# Patient Record
Sex: FEMALE | Race: BLACK OR AFRICAN AMERICAN | NOT HISPANIC OR LATINO | Employment: OTHER | URBAN - METROPOLITAN AREA
[De-identification: names, ages, dates, MRNs, and addresses within clinical notes are randomized per-mention and may not be internally consistent; named-entity substitution may affect disease eponyms.]

---

## 2017-01-27 ENCOUNTER — TELEPHONE (OUTPATIENT)
Dept: INTERNAL MEDICINE | Facility: CLINIC | Age: 67
End: 2017-01-27

## 2017-02-07 ENCOUNTER — OFFICE VISIT (OUTPATIENT)
Dept: INTERNAL MEDICINE | Facility: CLINIC | Age: 67
End: 2017-02-07
Payer: MEDICARE

## 2017-02-07 ENCOUNTER — HOSPITAL ENCOUNTER (OUTPATIENT)
Dept: RADIOLOGY | Facility: HOSPITAL | Age: 67
Discharge: HOME | End: 2017-02-07
Attending: INTERNAL MEDICINE
Payer: MEDICARE

## 2017-02-07 VITALS
TEMPERATURE: 98 F | HEART RATE: 76 BPM | SYSTOLIC BLOOD PRESSURE: 124 MMHG | DIASTOLIC BLOOD PRESSURE: 70 MMHG | BODY MASS INDEX: 31.17 KG/M2 | WEIGHT: 170.44 LBS | RESPIRATION RATE: 15 BRPM

## 2017-02-07 DIAGNOSIS — M25.561 CHRONIC PAIN OF RIGHT KNEE: ICD-10-CM

## 2017-02-07 DIAGNOSIS — G89.29 CHRONIC BILATERAL LOW BACK PAIN WITHOUT SCIATICA: ICD-10-CM

## 2017-02-07 DIAGNOSIS — E04.2 MULTIPLE THYROID NODULES: ICD-10-CM

## 2017-02-07 DIAGNOSIS — I10 ESSENTIAL HYPERTENSION: Primary | Chronic | ICD-10-CM

## 2017-02-07 DIAGNOSIS — M54.50 CHRONIC BILATERAL LOW BACK PAIN WITHOUT SCIATICA: ICD-10-CM

## 2017-02-07 DIAGNOSIS — K21.9 GASTROESOPHAGEAL REFLUX DISEASE, ESOPHAGITIS PRESENCE NOT SPECIFIED: ICD-10-CM

## 2017-02-07 DIAGNOSIS — R06.02 SOB (SHORTNESS OF BREATH) ON EXERTION: ICD-10-CM

## 2017-02-07 DIAGNOSIS — G89.29 CHRONIC PAIN OF RIGHT KNEE: ICD-10-CM

## 2017-02-07 DIAGNOSIS — E55.9 VITAMIN D DEFICIENCY: ICD-10-CM

## 2017-02-07 PROCEDURE — 1160F RVW MEDS BY RX/DR IN RCRD: CPT | Mod: S$GLB,,, | Performed by: INTERNAL MEDICINE

## 2017-02-07 PROCEDURE — 3074F SYST BP LT 130 MM HG: CPT | Mod: S$GLB,,, | Performed by: INTERNAL MEDICINE

## 2017-02-07 PROCEDURE — 73562 X-RAY EXAM OF KNEE 3: CPT | Mod: 26,RT,, | Performed by: RADIOLOGY

## 2017-02-07 PROCEDURE — 99499 UNLISTED E&M SERVICE: CPT | Mod: S$GLB,,, | Performed by: INTERNAL MEDICINE

## 2017-02-07 PROCEDURE — 99214 OFFICE O/P EST MOD 30 MIN: CPT | Mod: S$GLB,,, | Performed by: INTERNAL MEDICINE

## 2017-02-07 PROCEDURE — 73562 X-RAY EXAM OF KNEE 3: CPT | Mod: TC,PO,RT

## 2017-02-07 PROCEDURE — 3078F DIAST BP <80 MM HG: CPT | Mod: S$GLB,,, | Performed by: INTERNAL MEDICINE

## 2017-02-07 PROCEDURE — 1159F MED LIST DOCD IN RCRD: CPT | Mod: S$GLB,,, | Performed by: INTERNAL MEDICINE

## 2017-02-07 PROCEDURE — 99999 PR PBB SHADOW E&M-EST. PATIENT-LVL III: CPT | Mod: PBBFAC,,, | Performed by: INTERNAL MEDICINE

## 2017-02-07 PROCEDURE — 93010 ELECTROCARDIOGRAM REPORT: CPT | Mod: S$GLB,,, | Performed by: INTERNAL MEDICINE

## 2017-02-07 PROCEDURE — 1157F ADVNC CARE PLAN IN RCRD: CPT | Mod: S$GLB,,, | Performed by: INTERNAL MEDICINE

## 2017-02-07 PROCEDURE — 93005 ELECTROCARDIOGRAM TRACING: CPT | Mod: S$GLB,,, | Performed by: INTERNAL MEDICINE

## 2017-02-07 NOTE — PROGRESS NOTES
Subjective:       Patient ID: Jojo Burrows is a 67 y.o. female.    Chief Complaint: Follow-up; Thyroid Problem; and Hypertension    HPI   The patient presents for evaluation of medical conditions.  She has hypertension and is tolerating her blood pressure medication well.  She has multinodular goiter and has been noting throat pain and soreness of the anterior neck at the site of her thyroid.  Symptoms have been present for the past 2 weeks.  She also admits to periods of shortness of breath.  She complains of easy fatigability.  Occasional upper sternal and left sided chest pains are noted at rest or with physical activity.    The patient has chronic joint pains involving the hips and knee joints.  She has been experiencing more symptoms of pain in her right knee.  She states this has limited her ability to exercise more.  There is no history of any recent injury involving the joint.  She has use meloxicam alternating with Tylenol for joint pain management.  Review of Systems   Constitutional: Positive for activity change. Negative for appetite change, fatigue and unexpected weight change.   HENT: Positive for sore throat.    Eyes: Negative for visual disturbance.   Respiratory: Positive for shortness of breath. Negative for cough.    Cardiovascular: Positive for chest pain. Negative for palpitations and leg swelling.   Gastrointestinal: Negative for abdominal pain, blood in stool, constipation and diarrhea.   Genitourinary: Negative for dysuria and hematuria.   Musculoskeletal: Positive for arthralgias, back pain, joint swelling and neck pain. Negative for neck stiffness.   Skin: Negative for rash.   Neurological: Negative for dizziness, syncope and headaches.   Psychiatric/Behavioral: Negative for sleep disturbance.       Objective:      Physical Exam   Constitutional: She is oriented to person, place, and time. She appears well-developed and well-nourished. No distress.   HENT:   Head: Normocephalic and  atraumatic.   Eyes: Conjunctivae and EOM are normal. No scleral icterus.   Neck: Normal range of motion. Neck supple. No JVD present. Thyromegaly present.   Cardiovascular: Normal rate, regular rhythm, normal heart sounds and intact distal pulses.  Exam reveals no gallop and no friction rub.    No murmur heard.  Pulmonary/Chest: Effort normal and breath sounds normal. No respiratory distress. She has no wheezes. She has no rales.   Abdominal: Soft. Bowel sounds are normal. She exhibits no mass. There is no tenderness.   Musculoskeletal:   Mild tenderness is noted with flexion and extension of the right knee; range of motion appears to be intact.  No effusion is appreciated.   Lymphadenopathy:     She has no cervical adenopathy.   Neurological: She is alert and oriented to person, place, and time.   Skin: Skin is warm and dry. No rash noted.   Nursing note and vitals reviewed.    EKG today shows a sinus rhythm with ventricular rate of 60.  Moderate voltage criteria for LVH, may be normal variant.  Nonspecific T-wave abnormality.  Abnormal EKG.  Assessment:       1. Essential hypertension    2. Multiple thyroid nodules    3. SOB (shortness of breath) on exertion    4. Chronic pain of right knee    5. Chronic bilateral low back pain without sciatica    6. Gastroesophageal reflux disease, esophagitis presence not specified    7. Vitamin D deficiency        Plan:       Jojo was seen today for follow-up of multiple medical conditions.  Easy fatigability with recurrent shortness of breath and abnormal EKG warrant further cardiac evaluation.  A pharmacologic nuclear stress test will be obtained to assess for any significant coronary artery disease..  X-rays of the right knee will be obtained along with sports medicine consultation regarding her right knee pain as requested.  A thyroid ultrasound will be obtained for interval assessment of her nodular goiter.  Fasting blood tests and a follow-up visit in 4 months will be  obtained.    Diagnoses and all orders for this visit:    Essential hypertension  -     EKG 12-lead; Future  -     CBC auto differential; Future  -     Comprehensive metabolic panel; Future  -     Lipid panel; Future  -     Urinalysis; Future    Multiple thyroid nodules  -     US Soft Tissue Head Neck Thyroid; Future  -     TSH; Future  -     T4, free; Future    SOB (shortness of breath) on exertion  -     EKG 12-lead; Future  -     Nuclear Stress Test - Pharmacologic - Interpreted by Cardiology ([facility]); Future    Chronic pain of right knee  -     X-Ray Knee 3 View Right; Future  -     Ambulatory consult to Sports Medicine    Chronic bilateral low back pain without sciatica    Gastroesophageal reflux disease, esophagitis presence not specified    Vitamin D deficiency  -     Vitamin D; Future

## 2017-02-09 ENCOUNTER — HOSPITAL ENCOUNTER (OUTPATIENT)
Dept: RADIOLOGY | Facility: HOSPITAL | Age: 67
Discharge: HOME | End: 2017-02-09
Attending: INTERNAL MEDICINE
Payer: MEDICARE

## 2017-02-09 DIAGNOSIS — E04.2 MULTIPLE THYROID NODULES: ICD-10-CM

## 2017-02-09 PROCEDURE — 76536 US EXAM OF HEAD AND NECK: CPT | Mod: TC

## 2017-02-09 PROCEDURE — 76536 US EXAM OF HEAD AND NECK: CPT | Mod: 26,,, | Performed by: RADIOLOGY

## 2017-02-13 ENCOUNTER — OFFICE VISIT (OUTPATIENT)
Dept: SPORTS MEDICINE | Facility: CLINIC | Age: 67
End: 2017-02-13
Payer: MEDICARE

## 2017-02-13 ENCOUNTER — HOSPITAL ENCOUNTER (OUTPATIENT)
Dept: RADIOLOGY | Facility: HOSPITAL | Age: 67
Discharge: HOME | End: 2017-02-13
Attending: FAMILY MEDICINE
Payer: MEDICARE

## 2017-02-13 VITALS — HEIGHT: 62 IN | WEIGHT: 169 LBS | BODY MASS INDEX: 31.1 KG/M2 | TEMPERATURE: 98 F

## 2017-02-13 DIAGNOSIS — M25.561 CHRONIC PAIN OF RIGHT KNEE: ICD-10-CM

## 2017-02-13 DIAGNOSIS — G89.29 CHRONIC PAIN OF RIGHT KNEE: ICD-10-CM

## 2017-02-13 DIAGNOSIS — G89.29 CHRONIC PAIN OF BOTH KNEES: ICD-10-CM

## 2017-02-13 DIAGNOSIS — M25.561 CHRONIC PAIN OF BOTH KNEES: ICD-10-CM

## 2017-02-13 DIAGNOSIS — M25.562 CHRONIC PAIN OF BOTH KNEES: ICD-10-CM

## 2017-02-13 DIAGNOSIS — M17.0 PRIMARY OSTEOARTHRITIS OF BOTH KNEES: Primary | ICD-10-CM

## 2017-02-13 PROCEDURE — 73562 X-RAY EXAM OF KNEE 3: CPT | Mod: 26,50,, | Performed by: RADIOLOGY

## 2017-02-13 PROCEDURE — 1125F AMNT PAIN NOTED PAIN PRSNT: CPT | Mod: S$GLB,,, | Performed by: FAMILY MEDICINE

## 2017-02-13 PROCEDURE — 99999 PR PBB SHADOW E&M-EST. PATIENT-LVL III: CPT | Mod: PBBFAC,,, | Performed by: FAMILY MEDICINE

## 2017-02-13 PROCEDURE — 1159F MED LIST DOCD IN RCRD: CPT | Mod: S$GLB,,, | Performed by: FAMILY MEDICINE

## 2017-02-13 PROCEDURE — 1160F RVW MEDS BY RX/DR IN RCRD: CPT | Mod: S$GLB,,, | Performed by: FAMILY MEDICINE

## 2017-02-13 PROCEDURE — 20611 DRAIN/INJ JOINT/BURSA W/US: CPT | Mod: 50,S$GLB,, | Performed by: FAMILY MEDICINE

## 2017-02-13 PROCEDURE — 1157F ADVNC CARE PLAN IN RCRD: CPT | Mod: S$GLB,,, | Performed by: FAMILY MEDICINE

## 2017-02-13 PROCEDURE — 99203 OFFICE O/P NEW LOW 30 MIN: CPT | Mod: 25,S$GLB,, | Performed by: FAMILY MEDICINE

## 2017-02-13 PROCEDURE — 73562 X-RAY EXAM OF KNEE 3: CPT | Mod: TC,50,PO,RT

## 2017-02-13 RX ORDER — TRIAMCINOLONE ACETONIDE 40 MG/ML
40 INJECTION, SUSPENSION INTRA-ARTICULAR; INTRAMUSCULAR
Status: DISCONTINUED | OUTPATIENT
Start: 2017-02-13 | End: 2017-02-13 | Stop reason: HOSPADM

## 2017-02-13 RX ADMIN — TRIAMCINOLONE ACETONIDE 40 MG: 40 INJECTION, SUSPENSION INTRA-ARTICULAR; INTRAMUSCULAR at 11:02

## 2017-02-13 NOTE — LETTER
February 13, 2017      Joo Munoz MD  [address]  [place] [address]           [facility]  [address] [address]  Phone: [phone]          Patient: Jojo Burrows   MR Number: [id number]   YOB: 1950   Date of Visit: 2/13/2017       Dear Dr. Joo Munoz:    Thank you for referring Jojo Burrows to me for evaluation. Attached you will find relevant portions of my assessment and plan of care.    If you have questions, please do not hesitate to call me. I look forward to following Jojo Burrows along with you.    Sincerely,    Moreno Bergeron MD    Enclosure  CC:  No Recipients    If you would like to receive this communication electronically, please contact [email or web address] or [phone] to request more information on [employer] [facility] access.    For providers and/or their staff who would like to refer a patient to [facility], please contact us through our one-stop-shop provider referral line, [facility], [place], at [phone].    If you feel you have received this communication in error or would no longer like to receive these types of communications, please e-mail [email or web address]

## 2017-02-13 NOTE — MR AVS SNAPSHOT
[facility]  [address]  [facility] [address]  Phone: [phone]                  Jojo Burrows   2017 9:45 AM   Appointment    Description:  Female : 1950   Provider:  Moreno Bergeron MD   Department:  [facility]                To Do List           Future Appointments        Provider Department Dept Phone    2017 7:15 AM NUCLEAR CAMERA, [facility] - Nuclear Camera [phone]    2017 8:00 AM LAB, [place] [facility] - Laboratory [phone]    2017 8:15 AM SPECIMEN, [place] [facility] - Specimen Lab [phone]    2017 1:00 PM Joo Munoz MD [facility] - Internal Medicine [phone]      Goals (5 Years of Data)     None      [facility] On Call     [facility] On Call Nurse [facility] -  Assistance  Registered nurses in the [facility] On Call Center provide clinical advisement, health education, appointment booking, and other advisory services.  Call for this free service at [phone].             Medications           Message regarding Medications     Verify the changes and/or additions to your medication regime listed below are the same as discussed with your clinician today.  If any of these changes or additions are incorrect, please notify your healthcare provider.             Verify that the below list of medications is an accurate representation of the medications you are currently taking.  If none reported, the list may be blank. If incorrect, please contact your healthcare provider. Carry this list with you in case of emergency.           Current Medications     esomeprazole (NEXIUM) 40 MG capsule Take 1 capsule (40 mg total) by mouth before breakfast.    meloxicam (MOBIC) 15 MG tablet Take 1 tablet (15 mg total) by mouth once daily.    metoprolol succinate (TOPROL-XL) 50 MG 24 hr tablet TAKE 1 TABLET BY MOUTH EVERY DAY           Clinical Reference Information           Allergies as of 2017     Iodine     Iodine And Iodide Containing Products    Shellfish Containing Products      Immunizations Administered on Date of Encounter - 2/13/2017     None      Language Assistance Services     ATTENTION: Language assistance services are available, free of charge. Please call [phone].      ATENCIÓN: Si dedrick li, tiene a quezada disposición servicios gratuitos de asistencia lingüística. Llame al [phone].     CHÚ Ý: N?u b?n nói Ti?ng Vi?t, có các d?ch v? h? tr? ngôn ng? mi?n phí dành cho b?n. G?i s? [phone].         [facility] complies with applicable Federal civil rights laws and does not discriminate on the basis of race, color, national origin, age, disability, or sex.

## 2017-02-13 NOTE — PROGRESS NOTES
Jojo Burrows, a 67 y.o. female, presents today for evaluation of her right knee.      This patient visit is a consult from the following provider: Joo Munoz MD  Today's office visit notes will be made available to the consulting/refering provider via the mail, a fax, and/or an in basket message through the electronic medical record    New patient.    HISTORY OF PRESENT ILLNESS   Location: anterior knee, right  Onset: insidious,many years  Palliative:    Relative rest   Oral analgesics (meloxicam,tylenol)  Provocative:    ADLs  Prior:    14.05 - L knee - arthroscopy - PPetrocy   Progression: worsening discomfort  Quality:    Clicking    Swollen    Sharp at times  Radiation: none  Severity: per nursing documentation  Timing: intermittent w/ use  Trauma: none     Review of systems (ROS):  A 10+ review of systems was performed with pertinent positives and negatives noted above in the history of present illness. Other systems were negative unless otherwise specified.    PHYSICAL EXAMINATION  General:  The patient is alert and oriented x 3. Mood is pleasant. Observation of ears, eyes and nose reveal no gross abnormalities. HEENT: NCAT, sclera anicteric.   Lungs: Respirations are equal and unlabored.  Gait is coordinated. Patient can toe walk and heel walk without difficulty.    RIGHT KNEE EXAMINATION    Observation/Inspection  Gait:   Antalgic   Alignment:  Neutral   Scars:   None   Muscle atrophy: Mild  Effusion:  None   Warmth:  None   Discoloration:   none     Tenderness / Crepitus (T / C):         T / C      T / C  Patella   + / -   Lateral joint line   + / -     Peripatellar medial  +  Medial joint line    + / -  Peripatellar lateral +  Medial plica   - / -  Patellar tendon +   Popliteal fossa   + / -  Quad tendon   +   Gastrocnemius   -  Prepatellar Bursa + / -   Quadricep   -  Tibial tubercle  -  Thigh/hamstring  -  Pes anserine/HS -  Fibula    -  ITB   - / -  Tibia     -  Tib/fib joint  - /  -  LCL    -    MFC   - / -   MCL: Proximal  -    LFC   - / -   Distal    -          ROM: (* = pain)  PASSIVE   ACTIVE    Left :   5 / 0 / 145   5 / 0 / 145     Right :    5 / 0 / 145   5 / 0 / 145    Patellofemoral examination:  See above noted areas of tenderness.   Patella position    Subluxation / dislocation: Centered        Sup. / Inf;   Normal   Crepitus (PF):    Absent   Patellar Mobility:       Medial-lateral:   Normal    Superior-inferior:  Normal    Inferior tilt   Normal    Patellar tendon:  Normal   Lateral tilt:    Normal   J-sign:     None   Patellofemoral grind:   PAINFUL     Meniscal Signs:     Pain on terminal extension:  -  Pain on terminal flexion:  -  Rebecas maneuver:  +*  Squat     NT    Ligament Examination:  ACL / Lachman:  WNL  PCL-Post.  drawer: normal 0 to 2mm  MCL- Valgus:  normal 0 to 2mm  LCL- Varus:    normal 0 to 2mm  Pivot shift:  guarding   Dial Test:   difference c/w other side   At 30° flexion: normal (< 5°)    At 90° flexion: normal (< 5°)   Reverse Pivot Shift:   normal (Equal)     Strength: (* = with pain) Painful Side  Quadriceps   4/5  Hamstrin/5    Extremity Neuro-vascular Examination:   Sensation:  Grossly intact to light touch all dermatomal regions.   Motor Function:  Fully intact motor function at hip, knee, foot and ankle    DTRs;  quadriceps and  achilles 2+.  No clonus and downgoing Babinski.    Vascular status:  DP and PT pulses 2+, brisk capillary refill, symmetric.     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, bilat   W/ knee pain, right >> left   S/p knee arthroscopy 14.05 PPatrocey, left      No evidence of neurologic pathology  No evidence of vascular pathology    Imaging studies reviewed:   X-ray knee, bilateral 17.02    Plan:    We discussed the importance of appropriate diet, weight, and regular exercise including quadriceps strengthening     We discussed options including:  #1  watchful waiting  #2 physical therapy aimed at:   Core stability   RoM knee   Strengthening quadriceps   Gait training   #3 injection therapy:   CSI iaknee     Right,     Left,    VSI iaknee    Right,     Left,    Orthobiologics   #4 MRI for further evaluation      The patient chooses #3 csi iaknee bilat    Pain management: handout given  Bracing:   Physical therapy:   Activity (e.g. sports, work) restrictions: as tolerated   school/vocation: active in [employer]    Follow up in 2 w  A/e csi iaknee bilat  Effective-->hgPT, f/u in 12 w  Ineffective-->vsi iaknee bilat  Should symptoms worsen or fail to resolve, consider:  Revisiting the above options

## 2017-02-13 NOTE — PROCEDURES
Large Joint Aspiration/Injection  Date/Time: 2/13/2017 11:15 AM  Performed by: SHANAE SWARTZ  Authorized by: SHANAE SWARTZ     Consent Done?:  Yes (Verbal)  Indications:  Pain  Procedure site marked: Yes    Timeout: Prior to procedure the correct patient, procedure, and site was verified      Location:  Knee  Site:  R knee and L knee  Prep: Patient was prepped and draped in usual sterile fashion    Ultrasonic Guidance for needle placement: Yes  Images are saved and documented.  Needle size:  18 G  Approach:  Lateral  Medications:  40 mg triamcinolone acetonide 40 mg/mL; 40 mg triamcinolone acetonide 40 mg/mL  Patient tolerance:  Patient tolerated the procedure well with no immediate complications    Additional Comments: Description of ultrasound utilization for needle guidance:   Ultrasound guidance used for needle localization.  Images saved and stored for documentation.  The knee joint was visualized.  Dynamic visualization of the 20g x 1.5  needle was continuous throughout the procedure.

## 2017-02-15 ENCOUNTER — TELEPHONE (OUTPATIENT)
Dept: CARDIOLOGY | Facility: CLINIC | Age: 67
End: 2017-02-15

## 2017-02-16 ENCOUNTER — TELEPHONE (OUTPATIENT)
Dept: INTERNAL MEDICINE | Facility: CLINIC | Age: 67
End: 2017-02-16

## 2017-02-16 ENCOUNTER — CLINICAL SUPPORT (OUTPATIENT)
Dept: CARDIOLOGY | Facility: CLINIC | Age: 67
End: 2017-02-16
Payer: MEDICARE

## 2017-02-16 DIAGNOSIS — N63.0 BREAST NODULE: Primary | ICD-10-CM

## 2017-02-16 DIAGNOSIS — R06.02 SOB (SHORTNESS OF BREATH) ON EXERTION: ICD-10-CM

## 2017-02-16 PROCEDURE — 99999 PR PBB SHADOW E&M-EST. PATIENT-LVL I: CPT | Mod: PBBFAC,,,

## 2017-02-16 PROCEDURE — A9502 TC99M TETROFOSMIN: HCPCS | Mod: S$GLB,,, | Performed by: INTERNAL MEDICINE

## 2017-02-16 PROCEDURE — 93015 CV STRESS TEST SUPVJ I&R: CPT | Mod: S$GLB,,, | Performed by: INTERNAL MEDICINE

## 2017-02-16 PROCEDURE — 78452 HT MUSCLE IMAGE SPECT MULT: CPT | Mod: S$GLB,,, | Performed by: INTERNAL MEDICINE

## 2017-02-16 NOTE — TELEPHONE ENCOUNTER
Pharmacologic nuclear stress test results were reviewed with the patient today.  There is no evidence of stress-induced myocardial ischemia.      Incidental note was made of increased uptake in a nodular lesion in the left breast.  These results were also discussed with patient.  Her last mammogram and breast ultrasound were obtained one year ago.  At that time cysts were found in both breasts.  A diagnostic mammogram will be ordered for further evaluation.  The patient is agreeable to proceeding with further evaluation as recommended.

## 2017-02-16 NOTE — TELEPHONE ENCOUNTER
----- Message from Shireen Rascon sent at 2/16/2017  3:11 PM CST -----  Contact: [phone]/  Pt had a nuclear speck done today. Dr want to tell results on pt.

## 2017-02-16 NOTE — TELEPHONE ENCOUNTER
----- Message from Shireen Rascon sent at 2/16/2017  3:11 PM CST -----  Contact: [phone]/  Pt had a nuclear speck done today. Dr want to tell results on pt.

## 2017-02-23 ENCOUNTER — HOSPITAL ENCOUNTER (OUTPATIENT)
Dept: RADIOLOGY | Facility: HOSPITAL | Age: 67
Discharge: HOME | End: 2017-02-23
Attending: INTERNAL MEDICINE
Payer: MEDICARE

## 2017-02-23 DIAGNOSIS — N63.0 BREAST NODULE: ICD-10-CM

## 2017-02-23 PROCEDURE — 77062 BREAST TOMOSYNTHESIS BI: CPT | Mod: TC

## 2017-02-23 PROCEDURE — 76642 ULTRASOUND BREAST LIMITED: CPT | Mod: TC,LT

## 2017-02-23 PROCEDURE — 76642 ULTRASOUND BREAST LIMITED: CPT | Mod: 26,LT,, | Performed by: RADIOLOGY

## 2017-02-23 PROCEDURE — 77062 BREAST TOMOSYNTHESIS BI: CPT | Mod: 26,,, | Performed by: RADIOLOGY

## 2017-02-23 PROCEDURE — 77066 DX MAMMO INCL CAD BI: CPT | Mod: 26,,, | Performed by: RADIOLOGY

## 2017-03-01 ENCOUNTER — HOSPITAL ENCOUNTER (OUTPATIENT)
Dept: RADIOLOGY | Facility: OTHER | Age: 67
Discharge: HOME | End: 2017-03-01
Attending: INTERNAL MEDICINE
Payer: MEDICARE

## 2017-03-01 DIAGNOSIS — Z00.00 GENERAL MEDICAL EXAM: ICD-10-CM

## 2017-03-01 DIAGNOSIS — E03.9 ACQUIRED HYPOTHYROIDISM: Primary | ICD-10-CM

## 2017-03-01 DIAGNOSIS — E83.51 HYPOCALCEMIA: ICD-10-CM

## 2017-03-01 DIAGNOSIS — I10 ESSENTIAL HYPERTENSION: ICD-10-CM

## 2017-03-01 DIAGNOSIS — R92.8 ABNORMAL MAMMOGRAM: ICD-10-CM

## 2017-03-01 PROCEDURE — 77059 MRI BREAST BILATERAL: CPT | Mod: 26,,, | Performed by: RADIOLOGY

## 2017-03-01 PROCEDURE — A9577 INJ MULTIHANCE: HCPCS | Performed by: INTERNAL MEDICINE

## 2017-03-01 PROCEDURE — 0159T MRI BREAST BILATERAL: CPT | Mod: TC

## 2017-03-01 PROCEDURE — 0159T MRI BREAST BILATERAL: CPT | Mod: 26,,, | Performed by: RADIOLOGY

## 2017-03-01 PROCEDURE — 25500020 PHARM REV CODE 255: Performed by: INTERNAL MEDICINE

## 2017-03-01 PROCEDURE — 77059 MRI BREAST BILATERAL: CPT | Mod: TC

## 2017-03-01 RX ADMIN — GADOBENATE DIMEGLUMINE 15 ML: 529 INJECTION, SOLUTION INTRAVENOUS at 03:03

## 2017-03-02 ENCOUNTER — OFFICE VISIT (OUTPATIENT)
Dept: SPORTS MEDICINE | Facility: CLINIC | Age: 67
End: 2017-03-02
Payer: MEDICARE

## 2017-03-02 VITALS — TEMPERATURE: 98 F | HEIGHT: 62 IN | WEIGHT: 169 LBS | BODY MASS INDEX: 31.1 KG/M2

## 2017-03-02 DIAGNOSIS — M17.0 PRIMARY OSTEOARTHRITIS OF BOTH KNEES: Primary | ICD-10-CM

## 2017-03-02 PROCEDURE — 99214 OFFICE O/P EST MOD 30 MIN: CPT | Mod: 25,S$GLB,, | Performed by: FAMILY MEDICINE

## 2017-03-02 PROCEDURE — 1125F AMNT PAIN NOTED PAIN PRSNT: CPT | Mod: S$GLB,,, | Performed by: FAMILY MEDICINE

## 2017-03-02 PROCEDURE — 99999 PR PBB SHADOW E&M-EST. PATIENT-LVL III: CPT | Mod: PBBFAC,,, | Performed by: FAMILY MEDICINE

## 2017-03-02 PROCEDURE — 1157F ADVNC CARE PLAN IN RCRD: CPT | Mod: S$GLB,,, | Performed by: FAMILY MEDICINE

## 2017-03-02 PROCEDURE — 1160F RVW MEDS BY RX/DR IN RCRD: CPT | Mod: S$GLB,,, | Performed by: FAMILY MEDICINE

## 2017-03-02 PROCEDURE — 1159F MED LIST DOCD IN RCRD: CPT | Mod: S$GLB,,, | Performed by: FAMILY MEDICINE

## 2017-03-02 PROCEDURE — 97110 THERAPEUTIC EXERCISES: CPT | Mod: GP,S$GLB,, | Performed by: FAMILY MEDICINE

## 2017-03-02 NOTE — PROGRESS NOTES
Jojo Burrows, a 67 y.o. female, presents today for evaluation of her right knee.      This patient visit is a consult from the following provider: Joo Munoz MD  Today's office visit notes will be made available to the consulting/refering provider via the mail, a fax, and/or an in basket message through the electronic medical record    HISTORY OF PRESENT ILLNESS   Location: anterior knee, right > left   Onset: insidious,many years  Palliative:    Relative rest   Oral analgesics (meloxicam,tylenol)   R/L - CSI, iaKnee, 02/13/17, 85-90% improvement   Provocative:    Decreased pain with    ADLs  Prior:    14.05 - L knee - arthroscopy - PPetrocy   Progression: resolving discomfort  Quality:    Was clicking    Was swollen    Sharp at times  Radiation: none  Severity: per nursing documentation  Timing: intermittent w/ use  Trauma: none     Review of systems (ROS):  A 10+ review of systems was performed with pertinent positives and negatives noted above in the history of present illness. Other systems were negative unless otherwise specified.    PHYSICAL EXAMINATION  General:  The patient is alert and oriented x 3. Mood is pleasant. Observation of ears, eyes and nose reveal no gross abnormalities. HEENT: NCAT, sclera anicteric.   Lungs: Respirations are equal and unlabored.  Gait is coordinated. Patient can toe walk and heel walk without difficulty.    RIGHT KNEE EXAMINATION    Observation/Inspection  Gait:   Antalgic   Alignment:  Neutral   Scars:   None   Muscle atrophy: Mild  Effusion:  None   Warmth:  None   Discoloration:   none     Tenderness / Crepitus (T / C):         T / C      T / C  Patella   + / -   Lateral joint line   + / -     Peripatellar medial  +  Medial joint line    + / -  Peripatellar lateral +  Medial plica   - / -  Patellar tendon +   Popliteal fossa   + / -  Quad tendon   +   Gastrocnemius   -  Prepatellar Bursa + / -   Quadricep   -  Tibial tubercle  -  Thigh/hamstring  -  Pes  anserine/HS -  Fibula    -  ITB   - / -  Tibia     -  Tib/fib joint  - / -  LCL    -    MFC   - / -   MCL: Proximal  -    LFC   - / -   Distal    -          ROM: (* = pain)  PASSIVE   ACTIVE    Left :   5 / 0 / 145   5 / 0 / 145     Right :    5 / 0 / 145   5 / 0 / 145    Patellofemoral examination:  See above noted areas of tenderness.   Patella position    Subluxation / dislocation: Centered        Sup. / Inf;   Normal   Crepitus (PF):    Absent   Patellar Mobility:       Medial-lateral:   Normal    Superior-inferior:  Normal    Inferior tilt   Normal    Patellar tendon:  Normal   Lateral tilt:    Normal   J-sign:     None   Patellofemoral grind:   PAINFUL     Meniscal Signs:     Pain on terminal extension:  -  Pain on terminal flexion:  -  Rebecas maneuver:  +*  Squat     NT    Ligament Examination:  ACL / Lachman:  WNL  PCL-Post.  drawer: normal 0 to 2mm  MCL- Valgus:  normal 0 to 2mm  LCL- Varus:    normal 0 to 2mm  Pivot shift:  guarding   Dial Test:   difference c/w other side   At 30° flexion: normal (< 5°)    At 90° flexion: normal (< 5°)   Reverse Pivot Shift:   normal (Equal)     Strength: (* = with pain) Painful Side  Quadriceps   4/5  Hamstrin/5    Extremity Neuro-vascular Examination:   Sensation:  Grossly intact to light touch all dermatomal regions.   Motor Function:  Fully intact motor function at hip, knee, foot and ankle    DTRs;  quadriceps and  achilles 2+.  No clonus and downgoing Babinski.    Vascular status:  DP and PT pulses 2+, brisk capillary refill, symmetric.     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, bilat   W/ knee pain, right >> left   S/p knee arthroscopy 14.05 PPatrocey, left      No evidence of neurologic pathology  No evidence of vascular pathology    Imaging studies reviewed:   X-ray knee, bilateral 17.02    Plan:    We discussed the importance of appropriate diet, weight, and regular exercise  "including quadriceps strengthening     We discussed options including:  #1 watchful waiting  #2 physical therapy aimed at:   Core stability   RoM knee   Strengthening quadriceps   Gait training   #3 injection therapy:   CSI iaknee     Right, effective 85-90%, repeat     Left, effective 85-90%, repeat    VSI iaknee    Right,     Left,    Orthobiologics   #4 MRI for further evaluation      The patient chooses #2    Pain management: handout given  Bracing:   Physical therapy: hgPT, handout given, begin as above  Activity (e.g. sports, work) restrictions: as tolerated   school/vocation: active in [employer]    Follow up in 12 w  A/e hgPT  Should symptoms worsen or fail to resolve, consider:  Revisiting the above options  T-  O-  I-  Ot-  OFC+ 17.03.02) 29988 HOME EXERCISE PROGRAM (HEP):  The patient was taught a homegoing physical therapy regimen as described above and based on the appropriate chapter of "The Sports Medicine Patient Advisor," by Porfirio Bolivar, [id number]. The patient demonstrated understanding of the exercises and proper technique of their execution. This interaction took 15 minutes.       "

## 2017-03-02 NOTE — MR AVS SNAPSHOT
[facility]  [address]  [facility] [address]  Phone: [phone]                  Jojo Burrows   3/2/2017 10:15 AM   Appointment    Description:  Female : 1950   Provider:  Moreno Bergeron MD   Department:  [facility]                To Do List           Future Appointments        Provider Department Dept Phone    2017 8:00 AM LAB, [place] - Laboratory [phone]    2017 8:15 AM SPECIMEN, [place] - Specimen Lab [phone]    2017 1:00 PM Joo Munoz MD [facility] Internal Medicine [phone]      Goals (5 Years of Data)     None      [facility] On Call     [facility] On Call Nurse Care Line -  Assistance  Registered nurses in the [facility] On Call Center provide clinical advisement, health education, appointment booking, and other advisory services.  Call for this free service at [phone].             Medications           Message regarding Medications     Verify the changes and/or additions to your medication regime listed below are the same as discussed with your clinician today.  If any of these changes or additions are incorrect, please notify your healthcare provider.             Verify that the below list of medications is an accurate representation of the medications you are currently taking.  If none reported, the list may be blank. If incorrect, please contact your healthcare provider. Carry this list with you in case of emergency.           Current Medications     esomeprazole (NEXIUM) 40 MG capsule Take 1 capsule (40 mg total) by mouth before breakfast.    meloxicam (MOBIC) 15 MG tablet Take 1 tablet (15 mg total) by mouth once daily.    metoprolol succinate (TOPROL-XL) 50 MG 24 hr tablet TAKE 1 TABLET BY MOUTH EVERY DAY           Clinical Reference Information           Allergies as of 3/2/2017     Iodine    Iodine And Iodide Containing Products    Shellfish Containing Products      Immunizations  Administered on Date of Encounter - 3/2/2017     None      Language Assistance Services     ATTENTION: Language assistance services are available, free of charge. Please call [phone].      ATENCIÓN: Si habharini li, tiene a quezada disposición servicios gratuitos de asistencia lingüística. Llame al [phone].     CHÚ Ý: N?u b?n nói Ti?ng Vi?t, có các d?ch v? h? tr? ngôn ng? mi?n phí dành cho b?n. G?i s? [phone].         [facility] complies with applicable Federal civil rights laws and does not discriminate on the basis of race, color, national origin, age, disability, or sex.

## 2017-03-02 NOTE — LETTER
March 2, 2017      Joo Munoz MD  [address]  [place] [address]           [facility]  [address] [address]  Phone: [phone]          Patient: Jojo Burrows   MR Number: [id number]   YOB: 1950   Date of Visit: 3/2/2017       Dear Dr. Joo Munoz:    Thank you for referring Jojo Burrows to me for evaluation. Attached you will find relevant portions of my assessment and plan of care.    If you have questions, please do not hesitate to call me. I look forward to following Jojo Burrows along with you.    Sincerely,    Moreno Bergeron MD    Enclosure  CC:  No Recipients    If you would like to receive this communication electronically, please contact [email or web address] or [phone] to request more information on [employer] Link access.    For providers and/or their staff who would like to refer a patient to [facility], please contact us through our one-stop-shop provider referral line, [facility], at [phone].    If you feel you have received this communication in error or would no longer like to receive these types of communications, please e-mail [email or web address]

## 2017-03-24 RX ORDER — PROMETHAZINE HYDROCHLORIDE AND DEXTROMETHORPHAN HYDROBROMIDE 6.25; 15 MG/5ML; MG/5ML
5 SYRUP ORAL EVERY 4 HOURS PRN
Qty: 240 ML | Refills: 0 | Status: SHIPPED | OUTPATIENT
Start: 2017-03-24 | End: 2017-03-25

## 2017-03-24 RX ORDER — AZITHROMYCIN 250 MG/1
TABLET, FILM COATED ORAL
Qty: 6 TABLET | Refills: 0 | Status: SHIPPED | OUTPATIENT
Start: 2017-03-24 | End: 2017-03-29

## 2017-03-24 NOTE — TELEPHONE ENCOUNTER
----- Message from SanjanaRubenshabnam Herrera sent at 3/24/2017 10:52 AM CDT -----  Contact: Pt at [phone]  Patient would like to get medical advice.  Symptoms (please be specific):  Cough, sore throat  How long has patient had these symptoms:  About 2 days  Pharmacy name and phone #:  [facility]  [phone] (Phone)  [phone] (Fax)  Any drug allergies:   IodineRash  Iodine And Iodide Containing ProductsItching, Rash  Shellfish Containing ProductsItching, Rash      Comments:

## 2017-03-25 ENCOUNTER — HOSPITAL ENCOUNTER (EMERGENCY)
Facility: HOSPITAL | Age: 67
Discharge: HOME | End: 2017-03-25
Attending: EMERGENCY MEDICINE
Payer: MEDICARE

## 2017-03-25 ENCOUNTER — NURSE TRIAGE (OUTPATIENT)
Dept: ADMINISTRATIVE | Facility: CLINIC | Age: 67
End: 2017-03-25

## 2017-03-25 VITALS
BODY MASS INDEX: 30.73 KG/M2 | HEIGHT: 62 IN | HEART RATE: 90 BPM | SYSTOLIC BLOOD PRESSURE: 141 MMHG | WEIGHT: 167 LBS | DIASTOLIC BLOOD PRESSURE: 68 MMHG | TEMPERATURE: 98 F | RESPIRATION RATE: 20 BRPM | OXYGEN SATURATION: 98 %

## 2017-03-25 DIAGNOSIS — J06.9 UPPER RESPIRATORY TRACT INFECTION, UNSPECIFIED TYPE: Primary | ICD-10-CM

## 2017-03-25 DIAGNOSIS — R05.9 COUGH: ICD-10-CM

## 2017-03-25 LAB
FLUAV AG SPEC QL IA: NEGATIVE
FLUBV AG SPEC QL IA: NEGATIVE
SPECIMEN SOURCE: NORMAL

## 2017-03-25 PROCEDURE — 99284 EMERGENCY DEPT VISIT MOD MDM: CPT

## 2017-03-25 PROCEDURE — 87400 INFLUENZA A/B EACH AG IA: CPT

## 2017-03-25 PROCEDURE — 93005 ELECTROCARDIOGRAM TRACING: CPT

## 2017-03-25 PROCEDURE — 93010 ELECTROCARDIOGRAM REPORT: CPT | Mod: ,,, | Performed by: INTERNAL MEDICINE

## 2017-03-25 RX ORDER — PROMETHAZINE HYDROCHLORIDE AND CODEINE PHOSPHATE 6.25; 1 MG/5ML; MG/5ML
5 SOLUTION ORAL EVERY 4 HOURS PRN
Qty: 120 ML | Refills: 0 | Status: SHIPPED | OUTPATIENT
Start: 2017-03-25 | End: 2017-04-04

## 2017-03-25 NOTE — ED AVS SNAPSHOT
[facility] [address]  [place] [address]               Jojo Burrows   3/25/2017  4:07 PM   ED    Description:  Female : 1950   Department:  [facility]           Your Care was Coordinated By:     Provider Role From To    Alvin Cannon MD Attending Provider 17 0319 --      Reason for Visit     Cough     Pleurisy           Diagnoses this Visit        Comments    Upper respiratory tract infection, unspecified type    -  Primary     Cough           ED Disposition     None           To Do List           Follow-up Information     Follow up with Joo Munoz MD.    Specialty:  Internal Medicine    Why:  As needed    Contact information:     [facility] [address] [place] [place] [address]  [phone]         These Medications        Disp Refills Start End    promethazine-codeine 6.25-10 mg/5 ml (PHENERGAN WITH CODEINE) 6.25-10 mg/5 mL syrup 120 mL 0 3/25/2017 2017    Take 5 mLs by mouth every 4 (four) hours as needed for Cough. - Oral    Pharmacy: [facility]/pharmacy #[id number] - [place] - [address] Ph #: [phone]         [facility]     [facility] Care [facility] -  Assistance  Registered nurses in the [facility] provide clinical advisement, health education, appointment booking, and other advisory services.  Call for this free service at [phone].             Medications           Message regarding Medications     Verify the changes and/or additions to your medication regime listed below are the same as discussed with your clinician today.  If any of these changes or additions are incorrect, please notify your healthcare provider.        START taking these NEW medications        Refills    promethazine-codeine 6.25-10 mg/5 ml (PHENERGAN WITH CODEINE) 6.25-10 mg/5 mL syrup 0    Sig: Take 5 mLs by mouth every 4 (four) hours as needed for Cough.    Class: Print    Route: Oral      STOP taking  "these medications     promethazine-dextromethorphan (PROMETHAZINE-DM) 6.25-15 mg/5 mL Syrp Take 5 mLs by mouth every 4 (four) hours as needed.           Verify that the below list of medications is an accurate representation of the medications you are currently taking.  If none reported, the list may be blank. If incorrect, please contact your healthcare provider. Carry this list with you in case of emergency.           Current Medications     azithromycin (Z-CARRIE) 250 MG tablet Take 2 tablets by mouth on day 1; Take 1 tablet by mouth on days 2-5    esomeprazole (NEXIUM) 40 MG capsule Take 1 capsule (40 mg total) by mouth before breakfast.    meloxicam (MOBIC) 15 MG tablet Take 1 tablet (15 mg total) by mouth once daily.    metoprolol succinate (TOPROL-XL) 50 MG 24 hr tablet TAKE 1 TABLET BY MOUTH EVERY DAY    promethazine-codeine 6.25-10 mg/5 ml (PHENERGAN WITH CODEINE) 6.25-10 mg/5 mL syrup Take 5 mLs by mouth every 4 (four) hours as needed for Cough.           Clinical Reference Information           Your Vitals Were     BP Pulse Temp Resp Height Weight    141/68 (BP Location: Right arm, Patient Position: Lying) 84 98.4 °F (36.9 °C) (Oral) 20 5' 2" (1.575 m) 75.8 kg (167 lb)    SpO2 BMI             96% 30.54 kg/m2         Allergies as of 3/25/2017        Reactions    Iodine Rash    Iodine And Iodide Containing Products Itching, Rash    Shellfish Containing Products Itching, Rash      Immunizations Administered on Date of Encounter - 3/25/2017     None      ED Micro, Lab, POCT     Start Ordered       Status Ordering Provider    03/25/17 1620 03/25/17 1620  Influenza antigen Nasal Swab  STAT      Final result       ED Imaging Orders     Start Ordered       Status Ordering Provider    03/25/17 1620 03/25/17 1620  X-Ray Chest PA And Lateral  1 time imaging      Final result       Your Scheduled Appointments     Jun 02, 2017  8:00 AM CDT   Fasting Lab with LAB, [facility]   [facility]    [address] [facility]  [place] [address]   [phone]            Jun 02, 2017  8:15 AM CDT   Urine with SPECIMEN, [place] - Specimen Lab ([place])    [address] [address]   [phone]            Jun 06, 2017  1:00 PM CDT   Physical with MD [place] [facility] - Internal Medicine ([place])    [address] [facility] [address]   [phone]               [facility] complies with applicable Federal civil rights laws and does not discriminate on the basis of race, color, national origin, age, disability, or sex.        Language Assistance Services     ATTENTION: Language assistance services are available, free of charge. Please call [phone].      ATENCIÓN: Si habla español, tiene a quezada disposición servicios gratuitos de asistencia lingüística. Llame al [phone].     CHÚ Ý: N?u b?n nói Ti?ng Vi?t, có các d?ch v? h? tr? ngôn ng? mi?n phí dành cho b?n. G?i s? [phone].

## 2017-03-25 NOTE — TELEPHONE ENCOUNTER
Reason for Disposition   Wheezing is present    Protocols used: ST COUGH - ACUTE [id number]    Patient called her md and got zpak and cough syrup from her pcp office yesterday. Today patient complains of a cough and wheezing and sore throat. Patient given the information for [facility] clinic nearest her home and she was admonished to have someone to drive her to urgent care. Also, advised patient to go to the ED if wheezing becomes severe. Patient verbalized understanding.

## 2017-03-25 NOTE — ED PROVIDER NOTES
Encounter Date: 3/25/2017       History     Chief Complaint   Patient presents with    Cough     pt presents to ed wtih c/o cough and nasal congestion since yesterday. reports cough is non-productive. pt c/o tightness to lower chest. reports tightness is worse with cough and movement. reports use of mucinex & theraflu at home without relief. called PCP yesterday and began taking z-pack for symtpoms yesterday    Pleurisy     Review of patient's allergies indicates:   Allergen Reactions    Iodine Rash    Iodine and iodide containing products Itching and Rash    Shellfish containing products Itching and Rash     Patient is a 67 y.o. female presenting with the following complaint: cough. The history is provided by the patient.   Cough   This is a new problem. The current episode started two days ago. The problem has been unchanged. The cough is non-productive. There has been no fever. Associated symptoms include sore throat. Pertinent negatives include no chest pain, no chills and no shortness of breath. She is not a smoker.   She called her primary doctor and he phoned in a Z-pack and cough syrup. She is taking the Z-pack but says the pharmacist told her the syrup is no longer made. The cough is keeping her awake at night.  Past Medical History:   Diagnosis Date    ALLERGIC RHINITIS     GERD (gastroesophageal reflux disease)     Hyperlipidemia     Hypertension     Migraine headache     Nuclear sclerosis 4/30/2014    Sleep disorder     Thyroid disease     nodular goiter    TIA (transient ischemic attack)      Past Surgical History:   Procedure Laterality Date    COLONOSCOPY N/A 4/29/2016    Procedure: COLONOSCOPY;  Surgeon: Sergio Vickers MD;  Location: [address]);  Service: Endoscopy;  Laterality: N/A;    HYSTERECTOMY       Family History   Problem Relation Age of Onset    Diabetes Mother     Hypertension Mother     Asthma Father     Glaucoma Father     Heart disease Maternal  Grandmother     Amblyopia Neg Hx     Blindness Neg Hx     Cancer Neg Hx     Cataracts Neg Hx     Macular degeneration Neg Hx     Retinal detachment Neg Hx     Strabismus Neg Hx     Stroke Neg Hx     Thyroid disease Neg Hx      Social History   Substance Use Topics    Smoking status: Never Smoker    Smokeless tobacco: Never Used    Alcohol use No     Review of Systems   Constitutional: Negative for chills.   HENT: Positive for sore throat.    Respiratory: Positive for cough. Negative for shortness of breath.    Cardiovascular: Negative for chest pain.   All other systems reviewed and are negative.      Physical Exam   Initial Vitals   BP Pulse Resp Temp SpO2   03/25/17 1602 03/25/17 1602 03/25/17 1602 03/25/17 1602 03/25/17 1602   141/68 84 20 98.4 °F (36.9 °C) 96 %     Physical Exam    Nursing note and vitals reviewed.  Constitutional: She appears well-developed and well-nourished. No distress.   HENT:   Head: Normocephalic and atraumatic.   Eyes: Conjunctivae and EOM are normal. Pupils are equal, round, and reactive to light.   Neck: Normal range of motion. Neck supple.   Cardiovascular: Normal rate, regular rhythm and normal heart sounds.   Pulmonary/Chest: Breath sounds normal.   Abdominal: Soft. There is no tenderness. There is no rebound and no guarding.   Musculoskeletal: Normal range of motion.   Neurological: She is alert and oriented to person, place, and time. She has normal strength.   Skin: Skin is warm and dry.   Psychiatric: She has a normal mood and affect.         ED Course   Procedures  Labs Reviewed   INFLUENZA A AND B ANTIGEN     Imaging Results         X-Ray Chest PA And Lateral (Final result) Result time:  03/25/17 16:48:02    Final result by Luis Fernando Cai MD (03/25/17 16:48:02)    Impression:     No acute abnormality.      Electronically signed by: LUIS FERNANDO CAI MD  Date:     03/25/17  Time:    16:48     Narrative:    Chest PA and lateral    Comparison: None    Findings: Lungs  are clear. No effusion or pneumothorax. Unremarkable cardiomediastinal silhouette. No displaced fracture.                   X-Rays:   Independently Interpreted Readings:   Chest X-Ray: No infiltrates.     Medical Decision Making:   Clinical Tests:   Radiological Study: Ordered and Reviewed  ED Management:  67-year-old female with an upper respiratory infection.  Informed swabs negative.  Chest x-ray shows no infiltrates.  Patient was recently started an antibiotic which I have suggested she continue.  I have also written her a  prescription for Phenergan with codeine.  She will follow up with her physician if not improved in one week.                   ED Course     Clinical Impression:   The primary encounter diagnosis was Upper respiratory tract infection, unspecified type. A diagnosis of Cough was also pertinent to this visit.          Alvin Cannon MD  03/25/17 2275

## 2017-03-25 NOTE — ED NOTES
Pt co cough for the past 3 weeks increased over the past 2 days, pt reports nasal congestion also, pt denies chest pain or sob, pt awake alert in no acute distress, family in room with pt, breath sounds all fields posterior diminished clear

## 2017-03-29 DIAGNOSIS — J06.9 ACUTE UPPER RESPIRATORY INFECTIONS OF UNSPECIFIED SITE: Primary | ICD-10-CM

## 2017-04-29 DIAGNOSIS — G89.29 CHRONIC RIGHT-SIDED LOW BACK PAIN WITHOUT SCIATICA: ICD-10-CM

## 2017-04-29 DIAGNOSIS — M54.50 CHRONIC RIGHT-SIDED LOW BACK PAIN WITHOUT SCIATICA: ICD-10-CM

## 2017-04-29 DIAGNOSIS — M25.561 ACUTE PAIN OF RIGHT KNEE: ICD-10-CM

## 2017-04-29 RX ORDER — MELOXICAM 15 MG/1
TABLET ORAL
Qty: 30 TABLET | Refills: 2 | OUTPATIENT
Start: 2017-04-29

## 2017-04-30 DIAGNOSIS — M54.50 CHRONIC RIGHT-SIDED LOW BACK PAIN WITHOUT SCIATICA: ICD-10-CM

## 2017-04-30 DIAGNOSIS — G89.29 CHRONIC RIGHT-SIDED LOW BACK PAIN WITHOUT SCIATICA: ICD-10-CM

## 2017-04-30 DIAGNOSIS — M25.561 ACUTE PAIN OF RIGHT KNEE: ICD-10-CM

## 2017-04-30 RX ORDER — MELOXICAM 15 MG/1
TABLET ORAL
Qty: 30 TABLET | Refills: 2 | Status: SHIPPED | OUTPATIENT
Start: 2017-04-30 | End: 2017-06-22 | Stop reason: SDUPTHER

## 2017-06-22 DIAGNOSIS — M54.50 CHRONIC RIGHT-SIDED LOW BACK PAIN WITHOUT SCIATICA: ICD-10-CM

## 2017-06-22 DIAGNOSIS — G89.29 CHRONIC RIGHT-SIDED LOW BACK PAIN WITHOUT SCIATICA: ICD-10-CM

## 2017-06-22 DIAGNOSIS — M25.561 ACUTE PAIN OF RIGHT KNEE: ICD-10-CM

## 2017-06-22 NOTE — TELEPHONE ENCOUNTER
----- Message from Melony Lul sent at 6/22/2017 10:26 AM CDT -----  Contact: [facility] [phone]  RX request - refill or new RX.  Is this a refill or new RX:  New Rx  RX name and strength: metoprolol succinate (TOPROL-XL) 50 MG 24 hr tablet  Directions:   Is this a 30 day or 90 day RX:  90 day  Pharmacy name and phone #: [facility] [phone] (Phone)  [phone] (Fax)  Comments:        RX request - refill or new RX.  Is this a refill or new RX:  New Rx  RX name and strength: meloxicam (MOBIC) 15 MG tablet   Directions:   Is this a 30 day or 90 day RX:  90 day

## 2017-06-26 RX ORDER — MELOXICAM 15 MG/1
TABLET ORAL
Qty: 30 TABLET | Refills: 2 | Status: SHIPPED | OUTPATIENT
Start: 2017-06-26 | End: 2018-02-15 | Stop reason: SDUPTHER

## 2017-06-26 RX ORDER — METOPROLOL SUCCINATE 50 MG/1
50 TABLET, EXTENDED RELEASE ORAL DAILY
Qty: 30 TABLET | Refills: 10 | Status: SHIPPED | OUTPATIENT
Start: 2017-06-26 | End: 2018-10-05 | Stop reason: SDUPTHER

## 2017-07-19 ENCOUNTER — OFFICE VISIT (OUTPATIENT)
Dept: INTERNAL MEDICINE | Facility: CLINIC | Age: 67
End: 2017-07-19
Payer: MEDICARE

## 2017-07-19 VITALS
DIASTOLIC BLOOD PRESSURE: 80 MMHG | WEIGHT: 163 LBS | HEIGHT: 61 IN | RESPIRATION RATE: 15 BRPM | HEART RATE: 75 BPM | SYSTOLIC BLOOD PRESSURE: 148 MMHG | BODY MASS INDEX: 30.78 KG/M2 | TEMPERATURE: 99 F

## 2017-07-19 DIAGNOSIS — E55.9 VITAMIN D DEFICIENCY: ICD-10-CM

## 2017-07-19 DIAGNOSIS — M47.816 LUMBAR FACET ARTHROPATHY: ICD-10-CM

## 2017-07-19 DIAGNOSIS — N60.02 BREAST CYST, LEFT: ICD-10-CM

## 2017-07-19 DIAGNOSIS — M54.40 CHRONIC LOW BACK PAIN WITH SCIATICA, SCIATICA LATERALITY UNSPECIFIED, UNSPECIFIED BACK PAIN LATERALITY: ICD-10-CM

## 2017-07-19 DIAGNOSIS — I10 ESSENTIAL HYPERTENSION: Chronic | ICD-10-CM

## 2017-07-19 DIAGNOSIS — H04.129 DRY EYE: ICD-10-CM

## 2017-07-19 DIAGNOSIS — H25.10 NUCLEAR SCLEROSIS, UNSPECIFIED LATERALITY: ICD-10-CM

## 2017-07-19 DIAGNOSIS — I77.1 TORTUOUS AORTA: ICD-10-CM

## 2017-07-19 DIAGNOSIS — K21.9 GASTROESOPHAGEAL REFLUX DISEASE, ESOPHAGITIS PRESENCE NOT SPECIFIED: ICD-10-CM

## 2017-07-19 DIAGNOSIS — G89.29 CHRONIC LOW BACK PAIN WITH SCIATICA, SCIATICA LATERALITY UNSPECIFIED, UNSPECIFIED BACK PAIN LATERALITY: ICD-10-CM

## 2017-07-19 DIAGNOSIS — Z13.5 SCREENING FOR EYE CONDITION: ICD-10-CM

## 2017-07-19 DIAGNOSIS — Z00.00 ENCOUNTER FOR PREVENTIVE HEALTH EXAMINATION: ICD-10-CM

## 2017-07-19 DIAGNOSIS — E66.9 OBESITY (BMI 30.0-34.9): ICD-10-CM

## 2017-07-19 DIAGNOSIS — E04.2 MULTIPLE THYROID NODULES: Primary | ICD-10-CM

## 2017-07-19 PROBLEM — N60.09 BREAST CYST: Status: ACTIVE | Noted: 2017-07-19

## 2017-07-19 PROCEDURE — 99499 UNLISTED E&M SERVICE: CPT | Mod: S$GLB,,, | Performed by: NURSE PRACTITIONER

## 2017-07-19 PROCEDURE — G0439 PPPS, SUBSEQ VISIT: HCPCS | Mod: S$GLB,,, | Performed by: NURSE PRACTITIONER

## 2017-07-19 PROCEDURE — 99999 PR PBB SHADOW E&M-EST. PATIENT-LVL IV: CPT | Mod: PBBFAC,,, | Performed by: NURSE PRACTITIONER

## 2017-07-19 NOTE — PATIENT INSTRUCTIONS
Counseling and Referral of Other Preventative  (Italic type indicates deductible and co-insurance are waived)    Patient Name: Jojo Burrows  Today's Date: 7/19/2017      SERVICE LIMITATIONS RECOMMENDATION    Vaccines    · Pneumococcal (once after 65)    · Influenza (annually)    · Hepatitis B (if medium/high risk)    · Prevnar 13      Hepatitis B medium/high risk factors:       - End-stage renal disease       - Hemophiliacs who received Factor VII or         IX concentrates       - Clients of institutions for the mentally             retarded       - Persons who live in the same house as          a HepB carrier       - Homosexual men       - Illicit injectable drug abusers     Pneumococcal: due-[facility] handout given     Influenza: current     Hepatitis B:      Prevnar 13: current    Mammogram (biennial age 50-74)  Annually (age 40 or over)  3/1/17    Pap (up to age 70 and after 70 if unknown history or abnormal study last 10 years)         N/A    Colorectal cancer screening (to age 75)    · Fecal occult blood test (annual)  · Flexible sigmoidoscopy (5y)  · Screening colonoscopy (10y)  · Barium enema   4/29/16- due in 2021    Diabetes self-management training (no USPSTF recommendations)  Requires referral by treating physician for patient with diabetes or renal disease. 10 hours of initial DSMT sessions of no less than 30 minutes each in a continuous 12-month period. 2 hours of follow-up DSMT in subsequent years.  N/A    Bone mass measurements (age 65 & older, biennial)  Requires diagnosis related to osteoporosis or estrogen deficiency. Biennial benefit unless patient has history of long-term glucocorticoid  4/10/15- due in 2019    Glaucoma screening (no USPSTF recommendation)  Diabetes mellitus, family history   , age 50 or over    American, age 65 or over  due    Medical nutrition therapy for diabetes or renal disease (no recommended schedule)  Requires referral by treating physician for  patient with diabetes or renal disease or kidney transplant within the past 3 years.  Can be provided in same year as diabetes self-management training (DSMT), and CMS recommends medical nutrition therapy take place after DSMT. Up to 3 hours for initial year and 2 hours in subsequent years.  N/A    Cardiovascular screening blood tests (every 5 years)  · Fasting lipid panel  Order as a panel if possible  current    Diabetes screening tests (at least every 3 years, Medicare covers annually or at 6-month intervals for prediabetic patients)  · Fasting blood sugar (FBS) or glucose tolerance test (GTT)  Patient must be diagnosed with one of the following:       - Hypertension       - Dyslipidemia       - Obesity (BMI 30kg/m2)       - Previous elevated impaired FBS or GTT       ... or any two of the following:       - Overweight (BMI 25 but <30)       - Family history of diabetes       - Age 65 or older       - History of gestational diabetes or birth of baby weighing more than 9 pounds  current    Abdominal aortic aneurysm screening (once)  · Sonogram   Limited to patients who meet one of the following criteria:       - Men who are 65-75 years old and have smoked more than 100 cigarette in their lifetime       - Anyone with a family history of abdominal aortic aneurysm       - Anyone recommended for screening by the USPSTF  N/A    HIV screening (annually for increased risk patients)  · HIV-1 and HIV-2 by EIA, or KEYONNA, rapid antibody test or oral mucosa transudate  Patients must be at increased risk for HIV infection per USPSTF guidelines or pregnant. Tests covered annually for patient at increased risk or as requested by the patient. Pregnant patients may receive up to 3 tests during pregnancy.  Risks discussed, screening is declined    Smoking cessation counseling (up to 8 sessions per year)  Patients must be asymptomatic of tobacco-related conditions to receive as a preventative service.  N/a    Subsequent annual  wellness visit  At least 12 months since last AWV  Return in one year     The following information is provided to all patients.  This information is to help you find resources for any of the problems found today that may be affecting your health:                Living healthy guide: www.[facility].[place].[facility]      Understanding Diabetes: www.diabetes.org      Eating healthy: www.cdc.gov/healthyweight      CDC home safety checklist: www.cdc.gov/steadi/patient.html      Agency on Aging: www.goea.[place].[facility]      Alcoholics anonymous (AA): www.aa.org      Physical Activity: www.soraya.nih.gov/aw4bqta      Tobacco use: www.quitwithusla.org

## 2017-08-04 ENCOUNTER — LAB VISIT (OUTPATIENT)
Dept: LAB | Facility: HOSPITAL | Age: 67
End: 2017-08-04
Attending: INTERNAL MEDICINE
Payer: MEDICARE

## 2017-08-04 DIAGNOSIS — I10 ESSENTIAL HYPERTENSION: Chronic | ICD-10-CM

## 2017-08-04 DIAGNOSIS — E04.2 MULTIPLE THYROID NODULES: ICD-10-CM

## 2017-08-04 DIAGNOSIS — E55.9 VITAMIN D DEFICIENCY: ICD-10-CM

## 2017-08-04 LAB
25(OH)D3+25(OH)D2 SERPL-MCNC: 21 NG/ML
ALBUMIN SERPL BCP-MCNC: 3.9 G/DL
ALP SERPL-CCNC: 100 U/L
ALT SERPL W/O P-5'-P-CCNC: 14 U/L
ANION GAP SERPL CALC-SCNC: 9 MMOL/L
AST SERPL-CCNC: 20 U/L
BASOPHILS # BLD AUTO: 0.02 K/UL
BASOPHILS NFR BLD: 0.3 %
BILIRUB SERPL-MCNC: 0.5 MG/DL
BUN SERPL-MCNC: 12 MG/DL
CALCIUM SERPL-MCNC: 9.5 MG/DL
CHLORIDE SERPL-SCNC: 106 MMOL/L
CHOLEST/HDLC SERPL: 2.9 {RATIO}
CO2 SERPL-SCNC: 26 MMOL/L
CREAT SERPL-MCNC: 0.8 MG/DL
DIFFERENTIAL METHOD: NORMAL
EOSINOPHIL # BLD AUTO: 0.1 K/UL
EOSINOPHIL NFR BLD: 0.7 %
ERYTHROCYTE [DISTWIDTH] IN BLOOD BY AUTOMATED COUNT: 13.9 %
EST. GFR  (AFRICAN AMERICAN): >60 ML/MIN/1.73 M^2
EST. GFR  (NON AFRICAN AMERICAN): >60 ML/MIN/1.73 M^2
GLUCOSE SERPL-MCNC: 88 MG/DL
HCT VFR BLD AUTO: 39.5 %
HDL/CHOLESTEROL RATIO: 34.9 %
HDLC SERPL-MCNC: 212 MG/DL
HDLC SERPL-MCNC: 74 MG/DL
HGB BLD-MCNC: 13.5 G/DL
LDLC SERPL CALC-MCNC: 127 MG/DL
LYMPHOCYTES # BLD AUTO: 3.1 K/UL
LYMPHOCYTES NFR BLD: 46.4 %
MCH RBC QN AUTO: 29.5 PG
MCHC RBC AUTO-ENTMCNC: 34.2 G/DL
MCV RBC AUTO: 86 FL
MONOCYTES # BLD AUTO: 0.7 K/UL
MONOCYTES NFR BLD: 10 %
NEUTROPHILS # BLD AUTO: 2.9 K/UL
NEUTROPHILS NFR BLD: 42.5 %
NONHDLC SERPL-MCNC: 138 MG/DL
PLATELET # BLD AUTO: 197 K/UL
PMV BLD AUTO: 11.6 FL
POTASSIUM SERPL-SCNC: 3.9 MMOL/L
PROT SERPL-MCNC: 7.6 G/DL
RBC # BLD AUTO: 4.58 M/UL
SODIUM SERPL-SCNC: 141 MMOL/L
T4 FREE SERPL-MCNC: 1 NG/DL
TRIGL SERPL-MCNC: 55 MG/DL
TSH SERPL DL<=0.005 MIU/L-ACNC: 2.22 UIU/ML
WBC # BLD AUTO: 6.77 K/UL

## 2017-08-04 PROCEDURE — 84439 ASSAY OF FREE THYROXINE: CPT

## 2017-08-04 PROCEDURE — 85025 COMPLETE CBC W/AUTO DIFF WBC: CPT

## 2017-08-04 PROCEDURE — 80061 LIPID PANEL: CPT

## 2017-08-04 PROCEDURE — 80053 COMPREHEN METABOLIC PANEL: CPT

## 2017-08-04 PROCEDURE — 84443 ASSAY THYROID STIM HORMONE: CPT

## 2017-08-04 PROCEDURE — 82306 VITAMIN D 25 HYDROXY: CPT

## 2017-08-04 PROCEDURE — 36415 COLL VENOUS BLD VENIPUNCTURE: CPT | Mod: PO

## 2017-08-11 ENCOUNTER — OFFICE VISIT (OUTPATIENT)
Dept: INTERNAL MEDICINE | Facility: CLINIC | Age: 67
End: 2017-08-11
Payer: MEDICARE

## 2017-08-11 VITALS
SYSTOLIC BLOOD PRESSURE: 124 MMHG | HEIGHT: 61 IN | WEIGHT: 167.31 LBS | RESPIRATION RATE: 16 BRPM | DIASTOLIC BLOOD PRESSURE: 63 MMHG | TEMPERATURE: 98 F | HEART RATE: 68 BPM | BODY MASS INDEX: 31.59 KG/M2

## 2017-08-11 DIAGNOSIS — G89.29 CHRONIC LOW BACK PAIN WITH SCIATICA, SCIATICA LATERALITY UNSPECIFIED, UNSPECIFIED BACK PAIN LATERALITY: ICD-10-CM

## 2017-08-11 DIAGNOSIS — E66.9 OBESITY (BMI 30.0-34.9): ICD-10-CM

## 2017-08-11 DIAGNOSIS — E04.2 MULTIPLE THYROID NODULES: ICD-10-CM

## 2017-08-11 DIAGNOSIS — M54.40 CHRONIC LOW BACK PAIN WITH SCIATICA, SCIATICA LATERALITY UNSPECIFIED, UNSPECIFIED BACK PAIN LATERALITY: ICD-10-CM

## 2017-08-11 DIAGNOSIS — I77.1 TORTUOUS AORTA: ICD-10-CM

## 2017-08-11 DIAGNOSIS — I10 ESSENTIAL HYPERTENSION: Primary | Chronic | ICD-10-CM

## 2017-08-11 DIAGNOSIS — E55.9 VITAMIN D DEFICIENCY: ICD-10-CM

## 2017-08-11 DIAGNOSIS — M19.90 ARTHRITIS: ICD-10-CM

## 2017-08-11 PROCEDURE — 99999 PR PBB SHADOW E&M-EST. PATIENT-LVL III: CPT | Mod: PBBFAC,,, | Performed by: INTERNAL MEDICINE

## 2017-08-11 PROCEDURE — 99397 PER PM REEVAL EST PAT 65+ YR: CPT | Mod: 25,S$GLB,, | Performed by: INTERNAL MEDICINE

## 2017-08-11 PROCEDURE — 90732 PPSV23 VACC 2 YRS+ SUBQ/IM: CPT | Mod: S$GLB,,, | Performed by: INTERNAL MEDICINE

## 2017-08-11 PROCEDURE — 99499 UNLISTED E&M SERVICE: CPT | Mod: S$GLB,,, | Performed by: INTERNAL MEDICINE

## 2017-08-11 PROCEDURE — G0009 ADMIN PNEUMOCOCCAL VACCINE: HCPCS | Mod: S$GLB,,, | Performed by: INTERNAL MEDICINE

## 2017-08-23 NOTE — PROGRESS NOTES
Subjective:       Patient ID: Jojo Burrows is a 67 y.o. female.    Chief Complaint: Annual Exam    HPI   The patient presents for annual physical examination follow-up.  She has been trying to adjust her diet by reducing her calorie intake and carbohydrate intake.  She also has reduced her intake of fried foods.  She has hypertension and is tolerating her blood pressure medication well.  She has GERD but has been using OTC Nexium as needed.  She does not use a PPI on a daily basis.  She remains physically active but does not have a scheduled exercise regimen.  There is no decrease in exercise tolerance.  Other medical conditions include multinodular thyroid, tortuous aorta, and vitamin D deficiency.  Review of Systems   Constitutional: Negative for fatigue, fever and unexpected weight change.   HENT: Negative for congestion, postnasal drip, rhinorrhea and sore throat.    Eyes: Negative for visual disturbance.   Respiratory: Negative for cough, chest tightness, shortness of breath and wheezing.    Cardiovascular: Negative for chest pain, palpitations and leg swelling.   Gastrointestinal: Negative for abdominal pain and blood in stool.   Genitourinary: Negative for dysuria, frequency and hematuria.   Musculoskeletal: Positive for arthralgias. Negative for back pain, joint swelling and myalgias.   Skin: Negative for rash.   Neurological: Negative for dizziness, syncope, weakness, numbness and headaches.   Psychiatric/Behavioral: Negative for sleep disturbance. The patient is not nervous/anxious.        Objective:      Physical Exam   Constitutional: She is oriented to person, place, and time. Vital signs are normal. She appears well-developed and well-nourished. No distress.   HENT:   Head: Normocephalic and atraumatic.   Right Ear: External ear normal.   Left Ear: External ear normal.   Nose: Nose normal.   Mouth/Throat: Oropharynx is clear and moist. No oropharyngeal exudate.   Eyes: Conjunctivae and EOM are normal.  Pupils are equal, round, and reactive to light. No scleral icterus.   Neck: Normal range of motion. Neck supple. No JVD present. Carotid bruit is not present. No thyromegaly present.   Cardiovascular: Normal rate, regular rhythm, normal heart sounds, intact distal pulses and normal pulses.  Exam reveals no gallop and no friction rub.    No murmur heard.  Pulmonary/Chest: Effort normal and breath sounds normal. No respiratory distress. She has no wheezes. She has no rales.   Abdominal: Soft. Bowel sounds are normal. She exhibits no abdominal bruit and no mass. There is no splenomegaly or hepatomegaly. There is no tenderness. No hernia.   Musculoskeletal: She exhibits no edema.        Right shoulder: She exhibits no effusion and no deformity.   Hypertrophic changes are noted of the right knee.  Tenderness on flexion and extension is present.   Lymphadenopathy:     She has no cervical adenopathy.     She has no axillary adenopathy.        Right: No supraclavicular adenopathy present.        Left: No supraclavicular adenopathy present.   Neurological: She is alert and oriented to person, place, and time. She has normal strength. No cranial nerve deficit.   Skin: Skin is warm and dry. No rash noted.   Psychiatric: She has a normal mood and affect. Her speech is normal and behavior is normal.   Nursing note and vitals reviewed.      Results for orders placed or performed in visit on 08/04/17   Urinalysis   Result Value Ref Range    Specimen UA Urine, Clean Catch     Color, UA Straw Yellow, Straw, Bindu    Appearance, UA Clear Clear    pH, UA 7.0 5.0 - 8.0    Specific Gravity, UA 1.010 1.005 - 1.030    Protein, UA Negative Negative    Glucose, UA Negative Negative    Ketones, UA Negative Negative    Bilirubin (UA) Negative Negative    Occult Blood UA Negative Negative    Nitrite, UA Negative Negative    Urobilinogen, UA Negative <2.0 EU/dL    Leukocytes, UA Negative Negative     Other labs were reviewed with the  patient.  Assessment:       1. Essential hypertension    2. Chronic low back pain with sciatica, sciatica laterality unspecified, unspecified back pain laterality    3. Vitamin D deficiency    4. Multiple thyroid nodules    5. Tortuous aorta    6. Obesity (BMI 30.0-34.9)    7. Arthritis        Plan:       Jojo was seen today for annual exam.  Current therapy will be continued.  The patient has been encouraged to lose weight and increase her exercise level.  Pneumovax will be administered today.  The patient is to return to clinic in 6 months.    Diagnoses and all orders for this visit:    Essential hypertension    Chronic low back pain with sciatica, sciatica laterality unspecified, unspecified back pain laterality    Vitamin D deficiency    Multiple thyroid nodules    Tortuous aorta    Obesity (BMI 30.0-34.9)    Arthritis    Other orders  -     Pneumococcal Polysaccharide Vaccine (23 Valent) (SQ/IM)

## 2017-10-12 ENCOUNTER — OFFICE VISIT (OUTPATIENT)
Dept: OPTOMETRY | Facility: CLINIC | Age: 67
End: 2017-10-12
Payer: MEDICARE

## 2017-10-12 DIAGNOSIS — H25.13 NUCLEAR SCLEROSIS OF BOTH EYES: ICD-10-CM

## 2017-10-12 DIAGNOSIS — H43.811 POSTERIOR VITREOUS DETACHMENT OF RIGHT EYE: Primary | ICD-10-CM

## 2017-10-12 DIAGNOSIS — H52.7 REFRACTIVE ERROR: ICD-10-CM

## 2017-10-12 PROCEDURE — 92015 DETERMINE REFRACTIVE STATE: CPT | Mod: S$GLB,,, | Performed by: OPTOMETRIST

## 2017-10-12 PROCEDURE — 92004 COMPRE OPH EXAM NEW PT 1/>: CPT | Mod: S$GLB,,, | Performed by: OPTOMETRIST

## 2017-10-12 PROCEDURE — 99999 PR PBB SHADOW E&M-EST. PATIENT-LVL II: CPT | Mod: PBBFAC,,, | Performed by: OPTOMETRIST

## 2017-10-12 NOTE — PROGRESS NOTES
HPI     OSIEL 04/2014. Black spots OD past 3 weeks, no flashes. Vision seems worse   distance and near, gradual change.  Wears OTC+1.50 and +1.75 readers.  No eye pain  No drops used  Wants bifocal rx    Last edited by Robert Billy, OD on 10/12/2017  3:51 PM. (History)              Assessment /Plan     For exam results, see Encounter Report.    Posterior vitreous detachment of right eye    Nuclear sclerosis of both eyes    Refractive error      1. No evidence of holes, tears or detachment of retina. Negative Shafers sign in vitreous. 90 diopter lens exam negative in all quadrants. Patient educated to signs and symptoms of retinal detachment and return to clinic immediately if signs or symptoms arise. RTC for dfe if symptoms do not resolve in 1 mos.   2. Educated pt on presence of cataracts and effects on vision. No surgery at this time. Recheck in one year.  3. Spec Rx given. Different lens options discussed with patient. RTC 1 year full exam.

## 2017-10-24 ENCOUNTER — OFFICE VISIT (OUTPATIENT)
Dept: OTOLARYNGOLOGY | Facility: CLINIC | Age: 67
End: 2017-10-24
Payer: MEDICARE

## 2017-10-24 VITALS — SYSTOLIC BLOOD PRESSURE: 147 MMHG | DIASTOLIC BLOOD PRESSURE: 88 MMHG

## 2017-10-24 DIAGNOSIS — H61.22 IMPACTED CERUMEN OF LEFT EAR: ICD-10-CM

## 2017-10-24 DIAGNOSIS — H93.8X2 SENSATION OF FULLNESS IN LEFT EAR: Primary | ICD-10-CM

## 2017-10-24 PROCEDURE — 99203 OFFICE O/P NEW LOW 30 MIN: CPT | Mod: 25,S$GLB,, | Performed by: NURSE PRACTITIONER

## 2017-10-24 PROCEDURE — 69210 REMOVE IMPACTED EAR WAX UNI: CPT | Mod: S$GLB,,, | Performed by: NURSE PRACTITIONER

## 2017-10-24 PROCEDURE — 99999 PR PBB SHADOW E&M-EST. PATIENT-LVL III: CPT | Mod: PBBFAC,,, | Performed by: NURSE PRACTITIONER

## 2017-10-24 NOTE — PROGRESS NOTES
Subjective:       Patient ID: Jojo Burrows is a 67 y.o. female.    Chief Complaint: Cerumen Impaction and Ear Fullness    Ear Fullness    There is pain in the left ear. This is a recurrent problem. The current episode started 1 to 4 weeks ago. The problem occurs constantly. The problem has been unchanged. There has been no fever. The patient is experiencing no pain. Associated symptoms include hearing loss. Pertinent negatives include no abdominal pain, coughing, diarrhea, ear discharge, headaches, neck pain, rash, rhinorrhea, sore throat or vomiting. She has tried nothing for the symptoms. There is no history of a chronic ear infection, hearing loss or a tympanostomy tube.        Past Medical History: Patient has a past medical history of ALLERGIC RHINITIS; Cataract; GERD (gastroesophageal reflux disease); Hyperlipidemia; Hypertension; Migraine headache; Nuclear sclerosis (4/30/2014); Sleep disorder; Thyroid disease; and TIA (transient ischemic attack).    Past Surgical History: Patient has a past surgical history that includes Hysterectomy and Colonoscopy (N/A, 4/29/2016).    Social History: Patient reports that she has never smoked. She has never used smokeless tobacco. She reports that she does not drink alcohol or use drugs.    Family History: family history includes Asthma in her father; Diabetes in her mother; Glaucoma in her father; Heart disease in her maternal grandmother; Hypertension in her mother; No Known Problems in her daughter, daughter, and son; Thalassemia in her daughter.    Medications:   Current Outpatient Prescriptions   Medication Sig    esomeprazole (NEXIUM) 40 MG capsule Take 1 capsule (40 mg total) by mouth before breakfast.    meloxicam (MOBIC) 15 MG tablet TAKE 1 TABLET (15 MG TOTAL) BY MOUTH ONCE DAILY.    metoprolol succinate (TOPROL-XL) 50 MG 24 hr tablet Take 1 tablet (50 mg total) by mouth once daily.     No current facility-administered medications for this visit.         Allergies: Patient is allergic to iodine; iodine and iodide containing products; and shellfish containing products.    Review of Systems   Constitutional: Negative for activity change, appetite change, chills, diaphoresis, fatigue, fever and unexpected weight change.   HENT: Positive for hearing loss. Negative for congestion, dental problem, drooling, ear discharge, ear pain, facial swelling, mouth sores, nosebleeds, postnasal drip, rhinorrhea, sinus pain, sinus pressure, sneezing, sore throat, tinnitus, trouble swallowing and voice change.         L ear fullness   Eyes: Negative for pain and visual disturbance.   Respiratory: Negative for cough, chest tightness, shortness of breath, wheezing and stridor.    Cardiovascular: Negative for chest pain.   Gastrointestinal: Negative for abdominal pain, diarrhea and vomiting.   Musculoskeletal: Negative for gait problem and neck pain.   Skin: Negative for color change and rash.   Allergic/Immunologic: Negative for environmental allergies.   Neurological: Negative for dizziness, seizures, syncope, facial asymmetry, speech difficulty, weakness, light-headedness, numbness and headaches.   Psychiatric/Behavioral: Negative for agitation and confusion. The patient is not nervous/anxious.        Objective:       BP (!) 147/88 (BP Location: Right arm, Patient Position: Sitting)     Physical Exam   Constitutional: She is oriented to person, place, and time. She appears well-developed and well-nourished.   HENT:   Head: Normocephalic and atraumatic. Not macrocephalic and not microcephalic. Head is without raccoon's eyes, without Silveira's sign, without abrasion, without contusion, without laceration, without right periorbital erythema and without left periorbital erythema. Hair is normal.   Right Ear: Tympanic membrane, external ear and ear canal normal. No lacerations. No drainage, swelling or tenderness. No foreign bodies. No mastoid tenderness. Tympanic membrane is not  injected, not scarred, not perforated, not erythematous, not retracted and not bulging. Tympanic membrane mobility is normal. No middle ear effusion. No hemotympanum. No decreased hearing is noted.   Left Ear: Tympanic membrane, external ear and ear canal normal. No lacerations. No drainage, swelling or tenderness. No foreign bodies. No mastoid tenderness. Tympanic membrane is not injected, not scarred, not perforated, not erythematous, not retracted and not bulging. Tympanic membrane mobility is normal.  No middle ear effusion. No hemotympanum. No decreased hearing is noted.   Nose: No mucosal edema, rhinorrhea, nose lacerations, sinus tenderness, nasal deformity or nasal septal hematoma. No epistaxis.  No foreign bodies. Right sinus exhibits no maxillary sinus tenderness and no frontal sinus tenderness. Left sinus exhibits no maxillary sinus tenderness and no frontal sinus tenderness.   Mouth/Throat: Uvula is midline, oropharynx is clear and moist and mucous membranes are normal.   Procedure Note:    Patient was brought to the minor procedure room and using the operating microscope the left ear canal  was cleaned of ceruminous debris. There was a significant cerumen impaction.  The forceps and suction were both used to perform this. Tympanic membrane intact. Pt tolerated well. There were no complications.    No AOM or OE.  No mastoid, tenderness, swelling, or redness.  Facial nerve intact.     Eyes: Conjunctivae, EOM and lids are normal. Pupils are equal, round, and reactive to light.   Neck: Trachea normal and normal range of motion. Neck supple. No spinous process tenderness and no muscular tenderness present. No neck rigidity. No edema, no erythema and normal range of motion present. No thyroid mass and no thyromegaly present.   Pulmonary/Chest: Effort normal.   Abdominal: Soft.   Musculoskeletal: Normal range of motion.   Lymphadenopathy:        Head (right side): No submental, no submandibular, no tonsillar,  no preauricular and no posterior auricular adenopathy present.        Head (left side): No submental, no submandibular, no tonsillar, no preauricular, no posterior auricular and no occipital adenopathy present.     She has no cervical adenopathy.   Neurological: She is alert and oriented to person, place, and time. No cranial nerve deficit or sensory deficit.   Skin: Skin is warm and dry.   Psychiatric: She has a normal mood and affect. Her behavior is normal. Judgment and thought content normal.   Nursing note and vitals reviewed.      Assessment:       1. Sensation of fullness in left ear    2. Impacted cerumen of left ear        Plan:       Advised to not put anything smaller that your elbow in your ear canal.  Encouraged yearly ear cleanings.   RTC prn.

## 2017-10-24 NOTE — PATIENT INSTRUCTIONS
Advised to not put anything smaller that your elbow in your ear canal.  Encouraged yearly ear cleanings.   RTC prn.

## 2017-11-20 ENCOUNTER — TELEPHONE (OUTPATIENT)
Dept: OPTOMETRY | Facility: CLINIC | Age: 67
End: 2017-11-20

## 2018-01-15 ENCOUNTER — PES CALL (OUTPATIENT)
Dept: ADMINISTRATIVE | Facility: CLINIC | Age: 68
End: 2018-01-15

## 2018-02-11 ENCOUNTER — OFFICE VISIT (OUTPATIENT)
Dept: URGENT CARE | Facility: CLINIC | Age: 68
End: 2018-02-11
Payer: MEDICARE

## 2018-02-11 VITALS
HEIGHT: 61 IN | HEART RATE: 72 BPM | SYSTOLIC BLOOD PRESSURE: 122 MMHG | BODY MASS INDEX: 31.53 KG/M2 | RESPIRATION RATE: 18 BRPM | DIASTOLIC BLOOD PRESSURE: 70 MMHG | OXYGEN SATURATION: 97 % | WEIGHT: 167 LBS | TEMPERATURE: 98 F

## 2018-02-11 DIAGNOSIS — J01.90 ACUTE SINUSITIS, RECURRENCE NOT SPECIFIED, UNSPECIFIED LOCATION: Primary | ICD-10-CM

## 2018-02-11 DIAGNOSIS — R50.9 FEVER, UNSPECIFIED FEVER CAUSE: ICD-10-CM

## 2018-02-11 LAB
CTP QC/QA: YES
FLUAV AG NPH QL: NEGATIVE
FLUBV AG NPH QL: NEGATIVE

## 2018-02-11 PROCEDURE — 99213 OFFICE O/P EST LOW 20 MIN: CPT | Mod: 25,S$GLB,, | Performed by: NURSE PRACTITIONER

## 2018-02-11 PROCEDURE — 3008F BODY MASS INDEX DOCD: CPT | Mod: S$GLB,,, | Performed by: NURSE PRACTITIONER

## 2018-02-11 PROCEDURE — 1159F MED LIST DOCD IN RCRD: CPT | Mod: S$GLB,,, | Performed by: NURSE PRACTITIONER

## 2018-02-11 PROCEDURE — 87804 INFLUENZA ASSAY W/OPTIC: CPT | Mod: 59,QW,S$GLB, | Performed by: NURSE PRACTITIONER

## 2018-02-11 PROCEDURE — 96372 THER/PROPH/DIAG INJ SC/IM: CPT | Mod: S$GLB,,, | Performed by: EMERGENCY MEDICINE

## 2018-02-11 RX ORDER — BETAMETHASONE SODIUM PHOSPHATE AND BETAMETHASONE ACETATE 3; 3 MG/ML; MG/ML
9 INJECTION, SUSPENSION INTRA-ARTICULAR; INTRALESIONAL; INTRAMUSCULAR; SOFT TISSUE
Status: COMPLETED | OUTPATIENT
Start: 2018-02-11 | End: 2018-02-11

## 2018-02-11 RX ORDER — AMOXICILLIN AND CLAVULANATE POTASSIUM 875; 125 MG/1; MG/1
1 TABLET, FILM COATED ORAL 2 TIMES DAILY
Qty: 14 TABLET | Refills: 0 | Status: SHIPPED | OUTPATIENT
Start: 2018-02-11 | End: 2018-02-18

## 2018-02-11 RX ORDER — PROMETHAZINE HYDROCHLORIDE AND DEXTROMETHORPHAN HYDROBROMIDE 6.25; 15 MG/5ML; MG/5ML
5 SYRUP ORAL 4 TIMES DAILY PRN
Qty: 100 ML | Refills: 0 | Status: SHIPPED | OUTPATIENT
Start: 2018-02-11 | End: 2018-02-16

## 2018-02-11 RX ADMIN — BETAMETHASONE SODIUM PHOSPHATE AND BETAMETHASONE ACETATE 9 MG: 3; 3 INJECTION, SUSPENSION INTRA-ARTICULAR; INTRALESIONAL; INTRAMUSCULAR; SOFT TISSUE at 05:02

## 2018-02-11 NOTE — PROGRESS NOTES
"Subjective:       Patient ID: Jojo Burrows is a 68 y.o. female.    Vitals:  height is 5' 1" (1.549 m) and weight is 75.8 kg (167 lb). Her oral temperature is 97.5 °F (36.4 °C). Her blood pressure is 122/70 and her pulse is 72. Her respiration is 18 and oxygen saturation is 97%.     Chief Complaint: URI    URI    This is a new problem. The current episode started in the past 7 days. The problem has been gradually worsening. The maximum temperature recorded prior to her arrival was 100.4 - 100.9 F. Associated symptoms include congestion, coughing, a plugged ear sensation and sinus pain. Pertinent negatives include no abdominal pain, chest pain, ear pain, headaches, nausea, sore throat or wheezing. She has tried decongestant for the symptoms. The treatment provided no relief.     Review of Systems   Constitution: Positive for chills, fever and malaise/fatigue.   HENT: Positive for congestion and sinus pain. Negative for ear pain, hoarse voice and sore throat.    Eyes: Negative for discharge and redness.   Cardiovascular: Negative for chest pain, dyspnea on exertion and leg swelling.   Respiratory: Positive for cough. Negative for shortness of breath, sputum production and wheezing.    Musculoskeletal: Positive for myalgias.   Gastrointestinal: Negative for abdominal pain and nausea.   Neurological: Negative for headaches.   All other systems reviewed and are negative.      Objective:      Physical Exam   Constitutional: She is oriented to person, place, and time. She appears well-developed and well-nourished.   HENT:   Head: Normocephalic and atraumatic.   Right Ear: Hearing, tympanic membrane, external ear and ear canal normal. Tympanic membrane is not erythematous. No decreased hearing is noted.   Left Ear: Hearing, tympanic membrane, external ear and ear canal normal. Tympanic membrane is not erythematous. No decreased hearing is noted.   Nose: Mucosal edema and rhinorrhea present. Right sinus exhibits frontal " sinus tenderness. Right sinus exhibits no maxillary sinus tenderness. Left sinus exhibits frontal sinus tenderness. Left sinus exhibits no maxillary sinus tenderness.   Mouth/Throat: Uvula is midline and mucous membranes are normal. No oropharyngeal exudate or posterior oropharyngeal erythema.   Eyes: Conjunctivae, EOM and lids are normal.   Neck: Normal range of motion. Neck supple.   Cardiovascular: Normal rate, regular rhythm, S1 normal, S2 normal and normal heart sounds.    Pulmonary/Chest: Effort normal and breath sounds normal. No respiratory distress.   Neurological: She is alert and oriented to person, place, and time.   Skin: Skin is warm and dry.   Nursing note and vitals reviewed.      Assessment:       1. Acute sinusitis, recurrence not specified, unspecified location    2. Fever, unspecified fever cause        Plan:         Acute sinusitis, recurrence not specified, unspecified location    Fever, unspecified fever cause  -     POCT Influenza A/B    Other orders  -     promethazine-dextromethorphan (PROMETHAZINE-DM) 6.25-15 mg/5 mL Syrp; Take 5 mLs by mouth 4 (four) times daily as needed (cough).  Dispense: 100 mL; Refill: 0  -     amoxicillin-clavulanate 875-125mg (AUGMENTIN) 875-125 mg per tablet; Take 1 tablet by mouth 2 (two) times daily.  Dispense: 14 tablet; Refill: 0  -     betamethasone acetate-betamethasone sodium phosphate injection 9 mg; Inject 1.5 mLs (9 mg total) into the muscle one time.

## 2018-02-11 NOTE — PATIENT INSTRUCTIONS
Please arrange follow up with your primary medical clinic as soon as possible. You must understand that you've received an Urgent Care treatment only and that you may be released before all of your medical problems are known or treated. You, the patient, will arrange for follow up as instructed. If your symptoms worsen or fail to improve you should go to the Emergency Room.    Take over the counter Claritin, Allegra, or Zyrtec for relief of symptoms.   Take over the counter Flonase for relief of symptoms.  These medications can be purchased at any local drug store or pharmacy.      Sinusitis (Antibiotic Treatment)    The sinuses are air-filled spaces within the bones of the face. They connect to the inside of the nose. Sinusitis is an inflammation of the tissue lining the sinus cavity. Sinus inflammation can occur during a cold. It can also be due to allergies to pollens and other particles in the air. Sinusitis can cause symptoms of sinus congestion and fullness. A sinus infection causes fever, headache and facial pain. There is often green or yellow drainage from the nose or into the back of the throat (post-nasal drip). You have been given antibiotics to treat this condition.  Home care:  · Take the full course of antibiotics as instructed. Do not stop taking them, even if you feel better.  · Drink plenty of water, hot tea, and other liquids. This may help thin mucus. It also may promote sinus drainage.  · Heat may help soothe painful areas of the face. Use a towel soaked in hot water. Or,  the shower and direct the hot spray onto your face. Using a vaporizer along with a menthol rub at night may also help.   · An expectorant containing guaifenesin may help thin the mucus and promote drainage from the sinuses.  · Over-the-counter decongestants may be used unless a similar medicine was prescribed. Nasal sprays work the fastest. Use one that contains phenylephrine or oxymetazoline. First blow the nose  gently. Then use the spray. Do not use these medicines more often than directed on the label or symptoms may get worse. You may also use tablets containing pseudoephedrine. Avoid products that combine ingredients, because side effects may be increased. Read labels. You can also ask the pharmacist for help. (NOTE: Persons with high blood pressure should not use decongestants. They can raise blood pressure.)  · Over-the-counter antihistamines may help if allergies contributed to your sinusitis.    · Do not use nasal rinses or irrigation during an acute sinus infection, unless told to by your health care provider. Rinsing may spread the infection to other sinuses.  · Use acetaminophen or ibuprofen to control pain, unless another pain medicine was prescribed. (If you have chronic liver or kidney disease or ever had a stomach ulcer, talk with your doctor before using these medicines. Aspirin should never be used in anyone under 18 years of age who is ill with a fever. It may cause severe liver damage.)  · Don't smoke. This can worsen symptoms.  Follow-up care  Follow up with your healthcare provider or our staff if you are not improving within the next week.  When to seek medical advice  Call your healthcare provider if any of these occur:  · Facial pain or headache becoming more severe  · Stiff neck  · Unusual drowsiness or confusion  · Swelling of the forehead or eyelids  · Vision problems, including blurred or double vision  · Fever of 100.4ºF (38ºC) or higher, or as directed by your healthcare provider  · Seizure  · Breathing problems  · Symptoms not resolving within 10 days  Date Last Reviewed: 4/13/2015  © 3368-6037 [employer]. [address] [place] [address], [place] [address]. All rights reserved. This information is not intended as a substitute for professional medical care. Always follow your healthcare professional's instructions.

## 2018-02-15 DIAGNOSIS — M54.50 CHRONIC RIGHT-SIDED LOW BACK PAIN WITHOUT SCIATICA: ICD-10-CM

## 2018-02-15 DIAGNOSIS — M25.561 ACUTE PAIN OF RIGHT KNEE: ICD-10-CM

## 2018-02-15 DIAGNOSIS — G89.29 CHRONIC RIGHT-SIDED LOW BACK PAIN WITHOUT SCIATICA: ICD-10-CM

## 2018-02-15 RX ORDER — MELOXICAM 15 MG/1
TABLET ORAL
Qty: 30 TABLET | Refills: 3 | Status: SHIPPED | OUTPATIENT
Start: 2018-02-15 | End: 2018-07-25

## 2018-03-20 ENCOUNTER — TELEPHONE (OUTPATIENT)
Dept: INTERNAL MEDICINE | Facility: CLINIC | Age: 68
End: 2018-03-20

## 2018-03-23 ENCOUNTER — LAB VISIT (OUTPATIENT)
Dept: LAB | Facility: HOSPITAL | Age: 68
End: 2018-03-23
Attending: INTERNAL MEDICINE
Payer: MEDICARE

## 2018-03-23 ENCOUNTER — OFFICE VISIT (OUTPATIENT)
Dept: INTERNAL MEDICINE | Facility: CLINIC | Age: 68
End: 2018-03-23
Payer: MEDICARE

## 2018-03-23 VITALS
BODY MASS INDEX: 31.8 KG/M2 | OXYGEN SATURATION: 98 % | HEIGHT: 61 IN | DIASTOLIC BLOOD PRESSURE: 82 MMHG | HEART RATE: 60 BPM | SYSTOLIC BLOOD PRESSURE: 113 MMHG | TEMPERATURE: 98 F | WEIGHT: 168.44 LBS

## 2018-03-23 DIAGNOSIS — Z12.31 ENCOUNTER FOR SCREENING MAMMOGRAM FOR BREAST CANCER: ICD-10-CM

## 2018-03-23 DIAGNOSIS — R10.13 EPIGASTRIC PAIN: Primary | ICD-10-CM

## 2018-03-23 DIAGNOSIS — I10 ESSENTIAL HYPERTENSION: Chronic | ICD-10-CM

## 2018-03-23 DIAGNOSIS — R10.13 EPIGASTRIC PAIN: ICD-10-CM

## 2018-03-23 LAB
ALBUMIN SERPL BCP-MCNC: 3.9 G/DL
ALP SERPL-CCNC: 87 U/L
ALT SERPL W/O P-5'-P-CCNC: 15 U/L
ANION GAP SERPL CALC-SCNC: 10 MMOL/L
AST SERPL-CCNC: 17 U/L
BASOPHILS # BLD AUTO: 0.02 K/UL
BASOPHILS NFR BLD: 0.3 %
BILIRUB SERPL-MCNC: 0.6 MG/DL
BUN SERPL-MCNC: 13 MG/DL
CALCIUM SERPL-MCNC: 9.8 MG/DL
CHLORIDE SERPL-SCNC: 103 MMOL/L
CO2 SERPL-SCNC: 26 MMOL/L
CREAT SERPL-MCNC: 0.8 MG/DL
DIFFERENTIAL METHOD: ABNORMAL
EOSINOPHIL # BLD AUTO: 0.1 K/UL
EOSINOPHIL NFR BLD: 1.1 %
ERYTHROCYTE [DISTWIDTH] IN BLOOD BY AUTOMATED COUNT: 14.3 %
EST. GFR  (AFRICAN AMERICAN): >60 ML/MIN/1.73 M^2
EST. GFR  (NON AFRICAN AMERICAN): >60 ML/MIN/1.73 M^2
GLUCOSE SERPL-MCNC: 81 MG/DL
HCT VFR BLD AUTO: 38.6 %
HGB BLD-MCNC: 12.2 G/DL
IMM GRANULOCYTES # BLD AUTO: 0.02 K/UL
IMM GRANULOCYTES NFR BLD AUTO: 0.3 %
LIPASE SERPL-CCNC: 15 U/L
LYMPHOCYTES # BLD AUTO: 2.3 K/UL
LYMPHOCYTES NFR BLD: 34.1 %
MCH RBC QN AUTO: 28.6 PG
MCHC RBC AUTO-ENTMCNC: 31.6 G/DL
MCV RBC AUTO: 90 FL
MONOCYTES # BLD AUTO: 0.7 K/UL
MONOCYTES NFR BLD: 10.4 %
NEUTROPHILS # BLD AUTO: 3.6 K/UL
NEUTROPHILS NFR BLD: 53.8 %
NRBC BLD-RTO: 0 /100 WBC
PLATELET # BLD AUTO: 201 K/UL
PMV BLD AUTO: 11.8 FL
POTASSIUM SERPL-SCNC: 4 MMOL/L
PROT SERPL-MCNC: 7.3 G/DL
RBC # BLD AUTO: 4.27 M/UL
SODIUM SERPL-SCNC: 139 MMOL/L
WBC # BLD AUTO: 6.65 K/UL

## 2018-03-23 PROCEDURE — 3079F DIAST BP 80-89 MM HG: CPT | Mod: CPTII,S$GLB,, | Performed by: INTERNAL MEDICINE

## 2018-03-23 PROCEDURE — 36415 COLL VENOUS BLD VENIPUNCTURE: CPT | Mod: PO

## 2018-03-23 PROCEDURE — 3074F SYST BP LT 130 MM HG: CPT | Mod: CPTII,S$GLB,, | Performed by: INTERNAL MEDICINE

## 2018-03-23 PROCEDURE — 99214 OFFICE O/P EST MOD 30 MIN: CPT | Mod: S$GLB,,, | Performed by: INTERNAL MEDICINE

## 2018-03-23 PROCEDURE — 85025 COMPLETE CBC W/AUTO DIFF WBC: CPT

## 2018-03-23 PROCEDURE — 80053 COMPREHEN METABOLIC PANEL: CPT

## 2018-03-23 PROCEDURE — 99999 PR PBB SHADOW E&M-EST. PATIENT-LVL III: CPT | Mod: PBBFAC,,, | Performed by: INTERNAL MEDICINE

## 2018-03-23 PROCEDURE — 83690 ASSAY OF LIPASE: CPT

## 2018-03-23 PROCEDURE — 99499 UNLISTED E&M SERVICE: CPT | Mod: S$GLB,,, | Performed by: INTERNAL MEDICINE

## 2018-03-28 ENCOUNTER — HOSPITAL ENCOUNTER (OUTPATIENT)
Dept: RADIOLOGY | Facility: HOSPITAL | Age: 68
Discharge: HOME | End: 2018-03-28
Attending: INTERNAL MEDICINE
Payer: MEDICARE

## 2018-03-28 ENCOUNTER — HOSPITAL ENCOUNTER (OUTPATIENT)
Dept: RADIOLOGY | Facility: OTHER | Age: 68
Discharge: HOME | End: 2018-03-28
Attending: INTERNAL MEDICINE
Payer: MEDICARE

## 2018-03-28 DIAGNOSIS — Z12.31 ENCOUNTER FOR SCREENING MAMMOGRAM FOR BREAST CANCER: ICD-10-CM

## 2018-03-28 DIAGNOSIS — R10.13 EPIGASTRIC PAIN: ICD-10-CM

## 2018-03-28 PROCEDURE — 77067 SCR MAMMO BI INCL CAD: CPT | Mod: TC

## 2018-03-28 PROCEDURE — 77063 BREAST TOMOSYNTHESIS BI: CPT | Mod: 26,,, | Performed by: RADIOLOGY

## 2018-03-28 PROCEDURE — 76700 US EXAM ABDOM COMPLETE: CPT | Mod: TC

## 2018-03-28 PROCEDURE — 76700 US EXAM ABDOM COMPLETE: CPT | Mod: 26,,, | Performed by: RADIOLOGY

## 2018-03-28 PROCEDURE — 77067 SCR MAMMO BI INCL CAD: CPT | Mod: 26,,, | Performed by: RADIOLOGY

## 2018-04-03 ENCOUNTER — HOSPITAL ENCOUNTER (OUTPATIENT)
Dept: RADIOLOGY | Facility: HOSPITAL | Age: 68
Discharge: HOME | End: 2018-04-03
Attending: INTERNAL MEDICINE
Payer: MEDICARE

## 2018-04-03 DIAGNOSIS — R92.8 ABNORMAL MAMMOGRAM: ICD-10-CM

## 2018-04-03 PROCEDURE — 76642 ULTRASOUND BREAST LIMITED: CPT | Mod: 26,LT,, | Performed by: RADIOLOGY

## 2018-04-03 PROCEDURE — 76642 ULTRASOUND BREAST LIMITED: CPT | Mod: TC,PO,LT

## 2018-04-03 PROCEDURE — 77061 BREAST TOMOSYNTHESIS UNI: CPT | Mod: 26,LT,, | Performed by: RADIOLOGY

## 2018-04-03 PROCEDURE — 77061 BREAST TOMOSYNTHESIS UNI: CPT | Mod: TC,PO,LT

## 2018-04-03 PROCEDURE — 77065 DX MAMMO INCL CAD UNI: CPT | Mod: TC,PO,LT

## 2018-04-03 PROCEDURE — 77065 DX MAMMO INCL CAD UNI: CPT | Mod: 26,LT,, | Performed by: RADIOLOGY

## 2018-04-03 NOTE — PROGRESS NOTES
Subjective:       Patient ID: Jojo Burrows is a 68 y.o. female.    Chief Complaint: Follow-up    HPI   The patient presents for follow-up of medical conditions.  She has hypertension and has been tolerating her blood pressure medication well.  Her main complaint today is stomach discomfort for the past 2 months.  She has been noting cramping mid abdominal pain.  Transient diarrhea has also been noted.  She has been using Nexium intermittently.  She also has been using meloxicam on a regular basis for joint pain.  She has been noting bloating.  She denies having any gross rectal bleeding or black-colored stools.    The patient's last colonoscopy was on 4/29/16.  A follow-up exam in 5 years was recommended.    Review of Systems   Constitutional: Negative for activity change, appetite change, fatigue and unexpected weight change.   Eyes: Negative for visual disturbance.   Respiratory: Negative for cough and shortness of breath.    Cardiovascular: Negative for chest pain, palpitations and leg swelling.   Gastrointestinal: Positive for abdominal distention and abdominal pain. Negative for blood in stool, constipation and diarrhea.   Genitourinary: Negative for dysuria and hematuria.   Musculoskeletal: Positive for arthralgias. Negative for neck pain and neck stiffness.   Skin: Negative for rash.   Neurological: Negative for dizziness, syncope and headaches.   Psychiatric/Behavioral: Negative for sleep disturbance.       Objective:      Physical Exam   Constitutional: She is oriented to person, place, and time. She appears well-developed and well-nourished. No distress.   The patient's weight has remained stable since 8/11/17.   HENT:   Head: Normocephalic and atraumatic.   Eyes: Conjunctivae and EOM are normal. No scleral icterus.   Neck: Normal range of motion. Neck supple. No JVD present. No thyromegaly present.   Cardiovascular: Normal rate, regular rhythm, normal heart sounds and intact distal pulses.  Exam reveals  no gallop and no friction rub.    No murmur heard.  Pulmonary/Chest: Effort normal and breath sounds normal. No respiratory distress. She has no wheezes. She has no rales.   Abdominal: Soft. Bowel sounds are normal. She exhibits no mass. There is no tenderness.   Musculoskeletal: Normal range of motion. She exhibits no tenderness.   Lymphadenopathy:     She has no cervical adenopathy.   Neurological: She is alert and oriented to person, place, and time.   Skin: Skin is warm and dry. No rash noted.   Nursing note and vitals reviewed.      Assessment:       1. Epigastric pain    2. Essential hypertension    3. Encounter for screening mammogram for breast cancer        Plan:       Jojo was seen today for follow-up.  Meloxicam will be discontinued for now.  The patient should use Nexium 40 mg daily for the next 4 weeks.  An abdominal ultrasound and EGD will be obtained if the ultrasound is negative.  The patient should use arthritis strength Tylenol for joint pain.  Blood tests will be obtained today.  Follow-up visit in one month as recommended.    Diagnoses and all orders for this visit:    Epigastric pain  -     US Abdomen Complete; Future  -     CBC auto differential; Future  -     Comprehensive metabolic panel; Future  -     Lipase; Future    Essential hypertension  -     CBC auto differential; Future  -     Comprehensive metabolic panel; Future  -     Lipase; Future    Encounter for screening mammogram for breast cancer  -     Cancel: Mammo Digital Screening Bilateral With CAD; Future

## 2018-05-21 ENCOUNTER — OFFICE VISIT (OUTPATIENT)
Dept: INTERNAL MEDICINE | Facility: CLINIC | Age: 68
End: 2018-05-21
Payer: MEDICARE

## 2018-05-21 VITALS
DIASTOLIC BLOOD PRESSURE: 75 MMHG | HEART RATE: 64 BPM | RESPIRATION RATE: 18 BRPM | BODY MASS INDEX: 30.95 KG/M2 | TEMPERATURE: 99 F | HEIGHT: 62 IN | SYSTOLIC BLOOD PRESSURE: 123 MMHG | WEIGHT: 168.19 LBS

## 2018-05-21 DIAGNOSIS — J32.9 VIRAL SINUSITIS: Primary | ICD-10-CM

## 2018-05-21 DIAGNOSIS — J20.9 ACUTE BRONCHITIS, UNSPECIFIED ORGANISM: ICD-10-CM

## 2018-05-21 DIAGNOSIS — M47.816 LUMBAR FACET ARTHROPATHY: ICD-10-CM

## 2018-05-21 DIAGNOSIS — B97.89 VIRAL SINUSITIS: Primary | ICD-10-CM

## 2018-05-21 DIAGNOSIS — I77.1 TORTUOUS AORTA: ICD-10-CM

## 2018-05-21 DIAGNOSIS — I10 HTN, GOAL BELOW 130/80: ICD-10-CM

## 2018-05-21 PROCEDURE — 99999 PR PBB SHADOW E&M-EST. PATIENT-LVL III: CPT | Mod: PBBFAC,,, | Performed by: INTERNAL MEDICINE

## 2018-05-21 PROCEDURE — 96372 THER/PROPH/DIAG INJ SC/IM: CPT | Mod: S$GLB,,, | Performed by: INTERNAL MEDICINE

## 2018-05-21 PROCEDURE — 99214 OFFICE O/P EST MOD 30 MIN: CPT | Mod: 25,S$GLB,, | Performed by: INTERNAL MEDICINE

## 2018-05-21 PROCEDURE — 3074F SYST BP LT 130 MM HG: CPT | Mod: CPTII,S$GLB,, | Performed by: INTERNAL MEDICINE

## 2018-05-21 PROCEDURE — 99499 UNLISTED E&M SERVICE: CPT | Mod: S$GLB,,, | Performed by: INTERNAL MEDICINE

## 2018-05-21 PROCEDURE — 3078F DIAST BP <80 MM HG: CPT | Mod: CPTII,S$GLB,, | Performed by: INTERNAL MEDICINE

## 2018-05-21 RX ORDER — FLUTICASONE PROPIONATE 50 MCG
2 SPRAY, SUSPENSION (ML) NASAL DAILY
Qty: 16 G | Refills: 2 | Status: SHIPPED | OUTPATIENT
Start: 2018-05-21 | End: 2019-04-09 | Stop reason: SDUPTHER

## 2018-05-21 RX ORDER — IBUPROFEN 200 MG
200 TABLET ORAL EVERY 6 HOURS PRN
Status: ON HOLD | COMMUNITY
End: 2020-04-17 | Stop reason: HOSPADM

## 2018-05-21 RX ORDER — TRIAMCINOLONE ACETONIDE 40 MG/ML
40 INJECTION, SUSPENSION INTRA-ARTICULAR; INTRAMUSCULAR
Status: COMPLETED | OUTPATIENT
Start: 2018-05-21 | End: 2018-05-21

## 2018-05-21 RX ORDER — CODEINE PHOSPHATE AND GUAIFENESIN 10; 100 MG/5ML; MG/5ML
5 SOLUTION ORAL 3 TIMES DAILY PRN
Qty: 236 ML | Refills: 0 | Status: SHIPPED | OUTPATIENT
Start: 2018-05-21 | End: 2018-05-31

## 2018-05-21 RX ADMIN — TRIAMCINOLONE ACETONIDE 40 MG: 40 INJECTION, SUSPENSION INTRA-ARTICULAR; INTRAMUSCULAR at 09:05

## 2018-05-21 NOTE — PROGRESS NOTES
Subjective:       Patient ID: Jojo Burrows is a 68 y.o. female.    Chief Complaint: Cough (light yellow mucus); Nasal Congestion; Sore Throat; and Laryngitis    HPI   Pt with HTN, Lumbar arthropathy, tortuous aorta is here c/o 2 weeks of slowly worsening sinus/chest congestion, mild productive cough, post nasal drip, sore throat, subjective fevers with chills, fatigue, wheezing/SOB. Minimal relief with Chloraseptic spray and antihistamines.   Review of Systems   Constitutional: Positive for fever. Negative for activity change, appetite change, chills, diaphoresis, fatigue and unexpected weight change.   HENT: Positive for congestion, postnasal drip, rhinorrhea, sinus pain, sinus pressure and sore throat. Negative for sneezing, trouble swallowing and voice change.    Respiratory: Positive for cough. Negative for shortness of breath and wheezing.    Cardiovascular: Negative for chest pain, palpitations and leg swelling.   Gastrointestinal: Negative for abdominal pain, blood in stool, constipation, diarrhea, nausea and vomiting.   Genitourinary: Negative for dysuria.   Musculoskeletal: Negative for arthralgias and myalgias.   Skin: Negative for rash and wound.   Allergic/Immunologic: Negative for environmental allergies and food allergies.   Hematological: Negative for adenopathy. Does not bruise/bleed easily.       Objective:      Physical Exam   Constitutional: She is oriented to person, place, and time. She appears well-developed and well-nourished. No distress.   HENT:   Head: Normocephalic and atraumatic.   Right Ear: External ear normal.   Left Ear: External ear normal.   Nose: Mucosal edema and rhinorrhea present.   Mouth/Throat: Oropharynx is clear and moist. No oropharyngeal exudate.   Eyes: Conjunctivae and EOM are normal. Pupils are equal, round, and reactive to light. Right eye exhibits no discharge. Left eye exhibits no discharge. No scleral icterus.   Neck: Neck supple. No JVD present.   Cardiovascular:  Normal rate, regular rhythm, normal heart sounds and intact distal pulses.    Pulmonary/Chest: Effort normal and breath sounds normal. No respiratory distress. She has no wheezes. She has no rales.   Abdominal: Soft. Bowel sounds are normal. There is no tenderness.   Musculoskeletal: She exhibits no edema.   Lymphadenopathy:     She has no cervical adenopathy.   Neurological: She is alert and oriented to person, place, and time.   Skin: Skin is warm and dry. No rash noted. She is not diaphoretic. No pallor.       Assessment:       1. Viral sinusitis    2. Acute bronchitis, unspecified organism    3. HTN, goal below 130/80    4. Tortuous aorta    5. Lumbar facet arthropathy        Plan:    1. Rx Flonase, kenalog 40 mg IM and continue daily antihistamine       No decongestants 2/2 HTN   2. Rx Cheratussin AC TID PRN   3. Stable, CPT    4/5. Stable, continue to monitor

## 2018-05-25 ENCOUNTER — TELEPHONE (OUTPATIENT)
Dept: INTERNAL MEDICINE | Facility: CLINIC | Age: 68
End: 2018-05-25

## 2018-05-25 RX ORDER — AZITHROMYCIN 250 MG/1
TABLET, FILM COATED ORAL
Qty: 6 TABLET | Refills: 0 | Status: SHIPPED | OUTPATIENT
Start: 2018-05-25 | End: 2018-05-30

## 2018-05-25 NOTE — TELEPHONE ENCOUNTER
"----- Message from Sachi Bauer sent at 5/25/2018 12:33 PM CDT -----  Contact: self/[phone]  Patient would like to get medical advice.    Symptoms (please be specific):  Hoarse,coughing, chest congestion    How long has patient had these symptoms:  4 days    Pharmacy name and phone # (DON'T enter "on file" or "in chart"):  [facility] [phone] (Phone) [phone] (Fax  Any drug allergies:      Would you prefer a response via [employer]?:      Comments:  Pt stated the doctor would call in antibiotics if her symptoms did not improve.  "

## 2018-07-21 ENCOUNTER — HOSPITAL ENCOUNTER (EMERGENCY)
Facility: HOSPITAL | Age: 68
Discharge: HOME | End: 2018-07-21
Attending: EMERGENCY MEDICINE
Payer: MEDICARE

## 2018-07-21 VITALS
DIASTOLIC BLOOD PRESSURE: 68 MMHG | HEART RATE: 78 BPM | RESPIRATION RATE: 20 BRPM | SYSTOLIC BLOOD PRESSURE: 134 MMHG | HEIGHT: 62 IN | OXYGEN SATURATION: 96 % | WEIGHT: 167 LBS | BODY MASS INDEX: 30.73 KG/M2 | TEMPERATURE: 98 F

## 2018-07-21 DIAGNOSIS — N61.0 CELLULITIS OF LEFT BREAST: Primary | ICD-10-CM

## 2018-07-21 PROCEDURE — 99283 EMERGENCY DEPT VISIT LOW MDM: CPT | Mod: ,,, | Performed by: EMERGENCY MEDICINE

## 2018-07-21 PROCEDURE — 99283 EMERGENCY DEPT VISIT LOW MDM: CPT

## 2018-07-21 RX ORDER — MUPIROCIN 20 MG/G
OINTMENT TOPICAL 3 TIMES DAILY
Qty: 15 G | Refills: 0 | Status: SHIPPED | OUTPATIENT
Start: 2018-07-21 | End: 2018-07-28

## 2018-07-21 RX ORDER — CLINDAMYCIN HYDROCHLORIDE 150 MG/1
300 CAPSULE ORAL EVERY 8 HOURS
Qty: 42 CAPSULE | Refills: 0 | Status: SHIPPED | OUTPATIENT
Start: 2018-07-21 | End: 2018-07-28

## 2018-07-22 NOTE — ED NOTES
Discharge home with family, states understanding to follow up as directed. Ambulates out of ED without difficulty. RX given, All questions answered.

## 2018-07-22 NOTE — ED PROVIDER NOTES
Encounter Date: 7/21/2018    SCRIBE #1 NOTE: I, Santino Richardson, am scribing for, and in the presence of,  Dr. Zhong. I have scribed the following portions of the note - Other sections scribed: HPI, ROS, PE.       History     Chief Complaint   Patient presents with    Insect Bite     Pt presents to ED c/o unknown insect bite to L breast on Thursday. Denies itching.      Time seen by provider: 7:32 PM    This is a 68 y.o. female with history of hypertension, hyperlipidemia who presents to the ED with complaint of superficial pain to her left breast for 2 days, associated with a localized area of redness.  She was cleaning out a box of papers when debris got onto her clothes and chest. She did not feel anything bite her at that time but noticed a small area of redness to the left side of the breast approximately one day later. She thinks it is an insect bite. She reports gradual increase in size of this area of redness and is now having mild pain that is worse with palpation. She denies nipple pain and discharge. She has been applying neosporin ointment to the area without improvement. Patient denies any fever, chills, nausea, vomiting.       The history is provided by the patient.     Review of patient's allergies indicates:   Allergen Reactions    Iodine Rash    Iodine and iodide containing products Itching and Rash    Shellfish containing products Itching and Rash     Past Medical History:   Diagnosis Date    ALLERGIC RHINITIS     Cataract     GERD (gastroesophageal reflux disease)     Hyperlipidemia     Hypertension     Migraine headache     Nuclear sclerosis 4/30/2014    Sleep disorder     Thyroid disease     nodular goiter    TIA (transient ischemic attack)      Past Surgical History:   Procedure Laterality Date    COLONOSCOPY N/A 4/29/2016    Procedure: COLONOSCOPY;  Surgeon: Sergio Vickers MD;  Location: [address]);  Service: Endoscopy;  Laterality: N/A;    HYSTERECTOMY       Family  History   Problem Relation Age of Onset    Diabetes Mother     Hypertension Mother     Asthma Father     Glaucoma Father     Heart disease Maternal Grandmother     Thalassemia Daughter     No Known Problems Son     No Known Problems Daughter     No Known Problems Daughter     Amblyopia Neg Hx     Blindness Neg Hx     Cancer Neg Hx     Cataracts Neg Hx     Macular degeneration Neg Hx     Retinal detachment Neg Hx     Strabismus Neg Hx     Stroke Neg Hx     Thyroid disease Neg Hx      Social History   Substance Use Topics    Smoking status: Never Smoker    Smokeless tobacco: Never Used    Alcohol use No     Review of Systems   Constitutional: Negative for chills, fatigue and fever.   Gastrointestinal: Negative for nausea and vomiting.   Musculoskeletal: Negative for arthralgias, back pain and myalgias.   Skin: Positive for color change and rash (left breast).   Neurological: Negative for dizziness, light-headedness and headaches.   Hematological: Does not bruise/bleed easily.       Physical Exam     Initial Vitals [07/21/18 1918]   BP Pulse Resp Temp SpO2   134/68 78 20 98.3 °F (36.8 °C) 96 %      MAP       --         Physical Exam    Constitutional: She appears well-developed and well-nourished. No distress.   Pulmonary/Chest: Left breast exhibits skin change and tenderness. Left breast exhibits no inverted nipple, no mass and no nipple discharge. Breasts are symmetrical. There is no breast swelling.       Small papule to the left breast superiorly and laterally to the nipple, approximately 1 cm in diameter, mildly tender and without fluctuance and with small amount of nonpapular erythema surrounding this lesion. No drainage with palpation. No overlying puncture.   Genitourinary: No breast bleeding.   Neurological: She is alert and oriented to person, place, and time. GCS eye subscore is 4. GCS verbal subscore is 5. GCS motor subscore is 6.         ED Course   Procedures  Labs Reviewed - No data  to display       Imaging Results    None          Medical Decision Making:   History:   Old Medical Records: I decided to obtain old medical records.            Scribe Attestation:   Scribe #1: I performed the above scribed service and the documentation accurately describes the services I performed. I attest to the accuracy of the note.               Clinical Impression:   The encounter diagnosis was Cellulitis of left breast.      Disposition:   Disposition: Discharged  Condition: Stable                        Sal Zhong III, MD  07/21/18 1957

## 2018-07-22 NOTE — ED TRIAGE NOTES
68 year old female pt presents to the ed with complaints of an insect bite to her left breast x 2-3 days ago. Pt states she was working outside and something bit her . Pt denies fever or night sweats.pt denies any diarrhea. Pt is aox 3.

## 2018-07-22 NOTE — ED NOTES
Pain: Denies at present.    Psychosocial: Patient is calm and cooperative. Patients insight and judgement are appropriate to situation. Appears clean, well maintained, with clothing appropriate to environment. No evidence of delusions, hallucinations, or psychosis.    Neuro: Eyes open spontaneously. Awake, alert, oriented x 4. Speech clear and appropriate. Tolerating saliva secretions well. Able to follow commands, demonstrating ability to actively and appropriately communicate within context of current conversation. Symmetrical facial muscles. Moving all extremities well with no noted weakness. Adequate muscle tone present. Movement is purposeful. No evidence of impaired sensation. Responds to external stimuli with appropriate reflexes.     Airway: Bilateral chest rise and fall. RR regular and non-labored. Air entry patent and clear x 5 lobes of the lungs. No crepitus or subcutaneous emphysema noted on palpation.     Circulatory: Skin warm, dry, and pink. Apical and radial pulses strong and regular. Capillary refill/skin blanching less than 3 seconds to distal of 4 extremities. Placed on CM in NSR without ectopy.    Abdomen: Abdomen obese, soft and non-distended. Positive normo-active bowel sounds x 4 quadrants.     Urinary: Patient reports routine urination without pain, frequency, or urgency. Voids independently. Reports urine appears emelina/yellow in color.    Extremities: No redness, heat, swelling, deformity, or pain.     Skin: 3 cm red indication noted to the left breast x 3 days red and hard

## 2018-07-23 ENCOUNTER — TELEPHONE (OUTPATIENT)
Dept: SURGERY | Facility: CLINIC | Age: 68
End: 2018-07-23

## 2018-07-23 ENCOUNTER — OFFICE VISIT (OUTPATIENT)
Dept: INTERNAL MEDICINE | Facility: CLINIC | Age: 68
End: 2018-07-23
Payer: MEDICARE

## 2018-07-23 VITALS
BODY MASS INDEX: 30.76 KG/M2 | HEART RATE: 79 BPM | SYSTOLIC BLOOD PRESSURE: 123 MMHG | TEMPERATURE: 98 F | DIASTOLIC BLOOD PRESSURE: 63 MMHG | OXYGEN SATURATION: 97 % | WEIGHT: 167.13 LBS | HEIGHT: 62 IN

## 2018-07-23 DIAGNOSIS — N61.1 BREAST ABSCESS OF FEMALE: Primary | ICD-10-CM

## 2018-07-23 DIAGNOSIS — N61.0 CELLULITIS OF LEFT BREAST: Primary | ICD-10-CM

## 2018-07-23 DIAGNOSIS — N61.0 CELLULITIS OF BREAST: ICD-10-CM

## 2018-07-23 PROCEDURE — 3078F DIAST BP <80 MM HG: CPT | Mod: CPTII,S$GLB,, | Performed by: INTERNAL MEDICINE

## 2018-07-23 PROCEDURE — 99999 PR PBB SHADOW E&M-EST. PATIENT-LVL III: CPT | Mod: PBBFAC,,, | Performed by: INTERNAL MEDICINE

## 2018-07-23 PROCEDURE — 3074F SYST BP LT 130 MM HG: CPT | Mod: CPTII,S$GLB,, | Performed by: INTERNAL MEDICINE

## 2018-07-23 PROCEDURE — 99213 OFFICE O/P EST LOW 20 MIN: CPT | Mod: S$GLB,,, | Performed by: INTERNAL MEDICINE

## 2018-07-23 NOTE — PROGRESS NOTES
Subjective:       Patient ID: Jojo Burrows is a 68 y.o. female.    Chief Complaint: Follow-up and breast cellulitis    HPI   The patient presents for follow-up after being evaluated in the emergency room on 07/21/2018 for redness and swelling of the left breast.  The patient was diagnosed to have a localized cellulitis of the left breast.  She was placed on clindamycin and topical Bactroban ointment.  She presents today for follow-up stating that she has noted worsening swelling and soreness at the site of the cellulitis.   The skin has remained dry with no drainage present.  She does have a history of multiple cysts in the left breast with noted on previous breast ultrasound examinations.  Her last mammogram and left breast ultrasound were performed in April 2018. She has not experienced any nipple discharge.     Medical history is significant for hypertension, lumbar facet arthropathy, tortuous aorta.  The patient does not have diabetes mellitus.     Review of Systems   Constitutional: Negative for chills, diaphoresis and fever.   Respiratory: Negative for cough and shortness of breath.    Cardiovascular: Negative for chest pain.   Skin: Positive for color change. Negative for rash and wound.        Increased redness is noted involving the skin of the left breast.       Objective:      Physical Exam   Constitutional: She is oriented to person, place, and time. She appears well-developed and well-nourished. No distress.   HENT:   Head: Normocephalic and atraumatic.   Neck: Normal range of motion. Neck supple.   Lymphadenopathy:     She has no cervical adenopathy.   Neurological: She is alert and oriented to person, place, and time. Coordination normal.   Gait is normal.   No focal extremity weakness is noted.   Skin:   The left breast shows erythema and induration involving  the skin superolateral to the areola.  The center of the inflamed soft tissue is raised and exquisitely tender.  No other breast abnormality  is noted.   There is no tenderness or mass palpable on examination of the left axilla.   Psychiatric: She has a normal mood and affect. Her behavior is normal.   Nursing note and vitals reviewed.      Assessment:       1. Breast abscess of female    2. Cellulitis of breast        Plan:     Jojo was seen today for follow-up and breast cellulitis. Breast Surgery consultation will be obtained for further evaluation for possible abscess involving the left breast.  Current antibiotic therapy will be continued.      Diagnoses and all orders for this visit:    Breast abscess of female  -     Ambulatory Referral to Breast Surgery    Cellulitis of breast  -     Ambulatory Referral to Breast Surgery

## 2018-07-23 NOTE — TELEPHONE ENCOUNTER
----- Message from Alejandra Ledezma sent at 7/23/2018  3:14 PM CDT -----  Dr. Munoz has a pt with Cellulitis of the breast and soonest we could get her in was 8/6/2018.  Can Dr. Yanes or one of the other providers see her sooner.  The pt can be reached @ [phone]

## 2018-07-23 NOTE — TELEPHONE ENCOUNTER
Spoke with pt. Appt for left breast US schedule 7/25/18 at 11:00 am and pt to see Dr Yanes at 12:00 pm. Appt accepted by pt.

## 2018-07-25 ENCOUNTER — OFFICE VISIT (OUTPATIENT)
Dept: SURGERY | Facility: CLINIC | Age: 68
End: 2018-07-25
Payer: MEDICARE

## 2018-07-25 ENCOUNTER — HOSPITAL ENCOUNTER (OUTPATIENT)
Dept: RADIOLOGY | Facility: HOSPITAL | Age: 68
Discharge: HOME | End: 2018-07-25
Attending: SURGERY
Payer: MEDICARE

## 2018-07-25 VITALS
WEIGHT: 167 LBS | HEIGHT: 62 IN | HEART RATE: 65 BPM | BODY MASS INDEX: 30.73 KG/M2 | SYSTOLIC BLOOD PRESSURE: 146 MMHG | DIASTOLIC BLOOD PRESSURE: 80 MMHG | TEMPERATURE: 98 F

## 2018-07-25 DIAGNOSIS — N61.0 CELLULITIS OF LEFT BREAST: ICD-10-CM

## 2018-07-25 DIAGNOSIS — N61.1 BREAST ABSCESS: Primary | ICD-10-CM

## 2018-07-25 PROCEDURE — 99999 PR PBB SHADOW E&M-EST. PATIENT-LVL III: CPT | Mod: PBBFAC,,, | Performed by: SURGERY

## 2018-07-25 PROCEDURE — 76642 ULTRASOUND BREAST LIMITED: CPT | Mod: TC,PO,LT

## 2018-07-25 PROCEDURE — 3077F SYST BP >= 140 MM HG: CPT | Mod: CPTII,S$GLB,, | Performed by: SURGERY

## 2018-07-25 PROCEDURE — 76642 ULTRASOUND BREAST LIMITED: CPT | Mod: 26,LT,, | Performed by: RADIOLOGY

## 2018-07-25 PROCEDURE — 10060 I&D ABSCESS SIMPLE/SINGLE: CPT | Mod: S$GLB,,, | Performed by: SURGERY

## 2018-07-25 PROCEDURE — 99202 OFFICE O/P NEW SF 15 MIN: CPT | Mod: 25,S$GLB,, | Performed by: SURGERY

## 2018-07-25 PROCEDURE — 3079F DIAST BP 80-89 MM HG: CPT | Mod: CPTII,S$GLB,, | Performed by: SURGERY

## 2018-07-25 NOTE — PROGRESS NOTES
History and Physical  [facility]  Department of Surgery    CHIEF COMPLAINT: Left Breast pain/drainage      Subjective:      Jojo Burrows is a 68 y.o. postmenopausal female referred for evaluation of a breast abscess. Change was noted 8 days ago. Patient denies fever.  Patient reports skin changes. Patient initially noticed change in breast exam on Tuesday (), thought that she had a small insect bite, but does not recall any inciting event.  Went to urgent care on Saturday () because she was concerned the redness and pain would not go away with neosporin at home.  The urgent care was closed, so she went to ED.  The ED gave her clindamycin which she has taken since that day.  Patient reports drainage.      Patient does routinely do self breast exams.  Patient has noted a change on breast exam (as of last Tuesday).  Patient denies nipple discharge. Patient reports to previous breast biopsy (says she has had it many times and pathology has been benign cysts). Patient denies a personal history of breast cancer.    GYN History:  Age of menarche was 11. Age of menopause was 49.  Patient endorses hormonal therapy for over five years after her hysterectomy. Patient is . Age of first live birth was 23. Patient did breast feed.  Hysterectomy at age 49.    Breast cancer risk factors include family hx on mother's side.     PMH- HTN, arthritis      Family History:  Mother- breast cancer (diagnosed at age 80, alive at age 91)  Sister- Breast cancer (diagnosed at age 60, alive at 68), also ovarian cancer (diagnosed 2 years ago)  Great aunt (mother's side)- had breast cancer at unknown age)  Cousin (mothers side)- breast cancer diagnosed around age 50    TC score- 12.5 %, calculated in office    Past Medical History:   Diagnosis Date    ALLERGIC RHINITIS     Cataract     GERD (gastroesophageal reflux disease)     Hyperlipidemia     Hypertension     Migraine headache     Nuclear sclerosis 2014     Sleep disorder     Thyroid disease     nodular goiter    TIA (transient ischemic attack)      Past Surgical History:   Procedure Laterality Date    COLONOSCOPY N/A 4/29/2016    Procedure: COLONOSCOPY;  Surgeon: Sergio Vickers MD;  Location: [address]);  Service: Endoscopy;  Laterality: N/A;    HYSTERECTOMY       Current Outpatient Prescriptions on File Prior to Visit   Medication Sig Dispense Refill    acetaminophen (TYLENOL ARTHRITIS ORAL) Take 1 tablet by mouth.      clindamycin (CLEOCIN) 150 MG capsule Take 2 capsules (300 mg total) by mouth every 8 (eight) hours. for 7 days 42 capsule 0    esomeprazole (NEXIUM) 40 MG capsule Take 1 capsule (40 mg total) by mouth before breakfast. 90 capsule 3    ibuprofen (ADVIL,MOTRIN) 200 MG tablet Take 200 mg by mouth every 6 (six) hours as needed for Pain.      metoprolol succinate (TOPROL-XL) 50 MG 24 hr tablet Take 1 tablet (50 mg total) by mouth once daily. 30 tablet 10    mupirocin (BACTROBAN) 2 % ointment Apply topically 3 (three) times daily. for 7 days 15 g 0    [DISCONTINUED] meloxicam (MOBIC) 15 MG tablet TAKE 1 TABLET (15 MG TOTAL) BY MOUTH ONCE DAILY. 30 tablet 3     No current facility-administered medications on file prior to visit.      Social History     Social History    Marital status:      Spouse name: N/A    Number of children: N/A    Years of education: N/A     Occupational History    Not on file.     Social History Main Topics    Smoking status: Never Smoker    Smokeless tobacco: Never Used    Alcohol use No    Drug use: No    Sexual activity: Not on file     Other Topics Concern    Not on file     Social History Narrative    No narrative on file     Family History   Problem Relation Age of Onset    Diabetes Mother     Hypertension Mother     Asthma Father     Glaucoma Father     Heart disease Maternal Grandmother     Thalassemia Daughter     No Known Problems Son     No Known Problems Daughter     No  Known Problems Daughter     Amblyopia Neg Hx     Blindness Neg Hx     Cancer Neg Hx     Cataracts Neg Hx     Macular degeneration Neg Hx     Retinal detachment Neg Hx     Strabismus Neg Hx     Stroke Neg Hx     Thyroid disease Neg Hx        Review of Systems  Constitutional: negative  Eyes: negative  Ears, nose, mouth, throat, and face: negative  Respiratory: negative  Cardiovascular: negative  Gastrointestinal: negative  Integument/breast: negative  Hematologic/lymphatic: negative  Musculoskeletal:negative  Neurological: negative  Behavioral/Psych: negative       Objective:        Physical Exam   Constitutional: She appears well-developed and well-nourished.   HENT:   Head: Normocephalic.   Eyes: No scleral icterus.   Neck: Neck supple. No tracheal deviation present.   Cardiovascular: Normal rate and regular rhythm.    Pulmonary/Chest: Breath sounds normal. No respiratory distress. Right breast exhibits no inverted nipple, no mass, no nipple discharge and no skin change. Left breast exhibits no inverted nipple, no mass, no nipple discharge and no skin change.       Abdominal: Soft. She exhibits no mass. There is no tenderness.   Musculoskeletal: She exhibits no edema.   Lymphadenopathy:     She has no cervical adenopathy.   Neurological: She is alert.   Skin: No rash noted. No erythema.     Psychiatric: She has a normal mood and affect.       Radiology review: Images personally reviewed by me in the clinic.  Mammogram: In March (could not pull up on computer in clinic)  Ultrasound:       In the left breast 2 o'clock axis 4 cm from the nipple, there is an irregular heterogeneous fluid collection measuring 2.4 x 1.1 x 2.0 cm. Associated posterior acoustic enhancement and marked hypervascularity. This fluid collection is confined within the   skin layer. Associated surrounding edema and phlegmonous tissue, also confined in the skin.   This finding corresponds to the area of concern, including left breast  erythema and ulcerated tissue draining suppurative fluid  Impression:  Left breast 2 o'clock axis abscess and phlegmonous tissue confined within the skin. This corresponds to the area of concern. BI-RADS 2: Benign. (7/25)            Assessment:      Jojo Burrows is a 68 y.o. postmenopausal female with abscess of left breast     Plan:   We discussed the options for management of a breast abscess. These include antibiotics and drainage.  Drainage options include aspiration vs. incision and drainage.     We will proceed with incision and drainage.  Risks and benefits explained.  All questions were answered.     Patient will return to clinic in 1 week for wound check.  Instructions for wound care given to the patient.    PROCEDURE:  Betadine prep to skin  Ultrasound exam of abscess performed.  Lidocaine without epi injected .  Aspiration attempted but unsuccessful.  Incision then created with 11 blade and minimal drainage was released from the wound.    Irrigation performed.  Packing placed.  Tolerated well.

## 2018-07-25 NOTE — LETTER
July 29, 2018      Joo Munoz MD  [address]  [place] [address]           [facility]  [address]  [facility] [address]  Phone: [phone]  Fax: [phone]          Patient: Jojo Burrows   MR Number: [id number]   YOB: 1950   Date of Visit: 7/25/2018       Dear Dr. Joo Munoz:    Thank you for referring Jojo Burrows to me for evaluation. Attached you will find relevant portions of my assessment and plan of care.    If you have questions, please do not hesitate to call me. I look forward to following Jojo Burrows along with you.    Sincerely,    Pilar Yanes MD    Enclosure  CC:  No Recipients    If you would like to receive this communication electronically, please contact [email or web address] or [phone] to request more information on [employer] Link access.    For providers and/or their staff who would like to refer a patient to [facility], please contact us through our one-stop-shop provider referral line, [facility], at [phone].    If you feel you have received this communication in error or would no longer like to receive these types of communications, please e-mail [email or web address]

## 2018-08-01 ENCOUNTER — OFFICE VISIT (OUTPATIENT)
Dept: SURGERY | Facility: CLINIC | Age: 68
End: 2018-08-01
Payer: MEDICARE

## 2018-08-01 VITALS
HEART RATE: 84 BPM | DIASTOLIC BLOOD PRESSURE: 83 MMHG | TEMPERATURE: 98 F | HEIGHT: 62 IN | WEIGHT: 168.75 LBS | BODY MASS INDEX: 31.05 KG/M2 | SYSTOLIC BLOOD PRESSURE: 138 MMHG

## 2018-08-01 DIAGNOSIS — N61.1 BREAST ABSCESS: Primary | ICD-10-CM

## 2018-08-01 PROCEDURE — 3075F SYST BP GE 130 - 139MM HG: CPT | Mod: CPTII,S$GLB,, | Performed by: SURGERY

## 2018-08-01 PROCEDURE — 3079F DIAST BP 80-89 MM HG: CPT | Mod: CPTII,S$GLB,, | Performed by: SURGERY

## 2018-08-01 PROCEDURE — 99024 POSTOP FOLLOW-UP VISIT: CPT | Mod: S$GLB,,, | Performed by: SURGERY

## 2018-08-01 PROCEDURE — 99999 PR PBB SHADOW E&M-EST. PATIENT-LVL III: CPT | Mod: PBBFAC,,, | Performed by: SURGERY

## 2018-08-01 NOTE — PROGRESS NOTES
[facility]  Department of Surgery    CHIEF COMPLAINT: Left Breast pain/drainage      Subjective:      Jojo Burrows is a 68 y.o. postmenopausal female referred for evaluation of a breast abscess. Change was noted 8 days ago. Patient denies fever.  Patient reports skin changes. Patient initially noticed change in breast exam on Tuesday (), thought that she had a small insect bite, but does not recall any inciting event.  Went to urgent care on Saturday () because she was concerned the redness and pain would not go away with neosporin at home.  The urgent care was closed, so she went to ED.  The ED gave her clindamycin which she has taken since that day.  Patient reports drainage.      Patient does routinely do self breast exams.  Patient has noted a change on breast exam (as of last Tuesday).  Patient denies nipple discharge. Patient reports to previous breast biopsy (says she has had it many times and pathology has been benign cysts). Patient denies a personal history of breast cancer.    GYN History:  Age of menarche was 11. Age of menopause was 49.  Patient endorses hormonal therapy for over five years after her hysterectomy. Patient is . Age of first live birth was 23. Patient did breast feed.  Hysterectomy at age 49.    Breast cancer risk factors include family hx on mother's side.     PMH- HTN, arthritis      Family History:  Mother- breast cancer (diagnosed at age 80, alive at age 91)  Sister- Breast cancer (diagnosed at age 60, alive at 68), also ovarian cancer (diagnosed 2 years ago)  Great aunt (mother's side)- had breast cancer at unknown age)  Cousin (mothers side)- breast cancer diagnosed around age 50    TC score- 12.5 %, calculated in office    Interval history:  Ms. Burrows returns to clinic today for follow up after I&D of left breast abscess. Area healing well, erythema is decreasing around opening. She still has an open wound over the I&D site. Tenderness has dramatically  improved per the patient. She denies fevers, chills or warmth.     Past Medical History:   Diagnosis Date    ALLERGIC RHINITIS     Cataract     GERD (gastroesophageal reflux disease)     Hyperlipidemia     Hypertension     Migraine headache     Nuclear sclerosis 4/30/2014    Sleep disorder     Thyroid disease     nodular goiter    TIA (transient ischemic attack)      Past Surgical History:   Procedure Laterality Date    COLONOSCOPY N/A 4/29/2016    Procedure: COLONOSCOPY;  Surgeon: Sergio Vickers MD;  Location: [address]);  Service: Endoscopy;  Laterality: N/A;    HYSTERECTOMY       Current Outpatient Prescriptions on File Prior to Visit   Medication Sig Dispense Refill    acetaminophen (TYLENOL ARTHRITIS ORAL) Take 1 tablet by mouth.      esomeprazole (NEXIUM) 40 MG capsule Take 1 capsule (40 mg total) by mouth before breakfast. 90 capsule 3    ibuprofen (ADVIL,MOTRIN) 200 MG tablet Take 200 mg by mouth every 6 (six) hours as needed for Pain.      metoprolol succinate (TOPROL-XL) 50 MG 24 hr tablet Take 1 tablet (50 mg total) by mouth once daily. 30 tablet 10     No current facility-administered medications on file prior to visit.      Social History     Social History    Marital status:      Spouse name: N/A    Number of children: N/A    Years of education: N/A     Occupational History    Not on file.     Social History Main Topics    Smoking status: Never Smoker    Smokeless tobacco: Never Used    Alcohol use No    Drug use: No    Sexual activity: Not on file     Other Topics Concern    Not on file     Social History Narrative    No narrative on file     Family History   Problem Relation Age of Onset    Diabetes Mother     Hypertension Mother     Asthma Father     Glaucoma Father     Heart disease Maternal Grandmother     Thalassemia Daughter     No Known Problems Son     No Known Problems Daughter     No Known Problems Daughter     Amblyopia Neg Hx      "Blindness Neg Hx     Cancer Neg Hx     Cataracts Neg Hx     Macular degeneration Neg Hx     Retinal detachment Neg Hx     Strabismus Neg Hx     Stroke Neg Hx     Thyroid disease Neg Hx        Review of Systems  Constitutional: negative  Eyes: negative  Ears, nose, mouth, throat, and face: negative  Respiratory: negative  Cardiovascular: negative  Gastrointestinal: negative  Integument/breast: negative  Hematologic/lymphatic: negative  Musculoskeletal:negative  Neurological: negative  Behavioral/Psych: negative       Objective:   /83 (BP Location: Right arm, Patient Position: Sitting, BP Method: Medium (Automatic))   Pulse 84   Temp 98.3 °F (36.8 °C) (Oral)   Ht 5' 2" (1.575 m)   Wt 76.5 kg (168 lb 12.2 oz)   BMI 30.87 kg/m²        Physical Exam   Constitutional: She appears well-developed and well-nourished.   HENT:   Head: Normocephalic.   Cardiovascular: Normal rate and regular rhythm.    Pulmonary/Chest: Breath sounds normal. No respiratory distress. Right breast exhibits no inverted nipple, no mass, no nipple discharge and no skin change. Left breast exhibits no inverted nipple, no mass, no nipple discharge and no skin change.       Musculoskeletal: She exhibits no edema.   Lymphadenopathy:     She has no cervical adenopathy.   Neurological: She is alert.   Psychiatric: She has a normal mood and affect.       Radiology review: Images personally reviewed by me in the clinic.  Mammogram: In March (could not pull up on computer in clinic)  Ultrasound:       In the left breast 2 o'clock axis 4 cm from the nipple, there is an irregular heterogeneous fluid collection measuring 2.4 x 1.1 x 2.0 cm. Associated posterior acoustic enhancement and marked hypervascularity. This fluid collection is confined within the   skin layer. Associated surrounding edema and phlegmonous tissue, also confined in the skin.   This finding corresponds to the area of concern, including left breast erythema and ulcerated " tissue draining suppurative fluid  Impression:  Left breast 2 o'clock axis abscess and phlegmonous tissue confined within the skin. This corresponds to the area of concern. BI-RADS 2: Benign. (7/25)    Assessment:      Jojo Burrows is a 68 y.o. postmenopausal female with abscess of left breast s/p I&D on 7/25/18     Plan:   - wound healing well but still residual erythema around wound  - left diagnostic mammogram in 6 weeks  - return to clinic in 6 weeks for wound follow up and to follow up mammogram

## 2018-08-20 ENCOUNTER — HOSPITAL ENCOUNTER (EMERGENCY)
Facility: HOSPITAL | Age: 68
Discharge: HOME | End: 2018-08-20
Attending: EMERGENCY MEDICINE
Payer: MEDICARE

## 2018-08-20 VITALS
RESPIRATION RATE: 14 BRPM | TEMPERATURE: 98 F | HEIGHT: 62 IN | WEIGHT: 168 LBS | HEART RATE: 74 BPM | DIASTOLIC BLOOD PRESSURE: 62 MMHG | BODY MASS INDEX: 30.91 KG/M2 | OXYGEN SATURATION: 98 % | SYSTOLIC BLOOD PRESSURE: 147 MMHG

## 2018-08-20 DIAGNOSIS — M54.6 ACUTE LEFT-SIDED THORACIC BACK PAIN: ICD-10-CM

## 2018-08-20 DIAGNOSIS — M54.2 NECK PAIN, ACUTE: Primary | ICD-10-CM

## 2018-08-20 PROCEDURE — 99284 EMERGENCY DEPT VISIT MOD MDM: CPT | Mod: ,,, | Performed by: EMERGENCY MEDICINE

## 2018-08-20 PROCEDURE — 25000003 PHARM REV CODE 250: Performed by: STUDENT IN AN ORGANIZED HEALTH CARE EDUCATION/TRAINING PROGRAM

## 2018-08-20 PROCEDURE — 96372 THER/PROPH/DIAG INJ SC/IM: CPT

## 2018-08-20 PROCEDURE — 99283 EMERGENCY DEPT VISIT LOW MDM: CPT | Mod: 25

## 2018-08-20 PROCEDURE — 63600175 PHARM REV CODE 636 W HCPCS: Performed by: STUDENT IN AN ORGANIZED HEALTH CARE EDUCATION/TRAINING PROGRAM

## 2018-08-20 RX ORDER — KETOROLAC TROMETHAMINE 30 MG/ML
10 INJECTION, SOLUTION INTRAMUSCULAR; INTRAVENOUS
Status: COMPLETED | OUTPATIENT
Start: 2018-08-20 | End: 2018-08-20

## 2018-08-20 RX ORDER — METHOCARBAMOL 500 MG/1
1000 TABLET, FILM COATED ORAL
Status: COMPLETED | OUTPATIENT
Start: 2018-08-20 | End: 2018-08-20

## 2018-08-20 RX ORDER — METHOCARBAMOL 500 MG/1
1000 TABLET, FILM COATED ORAL 3 TIMES DAILY PRN
Qty: 9 TABLET | Refills: 0 | Status: SHIPPED | OUTPATIENT
Start: 2018-08-20 | End: 2018-08-23

## 2018-08-20 RX ADMIN — KETOROLAC TROMETHAMINE 10 MG: 30 INJECTION, SOLUTION INTRAMUSCULAR at 06:08

## 2018-08-20 RX ADMIN — METHOCARBAMOL 1000 MG: 500 TABLET ORAL at 06:08

## 2018-08-20 NOTE — ED TRIAGE NOTES
Jojo Burrows, a 68 y.o. female presents to the ED to intake 2.       Chief Complaint   Patient presents with    Back Pain     Left upper back pain x 2 days. Also reports left arm tingling     Review of patient's allergies indicates:   Allergen Reactions    Iodine Rash    Iodine and iodide containing products Itching and Rash    Shellfish containing products Itching and Rash     Past Medical History:   Diagnosis Date    ALLERGIC RHINITIS     Cataract     GERD (gastroesophageal reflux disease)     Hyperlipidemia     Hypertension     Migraine headache     Nuclear sclerosis 4/30/2014    Sleep disorder     Thyroid disease     nodular goiter    TIA (transient ischemic attack)

## 2018-08-20 NOTE — ED PROVIDER NOTES
Encounter Date: 8/20/2018       History     Chief Complaint   Patient presents with    Back Pain     Left upper back pain x 2 days. Also reports left arm tingling     68yoF w/HTN presenting with 2x days of constant left neck, upper back pain radiating down her left arm associated with some tingling. The tingling improves with moving her arm and her hand but did not fully go away this  Morning.  The patient reports that she recently helped her granddaughter move into her college dorm and the pain started shortly thereafter. Denies CP, SOB, palpitations, nausea, vomiting. The patient came in because she was concerned that her blood pressure read 158/90 at home and wanted to make sure she was not having a heart attack. She has no other medical history other than HTN.          Review of patient's allergies indicates:   Allergen Reactions    Iodine Rash    Iodine and iodide containing products Itching and Rash    Shellfish containing products Itching and Rash     Past Medical History:   Diagnosis Date    ALLERGIC RHINITIS     Cataract     GERD (gastroesophageal reflux disease)     Hyperlipidemia     Hypertension     Migraine headache     Nuclear sclerosis 4/30/2014    Sleep disorder     Thyroid disease     nodular goiter    TIA (transient ischemic attack)      Past Surgical History:   Procedure Laterality Date    HYSTERECTOMY       Family History   Problem Relation Age of Onset    Diabetes Mother     Hypertension Mother     Asthma Father     Glaucoma Father     Heart disease Maternal Grandmother     Thalassemia Daughter     No Known Problems Son     No Known Problems Daughter     No Known Problems Daughter     Amblyopia Neg Hx     Blindness Neg Hx     Cancer Neg Hx     Cataracts Neg Hx     Macular degeneration Neg Hx     Retinal detachment Neg Hx     Strabismus Neg Hx     Stroke Neg Hx     Thyroid disease Neg Hx      Social History     Tobacco Use    Smoking status: Never Smoker     Smokeless tobacco: Never Used   Substance Use Topics    Alcohol use: No    Drug use: No     Review of Systems   Constitutional: Negative for chills and fever.   HENT: Negative for ear pain and sore throat.    Eyes: Negative for pain and visual disturbance.   Respiratory: Negative for cough and shortness of breath.    Cardiovascular: Negative for chest pain and leg swelling.   Gastrointestinal: Negative for abdominal pain and nausea.   Genitourinary: Negative for dysuria and hematuria.   Musculoskeletal: Positive for back pain and neck pain. Negative for arthralgias, joint swelling and neck stiffness.   Skin: Negative for pallor and rash.   Allergic/Immunologic: Negative for environmental allergies and food allergies.   Neurological: Negative for dizziness, tremors, seizures, syncope, facial asymmetry, speech difficulty, weakness, light-headedness and headaches.   Hematological: Does not bruise/bleed easily.   Psychiatric/Behavioral: Negative for agitation and confusion.       Physical Exam     Initial Vitals [08/20/18 0518]   BP Pulse Resp Temp SpO2   (!) 161/72 77 16 98.4 °F (36.9 °C) 96 %      MAP       --         Physical Exam    Nursing note and vitals reviewed.  Constitutional: She appears well-developed and well-nourished.   HENT:   Head: Normocephalic and atraumatic.   Eyes: Conjunctivae and EOM are normal. Pupils are equal, round, and reactive to light.   Neck: Normal range of motion. Neck supple.   Cardiovascular: Normal rate, regular rhythm, normal heart sounds and intact distal pulses.   Pulmonary/Chest: Breath sounds normal. No respiratory distress. She has no wheezes. She has no rhonchi. She has no rales. She exhibits no tenderness.   Abdominal: Soft. Bowel sounds are normal.   Musculoskeletal:   +marked TTP to left trapezius down the left neck to left shoulder as well as to the suprascapular area; no midline c-spine TTP, patient able to range neck fully with some discomfort on the left side    Neurological: She is alert and oriented to person, place, and time. She has normal strength. No cranial nerve deficit or sensory deficit. Coordination and gait normal. GCS eye subscore is 4. GCS verbal subscore is 5. GCS motor subscore is 6.   Skin: Skin is warm and dry.   Psychiatric: She has a normal mood and affect.         ED Course   Procedures  Labs Reviewed - No data to display       Imaging Results    None                APC / Resident Notes:   HO4 MDM:  68yoF w/HTN presenting with 2x days of constant left neck, upper back pain radiating down her left arm associated with some tingling. Given reproducibility of the pain and no other concerning symptoms, very low suspcion for ACS at this time and feel that the pain is more musculoskeletal in nature. Suspect the higher BP is pain related and should resolve once the patient's pain is under control. Gave toradol and robaxin with some improvement in the pain. Will discharge with a few robaxin and instructions to continue warm compresses, massage and NSAIDs. The patient will followup with her PCP in 1-2 weeks if the pain is persisting.   Prema Butler MD  [facility] Medicine PGY-4  08/20/2018 6:34 AM               Attending Note:  Physician Attestation Statement: I have personally seen and examined this patient. As the supervising MD I agree with the above history. As the supervising MD I agree with the above PE. As the supervising MD I agree with the above treatment, course, plan, and disposition.          Clinical Impression:   The primary encounter diagnosis was Neck pain, acute. A diagnosis of Acute left-sided thoracic back pain was also pertinent to this visit.                             Prema Butler MD  Resident  08/20/18 0634       Vitor Wells MD  08/28/18 [id number]

## 2018-08-21 ENCOUNTER — TELEPHONE (OUTPATIENT)
Dept: INTERNAL MEDICINE | Facility: CLINIC | Age: 68
End: 2018-08-21

## 2018-08-21 ENCOUNTER — HOSPITAL ENCOUNTER (EMERGENCY)
Facility: HOSPITAL | Age: 68
Discharge: HOME | End: 2018-08-21
Attending: EMERGENCY MEDICINE
Payer: MEDICARE

## 2018-08-21 VITALS
WEIGHT: 167 LBS | BODY MASS INDEX: 30.73 KG/M2 | OXYGEN SATURATION: 97 % | DIASTOLIC BLOOD PRESSURE: 74 MMHG | TEMPERATURE: 98 F | HEART RATE: 80 BPM | RESPIRATION RATE: 18 BRPM | SYSTOLIC BLOOD PRESSURE: 158 MMHG | HEIGHT: 62 IN

## 2018-08-21 DIAGNOSIS — M54.9 UPPER BACK PAIN ON LEFT SIDE: ICD-10-CM

## 2018-08-21 DIAGNOSIS — R20.2 PARESTHESIA OF LEFT LOWER EXTREMITY: ICD-10-CM

## 2018-08-21 DIAGNOSIS — M54.12 CERVICAL RADICULOPATHY: Primary | ICD-10-CM

## 2018-08-21 DIAGNOSIS — R20.2 PARESTHESIA OF UPPER EXTREMITY: ICD-10-CM

## 2018-08-21 LAB
ALBUMIN SERPL BCP-MCNC: 4.1 G/DL
ALP SERPL-CCNC: 89 U/L
ALT SERPL W/O P-5'-P-CCNC: 30 U/L
ANION GAP SERPL CALC-SCNC: 11 MMOL/L
AST SERPL-CCNC: 32 U/L
BASOPHILS # BLD AUTO: 0.03 K/UL
BASOPHILS NFR BLD: 0.4 %
BILIRUB SERPL-MCNC: 0.7 MG/DL
BUN SERPL-MCNC: 9 MG/DL
CALCIUM SERPL-MCNC: 10 MG/DL
CHLORIDE SERPL-SCNC: 103 MMOL/L
CO2 SERPL-SCNC: 24 MMOL/L
CREAT SERPL-MCNC: 0.8 MG/DL
DIFFERENTIAL METHOD: ABNORMAL
EOSINOPHIL # BLD AUTO: 0 K/UL
EOSINOPHIL NFR BLD: 0.6 %
ERYTHROCYTE [DISTWIDTH] IN BLOOD BY AUTOMATED COUNT: 14.6 %
EST. GFR  (AFRICAN AMERICAN): >60 ML/MIN/1.73 M^2
EST. GFR  (NON AFRICAN AMERICAN): >60 ML/MIN/1.73 M^2
GLUCOSE SERPL-MCNC: 88 MG/DL
HCT VFR BLD AUTO: 40.9 %
HGB BLD-MCNC: 13.8 G/DL
IMM GRANULOCYTES # BLD AUTO: 0.02 K/UL
IMM GRANULOCYTES NFR BLD AUTO: 0.3 %
LYMPHOCYTES # BLD AUTO: 2.2 K/UL
LYMPHOCYTES NFR BLD: 30.2 %
MCH RBC QN AUTO: 29.7 PG
MCHC RBC AUTO-ENTMCNC: 33.7 G/DL
MCV RBC AUTO: 88 FL
MONOCYTES # BLD AUTO: 0.7 K/UL
MONOCYTES NFR BLD: 9.8 %
NEUTROPHILS # BLD AUTO: 4.2 K/UL
NEUTROPHILS NFR BLD: 58.7 %
NRBC BLD-RTO: 0 /100 WBC
PLATELET # BLD AUTO: 171 K/UL
PMV BLD AUTO: 11.2 FL
POTASSIUM SERPL-SCNC: 3.8 MMOL/L
PROT SERPL-MCNC: 8 G/DL
RBC # BLD AUTO: 4.65 M/UL
SODIUM SERPL-SCNC: 138 MMOL/L
TROPONIN I SERPL DL<=0.01 NG/ML-MCNC: 0.01 NG/ML
TROPONIN I SERPL DL<=0.01 NG/ML-MCNC: <0.006 NG/ML
WBC # BLD AUTO: 7.22 K/UL

## 2018-08-21 PROCEDURE — 85025 COMPLETE CBC W/AUTO DIFF WBC: CPT

## 2018-08-21 PROCEDURE — 99284 EMERGENCY DEPT VISIT MOD MDM: CPT | Mod: ,,, | Performed by: PHYSICIAN ASSISTANT

## 2018-08-21 PROCEDURE — 63600175 PHARM REV CODE 636 W HCPCS: Performed by: PHYSICIAN ASSISTANT

## 2018-08-21 PROCEDURE — 93010 ELECTROCARDIOGRAM REPORT: CPT | Mod: ,,, | Performed by: INTERNAL MEDICINE

## 2018-08-21 PROCEDURE — 80053 COMPREHEN METABOLIC PANEL: CPT

## 2018-08-21 PROCEDURE — 25000003 PHARM REV CODE 250: Performed by: PHYSICIAN ASSISTANT

## 2018-08-21 PROCEDURE — 99284 EMERGENCY DEPT VISIT MOD MDM: CPT | Mod: 25

## 2018-08-21 PROCEDURE — 84484 ASSAY OF TROPONIN QUANT: CPT | Mod: 91

## 2018-08-21 PROCEDURE — 96374 THER/PROPH/DIAG INJ IV PUSH: CPT

## 2018-08-21 PROCEDURE — 93005 ELECTROCARDIOGRAM TRACING: CPT

## 2018-08-21 RX ORDER — LIDOCAINE 50 MG/G
1 PATCH TOPICAL ONCE
Status: DISCONTINUED | OUTPATIENT
Start: 2018-08-21 | End: 2018-08-21 | Stop reason: HOSPADM

## 2018-08-21 RX ORDER — TRAMADOL HYDROCHLORIDE 50 MG/1
50 TABLET ORAL EVERY 6 HOURS PRN
Qty: 12 TABLET | Refills: 0 | Status: SHIPPED | OUTPATIENT
Start: 2018-08-21 | End: 2018-08-28

## 2018-08-21 RX ORDER — TRAMADOL HYDROCHLORIDE 50 MG/1
50 TABLET ORAL
Status: COMPLETED | OUTPATIENT
Start: 2018-08-21 | End: 2018-08-21

## 2018-08-21 RX ORDER — ACETAMINOPHEN 500 MG
1000 TABLET ORAL
Status: COMPLETED | OUTPATIENT
Start: 2018-08-21 | End: 2018-08-21

## 2018-08-21 RX ORDER — KETOROLAC TROMETHAMINE 30 MG/ML
10 INJECTION, SOLUTION INTRAMUSCULAR; INTRAVENOUS
Status: COMPLETED | OUTPATIENT
Start: 2018-08-21 | End: 2018-08-21

## 2018-08-21 RX ADMIN — ACETAMINOPHEN 1000 MG: 500 TABLET ORAL at 05:08

## 2018-08-21 RX ADMIN — TRAMADOL HYDROCHLORIDE 50 MG: 50 TABLET, COATED ORAL at 02:08

## 2018-08-21 RX ADMIN — LIDOCAINE 1 PATCH: 50 PATCH TOPICAL at 02:08

## 2018-08-21 RX ADMIN — KETOROLAC TROMETHAMINE 10 MG: 30 INJECTION, SOLUTION INTRAMUSCULAR at 05:08

## 2018-08-21 NOTE — ED NOTES
Appearance:  Pt awake, alert & oriented to person, place & time.  Pt appears uncomfortable.   Skin:  Skin warm, dry & intact.  Mucous membranes moist.  Skin turgor normal.  Respiratory:  Respirations even, non-labored.    Neurologic:  Pt moving all extremities without difficulty.  Sensation intact.   Numbness to left arm & hand.    Peripheral Vascular:  All peripheral pulses present.

## 2018-08-21 NOTE — ED PROVIDER NOTES
Encounter Date: 8/21/2018       History     Chief Complaint   Patient presents with    Neck Pain     seen here yest  neck spasm, numbness to L arm     Patient is a 68-year-old female with a past medical history of HTN, HLD, thyroid disease, TIA who presents the ED with left shoulder/neck/upper back pain. Patient complains of pain for the past 3 days.  She denies any injury, trauma, or heavy lifting.  Patient was seen in the ED yesterday and prescribed Robaxin for muscle spasms, but patient states that she is still in pain despite taking the medication which is why she presented to the ED again today.  She complains of 10/10 pain to her left shoulder/upper back/neck that is worse with movement.  She complains of left upper extremity pain and paresthesias.  She states her entire hand is numb.  She denies any decreased range of motion. She denies any anterior chest pain, cough, shortness of breath, urinary difficulties.          Review of patient's allergies indicates:   Allergen Reactions    Iodine Rash    Iodine and iodide containing products Itching and Rash    Shellfish containing products Itching and Rash     Past Medical History:   Diagnosis Date    ALLERGIC RHINITIS     Cataract     GERD (gastroesophageal reflux disease)     Hyperlipidemia     Hypertension     Migraine headache     Nuclear sclerosis 4/30/2014    Sleep disorder     Thyroid disease     nodular goiter    TIA (transient ischemic attack)      Past Surgical History:   Procedure Laterality Date    HYSTERECTOMY       Family History   Problem Relation Age of Onset    Diabetes Mother     Hypertension Mother     Asthma Father     Glaucoma Father     Heart disease Maternal Grandmother     Thalassemia Daughter     No Known Problems Son     No Known Problems Daughter     No Known Problems Daughter     Amblyopia Neg Hx     Blindness Neg Hx     Cancer Neg Hx     Cataracts Neg Hx     Macular degeneration Neg Hx     Retinal  detachment Neg Hx     Strabismus Neg Hx     Stroke Neg Hx     Thyroid disease Neg Hx      Social History     Tobacco Use    Smoking status: Never Smoker    Smokeless tobacco: Never Used   Substance Use Topics    Alcohol use: No    Drug use: No     Review of Systems   Constitutional: Negative for chills and fever.   HENT: Negative for ear pain and sore throat.    Eyes: Negative for redness.   Respiratory: Negative for cough and shortness of breath.    Cardiovascular: Negative for chest pain.   Gastrointestinal: Negative for nausea.   Endocrine: Negative for polyphagia.   Genitourinary: Negative for dysuria.   Musculoskeletal: Positive for myalgias and neck pain. Negative for back pain.   Skin: Negative for rash.   Neurological: Positive for numbness. Negative for weakness.   Hematological: Does not bruise/bleed easily.       Physical Exam     Initial Vitals [08/21/18 1343]   BP Pulse Resp Temp SpO2   (!) 158/74 80 18 98.1 °F (36.7 °C) 97 %      MAP       --         Physical Exam    Vitals reviewed.  Constitutional: She appears well-developed and well-nourished. She is not diaphoretic. No distress.   HENT:   Head: Normocephalic and atraumatic.   Nose: Nose normal.   Eyes: Conjunctivae and EOM are normal.   Neck: Normal range of motion.   Cardiovascular: Normal rate, regular rhythm and normal heart sounds. Exam reveals no friction rub.    No murmur heard.  Pulmonary/Chest: Breath sounds normal. No respiratory distress. She has no wheezes. She has no rales.   Abdominal: Soft. Bowel sounds are normal. She exhibits no distension. There is no tenderness. There is no rebound.   Musculoskeletal: Normal range of motion.        Cervical back: She exhibits tenderness. She exhibits no bony tenderness, no swelling, no edema and no deformity.        Back:    Full range of motion of upper extremities with 5/5 strength intact.   Neurological: She is alert and oriented to person, place, and time. She has normal strength. No  sensory deficit.   Skin: Skin is warm and dry. No erythema. No pallor.   Psychiatric: She has a normal mood and affect. Her behavior is normal. Judgment and thought content normal.         ED Course   Procedures  Labs Reviewed   CBC W/ AUTO DIFFERENTIAL - Abnormal; Notable for the following components:       Result Value    RDW 14.6 (*)     All other components within normal limits   COMPREHENSIVE METABOLIC PANEL   TROPONIN I   TROPONIN I          Imaging Results          CT Cervical Spine Without Contrast (Final result)  Result time 08/21/18 16:01:01    Final result by Woody Otoole MD (08/21/18 16:01:01)                 Impression:      1. Multilevel degenerative changes throughout the cervical spine as described above, no acute displaced fracture or dislocation.  Please see above.      Electronically signed by: Woody Otoole MD  Date:    08/21/2018  Time:    16:01             Narrative:    EXAMINATION:  CT CERVICAL SPINE WITHOUT CONTRAST    CLINICAL HISTORY:  L upper back and shoulder with LUE paresthesias;    TECHNIQUE:  Low dose axial images, sagittal and coronal reformations were performed though the cervical spine.  Contrast was not administered.    COMPARISON:  None    FINDINGS:  The visualized lung apices are grossly clear.  The thyroid gland and parotid glands are grossly unremarkable.  The remaining salivary glands are grossly unremarkable.  No significant cervical lymphadenopathy.  The paraspinous and hypo pharyngeal soft tissues are grossly unremarkable.  The airway is patent.    C2-C3: There is a mild disc bulge without significant spinal canal stenosis or neuroforaminal narrowing.    C3-C4: No significant spinal canal stenosis or neuroforaminal narrowing.    C4-C5: There is a broad-based disc bulge noting there may be mild spinal canal stenosis.  There is mild to moderate right neuroforaminal narrowing.    C5-C6: There is a broad-based disc bulge, asymmetric to the right.  There is mild spinal  canal stenosis and moderate left and mild right neuroforaminal narrowing.    C6-C7: There is disc osteophyte complex resulting in mild-to-moderate spinal canal stenosis.  There is moderate to severe left and moderate right neuroforaminal narrowing.    C7-T1: No significant spinal canal stenosis.  There is mild to moderate bilateral neuroforaminal narrowing.    There is multilevel facet arthropathy.  There is disc space height loss primarily involving C4-C5 and C6-C7 with marked endplate degenerative changes at C5-C6 and C6-C7.  There is mild height loss involving C6, likely on the basis of degenerative change/Schmorl's node although comparison with previous exams would be helpful.  Spinal alignment is otherwise grossly preserved.                               X-Ray Chest PA And Lateral (Final result)  Result time 08/21/18 15:24:13    Final result by Vitor Patterson Jr., MD (08/21/18 15:24:13)                 Impression:      See above      Electronically signed by: Vitor Patterson MD  Date:    08/21/2018  Time:    15:24             Narrative:    EXAMINATION:  XR CHEST PA AND LATERAL    CLINICAL HISTORY:  Paresthesia of skin    TECHNIQUE:  PA and lateral views of the chest were performed.    COMPARISON:  March 2017.    FINDINGS:  Heart size pulmonary vessels are normal.  The lungs are well aerated and clear.  Mild deviation the trachea to the left slightly increased compared to prior.  This may be related to an enlarged thyroid.  Correlation with clinical findings suggested.  Degenerative change in the spine.                                 Medical Decision Making:   History:   Old Medical Records: I decided to obtain old medical records.  Clinical Tests:   Lab Tests: Ordered and Reviewed  Radiological Study: Reviewed and Ordered  Medical Tests: Reviewed and Ordered       APC / Resident Notes:   Patient is a 68-year-old female that presents the ED with left-sided neck pain/upper back/lower shoulder pain for the past 3  days.  She went to the ED yesterday and was prescribed muscle relaxer which did not improve her pain.  She presented to the ED again due to persistent pain.    On exam, vital signs stable.  NAD and nontoxic appearing.  No cervical spinous process tenderness. She has TTP to her left paraspinous region.  Full range of motion of upper extremities with 5/5 strength intact and symmetric. She endorses numbness to her left 2nd through 4th distal digits.     DDX includes but is not limited to cervical radiculopathy, musculoskeletal pain, muscle strain, neuropathy, ACS.  I have considered but do not suspect CVA.  Will initiate workup, give tramadol and apply lidocaine patch, and continue to monitor.    ECG with sinus ileana at 57 bpm. Nonspecific T wave abn. Right ventricular conduction delay.   CBC with no leukocytosis or anemia.    CMP with no electrolyte abnormalities. Creatinine stable at 0.7.    1st troponin negative.   Chest x-ray with heart size and pulmonary vessels normal.  Lungs are clear.  Degenerative change in the spine.    CT cervical spine shows multilevel degenerative changes throughout the cervical spine without fracture or dislocations.    2nd troponin negative.    Patient updated with results.  She reports improvement in her pain. Her symptoms are most likely due to cervical radiculopathy.  I advised patient to take OTC NSAIDs, acetaminophen, and lidocaine patch.  I prescribed her tramadol for severe pain.  She is to follow up with sports medicine in 5 days if her symptoms do not improved.  Return to ED precautions given.  All questions answered.  Patient is comfortable with plan and stable for discharge. I have reviewed patient's chart and discussed this case with my supervising MD.                 Clinical Impression:   The primary encounter diagnosis was Cervical radiculopathy. Diagnoses of Paresthesia of upper extremity, Paresthesia of left lower extremity, and Upper back pain on left side were also  pertinent to this visit.      Disposition:   Disposition: Discharged  Condition: Stable                        Shanika Fish PA-C  08/21/18 [id number]

## 2018-08-21 NOTE — TELEPHONE ENCOUNTER
----- Message from Stacey Vang sent at 8/21/2018 11:37 AM CDT -----  Contact: Pt [phone]  or Home [phone]  Patient would like a call back in regards to her having to go to the emergency room for upper back pain and and loss of sensation and numbness in her arms hands and fingers. She called today to try to be seen but I told her that she should call 911 if she is having those symptoms.

## 2018-08-21 NOTE — ED TRIAGE NOTES
"Pt c/o neck, upper back pain and left arm pain.  Pain began a few days ago.  Pt was seen in ED yesterday-given meds which helped relieve pain " a little bit".  Pt has been taking meds that were prescribed but states pain continues.    "

## 2018-08-24 ENCOUNTER — TELEPHONE (OUTPATIENT)
Dept: INTERNAL MEDICINE | Facility: CLINIC | Age: 68
End: 2018-08-24

## 2018-08-24 NOTE — TELEPHONE ENCOUNTER
----- Message from Maddy Felder sent at 8/24/2018  2:46 PM CDT -----  Contact: Jojo Burrows [phone]  Jojo would like a call back for guidance as far as what to do next due to her being in the hospital. Jojo states she still not feeling well either.

## 2018-08-24 NOTE — TELEPHONE ENCOUNTER
"Er 8/20 and 8/21 , 8/21 notes say "  Patient updated with results.  She reports improvement in her pain. Her symptoms are most likely due to cervical radiculopathy.    I advised patient to take OTC NSAIDs, acetaminophen, and lidocaine patch.  I prescribed her tramadol for severe pain.    She is to follow up with sports medicine in 5 days if her symptoms do not improved.  Return to ED precautions given.  All questions answered.    Patient is comfortable with plan and stable for discharge. I have reviewed patient's chart and discussed this case with my supervising MD.  "    She is using the lidoderm patch, taking tylenol or motrin ev 6-8 hours  . Using tramadol once that wears off.  They gave her #12 8/21.  Has 4-5 left.    Out of robaxin given 8/20 visit .  Hands and fingers are numb and tingling. Pain in back/shoulder and arms.  She says Arms and hands are weak.    Can't really sleep thru night and wondering how long this is going to last.  She called sports medicine for appt since er suggested. they told her they don't handle the back And referred her to spine dept.    Soonest appt with dr king is 9/5 with spine dr king and she is on waiting list for a cancellation.  She is also seeing you Thursday next week for "hosp fol up" . I reminded her of that appt.      She wants to make sure you think she needs to keep dr king appt and if so will need more tramadol or other meds you suggest.  I explained you are in meeting this afternoon and we would respond as soon as you can, may not be till Monday.    Please advise   Thanks lillian      "

## 2018-08-27 ENCOUNTER — TELEPHONE (OUTPATIENT)
Dept: INTERNAL MEDICINE | Facility: CLINIC | Age: 68
End: 2018-08-27

## 2018-08-27 DIAGNOSIS — M51.36 DDD (DEGENERATIVE DISC DISEASE), LUMBAR: Primary | ICD-10-CM

## 2018-08-27 DIAGNOSIS — M50.30 DDD (DEGENERATIVE DISC DISEASE), CERVICAL: Primary | ICD-10-CM

## 2018-08-27 NOTE — TELEPHONE ENCOUNTER
"----- Message from Sherri Ramirez sent at 8/27/2018 11:41 AM CDT -----  Contact: pt [phone]  RX request - refill or new RX.  Is this a refill or new RX:  Refill   RX name and strength: traMADol (ULTRAM) 50 mg tablet  Directions:   Is this a 30 day or 90 day RX:    Local pharmacy or mail order pharmacy:  Local   Pharmacy name and phone # (DON'T enter "on file" or "in chart"): [facility]/pharmacy #[id number] - [place] - [address] [phone] (Phone)  [phone] (Fax)      Comments:        "

## 2018-08-28 ENCOUNTER — HOSPITAL ENCOUNTER (OUTPATIENT)
Dept: RADIOLOGY | Facility: HOSPITAL | Age: 68
Discharge: HOME | End: 2018-08-28
Attending: PHYSICIAN ASSISTANT
Payer: MEDICARE

## 2018-08-28 ENCOUNTER — OFFICE VISIT (OUTPATIENT)
Dept: ORTHOPEDICS | Facility: CLINIC | Age: 68
End: 2018-08-28
Payer: MEDICARE

## 2018-08-28 VITALS
HEART RATE: 70 BPM | BODY MASS INDEX: 30.76 KG/M2 | WEIGHT: 167.13 LBS | HEIGHT: 62 IN | SYSTOLIC BLOOD PRESSURE: 135 MMHG | DIASTOLIC BLOOD PRESSURE: 79 MMHG

## 2018-08-28 DIAGNOSIS — M54.12 CERVICAL RADICULOPATHY: Primary | ICD-10-CM

## 2018-08-28 DIAGNOSIS — M51.36 DDD (DEGENERATIVE DISC DISEASE), LUMBAR: ICD-10-CM

## 2018-08-28 DIAGNOSIS — M50.30 DDD (DEGENERATIVE DISC DISEASE), CERVICAL: ICD-10-CM

## 2018-08-28 PROCEDURE — 72050 X-RAY EXAM NECK SPINE 4/5VWS: CPT | Mod: 26,,, | Performed by: RADIOLOGY

## 2018-08-28 PROCEDURE — 3078F DIAST BP <80 MM HG: CPT | Mod: CPTII,S$GLB,, | Performed by: PHYSICIAN ASSISTANT

## 2018-08-28 PROCEDURE — 72050 X-RAY EXAM NECK SPINE 4/5VWS: CPT | Mod: TC

## 2018-08-28 PROCEDURE — 99999 PR PBB SHADOW E&M-EST. PATIENT-LVL III: CPT | Mod: PBBFAC,,, | Performed by: PHYSICIAN ASSISTANT

## 2018-08-28 PROCEDURE — 3075F SYST BP GE 130 - 139MM HG: CPT | Mod: CPTII,S$GLB,, | Performed by: PHYSICIAN ASSISTANT

## 2018-08-28 PROCEDURE — 99204 OFFICE O/P NEW MOD 45 MIN: CPT | Mod: S$GLB,,, | Performed by: PHYSICIAN ASSISTANT

## 2018-08-28 RX ORDER — DIAZEPAM 2 MG/1
2 TABLET ORAL
Qty: 2 TABLET | Refills: 0 | Status: SHIPPED | OUTPATIENT
Start: 2018-08-28 | End: 2018-09-12

## 2018-08-28 RX ORDER — METHYLPREDNISOLONE 4 MG/1
TABLET ORAL
Qty: 1 PACKAGE | Refills: 0 | Status: SHIPPED | OUTPATIENT
Start: 2018-08-28 | End: 2018-09-12

## 2018-08-28 RX ORDER — TRAMADOL HYDROCHLORIDE 50 MG/1
50 TABLET ORAL EVERY 6 HOURS PRN
Qty: 42 TABLET | Refills: 0 | Status: SHIPPED | OUTPATIENT
Start: 2018-08-28 | End: 2018-09-07

## 2018-08-28 NOTE — PROGRESS NOTES
DATE: 8/28/2018  PATIENT: Jojo Burrows    Supervising Physician: Fer Camarillo M.D.    CHIEF COMPLAINT: neck and left arm pain    HISTORY:  Jojo Burrows is a 68 y.o. female here for initial evaluation of neck and left arm pain (Neck - 10, Arm - 10). The pain has been present for about 2 weeks. The patient describes the pain as aching. The pain is worse with activity and at night and improved by heat. There is associated numbness and tingling. There is subjective weakness.  She reports dropping things and difficulty with fine motor skills with the left hand.  Prior treatments have included tylenol, tramadol, robaxin, and heat, but no ESIs or surgery.     The patient denies myelopathic symptoms such as handwriting changes or difficulty with buttons/coins/keys. Denies perineal paresthesias, bowel/bladder dysfunction.    PAST MEDICAL/SURGICAL HISTORY:  Past Medical History:   Diagnosis Date    ALLERGIC RHINITIS     Cataract     GERD (gastroesophageal reflux disease)     Hyperlipidemia     Hypertension     Migraine headache     Nuclear sclerosis 4/30/2014    Sleep disorder     Thyroid disease     nodular goiter    TIA (transient ischemic attack)      Past Surgical History:   Procedure Laterality Date    HYSTERECTOMY         Medications:  Current Outpatient Medications on File Prior to Visit   Medication Sig Dispense Refill    acetaminophen (TYLENOL ARTHRITIS ORAL) Take 1 tablet by mouth.      esomeprazole (NEXIUM) 40 MG capsule Take 1 capsule (40 mg total) by mouth before breakfast. 90 capsule 3    ibuprofen (ADVIL,MOTRIN) 200 MG tablet Take 200 mg by mouth every 6 (six) hours as needed for Pain.      metoprolol succinate (TOPROL-XL) 50 MG 24 hr tablet Take 1 tablet (50 mg total) by mouth once daily. 30 tablet 10    [DISCONTINUED] traMADol (ULTRAM) 50 mg tablet Take 1 tablet (50 mg total) by mouth every 6 (six) hours as needed for Pain. 12 tablet 0     No current facility-administered medications on  "file prior to visit.        Social History:   Social History     Socioeconomic History    Marital status:      Spouse name: Not on file    Number of children: Not on file    Years of education: Not on file    Highest education level: Not on file   Social Needs    Financial resource strain: Not on file    Food insecurity - worry: Not on file    Food insecurity - inability: Not on file    Transportation needs - medical: Not on file    Transportation needs - non-medical: Not on file   Occupational History    Not on file   Tobacco Use    Smoking status: Never Smoker    Smokeless tobacco: Never Used   Substance and Sexual Activity    Alcohol use: No    Drug use: No    Sexual activity: Not on file   Other Topics Concern    Not on file   Social History Narrative    Not on file       REVIEW OF SYSTEMS:  Constitution: Negative. Negative for chills, fever and night sweats.   Cardiovascular: Negative for chest pain and syncope.   Respiratory: Negative for cough and shortness of breath.   Gastrointestinal: See HPI. Negative for nausea/vomiting. Negative for abdominal pain.  Genitourinary: See HPI. Negative for discoloration or dysuria.  Skin: Negative for dry skin, itching and rash.   Hematologic/Lymphatic: Negative for bleeding problem. Does not bruise/bleed easily.   Musculoskeletal: Negative for falls and muscle weakness.   Neurological: See HPI. No seizures.   Endocrine: Negative for polydipsia, polyphagia and polyuria.   Allergic/Immunologic: Negative for hives and persistent infections.  Psychiatric/Behavioral: Negative for depression and insomnia.         EXAM:  /79 (BP Location: Right arm, Patient Position: Sitting, BP Method: Large (Automatic))   Pulse 70   Ht 5' 2" (1.575 m)   Wt 75.8 kg (167 lb 1.7 oz)   BMI 30.56 kg/m²     General: The patient is a very pleasant 68 y.o. female in no apparent distress, the patient is oriented to person, place and time.  Psych: Normal mood and " affect  HEENT: Vision grossly intact, hearing intact to the spoken word.  Lungs: Respirations unlabored.  Gait: Normal station and gait, no difficulty with toe or heel walk.   Skin: Cervical skin negative for rashes, lesions, hairy patches and surgical scars.  Range of motion: Cervical range of motion is acceptable. There is moderate trapezial tenderness to palpation.  Spinal Balance: Global saggital and coronal spinal balance acceptable, no significant for scoliosis and kyphosis.  Musculoskeletal: No pain with the range of motion of the bilateral shoulders and elbows. Normal bulk and contour of the bilateral hands.  Vascular: Bilateral hands warm and well perfused, radial pulses 2+ bilaterally.  Neurological: Normal strength and tone in all major motor groups in the bilateral upper and lower extremities with the exception of decreased strength with wrist extension on the left. Normal sensation to light touch in the C5-T1 and L2-S1 dermatomes bilaterally with the exception of decreased sensation in the C7 and C8 dermatomes on the left.  Deep tendon reflexes symmetric 2++ in the bilateral upper and lower extremities.  Positive Inverted Radial Reflex and negative Bourne's bilaterally. Negative Babinski bilaterally.     IMAGING:   Today I personally reviewed AP, Lat and Flex/Ex  upright C-spine films that demonstrate moderate disc space narrowing between C4 and C7.      CT cervical spine shows multilevel spondylosis.    Body mass index is 30.56 kg/m².    Hemoglobin A1C   Date Value Ref Range Status   06/14/2005 5.6 4.5 - 6.2 % Final           ASSESSMENT/PLAN:    Diagnoses and all orders for this visit:    Cervical radiculopathy  -     MRI Cervical Spine Without Contrast; Future    Other orders  -     methylPREDNISolone (MEDROL DOSEPACK) 4 mg tablet; use as directed  -     traMADol (ULTRAM) 50 mg tablet; Take 1 tablet (50 mg total) by mouth every 6 (six) hours as needed for Pain.  -     diazePAM (VALIUM) 2 MG tablet;  Take 1 tablet (2 mg total) by mouth On call Procedure for Anxiety (take 1 tab 30 min prior to procedure.  may take 2nd if needed).    MRI cervical spine.  Follow up after the MRI to discuss results and further treatment.       Follow-up if symptoms worsen or fail to improve.

## 2018-08-31 ENCOUNTER — HOSPITAL ENCOUNTER (OUTPATIENT)
Dept: RADIOLOGY | Facility: HOSPITAL | Age: 68
Discharge: HOME | End: 2018-08-31
Attending: PHYSICIAN ASSISTANT
Payer: MEDICARE

## 2018-08-31 DIAGNOSIS — M54.12 CERVICAL RADICULOPATHY: ICD-10-CM

## 2018-08-31 PROCEDURE — 72141 MRI NECK SPINE W/O DYE: CPT | Mod: TC

## 2018-08-31 PROCEDURE — 72141 MRI NECK SPINE W/O DYE: CPT | Mod: 26,,, | Performed by: RADIOLOGY

## 2018-09-05 ENCOUNTER — OFFICE VISIT (OUTPATIENT)
Dept: ORTHOPEDICS | Facility: CLINIC | Age: 68
End: 2018-09-05
Payer: MEDICARE

## 2018-09-05 VITALS — HEIGHT: 62 IN | WEIGHT: 167.13 LBS | BODY MASS INDEX: 30.76 KG/M2

## 2018-09-05 DIAGNOSIS — M54.12 CERVICAL RADICULOPATHY: Primary | ICD-10-CM

## 2018-09-05 DIAGNOSIS — M50.30 DDD (DEGENERATIVE DISC DISEASE), CERVICAL: ICD-10-CM

## 2018-09-05 PROCEDURE — 1101F PT FALLS ASSESS-DOCD LE1/YR: CPT | Mod: CPTII,,, | Performed by: PHYSICIAN ASSISTANT

## 2018-09-05 PROCEDURE — 99999 PR PBB SHADOW E&M-EST. PATIENT-LVL III: CPT | Mod: PBBFAC,,, | Performed by: PHYSICIAN ASSISTANT

## 2018-09-05 PROCEDURE — 99213 OFFICE O/P EST LOW 20 MIN: CPT | Mod: S$PBB,,, | Performed by: PHYSICIAN ASSISTANT

## 2018-09-05 PROCEDURE — 99213 OFFICE O/P EST LOW 20 MIN: CPT | Mod: PBBFAC | Performed by: PHYSICIAN ASSISTANT

## 2018-09-05 NOTE — PROGRESS NOTES
"DATE: 9/5/2018  PATIENT: Jojo Burrows    Attending Physician: Fer Camarillo M.D.    HISTORY:  Jojo Burrows is a 68 y.o. female who returns to me today for MRI results.  She was last seen by me 8/28/2018.  Today she is doing well but notes her pain is a little better today.  She rates her pain 2/10 today.  She completed the steroid pack.     The Patient denies myelopathic symptoms such as handwriting changes or difficulty with buttons/coins/keys. Denies perineal paresthesias, bowel/bladder dysfunction.    PMH/PSH/FamHx/SocHx:  Unchanged from prior visit    ROS:  REVIEW OF SYSTEMS:  Constitution: Negative. Negative for chills, fever and night sweats.   HENT: Negative for congestion and headaches.    Eyes: Negative for blurred vision, left vision loss and right vision loss.   Cardiovascular: Negative for chest pain and syncope.   Respiratory: Negative for cough and shortness of breath.    Endocrine: Negative for polydipsia, polyphagia and polyuria.   Hematologic/Lymphatic: Negative for bleeding problem. Does not bruise/bleed easily.   Skin: Negative for dry skin, itching and rash.   Musculoskeletal: Negative for falls and muscle weakness.   Gastrointestinal: Negative for abdominal pain and bowel incontinence.   Allergic/Immunologic: Negative for hives and persistent infections.  Genitourinary: Negative for urinary retention/incontinence and nocturia.   Neurological: Negative for disturbances in coordination, no myelopathic symptoms such as handwriting changes or difficulty with buttons, coins, keys or small objects. No loss of balance and seizures.   Psychiatric/Behavioral: Negative for depression. The patient does not have insomnia.   Denies myelopathic symptoms, perineal paresthesias, bowel or bladder incontinence    EXAM:  Ht 5' 2" (1.575 m)   Wt 75.8 kg (167 lb 1.7 oz)   BMI 30.56 kg/m²     Physical exam stable.  Neuro exam stable.    IMAGING:  No new imaging today.    Today I personally re-reviewed AP, Lat " and Flex/Ex  upright C-spine films that demonstrate moderate C4-C7 disc space narrowing.    MRI cervical spine shows multilevel spondylosis without significant spinal canal stenosis.     Body mass index is 30.56 kg/m².    Hemoglobin A1C   Date Value Ref Range Status   06/14/2005 5.6 4.5 - 6.2 % Final         ASSESSMENT/PLAN:    Diagnoses and all orders for this visit:    Cervical radiculopathy  -     Ambulatory Referral to Physical/Occupational Therapy    DDD (degenerative disc disease), cervical  -     Ambulatory Referral to Physical/Occupational Therapy        There is no surgical intervention indicated at this time.  Referral for PT placed today at [facility].  Follow up after therapy if symptoms persist.       Follow-up if symptoms worsen or fail to improve.

## 2018-09-10 ENCOUNTER — CLINICAL SUPPORT (OUTPATIENT)
Dept: REHABILITATION | Facility: HOSPITAL | Age: 68
End: 2018-09-10
Payer: MEDICARE

## 2018-09-10 DIAGNOSIS — M89.8X1 PAIN OF LEFT SCAPULA: ICD-10-CM

## 2018-09-10 DIAGNOSIS — R29.898 LEFT ARM WEAKNESS: ICD-10-CM

## 2018-09-10 DIAGNOSIS — M53.82 DECREASED ROM OF INTERVERTEBRAL DISCS OF CERVICAL SPINE: ICD-10-CM

## 2018-09-10 DIAGNOSIS — M79.602 LEFT ARM PAIN: ICD-10-CM

## 2018-09-10 PROCEDURE — 97110 THERAPEUTIC EXERCISES: CPT | Mod: PO

## 2018-09-10 PROCEDURE — G8978 MOBILITY CURRENT STATUS: HCPCS | Mod: CK,PO

## 2018-09-10 PROCEDURE — 97161 PT EVAL LOW COMPLEX 20 MIN: CPT | Mod: PO

## 2018-09-10 PROCEDURE — G8979 MOBILITY GOAL STATUS: HCPCS | Mod: CJ,PO

## 2018-09-10 NOTE — PROGRESS NOTES
PHYSICAL THERAPY INITIAL EVALUATION    Name: Jojo Burrows  Clinic Number: [id number]    Medical Diagnosis:   M54.12 (ICD-10-CM) - Cervical radiculopathy   M50.30 (ICD-10-CM) - DDD (degenerative disc disease), cervical       PT Diagnosis:   Encounter Diagnoses   Name Primary?    Pain of left scapula     Left arm pain     Left arm weakness     Decreased ROM of intervertebral discs of cervical spine      Physician: Prema Salazar PA-C  Treatment Orders: PT Eval and Treat    History     Past Medical History:   Diagnosis Date    ALLERGIC RHINITIS     Cataract     GERD (gastroesophageal reflux disease)     Hyperlipidemia     Hypertension     Migraine headache     Nuclear sclerosis 4/30/2014    Sleep disorder     Thyroid disease     nodular goiter    TIA (transient ischemic attack)      Current Outpatient Medications   Medication Sig    acetaminophen (TYLENOL ARTHRITIS ORAL) Take 1 tablet by mouth.    diazePAM (VALIUM) 2 MG tablet Take 1 tablet (2 mg total) by mouth On call Procedure for Anxiety (take 1 tab 30 min prior to procedure.  may take 2nd if needed).    esomeprazole (NEXIUM) 40 MG capsule Take 1 capsule (40 mg total) by mouth before breakfast.    ibuprofen (ADVIL,MOTRIN) 200 MG tablet Take 200 mg by mouth every 6 (six) hours as needed for Pain.    methylPREDNISolone (MEDROL DOSEPACK) 4 mg tablet use as directed    metoprolol succinate (TOPROL-XL) 50 MG 24 hr tablet Take 1 tablet (50 mg total) by mouth once daily.     No current facility-administered medications for this visit.      Review of patient's allergies indicates:   Allergen Reactions    Iodine Rash    Iodine and iodide containing products Itching and Rash    Shellfish containing products Itching and Rash       Evaluation Date: 9/10/2018  Visit # authorized: 1   Authorization period: 9/5/2019  Plan of care Expiration: 11/5/2018  Evaluation Time In/Time Out: 1:00/1:30    Subjective     History of present condition:  Patient  reports insiduous onset of of pain in L UT and scapular pain that would refer into her L UE.  Pain began approx 3 week ago. Went to ER and rec'd injection which she states wore off by the next day.  R hand dominant.  C/o pain, described as achiness in L UT/scapular region with pain referring into her L UE. Reports parasthesias throughout her entire L hand but states symptoms are worse in 4th, 5th digits     Precautions: standard  Previous treatment : steroid pack (finished 8 days ago) with good reilef  Current Functional Limitations:     Manipulating buttons and zippers using L hand: moderate limtation due to weakness of LUE  Reaching with L UE: increased pain in L UT/scapula region  Sleep: moderate limitation, difficult to find comfortable position for L UE  Driving: using R UE primarily  Sitting: increased pain with long duration  Walking: increased pain with long duration  Carrying purse/bag on L UE: moderate limitation  Lifting: moderate limitation using L UE    Prior Functional Status: able to sit at craft table for several hours without increased pain    Pain Scale:  Current: 5/10    Worst: 8/10    Best: 4/10    Diagnostic tests: MRI shows Multilevel degenerative change of the cervical spine resulting in varying degrees of spinal canal and neural foraminal narrowing    Occupation: retired    Patient Goals: wants to be able to get back to doing crafts, sitting for several hours at once without pain    Objective     Observation/Posture:: Mild FHP, increased tone B UT, pt demo mild gaurding posture of L UE throughout examination    Cervical ROM: (measured in percentage)    Percent   Flexion 25   Extension 50   Right SB 25   Left SB 25   Right rotation 50   Left rotation 50   **increased lower csp and L UT pain reported all planes, worse with flexion and L SB      Shoulder Active ROM: (measured in degrees)   Shoulder Right UE Left UE   Flexion 170 135   Abduction 180 140   ER 90 (at 90 deg abd) 90(at 90 deg abd)  "  Extension 50 50   IR Thumb to T9 Thumb to T9     Sensation: Dermatomes: decreased to light touch throughout L UE, states L UE feels "hard, cold, and lifeless"    Upper Extremity Strength: (grading 1-5 out of 5)      Right UE Left UE   Shoulder Flexion: 5/5 4+/5   Shoulder Abduction: 5/5 4+/5   Shoulder ER: 5/5 4+/5   Shoulder IR: 5/5 5/5        Elbow Flexion: 5/5 4+/5   Elbow Extension: 4+/5 4+/5        Wrist flexion: 5/5 4/5   Wrist extension: 5/5 4/5   Finger intrinsics 5/5 4/5    #1 40 20    #2 35 20    #3 34 18   Lower Trap: 4/5 4/5   Middle Trap: 4/5 4/5     Cervical Spine Special Tests: ((+): positive; (-): negative)   Compression +   Distraction -     Did not assess thoracic segmental mobility due to tenderness reported with light palpation and pt reporting several times that she is afraid of the severe pain returning since she's just now feeling some relief    Palpation:   Pt demo muscle guarding with all palpation, reporting concerns over severe pain returning.  Increased tone noted L UT, tsp paraspinals, and lower csp paraspinals    Functional Limitations Reports - G Codes  Category: Mobility    Tool: FOTO Lumbar Survey   Modifier  Impairment Limitation Restriction    CH  0 % impaired, limited or restricted    CI  @ least 1% but less than 20% impaired, limited or restricted    CJ  @ least 20%<40% impaired, limited or restricted    CK  @ least 40%<60% impaired, limited or restricted    CL  @ least 60% <80% impaired, limited or restricted    CM  @ least 80%<100% impaired limited or restricted    CN  100% impaired, limited or restricted     Current/: CK = 47% Limitation   Goal/ : CJ = 20% Limitation      History  Co-morbidities and personal factors that may impact the plan of care Co-morbidities:   advanced age and difficulty sleeping    Personal Factors:   no deficits     low   Examination  Body Structures and Functions, activity limitations and participation restrictions that may " impact the plan of care Body Regions:   neck  upper extremities  trunk    Body Systems:    ROM, strength, posture    Participation Restrictions:   none    Activity limitations:   Learning and applying knowledge  no deficits    General Tasks and Commands  no deficits    Communication  no deficits    Mobility  lifting and carrying objects  fine hand use (grasping/picking up)  walking    Self care  dressing     Domestic Life  no deficits    Interactions/Relationships  no deficits    Life Areas  no deficits    Community and Social Life  no deficits         low   Clinical Presentation stable and uncomplicated low   Decision Making/ Complexity Score: low           TREATMENT     Time In: 130  Time Out: 200    PT Evaluation Completed? Yes    Jojo received 15 minutes of therapeutic exercise & instruction including:    Supine cervical rotation x 10 B  Supine chin tuck 20 x 3 sec hold  Serratus punches 3 x 10  Seated scap squeeze 30 x 3 sec hold      Written Home Exercises Provided: Yes  Jojo demo good understanding of the education provided. Patient demo good return demo of skill of exercises.      Assessment     Patient presents with moderate functional limitations with sustained sitting, fine motor tasks, and lifting/reaching activity secondary to L cervical/thoracic pain, L  UE weakness, and impaired sensation throughout L UE and  weakness. Patient will benefit from treatment initially focused on increasing cervical/thoracic and L UE mobility with slow progression of postural stretching and strengthening as tolerated along with manual tx to decrease pain and parasthesias.  Patient cari initial tx session well and demo good understanding of written HEP.  Did not perform manual tx this date due to pt exhibiting fear avoidance behaviors and increased pain reported with light touch along with L medial scapular border.      Pt prognosis is Good.  Pt will benefit from skilled outpatient physical therapy to address the above  stated deficits, provide pt/family education and to maximize pt's level of independence.     Medical necessity is demonstrated by the following IMPAIRMENTS/PROBLEM LIST:   1) Pain limiting function   2) Postural dysfunction   3) Longus colli/suboccipital tightness   4) Upper trapezius/levator scapula tightness   5) Longus colli/scapula stabilizer weakness    6) Decreased cervical/shoulder ROM    7) Decreased cervical and thoracic joint mobility   8) Decreased UT/levator scapula/scalenes soft tissue extensibility L   9) Lack of HEP   10) UE paresthesia L    GOALS:     Short Term Goals:   1. Pt. to report decreased csp/tsp pain  </=  6/10 at worst to increase tolerance for upright unsupported sitting  2. Pt. to demonstrate proper cervical and scapula retraction requiring min. to no verbal cues from PT   3. Increase B cervical rotation AROM to 90%, pain-free, to promote greater ease with driving within 3 weeks  4. Pt to tolerate HEP to improve ROM and independence with ADLs  5. Pt. to report a decrease in frequency of L UE paresthesia by 25% within 3 weeks    Long Term Goals:  1. Pt. to report decreased csp/tsp pain </ =  3/10 at worst to increase tolerance for upright unsupported sitting posture within 8 weeks  2. Pt. to demonstrate proper cervical and scapula retraction requiring no verbal cues from PT  3. Pt. to demonstrate increased MMT for bilateral UE abduction to 5/5 to improve tolerance for ADLs within 8 weeks  4. Pt. to demonstrate increased MMT for mid-trap/lower trap strength to 4+/5 to improve posture stability during OH reaching/lifting tasks.  5. Pt to be independent with HEP to improve ROM and independence with ADL's  6 Pt. to report a decrease in UE paresthesia by at least 90% within 8 weeks  7. Patient able to tolerate at least 90  Minutes sustained sitting without increased csp/tsp pain within 8 weeks in order to return patient to PLOF  8. Patient able to carry purse on L shoulder without increased  pain within 6 weeks        Plan     Pt will be treated 2 times a week for 8 weeks for pt. education, HEP, therapeutic exercises, neuromuscular re-education, joint/soft tissue mobilizations, and modalities, including but not exclusive to dry needling, prn to achieve established goals. Jojo may at times be seen by a PTA as part of the Rehab Team.       Laura Gonsalez, PT

## 2018-09-12 ENCOUNTER — OFFICE VISIT (OUTPATIENT)
Dept: SURGERY | Facility: CLINIC | Age: 68
End: 2018-09-12
Payer: MEDICARE

## 2018-09-12 ENCOUNTER — HOSPITAL ENCOUNTER (OUTPATIENT)
Dept: RADIOLOGY | Facility: HOSPITAL | Age: 68
Discharge: HOME | End: 2018-09-12
Attending: SURGERY
Payer: MEDICARE

## 2018-09-12 ENCOUNTER — CLINICAL SUPPORT (OUTPATIENT)
Dept: REHABILITATION | Facility: HOSPITAL | Age: 68
End: 2018-09-12
Payer: MEDICARE

## 2018-09-12 VITALS
HEIGHT: 62 IN | TEMPERATURE: 98 F | DIASTOLIC BLOOD PRESSURE: 59 MMHG | BODY MASS INDEX: 30.56 KG/M2 | HEART RATE: 70 BPM | SYSTOLIC BLOOD PRESSURE: 126 MMHG

## 2018-09-12 DIAGNOSIS — M89.8X1 PAIN OF LEFT SCAPULA: ICD-10-CM

## 2018-09-12 DIAGNOSIS — N61.1 BREAST ABSCESS: Primary | ICD-10-CM

## 2018-09-12 DIAGNOSIS — M53.82 DECREASED ROM OF INTERVERTEBRAL DISCS OF CERVICAL SPINE: ICD-10-CM

## 2018-09-12 DIAGNOSIS — R29.898 LEFT ARM WEAKNESS: ICD-10-CM

## 2018-09-12 DIAGNOSIS — M79.602 LEFT ARM PAIN: ICD-10-CM

## 2018-09-12 DIAGNOSIS — N61.1 BREAST ABSCESS: ICD-10-CM

## 2018-09-12 PROCEDURE — 76642 ULTRASOUND BREAST LIMITED: CPT | Mod: 26,LT,, | Performed by: RADIOLOGY

## 2018-09-12 PROCEDURE — 97140 MANUAL THERAPY 1/> REGIONS: CPT | Mod: PO

## 2018-09-12 PROCEDURE — 99999 PR PBB SHADOW E&M-EST. PATIENT-LVL III: CPT | Mod: PBBFAC,,, | Performed by: SURGERY

## 2018-09-12 PROCEDURE — 77065 DX MAMMO INCL CAD UNI: CPT | Mod: TC,PO,LT

## 2018-09-12 PROCEDURE — 99212 OFFICE O/P EST SF 10 MIN: CPT | Mod: S$PBB,,, | Performed by: SURGERY

## 2018-09-12 PROCEDURE — 77061 BREAST TOMOSYNTHESIS UNI: CPT | Mod: TC,PO,LT

## 2018-09-12 PROCEDURE — 3074F SYST BP LT 130 MM HG: CPT | Mod: CPTII,,, | Performed by: SURGERY

## 2018-09-12 PROCEDURE — 77065 DX MAMMO INCL CAD UNI: CPT | Mod: 26,LT,, | Performed by: RADIOLOGY

## 2018-09-12 PROCEDURE — 99213 OFFICE O/P EST LOW 20 MIN: CPT | Mod: PBBFAC,25,PO | Performed by: SURGERY

## 2018-09-12 PROCEDURE — 1101F PT FALLS ASSESS-DOCD LE1/YR: CPT | Mod: CPTII,,, | Performed by: SURGERY

## 2018-09-12 PROCEDURE — 3078F DIAST BP <80 MM HG: CPT | Mod: CPTII,,, | Performed by: SURGERY

## 2018-09-12 PROCEDURE — 77061 BREAST TOMOSYNTHESIS UNI: CPT | Mod: 26,LT,, | Performed by: RADIOLOGY

## 2018-09-12 PROCEDURE — 76642 ULTRASOUND BREAST LIMITED: CPT | Mod: TC,PO,LT

## 2018-09-12 PROCEDURE — 97110 THERAPEUTIC EXERCISES: CPT | Mod: PO

## 2018-09-12 NOTE — PROGRESS NOTES
Physical Therapy Daily Treatment Note     Name: Jojo Burrows  Clinic Number: [id number]    Therapy Diagnosis:   Encounter Diagnoses   Name Primary?    Pain of left scapula     Left arm pain     Left arm weakness     Decreased ROM of intervertebral discs of cervical spine      Physician: Prema Salazar PA-C    Visit Date: 9/12/2018  Visit #/Visits authorized: 2   Visit # authorized: 1    Authorization period: 9/5/2019  Plan of care Expiration: 11/5/2018  Evaluation Time In/Time Out: 1:00/1:30  Time In: 11:15  Time Out: 12:15  Total Billable Time: 40 minutes    Precautions: Standard    Subjective     Pt reports: she's been doing her ex's at home without c/o increased pain. No significant changes in symptoms since initial evaluation.   She was compliant with home exercise program.    Objective     Began with MH x 10 minutes to L UT/periscap region. Pt then performed therex, finished with manual tx.    Jojo received therapeutic exercises to develop strength, ROM, flexibility and posture for 25 minutes including:    Supine cervical rotation x 15 B  Supine chin tuck 30 x 3 sec hold  Serratus punches 2 x 10 2#  scap retraction OTB 2 x 10  Upper back stretch at pole 2 x 20 sec  Posterior shoulder rolls 2 x 10  Supine cane flexion 2 x 10    Jojo received the following manual therapy techniques: Joint mobilizations, Manual traction and Soft tissue Mobilization were applied to the: csp/tsp  for 15 minutes, including:    STM to L UT/levator scapula  L scapula release performed in R SL  Manual csp traction grade II/III  Passive csp rotation B with manual upglides of lower csp B    Reviewed current HEP, no changes made    Assessment     Patient with decreased tenderness to light palpation throughout L persicapular mm's and able to tolerate gentle manual tx this date with r/o decreased pain at end of session.  Will benefit from continued tx to improve postural mobility and strength along with manual tx to decrease pain and  restore segmental mobility.capri Uribe is progressing well towards her goals.   Pt prognosis is Good.     Pt will continue to benefit from skilled outpatient physical therapy to address the deficits listed in the problem list box on initial evaluation, provide pt/family education and to maximize pt's level of independence in the home and community environment.     Pt's spiritual, cultural and educational needs considered and pt agreeable to plan of care and goals.    Anticipated barriers to physical therapy: none    Goals:   Short Term Goals:   1. Pt. to report decreased csp/tsp pain  </=  6/10 at worst to increase tolerance for upright unsupported sitting  2. Pt. to demonstrate proper cervical and scapula retraction requiring min. to no verbal cues from PT   3. Increase B cervical rotation AROM to 90%, pain-free, to promote greater ease with driving within 3 weeks  4. Pt to tolerate HEP to improve ROM and independence with ADLs  5. Pt. to report a decrease in frequency of L UE paresthesia by 25% within 3 weeks     Long Term Goals:  1. Pt. to report decreased csp/tsp pain </ =  3/10 at worst to increase tolerance for upright unsupported sitting posture within 8 weeks  2. Pt. to demonstrate proper cervical and scapula retraction requiring no verbal cues from PT  3. Pt. to demonstrate increased MMT for bilateral UE abduction to 5/5 to improve tolerance for ADLs within 8 weeks  4. Pt. to demonstrate increased MMT for mid-trap/lower trap strength to 4+/5 to improve posture stability during OH reaching/lifting tasks.  5. Pt to be independent with HEP to improve ROM and independence with ADL's  6 Pt. to report a decrease in UE paresthesia by at least 90% within 8 weeks  7. Patient able to tolerate at least 90  Minutes sustained sitting without increased csp/tsp pain within 8 weeks in order to return patient to PLOF  8. Patient able to carry purse on L shoulder without increased pain within 6 weeks    Plan     Pt will be  treated 2 times a week for 8 weeks for pt. education, HEP, therapeutic exercises, neuromuscular re-education, joint/soft tissue mobilizations, and modalities, including but not exclusive to dry needling, prn to achieve established goals. Jojo may at times be seen by a PTA as part of the Rehab Team.     Laura Gonsalez, PT

## 2018-09-13 ENCOUNTER — OFFICE VISIT (OUTPATIENT)
Dept: INTERNAL MEDICINE | Facility: CLINIC | Age: 68
End: 2018-09-13
Payer: MEDICARE

## 2018-09-13 VITALS
TEMPERATURE: 98 F | HEIGHT: 62 IN | BODY MASS INDEX: 30.26 KG/M2 | SYSTOLIC BLOOD PRESSURE: 119 MMHG | WEIGHT: 164.44 LBS | RESPIRATION RATE: 18 BRPM | DIASTOLIC BLOOD PRESSURE: 69 MMHG | HEART RATE: 70 BPM

## 2018-09-13 DIAGNOSIS — M54.12 CERVICAL RADICULOPATHY: Primary | Chronic | ICD-10-CM

## 2018-09-13 DIAGNOSIS — M79.602 LEFT ARM PAIN: ICD-10-CM

## 2018-09-13 DIAGNOSIS — M50.30 DEGENERATIVE DISC DISEASE, CERVICAL: Chronic | ICD-10-CM

## 2018-09-13 PROCEDURE — 3078F DIAST BP <80 MM HG: CPT | Mod: CPTII,,, | Performed by: INTERNAL MEDICINE

## 2018-09-13 PROCEDURE — 3074F SYST BP LT 130 MM HG: CPT | Mod: CPTII,,, | Performed by: INTERNAL MEDICINE

## 2018-09-13 PROCEDURE — 1101F PT FALLS ASSESS-DOCD LE1/YR: CPT | Mod: CPTII,,, | Performed by: INTERNAL MEDICINE

## 2018-09-13 PROCEDURE — 99999 PR PBB SHADOW E&M-EST. PATIENT-LVL III: CPT | Mod: PBBFAC,,, | Performed by: INTERNAL MEDICINE

## 2018-09-13 PROCEDURE — 99213 OFFICE O/P EST LOW 20 MIN: CPT | Mod: PBBFAC,PO | Performed by: INTERNAL MEDICINE

## 2018-09-13 PROCEDURE — 99213 OFFICE O/P EST LOW 20 MIN: CPT | Mod: S$PBB,,, | Performed by: INTERNAL MEDICINE

## 2018-09-13 NOTE — PROGRESS NOTES
Subjective:       Patient ID: Jojo Burrows is a 68 y.o. female.    Chief Complaint: Follow-up (hospital )    HPI     The patient presents for follow-up post ER visits of 820/18 in 08/21/2018 for back, shoulder, and left upper arm pain.  The patient also has been experiencing numbness in the left hand.  Symptoms have been present now for 3 weeks.  An MRI of the cervical spine showed moderate C4-C7 disc space narrowing.  Degenerative disc disease of the cervical spine was present.  She was evaluated by the Spine specialist and was referred for outpatient PT/OT.  She just completed her 2nd treatment.  Slight improvement in her left upper extremity pain is noted.  She is still noted numbness in the 4th and 5th fingers of the left hand.  She is currently using a arthritis strength Tylenol for pain.  She has used NSAIDs before but has experienced GI upset with medications such as meloxicam.  She does complain of fatigue.  Her sleep has been disrupted by the left upper extremity pain.    The patient denies any exertional chest pain or shortness of breath.  No PND or orthopnea as described.  She does complain of fatigue.      Review of Systems   Constitutional: Positive for fatigue. Negative for activity change, appetite change, chills, fever and unexpected weight change.   Eyes: Negative for visual disturbance.   Respiratory: Negative for cough and shortness of breath.    Cardiovascular: Positive for chest pain. Negative for palpitations and leg swelling.   Gastrointestinal: Negative for abdominal pain, blood in stool, constipation and diarrhea.   Genitourinary: Negative for dysuria and hematuria.   Musculoskeletal: Positive for arthralgias, back pain, myalgias and neck pain. Negative for joint swelling and neck stiffness.   Skin: Negative for rash.   Neurological: Positive for weakness and numbness. Negative for dizziness, syncope and headaches.   Psychiatric/Behavioral: Positive for sleep disturbance. Negative for  confusion and dysphoric mood.       Objective:      Physical Exam   Constitutional: She is oriented to person, place, and time. She appears well-developed and well-nourished. No distress.   HENT:   Head: Normocephalic and atraumatic.   Eyes: Conjunctivae and EOM are normal. No scleral icterus.   Neck: No JVD present. No thyromegaly present.   Neck muscle tenderness is present on left lateral rotation, flexion, and extension.   Cardiovascular: Normal rate, regular rhythm, normal heart sounds and intact distal pulses. Exam reveals no gallop and no friction rub.   No murmur heard.  Pulmonary/Chest: Effort normal and breath sounds normal. No respiratory distress. She has no wheezes. She has no rales.   Abdominal: Soft. Bowel sounds are normal. She exhibits no mass. There is no tenderness.   Musculoskeletal: Normal range of motion. She exhibits tenderness.   Left shoulder range of motion is intact.  Marked tenderness is noted overlying the left trapezius, left scapula, and upper anterior chest wall .No skin rash is present.    Lymphadenopathy:     She has no cervical adenopathy.   Neurological: She is alert and oriented to person, place, and time.   Skin: Skin is warm and dry. No rash noted.   Psychiatric: She has a normal mood and affect. Her behavior is normal. Thought content normal.   Nursing note and vitals reviewed.      Assessment:       1. Cervical radiculopathy    2. Degenerative disc disease, cervical    3. Left arm pain        Plan:       Jojo was seen today for follow-up. We discussed obtaining an EMG of the left upper extremity; the patient wishes to defer the study.  The patient should continue PT/OT as prescribed.    Diagnoses and all orders for this visit:    Cervical radiculopathy    Degenerative disc disease, cervical    Left arm pain

## 2018-09-19 ENCOUNTER — CLINICAL SUPPORT (OUTPATIENT)
Dept: REHABILITATION | Facility: HOSPITAL | Age: 68
End: 2018-09-19
Payer: MEDICARE

## 2018-09-19 DIAGNOSIS — R29.898 LEFT ARM WEAKNESS: ICD-10-CM

## 2018-09-19 DIAGNOSIS — M53.82 DECREASED ROM OF INTERVERTEBRAL DISCS OF CERVICAL SPINE: ICD-10-CM

## 2018-09-19 DIAGNOSIS — M79.602 LEFT ARM PAIN: ICD-10-CM

## 2018-09-19 DIAGNOSIS — M89.8X1 PAIN OF LEFT SCAPULA: ICD-10-CM

## 2018-09-19 PROCEDURE — 97110 THERAPEUTIC EXERCISES: CPT | Mod: PO

## 2018-09-19 PROCEDURE — 97140 MANUAL THERAPY 1/> REGIONS: CPT | Mod: PO

## 2018-09-19 NOTE — PROGRESS NOTES
Physical Therapy Daily Treatment Note     Name: Jojo Burrows  Clinic Number: [id number]    Therapy Diagnosis:   Encounter Diagnoses   Name Primary?    Pain of left scapula     Left arm pain     Left arm weakness     Decreased ROM of intervertebral discs of cervical spine      Physician: Prema Salazar PA-C    Visit Date: 9/19/2018  Visit #/Visits authorized: 3   Visit # authorized: 1    Authorization period: 9/5/2019  Plan of care Expiration: 11/5/2018  Evaluation Time In/Time Out: 1:00/1:30  Time In: 8:30  Time Out: 9:00  Total Billable Time: 30 minutes  Patient arrives 30 minutes late to appt     Precautions: Standard    Subjective     Pt reports: improvement in the last week, the parasthesias in  L UE have been less and primarily localized now in her pinky finger. Also states the pain in her neck and L scapula region is improved, but can still reach moderate levels (up to 6/10) with sustained sitting.  Is also still careful with all lifting, pushing/pulling activity with L UE because it will increase her pain.  She was compliant with home exercise program.    Objective     Pt performed therex, finished with manual tx.    Jojo received therapeutic exercises to develop strength, ROM, flexibility and posture for 25 minutes including:    Supine cervical rotation x 15 B-NP  Supine chin tuck 30 x 3 sec hold-HEP  Serratus punches 2 x 10 2#-NP  scap retraction GTB 3 x 10  Upper back stretch at pole 2 x 20 sec-NP  Posterior shoulder rolls 2 x 10  Supine cane flexion 2 x 10-HEP  Pulleys flex/scap 1 min each  Bent over row 2# 2 x 10  Doorway stretch 2 x 20 sec  pec minor stretch on door 2 x 20 sec      Jojo received the following manual therapy techniques: Joint mobilizations, Manual traction and Soft tissue Mobilization were applied to the: csp/tsp  for 15 minutes, including:    STM to L UT/levator scapula  L scapula release performed in R SL  Manual csp traction grade II/III  Passive csp rotation B with manual  upglides of lower csp B  Grade II/III upglides of lower csp B at neutral and end range passive rotation    Issued new HEP for doorway stretch and pec minor stretch    Assessment     Patient progressing well towards LTGs with decreased intensity of parasthesias L UE and decreased pain levels in L scapula region. She continues with  Moderate tightness and tenderness along medial scapular border and L UT/levator scapula.  Moderate restrictions in middle to lower cervical spine and pt will benefit from continued manual tx.     Jojo is progressing well towards her goals.   Pt prognosis is Good.     Pt will continue to benefit from skilled outpatient physical therapy to address the deficits listed in the problem list box on initial evaluation, provide pt/family education and to maximize pt's level of independence in the home and community environment.     Pt's spiritual, cultural and educational needs considered and pt agreeable to plan of care and goals.    Anticipated barriers to physical therapy: none    Goals:   Short Term Goals:   1. Pt. to report decreased csp/tsp pain  </=  6/10 at worst to increase tolerance for upright unsupported sitting  2. Pt. to demonstrate proper cervical and scapula retraction requiring min. to no verbal cues from PT   3. Increase B cervical rotation AROM to 90%, pain-free, to promote greater ease with driving within 3 weeks  4. Pt to tolerate HEP to improve ROM and independence with ADLs  5. Pt. to report a decrease in frequency of L UE paresthesia by 25% within 3 weeks     Long Term Goals:  1. Pt. to report decreased csp/tsp pain </ =  3/10 at worst to increase tolerance for upright unsupported sitting posture within 8 weeks  2. Pt. to demonstrate proper cervical and scapula retraction requiring no verbal cues from PT  3. Pt. to demonstrate increased MMT for bilateral UE abduction to 5/5 to improve tolerance for ADLs within 8 weeks  4. Pt. to demonstrate increased MMT for mid-trap/lower  trap strength to 4+/5 to improve posture stability during OH reaching/lifting tasks.  5. Pt to be independent with HEP to improve ROM and independence with ADL's  6 Pt. to report a decrease in UE paresthesia by at least 90% within 8 weeks  7. Patient able to tolerate at least 90  Minutes sustained sitting without increased csp/tsp pain within 8 weeks in order to return patient to PLOF  8. Patient able to carry purse on L shoulder without increased pain within 6 weeks    Plan     Pt will be treated 2 times a week for 8 weeks for pt. education, HEP, therapeutic exercises, neuromuscular re-education, joint/soft tissue mobilizations, and modalities, including but not exclusive to dry needling, prn to achieve established goals. Jojo may at times be seen by a PTA as part of the Rehab Team.     Laura Gonsalez, PT

## 2018-09-22 NOTE — PROGRESS NOTES
[facility]  Department of Surgery    CHIEF COMPLAINT: Left Breast pain/drainage      Subjective:      Jojo Burrows is a 68 y.o. postmenopausal female returns for 6 week eval of healed breast abscess with imaging.  SHe is doing well with no complaints.      She was initially referred for evaluation of a breast abscess. Change was noted 8 days ago. Patient denies fever.  Patient reports skin changes. Patient initially noticed change in breast exam on Tuesday (), thought that she had a small insect bite, but does not recall any inciting event.  Went to urgent care on Saturday () because she was concerned the redness and pain would not go away with neosporin at home.  The urgent care was closed, so she went to ED.  The ED gave her clindamycin which she has taken since that day.  Patient reports drainage.      Patient does routinely do self breast exams.  Patient has noted a change on breast exam (as of last Tuesday).  Patient denies nipple discharge. Patient reports to previous breast biopsy (says she has had it many times and pathology has been benign cysts). Patient denies a personal history of breast cancer.    GYN History:  Age of menarche was 11. Age of menopause was 49.  Patient endorses hormonal therapy for over five years after her hysterectomy. Patient is . Age of first live birth was 23. Patient did breast feed.  Hysterectomy at age 49.    Breast cancer risk factors include family hx on mother's side.     PMH- HTN, arthritis      Family History:  Mother- breast cancer (diagnosed at age 80, alive at age 91)  Sister- Breast cancer (diagnosed at age 60, alive at 68), also ovarian cancer (diagnosed 2 years ago)  Great aunt (mother's side)- had breast cancer at unknown age)  Cousin (mothers side)- breast cancer diagnosed around age 50    TC score- 12.5 %, calculated in office    Interval history:  Ms. Burrows returns to clinic today for follow up after I&D of left breast abscess. Area  healing well, erythema is decreasing around opening. She still has an open wound over the I&D site. Tenderness has dramatically improved per the patient. She denies fevers, chills or warmth.     Past Medical History:   Diagnosis Date    ALLERGIC RHINITIS     Cataract     Degenerative disc disease, cervical 9/13/2018    GERD (gastroesophageal reflux disease)     Hyperlipidemia     Hypertension     Migraine headache     Nuclear sclerosis 4/30/2014    Sleep disorder     Thyroid disease     nodular goiter    TIA (transient ischemic attack)      Past Surgical History:   Procedure Laterality Date    ARTHROSCOPY-KNEE DEBRIDEMENT Left 5/13/2014    [facility] Del[facility] Hassan [facility]    COLONOSCOPY N/A 4/29/2016    Procedure: COLONOSCOPY;  Surgeon: Sergio Vickers MD;  Location: [facility] (4TH FLR);  Service: Endoscopy;  Laterality: N/A;    COLONOSCOPY N/A 4/29/2016    [facility]    HYSTERECTOMY       Current Outpatient Medications on File Prior to Visit   Medication Sig Dispense Refill    esomeprazole (NEXIUM) 40 MG capsule Take 1 capsule (40 mg total) by mouth before breakfast. 90 capsule 3    ibuprofen (ADVIL,MOTRIN) 200 MG tablet Take 200 mg by mouth every 6 (six) hours as needed for Pain.      metoprolol succinate (TOPROL-XL) 50 MG 24 hr tablet Take 1 tablet (50 mg total) by mouth once daily. 30 tablet 10    acetaminophen (TYLENOL ARTHRITIS ORAL) Take 1 tablet by mouth.       No current facility-administered medications on file prior to visit.      Social History     Socioeconomic History    Marital status:      Spouse name: Not on file    Number of children: Not on file    Years of education: Not on file    Highest education level: Not on file   Social Needs    Financial resource strain: Not on file    Food insecurity - worry: Not on file    Food insecurity - inability: Not on file    Transportation needs - medical: Not on file     "Transportation needs - non-medical: Not on file   Occupational History    Not on file   Tobacco Use    Smoking status: Never Smoker    Smokeless tobacco: Never Used   Substance and Sexual Activity    Alcohol use: No    Drug use: No    Sexual activity: Not on file   Other Topics Concern    Not on file   Social History Narrative    Not on file     Family History   Problem Relation Age of Onset    Diabetes Mother     Hypertension Mother     Asthma Father     Glaucoma Father     Heart disease Maternal Grandmother     Thalassemia Daughter     No Known Problems Son     No Known Problems Daughter     No Known Problems Daughter     Amblyopia Neg Hx     Blindness Neg Hx     Cancer Neg Hx     Cataracts Neg Hx     Macular degeneration Neg Hx     Retinal detachment Neg Hx     Strabismus Neg Hx     Stroke Neg Hx     Thyroid disease Neg Hx        Review of Systems  Constitutional: negative  Eyes: negative  Ears, nose, mouth, throat, and face: negative  Respiratory: negative  Cardiovascular: negative  Gastrointestinal: negative  Integument/breast: negative  Hematologic/lymphatic: negative  Musculoskeletal:negative  Neurological: negative  Behavioral/Psych: negative       Objective:   BP (!) 126/59 (BP Location: Right arm, Patient Position: Sitting, BP Method: Medium (Automatic))   Pulse 70   Temp 97.8 °F (36.6 °C) (Oral)   Ht 5' 2" (1.575 m)   BMI 30.56 kg/m²        Physical Exam   Constitutional: She appears well-developed and well-nourished.   HENT:   Head: Normocephalic.   Cardiovascular: Normal rate and regular rhythm.    Pulmonary/Chest: Breath sounds normal. No respiratory distress. Right breast exhibits no inverted nipple, no mass, no nipple discharge and no skin change. Left breast exhibits no inverted nipple, no mass, no nipple discharge and no skin change.       Musculoskeletal: She exhibits no edema.   Lymphadenopathy:     She has no cervical adenopathy.   Neurological: She is alert. "   Psychiatric: She has a normal mood and affect.       Radiology review: Images personally reviewed by me in the clinic.  Mammogram: In March (could not pull up on computer in clinic)  Ultrasound:       In the left breast 2 o'clock axis 4 cm from the nipple, there is an irregular heterogeneous fluid collection measuring 2.4 x 1.1 x 2.0 cm. Associated posterior acoustic enhancement and marked hypervascularity. This fluid collection is confined within the   skin layer. Associated surrounding edema and phlegmonous tissue, also confined in the skin.   This finding corresponds to the area of concern, including left breast erythema and ulcerated tissue draining suppurative fluid  Impression:  Left breast 2 o'clock axis abscess and phlegmonous tissue confined within the skin. This corresponds to the area of concern. BI-RADS 2: Benign. (7/25)    MMG today:  Mammo Digital Diagnostic Left with Tomosynthesis_CAD  Left  Skin Lesion: There is a skin lesion seen in the left breast at 2 o'clock, 1 cm from the nipple. Compared to the previous study, the skin lesion decreased in size. This small hypoechoic focus within the skin now may reflect scar or small residual sebaceous cyst but has dramatically improved from prior exam.      Impression:  There is no mammographic or sonographic evidence of malignancy.     BI-RADS Category:   Left: 2 - Benign  Overall: 2 - Benign     Recommendation:  Return to annual screening mammogram schedule is recommended     The patient's estimated lifetime risk of breast cancer (to age 85) based on Tyrer-Cuzick - 7 risk assessment model is: Tyrer-Cuzick: 5.12 %. According to the [employer] Society,  patients with a lifetime breast cancer risk of 20% or higher might benefit from supplemental screening tests.    Assessment:      Jojo Burrows is a 68 y.o. postmenopausal female with abscess of left breast s/p I&D on 7/25/18     Plan:   - wound well healed  - bilateral mammogram due 3/2018  - return to  clinic prn  -She does not want to have this area excised at this time.

## 2018-09-24 ENCOUNTER — CLINICAL SUPPORT (OUTPATIENT)
Dept: REHABILITATION | Facility: HOSPITAL | Age: 68
End: 2018-09-24
Payer: MEDICARE

## 2018-09-24 DIAGNOSIS — M89.8X1 PAIN OF LEFT SCAPULA: ICD-10-CM

## 2018-09-24 DIAGNOSIS — M79.602 LEFT ARM PAIN: ICD-10-CM

## 2018-09-24 DIAGNOSIS — M53.82 DECREASED ROM OF INTERVERTEBRAL DISCS OF CERVICAL SPINE: ICD-10-CM

## 2018-09-24 DIAGNOSIS — R29.898 LEFT ARM WEAKNESS: ICD-10-CM

## 2018-09-24 PROCEDURE — 97110 THERAPEUTIC EXERCISES: CPT | Mod: PO

## 2018-09-24 PROCEDURE — 97140 MANUAL THERAPY 1/> REGIONS: CPT | Mod: PO

## 2018-09-24 NOTE — PROGRESS NOTES
Physical Therapy Daily Treatment Note     Name: Jojo Burrows  Clinic Number: [id number]    Therapy Diagnosis:   Encounter Diagnoses   Name Primary?    Pain of left scapula     Left arm pain     Left arm weakness     Decreased ROM of intervertebral discs of cervical spine      Physician: Prema Salazar PA-C    Visit Date: 9/24/2018  Visit #/Visits authorized: 4   Visit # authorized: 1    Authorization period: 9/5/2019  Plan of care Expiration: 11/5/2018  Evaluation Time In/Time Out: 1:00/1:30  Time In: 11:05  Time Out: 12:00  Total Billable Time: 30 minutes    Precautions: Standard    Subjective     Pt reports: continued improvement, the N&T in her L UE is slowly improving. Not having as much pain in L shoulder blade region.  She was compliant with home exercise program.    Objective     Began with MH f/b manual tx and therex,    Jojo received therapeutic exercises to develop strength, ROM, flexibility and posture for 25 minutes including:    Supine cervical rotation x 15 B-NP  Supine chin tuck 30 x 3 sec hold-HEP  Serratus punches 2 x 10 2#-NP  scap retraction GTB 3 x 15  Upper back stretch at pole 2 x 20 sec-NP  Supine cane flexion x 10  Bent over row 2# 2 x 10-NP  Wall angels with 1/2 roll on wall 2 x 10    Progression next session: UBE bkwd, sup hor abd    Jojo received the following manual therapy techniques: Joint mobilizations, Manual traction and Soft tissue Mobilization were applied to the: csp/tsp  for 15 minutes, including:    STM to L UT/levator scapula  L scapula release performed in R SL  Manual csp traction grade II/III  Passive csp rotation B with manual upglides of lower csp B  Grade II/III upglides of lower csp B at neutral and end range passive rotation    Assessment     Much improved tolerance to manual tx to csp/tsp, including passive csp rotation and scapular release. Decreased tightness/tenderness L UT and scalenes and cervical passive rotation WFL B. Will benefit from progression of  postural strengthening to improve cari to sustained sitting.    Jojo is progressing well towards her goals.   Pt prognosis is Good.     Pt will continue to benefit from skilled outpatient physical therapy to address the deficits listed in the problem list box on initial evaluation, provide pt/family education and to maximize pt's level of independence in the home and community environment.     Pt's spiritual, cultural and educational needs considered and pt agreeable to plan of care and goals.    Anticipated barriers to physical therapy: none    Goals:   Short Term Goals:   1. Pt. to report decreased csp/tsp pain  </=  6/10 at worst to increase tolerance for upright unsupported sitting  2. Pt. to demonstrate proper cervical and scapula retraction requiring min. to no verbal cues from PT   3. Increase B cervical rotation AROM to 90%, pain-free, to promote greater ease with driving within 3 weeks  4. Pt to tolerate HEP to improve ROM and independence with ADLs  5. Pt. to report a decrease in frequency of L UE paresthesia by 25% within 3 weeks     Long Term Goals:  1. Pt. to report decreased csp/tsp pain </ =  3/10 at worst to increase tolerance for upright unsupported sitting posture within 8 weeks  2. Pt. to demonstrate proper cervical and scapula retraction requiring no verbal cues from PT  3. Pt. to demonstrate increased MMT for bilateral UE abduction to 5/5 to improve tolerance for ADLs within 8 weeks  4. Pt. to demonstrate increased MMT for mid-trap/lower trap strength to 4+/5 to improve posture stability during OH reaching/lifting tasks.  5. Pt to be independent with HEP to improve ROM and independence with ADL's  6 Pt. to report a decrease in UE paresthesia by at least 90% within 8 weeks  7. Patient able to tolerate at least 90  Minutes sustained sitting without increased csp/tsp pain within 8 weeks in order to return patient to [facility]  8. Patient able to carry purse on L shoulder without increased pain within 6  weeks    Plan     Pt will be treated 2 times a week for 8 weeks for pt. education, HEP, therapeutic exercises, neuromuscular re-education, joint/soft tissue mobilizations, and modalities, including but not exclusive to dry needling, prn to achieve established goals. Jojo may at times be seen by a PTA as part of the Rehab Team.     Laura Gonsalez, PT

## 2018-09-26 ENCOUNTER — CLINICAL SUPPORT (OUTPATIENT)
Dept: REHABILITATION | Facility: HOSPITAL | Age: 68
End: 2018-09-26
Payer: MEDICARE

## 2018-09-26 DIAGNOSIS — M79.602 LEFT ARM PAIN: ICD-10-CM

## 2018-09-26 DIAGNOSIS — M53.82 DECREASED ROM OF INTERVERTEBRAL DISCS OF CERVICAL SPINE: ICD-10-CM

## 2018-09-26 DIAGNOSIS — M89.8X1 PAIN OF LEFT SCAPULA: ICD-10-CM

## 2018-09-26 DIAGNOSIS — R29.898 LEFT ARM WEAKNESS: ICD-10-CM

## 2018-09-26 PROCEDURE — 97140 MANUAL THERAPY 1/> REGIONS: CPT | Mod: PO

## 2018-09-26 PROCEDURE — 97110 THERAPEUTIC EXERCISES: CPT | Mod: PO

## 2018-09-26 NOTE — PROGRESS NOTES
Physical Therapy Daily Treatment Note     Name: Jojo Burrows  Clinic Number: [id number]    Therapy Diagnosis:   Encounter Diagnoses   Name Primary?    Pain of left scapula     Left arm pain     Left arm weakness     Decreased ROM of intervertebral discs of cervical spine      Physician: Prema Salazar PA-C    Visit Date: 9/26/2018  Visit #/Visits authorized: 4   Visit # authorized: 1    Authorization period: 9/5/2019  Plan of care Expiration: 11/5/2018  Evaluation Time In/Time Out: 1:00/1:30  Time In: 11:05  Time Out: 12:00  Total Billable Time: 30 minutes    Precautions: Standard    Subjective     Pt reports: minimal L UT pain upon arrival, N&T isolated to L 4th/5th digits  She was compliant with home exercise program.    Objective     Began with  f/b manual tx and therex,    Jojo received therapeutic exercises to develop strength, ROM, flexibility and posture for 25 minutes including:    Supine cervical rotation x 15 B-  Supine chin tuck 30 x 3 sec hold-  Serratus punches 2 x 10 2#-  scap retraction GTB 3 x 15  Upper back stretch at pole 2 x 20 sec-  Supine cane flexion x 10  Bent over row 2# 2 x 10-  Wall angels with 1/2 roll on wall 2 x 10    Progression next session: UBE bkwd, sup hor abd    Jojo received the following manual therapy techniques: Joint mobilizations, Manual traction and Soft tissue Mobilization were applied to the: csp/tsp  for 15 minutes, including:    STM to L UT/levator scapula  L scapula release performed in R SL  Manual csp traction grade II/III  Passive csp rotation B with manual upglides of lower csp B  Grade II/III upglides of lower csp B at neutral and end range passive rotation    Assessment     Much improved tolerance to postural stretngthening this date, denies increase pain, mm fatigue only.    Jojo is progressing well towards her goals.   Pt prognosis is Good.     Pt will continue to benefit from skilled outpatient physical therapy to address the deficits listed in the  problem list box on initial evaluation, provide pt/family education and to maximize pt's level of independence in the home and community environment.     Pt's spiritual, cultural and educational needs considered and pt agreeable to plan of care and goals.    Anticipated barriers to physical therapy: none    Goals:   Short Term Goals:   1. Pt. to report decreased csp/tsp pain  </=  6/10 at worst to increase tolerance for upright unsupported sitting  2. Pt. to demonstrate proper cervical and scapula retraction requiring min. to no verbal cues from PT   3. Increase B cervical rotation AROM to 90%, pain-free, to promote greater ease with driving within 3 weeks  4. Pt to tolerate HEP to improve ROM and independence with ADLs  5. Pt. to report a decrease in frequency of L UE paresthesia by 25% within 3 weeks     Long Term Goals:  1. Pt. to report decreased csp/tsp pain </ =  3/10 at worst to increase tolerance for upright unsupported sitting posture within 8 weeks  2. Pt. to demonstrate proper cervical and scapula retraction requiring no verbal cues from PT  3. Pt. to demonstrate increased MMT for bilateral UE abduction to 5/5 to improve tolerance for ADLs within 8 weeks  4. Pt. to demonstrate increased MMT for mid-trap/lower trap strength to 4+/5 to improve posture stability during OH reaching/lifting tasks.  5. Pt to be independent with HEP to improve ROM and independence with ADL's  6 Pt. to report a decrease in UE paresthesia by at least 90% within 8 weeks  7. Patient able to tolerate at least 90  Minutes sustained sitting without increased csp/tsp pain within 8 weeks in order to return patient to PLOF  8. Patient able to carry purse on L shoulder without increased pain within 6 weeks    Plan     Pt will be treated 2 times a week for 8 weeks for pt. education, HEP, therapeutic exercises, neuromuscular re-education, joint/soft tissue mobilizations, and modalities, including but not exclusive to dry needling, prn to  achieve established goals. Jojo may at times be seen by a PTA as part of the Rehab Team.     Laura Gonsalez, PT

## 2018-10-01 ENCOUNTER — CLINICAL SUPPORT (OUTPATIENT)
Dept: REHABILITATION | Facility: HOSPITAL | Age: 68
End: 2018-10-01
Payer: MEDICARE

## 2018-10-01 DIAGNOSIS — R29.898 LEFT ARM WEAKNESS: ICD-10-CM

## 2018-10-01 DIAGNOSIS — M53.82 DECREASED ROM OF INTERVERTEBRAL DISCS OF CERVICAL SPINE: ICD-10-CM

## 2018-10-01 DIAGNOSIS — M79.602 LEFT ARM PAIN: ICD-10-CM

## 2018-10-01 DIAGNOSIS — M89.8X1 PAIN OF LEFT SCAPULA: ICD-10-CM

## 2018-10-01 PROCEDURE — 97110 THERAPEUTIC EXERCISES: CPT | Mod: PO

## 2018-10-01 PROCEDURE — 97140 MANUAL THERAPY 1/> REGIONS: CPT | Mod: PO

## 2018-10-01 NOTE — PROGRESS NOTES
Physical Therapy Daily Treatment Note     Name: Jojo Burrows  Clinic Number: [id number]    Therapy Diagnosis:   Encounter Diagnoses   Name Primary?    Pain of left scapula     Left arm pain     Left arm weakness     Decreased ROM of intervertebral discs of cervical spine      Physician: Prema Salazar PA-C    Visit Date: 10/1/2018  Visit #/Visits authorized: 6  Visit # authorized: 20    Authorization period: 9/5/2019  Plan of care Expiration: 11/5/2018  Time In: 11:15  Time Out: 12:00  Total Billable Time: 30 minutes    Precautions: Standard    Subjective     Pt reports: improvement in L hand pain/parasthesias but still having pain in L scapula region, 5/10 upon arrival.  She was compliant with home exercise program.    Objective       Jojo received therapeutic exercises to develop strength, ROM, flexibility and posture for 25 minutes including:  UBE 3/3  Serratus punches 3 x 10 3#  Upper back stretch at pole 2 x 20 sec-  Bent over row 3# 3 x 10  Wall angels with 1/2 roll on wall 3 x 10  Supine pec stretch 1 minute  Supine hor abd OTB 3 x 10    Jojo received the following manual therapy techniques: Joint mobilizations, Manual traction and Soft tissue Mobilization were applied to the: csp/tsp  for 15 minutes, including:    STM to L UT/levator scapula  L scapula release performed in R SL  Manual csp traction grade II/III  Passive csp rotation B with manual upglides of lower csp B  Grade II/III upglides of lower csp B at neutral and end range passive rotation    Assessment     Improved L scapular mobility yet pt continues with moderate segmental restriction L C-T junction that can reproduce L UE symptoms during joint mobilizations.  Will benefit from continued manual tx and postural stretching and strengthening in order to decrease pain and improve cari to sustained sitting, reaching, lifting.    Jojo is progressing well towards her goals.   Pt prognosis is Good.     Pt will continue to benefit from skilled  outpatient physical therapy to address the deficits listed in the problem list box on initial evaluation, provide pt/family education and to maximize pt's level of independence in the home and community environment.     Pt's spiritual, cultural and educational needs considered and pt agreeable to plan of care and goals.    Anticipated barriers to physical therapy: none    Goals:   Short Term Goals:   1. Pt. to report decreased csp/tsp pain  </=  6/10 at worst to increase tolerance for upright unsupported sitting  2. Pt. to demonstrate proper cervical and scapula retraction requiring min. to no verbal cues from PT   3. Increase B cervical rotation AROM to 90%, pain-free, to promote greater ease with driving within 3 weeks  4. Pt to tolerate HEP to improve ROM and independence with ADLs  5. Pt. to report a decrease in frequency of L UE paresthesia by 25% within 3 weeks     Long Term Goals:  1. Pt. to report decreased csp/tsp pain </ =  3/10 at worst to increase tolerance for upright unsupported sitting posture within 8 weeks  2. Pt. to demonstrate proper cervical and scapula retraction requiring no verbal cues from PT  3. Pt. to demonstrate increased MMT for bilateral UE abduction to 5/5 to improve tolerance for ADLs within 8 weeks  4. Pt. to demonstrate increased MMT for mid-trap/lower trap strength to 4+/5 to improve posture stability during OH reaching/lifting tasks.  5. Pt to be independent with HEP to improve ROM and independence with ADL's  6 Pt. to report a decrease in UE paresthesia by at least 90% within 8 weeks  7. Patient able to tolerate at least 90  Minutes sustained sitting without increased csp/tsp pain within 8 weeks in order to return patient to [facility]  8. Patient able to carry purse on L shoulder without increased pain within 6 weeks    Plan     Pt will be treated 2 times a week for 8 weeks for pt. education, HEP, therapeutic exercises, neuromuscular re-education, joint/soft tissue mobilizations, and  modalities, including but not exclusive to dry needling, prn to achieve established goals. Jojo may at times be seen by a PTA as part of the Rehab Team.     Laura Gonsalez, PT

## 2018-10-03 ENCOUNTER — CLINICAL SUPPORT (OUTPATIENT)
Dept: REHABILITATION | Facility: HOSPITAL | Age: 68
End: 2018-10-03
Payer: MEDICARE

## 2018-10-03 DIAGNOSIS — M53.82 DECREASED ROM OF INTERVERTEBRAL DISCS OF CERVICAL SPINE: ICD-10-CM

## 2018-10-03 DIAGNOSIS — R29.898 LEFT ARM WEAKNESS: ICD-10-CM

## 2018-10-03 DIAGNOSIS — M89.8X1 PAIN OF LEFT SCAPULA: ICD-10-CM

## 2018-10-03 DIAGNOSIS — M79.602 LEFT ARM PAIN: ICD-10-CM

## 2018-10-03 PROCEDURE — 97110 THERAPEUTIC EXERCISES: CPT | Mod: PO

## 2018-10-03 PROCEDURE — 97140 MANUAL THERAPY 1/> REGIONS: CPT | Mod: PO

## 2018-10-03 NOTE — PROGRESS NOTES
Physical Therapy Daily Treatment Note     Name: Jojo Burrows  Clinic Number: [id number]    Therapy Diagnosis:   Encounter Diagnoses   Name Primary?    Pain of left scapula     Left arm pain     Left arm weakness     Decreased ROM of intervertebral discs of cervical spine      Physician: Prema Salazar PA-C    Visit Date: 10/3/2018  Visit #::7  Visit # authorized: 20    Authorization period: 9/5/2019  Plan of care Expiration: 11/5/2018  Time In: 11:15  Time Out: 12:00  Total Billable Time: 30 minutes    Precautions: Standard    Subjective     Pt reports: 4/10 L scapula pain upon arrival. States she still feels constant mild pain throughout entire L arm but worse in 4th/5th digits.  She was compliant with home exercise program.    Objective       Jojo received therapeutic exercises to develop strength, ROM, flexibility and posture for 25 minutes including:  UBE 3/3  Serratus punches 3 x 10 3# on 1/2 roll  Upper back stretch at pole 2 x 20 sec-  Bent over row 3# 3 x 10  Wall angels with 1/2 roll on wall 3 x 10 1#  Supine pec stretch 1 minute with 1/2 roll  Standing hor abd OTB 3 x 10    Jojo received the following manual therapy techniques: Joint mobilizations, Manual traction and Soft tissue Mobilization were applied to the: csp/tsp  for 15 minutes, including:    STM to L UT/levator scapula  L scapula release performed in R SL  Manual csp traction grade II/III  Passive csp rotation B with manual upglides of lower csp B  Grade II/III upglides of lower csp B at neutral and end range passive rotation    Assessment     Patient demo improved active cervical rotation and decreased r/o pain throughout L UE since starting treatment. She continues with restrictions at L lower csp and C-T junction with both segmental restrictions along with soft tissue tightness and will benefit from continued skilled PT.  Good return demonstration of HEP this date.    Jojo is progressing well towards her goals.   Pt prognosis is Good.      Pt will continue to benefit from skilled outpatient physical therapy to address the deficits listed in the problem list box on initial evaluation, provide pt/family education and to maximize pt's level of independence in the home and community environment.     Pt's spiritual, cultural and educational needs considered and pt agreeable to plan of care and goals.    Anticipated barriers to physical therapy: none    Goals:   Short Term Goals:   1. Pt. to report decreased csp/tsp pain  </=  6/10 at worst to increase tolerance for upright unsupported sitting  2. Pt. to demonstrate proper cervical and scapula retraction requiring min. to no verbal cues from PT   3. Increase B cervical rotation AROM to 90%, pain-free, to promote greater ease with driving within 3 weeks  4. Pt to tolerate HEP to improve ROM and independence with ADLs  5. Pt. to report a decrease in frequency of L UE paresthesia by 25% within 3 weeks     Long Term Goals:  1. Pt. to report decreased csp/tsp pain </ =  3/10 at worst to increase tolerance for upright unsupported sitting posture within 8 weeks  2. Pt. to demonstrate proper cervical and scapula retraction requiring no verbal cues from PT  3. Pt. to demonstrate increased MMT for bilateral UE abduction to 5/5 to improve tolerance for ADLs within 8 weeks  4. Pt. to demonstrate increased MMT for mid-trap/lower trap strength to 4+/5 to improve posture stability during OH reaching/lifting tasks.  5. Pt to be independent with HEP to improve ROM and independence with ADL's  6 Pt. to report a decrease in UE paresthesia by at least 90% within 8 weeks  7. Patient able to tolerate at least 90  Minutes sustained sitting without increased csp/tsp pain within 8 weeks in order to return patient to PLOF  8. Patient able to carry purse on L shoulder without increased pain within 6 weeks    Plan     Pt will be treated 2 times a week for 8 weeks for pt. education, HEP, therapeutic exercises, neuromuscular  re-education, joint/soft tissue mobilizations, and modalities, including but not exclusive to dry needling, prn to achieve established goals. Jojo may at times be seen by a PTA as part of the Rehab Team.     Laura Gonsalez, PT

## 2018-10-05 RX ORDER — METOPROLOL SUCCINATE 50 MG/1
50 TABLET, EXTENDED RELEASE ORAL DAILY
Qty: 30 TABLET | Refills: 10 | Status: SHIPPED | OUTPATIENT
Start: 2018-10-05 | End: 2019-11-02 | Stop reason: SDUPTHER

## 2018-10-08 ENCOUNTER — CLINICAL SUPPORT (OUTPATIENT)
Dept: REHABILITATION | Facility: HOSPITAL | Age: 68
End: 2018-10-08
Payer: MEDICARE

## 2018-10-08 DIAGNOSIS — M89.8X1 PAIN OF LEFT SCAPULA: ICD-10-CM

## 2018-10-08 DIAGNOSIS — R29.898 LEFT ARM WEAKNESS: ICD-10-CM

## 2018-10-08 DIAGNOSIS — M53.82 DECREASED ROM OF INTERVERTEBRAL DISCS OF CERVICAL SPINE: ICD-10-CM

## 2018-10-08 DIAGNOSIS — M79.602 LEFT ARM PAIN: ICD-10-CM

## 2018-10-08 PROCEDURE — 97140 MANUAL THERAPY 1/> REGIONS: CPT | Mod: PO

## 2018-10-08 PROCEDURE — 97110 THERAPEUTIC EXERCISES: CPT | Mod: PO

## 2018-10-08 NOTE — PROGRESS NOTES
Physical Therapy Daily Treatment Note     Name: Jojo Burrows  Clinic Number: [id number]    Therapy Diagnosis:   Encounter Diagnoses   Name Primary?    Pain of left scapula     Left arm pain     Left arm weakness     Decreased ROM of intervertebral discs of cervical spine      Physician: Prema Salazar PA-C    Visit Date: 10/8/2018  Visit #::8  Visit # authorized: 20    Authorization period: 9/5/2019  Plan of care Expiration: 11/5/2018  Time In: 10:10  Time Out: 11:00  Total Billable Time: 30 minutes    Precautions: Standard    Subjective     Pt reports: she had more pain in R UT region this weekend, mopped her kitchen on Saturday and also sat for 1 hour, both of which increased her pain.  She was compliant with home exercise program.    Objective       Jojo received therapeutic exercises to develop strength, ROM, flexibility and posture for 25 minutes including:  UBE 3/3  Serratus punches 3 x 10 3# on 1/2 roll  Upper back stretch at pole 2 x 20 sec-HEP  Bent over row 3# 3 x 10  Wall angels with 1/2 roll on wall 3 x 10 1#  Supine pec stretch 1 minute with 1/2 roll  Standing hor abd OTB 3 x 10  Levator scap stretch 2 x 30 seconds  Wall push up (+) 3 x10    Jojo received the following manual therapy techniques: Joint mobilizations, Manual traction and Soft tissue Mobilization were applied to the: csp/tsp  for 15 minutes, including:    STM to L UT/levator scapula  L scapula release performed in R SL  Manual csp traction grade II/III  Passive csp rotation B with manual upglides of lower csp B  Grade II/III upglides of lower csp B at neutral and end range passive rotation    Assessment     Due to continued pain and tightness throughout L csp/tsp, levator scapula ,and medial scapular border that is causing functional limitations with sustained and repetitive activity, she will benefit from continued skilled PT.  Jojo is progressing well towards her goals.   Pt prognosis is Good.     Pt will continue to benefit from  skilled outpatient physical therapy to address the deficits listed in the problem list box on initial evaluation, provide pt/family education and to maximize pt's level of independence in the home and community environment.     Pt's spiritual, cultural and educational needs considered and pt agreeable to plan of care and goals.    Anticipated barriers to physical therapy: none    Goals:   Short Term Goals:   1. Pt. to report decreased csp/tsp pain  </=  6/10 at worst to increase tolerance for upright unsupported sitting  2. Pt. to demonstrate proper cervical and scapula retraction requiring min. to no verbal cues from PT   3. Increase B cervical rotation AROM to 90%, pain-free, to promote greater ease with driving within 3 weeks  4. Pt to tolerate HEP to improve ROM and independence with ADLs  5. Pt. to report a decrease in frequency of L UE paresthesia by 25% within 3 weeks     Long Term Goals:  1. Pt. to report decreased csp/tsp pain </ =  3/10 at worst to increase tolerance for upright unsupported sitting posture within 8 weeks  2. Pt. to demonstrate proper cervical and scapula retraction requiring no verbal cues from PT  3. Pt. to demonstrate increased MMT for bilateral UE abduction to 5/5 to improve tolerance for ADLs within 8 weeks  4. Pt. to demonstrate increased MMT for mid-trap/lower trap strength to 4+/5 to improve posture stability during OH reaching/lifting tasks.  5. Pt to be independent with HEP to improve ROM and independence with ADL's  6 Pt. to report a decrease in UE paresthesia by at least 90% within 8 weeks  7. Patient able to tolerate at least 90  Minutes sustained sitting without increased csp/tsp pain within 8 weeks in order to return patient to [facility]  8. Patient able to carry purse on L shoulder without increased pain within 6 weeks    Plan     Pt will be treated 2 times a week for 8 weeks for pt. education, HEP, therapeutic exercises, neuromuscular re-education, joint/soft tissue  mobilizations, and modalities, including but not exclusive to dry needling, prn to achieve established goals. Jojo may at times be seen by a PTA as part of the Rehab Team.     Laura Gonsalez, PT

## 2018-10-17 ENCOUNTER — CLINICAL SUPPORT (OUTPATIENT)
Dept: REHABILITATION | Facility: HOSPITAL | Age: 68
End: 2018-10-17
Payer: MEDICARE

## 2018-10-17 DIAGNOSIS — M53.82 DECREASED ROM OF INTERVERTEBRAL DISCS OF CERVICAL SPINE: ICD-10-CM

## 2018-10-17 DIAGNOSIS — R29.898 LEFT ARM WEAKNESS: ICD-10-CM

## 2018-10-17 DIAGNOSIS — M89.8X1 PAIN OF LEFT SCAPULA: ICD-10-CM

## 2018-10-17 DIAGNOSIS — M79.602 LEFT ARM PAIN: ICD-10-CM

## 2018-10-17 PROCEDURE — G8979 MOBILITY GOAL STATUS: HCPCS | Mod: CJ,PO

## 2018-10-17 PROCEDURE — 97110 THERAPEUTIC EXERCISES: CPT | Mod: PO

## 2018-10-17 PROCEDURE — 97140 MANUAL THERAPY 1/> REGIONS: CPT | Mod: PO

## 2018-10-17 PROCEDURE — G8978 MOBILITY CURRENT STATUS: HCPCS | Mod: CK,PO

## 2018-10-17 NOTE — PROGRESS NOTES
Physical Therapy Daily Treatment Note     Name: Jojo Burrows  Clinic Number: [id number]    Therapy Diagnosis:   Encounter Diagnoses   Name Primary?    Pain of left scapula     Left arm pain     Left arm weakness     Decreased ROM of intervertebral discs of cervical spine      Physician: Prema Salazar PA-C    Visit Date: 10/17/2018  Visit #::9  Visit # authorized: 20    Authorization period: 9/5/2019  Plan of care Expiration: 11/5/2018  Time In: 0920  Time Out: 1000  Total Billable Time: 30 minutes    Precautions: Standard    Subjective     Pt reports: she's been working on some craft projects, sat for an hour and had to intermittently stretch her upper back.  Has been working on her posture with sitting.  Pain/parasthesias in L UE slowly improving.  She was compliant with home exercise program.    Objective     Cervical ROM: (measured in percentage)     Percent   Flexion 100   Extension 50   Right SB 50   Left SB 50   Right rotation 90   Left rotation 60   **pain at end range L rotation     Jojo received therapeutic exercises to develop strength, ROM, flexibility and posture for 13 minutes including:  Bolded ex's performed this date    UBE 3/3  Serratus punches 3 x 10 3# on 1/2 roll  Upper back stretch at pole 2 x 20 sec-HEP  Bent over row 3# 3 x 10  Wall angels with 1/2 roll on wall 3 x 10   Supine pec stretch 1 minute with 1/2 roll  Standing hor abd OTB 3 x 10  Levator scap stretch 2 x 30 seconds  Wall push up (+) 3 x12  --held several ex's due to pt arriving 20 minutes late to appt    Jojo received the following manual therapy techniques: Joint mobilizations, Manual traction and Soft tissue Mobilization were applied to the: csp/tsp  for 15 minutes, including:    STM to L anterior scalenes  MET for elevated 1st rib in supine  Passive scalene stretch  L scapula release performed in R SL  Manual csp traction grade II/III  Passive csp rotation B with manual upglides of lower csp B  Grade II/III upglides of  lower csp B at neutral and end range passive rotation    Following manual tx, csp L rotation 90%, pain L    FOTO score impairment: 46%   Reviewed HEP, emphasized doorway stretch  Assessment     Moderate tightness and tenderness L scalenes, elevated 1st rib L. Patient demo significant improvement in L csp rotation after manual tx.  Demo improved postural awareness throughout session.  Jojo is progressing well towards her goals.   Pt prognosis is Good.     Pt will continue to benefit from skilled outpatient physical therapy to address the deficits listed in the problem list box on initial evaluation, provide pt/family education and to maximize pt's level of independence in the home and community environment.     Pt's spiritual, cultural and educational needs considered and pt agreeable to plan of care and goals.    Anticipated barriers to physical therapy: none    Goals:   Short Term Goals:   1. Pt. to report decreased csp/tsp pain  </=  6/10 at worst to increase tolerance for upright unsupported sitting MET  2. Pt. to demonstrate proper cervical and scapula retraction requiring min. to no verbal cues from PT  MET  3. Increase B cervical rotation AROM to 90%, pain-free, to promote greater ease with driving within 3 weeks IN PROGRESS  4. Pt to tolerate HEP to improve ROM and independence with ADLs MET  5. Pt. to report a decrease in frequency of L UE paresthesia by 25% within 3 weeks MET     Long Term Goals:  1. Pt. to report decreased csp/tsp pain </ =  3/10 at worst to increase tolerance for upright unsupported sitting posture within 8 weeks  IN PROGRESS  2. Pt. to demonstrate proper cervical and scapula retraction requiring no verbal cues from PT  3. Pt. to demonstrate increased MMT for bilateral UE abduction to 5/5 to improve tolerance for ADLs within 8 weeks IN PROGRESS  4. Pt. to demonstrate increased MMT for mid-trap/lower trap strength to 4+/5 to improve posture stability during OH reaching/lifting tasks.  IIN  PROGRES  5. Pt to be independent with HEP to improve ROM and independence with ADL's  6 Pt. to report a decrease in UE paresthesia by at least 90% within 8 weeks  IN PROGRESS  7. Patient able to tolerate at least 90  Minutes sustained sitting without increased csp/tsp pain within 8 weeks in order to return patient to PLOF IN PROGRESS  8. Patient able to carry purse on L shoulder without increased pain within 6 weeks    Plan     Pt will be treated 2 times a week for 8 weeks for pt. education, HEP, therapeutic exercises, neuromuscular re-education, joint/soft tissue mobilizations, and modalities, including but not exclusive to dry needling, prn to achieve established goals. Jojo may at times be seen by a PTA as part of the Rehab Team.     Laura Gonsalez, PT

## 2018-10-23 ENCOUNTER — OFFICE VISIT (OUTPATIENT)
Dept: FAMILY MEDICINE | Facility: CLINIC | Age: 68
End: 2018-10-23
Payer: MEDICARE

## 2018-10-23 VITALS
HEIGHT: 62 IN | SYSTOLIC BLOOD PRESSURE: 117 MMHG | BODY MASS INDEX: 30.07 KG/M2 | WEIGHT: 163.38 LBS | HEART RATE: 87 BPM | DIASTOLIC BLOOD PRESSURE: 74 MMHG

## 2018-10-23 DIAGNOSIS — H61.20 CERUMINOSIS, UNSPECIFIED LATERALITY: ICD-10-CM

## 2018-10-23 DIAGNOSIS — H60.502 ACUTE OTITIS EXTERNA OF LEFT EAR, UNSPECIFIED TYPE: Primary | ICD-10-CM

## 2018-10-23 DIAGNOSIS — J30.2 SEASONAL ALLERGIC RHINITIS, UNSPECIFIED TRIGGER: ICD-10-CM

## 2018-10-23 DIAGNOSIS — L29.9 PRURITUS: ICD-10-CM

## 2018-10-23 DIAGNOSIS — R21 RASH: ICD-10-CM

## 2018-10-23 DIAGNOSIS — L85.3 DRY SKIN: ICD-10-CM

## 2018-10-23 PROCEDURE — 3078F DIAST BP <80 MM HG: CPT | Mod: CPTII,,, | Performed by: NURSE PRACTITIONER

## 2018-10-23 PROCEDURE — 1101F PT FALLS ASSESS-DOCD LE1/YR: CPT | Mod: CPTII,,, | Performed by: NURSE PRACTITIONER

## 2018-10-23 PROCEDURE — 99999 PR PBB SHADOW E&M-EST. PATIENT-LVL III: CPT | Mod: PBBFAC,,, | Performed by: NURSE PRACTITIONER

## 2018-10-23 PROCEDURE — 3074F SYST BP LT 130 MM HG: CPT | Mod: CPTII,,, | Performed by: NURSE PRACTITIONER

## 2018-10-23 PROCEDURE — 99214 OFFICE O/P EST MOD 30 MIN: CPT | Mod: S$PBB,,, | Performed by: NURSE PRACTITIONER

## 2018-10-23 PROCEDURE — 99213 OFFICE O/P EST LOW 20 MIN: CPT | Mod: PBBFAC,PO | Performed by: NURSE PRACTITIONER

## 2018-10-23 RX ORDER — HYDROXYZINE HYDROCHLORIDE 25 MG/1
25 TABLET, FILM COATED ORAL DAILY PRN
Qty: 30 TABLET | Refills: 0 | Status: SHIPPED | OUTPATIENT
Start: 2018-10-23 | End: 2018-11-22

## 2018-10-23 RX ORDER — CLOTRIMAZOLE AND BETAMETHASONE DIPROPIONATE 10; .64 MG/G; MG/G
CREAM TOPICAL 2 TIMES DAILY
Qty: 45 G | Refills: 0 | Status: SHIPPED | OUTPATIENT
Start: 2018-10-23 | End: 2024-02-10

## 2018-10-23 RX ORDER — AMMONIUM LACTATE 12 G/100G
LOTION TOPICAL 2 TIMES DAILY PRN
Qty: 1 BOTTLE | Refills: 1 | Status: ON HOLD | OUTPATIENT
Start: 2018-10-23 | End: 2020-04-17 | Stop reason: HOSPADM

## 2018-10-23 NOTE — PROGRESS NOTES
Subjective:       Patient ID: Jojo Burrows is a 68 y.o. female.    Chief Complaint: Otalgia (left ear) and Rash    Otalgia    There is pain in the left ear. This is a new problem. The current episode started in the past 7 days. The problem occurs constantly. The problem has been gradually worsening. There has been no fever. The pain is at a severity of 5/10. The pain is moderate. Associated symptoms include coughing and a rash. She has tried nothing for the symptoms.   Rash   This is a chronic problem. The current episode started more than 1 year ago. The problem has been gradually worsening since onset. The affected locations include the lips. The rash is characterized by itchiness (tingling). She was exposed to nothing. Associated symptoms include coughing. Past treatments include nothing.     Review of Systems   HENT: Positive for ear pain (left) and postnasal drip.    Respiratory: Positive for cough.    Skin: Positive for color change (around lips) and rash.        Dry skin; itching   All other systems reviewed and are negative.      Objective:      Physical Exam   Constitutional: She is oriented to person, place, and time. She appears well-developed and well-nourished. No distress.   HENT:   Head: Normocephalic and atraumatic.   Right Ear: Hearing and external ear normal.   Left Ear: Hearing, tympanic membrane and external ear normal. There is swelling and tenderness.   Nose: Mucosal edema and rhinorrhea present. Right sinus exhibits no maxillary sinus tenderness and no frontal sinus tenderness. Left sinus exhibits no maxillary sinus tenderness and no frontal sinus tenderness.   Left ear canal pale and swollen with tenderness near the TMJ; ceruminosis to the right ear    Eyes: EOM are normal. Pupils are equal, round, and reactive to light.   Neck: Neck supple. No JVD present. No tracheal deviation present.   Cardiovascular: Normal rate, regular rhythm, normal heart sounds and intact distal pulses.   No murmur  heard.  Pulmonary/Chest: Effort normal and breath sounds normal. No respiratory distress. She has no wheezes. She has no rales.   Abdominal: Soft. Bowel sounds are normal. She exhibits no distension and no mass. There is no tenderness.   Musculoskeletal: Normal range of motion. She exhibits no edema or tenderness.   Neurological: She is alert and oriented to person, place, and time. Coordination normal.   Skin: Skin is warm and dry. Rash noted. Rash is macular (hypopigmented; around lips). No erythema. No pallor.   Psychiatric: She has a normal mood and affect. Her behavior is normal. Judgment and thought content normal. Cognition and memory are normal. She expresses no homicidal and no suicidal ideation.   Nursing note and vitals reviewed.      Assessment:       1. Acute otitis externa of left ear, unspecified type    2. Ceruminosis, unspecified laterality    3. Seasonal allergic rhinitis, unspecified trigger    4. Rash    5. Pruritus    6. Dry skin        Plan:       Jojo was seen today for otalgia.    Diagnoses and all orders for this visit:    Acute otitis externa of left ear, unspecified type  -     ciprofloxacin-hydrocortisone 0.2-1% (CIPRO HC OTIC) otic suspension; Place 3 drops into the left ear 2 (two) times daily.    Ceruminosis, unspecified laterality  -     carbamide peroxide (DEBROX) 6.5 % otic solution; Place 5 drops into both ears as needed.    Seasonal allergic rhinitis, unspecified trigger  Restart zyrtec and flonase as previously prescribed.    Rash  Appt made with Dermatology for hypopigmented area located around lips.  -     clotrimazole-betamethasone 1-0.05% (LOTRISONE) cream; Apply topically 2 (two) times daily.    Pruritus  -     hydrOXYzine HCl (ATARAX) 25 MG tablet; Take 1 tablet (25 mg total) by mouth daily as needed for Itching.  -     ammonium lactate (LAC-HYDRIN) 12 % lotion; Apply topically 2 (two) times daily as needed for Dry Skin.    Dry skin  -     ammonium lactate (LAC-HYDRIN) 12 %  lotion; Apply topically 2 (two) times daily as needed for Dry Skin.    Follow-up with PCP if symptoms worsen or fail to improve.

## 2018-10-25 ENCOUNTER — TELEPHONE (OUTPATIENT)
Dept: INTERNAL MEDICINE | Facility: CLINIC | Age: 68
End: 2018-10-25

## 2018-10-25 NOTE — TELEPHONE ENCOUNTER
----- Message from Cleopatra Mccormick sent at 10/25/2018 11:10 AM CDT -----  Contact: [phone]/self  Patient requesting to speak with you concerning her prescription not being covered.  (name of prescription unknown)  Please call and advise

## 2018-10-25 NOTE — TELEPHONE ENCOUNTER
----- Message from Emilie Villalobos sent at 10/25/2018  2:13 PM CDT -----  Contact: [facility] - [phone]  [facility] - [phone]      ciprofloxacin-hydrocortisone 0.2-1% (CIPRO HC OTIC) otic suspension ( to expensive )       Change to the NEOMYCIN POLYMYXIN Hydrocortisone  ( change to )

## 2018-10-31 ENCOUNTER — CLINICAL SUPPORT (OUTPATIENT)
Dept: REHABILITATION | Facility: HOSPITAL | Age: 68
End: 2018-10-31
Payer: MEDICARE

## 2018-10-31 DIAGNOSIS — R29.898 LEFT ARM WEAKNESS: ICD-10-CM

## 2018-10-31 DIAGNOSIS — M79.602 LEFT ARM PAIN: ICD-10-CM

## 2018-10-31 DIAGNOSIS — M53.82 DECREASED ROM OF INTERVERTEBRAL DISCS OF CERVICAL SPINE: ICD-10-CM

## 2018-10-31 DIAGNOSIS — M89.8X1 PAIN OF LEFT SCAPULA: ICD-10-CM

## 2018-10-31 PROCEDURE — 97140 MANUAL THERAPY 1/> REGIONS: CPT | Mod: PO

## 2018-10-31 NOTE — PROGRESS NOTES
Physical Therapy Daily Treatment Note     Name: Jojo Burrows  Clinic Number: [id number]    Therapy Diagnosis:   Encounter Diagnoses   Name Primary?    Pain of left scapula     Left arm pain     Left arm weakness     Decreased ROM of intervertebral discs of cervical spine      Physician: Prema Salazar PA-C    Visit Date: 10/31/2018  Visit #::10  Visit # authorized: 20    Authorization period: 9/5/2019  Plan of care Expiration: 11/5/2018  Time In: 1115  Time Out: 1200  Total Billable Time: 30 minutes    Precautions: Standard    Subjective     Pt reports: she missed PT last week due to being sick.  States she's been having more pain L UT, has been doing her stretches at home.  She was compliant with home exercise program.    Objective     Jojo received therapeutic exercises to develop strength, ROM, flexibility and posture for 10 minutes including:  Bolded ex's performed this date    UBE 3/3  Serratus punches 3 x 10 3# on 1/2 roll-NP  Upper back stretch at pole 2 x 20 sec  Bent over row 3# 3 x 10  Wall angels with 1/2 roll on wall 3 x 10   Supine pec stretch 1 minute with 1/2 roll-NP  Standing hor abd OTB 3 x 10  Levator scap stretch 2 x 30 seconds  Wall push up (+) 3 x12  --held several ex's due to pt arriving 15 minutes late to appt    Jojo received the following manual therapy techniques: Joint mobilizations, Manual traction and Soft tissue Mobilization were applied to the: csp/tsp  for 24 minutes, including:    STM to L anterior scalenes  MET for elevated 1st rib in supine  Passive scalene stretch  L scapula release performed in R SL  Manual csp traction grade II/III  Passive csp rotation B with manual upglides of lower csp B  Grade II/III upglides of lower csp B at neutral and end range passive rotation      Assessment     Patient responds well to manual tx this date with decreased pain at rest and improved L cervical rotation.  Continues with moderate restrictions throughout tsp/csp, worse on L, along  with scalenes on L.  L UE steadily decreasing in intensity per patient report.      Jojo is progressing well towards her goals.   Pt prognosis is Good.     Pt will continue to benefit from skilled outpatient physical therapy to address the deficits listed in the problem list box on initial evaluation, provide pt/family education and to maximize pt's level of independence in the home and community environment.     Pt's spiritual, cultural and educational needs considered and pt agreeable to plan of care and goals.    Anticipated barriers to physical therapy: none    Goals:   Short Term Goals:   1. Pt. to report decreased csp/tsp pain  </=  6/10 at worst to increase tolerance for upright unsupported sitting MET  2. Pt. to demonstrate proper cervical and scapula retraction requiring min. to no verbal cues from PT  MET  3. Increase B cervical rotation AROM to 90%, pain-free, to promote greater ease with driving within 3 weeks IN PROGRESS  4. Pt to tolerate HEP to improve ROM and independence with ADLs MET  5. Pt. to report a decrease in frequency of L UE paresthesia by 25% within 3 weeks MET     Long Term Goals:  1. Pt. to report decreased csp/tsp pain </ =  3/10 at worst to increase tolerance for upright unsupported sitting posture within 8 weeks  IN PROGRESS  2. Pt. to demonstrate proper cervical and scapula retraction requiring no verbal cues from PT  3. Pt. to demonstrate increased MMT for bilateral UE abduction to 5/5 to improve tolerance for ADLs within 8 weeks IN PROGRESS  4. Pt. to demonstrate increased MMT for mid-trap/lower trap strength to 4+/5 to improve posture stability during OH reaching/lifting tasks.  IIGRETCHEN REARDON  5. Pt to be independent with HEP to improve ROM and independence with ADL's  6 Pt. to report a decrease in UE paresthesia by at least 90% within 8 weeks  IN PROGRESS  7. Patient able to tolerate at least 90  Minutes sustained sitting without increased csp/tsp pain within 8 weeks in order to  return patient to PLOF IN PROGRESS  8. Patient able to carry purse on L shoulder without increased pain within 6 weeks    Plan     Pt will be treated 2 times a week for 8 weeks for pt. education, HEP, therapeutic exercises, neuromuscular re-education, joint/soft tissue mobilizations, and modalities, including but not exclusive to dry needling, prn to achieve established goals. Jojo may at times be seen by a PTA as part of the Rehab Team.     Laura Gonsalez, PT

## 2018-12-05 ENCOUNTER — CLINICAL SUPPORT (OUTPATIENT)
Dept: REHABILITATION | Facility: HOSPITAL | Age: 68
End: 2018-12-05
Payer: MEDICARE

## 2018-12-05 DIAGNOSIS — R29.898 LEFT ARM WEAKNESS: ICD-10-CM

## 2018-12-05 DIAGNOSIS — M89.8X1 PAIN OF LEFT SCAPULA: ICD-10-CM

## 2018-12-05 DIAGNOSIS — M79.602 LEFT ARM PAIN: ICD-10-CM

## 2018-12-05 DIAGNOSIS — M53.82 DECREASED ROM OF INTERVERTEBRAL DISCS OF CERVICAL SPINE: ICD-10-CM

## 2018-12-05 PROCEDURE — G8978 MOBILITY CURRENT STATUS: HCPCS | Mod: CK,PO

## 2018-12-05 PROCEDURE — G8979 MOBILITY GOAL STATUS: HCPCS | Mod: CJ,PO

## 2018-12-05 PROCEDURE — 97140 MANUAL THERAPY 1/> REGIONS: CPT | Mod: PO

## 2018-12-05 PROCEDURE — 97110 THERAPEUTIC EXERCISES: CPT | Mod: PO

## 2018-12-05 NOTE — PROGRESS NOTES
"  Physical Therapy Daily Treatment Note     Name: Jojo Burrows  Clinic Number: [id number]    Therapy Diagnosis:   Encounter Diagnoses   Name Primary?    Pain of left scapula     Left arm pain     Left arm weakness     Decreased ROM of intervertebral discs of cervical spine      Physician: Prema Salazra PA-C    Visit Date: 12/5/2018  Visit #::10  Visit # authorized: 20    Authorization period: 9/5/2019  Plan of care Expiration: 11/5/2018  Time In: 1115  Time Out: 1200  Total Billable Time: 30 minutes    Precautions: Standard    Subjective     Pt reports: she missed PT last week due to being sick.  States she's been having more pain L UT, has been doing her stretches at home.  She was compliant with home exercise program.    Objective      Observation/Posture:: Mild FHP, increased tone B UT, pt demo mild gaurding posture of L UE throughout examination     Cervical ROM: (measured in percentage)     Percent   Flexion 50   Extension 60   Right SB 35   Left SB 35   Right rotation 75   Left rotation 75   **increased lower csp and L UT pain reported all planes, worse with flexion and L SB        Shoulder Active ROM: (measured in degrees)   Shoulder Right UE Left UE   Flexion 170 150   Abduction 180 10   ER 90 (at 90 deg abd) 90(at 90 deg abd)   Extension 50 50   IR Thumb to T9 Thumb to T9      Sensation: Dermatomes: decreased to light touch throughout L UE, states L UE feels "hard, cold, and lifeless"     Upper Extremity Strength: (grading 1-5 out of 5)        Right UE Left UE   Shoulder Flexion: 5/5 4+/5   Shoulder Abduction: 5/5 4+/5   Shoulder ER: 5/5 4+/5   Shoulder IR: 5/5 5/5           Elbow Flexion: 5/5 4+/5   Elbow Extension: 4+/5 4+/5           Wrist flexion: 5/5 4/5   Wrist extension: 5/5 4/5   Finger intrinsics 5/5 4/5    #1 40 20    #2 35 20    #3 34 18   Lower Trap: 4/5 4/5   Middle Trap: 4/5 4/5      Cervical Spine Special Tests: ((+): positive; (-): negative)   Compression +   Distraction - "      Did not assess thoracic segmental mobility due to tenderness reported with light palpation and pt reporting several times that she is afraid of the severe pain returning since she's just now feeling some relief     Palpation:   Pt demo muscle guarding with all palpation, reporting concerns over severe pain returning.  Increased tone noted L UT, tsp paraspinals, and lower csp paraspinals     Functional Limitations Reports - G Codes  Category: Mobility     Tool: FOTO Lumbar Survey   Modifier  Impairment Limitation Restriction    CH  0 % impaired, limited or restricted    CI  @ least 1% but less than 20% impaired, limited or restricted    CJ  @ least 20%<40% impaired, limited or restricted    CK  @ least 40%<60% impaired, limited or restricted    CL  @ least 60% <80% impaired, limited or restricted    CM  @ least 80%<100% impaired limited or restricted    CN  100% impaired, limited or restricted      Current/: CK = 47% Limitation   Goal/ : CJ = 20% Limitation    Jojo received therapeutic exercises to develop strength, ROM, flexibility and posture for 15 minutes including:  Bolded ex's performed this date    UBE 3/3  Serratus punches 3 x 10 3# on 1/2 roll   Upper back stretch at pole 2 x 20 sec - np  Bent over row 3# 3 x 10 - np  Wall angels with 1/2 roll on wall 3 x 10 - np  Supine pec stretch 1 minute with 1/2 roll   Standing hor abd OTB 3 x 10 - np  Levator scap stretch 2 x 30 seconds   Mat push up (+) 3 x12  breuggers with yellow band - 2x10  Rows with TB - 2x10   Extension with TB - 2x10  Supine overhead flexion  On 1/2 foam roller - x30  --held several ex's due to pt arriving 15 minutes late to appt    Jojo received the following manual therapy techniques: Joint mobilizations, Manual traction and Soft tissue Mobilization were applied to the: csp/tsp  for 15 minutes, including:    STM to L anterior scalenes  1st rib depression and posterior glides - grades 1-4  Passive scalene stretch  L scapula  release performed in R SL - np  Manual csp traction grade II/III   Passive csp rotation B with manual upglides of lower csp B - np  Grade II/III upglides of lower csp B at neutral and end range passive rotation - np      Assessment     Patient responds well to manual tx this date with decreased pain. She continues to improve with shoulder mobility and thoraci mobilty. Her periscapular muscle strength is improving.  Continues with moderate restrictions throughout tsp/csp, worse on L, along with scalenes on L.  Still presents with radicular symtoms in arm, but symptoms have improved since last visit.      Jojo is progressing well towards her goals.   Pt prognosis is Good.     Pt will continue to benefit from skilled outpatient physical therapy to address the deficits listed in the problem list box on initial evaluation, provide pt/family education and to maximize pt's level of independence in the home and community environment.     Pt's spiritual, cultural and educational needs considered and pt agreeable to plan of care and goals.    Anticipated barriers to physical therapy: none    Goals:   Short Term Goals:   1. Pt. to report decreased csp/tsp pain  </=  6/10 at worst to increase tolerance for upright unsupported sitting MET  2. Pt. to demonstrate proper cervical and scapula retraction requiring min. to no verbal cues from PT  MET  3. Increase B cervical rotation AROM to 90%, pain-free, to promote greater ease with driving within 3 weeks IN PROGRESS  4. Pt to tolerate HEP to improve ROM and independence with ADLs MET   5. Pt. to report a decrease in frequency of L UE paresthesia by 25% within 3 weeks MET      Long Term Goals:  1. Pt. to report decreased csp/tsp pain </ =  3/10 at worst to increase tolerance for upright unsupported sitting posture within 8 weeks  IN PROGRESS  2. Pt. to demonstrate proper cervical and scapula retraction requiring no verbal cues from PT  3. Pt. to demonstrate increased MMT for  bilateral UE abduction to 5/5 to improve tolerance for ADLs within 8 weeks IN PROGRESS  4. Pt. to demonstrate increased MMT for mid-trap/lower trap strength to 4+/5 to improve posture stability during OH reaching/lifting tasks.  IIN PROGRES  5. Pt to be independent with HEP to improve ROM and independence with ADL's  6 Pt. to report a decrease in UE paresthesia by at least 90% within 8 weeks  IN PROGRESS  7. Patient able to tolerate at least 90  Minutes sustained sitting without increased csp/tsp pain within 8 weeks in order to return patient to OF IN PROGRESS  8. Patient able to carry purse on L shoulder without increased pain within 6 weeks - met     Plan     Pt will be treated 2 times a week for 8 weeks for pt. education, HEP, therapeutic exercises, neuromuscular re-education, joint/soft tissue mobilizations, and modalities, including but not exclusive to dry needling, prn to achieve established goals. Jojo may at times be seen by a PTA as part of the Rehab Team.     Oswaldo Peralta, PT

## 2018-12-13 ENCOUNTER — CLINICAL SUPPORT (OUTPATIENT)
Dept: REHABILITATION | Facility: HOSPITAL | Age: 68
End: 2018-12-13
Payer: MEDICARE

## 2018-12-13 DIAGNOSIS — M79.602 LEFT ARM PAIN: ICD-10-CM

## 2018-12-13 DIAGNOSIS — M89.8X1 PAIN OF LEFT SCAPULA: ICD-10-CM

## 2018-12-13 DIAGNOSIS — M53.82 DECREASED ROM OF INTERVERTEBRAL DISCS OF CERVICAL SPINE: ICD-10-CM

## 2018-12-13 DIAGNOSIS — R29.898 LEFT ARM WEAKNESS: ICD-10-CM

## 2018-12-13 PROCEDURE — 97140 MANUAL THERAPY 1/> REGIONS: CPT | Mod: PO

## 2018-12-13 PROCEDURE — 97110 THERAPEUTIC EXERCISES: CPT | Mod: PO

## 2018-12-13 NOTE — PROGRESS NOTES
"  Physical Therapy Daily Treatment Note     Name: Jojo Burrows  Clinic Number: [id number]    Therapy Diagnosis:   Encounter Diagnoses   Name Primary?    Pain of left scapula     Left arm pain     Left arm weakness     Decreased ROM of intervertebral discs of cervical spine      Physician: Prema Salazar PA-C    Visit Date: 12/13/2018  Visit #::12  Visit # authorized: 20    Authorization period: 9/5/2019  Plan of care Expiration: 11/5/2018  Time In: 910  Time Out: 1000  Total Billable Time: 30 minutes    Precautions: Standard    Subjective     Pt reports: she missed PT last week due to being sick.  States she's been having more pain L UT, has been doing her stretches at home.  She was compliant with home exercise program.    Objective      Observation/Posture:: Mild FHP, increased tone B UT, pt demo mild gaurding posture of L UE throughout examination     Cervical ROM: (measured in percentage)     Percent   Flexion 50   Extension 60   Right SB 35   Left SB 35   Right rotation 75   Left rotation 75   **increased lower csp and L UT pain reported all planes, worse with flexion and L SB        Shoulder Active ROM: (measured in degrees)   Shoulder Right UE Left UE   Flexion 170 150   Abduction 180 10   ER 90 (at 90 deg abd) 90(at 90 deg abd)   Extension 50 50   IR Thumb to T9 Thumb to T9      Sensation: Dermatomes: decreased to light touch throughout L UE, states L UE feels "hard, cold, and lifeless"     Upper Extremity Strength: (grading 1-5 out of 5)        Right UE Left UE   Shoulder Flexion: 5/5 4+/5   Shoulder Abduction: 5/5 4+/5   Shoulder ER: 5/5 4+/5   Shoulder IR: 5/5 5/5           Elbow Flexion: 5/5 4+/5   Elbow Extension: 4+/5 4+/5           Wrist flexion: 5/5 4/5   Wrist extension: 5/5 4/5   Finger intrinsics 5/5 4/5    #1 40 20    #2 35 20    #3 34 18   Lower Trap: 4/5 4/5   Middle Trap: 4/5 4/5      Cervical Spine Special Tests: ((+): positive; (-): negative)   Compression +   Distraction - "      Did not assess thoracic segmental mobility due to tenderness reported with light palpation and pt reporting several times that she is afraid of the severe pain returning since she's just now feeling some relief     Palpation:   Pt demo muscle guarding with all palpation, reporting concerns over severe pain returning.  Increased tone noted L UT, tsp paraspinals, and lower csp paraspinals     Functional Limitations Reports - G Codes  Category: Mobility     Tool: FOTO Lumbar Survey   Modifier  Impairment Limitation Restriction    CH  0 % impaired, limited or restricted    CI  @ least 1% but less than 20% impaired, limited or restricted    CJ  @ least 20%<40% impaired, limited or restricted    CK  @ least 40%<60% impaired, limited or restricted    CL  @ least 60% <80% impaired, limited or restricted    CM  @ least 80%<100% impaired limited or restricted    CN  100% impaired, limited or restricted      Current/: CK = 47% Limitation   Goal/ : CJ = 20% Limitation    Jojo received therapeutic exercises to develop strength, ROM, flexibility and posture for 15 minutes including:  Bolded ex's performed this date    UBE 3/3  Serratus punches 3 x 10 3# on 1/2 roll   Upper back stretch at pole 2 x 20 sec - np  Bent over row 3# 3 x 10 - np  Wall angels with 1/2 roll on wall 3 x 10 - np  Supine pec stretch 1 minute with 1/2 roll   Standing hor abd OTB 3 x 10 - np  Levator scap stretch 2 x 30 seconds   Mat push up (+) 3 x12  breuggers with yellow band - 2x10  Rows with TB - 2x10   Extension with TB - 2x10  Supine overhead flexion  On 1/2 foam roller - x30  Shoulder Ws at wall - 2x10  pec stretch in corner - 2x30 seconds  scaption raises - 2x10    Jojo received the following manual therapy techniques: Joint mobilizations, Manual traction and Soft tissue Mobilization were applied to the: csp/tsp  for 15 minutes, including:    STM to L anterior scalenes  1st rib depression and posterior glides - grades 1-4  Passive  scalene stretch  L scapula release performed in R SL - np  Manual csp traction grade II/III   Passive csp rotation B with manual upglides of lower csp B - np  Grade II/III upglides of lower csp B at neutral and end range passive rotation - np      Assessment     Patient responds well to manual tx. No adverse symtoms today and she had relief form postural exercises that were added.       Jojo is progressing well towards her goals.   Pt prognosis is Good.     Pt will continue to benefit from skilled outpatient physical therapy to address the deficits listed in the problem list box on initial evaluation, provide pt/family education and to maximize pt's level of independence in the home and community environment.     Pt's spiritual, cultural and educational needs considered and pt agreeable to plan of care and goals.    Anticipated barriers to physical therapy: none    Goals:   Short Term Goals:   1. Pt. to report decreased csp/tsp pain  </=  6/10 at worst to increase tolerance for upright unsupported sitting MET  2. Pt. to demonstrate proper cervical and scapula retraction requiring min. to no verbal cues from PT  MET  3. Increase B cervical rotation AROM to 90%, pain-free, to promote greater ease with driving within 3 weeks IN PROGRESS  4. Pt to tolerate HEP to improve ROM and independence with ADLs MET   5. Pt. to report a decrease in frequency of L UE paresthesia by 25% within 3 weeks MET      Long Term Goals:  1. Pt. to report decreased csp/tsp pain </ =  3/10 at worst to increase tolerance for upright unsupported sitting posture within 8 weeks  IN PROGRESS  2. Pt. to demonstrate proper cervical and scapula retraction requiring no verbal cues from PT  3. Pt. to demonstrate increased MMT for bilateral UE abduction to 5/5 to improve tolerance for ADLs within 8 weeks IN PROGRESS  4. Pt. to demonstrate increased MMT for mid-trap/lower trap strength to 4+/5 to improve posture stability during OH reaching/lifting tasks.   IIN PROGRES  5. Pt to be independent with HEP to improve ROM and independence with ADL's  6 Pt. to report a decrease in UE paresthesia by at least 90% within 8 weeks  IN PROGRESS  7. Patient able to tolerate at least 90  Minutes sustained sitting without increased csp/tsp pain within 8 weeks in order to return patient to PLOF IN PROGRESS  8. Patient able to carry purse on L shoulder without increased pain within 6 weeks - met     Plan     Pt will be treated 2 times a week for 8 weeks for pt. education, HEP, therapeutic exercises, neuromuscular re-education, joint/soft tissue mobilizations, and modalities, including but not exclusive to dry needling, prn to achieve established goals. Jojo may at times be seen by a PTA as part of the Rehab Team.     Oswaldo Peralta, PT

## 2018-12-19 ENCOUNTER — CLINICAL SUPPORT (OUTPATIENT)
Dept: REHABILITATION | Facility: HOSPITAL | Age: 68
End: 2018-12-19
Payer: MEDICARE

## 2018-12-19 DIAGNOSIS — M53.82 DECREASED ROM OF INTERVERTEBRAL DISCS OF CERVICAL SPINE: ICD-10-CM

## 2018-12-19 DIAGNOSIS — R29.898 LEFT ARM WEAKNESS: ICD-10-CM

## 2018-12-19 DIAGNOSIS — M89.8X1 PAIN OF LEFT SCAPULA: ICD-10-CM

## 2018-12-19 DIAGNOSIS — M79.602 LEFT ARM PAIN: ICD-10-CM

## 2018-12-19 PROCEDURE — 97110 THERAPEUTIC EXERCISES: CPT | Mod: PO

## 2018-12-19 NOTE — PROGRESS NOTES
"  Physical Therapy Daily Treatment Note     Name: oJjo Burrows  Clinic Number: [id number]    Therapy Diagnosis:   Encounter Diagnoses   Name Primary?    Pain of left scapula     Left arm pain     Left arm weakness     Decreased ROM of intervertebral discs of cervical spine      Physician: Prema Salazar PA-C    Visit Date: 12/19/2018  Visit #::13  Visit # authorized: 20    Authorization period: 9/5/2019  Plan of care Expiration: 11/5/2018- Referred to PT for reassessment/Updated POC.  Time In: 1115  Time Out: 1215  Total Billable Time: 30 minutes    Precautions: Standard    Subjective     Pt reports: " I have some pain in my left shoulder today and my neck."    Objective      Jojo received therapeutic exercises to develop strength, ROM, flexibility and posture for 30  minutes including:      UBE 3/3 Level 1.5  Upper trap stretch 2 x 30 sec   Levator scap stretch 2 x 30 seconds   breuggers with green band - 2x10  Rows with GTB - 2x10   Extension with GTB - 2x10  Supine overhead flexion  On 1/2 foam roller - x20  pec stretch in corner - 2x30 seconds  scaption raises - 2x10  Chin tucks in sitting x 10 reps with 5 sec hold    Jojo received the following manual therapy techniques x 25 mins: Joint mobilizations, Manual traction and Soft tissue Mobilization were applied to the: csp/tsp  for  minutes, including:  Performed by Oswaldo Peralta PT  See attached noted.      Assessment   Pt tolerated tx session well despite some pain and was seen by both the PT and the PTA.  PT available on site to perform reassessment/updated POC while the PTA went over HEP.  Pt was able to demonstrate understanding of HEP.  After seen by the PT, they both agreed that a D/C would be appropriate at this time with a good HEP to follow.        Jojo is progressing well towards her goals.   Pt prognosis is Good.     Pt will continue to benefit from skilled outpatient physical therapy to address the deficits listed in the problem list box on " initial evaluation, provide pt/family education and to maximize pt's level of independence in the home and community environment.     Pt's spiritual, cultural and educational needs considered and pt agreeable to plan of care and goals.    Anticipated barriers to physical therapy: none    Goals:   Short Term Goals:   1. Pt. to report decreased csp/tsp pain  </=  6/10 at worst to increase tolerance for upright unsupported sitting MET  2. Pt. to demonstrate proper cervical and scapula retraction requiring min. to no verbal cues from PT  MET  3. Increase B cervical rotation AROM to 90%, pain-free, to promote greater ease with driving within 3 weeks IN PROGRESS  4. Pt to tolerate HEP to improve ROM and independence with ADLs MET   5. Pt. to report a decrease in frequency of L UE paresthesia by 25% within 3 weeks MET      Long Term Goals:  1. Pt. to report decreased csp/tsp pain </ =  3/10 at worst to increase tolerance for upright unsupported sitting posture within 8 weeks  IN PROGRESS  2. Pt. to demonstrate proper cervical and scapula retraction requiring no verbal cues from PT  3. Pt. to demonstrate increased MMT for bilateral UE abduction to 5/5 to improve tolerance for ADLs within 8 weeks IN PROGRESS  4. Pt. to demonstrate increased MMT for mid-trap/lower trap strength to 4+/5 to improve posture stability during OH reaching/lifting tasks.  IIN PROGRES  5. Pt to be independent with HEP to improve ROM and independence with ADL's  6 Pt. to report a decrease in UE paresthesia by at least 90% within 8 weeks  IN PROGRESS  7. Patient able to tolerate at least 90  Minutes sustained sitting without increased csp/tsp pain within 8 weeks in order to return patient to OF IN PROGRESS  8. Patient able to carry purse on L shoulder without increased pain within 6 weeks - met     Plan     Pt will be treated 2 times a week for 8 weeks for pt. education, HEP, therapeutic exercises, neuromuscular re-education, joint/soft tissue  mobilizations, and modalities, including but not exclusive to dry needling, prn to achieve established goals. Jojo may at times be seen by a PTA as part of the Rehab Team.     Melony Norman, PTA

## 2019-01-07 DIAGNOSIS — I10 HYPERTENSION, ESSENTIAL: Primary | ICD-10-CM

## 2019-02-28 ENCOUNTER — PES CALL (OUTPATIENT)
Dept: ADMINISTRATIVE | Facility: CLINIC | Age: 69
End: 2019-02-28

## 2019-03-26 ENCOUNTER — PATIENT OUTREACH (OUTPATIENT)
Dept: ADMINISTRATIVE | Facility: HOSPITAL | Age: 69
End: 2019-03-26

## 2019-03-26 DIAGNOSIS — M94.9 DISORDER OF BONE AND ARTICULAR CARTILAGE: Primary | ICD-10-CM

## 2019-03-26 DIAGNOSIS — M89.9 DISORDER OF BONE AND ARTICULAR CARTILAGE: Primary | ICD-10-CM

## 2019-04-09 ENCOUNTER — LAB VISIT (OUTPATIENT)
Dept: LAB | Facility: HOSPITAL | Age: 69
End: 2019-04-09
Attending: INTERNAL MEDICINE
Payer: MEDICARE

## 2019-04-09 ENCOUNTER — OFFICE VISIT (OUTPATIENT)
Dept: INTERNAL MEDICINE | Facility: CLINIC | Age: 69
End: 2019-04-09
Payer: MEDICARE

## 2019-04-09 VITALS
HEART RATE: 62 BPM | DIASTOLIC BLOOD PRESSURE: 72 MMHG | TEMPERATURE: 98 F | OXYGEN SATURATION: 97 % | HEIGHT: 62 IN | WEIGHT: 168.63 LBS | SYSTOLIC BLOOD PRESSURE: 118 MMHG | BODY MASS INDEX: 31.03 KG/M2

## 2019-04-09 DIAGNOSIS — K82.4 GALLBLADDER POLYP: ICD-10-CM

## 2019-04-09 DIAGNOSIS — G89.29 CHRONIC PAIN OF RIGHT KNEE: ICD-10-CM

## 2019-04-09 DIAGNOSIS — I10 HYPERTENSION, ESSENTIAL: ICD-10-CM

## 2019-04-09 DIAGNOSIS — E55.9 VITAMIN D DEFICIENCY: ICD-10-CM

## 2019-04-09 DIAGNOSIS — J32.9 VIRAL SINUSITIS: ICD-10-CM

## 2019-04-09 DIAGNOSIS — M25.561 CHRONIC PAIN OF RIGHT KNEE: ICD-10-CM

## 2019-04-09 DIAGNOSIS — K21.9 GASTROESOPHAGEAL REFLUX DISEASE, ESOPHAGITIS PRESENCE NOT SPECIFIED: ICD-10-CM

## 2019-04-09 DIAGNOSIS — B97.89 VIRAL SINUSITIS: ICD-10-CM

## 2019-04-09 DIAGNOSIS — Z12.31 BREAST CANCER SCREENING BY MAMMOGRAM: ICD-10-CM

## 2019-04-09 DIAGNOSIS — Z00.00 WELL ADULT EXAM: ICD-10-CM

## 2019-04-09 DIAGNOSIS — M54.12 CERVICAL RADICULOPATHY: ICD-10-CM

## 2019-04-09 DIAGNOSIS — E04.2 MULTIPLE THYROID NODULES: ICD-10-CM

## 2019-04-09 DIAGNOSIS — I77.1 TORTUOUS AORTA: ICD-10-CM

## 2019-04-09 DIAGNOSIS — Z00.00 WELL ADULT EXAM: Primary | ICD-10-CM

## 2019-04-09 LAB
25(OH)D3+25(OH)D2 SERPL-MCNC: 20 NG/ML (ref 30–96)
ALBUMIN SERPL BCP-MCNC: 3.8 G/DL (ref 3.5–5.2)
ALP SERPL-CCNC: 88 U/L (ref 55–135)
ALT SERPL W/O P-5'-P-CCNC: 19 U/L (ref 10–44)
ANION GAP SERPL CALC-SCNC: 9 MMOL/L (ref 8–16)
AST SERPL-CCNC: 19 U/L (ref 10–40)
BASOPHILS # BLD AUTO: 0.02 K/UL (ref 0–0.2)
BASOPHILS NFR BLD: 0.3 % (ref 0–1.9)
BILIRUB SERPL-MCNC: 0.7 MG/DL (ref 0.1–1)
BUN SERPL-MCNC: 11 MG/DL (ref 8–23)
CALCIUM SERPL-MCNC: 9.8 MG/DL (ref 8.7–10.5)
CHLORIDE SERPL-SCNC: 104 MMOL/L (ref 95–110)
CHOLEST SERPL-MCNC: 196 MG/DL (ref 120–199)
CHOLEST/HDLC SERPL: 2.7 {RATIO} (ref 2–5)
CO2 SERPL-SCNC: 27 MMOL/L (ref 23–29)
CREAT SERPL-MCNC: 0.7 MG/DL (ref 0.5–1.4)
DIFFERENTIAL METHOD: ABNORMAL
EOSINOPHIL # BLD AUTO: 0.1 K/UL (ref 0–0.5)
EOSINOPHIL NFR BLD: 1 % (ref 0–8)
ERYTHROCYTE [DISTWIDTH] IN BLOOD BY AUTOMATED COUNT: 14.1 % (ref 11.5–14.5)
EST. GFR  (AFRICAN AMERICAN): >60 ML/MIN/1.73 M^2
EST. GFR  (NON AFRICAN AMERICAN): >60 ML/MIN/1.73 M^2
GLUCOSE SERPL-MCNC: 84 MG/DL (ref 70–110)
HCT VFR BLD AUTO: 39.7 % (ref 37–48.5)
HDLC SERPL-MCNC: 72 MG/DL (ref 40–75)
HDLC SERPL: 36.7 % (ref 20–50)
HGB BLD-MCNC: 12.6 G/DL (ref 12–16)
IMM GRANULOCYTES # BLD AUTO: 0.01 K/UL (ref 0–0.04)
IMM GRANULOCYTES NFR BLD AUTO: 0.1 % (ref 0–0.5)
LDLC SERPL CALC-MCNC: 113.8 MG/DL (ref 63–159)
LYMPHOCYTES # BLD AUTO: 2.3 K/UL (ref 1–4.8)
LYMPHOCYTES NFR BLD: 33.3 % (ref 18–48)
MCH RBC QN AUTO: 28.8 PG (ref 27–31)
MCHC RBC AUTO-ENTMCNC: 31.7 G/DL (ref 32–36)
MCV RBC AUTO: 91 FL (ref 82–98)
MONOCYTES # BLD AUTO: 0.8 K/UL (ref 0.3–1)
MONOCYTES NFR BLD: 12 % (ref 4–15)
NEUTROPHILS # BLD AUTO: 3.7 K/UL (ref 1.8–7.7)
NEUTROPHILS NFR BLD: 53.3 % (ref 38–73)
NONHDLC SERPL-MCNC: 124 MG/DL
NRBC BLD-RTO: 0 /100 WBC
PLATELET # BLD AUTO: 185 K/UL (ref 150–350)
PMV BLD AUTO: 12.1 FL (ref 9.2–12.9)
POTASSIUM SERPL-SCNC: 3.9 MMOL/L (ref 3.5–5.1)
PROT SERPL-MCNC: 7.4 G/DL (ref 6–8.4)
RBC # BLD AUTO: 4.38 M/UL (ref 4–5.4)
SODIUM SERPL-SCNC: 140 MMOL/L (ref 136–145)
TRIGL SERPL-MCNC: 51 MG/DL (ref 30–150)
TSH SERPL DL<=0.005 MIU/L-ACNC: 0.93 UIU/ML (ref 0.4–4)
WBC # BLD AUTO: 6.99 K/UL (ref 3.9–12.7)

## 2019-04-09 PROCEDURE — 99397 PR PREVENTIVE VISIT,EST,65 & OVER: ICD-10-PCS | Mod: S$GLB,,, | Performed by: INTERNAL MEDICINE

## 2019-04-09 PROCEDURE — 85025 COMPLETE CBC W/AUTO DIFF WBC: CPT

## 2019-04-09 PROCEDURE — 99397 PER PM REEVAL EST PAT 65+ YR: CPT | Mod: S$GLB,,, | Performed by: INTERNAL MEDICINE

## 2019-04-09 PROCEDURE — 3074F PR MOST RECENT SYSTOLIC BLOOD PRESSURE < 130 MM HG: ICD-10-PCS | Mod: CPTII,S$GLB,, | Performed by: INTERNAL MEDICINE

## 2019-04-09 PROCEDURE — 36415 COLL VENOUS BLD VENIPUNCTURE: CPT | Mod: PO

## 2019-04-09 PROCEDURE — 3078F DIAST BP <80 MM HG: CPT | Mod: CPTII,S$GLB,, | Performed by: INTERNAL MEDICINE

## 2019-04-09 PROCEDURE — 99999 PR PBB SHADOW E&M-EST. PATIENT-LVL IV: CPT | Mod: PBBFAC,,, | Performed by: INTERNAL MEDICINE

## 2019-04-09 PROCEDURE — 3078F PR MOST RECENT DIASTOLIC BLOOD PRESSURE < 80 MM HG: ICD-10-PCS | Mod: CPTII,S$GLB,, | Performed by: INTERNAL MEDICINE

## 2019-04-09 PROCEDURE — 99499 UNLISTED E&M SERVICE: CPT | Mod: HCNC,S$GLB,, | Performed by: INTERNAL MEDICINE

## 2019-04-09 PROCEDURE — 99999 PR PBB SHADOW E&M-EST. PATIENT-LVL IV: ICD-10-PCS | Mod: PBBFAC,,, | Performed by: INTERNAL MEDICINE

## 2019-04-09 PROCEDURE — 80053 COMPREHEN METABOLIC PANEL: CPT

## 2019-04-09 PROCEDURE — 99499 RISK ADDL DX/OHS AUDIT: ICD-10-PCS | Mod: HCNC,S$GLB,, | Performed by: INTERNAL MEDICINE

## 2019-04-09 PROCEDURE — 80061 LIPID PANEL: CPT

## 2019-04-09 PROCEDURE — 82306 VITAMIN D 25 HYDROXY: CPT

## 2019-04-09 PROCEDURE — 84443 ASSAY THYROID STIM HORMONE: CPT

## 2019-04-09 PROCEDURE — 3074F SYST BP LT 130 MM HG: CPT | Mod: CPTII,S$GLB,, | Performed by: INTERNAL MEDICINE

## 2019-04-09 RX ORDER — CETIRIZINE HYDROCHLORIDE 10 MG/1
10 TABLET ORAL DAILY
Status: ON HOLD | COMMUNITY
End: 2020-04-17 | Stop reason: HOSPADM

## 2019-04-09 RX ORDER — FLUTICASONE PROPIONATE 50 MCG
2 SPRAY, SUSPENSION (ML) NASAL DAILY
Qty: 16 G | Refills: 5 | Status: SHIPPED | OUTPATIENT
Start: 2019-04-09 | End: 2019-05-09

## 2019-04-09 NOTE — PROGRESS NOTES
Subjective:       Patient ID: Jojo Burrows is a 69 y.o. female.    Chief Complaint: Annual Exam    HPI   The patient presents for annual physical examination.  She remains physically active.  She has been traveling with her daughter to [place], [place] for care at [facility]; the daughter has non-Hodgkin's lymphoma.  The patient states she is overdue for her screening mammogram.  She has been experiencing more right knee pain. She has been seen by Sports Medicine in the past for bilateral knee problems. She is tolerating metoprolol well for treatment of her hypertension.  She would like a follow-up thyroid ultrasound to follow-up her nodular thyroid.  The patient also has been diagnosed to gallstones and gallbladder polyps.  Her last ultrasound was 1 year ago.    Active medical conditions include essential hypertension, cervical radiculopathy due to degenerative disc disease of the cervical spine, GERD, tortuous aorta, nodular thyroid, and vitamin-D deficiency.  She does have lumbar facet arthropathy but has not been experiencing any recent symptoms of lower back pain.    No interval change in past medical history, family history, or social history since prior evaluations.  The only exception is her daughter's recently diagnosed non-Hodgkin's lymphoma.    Review of Systems   Constitutional: Negative for activity change, appetite change, fatigue and unexpected weight change.   HENT: Positive for congestion and postnasal drip.    Eyes: Negative for visual disturbance.   Respiratory: Negative for cough and shortness of breath.    Cardiovascular: Negative for chest pain, palpitations and leg swelling.   Gastrointestinal: Negative for abdominal pain, blood in stool, constipation and diarrhea.   Genitourinary: Negative for dysuria and hematuria.   Musculoskeletal: Positive for arthralgias and joint swelling. Negative for neck pain and neck stiffness.   Skin: Negative for rash.   Neurological: Negative for dizziness,  syncope and headaches.   Psychiatric/Behavioral: Positive for sleep disturbance.       Objective:      Physical Exam   Constitutional: She is oriented to person, place, and time. Vital signs are normal. She appears well-developed and well-nourished. No distress.   HENT:   Head: Normocephalic and atraumatic.   Right Ear: External ear normal.   Left Ear: External ear normal.   Nose: Nose normal.   Mouth/Throat: Oropharynx is clear and moist. No oropharyngeal exudate.   Eyes: Pupils are equal, round, and reactive to light. Conjunctivae and EOM are normal. No scleral icterus.   Neck: Normal range of motion. Neck supple. No JVD present. Carotid bruit is not present.   Cardiovascular: Normal rate, regular rhythm, normal heart sounds, intact distal pulses and normal pulses. Exam reveals no gallop and no friction rub.   No murmur heard.  Pulmonary/Chest: Effort normal and breath sounds normal. No respiratory distress. She has no wheezes. She has no rales.   Abdominal: Soft. Bowel sounds are normal. She exhibits no abdominal bruit and no mass. There is no splenomegaly or hepatomegaly. There is no tenderness. No hernia.   Musculoskeletal: She exhibits tenderness. She exhibits no edema.        Right shoulder: She exhibits no effusion and no deformity.   Right knee is tender on ROM testing.  Left knee range of motion is intact with no localized tenderness present.   Lymphadenopathy:     She has no cervical adenopathy.     She has no axillary adenopathy.        Right: No supraclavicular adenopathy present.        Left: No supraclavicular adenopathy present.   Neurological: She is alert and oriented to person, place, and time. She has normal strength. No cranial nerve deficit.   Skin: Skin is warm and dry. No rash noted.   Psychiatric: She has a normal mood and affect. Her speech is normal and behavior is normal.   Nursing note and vitals reviewed.      Assessment:       1. Well adult exam    2. Hypertension, essential    3.  Tortuous aorta    4. Vitamin D deficiency    5. Cervical radiculopathy    6. Gastroesophageal reflux disease, esophagitis presence not specified    7. Chronic pain of right knee    8. Multiple thyroid nodules    9. Breast cancer screening by mammogram    10. Gallbladder polyp        Plan:     Jojo was seen today for annual exam.  Laboratory studies will be obtained today.  Screening mammogram will be ordered.  A thyroid ultrasound will be obtained for follow-up of nodular thyroid.  A gallbladder ultrasound will be ordered for follow-up of gallbladder polyps.  Sports Nedicine consultation will be obtained regarding right knee pain. The patient will use melatonin for insomnia.  She will use Flonase for treatment of rhinitis symptoms.  A routine follow-up visit in 6 months is recommended.    Diagnoses and all orders for this visit:    Well adult exam  -     Comprehensive metabolic panel; Future  -     CBC auto differential; Future  -     Lipid panel; Future  -     TSH; Future  -     Vitamin D; Future    Hypertension, essential  -     Comprehensive metabolic panel; Future  -     CBC auto differential; Future  -     Lipid panel; Future  -     TSH; Future  -     Vitamin D; Future    Tortuous aorta    Vitamin D deficiency  -     Comprehensive metabolic panel; Future  -     CBC auto differential; Future  -     Lipid panel; Future  -     TSH; Future  -     Vitamin D; Future    Cervical radiculopathy    Gastroesophageal reflux disease, esophagitis presence not specified    Chronic pain of right knee  -     Ambulatory consult to Sports Medicine    Multiple thyroid nodules  -     US Soft Tissue Head Neck Thyroid; Future  -     Comprehensive metabolic panel; Future  -     CBC auto differential; Future  -     Lipid panel; Future  -     TSH; Future  -     Vitamin D; Future    Breast cancer screening by mammogram  -     Mammo Digital Screening Bilateral With CAD; Future    Gallbladder polyp  -     US Abdomen Complete;  Future    Viral sinusitis  -     fluticasone (FLONASE) 50 mcg/actuation nasal spray; 2 sprays (100 mcg total) by Each Nare route once daily.

## 2019-04-11 NOTE — TELEPHONE ENCOUNTER
----- Message from Hayden Herrera sent at 1/27/2017 12:26 PM CST -----  Contact: Pt at [phone]  Pt requesting a call back regarding an appointment to be made for her to see Dr. Munoz sooner that available.   
Skin normal color for race, warm, dry and intact. No evidence of rash.

## 2019-04-18 ENCOUNTER — HOSPITAL ENCOUNTER (OUTPATIENT)
Dept: RADIOLOGY | Facility: HOSPITAL | Age: 69
Discharge: HOME | End: 2019-04-18
Attending: INTERNAL MEDICINE
Payer: MEDICARE

## 2019-04-18 DIAGNOSIS — E04.2 MULTIPLE THYROID NODULES: ICD-10-CM

## 2019-04-18 DIAGNOSIS — K82.4 GALLBLADDER POLYP: ICD-10-CM

## 2019-04-18 PROCEDURE — 76700 US ABDOMEN COMPLETE: ICD-10-PCS | Mod: 26,,, | Performed by: RADIOLOGY

## 2019-04-18 PROCEDURE — 76536 US EXAM OF HEAD AND NECK: CPT | Mod: TC

## 2019-04-18 PROCEDURE — 76700 US EXAM ABDOM COMPLETE: CPT | Mod: TC

## 2019-04-18 PROCEDURE — 76536 US EXAM OF HEAD AND NECK: CPT | Mod: 26,,, | Performed by: RADIOLOGY

## 2019-04-18 PROCEDURE — 76700 US EXAM ABDOM COMPLETE: CPT | Mod: 26,,, | Performed by: RADIOLOGY

## 2019-04-18 PROCEDURE — 76536 US SOFT TISSUE HEAD NECK THYROID: ICD-10-PCS | Mod: 26,,, | Performed by: RADIOLOGY

## 2019-04-30 ENCOUNTER — HOSPITAL ENCOUNTER (OUTPATIENT)
Dept: RADIOLOGY | Facility: HOSPITAL | Age: 69
Discharge: HOME | End: 2019-04-30
Attending: INTERNAL MEDICINE
Payer: MEDICARE

## 2019-04-30 DIAGNOSIS — Z12.31 BREAST CANCER SCREENING BY MAMMOGRAM: ICD-10-CM

## 2019-04-30 PROCEDURE — 77063 MAMMO DIGITAL SCREENING BILAT WITH TOMOSYNTHESIS_CAD: ICD-10-PCS | Mod: 26,,, | Performed by: RADIOLOGY

## 2019-04-30 PROCEDURE — 77067 SCR MAMMO BI INCL CAD: CPT | Mod: TC,PO

## 2019-04-30 PROCEDURE — 77067 SCR MAMMO BI INCL CAD: CPT | Mod: 26,,, | Performed by: RADIOLOGY

## 2019-04-30 PROCEDURE — 77063 BREAST TOMOSYNTHESIS BI: CPT | Mod: 26,,, | Performed by: RADIOLOGY

## 2019-04-30 PROCEDURE — 77067 MAMMO DIGITAL SCREENING BILAT WITH TOMOSYNTHESIS_CAD: ICD-10-PCS | Mod: 26,,, | Performed by: RADIOLOGY

## 2019-05-08 ENCOUNTER — HOSPITAL ENCOUNTER (OUTPATIENT)
Dept: RADIOLOGY | Facility: HOSPITAL | Age: 69
Discharge: HOME | End: 2019-05-08
Attending: FAMILY MEDICINE
Payer: MEDICARE

## 2019-05-08 ENCOUNTER — OFFICE VISIT (OUTPATIENT)
Dept: SPORTS MEDICINE | Facility: CLINIC | Age: 69
End: 2019-05-08
Payer: MEDICARE

## 2019-05-08 VITALS — BODY MASS INDEX: 30.91 KG/M2 | WEIGHT: 168 LBS | TEMPERATURE: 98 F | HEIGHT: 62 IN

## 2019-05-08 DIAGNOSIS — M17.11 PRIMARY OSTEOARTHRITIS OF RIGHT KNEE: Primary | ICD-10-CM

## 2019-05-08 DIAGNOSIS — M25.561 RIGHT KNEE PAIN, UNSPECIFIED CHRONICITY: ICD-10-CM

## 2019-05-08 PROCEDURE — 99999 PR PBB SHADOW E&M-EST. PATIENT-LVL III: ICD-10-PCS | Mod: PBBFAC,,, | Performed by: FAMILY MEDICINE

## 2019-05-08 PROCEDURE — 99999 PR PBB SHADOW E&M-EST. PATIENT-LVL III: CPT | Mod: PBBFAC,,, | Performed by: FAMILY MEDICINE

## 2019-05-08 PROCEDURE — 3288F PR FALLS RISK ASSESSMENT DOCUMENTED: ICD-10-PCS | Mod: CPTII,S$GLB,, | Performed by: FAMILY MEDICINE

## 2019-05-08 PROCEDURE — 1100F PTFALLS ASSESS-DOCD GE2>/YR: CPT | Mod: CPTII,S$GLB,, | Performed by: FAMILY MEDICINE

## 2019-05-08 PROCEDURE — 3288F FALL RISK ASSESSMENT DOCD: CPT | Mod: CPTII,S$GLB,, | Performed by: FAMILY MEDICINE

## 2019-05-08 PROCEDURE — 73564 X-RAY EXAM KNEE 4 OR MORE: CPT | Mod: 26,50,, | Performed by: RADIOLOGY

## 2019-05-08 PROCEDURE — 1100F PR PT FALLS ASSESS DOC 2+ FALLS/FALL W/INJURY/YR: ICD-10-PCS | Mod: CPTII,S$GLB,, | Performed by: FAMILY MEDICINE

## 2019-05-08 PROCEDURE — 73564 XR KNEE ORTHO BILAT WITH FLEXION: ICD-10-PCS | Mod: 26,50,, | Performed by: RADIOLOGY

## 2019-05-08 PROCEDURE — 73564 X-RAY EXAM KNEE 4 OR MORE: CPT | Mod: TC,50,FY,PO

## 2019-05-08 PROCEDURE — 99214 PR OFFICE/OUTPT VISIT, EST, LEVL IV, 30-39 MIN: ICD-10-PCS | Mod: S$GLB,,, | Performed by: FAMILY MEDICINE

## 2019-05-08 PROCEDURE — 99214 OFFICE O/P EST MOD 30 MIN: CPT | Mod: S$GLB,,, | Performed by: FAMILY MEDICINE

## 2019-05-08 NOTE — LETTER
May 9, 2019      Joo Munoz MD  [address]  [place] [address]           [facility]  [address] [address]  Phone: [phone]          Patient: Jojo Burrows   MR Number: [id number]   YOB: 1950   Date of Visit: 5/8/2019       Dear Dr. Joo Munoz:    Thank you for referring Jojo Burrows to me for evaluation. Attached you will find relevant portions of my assessment and plan of care.    If you have questions, please do not hesitate to call me. I look forward to following Jojo Burrows along with you.    Sincerely,    Moreno Bergeron MD    Enclosure  CC:  No Recipients    If you would like to receive this communication electronically, please contact [email or web address] or [phone] to request more information on [employer] access.    For providers and/or their staff who would like to refer a patient to [facility], please contact us through our one-stop-shop provider referral line, [facility] [place], at [phone].    If you feel you have received this communication in error or would no longer like to receive these types of communications, please e-mail [email or web address]

## 2019-05-08 NOTE — PROGRESS NOTES
Jojo Burrows, a 69 y.o. female, presents today for evaluation of her right knee.      HISTORY OF PRESENT ILLNESS   Location: anterior knee, right > left   Onset: insidious,many years  Palliative:    Relative rest   Oral analgesics (meloxicam,tylenol)   R/L - CSIZeinab, 02/13/17, 85-90% improvement   Provocative:    ADLs   prolonged walking  Prior:    14.05 - L knee - arthroscopy - PPetrocy   Progression: resolving discomfort  Quality:    Was clicking    Was swollen    Sharp at times  Radiation: none  Severity: per nursing documentation  Timing: intermittent w/ use  Trauma: none     Review of systems (ROS):  A 10+ review of systems was performed with pertinent positives and negatives noted above in the history of present illness. Other systems were negative unless otherwise specified.    PHYSICAL EXAMINATION  General:  The patient is alert and oriented x 3. Mood is pleasant. Observation of ears, eyes and nose reveal no gross abnormalities. HEENT: NCAT, sclera anicteric.   Lungs: Respirations are equal and unlabored.  Gait is coordinated. Patient can toe walk and heel walk without difficulty.    RIGHT KNEE EXAMINATION    Observation/Inspection  Gait:   Antalgic   Alignment:  Neutral   Scars:   None   Muscle atrophy: Mild  Effusion:  None   Warmth:  None   Discoloration:   none     Tenderness / Crepitus (T / C):         T / C      T / C  Patella   + / -   Lateral joint line   + / -     Peripatellar medial  +  Medial joint line    + / -  Peripatellar lateral +  Medial plica   - / -  Patellar tendon +   Popliteal fossa   + / -  Quad tendon   +   Gastrocnemius   -  Prepatellar Bursa + / -   Quadricep   -  Tibial tubercle  -  Thigh/hamstring  -  Pes anserine/HS -  Fibula    -  ITB   - / -  Tibia     -  Tib/fib joint  - / -  LCL    -    MFC   - / -   MCL: Proximal  -    LFC   - / -   Distal    -          ROM: (* = pain)  PASSIVE   ACTIVE    Left :   5 / 0 / 145   5 / 0 / 145     Right :    5 / 0 / 145   5 / 0 /  145    Patellofemoral examination:  See above noted areas of tenderness.   Patella position    Subluxation / dislocation: Centered        Sup. / Inf;   Normal   Crepitus (PF):    Absent   Patellar Mobility:       Medial-lateral:   Normal    Superior-inferior:  Normal    Inferior tilt   Normal    Patellar tendon:  Normal   Lateral tilt:    Normal   J-sign:     None   Patellofemoral grind:   POS     Meniscal Signs:     Pain on terminal extension:  -  Pain on terminal flexion:  -  Rebecas maneuver:  +*  Squat     NT    Ligament Examination:  ACL / Lachman:  WNL  PCL-Post.  drawer: normal 0 to 2mm  MCL- Valgus:  normal 0 to 2mm  LCL- Varus:    normal 0 to 2mm  Pivot shift:  guarding   Dial Test:   difference c/w other side   At 30° flexion: normal (< 5°)    At 90° flexion: normal (< 5°)   Reverse Pivot Shift:   normal (Equal)     Strength: (* = with pain) Painful Side  Quadriceps   4/5  Hamstrin/5    Extremity Neuro-vascular Examination:   Sensation:  Grossly intact to light touch all dermatomal regions.   Motor Function:  Fully intact motor function at hip, knee, foot and ankle    DTRs;  quadriceps and  achilles 2+.  No clonus and downgoing Babinski.    Vascular status:  DP and PT pulses 2+, brisk capillary refill, symmetric.     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, fadia med+ant compartment, bilat   S/p knee arthroscopy 14.05 PPatrocey, left    Knee pain right >> left    No evidence of neurologic pathology  No evidence of vascular pathology    Imaging studies reviewed:   X-ray knee, bilateral 19.05    Plan:    We discussed the importance of appropriate diet, weight, and regular exercise including quadriceps strengthening     We discussed options including:  #1 watchful waiting  #2 physical therapy aimed at:   Core stability   RoM knee   Strengthening quadriceps   Gait training   #3 injection therapy:   CSI iaricky     Right, effective  85-90%, did not last long, repeat     Left, effective 85-90%, did not last long, repeat    VSI iaknee    Right,     Left,    Orthobiologics   #4      The patient chooses #3 vsi iaknee right    Pain management: handout given  Bracing:   Physical therapy:   Activity (e.g. sports, work) restrictions: as tolerated   school/vocation: active in [employer]    Daughter has non logkins lymphoma since 19.01 @[facility] [place]    Follow up for vsi iaknee right  Clarify nfpt  Should symptoms worsen or fail to resolve, consider:  Revisiting the above options

## 2019-05-13 ENCOUNTER — PATIENT MESSAGE (OUTPATIENT)
Dept: INTERNAL MEDICINE | Facility: CLINIC | Age: 69
End: 2019-05-13

## 2019-07-23 ENCOUNTER — PES CALL (OUTPATIENT)
Dept: ADMINISTRATIVE | Facility: CLINIC | Age: 69
End: 2019-07-23

## 2019-08-08 ENCOUNTER — TELEPHONE (OUTPATIENT)
Dept: INTERNAL MEDICINE | Facility: CLINIC | Age: 69
End: 2019-08-08

## 2019-08-09 ENCOUNTER — OFFICE VISIT (OUTPATIENT)
Dept: OPTOMETRY | Facility: CLINIC | Age: 69
End: 2019-08-09
Payer: COMMERCIAL

## 2019-08-09 DIAGNOSIS — H25.13 NUCLEAR SCLEROSIS OF BOTH EYES: ICD-10-CM

## 2019-08-09 DIAGNOSIS — H52.4 MYOPIA WITH ASTIGMATISM AND PRESBYOPIA, BILATERAL: ICD-10-CM

## 2019-08-09 DIAGNOSIS — H43.813 POSTERIOR VITREOUS DETACHMENT OF BOTH EYES: Primary | ICD-10-CM

## 2019-08-09 DIAGNOSIS — H52.13 MYOPIA WITH ASTIGMATISM AND PRESBYOPIA, BILATERAL: ICD-10-CM

## 2019-08-09 DIAGNOSIS — H52.203 MYOPIA WITH ASTIGMATISM AND PRESBYOPIA, BILATERAL: ICD-10-CM

## 2019-08-09 PROCEDURE — 92015 PR REFRACTION: ICD-10-PCS | Mod: S$GLB,,, | Performed by: OPTOMETRIST

## 2019-08-09 PROCEDURE — 99999 PR PBB SHADOW E&M-EST. PATIENT-LVL III: ICD-10-PCS | Mod: PBBFAC,,, | Performed by: OPTOMETRIST

## 2019-08-09 PROCEDURE — 92014 PR EYE EXAM, EST PATIENT,COMPREHESV: ICD-10-PCS | Mod: S$GLB,,, | Performed by: OPTOMETRIST

## 2019-08-09 PROCEDURE — 92014 COMPRE OPH EXAM EST PT 1/>: CPT | Mod: S$GLB,,, | Performed by: OPTOMETRIST

## 2019-08-09 PROCEDURE — 92015 DETERMINE REFRACTIVE STATE: CPT | Mod: S$GLB,,, | Performed by: OPTOMETRIST

## 2019-08-09 PROCEDURE — 99999 PR PBB SHADOW E&M-EST. PATIENT-LVL III: CPT | Mod: PBBFAC,,, | Performed by: OPTOMETRIST

## 2019-08-12 NOTE — PROGRESS NOTES
HPI     OSIEL:10/2017  Glasses? no  Contacts? no  H/o eye surgery, injections or laser: no  H/o eye injury: no  Known eye conditions? Dry OU   Family h/o eye conditions? Cataracts, Glaucoma  OU Mother   Eye gtts?no    (-) Flashes (+) Floaters OU OS only, nothing new, intermittent  (-) Mucous     (+) Tearing OU occasional -allergies  (-) Itching (-) Burning   (-) Headaches (+) Eye Pain/discomfort OU pressure due to sinus- sometimes   it's sharp pain occasionally and other times a pressure feeling from the   back of the eye(-) Irritation   (-) Redness (-) Double vision (+) Blurry vision near va is blurry and   having issues with glare at night     Diabetic? (-)  A1c?  (Hemoglobin A1C       Date                     Value               Ref Range             Status                06/14/2005               5.6                 4.5 - 6.2 %           Final            ----------)        Last edited by Lise Gramajo, OD on 8/9/2019  3:38 PM. (History)        ROS     Negative for: Constitutional, Gastrointestinal, Neurological, Skin,   Genitourinary, Musculoskeletal, HENT, Endocrine, Cardiovascular, Eyes,   Respiratory, Psychiatric, Allergic/Imm, Heme/Lymph    Last edited by Lise Gramajo, OD on 8/9/2019  3:36 PM. (History)        Assessment /Plan     For exam results, see Encounter Report.    Posterior vitreous detachment of both eyes    Nuclear sclerosis of both eyes    Myopia with astigmatism and presbyopia, bilateral      1. No e/o h/b/t 360 degrees OU. Monitor for worsening of symptoms or S/Sx of RD.   2. Nuclear sclerotic cataract - not visually significant. Observe.  3. SRx released to patient. Patient educated on lens options. Normal ocular health. RTC 1 year for routine exam.

## 2019-09-19 PROBLEM — M89.8X1 PAIN OF LEFT SCAPULA: Status: RESOLVED | Noted: 2018-09-10 | Resolved: 2019-09-19

## 2019-09-19 PROBLEM — R29.898 LEFT ARM WEAKNESS: Status: RESOLVED | Noted: 2018-09-10 | Resolved: 2019-09-19

## 2019-09-19 PROBLEM — M79.602 LEFT ARM PAIN: Status: RESOLVED | Noted: 2018-09-10 | Resolved: 2019-09-19

## 2019-09-19 PROBLEM — M53.82 DECREASED ROM OF INTERVERTEBRAL DISCS OF CERVICAL SPINE: Status: RESOLVED | Noted: 2018-09-10 | Resolved: 2019-09-19

## 2019-09-23 ENCOUNTER — OFFICE VISIT (OUTPATIENT)
Dept: INTERNAL MEDICINE | Facility: CLINIC | Age: 69
End: 2019-09-23
Payer: MEDICARE

## 2019-09-23 VITALS
OXYGEN SATURATION: 97 % | WEIGHT: 165.13 LBS | BODY MASS INDEX: 30.39 KG/M2 | HEIGHT: 62 IN | DIASTOLIC BLOOD PRESSURE: 74 MMHG | RESPIRATION RATE: 15 BRPM | SYSTOLIC BLOOD PRESSURE: 119 MMHG | HEART RATE: 68 BPM

## 2019-09-23 DIAGNOSIS — K21.9 GASTROESOPHAGEAL REFLUX DISEASE, ESOPHAGITIS PRESENCE NOT SPECIFIED: ICD-10-CM

## 2019-09-23 DIAGNOSIS — E55.9 VITAMIN D DEFICIENCY: ICD-10-CM

## 2019-09-23 DIAGNOSIS — M47.816 LUMBAR FACET ARTHROPATHY: ICD-10-CM

## 2019-09-23 DIAGNOSIS — I77.1 TORTUOUS AORTA: ICD-10-CM

## 2019-09-23 DIAGNOSIS — M46.96 INFLAMMATORY SPONDYLOPATHY OF LUMBAR REGION: ICD-10-CM

## 2019-09-23 DIAGNOSIS — H61.21 EXCESSIVE EAR WAX, RIGHT: ICD-10-CM

## 2019-09-23 DIAGNOSIS — H91.93 DECREASED HEARING OF BOTH EARS: ICD-10-CM

## 2019-09-23 DIAGNOSIS — N60.09 CYST OF BREAST, UNSPECIFIED LATERALITY: ICD-10-CM

## 2019-09-23 DIAGNOSIS — H25.13 NUCLEAR SCLEROSIS OF BOTH EYES: ICD-10-CM

## 2019-09-23 DIAGNOSIS — E66.9 OBESITY (BMI 30.0-34.9): ICD-10-CM

## 2019-09-23 DIAGNOSIS — M54.12 CERVICAL RADICULOPATHY: Chronic | ICD-10-CM

## 2019-09-23 DIAGNOSIS — I10 ESSENTIAL HYPERTENSION: Chronic | ICD-10-CM

## 2019-09-23 DIAGNOSIS — E04.2 MULTIPLE THYROID NODULES: ICD-10-CM

## 2019-09-23 DIAGNOSIS — H61.22 EXCESSIVE EAR WAX, LEFT: ICD-10-CM

## 2019-09-23 DIAGNOSIS — M50.30 DEGENERATIVE DISC DISEASE, CERVICAL: Chronic | ICD-10-CM

## 2019-09-23 DIAGNOSIS — M17.0 PRIMARY OSTEOARTHRITIS OF BOTH KNEES: ICD-10-CM

## 2019-09-23 DIAGNOSIS — Z00.00 ENCOUNTER FOR PREVENTIVE HEALTH EXAMINATION: Primary | ICD-10-CM

## 2019-09-23 PROCEDURE — 3074F SYST BP LT 130 MM HG: CPT | Mod: HCNC,CPTII,S$GLB, | Performed by: NURSE PRACTITIONER

## 2019-09-23 PROCEDURE — 99499 RISK ADDL DX/OHS AUDIT: ICD-10-PCS | Mod: HCNC,S$GLB,, | Performed by: NURSE PRACTITIONER

## 2019-09-23 PROCEDURE — 3074F PR MOST RECENT SYSTOLIC BLOOD PRESSURE < 130 MM HG: ICD-10-PCS | Mod: HCNC,CPTII,S$GLB, | Performed by: NURSE PRACTITIONER

## 2019-09-23 PROCEDURE — 99999 PR PBB SHADOW E&M-EST. PATIENT-LVL V: ICD-10-PCS | Mod: PBBFAC,HCNC,, | Performed by: NURSE PRACTITIONER

## 2019-09-23 PROCEDURE — 3078F DIAST BP <80 MM HG: CPT | Mod: HCNC,CPTII,S$GLB, | Performed by: NURSE PRACTITIONER

## 2019-09-23 PROCEDURE — G0439 PPPS, SUBSEQ VISIT: HCPCS | Mod: HCNC,S$GLB,, | Performed by: NURSE PRACTITIONER

## 2019-09-23 PROCEDURE — G0439 PR MEDICARE ANNUAL WELLNESS SUBSEQUENT VISIT: ICD-10-PCS | Mod: HCNC,S$GLB,, | Performed by: NURSE PRACTITIONER

## 2019-09-23 PROCEDURE — 99499 UNLISTED E&M SERVICE: CPT | Mod: HCNC,S$GLB,, | Performed by: NURSE PRACTITIONER

## 2019-09-23 PROCEDURE — 99999 PR PBB SHADOW E&M-EST. PATIENT-LVL V: CPT | Mod: PBBFAC,HCNC,, | Performed by: NURSE PRACTITIONER

## 2019-09-23 PROCEDURE — 3078F PR MOST RECENT DIASTOLIC BLOOD PRESSURE < 80 MM HG: ICD-10-PCS | Mod: HCNC,CPTII,S$GLB, | Performed by: NURSE PRACTITIONER

## 2019-09-23 RX ORDER — CYANOCOBALAMIN (VITAMIN B-12) 250 MCG
250 TABLET ORAL DAILY
COMMUNITY

## 2019-09-23 NOTE — Clinical Note
Primary Care Providers:Joo Munoz MD, MD (General)Your patient was seen today for a HRA visit. No Gap(s) in care (HEDIS gaps) have been identified during this visit that require additional testing and possible follow up.Orders Placed This Encounter    Ambulatory referral to ENT        Referral Priority:Routine        Referral Type:Consultation        Referral Reason:Specialty Services Required        Requested Specialty:Otolaryngology        Number of Visits Requested:1These orders were placed using [facility] approved protocol and any results will be forwarded to your office for appropriate follow up. I have included a copy of my visit note; please review the note and feel free to contact me with any questions. Thank you for allowing me to participate in the care of your patients.Zohreh Hoffman NP

## 2019-09-23 NOTE — PATIENT INSTRUCTIONS
Counseling and Referral of Other Preventative  (Italic type indicates deductible and co-insurance are waived)    Patient Name: Jojo Burrows  Today's Date: 9/23/2019    Health Maintenance       Date Due Completion Date    Influenza Vaccine (1) 10/16/2019 (Originally 9/1/2019)   10/26/2015    TETANUS VACCINE In the future for injury   ---    Shingles Vaccine (1 of 2) [facility] handout given   ---    Mammogram 04/30/2020 4/30/2019    DEXA SCAN 04/30/2022 4/30/2019    Lipid Panel 04/09/2020 4/9/2019    Colonoscopy 04/29/2021 4/29/2016    Thyroid ultrasound                                                                              4/18/2019            Orders Placed This Encounter   Procedures    Ambulatory referral to ENT     The following information is provided to all patients.  This information is to help you find resources for any of the problems found today that may be affecting your health:                Living healthy guide: www.[facility].[place].[facility]      Understanding Diabetes: www.diabetes.org      Eating healthy: www.cdc.gov/healthyweight      [facility] home safety checklist: www.cdc.gov/steadi/patient.html      Agency on Aging: www.goea.[place].[facility]      Alcoholics anonymous (AA): www.aa.org      Physical Activity: www.soraya.nih.gov/mc9pyol      Tobacco use: www.quitwithusla.org     Exercises to Prevent Falls  Certain types of exercises may help make you less likely to fall. Try the ones below. Or do other exercises that your health care provider suggests. Depending on your health, you may need to start slowly. Don't let that stop you. Even small amounts of exercise can help you. Be sure to talk to your health care provider before starting any exercise program.       Improve balance  Many types of exercise can help improve balance. John chi and yoga are good examples. Here's another one to try. You can do it anytime and almost anywhere.  · Stand next to a counter or solid support.  · Push yourself up onto your  "tiptoes.  · Hold for 5 seconds. If you start to lose your balance, hold on to the counter.  · Rest and repeat 5 times. Work up to holding for 20 to 30 seconds, if you can. Increase flexibility  Being more flexible makes it easier for you to move around safely. Try exercises like the seated hamstring stretch.  · Sit in a chair and put one foot on a stool.  · Straighten your leg and reach with both hands down either side of your leg. Reach as far down your leg as you can.  · Hold for about 20 seconds.  · Go back to the starting position. Then repeat 5 times. Switch legs. Build strength  "Resistance" exercises help build strength. You can do them without equipment. Or you can use weights, elastic bands, or special machines. One such exercise is called the biceps curl. You can hold a 1-pound weight or even a can of soup. Do this exercise at least 3 times a week. Strive for every day.  · Sit up straight in a chair.  · Keep your elbow close to your body and your wrist straight.  · Bend your arm, moving your hand up to your shoulder. Then slowly lower your arm.  · Repeat 5 times. Switch to the other arm.   Build your staying power  Aerobic exercises make your heart and lungs stronger so you can keep moving longer. Walking and swimming are two of the best types of exercises you can do. Using a stationary bike is great, too. Find an aerobic exercise that you enjoy. Start slowly and build up. Even 5 minutes is helpful. Aim for a goal of 30 minutes, at least 3 times a week. You don't have to do 30 minutes in 1 session. Break it up and walk a little throughout the day.  More helpful tips  · Start easy. Slowly work up to doing more.  · Talk with your health care provider about the best exercises for you.  · Call senior centers or health clubs about exercise programs.  · If needed, have a family member watch you walk every so often to check your stability.  · Exercise with a friend. Choose an activity you both enjoy.  · Consider " adelita chi or yoga to strengthen your balance.  · Try exercises that you can do anytime, anywhere. Here are 2 examples. Have someone with you when you first try these:  ¨ Practice walking by placing 1 foot right in front of the other.  ¨ Stand up and sit down 10 times. Repeat this throughout the day.   Date Last Reviewed: 6/13/2015  © 9865-0192 [employer]. [address] [place] [address], [place] [address]. All rights reserved. This information is not intended as a substitute for professional medical care. Always follow your healthcare professional's instructions.        Preventing Falls: Moving Safely Using a Cane or Walker     When using a cane, keep it away from your feet so you dont trip.     A walking aid, such as a cane or walker, can help you stay more independent and avoid falls. Remember to keep your walking aid within easy reach when you're in a chair or in bed. And learn how to use it safely so you don't injure yourself. Be sure the cane or walker is the correct height. Hang your arm loosely at your side, and measure the distance from your wrist to the floor. The distance should be the same as the height of your cane or walker.  Using a cane  If you have a stronger side, hold the cane on the side of your stronger leg.  1. Get your balance.  2. Move the cane and your weaker leg forward.  3. Support your weight on both the cane and your weaker side.  4. Step with your stronger leg.  5. Start again from step 1.  Using a walker  1. Roll the walker (or lift it, if you're using one without wheels) forward about 12 inches.  2. Step forward with your weaker leg first.  3. Use the walker to help keep your balance.  4. Bring your other foot forward to the center of the walker.  5. Start again from step 1.  Helpful tips  · Check with your health care provider about the right walking aid to use. Ask about a walker with a seat attached.  · Check the tips of your cane or walker to make sure they have nonskid  covers.  · Move slowly from room to room. Don't rush.  · Sit down to get dressed.  · Use a tee pack or backpack to keep your hands free.  · Get help for jobs that mean climbing, even on a stepstool.  · Do not try going up or down stairs using a walker.   Date Last Reviewed: 6/12/2015  © 6125-8465 [employer]. [address] [place] [address], [place] [address]. All rights reserved. This information is not intended as a substitute for professional medical care. Always follow your healthcare professional's instructions.

## 2019-09-23 NOTE — PROGRESS NOTES
"Jojo Burrows presented for a  Medicare AWV and comprehensive Health Risk Assessment today. The following components were reviewed and updated:    · Medical history  · Family History  · Social history  · Allergies and Current Medications  · Health Risk Assessment  · Health Maintenance  · Care Team     ** See Completed Assessments for Annual Wellness Visit within the encounter summary.**       The following assessments were completed:  · Living Situation  · CAGE  · Depression Screening  · Timed Get Up and Go  · Whisper Test  · Cognitive Function Screening  · Nutrition Screening  · ADL Screening  · PAQ Screening    Vitals:    09/23/19 1013   BP: 119/74   BP Location: Right arm   Patient Position: Sitting   BP Method: Large (Manual)   Pulse: 68   Resp: 15   SpO2: 97%   Weight: 74.9 kg (165 lb 2 oz)   Height: 5' 2" (1.575 m)     Body mass index is 30.2 kg/m².  Physical Exam   Constitutional: She is oriented to person, place, and time. She appears well-developed and well-nourished.   HENT:   Head: Normocephalic and atraumatic.   Ear wax L>R   Cardiovascular: Normal rate, regular rhythm and normal heart sounds.   No murmur heard.  Pulmonary/Chest: Effort normal and breath sounds normal. No respiratory distress. She has no wheezes. She has no rales.   Musculoskeletal: She exhibits edema. She exhibits no tenderness or deformity.   Right knee, ambulates w a limp   Neurological: She is alert and oriented to person, place, and time. No cranial nerve deficit.   Skin: Skin is warm and dry.   Psychiatric: She has a normal mood and affect. Her behavior is normal. Judgment and thought content normal.   Nursing note and vitals reviewed.        Diagnoses and health risks identified today and associated recommendations/orders:    1. Gastroesophageal reflux disease, esophagitis presence not specified  Stable- followed by PCP    2. Lumbar facet arthropathy  Stable- followed by PCP    3. Tortuous aorta  Stable- followed by PCP    4. " Multiple thyroid nodules  Stable- followed by PCP-thyroid neck u/s 4/18/2019    5. Vitamin D deficiency  Stable- followed by PCP    6. Essential hypertension  Stable- followed by PCP    7. Degenerative disc disease, cervical  Stable- followed by PCP    8. Cervical radiculopathy  Stable- followed by PCP    9. Nuclear sclerosis of both eyes  Stable- followed by opth    10. Obesity (BMI 30.0-34.9)  Chronic . Followed by PCP.   Centers for Disease Control and Prevention (CDC)  weight recommendations for current BMI & ideal BMI range discussed with patient.  Recommended  Low fat diet, start consistent regular exercise & aquatic exercise.     11. Cyst of breast, unspecified laterality  Stable- followed by PCP    12. Decreased hearing of both ears  Stable- followed by PCP  - Ambulatory referral to ENT    13. Excessive ear wax, left  Stable- followed by PCP  - Ambulatory referral to ENT    14. Excessive ear wax, right  Stable- followed by PCP  - Ambulatory referral to ENT    15. Inflammatory spondylopathy of lumbar region  Stable- followed by PCP    16. Encounter for preventive health examination  Assessments completed. Preventative health recommendations reviewed.         Provided Jojo with a 5-10 year written screening schedule and personal prevention plan. Recommendations were developed using the USPSTF age appropriate recommendations. Education, counseling, and referrals were provided as needed. After Visit Summary printed and given to patient which includes a list of additional screenings\tests needed. Fall prevention -suggested using cane for fall risk modification due to knee OA. She will consider F/U in orthopedics-  interested in knee gel injection. States previous 2 steroid injectiosn were painful & did not last. Caregiver oversight to parents, , daughter in remission. She will restart vit d 4000iu OTC a day x 3 mos- get level checked again in 3mos .  Weight bearing exercise .She also takes centrum silver 1  a day- check vit D amount.     Follow up in about 1 year (around 9/23/2020) for HRA.    AYAZ Ramirez offered to discuss end of life issues, including information on how to make advance directives that the patient could use to name someone who would make medical decisions on their behalf if they became too ill to make themselves.    ___Patient declined  _X_Patient is interested, I provided paper work and offered to discuss.

## 2019-10-16 ENCOUNTER — HOSPITAL ENCOUNTER (EMERGENCY)
Facility: HOSPITAL | Age: 69
Discharge: HOME | End: 2019-10-16
Attending: EMERGENCY MEDICINE
Payer: MEDICARE

## 2019-10-16 VITALS
OXYGEN SATURATION: 97 % | RESPIRATION RATE: 20 BRPM | HEIGHT: 62 IN | BODY MASS INDEX: 30.36 KG/M2 | DIASTOLIC BLOOD PRESSURE: 80 MMHG | WEIGHT: 165 LBS | HEART RATE: 52 BPM | TEMPERATURE: 97 F | SYSTOLIC BLOOD PRESSURE: 140 MMHG

## 2019-10-16 DIAGNOSIS — L02.31 ABSCESS OF BUTTOCK, RIGHT: Primary | ICD-10-CM

## 2019-10-16 PROCEDURE — 87070 CULTURE OTHR SPECIMN AEROBIC: CPT | Mod: HCNC

## 2019-10-16 PROCEDURE — 99283 EMERGENCY DEPT VISIT LOW MDM: CPT | Mod: 25,HCNC

## 2019-10-16 PROCEDURE — 90471 IMMUNIZATION ADMIN: CPT | Mod: HCNC | Performed by: PHYSICIAN ASSISTANT

## 2019-10-16 PROCEDURE — 90715 TDAP VACCINE 7 YRS/> IM: CPT | Mod: HCNC | Performed by: PHYSICIAN ASSISTANT

## 2019-10-16 PROCEDURE — 10060 I&D ABSCESS SIMPLE/SINGLE: CPT | Mod: HCNC

## 2019-10-16 PROCEDURE — 25000003 PHARM REV CODE 250: Mod: HCNC | Performed by: PHYSICIAN ASSISTANT

## 2019-10-16 PROCEDURE — 63600175 PHARM REV CODE 636 W HCPCS: Mod: HCNC | Performed by: PHYSICIAN ASSISTANT

## 2019-10-16 PROCEDURE — 87077 CULTURE AEROBIC IDENTIFY: CPT | Mod: HCNC

## 2019-10-16 PROCEDURE — 87186 SC STD MICRODIL/AGAR DIL: CPT | Mod: HCNC

## 2019-10-16 RX ORDER — CLINDAMYCIN HYDROCHLORIDE 300 MG/1
300 CAPSULE ORAL 3 TIMES DAILY
Qty: 21 CAPSULE | Refills: 0 | Status: SHIPPED | OUTPATIENT
Start: 2019-10-16 | End: 2019-10-23

## 2019-10-16 RX ORDER — MUPIROCIN 20 MG/G
1 OINTMENT TOPICAL
Status: COMPLETED | OUTPATIENT
Start: 2019-10-16 | End: 2019-10-16

## 2019-10-16 RX ORDER — NAPROXEN 375 MG/1
375 TABLET ORAL 2 TIMES DAILY WITH MEALS
Qty: 30 TABLET | Refills: 0 | Status: ON HOLD | OUTPATIENT
Start: 2019-10-16 | End: 2020-04-17 | Stop reason: HOSPADM

## 2019-10-16 RX ORDER — LIDOCAINE HYDROCHLORIDE 10 MG/ML
10 INJECTION INFILTRATION; PERINEURAL
Status: COMPLETED | OUTPATIENT
Start: 2019-10-16 | End: 2019-10-16

## 2019-10-16 RX ORDER — KETOROLAC TROMETHAMINE 10 MG/1
10 TABLET, FILM COATED ORAL
Status: COMPLETED | OUTPATIENT
Start: 2019-10-16 | End: 2019-10-16

## 2019-10-16 RX ADMIN — KETOROLAC TROMETHAMINE 10 MG: 10 TABLET, FILM COATED ORAL at 02:10

## 2019-10-16 RX ADMIN — MUPIROCIN 22 G: 20 OINTMENT TOPICAL at 03:10

## 2019-10-16 RX ADMIN — LIDOCAINE HYDROCHLORIDE 10 ML: 10 INJECTION, SOLUTION INFILTRATION; PERINEURAL at 02:10

## 2019-10-16 RX ADMIN — CLOSTRIDIUM TETANI TOXOID ANTIGEN (FORMALDEHYDE INACTIVATED), CORYNEBACTERIUM DIPHTHERIAE TOXOID ANTIGEN (FORMALDEHYDE INACTIVATED), BORDETELLA PERTUSSIS TOXOID ANTIGEN (GLUTARALDEHYDE INACTIVATED), BORDETELLA PERTUSSIS FILAMENTOUS HEMAGGLUTININ ANTIGEN (FORMALDEHYDE INACTIVATED), BORDETELLA PERTUSSIS PERTACTIN ANTIGEN, AND BORDETELLA PERTUSSIS FIMBRIAE 2/3 ANTIGEN 0.5 ML: 5; 2; 2.5; 5; 3; 5 INJECTION, SUSPENSION INTRAMUSCULAR at 03:10

## 2019-10-16 NOTE — ED NOTES
APPEARANCE: Alert, oriented and in no acute distress.  RESPIRATORY:Normal rate and effort, breath sounds clear bilaterally throughout chest. Respirations are equal and unlabored no obvious signs of distress.  SKIN: Skin is warm and dry, normal skin turgor, mucous membranes moist. 2cm abscess to right medial buttock x several weeks. Surrounding induration, and warm to touch.  Pt reports white in color drainage today.   NEURO: 5/5 strength major flexors/extensors bilaterally. Sensory intact to light touch bilaterally. Heather coma scale: eyes open spontaneously-4, oriented & converses-5, obeys commands-6. No neurological abnormalities.   MENTAL STATUS: awake, alert and aware of environment.  EYE: PERRL, both eyes: pupils brisk and reactive to light. Normal size.  ENT: EARS: no obvious drainage. NOSE: no active bleeding.   BREAST: symmetrical. No masses. No tenderness.  GENITALIA: Normal external genitalia.

## 2019-10-16 NOTE — ED NOTES
Mupirocin ointment applied to area and covered with telfa adhesive dressing. Pt tolerated well. Pt educated on home wound care. Pt verbalizes understanding.

## 2019-10-16 NOTE — ED PROVIDER NOTES
Encounter Date: 10/16/2019       History     Chief Complaint   Patient presents with    Abscess     Reports abscess to R buttocks x2 weeks, worse over the past couple days.      Jojo Burrows 69 y.o. female with PMH of HTN, GERD, migraine headaches, HLD, thyroid disease, sleep disorder, TIA, nuclear sclerosis, cataract and degenerative disc disease presented to the ED with c/o abscess to the right buttock.  She states that she began with small papular lesion approximately 2 weeks ago.  She states that she was doing warm Epson soaks and that the area came to head.  She states that she did have some purulent bloody drainage to the site and thought the airway is improving.  She states over the past few days the redness area has gradually increased in size and pain. She is trying yes pore with no significant improvement symptoms. She denies any fever, chills, nausea, vomiting, diarrhea, difficulty with bowel movements or other complaints at this time.      The history is provided by the patient.     Review of patient's allergies indicates:   Allergen Reactions    Iodine Rash    Iodine and iodide containing products Itching and Rash    Shellfish containing products Itching and Rash     Past Medical History:   Diagnosis Date    ALLERGIC RHINITIS     Cataract     Degenerative disc disease, cervical 9/13/2018    GERD (gastroesophageal reflux disease)     Hyperlipidemia     Hypertension     Migraine headache     Nuclear sclerosis 4/30/2014    Sleep disorder     Thyroid disease     nodular goiter    TIA (transient ischemic attack)      Past Surgical History:   Procedure Laterality Date    BREAST BIOPSY      BREAST CYST ASPIRATION      BREAST CYST EXCISION      COLONOSCOPY N/A 4/29/2016    Procedure: COLONOSCOPY;  Surgeon: Sergio Vickers MD;  Location: [address];  Service: Endoscopy;  Laterality: N/A;    HYSTERECTOMY       Family History   Problem Relation Age of Onset    Diabetes Mother      Hypertension Mother     Breast cancer Mother     Asthma Father     Glaucoma Father     Heart disease Maternal Grandmother     No Known Problems Son     No Known Problems Daughter     Breast cancer Sister     Ovarian cancer Sister     Amblyopia Neg Hx     Blindness Neg Hx     Cancer Neg Hx     Cataracts Neg Hx     Macular degeneration Neg Hx     Retinal detachment Neg Hx     Strabismus Neg Hx     Stroke Neg Hx     Thyroid disease Neg Hx      Social History     Tobacco Use    Smoking status: Never Smoker    Smokeless tobacco: Never Used   Substance Use Topics    Alcohol use: No    Drug use: No     Review of Systems   Constitutional: Negative for activity change, appetite change, chills and fever.   Gastrointestinal: Negative for constipation, diarrhea, nausea and vomiting.   Musculoskeletal: Negative for arthralgias, back pain, gait problem and myalgias.   Skin: Positive for color change and wound. Negative for rash.   Allergic/Immunologic: Negative for immunocompromised state.   Neurological: Negative for weakness.   Hematological: Does not bruise/bleed easily.       Physical Exam     Initial Vitals [10/16/19 1344]   BP Pulse Resp Temp SpO2   124/60 62 17 98 °F (36.7 °C) 97 %      MAP       --         Physical Exam    Nursing note and vitals reviewed.  Constitutional: Vital signs are normal. She appears well-developed and well-nourished. She is cooperative.  Non-toxic appearance. She does not appear ill. No distress.   HENT:   Head: Normocephalic and atraumatic.   Eyes: Conjunctivae and lids are normal.   Neck: Neck supple. No neck rigidity.   Cardiovascular: Normal rate.   Pulmonary/Chest: No respiratory distress.   Abdominal: Soft. Normal appearance. There is no tenderness. There is no rigidity.   Musculoskeletal: Normal range of motion.        Legs:  2 cm area of erythema to the right inferior buttock with central punctate center and scant fluctuance. No extending erythema of induration. FROM  of all joints on affected extremities, sensation and capillary refill intact.    Neurological: She is alert and oriented to person, place, and time. She has normal strength. No sensory deficit. GCS eye subscore is 4. GCS verbal subscore is 5. GCS motor subscore is 6.   Skin: Skin is warm and dry. Abscess noted. No rash noted.   Psychiatric: She has a normal mood and affect. Her speech is normal and behavior is normal. Thought content normal.         ED Course   I & D - Incision and Drainage  Date/Time: 10/16/2019 7:47 PM  Location procedure was performed: [facility] EMERGENCY DEPARTMENT  Performed by: ENRIQUE Bhandari  Authorized by: Trever Romero MD   Assisting provider: Trever Romero MD  Pre-operative diagnosis: Abscess  Post-operative diagnosis: Abscess  Consent Done: Yes  Consent: Verbal consent obtained.  Risks and benefits: risks, benefits and alternatives were discussed  Consent given by: patient  Patient understanding: patient states understanding of the procedure being performed  Type: abscess  Body area: lower extremity  Location details: right buttock    Anesthesia:  Local Anesthetic: lidocaine 1% without epinephrine  Anesthetic total: 2 mL  Patient sedated: no  Scalpel size: 11  Incision type: single straight  Complexity: complex  Drainage: purulent and  bloody  Drainage amount: scant  Wound treatment: incision,  wound left open,  deloculation,  expression of material and  wound packed  Packing material: 1/4 in gauze  Specimens: Yes  Patient tolerance: Patient tolerated the procedure well with no immediate complications        Labs Reviewed   CULTURE, AEROBIC  (SPECIFY SOURCE)          Imaging Results    None         Jojo Burrows 69 y.o. female with PMH of HTN, GERD, migraine headaches, HLD, thyroid disease, sleep disorder, TIA, nuclear sclerosis, cataract and degenerative disc disease presented to the ED with c/o abscess to the right buttock.  She states that she began with small papular  lesion approximately 2 weeks ago.  She states that she was doing warm Epson soaks and that the area came to head.  She states that she did have some purulent bloody drainage to the site and thought the airway is improving.  She states over the past few days the redness area has gradually increased in size and pain. She is trying yes pore with no significant improvement symptoms. She denies any fever, chills, nausea, vomiting, diarrhea, difficulty with bowel movements or other complaints at this time.  ROS positive for abscess.  Physical exam reveals patient in no obvious distress with 2 cm area of erythema to the right inferior buttock with central punctate center and scant fluctuance. No extending erythema of induration. FROM of all joints on affected extremities, sensation and capillary refill intact.     DDX: abscess, cellulitis, folliculitis    ED management: wound cleaned I&D; see procedure note.  No immediate complications at this time.  No further labs or workup warranted at this time is patient is well-appearing with no reported history of immunocompromise state We will send patient home with ABX and NSAID'S with instructions on use.        Impression/Plan: The encounter diagnosis was Abscess of buttock, right.  Discharged with Naprosyn and clindamycin  Patient will follow up with Primary for wound check in 2-3 days.  Patient cautioned on when to return to ED.  Pt. Understands and agrees with current treatment plan                           Clinical Impression:       ICD-10-CM ICD-9-CM   1. Abscess of buttock, right L02.31 682.5                                ENRIQUE Bhandari  10/16/19 1948

## 2019-10-18 LAB — BACTERIA SPEC AEROBE CULT: ABNORMAL

## 2019-10-20 ENCOUNTER — PATIENT OUTREACH (OUTPATIENT)
Dept: ADMINISTRATIVE | Facility: OTHER | Age: 69
End: 2019-10-20

## 2019-10-23 ENCOUNTER — OFFICE VISIT (OUTPATIENT)
Dept: OTOLARYNGOLOGY | Facility: CLINIC | Age: 69
End: 2019-10-23
Payer: MEDICARE

## 2019-10-23 ENCOUNTER — CLINICAL SUPPORT (OUTPATIENT)
Dept: AUDIOLOGY | Facility: CLINIC | Age: 69
End: 2019-10-23
Payer: MEDICARE

## 2019-10-23 DIAGNOSIS — H91.90 PERCEIVED HEARING LOSS: Primary | ICD-10-CM

## 2019-10-23 DIAGNOSIS — R42 VERTIGO: Primary | ICD-10-CM

## 2019-10-23 DIAGNOSIS — J31.0 CHRONIC RHINITIS: ICD-10-CM

## 2019-10-23 PROCEDURE — 92552 PURE TONE AUDIOMETRY AIR: CPT | Mod: HCNC,S$GLB,, | Performed by: AUDIOLOGIST

## 2019-10-23 PROCEDURE — 92567 TYMPANOMETRY: CPT | Mod: HCNC,S$GLB,, | Performed by: AUDIOLOGIST

## 2019-10-23 PROCEDURE — 92552 PR PURE TONE AUDIOMETRY, AIR: ICD-10-PCS | Mod: HCNC,S$GLB,, | Performed by: AUDIOLOGIST

## 2019-10-23 PROCEDURE — 92567 PR TYMPA2METRY: ICD-10-PCS | Mod: HCNC,S$GLB,, | Performed by: AUDIOLOGIST

## 2019-10-23 PROCEDURE — 1101F PT FALLS ASSESS-DOCD LE1/YR: CPT | Mod: HCNC,CPTII,S$GLB, | Performed by: NURSE PRACTITIONER

## 2019-10-23 PROCEDURE — 99213 PR OFFICE/OUTPT VISIT, EST, LEVL III, 20-29 MIN: ICD-10-PCS | Mod: HCNC,S$GLB,, | Performed by: NURSE PRACTITIONER

## 2019-10-23 PROCEDURE — 99999 PR PBB SHADOW E&M-EST. PATIENT-LVL II: CPT | Mod: PBBFAC,HCNC,, | Performed by: NURSE PRACTITIONER

## 2019-10-23 PROCEDURE — 1101F PR PT FALLS ASSESS DOC 0-1 FALLS W/OUT INJ PAST YR: ICD-10-PCS | Mod: HCNC,CPTII,S$GLB, | Performed by: NURSE PRACTITIONER

## 2019-10-23 PROCEDURE — 92556 SPEECH AUDIOMETRY COMPLETE: CPT | Mod: HCNC,S$GLB,, | Performed by: AUDIOLOGIST

## 2019-10-23 PROCEDURE — 99213 OFFICE O/P EST LOW 20 MIN: CPT | Mod: HCNC,S$GLB,, | Performed by: NURSE PRACTITIONER

## 2019-10-23 PROCEDURE — 92556 PR SPEECH AUDIOMETRY, COMPLETE: ICD-10-PCS | Mod: HCNC,S$GLB,, | Performed by: AUDIOLOGIST

## 2019-10-23 PROCEDURE — 99999 PR PBB SHADOW E&M-EST. PATIENT-LVL II: ICD-10-PCS | Mod: PBBFAC,HCNC,, | Performed by: NURSE PRACTITIONER

## 2019-10-23 NOTE — PROGRESS NOTES
Audiologic Evaluation    Jojo Burrows was seen on the above date for a hearing evaluation. Jojo Burrows reports periodic episodes of dizziness. Jojo Burrows denies decreased hearing sensitivity, tinnitus and aural fullness.    Tympanometry indicated normal middle ear pressure, tympanic membrane compliance and ear canal volume (type A tympanogram) in each ear.     Audiometric testing via insert ear phones indicated normal hearing sensitivity for 250-8000 Hz in each ear. Pure tone average and speech recognition threshold were in good agreement. Excellent speech discrimination ability was demonstrated in quiet when novel words were presented at a conversational level in each ear.      Recommendations:  1) Otologic consultation.  2) Annual audiometric testing to monitor hearing sensitivity.

## 2019-10-23 NOTE — PROGRESS NOTES
Subjective:      Jojo Burrows is a 69 y.o. female who was referred to me by Zohreh Hoffman in consultation for hearing loss.    Ms. Burrows presents today with decreased hearing in both ears for the past several years. She denies ear pain or drainage, She denies tinnitus. She reports associated nasal congestion, post-nasal drip and itchy nose and eyes. There is not a family history of hearing loss at a young age.  There is not a prior history of ear surgery.  There is not a prior history of ear infections .  She denies a history of significant noise exposure.  She does not wear hearing aids currently.  She has not had a hearing test recently.      Past Medical History  She has a past medical history of ALLERGIC RHINITIS, Cataract, Degenerative disc disease, cervical, GERD (gastroesophageal reflux disease), Hyperlipidemia, Hypertension, Migraine headache, Nuclear sclerosis, Sleep disorder, Thyroid disease, and TIA (transient ischemic attack).    Past Surgical History  She has a past surgical history that includes Hysterectomy; Colonoscopy (N/A, 4/29/2016); Breast biopsy; Breast cyst excision; and Breast cyst aspiration.    Family History  Her family history includes Asthma in her father; Breast cancer in her mother and sister; Diabetes in her mother; Glaucoma in her father; Heart disease in her maternal grandmother; Hypertension in her mother; No Known Problems in her daughter and son; Ovarian cancer in her sister.    Social History  She reports that she has never smoked. She has never used smokeless tobacco. She reports that she does not drink alcohol or use drugs.    Allergies  She is allergic to iodine; iodine and iodide containing products; and shellfish containing products.    Medications  She has a current medication list which includes the following prescription(s): acetaminophen, ammonium lactate, carbamide peroxide, cetirizine, clindamycin, clotrimazole-betamethasone 1-0.05%, cyanocobalamin,  ergocalciferol (vitamin d2), esomeprazole, folic acid/multivit-min/lutein, ibuprofen, metoprolol succinate, and naproxen.    Review of Systems   Constitutional: Negative for chills, fever and unexpected weight change.   HENT: Positive for congestion, hearing loss, postnasal drip and sinus pressure. Negative for ear discharge, ear pain, facial swelling, sore throat, tinnitus and trouble swallowing.    Eyes: Positive for itching. Negative for pain and visual disturbance.   Respiratory: Negative for apnea and shortness of breath.    Cardiovascular: Negative for chest pain and palpitations.   Gastrointestinal: Negative for abdominal pain and nausea.   Endocrine: Negative for cold intolerance and heat intolerance.   Musculoskeletal: Negative for joint swelling and neck stiffness.   Skin: Negative for color change and rash.   Allergic/Immunologic: Positive for environmental allergies.   Neurological: Negative for dizziness, facial asymmetry and headaches.   Hematological: Negative for adenopathy. Does not bruise/bleed easily.   Psychiatric/Behavioral: Negative for agitation. The patient is not nervous/anxious.           Objective:     There were no vitals taken for this visit.     Constitutional:   She is oriented to person, place, and time. Vital signs are normal. She appears well-developed and well-nourished. She appears alert. Normal speech.      Head:  Normocephalic and atraumatic.     Ears:    Right Ear: No lacerations. No drainage, swelling or tenderness. No foreign bodies. No mastoid tenderness. Tympanic membrane is not injected, not scarred, not perforated, not erythematous, not retracted and not bulging. Tympanic membrane mobility is normal. No middle ear effusion. No hemotympanum. No decreased hearing is noted.   Left Ear: No lacerations. No drainage, swelling or tenderness. No foreign bodies. No mastoid tenderness. Tympanic membrane is not injected, not scarred, not perforated, not erythematous, not retracted  and not bulging. Tympanic membrane mobility is normal.  No middle ear effusion. No hemotympanum. No decreased hearing is noted.     Nose:  Mucosal edema and rhinorrhea present. No nose lacerations, sinus tenderness, septal deviation, nasal septal hematoma or polyps. No epistaxis.  No foreign bodies. No turbinate hypertrophy.  Right sinus exhibits no maxillary sinus tenderness and no frontal sinus tenderness. Left sinus exhibits no maxillary sinus tenderness and no frontal sinus tenderness.     Mouth/Throat  Oropharynx clear and moist without lesions or asymmetry, normal uvula midline and lips, teeth, and gums normal. No uvula swelling, oral lesions, trismus, mucous membrane lesions or xerostomia. No oropharyngeal exudate, posterior oropharyngeal edema or posterior oropharyngeal erythema.   Tonsils 1+ bilaterally      Neck:  Neck normal without thyromegaly masses, asymmetry, normal tracheal structure, crepitus, and tenderness and no adenopathy.     Psychiatric:   She has a normal mood and affect. Her speech is normal and behavior is normal.     Neurological:   She is alert and oriented to person, place, and time. No cranial nerve deficit.     Skin:   No abrasions, lacerations, lesions, or rashes.       Procedure    None      Data Reviewed    WBC (K/uL)   Date Value   04/09/2019 6.99     Platelets (K/uL)   Date Value   04/09/2019 185      Creatinine (mg/dL)   Date Value   04/09/2019 0.7     TSH (uIU/mL)   Date Value   04/09/2019 0.926     Glucose (mg/dL)   Date Value   04/09/2019 84     Hemoglobin A1C (%)   Date Value   06/14/2005 5.6       I independently reviewed the tracings of the complete audiometric evaluation performed today.  I reviewed the audiogram with the patient as well.  Pertinent findings include a normal study.         Assessment:     1. Perceived hearing loss    2. Chronic rhinitis         Plan:     I had a long discussion with the patient regarding her condition and the further workup and management  options.    Audiogram normal  I recommend fluticasone and zyrtec for chronic rhinitis symptoms.  RTC as needed.

## 2019-10-23 NOTE — LETTER
October 23, 2019      Zohreh Hoffman, ANDRE  [address]  [facility] [address]           [facility] - Otorhinolaryngology  [address]  [facility] [address]  Phone: [phone]  Fax: [phone]          Patient: Jojo Burrows   MR Number: [id number]   YOB: 1950   Date of Visit: 10/23/2019       Dear Zohreh Hoffman:    Thank you for referring Jojo Burrows to me for evaluation. Attached you will find relevant portions of my assessment and plan of care.    If you have questions, please do not hesitate to call me. I look forward to following Jojo Burrows along with you.    Sincerely,    Dania Navarro NP    Enclosure  CC:  No Recipients    If you would like to receive this communication electronically, please contact [email or web address] or [phone] to request more information on [employer] Link access.    For providers and/or their staff who would like to refer a patient to [facility], please contact us through our one-stop-shop provider referral line, [facility], at [phone].    If you feel you have received this communication in error or would no longer like to receive these types of communications, please e-mail [email or web address]

## 2019-10-24 ENCOUNTER — OFFICE VISIT (OUTPATIENT)
Dept: INTERNAL MEDICINE | Facility: CLINIC | Age: 69
End: 2019-10-24
Payer: MEDICARE

## 2019-10-24 VITALS
RESPIRATION RATE: 18 BRPM | HEIGHT: 62 IN | SYSTOLIC BLOOD PRESSURE: 124 MMHG | BODY MASS INDEX: 31.72 KG/M2 | HEART RATE: 69 BPM | WEIGHT: 172.38 LBS | TEMPERATURE: 98 F | DIASTOLIC BLOOD PRESSURE: 74 MMHG

## 2019-10-24 DIAGNOSIS — L02.31 ABSCESS OF BUTTOCK: Primary | ICD-10-CM

## 2019-10-24 DIAGNOSIS — I10 ESSENTIAL HYPERTENSION: Chronic | ICD-10-CM

## 2019-10-24 PROCEDURE — 99213 PR OFFICE/OUTPT VISIT, EST, LEVL III, 20-29 MIN: ICD-10-PCS | Mod: HCNC,S$GLB,, | Performed by: HOSPITALIST

## 2019-10-24 PROCEDURE — 3074F SYST BP LT 130 MM HG: CPT | Mod: HCNC,CPTII,S$GLB, | Performed by: HOSPITALIST

## 2019-10-24 PROCEDURE — 1101F PT FALLS ASSESS-DOCD LE1/YR: CPT | Mod: HCNC,CPTII,S$GLB, | Performed by: HOSPITALIST

## 2019-10-24 PROCEDURE — 99999 PR PBB SHADOW E&M-EST. PATIENT-LVL III: ICD-10-PCS | Mod: PBBFAC,HCNC,, | Performed by: HOSPITALIST

## 2019-10-24 PROCEDURE — 3078F PR MOST RECENT DIASTOLIC BLOOD PRESSURE < 80 MM HG: ICD-10-PCS | Mod: HCNC,CPTII,S$GLB, | Performed by: HOSPITALIST

## 2019-10-24 PROCEDURE — 99213 OFFICE O/P EST LOW 20 MIN: CPT | Mod: HCNC,S$GLB,, | Performed by: HOSPITALIST

## 2019-10-24 PROCEDURE — 99999 PR PBB SHADOW E&M-EST. PATIENT-LVL III: CPT | Mod: PBBFAC,HCNC,, | Performed by: HOSPITALIST

## 2019-10-24 PROCEDURE — 3078F DIAST BP <80 MM HG: CPT | Mod: HCNC,CPTII,S$GLB, | Performed by: HOSPITALIST

## 2019-10-24 PROCEDURE — 3074F PR MOST RECENT SYSTOLIC BLOOD PRESSURE < 130 MM HG: ICD-10-PCS | Mod: HCNC,CPTII,S$GLB, | Performed by: HOSPITALIST

## 2019-10-24 PROCEDURE — 1101F PR PT FALLS ASSESS DOC 0-1 FALLS W/OUT INJ PAST YR: ICD-10-PCS | Mod: HCNC,CPTII,S$GLB, | Performed by: HOSPITALIST

## 2019-10-24 NOTE — PROGRESS NOTES
"Subjective:     @Patient ID: Jojo Burrows is a 69 y.o. female.    Chief Complaint: No chief complaint on file.    HPI  70 yo F with pmhx of HTN, arthritis presents for f/u of buttock abscess. Seen in ER on 10/16/19 and underwent I&D with wound packing. Culture grew out +MRSA. Was placed on clindamycin 300 mg tid x 7 days. Pt reports wound is healing. Has 1 more day left of abx.     Review of Systems   Constitutional: Negative for chills and fever.   HENT: Negative for congestion and sore throat.    Eyes: Negative for pain and visual disturbance.   Respiratory: Negative for cough and shortness of breath.    Cardiovascular: Negative for chest pain and leg swelling.   Gastrointestinal: Negative for abdominal pain, nausea and vomiting.   Endocrine: Negative for polydipsia and polyuria.   Genitourinary: Negative for difficulty urinating and dysuria.   Musculoskeletal: Negative for arthralgias and back pain.   Skin: Positive for wound. Negative for rash.   Neurological: Negative for dizziness, weakness and headaches.   Psychiatric/Behavioral: Negative for agitation and confusion.     Past medical history, surgical history, and family medical history reviewed and updated as appropriate.    Medications and allergies reviewed.     Objective:     Vitals:    10/24/19 1440   BP: 124/74   BP Location: Right arm   Patient Position: Sitting   BP Method: Large (Manual)   Pulse: 69   Resp: 18   Temp: 97.9 °F (36.6 °C)   TempSrc: Oral   Weight: 78.2 kg (172 lb 6.4 oz)   Height: 5' 2" (1.575 m)     Body mass index is 31.53 kg/m².  Physical Exam   Constitutional: She is oriented to person, place, and time. She appears well-developed and well-nourished. No distress.   HENT:   Head: Normocephalic and atraumatic.   Eyes: Conjunctivae are normal. Right eye exhibits no discharge. Left eye exhibits no discharge.   Neck: Normal range of motion. Neck supple.   Cardiovascular: Normal rate, regular rhythm and intact distal pulses. Exam reveals " no friction rub.   No murmur heard.  Pulmonary/Chest: Effort normal and breath sounds normal.   Musculoskeletal: Normal range of motion. She exhibits no edema.   Neurological: She is alert and oriented to person, place, and time.   Skin: Skin is warm and dry.   +  R buttock abscess appears mostly healed. No drainage noted currently. No erythema.    Psychiatric: She has a normal mood and affect. Her behavior is normal.   Vitals reviewed.      Lab Results   Component Value Date    WBC 6.99 04/09/2019    HGB 12.6 04/09/2019    HCT 39.7 04/09/2019     04/09/2019    CHOL 196 04/09/2019    TRIG 51 04/09/2019    HDL 72 04/09/2019    ALT 19 04/09/2019    AST 19 04/09/2019     04/09/2019    K 3.9 04/09/2019     04/09/2019    CREATININE 0.7 04/09/2019    BUN 11 04/09/2019    CO2 27 04/09/2019    TSH 0.926 04/09/2019    INR 1.0 07/12/2011    HGBA1C 5.6 06/14/2005       Assessment:     1. Abscess of buttock    2. Essential hypertension      Plan:   Jojo was seen today for follow-up.    Diagnoses and all orders for this visit:    Abscess of buttock  - Healing well. Finish course of clindamycin. Continue to apply bactroban ointment. Keep wound clean. Pt aware    Essential hypertension  - bp controlled. Continue home meds           Kaci Meadows MD  Internal Medicine    10/24/2019

## 2019-11-03 RX ORDER — METOPROLOL SUCCINATE 50 MG/1
50 TABLET, EXTENDED RELEASE ORAL DAILY
Qty: 90 TABLET | Refills: 3 | Status: SHIPPED | OUTPATIENT
Start: 2019-11-03 | End: 2020-10-26

## 2019-12-17 ENCOUNTER — PATIENT OUTREACH (OUTPATIENT)
Dept: ADMINISTRATIVE | Facility: OTHER | Age: 69
End: 2019-12-17

## 2019-12-19 ENCOUNTER — HOSPITAL ENCOUNTER (OUTPATIENT)
Dept: RADIOLOGY | Facility: HOSPITAL | Age: 69
Discharge: HOME | End: 2019-12-19
Attending: ORTHOPAEDIC SURGERY
Payer: MEDICARE

## 2019-12-19 ENCOUNTER — OFFICE VISIT (OUTPATIENT)
Dept: ORTHOPEDICS | Facility: CLINIC | Age: 69
End: 2019-12-19
Payer: MEDICARE

## 2019-12-19 VITALS
SYSTOLIC BLOOD PRESSURE: 135 MMHG | HEIGHT: 62 IN | DIASTOLIC BLOOD PRESSURE: 80 MMHG | HEART RATE: 83 BPM | WEIGHT: 156 LBS | TEMPERATURE: 97 F | RESPIRATION RATE: 18 BRPM | BODY MASS INDEX: 28.71 KG/M2

## 2019-12-19 DIAGNOSIS — M62.9 HAMSTRING TIGHTNESS OF BOTH LOWER EXTREMITIES: ICD-10-CM

## 2019-12-19 DIAGNOSIS — M25.562 PAIN IN BOTH KNEES, UNSPECIFIED CHRONICITY: ICD-10-CM

## 2019-12-19 DIAGNOSIS — M17.0 PRIMARY OSTEOARTHRITIS OF BOTH KNEES: ICD-10-CM

## 2019-12-19 DIAGNOSIS — M25.561 PAIN IN BOTH KNEES, UNSPECIFIED CHRONICITY: ICD-10-CM

## 2019-12-19 DIAGNOSIS — M70.51 PES ANSERINUS BURSITIS OF BOTH KNEES: Primary | ICD-10-CM

## 2019-12-19 DIAGNOSIS — M70.52 PES ANSERINUS BURSITIS OF BOTH KNEES: Primary | ICD-10-CM

## 2019-12-19 PROCEDURE — 73564 XR KNEE ORTHO BILAT WITH FLEXION: ICD-10-PCS | Mod: 26,50,HCNC, | Performed by: RADIOLOGY

## 2019-12-19 PROCEDURE — 99213 OFFICE O/P EST LOW 20 MIN: CPT | Mod: HCNC,S$GLB,, | Performed by: ORTHOPAEDIC SURGERY

## 2019-12-19 PROCEDURE — 73564 X-RAY EXAM KNEE 4 OR MORE: CPT | Mod: TC,50,HCNC

## 2019-12-19 PROCEDURE — 3079F PR MOST RECENT DIASTOLIC BLOOD PRESSURE 80-89 MM HG: ICD-10-PCS | Mod: HCNC,CPTII,S$GLB, | Performed by: ORTHOPAEDIC SURGERY

## 2019-12-19 PROCEDURE — 1159F MED LIST DOCD IN RCRD: CPT | Mod: HCNC,S$GLB,, | Performed by: ORTHOPAEDIC SURGERY

## 2019-12-19 PROCEDURE — 99999 PR PBB SHADOW E&M-EST. PATIENT-LVL III: ICD-10-PCS | Mod: PBBFAC,HCNC,, | Performed by: ORTHOPAEDIC SURGERY

## 2019-12-19 PROCEDURE — 3075F SYST BP GE 130 - 139MM HG: CPT | Mod: HCNC,CPTII,S$GLB, | Performed by: ORTHOPAEDIC SURGERY

## 2019-12-19 PROCEDURE — 73564 X-RAY EXAM KNEE 4 OR MORE: CPT | Mod: 26,50,HCNC, | Performed by: RADIOLOGY

## 2019-12-19 PROCEDURE — 1101F PR PT FALLS ASSESS DOC 0-1 FALLS W/OUT INJ PAST YR: ICD-10-PCS | Mod: HCNC,CPTII,S$GLB, | Performed by: ORTHOPAEDIC SURGERY

## 2019-12-19 PROCEDURE — 99999 PR PBB SHADOW E&M-EST. PATIENT-LVL III: CPT | Mod: PBBFAC,HCNC,, | Performed by: ORTHOPAEDIC SURGERY

## 2019-12-19 PROCEDURE — 1125F AMNT PAIN NOTED PAIN PRSNT: CPT | Mod: HCNC,S$GLB,, | Performed by: ORTHOPAEDIC SURGERY

## 2019-12-19 PROCEDURE — 1125F PR PAIN SEVERITY QUANTIFIED, PAIN PRESENT: ICD-10-PCS | Mod: HCNC,S$GLB,, | Performed by: ORTHOPAEDIC SURGERY

## 2019-12-19 PROCEDURE — 1159F PR MEDICATION LIST DOCUMENTED IN MEDICAL RECORD: ICD-10-PCS | Mod: HCNC,S$GLB,, | Performed by: ORTHOPAEDIC SURGERY

## 2019-12-19 PROCEDURE — 99213 PR OFFICE/OUTPT VISIT, EST, LEVL III, 20-29 MIN: ICD-10-PCS | Mod: HCNC,S$GLB,, | Performed by: ORTHOPAEDIC SURGERY

## 2019-12-19 PROCEDURE — 3075F PR MOST RECENT SYSTOLIC BLOOD PRESS GE 130-139MM HG: ICD-10-PCS | Mod: HCNC,CPTII,S$GLB, | Performed by: ORTHOPAEDIC SURGERY

## 2019-12-19 PROCEDURE — 3079F DIAST BP 80-89 MM HG: CPT | Mod: HCNC,CPTII,S$GLB, | Performed by: ORTHOPAEDIC SURGERY

## 2019-12-19 PROCEDURE — 1101F PT FALLS ASSESS-DOCD LE1/YR: CPT | Mod: HCNC,CPTII,S$GLB, | Performed by: ORTHOPAEDIC SURGERY

## 2019-12-19 RX ORDER — DICLOFENAC SODIUM 10 MG/G
2 GEL TOPICAL 2 TIMES DAILY PRN
Qty: 1 TUBE | Refills: 1 | Status: SHIPPED | OUTPATIENT
Start: 2019-12-19 | End: 2024-02-10

## 2019-12-19 NOTE — PROGRESS NOTES
Subjective:     HPI:  69-year-old female complains of bilateral knee pain.  This been going on for some months.  She has a history prior meniscal surgery on the left knee years ago.  She points to the medial side of her knees and states that hurts more with increased activity.  Somewhat relieved with rest.  She takes Aleve.    Patient Active Problem List    Diagnosis Date Noted    Inflammatory spondylopathy of lumbar region 09/23/2019    Primary osteoarthritis of both knees 09/23/2019    Degenerative disc disease, cervical 09/13/2018    Cervical radiculopathy 09/13/2018    Vitamin D deficiency 07/19/2017    Breast cyst 07/19/2017    Tortuous aorta 12/22/2015    Multiple thyroid nodules 12/22/2015    Lumbar facet arthropathy 04/17/2015    Obesity (BMI 30.0-34.9) 01/27/2015    Nuclear sclerosis 04/30/2014    Essential hypertension     GERD (gastroesophageal reflux disease)      Past Medical History:   Diagnosis Date    ALLERGIC RHINITIS     Cataract     Degenerative disc disease, cervical 9/13/2018    GERD (gastroesophageal reflux disease)     Hyperlipidemia     Hypertension     Migraine headache     Nuclear sclerosis 4/30/2014    Sleep disorder     Thyroid disease     nodular goiter    TIA (transient ischemic attack)       Past Surgical History:   Procedure Laterality Date    BREAST BIOPSY      BREAST CYST ASPIRATION      BREAST CYST EXCISION      COLONOSCOPY N/A 4/29/2016    Procedure: COLONOSCOPY;  Surgeon: Sergio Vickers MD;  Location: [facility] ([address] [place] [address]);  Service: Endoscopy;  Laterality: N/A;    HYSTERECTOMY          (Not in a hospital admission)  Review of patient's allergies indicates:   Allergen Reactions    Iodine Rash    Iodine and iodide containing products Itching and Rash    Shellfish containing products Itching and Rash      Social History     Tobacco Use    Smoking status: Never Smoker    Smokeless tobacco: Never Used   Substance Use Topics    Alcohol use: No       Family History   Problem Relation Age of Onset    Diabetes Mother     Hypertension Mother     Breast cancer Mother     Asthma Father     Glaucoma Father     Heart disease Maternal Grandmother     No Known Problems Son     No Known Problems Daughter     Breast cancer Sister     Ovarian cancer Sister     Amblyopia Neg Hx     Blindness Neg Hx     Cancer Neg Hx     Cataracts Neg Hx     Macular degeneration Neg Hx     Retinal detachment Neg Hx     Strabismus Neg Hx     Stroke Neg Hx     Thyroid disease Neg Hx       ROS:  Patient denies constitutional symptoms, cardiac symptoms, respiratory symptoms, GI symptoms.  The remainder of the musculoskeletal ROS is included in the HPI.      Objective:     PE:    AA&O x 4.  NAD  HEENT:  NCAT, sclera nonicteric  Lungs:  Respirations are equal and unlabored.  CV:  2+ bilateral upper and lower extremity pulses.  Skin:  Intact throughout.    MS:  Bilateral knees with same exact exam safe for the well-healed arthroscopic incisions on the left knee.  Patient has a popliteal angle bilaterally of about 30° with tight hamstrings bow stringing.  Negative Lachman's, pivot shift, posterior drawer.  Negative varus/valgus instability. Normal patellar tracking and tilt.  50% medial and lateral translation.  No pain with grind.  No tenderness to palpation at the patellar tendon. Difficult to discern medial joint line tenderness to palpation given tenderness throughout the distal hamstrings into the pes bursa bilaterally. No palpable defect with Rebeca testing.    Imaging Review  Weight-bearing x-rays of bilateral knees show medial compartment moderate arthritis, varus.    Assessment:     1.  Bilateral knee osteoarthritis.  This is mild to moderate I do not think is the main cause of her symptoms.  2.  Bilateral tight hamstrings and pes anserine bursitis.    Plan:     I had a long discussion with the patient about her issues.  We discussed therapy and stretching.  She would  like to do the therapy at [place].. I explained to her that should she not get sufficiently better early with stretching which she will need to maintain long-term to avoid this recurring, I can do an injection in the pes bursa.  She will call and let me know if she does not get better initially with therapy.

## 2020-01-08 ENCOUNTER — CLINICAL SUPPORT (OUTPATIENT)
Dept: REHABILITATION | Facility: HOSPITAL | Age: 70
End: 2020-01-08
Attending: ORTHOPAEDIC SURGERY
Payer: MEDICARE

## 2020-01-08 DIAGNOSIS — M25.562 CHRONIC PAIN OF BOTH KNEES: ICD-10-CM

## 2020-01-08 DIAGNOSIS — G89.29 CHRONIC PAIN OF BOTH KNEES: ICD-10-CM

## 2020-01-08 DIAGNOSIS — M25.561 CHRONIC PAIN OF BOTH KNEES: ICD-10-CM

## 2020-01-08 DIAGNOSIS — R26.2 DIFFICULTY IN WALKING: ICD-10-CM

## 2020-01-08 PROCEDURE — 97161 PT EVAL LOW COMPLEX 20 MIN: CPT | Mod: HCNC | Performed by: PHYSICAL THERAPIST

## 2020-01-08 PROCEDURE — 97110 THERAPEUTIC EXERCISES: CPT | Mod: HCNC | Performed by: PHYSICAL THERAPIST

## 2020-01-08 NOTE — PLAN OF CARE
[facility]  Physical Therapy Initial Evaluation    Name: Jojo Burrows  Clinic Number: [id number]    Therapy Diagnosis:   Encounter Diagnoses   Name Primary?    Chronic pain of both knees     Difficulty in walking      Physician: Teodoro Smith MD    Physician Orders: PT Eval and Treat   Medical Diagnosis from Referral:   M70.51,M70.52 (ICD-10-CM) - Pes anserinus bursitis of both knees   M17.0 (ICD-10-CM) - Primary osteoarthritis of both knees   M62.9 (ICD-10-CM) - Hamstring tightness of both lower extremities       Evaluation Date: 1/8/2020  Authorization Period Expiration: 12/18/2020  Plan of Care Expiration: 2/8/2020  Visit # / Visits authorized: 1/ 1    Time In: 1010  Time Out: 1100  Total Billable Time: 50 minutes    Precautions: Standard,     Subjective   Date of onset: 2-3 years  History of current condition - Jojo reports: Patient reports she fell on the L knee and tore her mensicus requiring surgery. The increased pressure on the R knee caused it to start bothering her. Her right knee has been swelling and giving her problems. She notes 2-3 years of pain. She notes some pain with stretching her leg straight and difficulty sleeping at night. She uses a body pillow between knees. She lives with her  who assists with ADLs as necessary. Her bedroom is upstairs and she requires UE assistance to ascend and descend. She is currently not able to shop like she used to or walk in the community.       Past Medical History:   Diagnosis Date    ALLERGIC RHINITIS     Cataract     Degenerative disc disease, cervical 9/13/2018    GERD (gastroesophageal reflux disease)     Hyperlipidemia     Hypertension     Migraine headache     Nuclear sclerosis 4/30/2014    Sleep disorder     Thyroid disease     nodular goiter    TIA (transient ischemic attack)      Jojo Burrows  has a past surgical history that includes Hysterectomy; Colonoscopy (N/A, 4/29/2016); Breast biopsy; Breast  cyst excision; and Breast cyst aspiration.    Jojo has a current medication list which includes the following prescription(s): acetaminophen, ammonium lactate, carbamide peroxide, cetirizine, clotrimazole-betamethasone 1-0.05%, cyanocobalamin, diclofenac sodium, ergocalciferol (vitamin d2), esomeprazole, folic acid/multivit-min/lutein, ibuprofen, metoprolol succinate, and naproxen.    Review of patient's allergies indicates:   Allergen Reactions    Iodine Rash    Iodine and iodide containing products Itching and Rash    Shellfish containing products Itching and Rash        Imaging, xray    Prior Therapy: Yes  Social History: She lives with their spouse  Occupation: Retired   Prior Level of Function: Independent   Current Level of Function: Independent with difficulty performing ADLs     Pain:  Current 2/10, worst 7/10, best 0/10   Location: bilateral knee   Description: Aching, Grabbing and Sharp  Aggravating Factors: Standing, Walking and Night Time  Easing Factors: rest and cream for the knee    Pts goals: Return to shopping and wear high heels     Objective     Observation: Patient is a short, overweight female who ambulated into clinic without gait deficit. She struggled with sit to stand from low chair.     Functional tests:   SL squat: NT  SLS EO: fair  SLS EC: fair    Range of Motion (Passive):   Knee Right  Left    Flexion 106 118   Extension -5 -5     Range of Motion (Active):   Knee Right active Left Active   Flexion 114 112   Extension 0 0       Lower Extremity Strength  Right LE  Left LE    Quadriceps: 4+/5 Quadriceps: 4+/5   Hamstrings: 4+/5 Hamstrings: 4+/5   Hip flexion (supine): 4/5 Hip flexion (supine): 4/5   Hip extension:  4-/5 Hip extension: 4-/5   PGM: 3+/5 PGM:  3+/5   Hip ER:  4-/5 Hip ER:  4-/5   Hip IR: 4/5 Hip IR: 4/5     Special Tests:   Right Left   Valgus Stress Test negative negative   Varus Stress test negative negative   Lachman's test negative negative   Posterior Lachman  negative negative   Mirella's Test negative negative   Thessaly's Test negative negative   Patellar Grind Test positive positive     Joint Mobility: Limited with patellar mobility med/lateral in the right knee, better on the L. Pain noted with inferior mobs and crepitus, limiting her knee flexion. Improved with Gr III mobs with knee flexed 25 degrees.      Palpation: Tenderness at medial>lateral joint line bilaterally, R>L. Pes anserine on the R painful     Sensation: Intact    Flexibility:    Ely's test: R = NT ; L = NT    Hamstrings: R = -15 ; L = -15    Justin's test: R = NT ; L = NT   Russell test: R = neg ; L = neg    Girth Measurement Joint line 15 cm below 10 cm above         Left 42.8 cm 52.5 cm 40.2 cm         CMS Impairment/Limitation/Restriction for FOTO Knee Survey    Therapist reviewed FOTO scores for Jojo Burrows on 1/8/2020.   FOTO documents entered into [employer] - see Media section.      Current : CL = least 60% but < 80% impaired, limited or restricted  Goal: CK = at least 40% but < 60% impaired, limited or restricted         TREATMENT   Treatment Time In: 1045  Treatment Time Out: 1100  Total Treatment time separate from Evaluation: 15 minutes    Jojo received therapeutic exercises to develop strength for 15 minutes including:  SLR 15x B  SL hip abduction 15x B     Home Exercises and Patient Education Provided    Education provided re: Importance of quad strengthening, goals for therapy, role of therapy for care, exercises/HEP    Written Home Exercises Provided: .  Exercises were reviewed and Jojo was able to demonstrate them prior to the end of the session.   Pt received a written copy of exercises to perform at home. Jojo demonstrated good  understanding of the education provided.     See EMR under patient instructions for exercises given.   Assessment   Jojo is a 69 y.o. female referred to outpatient Physical Therapy with a medical diagnosis of pen anserine bursitis and bilateral knee pain. Pt  presents with limited knee ROM, strength deficits to the LE, poor exercise tolerance, impaired ability to complete ADLs and difficulty sleeping at night. Patient notes lifestyle changes due to her knee pain keeping her from shopping and unable to wear heels.    Pt prognosis is Good.   Pt will benefit from skilled outpatient Physical Therapy to address the deficits stated above and in the chart below, provide pt/family education, and to maximize pt's level of independence.     Plan of care discussed with patient: Yes  Pt's spiritual, cultural and educational needs considered and patient is agreeable to the plan of care and goals as stated below:     Anticipated Barriers for therapy: chronic condition    Medical Necessity is demonstrated by the following  History  Co-morbidities and personal factors that may impact the plan of care Co-morbidities:   advanced age, difficulty sleeping, high BMI and HTN    Personal Factors:   no deficits     low   Examination  Body Structures and Functions, activity limitations and participation restrictions that may impact the plan of care Body Regions:   lower extremities    Body Systems:    ROM  strength  balance  gait  motor control  edema    Participation Restrictions:   scheduling    Activity limitations:   Learning and applying knowledge  no deficits    General Tasks and Commands  no deficits    Communication  no deficits    Mobility  walking    Self care  no deficits    Domestic Life  no deficits    Interactions/Relationships  no deficits    Life Areas  no deficits    Community and Social Life  no deficits         low   Clinical Presentation stable and uncomplicated low   Decision Making/ Complexity Score: low     GOALS: Short Term Goals:  4 weeks  1.Report decreased knee pain  < / =  2/10  to increase tolerance for shopping  2. Increase knee ROM to 110 deg flexion in order to be able to perform ADLs without difficulty.  3. Increase strength by 1/3 MMT grade in quads  to increase  tolerance for ADL and work activities.  4. Pt to tolerate HEP to improve ROM and independence with ADL's    Long Term Goals: 8 weeks  1.Report decreased knee pain < / = 0/10  to increase tolerance for sleeping and ADLs pain free  2.Patient goal: return to shopping and wearing heels without knee pain  3.Increase strength to >/= 4+/5 in quads  to increase tolerance for ADL and work activities.  4. Pt will report at 51% on FOTO knee to demonstrate increase in LE function with every day tasks.     Plan   Plan of care Certification: 1/8/2020 to 2/8/2020.    Outpatient Physical Therapy 2 times weekly for 10 weeks to include the following interventions: Gait Training, Manual Therapy, Moist Heat/ Ice, Neuromuscular Re-ed, Patient Education, Self Care, Therapeutic Activites and Therapeutic Exercise.     Kasi Lovelace, PT DPT      Teodoro Smith MD  01/09/2020

## 2020-01-16 ENCOUNTER — CLINICAL SUPPORT (OUTPATIENT)
Dept: REHABILITATION | Facility: HOSPITAL | Age: 70
End: 2020-01-16
Attending: ORTHOPAEDIC SURGERY
Payer: MEDICARE

## 2020-01-16 DIAGNOSIS — G89.29 CHRONIC PAIN OF BOTH KNEES: ICD-10-CM

## 2020-01-16 DIAGNOSIS — M25.561 CHRONIC PAIN OF BOTH KNEES: ICD-10-CM

## 2020-01-16 DIAGNOSIS — R26.2 DIFFICULTY IN WALKING: ICD-10-CM

## 2020-01-16 DIAGNOSIS — M25.562 CHRONIC PAIN OF BOTH KNEES: ICD-10-CM

## 2020-01-16 PROCEDURE — 97140 MANUAL THERAPY 1/> REGIONS: CPT | Mod: HCNC | Performed by: PHYSICAL THERAPIST

## 2020-01-16 PROCEDURE — 97110 THERAPEUTIC EXERCISES: CPT | Mod: HCNC | Performed by: PHYSICAL THERAPIST

## 2020-01-16 NOTE — PROGRESS NOTES
Physical Therapy Daily Treatment Note     Name: Jojo Burrows  Clinic Number: [id number]    Therapy Diagnosis:   Encounter Diagnoses   Name Primary?    Chronic pain of both knees     Difficulty in walking      Physician: Teodoro Smith MD    Visit Date: 1/16/2020    Physician Orders: PT Eval and Treat   Medical Diagnosis from Referral:   M70.51,M70.52 (ICD-10-CM) - Pes anserinus bursitis of both knees   M17.0 (ICD-10-CM) - Primary osteoarthritis of both knees   M62.9 (ICD-10-CM) - Hamstring tightness of both lower extremities         Evaluation Date: 1/8/2020  Authorization Period Expiration: 12/18/2020  Plan of Care Expiration: 2/8/2020  Visit # / Visits authorized: 2/ 1      Time In: 1010  Time Out: 1107  Total Billable Time: 30 minutes    Precautions: Standard    Subjective     Pt reports: My  has been under the weather, taking care of him and my brother. I do my exercise in bed at night, good and bad days for me  She was compliant with home exercise program.  Response to previous treatment: good and bad days  Functional change: Improved walking    Pain: 4/10  Location: bilateral knee      Objective     Jojo received therapeutic exercises to develop strength, endurance, ROM and flexibility for 35 minutes including:    Recumbent bike 8' Level 3 for quad strengthening  SAQ 1.5# 3 x 10  Quad sets 3 sec holds 30x B  SLR 0# 2 x 15 B supine  Bridges with GTB above knees 3 x 10  SL clams GTB 2 x 15    oJjo received the following manual therapy techniques: Joint mobilizations and Soft tissue Mobilization were applied to the: B knee for 10 minutes, including:  Patellar mobs B planes   Gr III extension mob  Gr IV flexion with AP to tibia     Jojo received cold pack for 10 minutes to B knees.      Home Exercises Provided and Patient Education Provided     Education provided:   - Importance of quad and glut strength, performing her HEP     Written Home Exercises Provided: Patient instructed to cont prior  HEP.  Exercises were reviewed and Jojo was able to demonstrate them prior to the end of the session.  Jojo demonstrated good  understanding of the education provided.     See EMR under Patient Instructions for exercises provided prior visit.    Assessment     Jojo did very well with first treatment session. Notes fatigue in the gluts with clams and bridges. Tightness in quad sensation after SLR. Limited patellar mobility on the R, improved with inferior mob    Jojo is progressing well towards her goals.   Pt prognosis is Good.     Pt will continue to benefit from skilled outpatient physical therapy to address the deficits listed in the problem list box on initial evaluation, provide pt/family education and to maximize pt's level of independence in the home and community environment.     Pt's spiritual, cultural and educational needs considered and pt agreeable to plan of care and goals.     Anticipated barriers to physical therapy: chronic knee pain    Progressing towards her STG and LTG    GOALS: Short Term Goals:  4 weeks  1.Report decreased knee pain  < / =  2/10  to increase tolerance for shopping  2. Increase knee ROM to 110 deg flexion in order to be able to perform ADLs without difficulty.  3. Increase strength by 1/3 MMT grade in quads  to increase tolerance for ADL and work activities.  4. Pt to tolerate HEP to improve ROM and independence with ADL's     Long Term Goals: 8 weeks  1.Report decreased knee pain < / = 0/10  to increase tolerance for sleeping and ADLs pain free  2.Patient goal: return to shopping and wearing heels without knee pain  3.Increase strength to >/= 4+/5 in quads  to increase tolerance for ADL and work activities.  4. Pt will report at 51% on FOTO knee to demonstrate increase in LE function with every day tasks.     Plan     Plan of care Certification: 1/8/2020 to 2/8/2020.     Outpatient Physical Therapy 2 times weekly for 10 weeks to include the following interventions: Gait  Training, Manual Therapy, Moist Heat/ Ice, Neuromuscular Re-ed, Patient Education, Self Care, Therapeutic Activites and Therapeutic Exercise.     Progress quad and glut strengthening    Kasi Lovelace, PT

## 2020-01-28 ENCOUNTER — CLINICAL SUPPORT (OUTPATIENT)
Dept: REHABILITATION | Facility: HOSPITAL | Age: 70
End: 2020-01-28
Attending: ORTHOPAEDIC SURGERY
Payer: MEDICARE

## 2020-01-28 DIAGNOSIS — M25.562 CHRONIC PAIN OF BOTH KNEES: ICD-10-CM

## 2020-01-28 DIAGNOSIS — R26.2 DIFFICULTY IN WALKING: ICD-10-CM

## 2020-01-28 DIAGNOSIS — M25.561 CHRONIC PAIN OF BOTH KNEES: ICD-10-CM

## 2020-01-28 DIAGNOSIS — G89.29 CHRONIC PAIN OF BOTH KNEES: ICD-10-CM

## 2020-01-28 PROCEDURE — 97530 THERAPEUTIC ACTIVITIES: CPT | Mod: HCNC | Performed by: PHYSICAL THERAPIST

## 2020-01-28 PROCEDURE — 97110 THERAPEUTIC EXERCISES: CPT | Mod: HCNC | Performed by: PHYSICAL THERAPIST

## 2020-01-28 NOTE — PROGRESS NOTES
Physical Therapy Daily Treatment Note     Name: Jojo Burrows  Clinic Number: [id number]    Therapy Diagnosis:   Encounter Diagnoses   Name Primary?    Chronic pain of both knees     Difficulty in walking      Physician: Teodoro Smith MD    Visit Date: 1/28/2020    Physician Orders: PT Eval and Treat   Medical Diagnosis from Referral:   M70.51,M70.52 (ICD-10-CM) - Pes anserinus bursitis of both knees   M17.0 (ICD-10-CM) - Primary osteoarthritis of both knees   M62.9 (ICD-10-CM) - Hamstring tightness of both lower extremities         Evaluation Date: 1/8/2020  Authorization Period Expiration: 12/18/2020  Plan of Care Expiration: 2/8/2020  Visit # / Visits authorized: 3/ 1      Time In: 1010  Time Out: 1110  Total Billable Time: 30 minutes    Precautions: Standard    Subjective     Pt reports: I did a lot this past weekend but it is not hurting as bad. This coming week will be more relaxed and not as bad. Minimal knee pain, just some tightness on top of the knees  She was compliant with home exercise program.  Response to previous treatment: not too much pain, a little sore  Functional change: Busy over the weekend    Pain: 3/10  Location: bilateral knee      Objective     Jojo received therapeutic exercises to develop strength, endurance, ROM and flexibility for 30 minutes including:    Recumbent bike 10' Level 3 for quad strengthening  LAQ 2.5# 2 x 15  Bridges with BTB above knees 3 x 10  SL Clams BTB 2 x 15  TKE green sport cord 3 x 10    NT  SAQ 1.5# 3 x 10  Quad sets 3 sec holds 30x B  SLR 0# 2 x 15 B supine    Jojo received the following manual therapy techniques: Joint mobilizations and Soft tissue Mobilization were applied to the: B knee for 10 minutes, including:  Patellar mobs B planes   Gr III extension mob  Gr IV flexion with AP to tibia   90/90 R Hamstring stretch 20 sec 3x    Jojo received therapeutic exercises for 10 minutes including:  Sit to stands 10# kettle bell 3 x 10 - verbal cues    Jojo  received cold pack for 10 minutes to B knees.      Home Exercises Provided and Patient Education Provided     Education provided:   - Importance of quad and glut strength, performing her HEP     Written Home Exercises Provided: Patient instructed to cont prior HEP.  Exercises were reviewed and Jojo was able to demonstrate them prior to the end of the session.  Jojo demonstrated good  understanding of the education provided.     See EMR under Patient Instructions for exercises provided prior visit.    Assessment     Jojo was limited into knee flexion on the right with medial joint line pain. Patient with limited patellar mobility inferiorly on the R. Notes she is oldest of 10 children and has a lot of responsibility taking care of siblings and her parents. Medial joint line pain with education on strengthening gluts to reduce stress. Required verbal cues for proper squat from, corrected valgus stress at the knee and improved her hip hinge    Jojo is progressing well towards her goals.   Pt prognosis is Good.     Pt will continue to benefit from skilled outpatient physical therapy to address the deficits listed in the problem list box on initial evaluation, provide pt/family education and to maximize pt's level of independence in the home and community environment.     Pt's spiritual, cultural and educational needs considered and pt agreeable to plan of care and goals.     Anticipated barriers to physical therapy: chronic knee pain    Progressing towards her STG and LTG    GOALS: Short Term Goals:  4 weeks  1.Report decreased knee pain  < / =  2/10  to increase tolerance for shopping  2. Increase knee ROM to 110 deg flexion in order to be able to perform ADLs without difficulty.  3. Increase strength by 1/3 MMT grade in quads  to increase tolerance for ADL and work activities.  4. Pt to tolerate HEP to improve ROM and independence with ADL's     Long Term Goals: 8 weeks  1.Report decreased knee pain < / = 0/10  to  increase tolerance for sleeping and ADLs pain free  2.Patient goal: return to shopping and wearing heels without knee pain  3.Increase strength to >/= 4+/5 in quads  to increase tolerance for ADL and work activities.  4. Pt will report at 51% on FOTO knee to demonstrate increase in LE function with every day tasks.     Plan     Plan of care Certification: 1/8/2020 to 2/8/2020.     Outpatient Physical Therapy 2 times weekly for 10 weeks to include the following interventions: Gait Training, Manual Therapy, Moist Heat/ Ice, Neuromuscular Re-ed, Patient Education, Self Care, Therapeutic Activites and Therapeutic Exercise.     Progress quad and glut strengthening, squat form progressions    Kasi Lovelace, PT

## 2020-02-04 ENCOUNTER — CLINICAL SUPPORT (OUTPATIENT)
Dept: REHABILITATION | Facility: HOSPITAL | Age: 70
End: 2020-02-04
Attending: ORTHOPAEDIC SURGERY
Payer: MEDICARE

## 2020-02-04 DIAGNOSIS — M25.562 CHRONIC PAIN OF BOTH KNEES: ICD-10-CM

## 2020-02-04 DIAGNOSIS — M25.561 CHRONIC PAIN OF BOTH KNEES: ICD-10-CM

## 2020-02-04 DIAGNOSIS — G89.29 CHRONIC PAIN OF BOTH KNEES: ICD-10-CM

## 2020-02-04 DIAGNOSIS — R26.2 DIFFICULTY IN WALKING: ICD-10-CM

## 2020-02-04 PROCEDURE — 97140 MANUAL THERAPY 1/> REGIONS: CPT | Mod: HCNC,CQ

## 2020-02-04 PROCEDURE — 97110 THERAPEUTIC EXERCISES: CPT | Mod: HCNC,CQ

## 2020-02-04 NOTE — PROGRESS NOTES
"  Physical Therapy Daily Treatment Note     Name: Jojo Burrows  Clinic Number: [id number]    Therapy Diagnosis:   Encounter Diagnoses   Name Primary?    Chronic pain of both knees     Difficulty in walking      Physician: Teodoro Smith MD    Visit Date: 2/4/2020    Physician Orders: PT Eval and Treat   Medical Diagnosis from Referral:   M70.51,M70.52 (ICD-10-CM) - Pes anserinus bursitis of both knees   M17.0 (ICD-10-CM) - Primary osteoarthritis of both knees   M62.9 (ICD-10-CM) - Hamstring tightness of both lower extremities         Evaluation Date: 1/8/2020  Authorization Period Expiration: 12/18/2020  Plan of Care Expiration: 2/8/2020  Visit # / Visits authorized: 4/ pending       Time In: 1010  Time Out: 1100  Total Billable Time: 30 minutes  Tx time 50'    Precautions: Standard    Subjective     Pt reports: min pain in B knee with > R knee.  She was compliant "with some"  with home exercise program.  Response to previous treatment:  a little sore  Functional change: improved mobility    Pain: 3/10  Location: bilateral knee      Objective     Jojo received therapeutic exercises to develop strength, endurance, ROM and flexibility for 30 minutes including:    Recumbent bike 10' Level 3 for quad strengthening, ROM, mm endurance   Quad sets 3 sec holds 30x B  SLR  2 x 15 B supine  SAQ 2.5# 3x10  LAQ 2.5# 2 x 15  Bridges with BTB above knees 3 x 10  SL Clams BTB 2 x 15  TKE green sport cord 3 x 10    Jojo received the following manual therapy techniques: Joint mobilizations and Soft tissue Mobilization were applied to the: B knee for 10 minutes, including: Patellar mobs B planes, STM, Quad stretch, heel cord stretch     Jojo received therapeutic exercises for 0 minutes including: nt    Jojo received cold pack for 10 minutes to B knees.    Home Exercises Provided and Patient Education Provided     Education provided:   - Importance of quad and glut strength, performing her HEP     Written Home Exercises Provided: " Patient instructed to cont prior HEP.  Exercises were reviewed and Jojo was able to demonstrate them prior to the end of the session.  Jojo demonstrated good  understanding of the education provided.       Assessment     Pt tolerating tx well. Min knee pain > R during tx but noted less than last tx. Required verbal cues for proper form/technique with therex and for quad activation. Min extensor lack B knees. Continue to progress as tolerated.    Jojo is progressing well towards her goals.   Pt prognosis is Good.     Pt will continue to benefit from skilled outpatient physical therapy to address the deficits listed in the problem list box on initial evaluation, provide pt/family education and to maximize pt's level of independence in the home and community environment.     Pt's spiritual, cultural and educational needs considered and pt agreeable to plan of care and goals.     Anticipated barriers to physical therapy: chronic knee pain    Progressing towards her STG and LTG    GOALS: Short Term Goals:  4 weeks  1.Report decreased knee pain  < / =  2/10  to increase tolerance for shopping  2. Increase knee ROM to 110 deg flexion in order to be able to perform ADLs without difficulty.  3. Increase strength by 1/3 MMT grade in quads  to increase tolerance for ADL and work activities.  4. Pt to tolerate HEP to improve ROM and independence with ADL's     Long Term Goals: 8 weeks  1.Report decreased knee pain < / = 0/10  to increase tolerance for sleeping and ADLs pain free  2.Patient goal: return to shopping and wearing heels without knee pain  3.Increase strength to >/= 4+/5 in quads  to increase tolerance for ADL and work activities.  4. Pt will report at 51% on FOTO knee to demonstrate increase in LE function with every day tasks.     Plan     Plan of care Certification: 1/8/2020 to 2/8/2020.     Outpatient Physical Therapy 2 times weekly for 10 weeks to include the following interventions: Gait Training, Manual  Therapy, Moist Heat/ Ice, Neuromuscular Re-ed, Patient Education, Self Care, Therapeutic Activites and Therapeutic Exercise.     Progress quad and glut strengthening, squat form progressions    Luis Alfredo Weller, PTA, STS

## 2020-02-18 ENCOUNTER — TELEPHONE (OUTPATIENT)
Dept: INTERNAL MEDICINE | Facility: CLINIC | Age: 70
End: 2020-02-18

## 2020-02-18 DIAGNOSIS — E66.9 OBESITY (BMI 30.0-34.9): ICD-10-CM

## 2020-02-18 DIAGNOSIS — E55.9 VITAMIN D DEFICIENCY: ICD-10-CM

## 2020-02-18 DIAGNOSIS — E04.2 MULTIPLE THYROID NODULES: ICD-10-CM

## 2020-02-18 DIAGNOSIS — Z00.00 WELL ADULT EXAM: Primary | ICD-10-CM

## 2020-02-18 DIAGNOSIS — I10 ESSENTIAL HYPERTENSION: ICD-10-CM

## 2020-02-18 NOTE — TELEPHONE ENCOUNTER
----- Message from Jarrod Demarco sent at 2/18/2020 10:33 AM CST -----  Doctor appointment and lab have been scheduled.  Please link lab orders to the lab appointment.  Date of doctor appointment:  4/21  Date of lab appointment:  4/14  Physical or EP: Physical  Comments:

## 2020-02-19 ENCOUNTER — CLINICAL SUPPORT (OUTPATIENT)
Dept: REHABILITATION | Facility: HOSPITAL | Age: 70
End: 2020-02-19
Attending: ORTHOPAEDIC SURGERY
Payer: MEDICARE

## 2020-02-19 DIAGNOSIS — M25.562 CHRONIC PAIN OF BOTH KNEES: ICD-10-CM

## 2020-02-19 DIAGNOSIS — R26.2 DIFFICULTY IN WALKING: ICD-10-CM

## 2020-02-19 DIAGNOSIS — G89.29 CHRONIC PAIN OF BOTH KNEES: ICD-10-CM

## 2020-02-19 DIAGNOSIS — M25.561 CHRONIC PAIN OF BOTH KNEES: ICD-10-CM

## 2020-02-19 PROCEDURE — 97110 THERAPEUTIC EXERCISES: CPT | Mod: HCNC | Performed by: PHYSICAL THERAPIST

## 2020-02-19 PROCEDURE — 97140 MANUAL THERAPY 1/> REGIONS: CPT | Mod: HCNC | Performed by: PHYSICAL THERAPIST

## 2020-02-19 NOTE — PLAN OF CARE
"  Physical Therapy Daily Treatment Note     Name: Jojo Burrows  Clinic Number: [id number]    Therapy Diagnosis:   Encounter Diagnoses   Name Primary?    Chronic pain of both knees     Difficulty in walking      Physician: Teodoro Smith MD    Visit Date: 2/19/2020    Physician Orders: PT Eval and Treat   Medical Diagnosis from Referral:   M70.51,M70.52 (ICD-10-CM) - Pes anserinus bursitis of both knees   M17.0 (ICD-10-CM) - Primary osteoarthritis of both knees   M62.9 (ICD-10-CM) - Hamstring tightness of both lower extremities         Evaluation Date: 1/8/2020  Authorization Period Expiration: 7/23/2020  New Plan of Care Expiration: 3/25/2020  Visit # / Visits authorized: 3/12  Total Visits: 5      Time In: 1007  Time Out: 1112  Total Billable Time: 55 minutes  Tx time 65'    Precautions: Standard    Subjective     Pt reports: Pain about 4-5/10 today. I have been doing a decent bit of walking  She was compliant "with some"  with home exercise program.  Response to previous treatment:  a little sore  Functional change: improved mobility    Pain: 4/10  Location: bilateral knee      Objective     2/19: Tender to palpation at medial joint line and tightness to medial hamstring tendon. Poor patellar superior/inferior mobility bilaterally. Stands with knees hyperextended.    Jojo received therapeutic exercises to develop strength, endurance, ROM and flexibility for 50 minutes including:    Recumbent bike 10' Level 3 for quad strengthening, ROM, mm endurance   Hamstring stretch orange strap with ankle everted 30 sec 3x  Quad sets 3 sec holds 30x B  SLR  3 x 8 B supine  Bridges with BTB above knees 3 x 10  Shuttle 1 1/2 band DL 30x     NT  SAQ 2.5# 3x10  LAQ 2.5# 2 x 15  SL Clams BTB 2 x 15  TKE green sport cord 3 x 10    Jojo received the following manual therapy techniques: Joint mobilizations and Soft tissue Mobilization were applied to the: B knee for 10 minutes, including: Patellar mobs B 4 planes, STM, hamstring " stretch, heel cord stretch     Jojo received therapeutic exercises for 0 minutes including: nt    Jojo received cold pack for 10 minutes to B knees.    Home Exercises Provided and Patient Education Provided     Education provided:   - Importance of quad and glut strength, performing her HEP     Written Home Exercises Provided: Patient instructed to cont prior HEP.  Exercises were reviewed and Jojo was able to demonstrate them prior to the end of the session.  Jojo demonstrated good  understanding of the education provided.       Assessment     Patient got light headed after SLR and took a break with water. Improved quad activation at end of treatment. Notes more difficultly on upright bike today, poor cardiovascular endurance. Requires cues to not put her feet together for bridges and shuttle    Jojo is progressing well towards her goals.   Pt prognosis is Good.     Pt will continue to benefit from skilled outpatient physical therapy to address the deficits listed in the problem list box on initial evaluation, provide pt/family education and to maximize pt's level of independence in the home and community environment.     Pt's spiritual, cultural and educational needs considered and pt agreeable to plan of care and goals.     Anticipated barriers to physical therapy: chronic knee pain    Progressing towards her STG and LTG    GOALS: Short Term Goals:  4 weeks  1.Report decreased knee pain  < / =  2/10  to increase tolerance for shopping (progressing, not met)  2. Increase knee ROM to 110 deg flexion in order to be able to perform ADLs without difficulty.  (progressing, not met)  3. Increase strength by 1/3 MMT grade in quads  to increase tolerance for ADL and work activities. (met)  4. Pt to tolerate HEP to improve ROM and independence with ADL's (met)     Long Term Goals: 8 weeks  1.Report decreased knee pain < / = 0/10  to increase tolerance for sleeping and ADLs pain free  (progressing, not met)  2.Patient goal:  return to shopping and wearing heels without knee pain  (progressing, not met)  3.Increase strength to >/= 4+/5 in quads  to increase tolerance for ADL and work activities. (progressing, not met)  4. Pt will report at 51% on FOTO knee to demonstrate increase in LE function with every day tasks.    (progressing, not met)  Plan     Certification Period: 2/19/2020 to 3/25/2020  Recommended Treatment Plan: 2 times per week for 6 weeks: Gait Training, Manual Therapy, Moist Heat/ Ice, Neuromuscular Re-ed, Patient Education, Self Care, Therapeutic Activites and Therapeutic Exercise  Other Recommendations: modalities prn, ASTYM prn    Progress functional exercise and quad activation    Therapist: Kasi Lovelace, PT, DPT    Teodoro Smith MD  02/19/2020

## 2020-02-19 NOTE — PROGRESS NOTES
"  Physical Therapy Daily Treatment Note     Name: Jojo Burrows  Clinic Number: [id number]    Therapy Diagnosis:   Encounter Diagnoses   Name Primary?    Chronic pain of both knees     Difficulty in walking      Physician: Teodoro Smtih MD    Visit Date: 2/19/2020    Physician Orders: PT Eval and Treat   Medical Diagnosis from Referral:   M70.51,M70.52 (ICD-10-CM) - Pes anserinus bursitis of both knees   M17.0 (ICD-10-CM) - Primary osteoarthritis of both knees   M62.9 (ICD-10-CM) - Hamstring tightness of both lower extremities         Evaluation Date: 1/8/2020  Authorization Period Expiration: 7/23/2020  New Plan of Care Expiration: 3/25/2020  Visit # / Visits authorized: 3/12  Total Visits: 5      Time In: 1007  Time Out: 1112  Total Billable Time: 55 minutes  Tx time 65'    Precautions: Standard    Subjective     Pt reports: Pain about 4-5/10 today. I have been doing a decent bit of walking  She was compliant "with some"  with home exercise program.  Response to previous treatment:  a little sore  Functional change: improved mobility    Pain: 4/10  Location: bilateral knee      Objective     2/19: Tender to palpation at medial joint line and tightness to medial hamstring tendon. Poor patellar superior/inferior mobility bilaterally. Stands with knees hyperextended.    Jojo received therapeutic exercises to develop strength, endurance, ROM and flexibility for 50 minutes including:    Recumbent bike 10' Level 3 for quad strengthening, ROM, mm endurance   Hamstring stretch orange strap with ankle everted 30 sec 3x  Quad sets 3 sec holds 30x B  SLR  3 x 8 B supine  Bridges with BTB above knees 3 x 10  Shuttle 1 1/2 band DL 30x     NT  SAQ 2.5# 3x10  LAQ 2.5# 2 x 15  SL Clams BTB 2 x 15  TKE green sport cord 3 x 10    Jojo received the following manual therapy techniques: Joint mobilizations and Soft tissue Mobilization were applied to the: B knee for 10 minutes, including: Patellar mobs B 4 planes, STM, hamstring " stretch, heel cord stretch     Jojo received therapeutic exercises for 0 minutes including: nt    Jojo received cold pack for 10 minutes to B knees.    Home Exercises Provided and Patient Education Provided     Education provided:   - Importance of quad and glut strength, performing her HEP     Written Home Exercises Provided: Patient instructed to cont prior HEP.  Exercises were reviewed and Jojo was able to demonstrate them prior to the end of the session.  Jojo demonstrated good  understanding of the education provided.       Assessment     Patient got light headed after SLR and took a break with water. Improved quad activation at end of treatment. Notes more difficultly on upright bike today, poor cardiovascular endurance. Requires cues to not put her feet together for bridges and shuttle    Jojo is progressing well towards her goals.   Pt prognosis is Good.     Pt will continue to benefit from skilled outpatient physical therapy to address the deficits listed in the problem list box on initial evaluation, provide pt/family education and to maximize pt's level of independence in the home and community environment.     Pt's spiritual, cultural and educational needs considered and pt agreeable to plan of care and goals.     Anticipated barriers to physical therapy: chronic knee pain    Progressing towards her STG and LTG    GOALS: Short Term Goals:  4 weeks  1.Report decreased knee pain  < / =  2/10  to increase tolerance for shopping (progressing, not met)  2. Increase knee ROM to 110 deg flexion in order to be able to perform ADLs without difficulty.  (progressing, not met)  3. Increase strength by 1/3 MMT grade in quads  to increase tolerance for ADL and work activities. (met)  4. Pt to tolerate HEP to improve ROM and independence with ADL's (met)     Long Term Goals: 8 weeks  1.Report decreased knee pain < / = 0/10  to increase tolerance for sleeping and ADLs pain free  (progressing, not met)  2.Patient goal:  return to shopping and wearing heels without knee pain  (progressing, not met)  3.Increase strength to >/= 4+/5 in quads  to increase tolerance for ADL and work activities. (progressing, not met)  4. Pt will report at 51% on FOTO knee to demonstrate increase in LE function with every day tasks.    (progressing, not met)  Plan     Certification Period: 2/19/2020 to 3/25/2020  Recommended Treatment Plan: 2 times per week for 6 weeks: Gait Training, Manual Therapy, Moist Heat/ Ice, Neuromuscular Re-ed, Patient Education, Self Care, Therapeutic Activites and Therapeutic Exercise  Other Recommendations: modalities prn, ASTYM prn    Progress functional exercise and quad activation    Therapist: Kasi Lovelace, PT, DPT

## 2020-02-27 ENCOUNTER — TELEPHONE (OUTPATIENT)
Dept: INTERNAL MEDICINE | Facility: CLINIC | Age: 70
End: 2020-02-27

## 2020-02-27 DIAGNOSIS — I10 ESSENTIAL HYPERTENSION: ICD-10-CM

## 2020-02-27 DIAGNOSIS — E66.9 OBESITY (BMI 30.0-34.9): ICD-10-CM

## 2020-02-27 DIAGNOSIS — K21.9 GASTROESOPHAGEAL REFLUX DISEASE, ESOPHAGITIS PRESENCE NOT SPECIFIED: Primary | ICD-10-CM

## 2020-02-27 DIAGNOSIS — E55.9 VITAMIN D DEFICIENCY: ICD-10-CM

## 2020-02-27 DIAGNOSIS — E04.2 MULTIPLE THYROID NODULES: ICD-10-CM

## 2020-03-18 ENCOUNTER — TELEPHONE (OUTPATIENT)
Dept: REHABILITATION | Facility: HOSPITAL | Age: 70
End: 2020-03-18

## 2020-03-23 ENCOUNTER — TELEPHONE (OUTPATIENT)
Dept: INTERNAL MEDICINE | Facility: CLINIC | Age: 70
End: 2020-03-23

## 2020-03-23 RX ORDER — PROMETHAZINE HYDROCHLORIDE AND CODEINE PHOSPHATE 6.25; 1 MG/5ML; MG/5ML
5 SOLUTION ORAL EVERY 4 HOURS PRN
Qty: 240 ML | Refills: 0 | Status: ON HOLD | OUTPATIENT
Start: 2020-03-23 | End: 2020-04-17 | Stop reason: HOSPADM

## 2020-03-23 RX ORDER — LEVOCETIRIZINE DIHYDROCHLORIDE 5 MG/1
5 TABLET, FILM COATED ORAL DAILY PRN
Qty: 30 TABLET | Refills: 1 | Status: SHIPPED | OUTPATIENT
Start: 2020-03-23 | End: 2020-04-22 | Stop reason: SDUPTHER

## 2020-03-23 NOTE — TELEPHONE ENCOUNTER
----- Message from Lenore Wells sent at 3/23/2020  8:20 AM CDT -----  Contact: self   Would like to get medical advice.  Symptoms (please be specific):  Cough congestion sinus  How long has patient had these symptoms:  2 days  Pharmacy name and phone #:  [id number] - [place] - [address] [phone] (Phone)  [phone] (Fax)  Any drug allergies (copy from chart):      Would the patient rather a call back or a response via [facility]?:  Call back   Comments:

## 2020-03-23 NOTE — TELEPHONE ENCOUNTER
Prescription orders for lev cetirizine and promethazine with codeine have been sent to the patient's pharmacy.

## 2020-03-24 ENCOUNTER — TELEPHONE (OUTPATIENT)
Dept: INTERNAL MEDICINE | Facility: CLINIC | Age: 70
End: 2020-03-24

## 2020-03-24 RX ORDER — METHYLPREDNISOLONE 4 MG/1
TABLET ORAL
Qty: 1 PACKAGE | Refills: 0 | Status: ON HOLD | OUTPATIENT
Start: 2020-03-24 | End: 2020-04-17 | Stop reason: HOSPADM

## 2020-03-24 NOTE — TELEPHONE ENCOUNTER
Cough medication was prescribed last evening along Xyzal.  A Medrol Dosepak will be added for inflammation and chest congestion.  The chest congestion comes from inflammation of the bronchial airway.  The patient may also use Mucinex (OTC) for additional mucus congestion relief.

## 2020-03-27 ENCOUNTER — OFFICE VISIT (OUTPATIENT)
Dept: URGENT CARE | Facility: CLINIC | Age: 70
End: 2020-03-27
Payer: MEDICARE

## 2020-03-27 VITALS
BODY MASS INDEX: 30.73 KG/M2 | SYSTOLIC BLOOD PRESSURE: 125 MMHG | DIASTOLIC BLOOD PRESSURE: 86 MMHG | RESPIRATION RATE: 18 BRPM | TEMPERATURE: 99 F | OXYGEN SATURATION: 95 % | WEIGHT: 167 LBS | HEIGHT: 62 IN | HEART RATE: 77 BPM

## 2020-03-27 DIAGNOSIS — R68.89 FLU-LIKE SYMPTOMS: ICD-10-CM

## 2020-03-27 DIAGNOSIS — I10 ESSENTIAL HYPERTENSION: ICD-10-CM

## 2020-03-27 DIAGNOSIS — Z20.822 SUSPECTED COVID-19 VIRUS INFECTION: Primary | ICD-10-CM

## 2020-03-27 DIAGNOSIS — R06.02 SOB (SHORTNESS OF BREATH): ICD-10-CM

## 2020-03-27 LAB
CTP QC/QA: YES
FLUAV AG NPH QL: NEGATIVE
FLUBV AG NPH QL: NEGATIVE

## 2020-03-27 PROCEDURE — 99214 OFFICE O/P EST MOD 30 MIN: CPT | Mod: 25,S$GLB,, | Performed by: NURSE PRACTITIONER

## 2020-03-27 PROCEDURE — 87804 INFLUENZA ASSAY W/OPTIC: CPT | Mod: QW,S$GLB,, | Performed by: NURSE PRACTITIONER

## 2020-03-27 PROCEDURE — 99214 PR OFFICE/OUTPT VISIT, EST, LEVL IV, 30-39 MIN: ICD-10-PCS | Mod: 25,S$GLB,, | Performed by: NURSE PRACTITIONER

## 2020-03-27 PROCEDURE — 71046 X-RAY EXAM CHEST 2 VIEWS: CPT | Mod: S$GLB,,, | Performed by: RADIOLOGY

## 2020-03-27 PROCEDURE — 71046 XR CHEST PA AND LATERAL: ICD-10-PCS | Mod: S$GLB,,, | Performed by: RADIOLOGY

## 2020-03-27 PROCEDURE — 87804 POCT INFLUENZA A/B: ICD-10-PCS | Mod: 59,QW,S$GLB, | Performed by: NURSE PRACTITIONER

## 2020-03-27 RX ORDER — ONDANSETRON 4 MG/1
4 TABLET, ORALLY DISINTEGRATING ORAL EVERY 6 HOURS PRN
Qty: 12 TABLET | Refills: 0 | Status: SHIPPED | OUTPATIENT
Start: 2020-03-27 | End: 2020-05-01

## 2020-03-27 RX ORDER — ALBUTEROL SULFATE 90 UG/1
2 AEROSOL, METERED RESPIRATORY (INHALATION) EVERY 6 HOURS PRN
Qty: 18 G | Refills: 0 | Status: ON HOLD | OUTPATIENT
Start: 2020-03-27 | End: 2020-04-17 | Stop reason: SDUPTHER

## 2020-03-27 NOTE — PATIENT INSTRUCTIONS
"  At this time is not found that you need to be tested for covid 19.  If you start to develop fever or cough and the next 2-14 days please log on to [facility] for further guidance and reassessment.      Viral Syndrome (Adult)  A viral illness may cause a number of symptoms. The symptoms depend on the part of the body that the virus affects. If it settles in your nose, throat, and lungs, it may cause cough, sore throat, congestion, and sometimes headache. If it settles in your stomach and intestinal tract, it may cause vomiting and diarrhea. Sometimes it causes vague symptoms like "aching all over," feeling tired, loss of appetite, or fever.  A viral illness usually lasts 1 to 2 weeks, but sometimes it lasts longer. In some cases, a more serious infection can look like a viral syndrome in the first few days of the illness. You may need another exam and additional tests to know the difference. Watch for the warning signs listed below.  Home care  Follow these guidelines for taking care of yourself at home:  · If symptoms are severe, rest at home for the first 2 to 3 days.  · Stay away from cigarette smoke - both your smoke and the smoke from others.  · You may use over-the-counter acetaminophen or ibuprofen for fever, muscle aching, and headache, unless another medicine was prescribed for this. If you have chronic liver or kidney disease or ever had a stomach ulcer or GI bleeding, talk with your doctor before using these medicines. No one who is younger than 18 and ill with a fever should take aspirin. It may cause severe disease or death.  · Your appetite may be poor, so a light diet is fine. Avoid dehydration by drinking 8 to 12 8-ounce glasses of fluids each day. This may include water; orange juice; lemonade; apple, grape, and cranberry juice; clear fruit drinks; electrolyte replacement and sports drinks; and decaffeinated teas and coffee. If you have been diagnosed with a kidney disease, ask your " doctor how much and what types of fluids you should drink to prevent dehydration. If you have kidney disease, drinking too much fluid can cause it build up in the your body and be dangerous to your health.  · Over-the-counter remedies won't shorten the length of the illness but may be helpful for cough, sore throat; and nasal and sinus congestion. Don't use decongestants if you have high blood pressure.  Follow-up care  Follow up with your healthcare provider if you do not improve over the next week.  Call 911  Get emergency medical care if any of the following occur:  · Convulsion  · Feeling weak, dizzy, or like you are going to faint  · Chest pain, shortness of breath, wheezing, or difficulty breathing  When to seek medical advice  Call your healthcare provider right away if any of these occur:  · Cough with lots of colored sputum (mucus) or blood in your sputum  · Chest pain, shortness of breath, wheezing, or difficulty breathing  · Severe headache; face, neck, or ear pain  · Severe, constant pain in the lower right side of your belly (abdominal)  · Continued vomiting (cant keep liquids down)  · Frequent diarrhea (more than 5 times a day); blood (red or black color) or mucus in diarrhea  · Feeling weak, dizzy, or like you are going to faint  · Extreme thirst  · Fever of 100.4°F (38°C) or higher, or as directed by your healthcare provider  Date Last Reviewed: 9/25/2015  © 1604-0935 [employer]. [address] [place] [address], [place] [address]. All rights reserved. This information is not intended as a substitute for professional medical care. Always follow your healthcare professional's instructions.

## 2020-03-27 NOTE — PROGRESS NOTES
"Subjective:       Patient ID: Jojo Burrows is a 70 y.o. female.    Vitals:  height is 5' 2" (1.575 m) and weight is 75.8 kg (167 lb). Her temperature is 99 °F (37.2 °C). Her blood pressure is 216/103 (abnormal) and her pulse is 77. Her respiration is 18 and oxygen saturation is 95%.     Chief Complaint: URI ([phone])    3-5days cough,congestion,no taste or smell, weakness, SOB, wheezing, body aches, occasional dizziness. Has been taking cough syrup and steroids which were prescribed by her PCP.  States she took her blood pressure medication when she came into clinic.    URI    This is a new problem. The current episode started in the past 7 days. The problem has been gradually worsening. There has been no fever. Associated symptoms include congestion, coughing, headaches, nausea, sinus pain, a sore throat and wheezing. Pertinent negatives include no ear pain, rash or vomiting. She has tried antihistamine and acetaminophen for the symptoms. The treatment provided mild relief.       Constitution: Positive for chills and fatigue. Negative for sweating and fever.   HENT: Positive for congestion, sinus pain and sore throat. Negative for ear pain, sinus pressure and voice change.    Neck: Negative for painful lymph nodes.   Eyes: Negative for eye redness.   Respiratory: Positive for chest tightness, cough and wheezing. Negative for sputum production, bloody sputum, COPD, shortness of breath, stridor and asthma.    Gastrointestinal: Positive for nausea. Negative for vomiting.   Musculoskeletal: Positive for muscle ache.   Skin: Negative for rash.   Allergic/Immunologic: Negative for seasonal allergies and asthma.   Neurological: Positive for dizziness and headaches.   Hematologic/Lymphatic: Negative for swollen lymph nodes.       Objective:      Physical Exam     due to the state of emergency this visit has been done remotely as per [facility] protocol.   Xr Chest Pa And Lateral    Result Date: 3/27/2020  EXAMINATION: " XR CHEST PA AND LATERAL CLINICAL HISTORY: Other general symptoms and signs TECHNIQUE: PA and lateral views of the chest were performed. COMPARISON: 08/21/2018 FINDINGS: The heart size and pulmonary vessels are normal.  Mediastinal contour shows tortuous aorta.  Trachea is again deviated to the left at the thoracic inlet consistent with thyroid enlargement previously evaluated with ultrasound.  The the lungs are expanded and clear without acute consolidation or pleural fluid.  Skeletal structures show degenerative spine changes.     No acute cardiopulmonary disease. Electronically signed by: Anson Gramajo MD Date:    03/27/2020 Time:    12:17      Assessment:       1. Suspected Covid-19 Virus Infection    2. Flu-like symptoms    3. Essential hypertension    4. SOB (shortness of breath)        Plan:       I had a detailed discussion with the patient on quarantining themselves  Also discussed symptomatic management.  Also discussed calling [facility] for further assistance if they start become worse.  Patient voiced understanding and is in agreement with the plan of care.  Suspected Covid-19 Virus Infection    Flu-like symptoms  -     XR CHEST PA AND LATERAL; Future; Expected date: 03/27/2020  -     POCT Influenza A/B  -     ondansetron (ZOFRAN-ODT) 4 MG TbDL; Take 1 tablet (4 mg total) by mouth every 6 (six) hours as needed.  Dispense: 12 tablet; Refill: 0    Essential hypertension    SOB (shortness of breath)  -     albuterol (PROVENTIL/VENTOLIN HFA) 90 mcg/actuation inhaler; Inhale 2 puffs into the lungs every 6 (six) hours as needed for Wheezing. Rescue  Dispense: 18 g; Refill: 0          Patient Instructions     At this time is not found that you need to be tested for covid 19.  If you start to develop fever or cough and the next 2-14 days please log on to [facility] for further guidance and reassessment.      Viral Syndrome (Adult)  A viral illness may cause a number of symptoms. The symptoms  "depend on the part of the body that the virus affects. If it settles in your nose, throat, and lungs, it may cause cough, sore throat, congestion, and sometimes headache. If it settles in your stomach and intestinal tract, it may cause vomiting and diarrhea. Sometimes it causes vague symptoms like "aching all over," feeling tired, loss of appetite, or fever.  A viral illness usually lasts 1 to 2 weeks, but sometimes it lasts longer. In some cases, a more serious infection can look like a viral syndrome in the first few days of the illness. You may need another exam and additional tests to know the difference. Watch for the warning signs listed below.  Home care  Follow these guidelines for taking care of yourself at home:  · If symptoms are severe, rest at home for the first 2 to 3 days.  · Stay away from cigarette smoke - both your smoke and the smoke from others.  · You may use over-the-counter acetaminophen or ibuprofen for fever, muscle aching, and headache, unless another medicine was prescribed for this. If you have chronic liver or kidney disease or ever had a stomach ulcer or GI bleeding, talk with your doctor before using these medicines. No one who is younger than 18 and ill with a fever should take aspirin. It may cause severe disease or death.  · Your appetite may be poor, so a light diet is fine. Avoid dehydration by drinking 8 to 12 8-ounce glasses of fluids each day. This may include water; orange juice; lemonade; apple, grape, and cranberry juice; clear fruit drinks; electrolyte replacement and sports drinks; and decaffeinated teas and coffee. If you have been diagnosed with a kidney disease, ask your doctor how much and what types of fluids you should drink to prevent dehydration. If you have kidney disease, drinking too much fluid can cause it build up in the your body and be dangerous to your health.  · Over-the-counter remedies won't shorten the length of the illness but may be helpful for cough, " sore throat; and nasal and sinus congestion. Don't use decongestants if you have high blood pressure.  Follow-up care  Follow up with your healthcare provider if you do not improve over the next week.  Call 911  Get emergency medical care if any of the following occur:  · Convulsion  · Feeling weak, dizzy, or like you are going to faint  · Chest pain, shortness of breath, wheezing, or difficulty breathing  When to seek medical advice  Call your healthcare provider right away if any of these occur:  · Cough with lots of colored sputum (mucus) or blood in your sputum  · Chest pain, shortness of breath, wheezing, or difficulty breathing  · Severe headache; face, neck, or ear pain  · Severe, constant pain in the lower right side of your belly (abdominal)  · Continued vomiting (cant keep liquids down)  · Frequent diarrhea (more than 5 times a day); blood (red or black color) or mucus in diarrhea  · Feeling weak, dizzy, or like you are going to faint  · Extreme thirst  · Fever of 100.4°F (38°C) or higher, or as directed by your healthcare provider  Date Last Reviewed: 9/25/2015  © 6177-0167 [employer]. [address] [place] [address], [place] [address]. All rights reserved. This information is not intended as a substitute for professional medical care. Always follow your healthcare professional's instructions.

## 2020-03-30 ENCOUNTER — NURSE TRIAGE (OUTPATIENT)
Dept: ADMINISTRATIVE | Facility: CLINIC | Age: 70
End: 2020-03-30

## 2020-03-30 NOTE — TELEPHONE ENCOUNTER
"Patient reports feeling "slight better" better. Reports temperature of 100.4 this morning. Reports taking tylenol for her temperature. Patient reports cough with mils sob. Reports using inhaler for her sob. Patient denies any chest pains or difficulty breathing. Patient is keeping herself hydrated. Patient educated on s/s that would warrant an ER visit. Patient verbalizes understanding. Patient is in self-quarantine at this time.    Reason for Disposition   [1] Cough occurs AND [2] within 14 days of COVID-19 EXPOSURE    Additional Information   Negative: Severe difficulty breathing (e.g., struggling for each breath, speak in single words, bluish lips)   Negative: Sounds like a life-threatening emergency to the triager   Negative: [1] Difficulty breathing (shortness of breath) occurs AND [2] onset > 14 days after COVID-19 EXPOSURE (Close Contact)   Negative: [1] Dry cough occurs AND [2] onset > 14 days after COVID-19 EXPOSURE   Negative: [1] Wet cough (i.e., white-yellow, yellow, green, or janeen colored sputum) AND [2] onset > 14 days after COVID-19 EXPOSURE   Negative: [1] Common cold symptoms AND [2] onset > 14 days after COVID-19 EXPOSURE   Negative: [1] Difficulty breathing occurs AND [2] within 14 days of COVID-19 EXPOSURE (Close Contact)   Negative: Patient sounds very sick or weak to the triager   Negative: [1] Fever or feeling feverish AND [2] within 14 Days of COVID-19 EXPOSURE (Close Contact)   Negative: [1] Fever (or feeling feverish) OR cough occurs AND [2] travel from or living in high risk area (identified by CDC) AND [3] within last 14 days   Negative: [1] COVID-19 EXPOSURE within last 14 days AND [2] mild body aches, chills, diarrhea, headache, runny nose, or sore throat occur   Negative: [1] COVID-19 EXPOSURE within last 14 days AND [2] NO cough, fever, or breathing difficulty AND [3] exposed person is a healthcare worker who was NOT using all recommended personal protective equipment " (i.e., a respirator-N95 mask, eye protection, gloves, and gown)   Negative: [1] COVID-19 EXPOSURE (Close Contact) within last 14 days AND [2] NO cough, fever, or breathing difficulty   Negative: [1] Travel from or living in high risk area (identified by CDC) AND [2] within last 14 days AND [3] NO cough or fever or breathing difficulty   Negative: [1] COVID-19 EXPOSURE (Close Contact) AND [2] 15 or more days ago AND [3] NO cough or fever or breathing difficulty   Negative: [1] No COVID-19 EXPOSURE BUT [2] living with someone who was exposed and who has no fever or cough   Negative: [1] Caller concerned that exposure to COVID-19 occurred BUT [2] does not meet COVID-19 EXPOSURE criteria from CDC   Negative: COVID-19 testing, questions about who needs it   Negative: [1] No COVID-19 EXPOSURE BUT [2] questions about   Negative: [1] Diagnosed with Coronavirus Disease (COVID-19) AND [2] questions about home isolation    Protocols used: CORONAVIRUS (COVID-19) EXPOSURE-A-

## 2020-03-31 ENCOUNTER — HOSPITAL ENCOUNTER (INPATIENT)
Facility: HOSPITAL | Age: 70
LOS: 18 days | Discharge: NURSING, REHAB OR LONG-TERM CARE | DRG: 177 | End: 2020-04-18
Attending: EMERGENCY MEDICINE | Admitting: EMERGENCY MEDICINE
Payer: MEDICARE

## 2020-03-31 DIAGNOSIS — U07.1 COVID-19 VIRUS INFECTION: Primary | ICD-10-CM

## 2020-03-31 DIAGNOSIS — R09.02 HYPOXIA: ICD-10-CM

## 2020-03-31 DIAGNOSIS — R06.02 SOB (SHORTNESS OF BREATH): ICD-10-CM

## 2020-03-31 PROBLEM — J18.9 MULTIFOCAL PNEUMONIA: Status: ACTIVE | Noted: 2020-03-31

## 2020-03-31 PROBLEM — J96.01 ACUTE HYPOXEMIC RESPIRATORY FAILURE: Status: ACTIVE | Noted: 2020-03-31

## 2020-03-31 LAB
ALBUMIN SERPL BCP-MCNC: 3.5 G/DL (ref 3.5–5.2)
ALP SERPL-CCNC: 67 U/L (ref 55–135)
ALT SERPL W/O P-5'-P-CCNC: 29 U/L (ref 10–44)
ANION GAP SERPL CALC-SCNC: 8 MMOL/L (ref 8–16)
AST SERPL-CCNC: 45 U/L (ref 10–40)
BASOPHILS # BLD AUTO: 0.01 K/UL (ref 0–0.2)
BASOPHILS NFR BLD: 0.1 % (ref 0–1.9)
BILIRUB SERPL-MCNC: 0.6 MG/DL (ref 0.1–1)
BNP SERPL-MCNC: <10 PG/ML (ref 0–99)
BUN SERPL-MCNC: 6 MG/DL (ref 8–23)
CALCIUM SERPL-MCNC: 8.7 MG/DL (ref 8.7–10.5)
CHLORIDE SERPL-SCNC: 97 MMOL/L (ref 95–110)
CK SERPL-CCNC: 275 U/L (ref 20–180)
CO2 SERPL-SCNC: 31 MMOL/L (ref 23–29)
CREAT SERPL-MCNC: 0.9 MG/DL (ref 0.5–1.4)
CRP SERPL-MCNC: 52.4 MG/L (ref 0–8.2)
D DIMER PPP IA.FEU-MCNC: 0.73 MG/L FEU
DIFFERENTIAL METHOD: NORMAL
EOSINOPHIL # BLD AUTO: 0 K/UL (ref 0–0.5)
EOSINOPHIL NFR BLD: 0 % (ref 0–8)
ERYTHROCYTE [DISTWIDTH] IN BLOOD BY AUTOMATED COUNT: 13.6 % (ref 11.5–14.5)
ERYTHROCYTE [SEDIMENTATION RATE] IN BLOOD BY WESTERGREN METHOD: 51 MM/HR (ref 0–36)
EST. GFR  (AFRICAN AMERICAN): >60 ML/MIN/1.73 M^2
EST. GFR  (NON AFRICAN AMERICAN): >60 ML/MIN/1.73 M^2
FERRITIN SERPL-MCNC: 471 NG/ML (ref 20–300)
GLUCOSE SERPL-MCNC: 111 MG/DL (ref 70–110)
HCT VFR BLD AUTO: 42 % (ref 37–48.5)
HGB BLD-MCNC: 13.8 G/DL (ref 12–16)
IMM GRANULOCYTES # BLD AUTO: 0.03 K/UL (ref 0–0.04)
IMM GRANULOCYTES NFR BLD AUTO: 0.4 % (ref 0–0.5)
INFLUENZA A, MOLECULAR: NEGATIVE
INFLUENZA B, MOLECULAR: NEGATIVE
LACTATE SERPL-SCNC: 1.4 MMOL/L (ref 0.5–2.2)
LDH SERPL L TO P-CCNC: 444 U/L (ref 110–260)
LYMPHOCYTES # BLD AUTO: 1.6 K/UL (ref 1–4.8)
LYMPHOCYTES NFR BLD: 21.5 % (ref 18–48)
MAGNESIUM SERPL-MCNC: 2.3 MG/DL (ref 1.6–2.6)
MCH RBC QN AUTO: 29.2 PG (ref 27–31)
MCHC RBC AUTO-ENTMCNC: 32.9 G/DL (ref 32–36)
MCV RBC AUTO: 89 FL (ref 82–98)
MONOCYTES # BLD AUTO: 0.7 K/UL (ref 0.3–1)
MONOCYTES NFR BLD: 9 % (ref 4–15)
NEUTROPHILS # BLD AUTO: 5 K/UL (ref 1.8–7.7)
NEUTROPHILS NFR BLD: 69 % (ref 38–73)
NRBC BLD-RTO: 0 /100 WBC
PHOSPHATE SERPL-MCNC: 2.5 MG/DL (ref 2.7–4.5)
PLATELET # BLD AUTO: 173 K/UL (ref 150–350)
PMV BLD AUTO: 10.9 FL (ref 9.2–12.9)
POTASSIUM SERPL-SCNC: 3.8 MMOL/L (ref 3.5–5.1)
PROCALCITONIN SERPL IA-MCNC: 0.04 NG/ML
PROT SERPL-MCNC: 7.6 G/DL (ref 6–8.4)
RBC # BLD AUTO: 4.73 M/UL (ref 4–5.4)
SODIUM SERPL-SCNC: 136 MMOL/L (ref 136–145)
SPECIMEN SOURCE: NORMAL
TROPONIN I SERPL DL<=0.01 NG/ML-MCNC: 0.01 NG/ML (ref 0–0.03)
WBC # BLD AUTO: 7.22 K/UL (ref 3.9–12.7)

## 2020-03-31 PROCEDURE — 99223 PR INITIAL HOSPITAL CARE,LEVL III: ICD-10-PCS | Mod: HCNC,AI,, | Performed by: HOSPITALIST

## 2020-03-31 PROCEDURE — 94640 AIRWAY INHALATION TREATMENT: CPT | Mod: HCNC

## 2020-03-31 PROCEDURE — 83615 LACTATE (LD) (LDH) ENZYME: CPT | Mod: HCNC

## 2020-03-31 PROCEDURE — 93005 ELECTROCARDIOGRAM TRACING: CPT | Mod: HCNC

## 2020-03-31 PROCEDURE — 84100 ASSAY OF PHOSPHORUS: CPT | Mod: HCNC

## 2020-03-31 PROCEDURE — 27000221 HC OXYGEN, UP TO 24 HOURS: Mod: HCNC

## 2020-03-31 PROCEDURE — 63600175 PHARM REV CODE 636 W HCPCS: Mod: HCNC | Performed by: PHYSICIAN ASSISTANT

## 2020-03-31 PROCEDURE — 87040 BLOOD CULTURE FOR BACTERIA: CPT | Mod: 59,HCNC

## 2020-03-31 PROCEDURE — 99291 CRITICAL CARE FIRST HOUR: CPT | Mod: HCNC,,, | Performed by: PHYSICIAN ASSISTANT

## 2020-03-31 PROCEDURE — 87502 INFLUENZA DNA AMP PROBE: CPT | Mod: HCNC

## 2020-03-31 PROCEDURE — 84145 PROCALCITONIN (PCT): CPT | Mod: HCNC

## 2020-03-31 PROCEDURE — 83605 ASSAY OF LACTIC ACID: CPT | Mod: HCNC

## 2020-03-31 PROCEDURE — 11000001 HC ACUTE MED/SURG PRIVATE ROOM: Mod: HCNC

## 2020-03-31 PROCEDURE — 82728 ASSAY OF FERRITIN: CPT | Mod: HCNC

## 2020-03-31 PROCEDURE — 93010 EKG 12-LEAD: ICD-10-PCS | Mod: HCNC,,, | Performed by: INTERNAL MEDICINE

## 2020-03-31 PROCEDURE — 86140 C-REACTIVE PROTEIN: CPT | Mod: HCNC

## 2020-03-31 PROCEDURE — 85652 RBC SED RATE AUTOMATED: CPT | Mod: HCNC

## 2020-03-31 PROCEDURE — 99291 PR CRITICAL CARE, E/M 30-74 MINUTES: ICD-10-PCS | Mod: HCNC,,, | Performed by: PHYSICIAN ASSISTANT

## 2020-03-31 PROCEDURE — 80053 COMPREHEN METABOLIC PANEL: CPT | Mod: HCNC

## 2020-03-31 PROCEDURE — 82550 ASSAY OF CK (CPK): CPT | Mod: HCNC

## 2020-03-31 PROCEDURE — 27100098 HC SPACER: Mod: HCNC

## 2020-03-31 PROCEDURE — 85379 FIBRIN DEGRADATION QUANT: CPT | Mod: HCNC

## 2020-03-31 PROCEDURE — 63700000 PHARM REV CODE 250 ALT 637 W/O HCPCS: Mod: HCNC | Performed by: PHYSICIAN ASSISTANT

## 2020-03-31 PROCEDURE — 85025 COMPLETE CBC W/AUTO DIFF WBC: CPT | Mod: HCNC

## 2020-03-31 PROCEDURE — 83735 ASSAY OF MAGNESIUM: CPT | Mod: HCNC

## 2020-03-31 PROCEDURE — 84484 ASSAY OF TROPONIN QUANT: CPT | Mod: HCNC

## 2020-03-31 PROCEDURE — 99223 1ST HOSP IP/OBS HIGH 75: CPT | Mod: HCNC,AI,, | Performed by: HOSPITALIST

## 2020-03-31 PROCEDURE — 94761 N-INVAS EAR/PLS OXIMETRY MLT: CPT | Mod: HCNC

## 2020-03-31 PROCEDURE — 93010 ELECTROCARDIOGRAM REPORT: CPT | Mod: HCNC,,, | Performed by: INTERNAL MEDICINE

## 2020-03-31 PROCEDURE — 83880 ASSAY OF NATRIURETIC PEPTIDE: CPT | Mod: HCNC

## 2020-03-31 RX ORDER — GLUCAGON 1 MG
1 KIT INJECTION
Status: DISCONTINUED | OUTPATIENT
Start: 2020-03-31 | End: 2020-04-18 | Stop reason: HOSPADM

## 2020-03-31 RX ORDER — SODIUM CHLORIDE 0.9 % (FLUSH) 0.9 %
10 SYRINGE (ML) INJECTION
Status: DISCONTINUED | OUTPATIENT
Start: 2020-03-31 | End: 2020-04-18 | Stop reason: HOSPADM

## 2020-03-31 RX ORDER — TIOTROPIUM BROMIDE 18 UG/1
1 CAPSULE ORAL; RESPIRATORY (INHALATION) DAILY
Status: DISCONTINUED | OUTPATIENT
Start: 2020-04-01 | End: 2020-04-18 | Stop reason: HOSPADM

## 2020-03-31 RX ORDER — ALBUTEROL SULFATE 90 UG/1
2 AEROSOL, METERED RESPIRATORY (INHALATION) EVERY 6 HOURS
Status: DISCONTINUED | OUTPATIENT
Start: 2020-04-01 | End: 2020-04-01

## 2020-03-31 RX ORDER — AZITHROMYCIN 250 MG/1
500 TABLET, FILM COATED ORAL NIGHTLY
Status: COMPLETED | OUTPATIENT
Start: 2020-03-31 | End: 2020-04-02

## 2020-03-31 RX ORDER — ENOXAPARIN SODIUM 100 MG/ML
40 INJECTION SUBCUTANEOUS NIGHTLY
Status: DISCONTINUED | OUTPATIENT
Start: 2020-03-31 | End: 2020-04-18 | Stop reason: HOSPADM

## 2020-03-31 RX ORDER — POLYETHYLENE GLYCOL 3350 17 G/17G
17 POWDER, FOR SOLUTION ORAL 2 TIMES DAILY PRN
Status: DISCONTINUED | OUTPATIENT
Start: 2020-03-31 | End: 2020-04-18 | Stop reason: HOSPADM

## 2020-03-31 RX ORDER — ALBUTEROL SULFATE 90 UG/1
2 AEROSOL, METERED RESPIRATORY (INHALATION) EVERY 6 HOURS PRN
Status: DISCONTINUED | OUTPATIENT
Start: 2020-03-31 | End: 2020-03-31

## 2020-03-31 RX ORDER — CEFTRIAXONE 1 G/1
1 INJECTION, POWDER, FOR SOLUTION INTRAMUSCULAR; INTRAVENOUS NIGHTLY
Status: COMPLETED | OUTPATIENT
Start: 2020-03-31 | End: 2020-04-05

## 2020-03-31 RX ORDER — LOPERAMIDE HYDROCHLORIDE 2 MG/1
2 CAPSULE ORAL EVERY 6 HOURS PRN
Status: DISCONTINUED | OUTPATIENT
Start: 2020-03-31 | End: 2020-04-18 | Stop reason: HOSPADM

## 2020-03-31 RX ORDER — ONDANSETRON 8 MG/1
8 TABLET, ORALLY DISINTEGRATING ORAL EVERY 8 HOURS PRN
Status: DISCONTINUED | OUTPATIENT
Start: 2020-03-31 | End: 2020-04-18 | Stop reason: HOSPADM

## 2020-03-31 RX ORDER — TALC
6 POWDER (GRAM) TOPICAL NIGHTLY PRN
Status: DISCONTINUED | OUTPATIENT
Start: 2020-03-31 | End: 2020-04-06

## 2020-03-31 RX ORDER — BENZONATATE 100 MG/1
100 CAPSULE ORAL 3 TIMES DAILY PRN
Status: DISCONTINUED | OUTPATIENT
Start: 2020-03-31 | End: 2020-04-18 | Stop reason: HOSPADM

## 2020-03-31 RX ORDER — ACETAMINOPHEN 325 MG/1
650 TABLET ORAL EVERY 8 HOURS PRN
Status: DISCONTINUED | OUTPATIENT
Start: 2020-03-31 | End: 2020-04-18 | Stop reason: HOSPADM

## 2020-03-31 RX ORDER — IBUPROFEN 200 MG
24 TABLET ORAL
Status: DISCONTINUED | OUTPATIENT
Start: 2020-03-31 | End: 2020-04-18 | Stop reason: HOSPADM

## 2020-03-31 RX ORDER — IBUPROFEN 200 MG
16 TABLET ORAL
Status: DISCONTINUED | OUTPATIENT
Start: 2020-03-31 | End: 2020-04-18 | Stop reason: HOSPADM

## 2020-03-31 RX ORDER — ACETAMINOPHEN 325 MG/1
650 TABLET ORAL EVERY 4 HOURS PRN
Status: DISCONTINUED | OUTPATIENT
Start: 2020-03-31 | End: 2020-04-18 | Stop reason: HOSPADM

## 2020-03-31 RX ADMIN — AZITHROMYCIN MONOHYDRATE 500 MG: 250 TABLET ORAL at 10:03

## 2020-03-31 RX ADMIN — ALBUTEROL SULFATE 2 PUFF: 90 AEROSOL, METERED RESPIRATORY (INHALATION) at 11:03

## 2020-03-31 RX ADMIN — ENOXAPARIN SODIUM 40 MG: 100 INJECTION SUBCUTANEOUS at 10:03

## 2020-03-31 RX ADMIN — CEFTRIAXONE 1 G: 1 INJECTION, POWDER, FOR SOLUTION INTRAMUSCULAR; INTRAVENOUS at 10:03

## 2020-04-01 LAB
ALBUMIN SERPL BCP-MCNC: 3.4 G/DL (ref 3.5–5.2)
ALP SERPL-CCNC: 63 U/L (ref 55–135)
ALT SERPL W/O P-5'-P-CCNC: 28 U/L (ref 10–44)
ANION GAP SERPL CALC-SCNC: 12 MMOL/L (ref 8–16)
AST SERPL-CCNC: 40 U/L (ref 10–40)
BASOPHILS # BLD AUTO: 0.04 K/UL (ref 0–0.2)
BASOPHILS NFR BLD: 0.5 % (ref 0–1.9)
BILIRUB SERPL-MCNC: 0.6 MG/DL (ref 0.1–1)
BUN SERPL-MCNC: 8 MG/DL (ref 8–23)
CALCIUM SERPL-MCNC: 8.8 MG/DL (ref 8.7–10.5)
CHLORIDE SERPL-SCNC: 99 MMOL/L (ref 95–110)
CO2 SERPL-SCNC: 28 MMOL/L (ref 23–29)
CREAT SERPL-MCNC: 0.8 MG/DL (ref 0.5–1.4)
DIFFERENTIAL METHOD: ABNORMAL
EOSINOPHIL # BLD AUTO: 0 K/UL (ref 0–0.5)
EOSINOPHIL NFR BLD: 0.1 % (ref 0–8)
ERYTHROCYTE [DISTWIDTH] IN BLOOD BY AUTOMATED COUNT: 13.7 % (ref 11.5–14.5)
EST. GFR  (AFRICAN AMERICAN): >60 ML/MIN/1.73 M^2
EST. GFR  (NON AFRICAN AMERICAN): >60 ML/MIN/1.73 M^2
GLUCOSE SERPL-MCNC: 90 MG/DL (ref 70–110)
HCT VFR BLD AUTO: 42.4 % (ref 37–48.5)
HGB BLD-MCNC: 13.5 G/DL (ref 12–16)
IMM GRANULOCYTES # BLD AUTO: 0.02 K/UL (ref 0–0.04)
IMM GRANULOCYTES NFR BLD AUTO: 0.3 % (ref 0–0.5)
LYMPHOCYTES # BLD AUTO: 2.9 K/UL (ref 1–4.8)
LYMPHOCYTES NFR BLD: 37.8 % (ref 18–48)
MAGNESIUM SERPL-MCNC: 2.4 MG/DL (ref 1.6–2.6)
MCH RBC QN AUTO: 28.6 PG (ref 27–31)
MCHC RBC AUTO-ENTMCNC: 31.8 G/DL (ref 32–36)
MCV RBC AUTO: 90 FL (ref 82–98)
MONOCYTES # BLD AUTO: 0.8 K/UL (ref 0.3–1)
MONOCYTES NFR BLD: 10.9 % (ref 4–15)
NEUTROPHILS # BLD AUTO: 3.8 K/UL (ref 1.8–7.7)
NEUTROPHILS NFR BLD: 50.4 % (ref 38–73)
NRBC BLD-RTO: 0 /100 WBC
PHOSPHATE SERPL-MCNC: 3.1 MG/DL (ref 2.7–4.5)
PLATELET # BLD AUTO: 177 K/UL (ref 150–350)
PMV BLD AUTO: 11.2 FL (ref 9.2–12.9)
POTASSIUM SERPL-SCNC: 3.7 MMOL/L (ref 3.5–5.1)
PROT SERPL-MCNC: 7.3 G/DL (ref 6–8.4)
RBC # BLD AUTO: 4.72 M/UL (ref 4–5.4)
SODIUM SERPL-SCNC: 139 MMOL/L (ref 136–145)
WBC # BLD AUTO: 7.61 K/UL (ref 3.9–12.7)

## 2020-04-01 PROCEDURE — 99900035 HC TECH TIME PER 15 MIN (STAT): Mod: HCNC

## 2020-04-01 PROCEDURE — 36415 COLL VENOUS BLD VENIPUNCTURE: CPT | Mod: HCNC

## 2020-04-01 PROCEDURE — 80053 COMPREHEN METABOLIC PANEL: CPT | Mod: HCNC

## 2020-04-01 PROCEDURE — 63700000 PHARM REV CODE 250 ALT 637 W/O HCPCS: Mod: HCNC | Performed by: PHYSICIAN ASSISTANT

## 2020-04-01 PROCEDURE — 63600175 PHARM REV CODE 636 W HCPCS: Mod: HCNC | Performed by: PHYSICIAN ASSISTANT

## 2020-04-01 PROCEDURE — 99233 PR SUBSEQUENT HOSPITAL CARE,LEVL III: ICD-10-PCS | Mod: HCNC,,, | Performed by: HOSPITALIST

## 2020-04-01 PROCEDURE — 83735 ASSAY OF MAGNESIUM: CPT | Mod: HCNC

## 2020-04-01 PROCEDURE — 11000001 HC ACUTE MED/SURG PRIVATE ROOM: Mod: HCNC

## 2020-04-01 PROCEDURE — 25000242 PHARM REV CODE 250 ALT 637 W/ HCPCS: Mod: HCNC | Performed by: HOSPITALIST

## 2020-04-01 PROCEDURE — 25000242 PHARM REV CODE 250 ALT 637 W/ HCPCS: Mod: HCNC | Performed by: PHYSICIAN ASSISTANT

## 2020-04-01 PROCEDURE — 99233 SBSQ HOSP IP/OBS HIGH 50: CPT | Mod: HCNC,,, | Performed by: HOSPITALIST

## 2020-04-01 PROCEDURE — 94640 AIRWAY INHALATION TREATMENT: CPT | Mod: HCNC

## 2020-04-01 PROCEDURE — 27000221 HC OXYGEN, UP TO 24 HOURS: Mod: HCNC

## 2020-04-01 PROCEDURE — 94761 N-INVAS EAR/PLS OXIMETRY MLT: CPT | Mod: HCNC

## 2020-04-01 PROCEDURE — 85025 COMPLETE CBC W/AUTO DIFF WBC: CPT | Mod: HCNC

## 2020-04-01 PROCEDURE — 25000003 PHARM REV CODE 250: Mod: HCNC | Performed by: PHYSICIAN ASSISTANT

## 2020-04-01 PROCEDURE — 84100 ASSAY OF PHOSPHORUS: CPT | Mod: HCNC

## 2020-04-01 PROCEDURE — 25000003 PHARM REV CODE 250: Mod: HCNC | Performed by: HOSPITALIST

## 2020-04-01 RX ORDER — HYDROXYCHLOROQUINE SULFATE 200 MG/1
400 TABLET, FILM COATED ORAL 2 TIMES DAILY
Status: COMPLETED | OUTPATIENT
Start: 2020-04-01 | End: 2020-04-01

## 2020-04-01 RX ORDER — HYDROXYCHLOROQUINE SULFATE 200 MG/1
400 TABLET, FILM COATED ORAL DAILY
Status: COMPLETED | OUTPATIENT
Start: 2020-04-02 | End: 2020-04-05

## 2020-04-01 RX ORDER — ALBUTEROL SULFATE 2.5 MG/.5ML
0.5 SOLUTION RESPIRATORY (INHALATION) EVERY 6 HOURS
Status: DISCONTINUED | OUTPATIENT
Start: 2020-04-01 | End: 2020-04-02

## 2020-04-01 RX ORDER — METOPROLOL SUCCINATE 50 MG/1
50 TABLET, EXTENDED RELEASE ORAL DAILY
Status: DISCONTINUED | OUTPATIENT
Start: 2020-04-02 | End: 2020-04-10

## 2020-04-01 RX ORDER — ALBUTEROL SULFATE 90 UG/1
2 AEROSOL, METERED RESPIRATORY (INHALATION)
Status: DISCONTINUED | OUTPATIENT
Start: 2020-04-01 | End: 2020-04-01

## 2020-04-01 RX ADMIN — ALBUTEROL SULFATE 2 PUFF: 90 AEROSOL, METERED RESPIRATORY (INHALATION) at 11:04

## 2020-04-01 RX ADMIN — ALBUTEROL SULFATE 2 PUFF: 90 AEROSOL, METERED RESPIRATORY (INHALATION) at 04:04

## 2020-04-01 RX ADMIN — HYDROXYCHLOROQUINE SULFATE 400 MG: 200 TABLET ORAL at 08:04

## 2020-04-01 RX ADMIN — BENZONATATE 100 MG: 100 CAPSULE, LIQUID FILLED ORAL at 11:04

## 2020-04-01 RX ADMIN — TIOTROPIUM BROMIDE 18 MCG: 18 CAPSULE ORAL; RESPIRATORY (INHALATION) at 11:04

## 2020-04-01 RX ADMIN — LOPERAMIDE HYDROCHLORIDE 2 MG: 2 CAPSULE ORAL at 11:04

## 2020-04-01 RX ADMIN — AZITHROMYCIN MONOHYDRATE 500 MG: 250 TABLET ORAL at 08:04

## 2020-04-01 RX ADMIN — ENOXAPARIN SODIUM 40 MG: 100 INJECTION SUBCUTANEOUS at 08:04

## 2020-04-01 RX ADMIN — CEFTRIAXONE 1 G: 1 INJECTION, POWDER, FOR SOLUTION INTRAMUSCULAR; INTRAVENOUS at 08:04

## 2020-04-01 RX ADMIN — ALBUTEROL SULFATE 0.5 MG: 2.5 SOLUTION RESPIRATORY (INHALATION) at 08:04

## 2020-04-01 NOTE — H&P
Hospital Medicine  History and Physical  [facility]      Patient Name: Jojo Burrows  MRN:  [id number]  Hospital Medicine Team: [facility] MED C Mikal Staples PA-C  Date of Admission:  3/31/2020     Length of Stay:  LOS: 0 days     Principal Problem: Covid-19 Virus Infection    Chief complaint    SOB    HPI    Jojo Burrows is a 70F with HTN, obesity who presents for evaluation of SOB. She was seen in the ED on 03/28 with fever, chills, cough. She wasn't hypoxic with ambulation and discharged home. She was tested for COVID 19 at that time and has subsequently ruled in. Her  is admitted for COVID+ complications. She presents today with worsening SOB, cough, pleuritic chest pain, sinus congestions, myalgias. She has been using her albuterol inhaler at home with only mild relief.     Initially SpO2 of 78%, placed on NRB 15L, weaned to 6L NC.      Review of Systems    Constitutional: Positive for fever, chills, fatigue, poor appetite   HENT: Neg for sore throat, negative for trouble swallowing.    Eyes: Negative for photophobia, visual disturbance.   Respiratory: Positive for cough, shortness of breath  Cardiovascular: Positive for chest pain, Negative palpitations, leg swelling.   Gastrointestinal: Neg for diarrhea. Negative for abdominal pain, constipation, nausea, vomiting.   Endocrine: Negative for cold intolerance, heat intolerance.   Genitourinary: Negative for dysuria, frequency.   Musculoskeletal: Positive for arthralgias, myalgias.   Skin: Negative for rash  Neurological: Negative for dizziness, syncope, light-headedness.   Psychiatric/Behavioral: Negative for confusion, hallucinations, anxiety    Past Medical History:   Diagnosis Date    Acute hypoxemic respiratory failure 3/31/2020    ALLERGIC RHINITIS     Cataract     Degenerative disc disease, cervical 9/13/2018    GERD (gastroesophageal reflux disease)     Hyperlipidemia     Hypertension     Migraine headache      Multifocal pneumonia 3/31/2020    Nuclear sclerosis 4/30/2014    Sleep disorder     Thyroid disease     nodular goiter    TIA (transient ischemic attack)      Past Surgical History:   Procedure Laterality Date    BREAST BIOPSY      BREAST CYST ASPIRATION      BREAST CYST EXCISION      COLONOSCOPY N/A 4/29/2016    Procedure: COLONOSCOPY;  Surgeon: Sergio Vickers MD;  Location: [address]);  Service: Endoscopy;  Laterality: N/A;    HYSTERECTOMY       Family History   Problem Relation Age of Onset    Diabetes Mother     Hypertension Mother     Breast cancer Mother     Asthma Father     Glaucoma Father     Heart disease Maternal Grandmother     No Known Problems Son     No Known Problems Daughter     Breast cancer Sister     Ovarian cancer Sister     Amblyopia Neg Hx     Blindness Neg Hx     Cancer Neg Hx     Cataracts Neg Hx     Macular degeneration Neg Hx     Retinal detachment Neg Hx     Strabismus Neg Hx     Stroke Neg Hx     Thyroid disease Neg Hx      Social History     Socioeconomic History    Marital status:      Spouse name: Not on file    Number of children: Not on file    Years of education: Not on file    Highest education level: Not on file   Occupational History    Not on file   Social Needs    Financial resource strain: Not on file    Food insecurity:     Worry: Not on file     Inability: Not on file    Transportation needs:     Medical: Not on file     Non-medical: Not on file   Tobacco Use    Smoking status: Never Smoker    Smokeless tobacco: Never Used   Substance and Sexual Activity    Alcohol use: No    Drug use: No    Sexual activity: Not on file   Lifestyle    Physical activity:     Days per week: Not on file     Minutes per session: Not on file    Stress: Not on file   Relationships    Social connections:     Talks on phone: Not on file     Gets together: Not on file     Attends Jainism service: Not on file     Active member of club  or organization: Not on file     Attends meetings of clubs or organizations: Not on file     Relationship status: Not on file   Other Topics Concern    Not on file   Social History Narrative    Not on file       Medications  No current facility-administered medications on file prior to encounter.      Current Outpatient Medications on File Prior to Encounter   Medication Sig Dispense Refill    acetaminophen (TYLENOL ARTHRITIS ORAL) Take 1 tablet by mouth.      albuterol (PROVENTIL/VENTOLIN HFA) 90 mcg/actuation inhaler Inhale 2 puffs into the lungs every 6 (six) hours as needed for Wheezing. Rescue 18 g 0    ammonium lactate (LAC-HYDRIN) 12 % lotion Apply topically 2 (two) times daily as needed for Dry Skin. (Patient not taking: Reported on 3/27/2020) 1 Bottle 1    benzonatate (TESSALON) 100 MG capsule Take 1 capsule (100 mg total) by mouth 3 (three) times daily as needed for Cough. 20 capsule 0    carbamide peroxide (DEBROX) 6.5 % otic solution Place 5 drops into both ears as needed. (Patient not taking: Reported on 3/27/2020) 14.8 mL 0    cetirizine (ZYRTEC) 10 MG tablet Take 10 mg by mouth once daily.      clotrimazole-betamethasone 1-0.05% (LOTRISONE) cream Apply topically 2 (two) times daily. 45 g 0    cyanocobalamin (VITAMIN B-12) 250 MCG tablet Take 250 mcg by mouth once daily.      diclofenac sodium (VOLTAREN) 1 % Gel Apply 2 g topically 2 (two) times daily as needed. 1 Tube 1    ergocalciferol, vitamin D2, (VITAMIN D ORAL) Take 2,000 Int'l Units by mouth once daily.      esomeprazole (NEXIUM) 40 MG capsule Take 1 capsule (40 mg total) by mouth before breakfast. 90 capsule 3    folic acid/multivit-min/lutein (CENTRUM SILVER ORAL) Take 1 tablet by mouth once daily.      ibuprofen (ADVIL,MOTRIN) 200 MG tablet Take 200 mg by mouth every 6 (six) hours as needed for Pain.      levocetirizine (XYZAL) 5 MG tablet Take 1 tablet (5 mg total) by mouth daily as needed (cold and sinus). 30 tablet 1     methylPREDNISolone (MEDROL DOSEPACK) 4 mg tablet use as directed 1 Package 0    metoprolol succinate (TOPROL-XL) 50 MG 24 hr tablet TAKE 1 TABLET (50 MG TOTAL) BY MOUTH ONCE DAILY. 90 tablet 3    naproxen (NAPROSYN) 375 MG tablet Take 1 tablet (375 mg total) by mouth 2 (two) times daily with meals. 30 tablet 0    ondansetron (ZOFRAN) 4 MG tablet Take 1 tablet (4 mg total) by mouth every 6 (six) hours. 12 tablet 0    ondansetron (ZOFRAN-ODT) 4 MG TbDL Take 1 tablet (4 mg total) by mouth every 6 (six) hours as needed. 12 tablet 0    promethazine-codeine 6.25-10 mg/5 ml (PHENERGAN WITH CODEINE) 6.25-10 mg/5 mL syrup Take 5 mLs by mouth every 4 (four) hours as needed. 240 mL 0       Allergies  Iodine; Iodine and iodide containing products; and Shellfish containing products    Physical Examination  Temp:  [99.9 °F (37.7 °C)]   Pulse:  [71-87]   Resp:  [26-30]   BP: (104-123)/(58-74)   SpO2:  [76 %-99 %]     Gen: NAD, conversant  Head: NC, AT  Eyes: PERRLA, EOMI  Throat: MMM, OP clear  CV: RRR, no M/R/G, no peripheral edema, no JVD  Resp: mild wheeze throughout, no increased work of breathing on 6L NC  GI: Soft, NT, ND, +BS  Ext: MAEW, no c/c/e  Neuro: AAOx3, CN grossly intact, no focal neurologic deficits  Psychiatry: Normal mood, normal affect    Laboratory:  Recent Labs   Lab 03/28/20 1843 03/31/20 2011   WBC 5.79 7.22   LYMPH 22.8  1.3 21.5  1.6   HGB 14.2 13.8   HCT 43.2 42.0   * 173     Recent Labs   Lab 03/28/20 1843 03/31/20 2011    136   K 3.5 3.8    97   CO2 25 31*   BUN 9 6*   CREATININE 0.8 0.9    111*   CALCIUM 8.6* 8.7   MG  --  2.3   PHOS  --  2.5*     Recent Labs   Lab 03/28/20  1843 03/31/20 2011   ALKPHOS 70 67   ALT 30 29   AST 52* 45*   ALBUMIN 3.7 3.5   PROT 7.5 7.6   BILITOT 0.6 0.6      Recent Labs     03/31/20 2011   FERRITIN 471*   CRP 52.4*   *   BNP <10   TROPONINI 0.008   LACTATE 1.4       All labs within the last 24 hours were reviewed.  "    Microbiology:  Lab Results   Component Value Date    [id number] Detected (A) 03/28/2020       Microbiology Results (last 7 days)     Procedure Component Value Units Date/Time    Influenza A & B by Molecular [[id number]] Collected:  03/31/20 2224    Order Status:  Sent Specimen:  Nasopharyngeal Swab Updated:  03/31/20 2225    Culture, Respiratory with Gram Stain [[id number]]     Order Status:  No result Specimen:  Sputum, Expectorated     Blood culture [[id number]] Collected:  03/31/20 2018    Order Status:  Sent Specimen:  Blood from Peripheral, Antecubital, Right Updated:  03/31/20 2043    Blood culture [[id number]] Collected:  03/31/20 2011    Order Status:  Sent Specimen:  Blood from Peripheral, Antecubital, Left Updated:  03/31/20 2042            Imaging  ECG Results          EKG 12-lead (In process)  Result time 03/31/20 20:21:23    In process by Interface, Lab In [facility] (03/31/20 20:21:23)                 Narrative:    Test Reason : R06.02,    Vent. Rate : 079 BPM     Atrial Rate : 079 BPM     P-R Int : 148 ms          QRS Dur : 086 ms      QT Int : 382 ms       P-R-T Axes : 067 -15 014 degrees     QTc Int : 438 ms    Normal sinus rhythm  Minimal voltage criteria for LVH, may be normal variant  Nonspecific T wave abnormality  Abnormal ECG  When compared with ECG of 28-MAR-2020 18:26,  No significant change was found    Referred By: [email or web address]   SELF           Confirmed By:                               No results found for this or any previous visit.    X-Ray Chest AP Portable  Narrative: EXAMINATION:  XR CHEST AP PORTABLE    CLINICAL HISTORY:  Provided history is "  Shortness of breath".    TECHNIQUE:  One view of the chest.    COMPARISON:  03/28/2020, 03/27/2020, and 08/21/2018.    FINDINGS:  Lung volumes are low.  Cardiac wires overlie the chest.  Platelike subsegmental opacities are again present in the lower lung zones, slightly worse when compared with the prior study.  New subtle bibasilar and " peripheral interstitial opacities.  No confluent area of consolidation.  No sizable pleural effusion.  No pneumothorax.  Impression: Worsening bibasilar subsegmental and interstitial opacities, worrisome for worsening infectious or inflammatory process including pneumonia/pneumonitis in this patient with recent diagnosis of COVID-19.    Electronically signed by: Graham Ortiz MD  Date:    03/31/2020  Time:    20:46      All imaging within the last 24 hours was reviewed.       Assessment and Plan:    Active Hospital Problems    Diagnosis  POA    *COVID-19 virus infection [J22, B97.29]  Yes    Multifocal pneumonia [J18.9]  Yes    Acute hypoxemic respiratory failure [J96.01]  Yes    Obesity (BMI 30.0-34.9) [E66.9]  Yes    Essential hypertension [I10]  Yes     Chronic      Resolved Hospital Problems   No resolved problems to display.       Covid-19 Virus Infection  Multifocal PNA  Acute Hypoxemic Respiratory Failure  - COVID-19 testing: Collection Date: 3/28/2020 Collection Time:   6:43 PM  - Infection Control notified    - Isolation:   - Airborne and Droplet Precautions  - Surgical mask on patient   - N95 masks must be fit tested, wear eye protection  - 20 second hand hygiene   - Limit visitors per hospital policy   - Consolidate lab draws, nursing care, and interventions    - Diagnostics: (Lymphopenia, hyponatremia, hyperferritinemia, elevated troponin, elevated d-dimer, age, and comorbidities are significant predictors of poor clinical outcome)   - CBC: Hgb 13, WBC 7.22, ALC 1.6   trend Q48hrs  - CMP: Na 136, SCr 0.9, BUN 6        trend Q48hrs  - Procalcitonin: 0.04  - D-dimer: pending  repeat prior to discharge  - Ferritin: 471  repeat prior to discharge   - CRP: 53        trend Q48hrs  - LDH: 444   repeat prior to discharge  - BNP: <10  - Troponin: 0.008    - ECG: NSR, no ischemic changes   - rapid Flu: pending   - RIP only if BMT/solid transplant:   - Legionella antigen: deferred   - Blood culture x2:  pending   - Sputum culture: pending   - CXR: worsening bilateral PNA   - UA and culture: pending   - CPK: 275    - Management:   Bundle care as able to maintain isolation & minimize in/out of room   - Supplemental O2 to maintain SpO2 92%-96%   if requiring 6L NC or higher, place on nonrebreather and discuss case with MICU   - Telemetry & continuous pulse oximetry    - If wheezing   - albuterol inhaler 2-4 puff Q6hr PRN    - ipratropium daily    - acetaminophen 650mg PO Q6hr PRN fever   - loperamide PRN for viral diarrhea  - Empiric antibiotics per likely source & patient allergies    - CAP: x 5 day course (d/c early if low concern for bacterial co-infection)  Ceftriaxone 1g IV Q24hrs            Azithromycin 500mg IV day #1, then 250mg PO daily x4 days     If azithromycin is not available, start doxycycline                 If MRSA risk factors, add Vancomycin IV (PharmD consult)   - Investigational Treatment Protocol: (if patient meets criteria)   [email or web address]     - statin: atorvastatin 40mg po daily (if CPK WNL)    - start HCQ 400mg PO BID x1 day, then 400mg PO daily x 4 days   (check glucose 6 phosphate dehydrogenase (NOT G6PD Quant), ECG, and start Qshift POCT glucose)    Safety notes:   - Avoid NIPPV (CPAP/BiPAP) to prevent aerosolization, use on a case-by-case basis if in neg pressure room   - Cautious use of NSAIDS for fever per WHO recommendations (3/16/2020)   - No new ACEi/ARB start or discontinuation of chronic med unless hypotensive (Esler et al. Journal of Hypertension 2020, 38:000-000)   - Careful use of steroids in the absence of other indications (shock, ARDS)   - Fluid sparing resuscitation, avoid maintenance fluids     HTN  - continue MTP XL 50 mg daily      VTE High Risk Prophylaxis: enoxaparin 40mg sq QHS @ 2100 (bundled care) if GFR >30    Patient's chronic/stable medical conditions noted in the  assessment above will be managed with the patient's home medications as tolerated.     Salbador Mendoza MD  Hospital Medicine Staff  [phone] pager

## 2020-04-01 NOTE — PLAN OF CARE
CM obtained discharge planning assessment from medical record due to COVID.  Planned discharge is home with family - Plan (A) or home with family with home health - Plan (B).    PCP:  Joo Munoz MD     Payor:  Payor: [employer] MANAGED MEDICARE / Plan: [employer] MEDICARE HMO / Product Type: Capitation /      Pharmacy:    [facility]/pharmacy #[id number] - [place] - [address] [place] [address]  Phone: [phone] Fax: [phone]       04/01/20 1021   Discharge Assessment   Assessment Type Discharge Planning Assessment   Confirmed/corrected address and phone number on facesheet? No   Assessment information obtained from? Medical Record   Communicated expected length of stay with patient/caregiver no   Prior to hospitilization cognitive status: Alert/Oriented   Prior to hospitalization functional status: Independent   Current cognitive status: Alert/Oriented   Current Functional Status: Independent   Lives With spouse   Able to Return to Prior Arrangements other (see comments)  (tbd)   Is patient able to care for self after discharge? Unable to determine at this time (comments)   Who are your caregiver(s) and their phone number(s)? Lucille - daughter [phone]   Patient's perception of discharge disposition home or selfcare   Readmission Within the Last 30 Days no previous admission in last 30 days   Patient currently being followed by outpatient case management? No   Patient currently receives any other outside agency services? No   Equipment Currently Used at Home none   Do you have any problems affording any of your prescribed medications? No   Is the patient taking medications as prescribed? yes   Does the patient have transportation home? Yes   Transportation Anticipated family or friend will provide   Discharge Plan A Home with family   Discharge Plan B Home with family;Home Health   DME Needed Upon Discharge  none   Patient/Family in Agreement with Plan yes

## 2020-04-01 NOTE — NURSING
Patient had period of SOB, labored breathing, and sats started decreasing <88 on 6LNC, respirations were 36/min. placed patient on 10L non-rebreather mask and sats are now remaining greater than 95%. Respirations decreased to 24.

## 2020-04-01 NOTE — ED PROVIDER NOTES
Encounter Date: 3/31/2020       History     Chief Complaint   Patient presents with    Shortness of Breath     Pt c/o SOB, sats 76% RA on arrival. She tested positive for COVID-19 on saturday. Nonrebreather placed. Sat is now 96%     70 year old female with medical history of GERD, HLD, HTN, TIA, Recently +COVID-19 on 3/28/2020 presenting to the ED with the chief complaint of SOB. Patient reports 1 week of fever, SOB, dry cough, pleuritic chest pain, sinus congestion, myalgias. She has been using a MDI for her SOB with mild relief. She had worsening SOB today which prompted her ED visit. She denies history of lung disease or tobacco use. Her  is currently admitted for COVID-19 complications.        Review of patient's allergies indicates:   Allergen Reactions    Iodine Rash    Iodine and iodide containing products Itching and Rash    Shellfish containing products Itching and Rash     Past Medical History:   Diagnosis Date    Acute hypoxemic respiratory failure 3/31/2020    ALLERGIC RHINITIS     Cataract     Degenerative disc disease, cervical 9/13/2018    GERD (gastroesophageal reflux disease)     Hyperlipidemia     Hypertension     Migraine headache     Multifocal pneumonia 3/31/2020    Nuclear sclerosis 4/30/2014    Sleep disorder     Thyroid disease     nodular goiter    TIA (transient ischemic attack)      Past Surgical History:   Procedure Laterality Date    BREAST BIOPSY      BREAST CYST ASPIRATION      BREAST CYST EXCISION      COLONOSCOPY N/A 4/29/2016    Procedure: COLONOSCOPY;  Surgeon: Sergio Vickers MD;  Location: [address]);  Service: Endoscopy;  Laterality: N/A;    HYSTERECTOMY       Family History   Problem Relation Age of Onset    Diabetes Mother     Hypertension Mother     Breast cancer Mother     Asthma Father     Glaucoma Father     Heart disease Maternal Grandmother     No Known Problems Son     No Known Problems Daughter     Breast cancer Sister      Ovarian cancer Sister     Amblyopia Neg Hx     Blindness Neg Hx     Cancer Neg Hx     Cataracts Neg Hx     Macular degeneration Neg Hx     Retinal detachment Neg Hx     Strabismus Neg Hx     Stroke Neg Hx     Thyroid disease Neg Hx      Social History     Tobacco Use    Smoking status: Never Smoker    Smokeless tobacco: Never Used   Substance Use Topics    Alcohol use: No    Drug use: No     Review of Systems   Constitutional: Positive for fever.   HENT: Positive for congestion. Negative for sore throat.    Eyes: Negative for pain.   Respiratory: Positive for cough and shortness of breath.    Cardiovascular: Positive for chest pain (pleuritic).   Gastrointestinal: Negative for abdominal pain, constipation, diarrhea, nausea and vomiting.   Genitourinary: Negative for dysuria.   Musculoskeletal: Negative for back pain, neck pain and neck stiffness.   Skin: Negative for rash.   Neurological: Negative for weakness, light-headedness and headaches.   Hematological: Does not bruise/bleed easily.       Physical Exam     Initial Vitals   BP Pulse Resp Temp SpO2   03/31/20 1951 03/31/20 1951 03/31/20 1951 03/31/20 1951 03/1949   104/66 87 (!) 30 99.9 °F (37.7 °C) (!) 76 %      MAP       --                Physical Exam    Constitutional: She appears well-developed and well-nourished. She is not diaphoretic. No distress.   HENT:   Head: Normocephalic and atraumatic.   Mouth/Throat: Oropharynx is clear and moist. No oropharyngeal exudate.   Eyes: EOM are normal. Pupils are equal, round, and reactive to light.   Neck: Normal range of motion. Neck supple.   Cardiovascular: Normal rate and regular rhythm.   No edema BLE   Pulmonary/Chest: She is in respiratory distress.   On Non-rebreather. Speaking in short sentences. Minimal accessory muscle use   Abdominal: Soft. There is no tenderness.   Musculoskeletal: Normal range of motion.   Neurological: She is alert and oriented to person, place, and time. She has  normal strength. No cranial nerve deficit or sensory deficit.   Skin: Skin is warm and dry.       ED Course   Critical Care  Date/Time: 3/31/2020 10:20 PM  Performed by: Aidan Myrick PA-C  Authorized by: Demond Madrigal MD   Direct patient critical care time: 14 minutes  Additional history critical care time: 7 minutes  Ordering / reviewing critical care time: 7 minutes  Documentation critical care time: 4 minutes  Consulting other physicians critical care time: 3 minutes  Total critical care time (exclusive of procedural time) : 35 minutes  Critical care was necessary to treat or prevent imminent or life-threatening deterioration of the following conditions: respiratory failure.  Critical care was time spent personally by me on the following activities: examination of patient, ordering and performing treatments and interventions, ordering and review of radiographic studies, re-evaluation of patient's condition, review of old charts, ordering and review of laboratory studies, obtaining history from patient or surrogate and evaluation of patient's response to treatment.        Labs Reviewed   COMPREHENSIVE METABOLIC PANEL - Abnormal; Notable for the following components:       Result Value    CO2 31 (*)     Glucose 111 (*)     BUN, Bld 6 (*)     AST 45 (*)     All other components within normal limits   C-REACTIVE PROTEIN - Abnormal; Notable for the following components:    CRP 52.4 (*)     All other components within normal limits   LACTATE DEHYDROGENASE - Abnormal; Notable for the following components:     (*)     All other components within normal limits   PHOSPHORUS - Abnormal; Notable for the following components:    Phosphorus 2.5 (*)     All other components within normal limits   CK - Abnormal; Notable for the following components:     (*)     All other components within normal limits   FERRITIN - Abnormal; Notable for the following components:    Ferritin 471 (*)     All other components  "within normal limits   CULTURE, BLOOD   CULTURE, BLOOD   CULTURE, RESPIRATORY   INFLUENZA A & B BY MOLECULAR   B-TYPE NATRIURETIC PEPTIDE   CBC W/ AUTO DIFFERENTIAL   LACTIC ACID, PLASMA   MAGNESIUM   PROCALCITONIN   TROPONIN I   D DIMER, QUANTITATIVE   SEDIMENTATION RATE   URINALYSIS, REFLEX TO URINE CULTURE   SEDIMENTATION RATE        ECG Results          EKG 12-lead (In process)  Result time 03/31/20 20:21:23    In process by Interface, Lab In [facility] (03/31/20 20:21:23)                 Narrative:    Test Reason : R06.02,    Vent. Rate : 079 BPM     Atrial Rate : 079 BPM     P-R Int : 148 ms          QRS Dur : 086 ms      QT Int : 382 ms       P-R-T Axes : 067 -15 014 degrees     QTc Int : 438 ms    Normal sinus rhythm  Minimal voltage criteria for LVH, may be normal variant  Nonspecific T wave abnormality  Abnormal ECG  When compared with ECG of 28-MAR-2020 18:26,  No significant change was found    Referred By: [email or web address]   SELF           Confirmed By:                             Imaging Results          X-Ray Chest AP Portable (Final result)  Result time 03/31/20 20:46:52    Final result by Graham Ortiz MD (03/31/20 20:46:52)                 Impression:      Worsening bibasilar subsegmental and interstitial opacities, worrisome for worsening infectious or inflammatory process including pneumonia/pneumonitis in this patient with recent diagnosis of COVID-19.      Electronically signed by: Graham Ortiz MD  Date:    03/31/2020  Time:    20:46             Narrative:    EXAMINATION:  XR CHEST AP PORTABLE    CLINICAL HISTORY:  Provided history is "  Shortness of breath".    TECHNIQUE:  One view of the chest.    COMPARISON:  03/28/2020, 03/27/2020, and 08/21/2018.    FINDINGS:  Lung volumes are low.  Cardiac wires overlie the chest.  Platelike subsegmental opacities are again present in the lower lung zones, slightly worse when compared with the prior study.  New subtle bibasilar and peripheral interstitial " opacities.  No confluent area of consolidation.  No sizable pleural effusion.  No pneumothorax.                                 Medical Decision Making:   History:   Old Medical Records: I decided to obtain old medical records.  Old Records Summarized: records from clinic visits.  Clinical Tests:   Lab Tests: Ordered and Reviewed  Radiological Study: Ordered and Reviewed  Medical Tests: Ordered and Reviewed  Other:   I have discussed this case with another health care provider.       <> Summary of the Discussion: Hospital Medicine       APC / Resident Notes:   70 year old female with medical history of GERD, HLD, HTN, TIA, Recently +COVID-19 on 3/28/2020 presenting to the ED c/o 1 week of fever, SOB, dry cough, pleuritic chest pain, sinus congestion, myalgias. Reports worsening SOB today which prompted ED visit. Triage vitals significant for POx 76% on RA which improved to % on 15L NRB. DDx includes but not limited to COVID-19, pneumonia, reactive airway disease, ARDS.     Work-up shows CXR and labs concerning for worsening infection. Patient able to be weaned off of NRB to 6L NC while maintaining POx 98%. Resting more comfortably on reassessment. Will admit to medicine for further management. Patient expresses understanding and agreeable to the plan. I have discussed the care of this patient with my supervising physician.         Attending Attestation:     Physician Attestation Statement for NP/PA:   I reviewed the chart but I did not personally examine the patient. The face to face encounter was performed by the NP/PA.                                Clinical Impression:       ICD-10-CM ICD-9-CM   1. COVID-19 virus infection J22     B97.29    2. SOB (shortness of breath) R06.02 786.05   3. Hypoxia R09.02 799.02         Disposition:   Disposition: Admitted  Condition: Fair     ED Disposition Condition    Admit                           Aidan Myrick PA-C  03/31/20 2221       Demond Madrigal MD  03/31/20  [id number]

## 2020-04-01 NOTE — ED TRIAGE NOTES
Pt presents to room on nonrebreather with 02 sat of 99%. Pt was reported in 70s on room air in lobby. Pt with reported positive COVID and has been feeling SOB and coughing x8 days.

## 2020-04-01 NOTE — ED NOTES
Adult Physical Assessment  LOC: Jojo Burrows, 70 y.o. female verified via two identifiers.  The patient is awake, alert, oriented and speaking appropriately at this time.  APPEARANCE: Patient in respiratory distress on nonrebreather. Patient is clean and well groomed, patient's clothing is properly fastened.  SKIN:The skin is warm and dry, color consistent with ethnicity, patient has normal skin turgor and moist mucus membranes, skin intact, no breakdown or brusing noted.  MUSCULOSKELETAL: Patient moving all extremities well, no obvious swelling or deformities noted.  RESPIRATORY: Pt on nonrebreather using accessory muscles and tachypneic.  CARDIAC: Patient has a normal rate and rhythm, no periphreal edema noted in any extremity, capillary refill < 3 seconds in all extremities  ABDOMEN: Soft and non tender to palpation, no abdominal distention noted. Bowel sounds present in all four quadrants.  NEUROLOGIC: Eyes open spontaneously, behavior appropriate to situation, follows commands, facial expression symmetrical, bilateral hand grasp equal and even, purposeful motor response noted, normal sensation in all extremities when touched with a finger.

## 2020-04-01 NOTE — ED NOTES
Attempted to call report x1. Nurse in pt's room and unable to get report at this time. Will call back shortly.

## 2020-04-01 NOTE — PROGRESS NOTES
Hospital Medicine  Progress Note  [facility]      Patient Name: Jojo Burrows  MRN:  [id number]  Hospital Medicine Team: [facility] MED C Marisol Jones MD  Date of Admission:  3/31/2020     Length of Stay:  LOS: 1 day       Principal Problem:  COVID-19 virus infection      Hospital Course:  Jojo Burrows is a 70F with HTN, obesity who presents for evaluation of SOB. She was seen in the ED on 03/28 with fever, chills, cough. She wasn't hypoxic with ambulation and discharged home. She was tested for COVID 19 at that time and has subsequently ruled in. Her  is admitted for COVID+ complications. She presents today with worsening SOB, cough, pleuritic chest pain, sinus congestions, myalgias. She has been using her albuterol inhaler at home with only mild relief.      Initially SpO2 of 78%, placed on NRB 15L, weaned to 6L NC.    Interval History:     Oxygen requirements increased to 11L; pt feels more comfortable. Now.  Her  is also admitted to hospital with +COVID; attempting to have couple placed in same room.  She reports she does not know how to use inhaler.    Review of Systems:  Respiratory: +SOB, minimal cough  Cardiovascular: no chest pain, palpitations  GI: no N/V/D, abdominal pain    Inpatient Medications:    Current Facility-Administered Medications:     acetaminophen tablet 650 mg, 650 mg, Oral, Q4H PRN, Mikal Staples, PA-C    acetaminophen tablet 650 mg, 650 mg, Oral, Q8H PRN, Mikal Staples, PA-C    albuterol inhaler 2 puff, 2 puff, Inhalation, Q6H, 2 puff at 04/01/20 1119 **AND** MDI Q6H, , , Q6H, Mikal Staples, PA-C    azithromycin tablet 500 mg, 500 mg, Oral, QHS, Mikal Staples, PA-C, 500 mg at 03/31/20 2223    benzonatate capsule 100 mg, 100 mg, Oral, TID PRN, Mikal Staples, PA-C, 100 mg at 04/01/20 1107    cefTRIAXone injection 1 g, 1 g, Intravenous, QHS, Mikal Staples, PA-C, 1 g at 03/31/20 2223    dextrose 10% (D10W) Bolus, 12.5 g, Intravenous, PRN, Mikal  "ENRIQUE Staples-SHERWIN    dextrose 10% (D10W) Bolus, 25 g, Intravenous, PRN, Mikal Staples PA-C    enoxaparin injection 40 mg, 40 mg, Subcutaneous, QHS, Mikal Staples, PA-C, 40 mg at 03/31/20 2223    glucagon (human recombinant) injection 1 mg, 1 mg, Intramuscular, PRN, Mikal Staples, PA-C    glucose chewable tablet 16 g, 16 g, Oral, PRN, Mikal Staples, PA-C    glucose chewable tablet 24 g, 24 g, Oral, PRN, Mikal Staples, PA-C    hydroxychloroquine tablet 400 mg, 400 mg, Oral, BID, 400 mg at 04/01/20 0855 **FOLLOWED BY** [START ON 4/2/2020] hydroxychloroquine tablet 400 mg, 400 mg, Oral, Daily, Marisol Jones MD    loperamide capsule 2 mg, 2 mg, Oral, Q6H PRN, Mikal Staples PA-C, 2 mg at 04/01/20 1107    melatonin tablet 6 mg, 6 mg, Oral, Nightly PRN, Mikal Staples PA-C    ondansetron disintegrating tablet 8 mg, 8 mg, Oral, Q8H PRN, Mikal Staples PA-C    polyethylene glycol packet 17 g, 17 g, Oral, BID PRN, Mikal Staples, PA-C    sodium chloride 0.9% flush 10 mL, 10 mL, Intravenous, PRN, ENRIQUE Dias-SHERWIN    sodium chloride 0.9% flush 10 mL, 10 mL, Intravenous, PRN, Mikal Staples PA-C    tiotropium inhalation capsule 18 mcg, 1 capsule, Inhalation, Daily, Mikal Staples, PA-C, 18 mcg at 04/01/20 1108      Physical Exam:    No intake or output data in the 24 hours ending 04/01/20 1346  Wt Readings from Last 3 Encounters:   03/31/20 75.8 kg (167 lb)   03/28/20 75.8 kg (167 lb)   03/27/20 75.8 kg (167 lb)       /68 (BP Location: Left arm, Patient Position: Lying)   Pulse 86   Temp 96.2 °F (35.7 °C) (Axillary)   Resp (!) 28   Ht 5' 2" (1.575 m)   Wt 75.8 kg (167 lb)   SpO2 96%   Breastfeeding? No   BMI 30.54 kg/m²     GEN: NAD, conversant  Resp: coarse bilateral breath sounds, no wheezes or rales, normal work of breathing   CV: RRR, no m/r/g, no edema  GI: soft, NTND  Skin: no rash    Laboratory:  Lab Results   Component Value Date    [id number] Detected (A) 03/28/2020       Recent Labs   Lab " 03/28/20 1843 03/31/20 2011 04/01/20  0556   WBC 5.79 7.22 7.61   LYMPH 22.8  1.3 21.5  1.6 37.8  2.9   HGB 14.2 13.8 13.5   HCT 43.2 42.0 42.4   * 173 177     Recent Labs   Lab 03/28/20  1843 03/31/20 2011 04/01/20  0556    136 139   K 3.5 3.8 3.7    97 99   CO2 25 31* 28   BUN 9 6* 8   CREATININE 0.8 0.9 0.8    111* 90   CALCIUM 8.6* 8.7 8.8   MG  --  2.3 2.4   PHOS  --  2.5* 3.1     Recent Labs   Lab 03/28/20 1843 03/31/20 2011 04/01/20  0556   ALKPHOS 70 67 63   ALT 30 29 28   AST 52* 45* 40   ALBUMIN 3.7 3.5 3.4*   PROT 7.5 7.6 7.3   BILITOT 0.6 0.6 0.6        Recent Labs     03/31/20 2011   DDIMER 0.73*   FERRITIN 471*   CRP 52.4*   *   BNP <10   TROPONINI 0.008   *       All labs within the last 24 hours were reviewed.     Microbiology:  Microbiology Results (last 7 days)     Procedure Component Value Units Date/Time    Blood culture [[id number]] Collected:  03/31/20 2011    Order Status:  Completed Specimen:  Blood from Peripheral, Antecubital, Left Updated:  04/01/20 0715     Blood Culture, Routine No Growth to date    Blood culture [[id number]] Collected:  03/31/20 2018    Order Status:  Completed Specimen:  Blood from Peripheral, Antecubital, Right Updated:  04/01/20 0515     Blood Culture, Routine No Growth to date    Influenza A & B by Molecular [[id number]] Collected:  03/31/20 2224    Order Status:  Completed Specimen:  Nasopharyngeal Swab Updated:  03/31/20 2258     Influenza A, Molecular Negative     Influenza B, Molecular Negative     Flu A & B Source Nasal swab    Culture, Respiratory with Gram Stain [[id number]]     Order Status:  No result Specimen:  Sputum, Expectorated             Imaging  ECG Results          EKG 12-lead (Final result)  Result time 04/01/20 09:38:21    Final result by Interface, Lab In [facility] (04/01/20 09:38:21)                 Narrative:    Test Reason : R06.02,    Vent. Rate : 079 BPM     Atrial Rate : 079 BPM     P-R Int : 148  "ms          QRS Dur : 086 ms      QT Int : 382 ms       P-R-T Axes : 067 -15 014 degrees     QTc Int : 438 ms    Normal sinus rhythm  Minimal voltage criteria for LVH, may be normal variant  Nonspecific T wave abnormality  Abnormal ECG  When compared with ECG of 28-MAR-2020 18:26,  No significant change was found  Confirmed by BLANCA MARTINEZ MD ([id number]) on 4/1/2020 9:38:08 AM    Referred By: [email or web address]   SELF           Confirmed By:BLANCA MARTINEZ MD                              No results found for this or any previous visit.    X-Ray Chest AP Portable  Narrative: EXAMINATION:  XR CHEST AP PORTABLE    CLINICAL HISTORY:  Provided history is "  Shortness of breath".    TECHNIQUE:  One view of the chest.    COMPARISON:  03/28/2020, 03/27/2020, and 08/21/2018.    FINDINGS:  Lung volumes are low.  Cardiac wires overlie the chest.  Platelike subsegmental opacities are again present in the lower lung zones, slightly worse when compared with the prior study.  New subtle bibasilar and peripheral interstitial opacities.  No confluent area of consolidation.  No sizable pleural effusion.  No pneumothorax.  Impression: Worsening bibasilar subsegmental and interstitial opacities, worrisome for worsening infectious or inflammatory process including pneumonia/pneumonitis in this patient with recent diagnosis of COVID-19.    Electronically signed by: Graham Ortiz MD  Date:    03/31/2020  Time:    20:46      All imaging within the last 24 hours was reviewed.     Assessment and Plan:    Active Hospital Problems    Diagnosis  POA    *COVID-19 virus infection [U07.1]  Yes    Multifocal pneumonia [J18.9]  Yes    Acute hypoxemic respiratory failure [J96.01]  Yes    Obesity (BMI 30.0-34.9) [E66.9]  Yes    Essential hypertension [I10]  Yes     Chronic      Resolved Hospital Problems   No resolved problems to display.       Covid-19 Virus Infection  - COVID-19 testing POSITIVE: Collection Date: 3/28/2020 Collection Time:   6:43 PM  - " Infection Control notified    - Isolation:   - Airborne and Droplet Precautions  - Surgical mask on patient   - N95 masks must be fit tested, wear eye protection  - 20 second hand hygiene   - Limit visitors per hospital policy   - Consolidate lab draws, nursing care, and interventions    - Diagnostics: (Lymphopenia, hyponatremia, hyperferritinemia, elevated troponin, elevated d-dimer, age, and comorbidities are significant predictors of poor clinical outcome)   - CBC:   trend Q48hrs  - Ferritin:  471; repeat prior to discharge   - D-dimer:  0.73; repeat prior to discharge  - CMP:        trend Q48hrs  - CRP:        52.4; trend Q48hrs  - BNP:   <10  - CPK:   275  - LDH:   444; repeat prior to discharge  - Troponin:  0.008  - Procalcitonin: 0.04   - ECG with QTc: NSR with QTc 438   - rapid Flu:  negative   - Legionella antigen:   - Blood culture x2: NGTD   - Sputum culture:          - Management:   Bundle care as able to maintain isolation & minimize in/out of room   - Supplemental O2 to maintain SpO2 92%-96%    - Telemetry & continuous pulse oximetry    - If wheezing   - albuterol inhaler 2-4 puff Q6hr PRN  - respiratory communication for inhaler technique  - ipratropium daily    - acetaminophen 650mg PO Q6hr PRN fever   - loperamide PRN for viral diarrhea  - Empiric antibiotics for CAP- end date: 4/4    - Ceftriaxone 1g IV Q24hrs           - Azithromycin 500mg IV day #1, then 250mg PO daily x4 days   - Investigational Treatment Protocol:     - patient does not meet criteria for statin: atorvastatin 40mg po daily     - patient does meet criteria for HCQ: HCQ 400mg PO BID x1 day, then 400mg PO daily x 4 days   -checking glucose 6 phosphate dehydrogenase, ECG, and start Qshift POCT glucose    Safety notes:   - Avoid NIPPV (CPAP/BiPAP) to prevent aerosolization, use on a case-by-case basis if in neg pressure room   - Cautious use of NSAIDS for fever per WHO recommendations (3/16/2020)   - No new ACEi/ARB start or  discontinuation of chronic med unless hypotensive (Inesler et al. Journal of Hypertension 2020, 38:000-000)   - Careful use of steroids in the absence of other indications (shock, ARDS)   - Fluid sparing resuscitation, avoid maintenance fluids      Advance Care Planning  Goals of care, counseling/discussion   -patient understand severity of disease and potential to need to go to ICU for mechanical ventilation should oxygen status worsen  -confirms FULL code status  HTN  - continue MTP XL 50 mg daily    Patient's chronic/stable medical conditions noted in the problem list above will be managed with the patient's home medications as tolerated.     VTE High Risk Prophylaxis: enoxaparin 40mg sq QHS @ 2100 (bundled care) if GFR >30    Disposition: home pending improvement    Marisol Jones MD  Hospital Medicine Staff     Communicable/Infectious

## 2020-04-01 NOTE — PLAN OF CARE
POC reviewed with patient. Pt verbalized understanding. Questions and concerns addressed. No acute events today. Pt progressing toward goals. Will continue to monitor. See flowsheets for full assessments and VS info.

## 2020-04-02 LAB
CRP SERPL-MCNC: 44.6 MG/L (ref 0–8.2)
GLUCOSE-6-PHOSPHATE DEHYDROGENASE QUAL: NORMAL

## 2020-04-02 PROCEDURE — 27100092 HC HIGH FLOW DELIVERY CANNULA: Mod: HCNC

## 2020-04-02 PROCEDURE — 99233 SBSQ HOSP IP/OBS HIGH 50: CPT | Mod: HCNC,,, | Performed by: HOSPITALIST

## 2020-04-02 PROCEDURE — 87070 CULTURE OTHR SPECIMN AEROBIC: CPT | Mod: HCNC

## 2020-04-02 PROCEDURE — 82960 TEST FOR G6PD ENZYME: CPT | Mod: HCNC

## 2020-04-02 PROCEDURE — 99900035 HC TECH TIME PER 15 MIN (STAT): Mod: HCNC

## 2020-04-02 PROCEDURE — 36415 COLL VENOUS BLD VENIPUNCTURE: CPT | Mod: HCNC

## 2020-04-02 PROCEDURE — 94640 AIRWAY INHALATION TREATMENT: CPT | Mod: HCNC

## 2020-04-02 PROCEDURE — 27100171 HC OXYGEN HIGH FLOW UP TO 24 HOURS: Mod: HCNC

## 2020-04-02 PROCEDURE — 87205 SMEAR GRAM STAIN: CPT | Mod: HCNC

## 2020-04-02 PROCEDURE — 25000242 PHARM REV CODE 250 ALT 637 W/ HCPCS: Mod: HCNC | Performed by: PHYSICIAN ASSISTANT

## 2020-04-02 PROCEDURE — 99233 PR SUBSEQUENT HOSPITAL CARE,LEVL III: ICD-10-PCS | Mod: HCNC,,, | Performed by: HOSPITALIST

## 2020-04-02 PROCEDURE — 25000003 PHARM REV CODE 250: Mod: HCNC | Performed by: PHYSICIAN ASSISTANT

## 2020-04-02 PROCEDURE — 25000003 PHARM REV CODE 250: Mod: HCNC | Performed by: HOSPITALIST

## 2020-04-02 PROCEDURE — 63600175 PHARM REV CODE 636 W HCPCS: Mod: HCNC | Performed by: PHYSICIAN ASSISTANT

## 2020-04-02 PROCEDURE — 94761 N-INVAS EAR/PLS OXIMETRY MLT: CPT | Mod: HCNC

## 2020-04-02 PROCEDURE — 63600175 PHARM REV CODE 636 W HCPCS: Mod: HCNC | Performed by: HOSPITALIST

## 2020-04-02 PROCEDURE — 11000001 HC ACUTE MED/SURG PRIVATE ROOM: Mod: HCNC

## 2020-04-02 PROCEDURE — 86140 C-REACTIVE PROTEIN: CPT | Mod: HCNC

## 2020-04-02 PROCEDURE — 63700000 PHARM REV CODE 250 ALT 637 W/O HCPCS: Mod: HCNC | Performed by: PHYSICIAN ASSISTANT

## 2020-04-02 PROCEDURE — 25000242 PHARM REV CODE 250 ALT 637 W/ HCPCS: Mod: HCNC | Performed by: HOSPITALIST

## 2020-04-02 PROCEDURE — 27000221 HC OXYGEN, UP TO 24 HOURS: Mod: HCNC

## 2020-04-02 RX ORDER — FUROSEMIDE 10 MG/ML
20 INJECTION INTRAMUSCULAR; INTRAVENOUS ONCE
Status: COMPLETED | OUTPATIENT
Start: 2020-04-02 | End: 2020-04-02

## 2020-04-02 RX ORDER — ALBUTEROL SULFATE 2.5 MG/.5ML
2.5 SOLUTION RESPIRATORY (INHALATION) EVERY 4 HOURS
Status: DISPENSED | OUTPATIENT
Start: 2020-04-03 | End: 2020-04-03

## 2020-04-02 RX ORDER — PANTOPRAZOLE SODIUM 40 MG/1
40 TABLET, DELAYED RELEASE ORAL NIGHTLY
Status: DISCONTINUED | OUTPATIENT
Start: 2020-04-02 | End: 2020-04-18 | Stop reason: HOSPADM

## 2020-04-02 RX ORDER — GUAIFENESIN 600 MG/1
600 TABLET, EXTENDED RELEASE ORAL 2 TIMES DAILY
Status: DISCONTINUED | OUTPATIENT
Start: 2020-04-02 | End: 2020-04-12

## 2020-04-02 RX ORDER — CALCIUM CARBONATE 200(500)MG
1000 TABLET,CHEWABLE ORAL 3 TIMES DAILY PRN
Status: DISCONTINUED | OUTPATIENT
Start: 2020-04-02 | End: 2020-04-18 | Stop reason: HOSPADM

## 2020-04-02 RX ADMIN — ALBUTEROL SULFATE 0.5 MG: 2.5 SOLUTION RESPIRATORY (INHALATION) at 02:04

## 2020-04-02 RX ADMIN — ENOXAPARIN SODIUM 40 MG: 100 INJECTION SUBCUTANEOUS at 09:04

## 2020-04-02 RX ADMIN — CALCIUM CARBONATE (ANTACID) CHEW TAB 500 MG 1000 MG: 500 CHEW TAB at 10:04

## 2020-04-02 RX ADMIN — GUAIFENESIN 600 MG: 600 TABLET, EXTENDED RELEASE ORAL at 09:04

## 2020-04-02 RX ADMIN — GUAIFENESIN 600 MG: 600 TABLET, EXTENDED RELEASE ORAL at 11:04

## 2020-04-02 RX ADMIN — CEFTRIAXONE 1 G: 1 INJECTION, POWDER, FOR SOLUTION INTRAMUSCULAR; INTRAVENOUS at 09:04

## 2020-04-02 RX ADMIN — METOPROLOL SUCCINATE 50 MG: 50 TABLET, EXTENDED RELEASE ORAL at 09:04

## 2020-04-02 RX ADMIN — ALBUTEROL SULFATE 0.5 MG: 2.5 SOLUTION RESPIRATORY (INHALATION) at 09:04

## 2020-04-02 RX ADMIN — TIOTROPIUM BROMIDE 18 MCG: 18 CAPSULE ORAL; RESPIRATORY (INHALATION) at 09:04

## 2020-04-02 RX ADMIN — PANTOPRAZOLE SODIUM 40 MG: 40 TABLET, DELAYED RELEASE ORAL at 10:04

## 2020-04-02 RX ADMIN — FUROSEMIDE 20 MG: 10 INJECTION, SOLUTION INTRAVENOUS at 11:04

## 2020-04-02 RX ADMIN — ALBUTEROL SULFATE 0.5 MG: 2.5 SOLUTION RESPIRATORY (INHALATION) at 12:04

## 2020-04-02 RX ADMIN — AZITHROMYCIN MONOHYDRATE 500 MG: 250 TABLET ORAL at 09:04

## 2020-04-02 RX ADMIN — HYDROXYCHLOROQUINE SULFATE 400 MG: 200 TABLET ORAL at 09:04

## 2020-04-02 NOTE — PLAN OF CARE
Problem: Adult Inpatient Plan of Care  Goal: Plan of Care Review  Outcome: Ongoing, Progressing     POC reviewed with patient this shift.  A/O x4.  Respirations unlabored.  Skin w/d.  Pt noted to have some periods of SOB but primarily noted with activity but O2 sats maintained at >92%.  Non-rebreather continues at 15L.  Continent of b/b.  Uses bedpan.  Meds tolerated whole with water without difficulty.  ABX Tx in progress with no adverse effects noted.  VSS.  See flowsheet for full assessment.  No c/o pain or discomfort at this time.  WCTM.

## 2020-04-02 NOTE — PROGRESS NOTES
Hospital Medicine  Progress Note  [facility]      Patient Name: Jojo Burrows  MRN:  [id number]  Hospital Medicine Team: [facility] C Marisol Jones MD  Date of Admission:  3/31/2020     Length of Stay:  LOS: 2 days       Principal Problem:  COVID-19 virus infection      Hospital Course:  Jojo Burrows is a 70F with HTN, obesity who presents for evaluation of SOB. She was seen in the ED on 03/28 with fever, chills, cough. She wasn't hypoxic with ambulation and discharged home. She was tested for COVID 19 at that time and has subsequently ruled in. Her  is admitted for COVID+ complications. She presents today with worsening SOB, cough, pleuritic chest pain, sinus congestions, myalgias. She has been using her albuterol inhaler at home with only mild relief.      Initially SpO2 of 78%, placed on NRB 15L, weaned to 6L NC, but then returned to requiring 15L 4/2.    Interval History:     Pt now at 15L NRB from 11L yesterday.  She denies any acute change in symptoms, but reports she has difficulty coughing, as it causes her to become more short of breath.  She notes mild diarrhea.  Discussed that pt's  is COVID+ and inpatient; asked if she would like to be in a double room with him, she prefers not to as she knows she would be continuously tending to him instead of resting.    Review of Systems:  Respiratory: +SOB, minimal cough  Cardiovascular: no chest pain, palpitations  GI: no N/V/D, abdominal pain    Inpatient Medications:    Current Facility-Administered Medications:     acetaminophen tablet 650 mg, 650 mg, Oral, Q4H PRN, Mikal Staples PA-C    acetaminophen tablet 650 mg, 650 mg, Oral, Q8H PRN, Mikal Staples PA-C    albuterol sulfate nebulizer solution 0.5 mg, 0.5 mg, Nebulization, Q6H, Marisol Jones MD, 0.5 mg at 04/02/20 0935    azithromycin tablet 500 mg, 500 mg, Oral, QHS, Mikal Staples PA-C, 500 mg at 04/01/20 2016    benzonatate capsule 100 mg, 100 mg, Oral, TID  PRN, Mikal Staples PA-C, 100 mg at 04/01/20 1107    cefTRIAXone injection 1 g, 1 g, Intravenous, QHS, Marisol Jones MD, 1 g at 04/01/20 2017    dextrose 10% (D10W) Bolus, 12.5 g, Intravenous, PRN, ENRIQUE Aldana-SHERWIN    dextrose 10% (D10W) Bolus, 25 g, Intravenous, PRN, Mikal Staples PA-C    enoxaparin injection 40 mg, 40 mg, Subcutaneous, QHS, ENRIQUE Aldana-C, 40 mg at 04/01/20 2016    glucagon (human recombinant) injection 1 mg, 1 mg, Intramuscular, PRN, Mikal Staples PA-C    glucose chewable tablet 16 g, 16 g, Oral, PRN, ENRIQUE Aldana-SHERWIN    glucose chewable tablet 24 g, 24 g, Oral, PRN, Mikal Staples PA-C    guaiFENesin 12 hr tablet 600 mg, 600 mg, Oral, BID, Marisol Jones MD, 600 mg at 04/02/20 1118    [COMPLETED] hydroxychloroquine tablet 400 mg, 400 mg, Oral, BID, 400 mg at 04/01/20 2016 **FOLLOWED BY** hydroxychloroquine tablet 400 mg, 400 mg, Oral, Daily, Marisol Jones MD, 400 mg at 04/02/20 0902    loperamide capsule 2 mg, 2 mg, Oral, Q6H PRN, Mikal Staples PA-C, 2 mg at 04/01/20 1107    melatonin tablet 6 mg, 6 mg, Oral, Nightly PRN, Mikal Staples PA-C    metoprolol succinate (TOPROL-XL) 24 hr tablet 50 mg, 50 mg, Oral, Daily, Mikal Staples PA-C, 50 mg at 04/02/20 0902    ondansetron disintegrating tablet 8 mg, 8 mg, Oral, Q8H PRN, Mikal Staples PA-C    polyethylene glycol packet 17 g, 17 g, Oral, BID PRN, Mikal Staples PA-C    sodium chloride 0.9% flush 10 mL, 10 mL, Intravenous, PRN, Aidan Myrick PA-C    sodium chloride 0.9% flush 10 mL, 10 mL, Intravenous, PRN, Mikal Staples PA-C    tiotropium inhalation capsule 18 mcg, 1 capsule, Inhalation, Daily, Mikal Staples PA-C, 18 mcg at 04/02/20 0902      Physical Exam:      Intake/Output Summary (Last 24 hours) at 4/2/2020 1316  Last data filed at 4/1/2020 2013  Gross per 24 hour   Intake 240 ml   Output --   Net 240 ml     Wt Readings from Last 3 Encounters:   04/02/20 77.4 kg (170 lb 11.2 oz)   03/28/20 75.8 kg  "(167 lb)   03/27/20 75.8 kg (167 lb)       /69   Pulse 87   Temp 98.8 °F (37.1 °C)   Resp 18   Ht 5' 2" (1.575 m)   Wt 77.4 kg (170 lb 11.2 oz)   SpO2 (!) 93%   Breastfeeding? No   BMI 31.22 kg/m²     GEN: NAD, conversant  Resp: coarse bilateral breath sounds with poor air movement, no wheezes or rales, normal work of breathing   CV: RRR, no m/r/g, no edema  GI: soft, NTND  Skin: no rash    Laboratory:  Lab Results   Component Value Date    [id number] Detected (A) 03/28/2020       Recent Labs   Lab 03/28/20 1843 03/31/20 2011 04/01/20  0556   WBC 5.79 7.22 7.61   LYMPH 22.8  1.3 21.5  1.6 37.8  2.9   HGB 14.2 13.8 13.5   HCT 43.2 42.0 42.4   * 173 177     Recent Labs   Lab 03/28/20 1843 03/31/20 2011 04/01/20  0556    136 139   K 3.5 3.8 3.7    97 99   CO2 25 31* 28   BUN 9 6* 8   CREATININE 0.8 0.9 0.8    111* 90   CALCIUM 8.6* 8.7 8.8   MG  --  2.3 2.4   PHOS  --  2.5* 3.1     Recent Labs   Lab 03/28/20 1843 03/31/20 2011 04/01/20  0556   ALKPHOS 70 67 63   ALT 30 29 28   AST 52* 45* 40   ALBUMIN 3.7 3.5 3.4*   PROT 7.5 7.6 7.3   BILITOT 0.6 0.6 0.6        Recent Labs     03/31/20 2011 04/02/20  0413   DDIMER 0.73*  --    FERRITIN 471*  --    CRP 52.4* 44.6*   *  --    BNP <10  --    TROPONINI 0.008  --    *  --        All labs within the last 24 hours were reviewed.     Microbiology:  Microbiology Results (last 7 days)     Procedure Component Value Units Date/Time    Culture, Respiratory with Gram Stain [[id number]] Collected:  04/02/20 0514    Order Status:  Completed Specimen:  Sputum, Expectorated Updated:  04/02/20 1002     Gram Stain (Respiratory) >10 epithelial cells per low power field     Gram Stain (Respiratory) Moderate WBC's     Gram Stain (Respiratory) Many Gram positive cocci     Gram Stain (Respiratory) Many Gram negative rods     Gram Stain (Respiratory) Few Gram positive rods     Gram Stain (Respiratory) Rare yeast    Blood culture " "[[id number]] Collected:  03/31/20 2018    Order Status:  Completed Specimen:  Blood from Peripheral, Antecubital, Right Updated:  04/01/20 2213     Blood Culture, Routine No Growth to date      No Growth to date    Blood culture [[id number]] Collected:  03/31/20 2011    Order Status:  Completed Specimen:  Blood from Peripheral, Antecubital, Left Updated:  04/01/20 2213     Blood Culture, Routine No Growth to date      No Growth to date    Influenza A & B by Molecular [[id number]] Collected:  03/31/20 2224    Order Status:  Completed Specimen:  Nasopharyngeal Swab Updated:  03/31/20 2258     Influenza A, Molecular Negative     Influenza B, Molecular Negative     Flu A & B Source Nasal swab            Imaging  ECG Results          EKG 12-lead (Final result)  Result time 04/01/20 09:38:21    Final result by Interface, Lab In [facility] (04/01/20 09:38:21)                 Narrative:    Test Reason : R06.02,    Vent. Rate : 079 BPM     Atrial Rate : 079 BPM     P-R Int : 148 ms          QRS Dur : 086 ms      QT Int : 382 ms       P-R-T Axes : 067 -15 014 degrees     QTc Int : 438 ms    Normal sinus rhythm  Minimal voltage criteria for LVH, may be normal variant  Nonspecific T wave abnormality  Abnormal ECG  When compared with ECG of 28-MAR-2020 18:26,  No significant change was found  Confirmed by BLANCA MARTINEZ MD ([id number]) on 4/1/2020 9:38:08 AM    Referred By: [email or web address]   SELF           Confirmed By:BLANCA MARTINEZ MD                              No results found for this or any previous visit.    X-Ray Chest AP Portable  Narrative: EXAMINATION:  XR CHEST AP PORTABLE    CLINICAL HISTORY:  Provided history is "  Shortness of breath".    TECHNIQUE:  One view of the chest.    COMPARISON:  03/28/2020, 03/27/2020, and 08/21/2018.    FINDINGS:  Lung volumes are low.  Cardiac wires overlie the chest.  Platelike subsegmental opacities are again present in the lower lung zones, slightly worse when compared with the prior study.  New " subtle bibasilar and peripheral interstitial opacities.  No confluent area of consolidation.  No sizable pleural effusion.  No pneumothorax.  Impression: Worsening bibasilar subsegmental and interstitial opacities, worrisome for worsening infectious or inflammatory process including pneumonia/pneumonitis in this patient with recent diagnosis of COVID-19.    Electronically signed by: Graham Ortiz MD  Date:    03/31/2020  Time:    20:46      All imaging within the last 24 hours was reviewed.     Assessment and Plan:    Active Hospital Problems    Diagnosis  POA    *COVID-19 virus infection [U07.1]  Yes    Multifocal pneumonia [J18.9]  Yes    Acute hypoxemic respiratory failure [J96.01]  Yes    Obesity (BMI 30.0-34.9) [E66.9]  Yes    Essential hypertension [I10]  Yes     Chronic      Resolved Hospital Problems   No resolved problems to display.       Covid-19 Virus Infection  - COVID-19 testing POSITIVE: Collection Date: 3/28/2020 Collection Time:   6:43 PM  - Infection Control notified    - Isolation:   - Airborne and Droplet Precautions  - Surgical mask on patient   - N95 masks must be fit tested, wear eye protection  - 20 second hand hygiene   - Limit visitors per hospital policy   - Consolidate lab draws, nursing care, and interventions    - Diagnostics: (Lymphopenia, hyponatremia, hyperferritinemia, elevated troponin, elevated d-dimer, age, and comorbidities are significant predictors of poor clinical outcome)   - CBC:   trend Q48hrs  - Ferritin:  471; repeat prior to discharge   - D-dimer:  0.73; repeat prior to discharge  - CMP:        trend Q48hrs  - CRP:        52.4 to 44.6; trend Q48hrs  - BNP:   <10  - CPK:   275  - LDH:   444; repeat prior to discharge  - Troponin:  0.008  - Procalcitonin: 0.04   - ECG with QTc: NSR with QTc 438   - rapid Flu:  negative   - Legionella antigen:   - Blood culture x2: NGTD   - Sputum culture:          - Management:   Bundle care as able to maintain isolation & minimize  in/out of room   - Supplemental O2 to maintain SpO2 92%-96%    - Telemetry & continuous pulse oximetry    - If wheezing   - albuterol inhaler 2-4 puff Q6hr PRN  - ipratropium daily    - acetaminophen 650mg PO Q6hr PRN fever   - loperamide PRN for viral diarrhea  - Empiric antibiotics for CAP- end date: 4/4    - Ceftriaxone 1g IV Q24hrs           - Azithromycin 500mg IV day #1, then 250mg PO daily x4 days   -add Mucinex, Lasix IV x 1   - Investigational Treatment Protocol:     - patient does not meet criteria for statin: atorvastatin 40mg po daily     - patient does meet criteria for HCQ: HCQ 400mg PO BID x1 day, then 400mg PO daily x 4 days   -checking glucose 6 phosphate dehydrogenase, ECG, and start Qshift POCT glucose    Safety notes:   - Avoid NIPPV (CPAP/BiPAP) to prevent aerosolization, use on a case-by-case basis if in neg pressure room   - Cautious use of NSAIDS for fever per WHO recommendations (3/16/2020)   - No new ACEi/ARB start or discontinuation of chronic med unless hypotensive (Esler et al. Journal of Hypertension 2020, 38:000-000)   - Careful use of steroids in the absence of other indications (shock, ARDS)   - Fluid sparing resuscitation, avoid maintenance fluids      Advance Care Planning  Goals of care, counseling/discussion   -patient understand severity of disease and potential to need to go to ICU for mechanical ventilation should oxygen status worsen  -confirms FULL code status  HTN  - continue MTP XL 50 mg daily    Patient's chronic/stable medical conditions noted in the problem list above will be managed with the patient's home medications as tolerated.     VTE High Risk Prophylaxis: enoxaparin 40mg sq QHS @ 2100 (bundled care) if GFR >30    Disposition: home pending improvement    Marisol Jones MD  Hospital Medicine Staff

## 2020-04-03 LAB — CRP SERPL-MCNC: 130.9 MG/L (ref 0–8.2)

## 2020-04-03 PROCEDURE — 99233 SBSQ HOSP IP/OBS HIGH 50: CPT | Mod: HCNC,,, | Performed by: HOSPITALIST

## 2020-04-03 PROCEDURE — 25000242 PHARM REV CODE 250 ALT 637 W/ HCPCS: Mod: HCNC | Performed by: PHYSICIAN ASSISTANT

## 2020-04-03 PROCEDURE — 63600175 PHARM REV CODE 636 W HCPCS: Mod: HCNC | Performed by: HOSPITALIST

## 2020-04-03 PROCEDURE — 63600175 PHARM REV CODE 636 W HCPCS: Mod: HCNC | Performed by: PHYSICIAN ASSISTANT

## 2020-04-03 PROCEDURE — 11000001 HC ACUTE MED/SURG PRIVATE ROOM: Mod: HCNC

## 2020-04-03 PROCEDURE — 94761 N-INVAS EAR/PLS OXIMETRY MLT: CPT | Mod: HCNC

## 2020-04-03 PROCEDURE — 94664 DEMO&/EVAL PT USE INHALER: CPT | Mod: HCNC

## 2020-04-03 PROCEDURE — 99233 PR SUBSEQUENT HOSPITAL CARE,LEVL III: ICD-10-PCS | Mod: HCNC,,, | Performed by: HOSPITALIST

## 2020-04-03 PROCEDURE — 25000242 PHARM REV CODE 250 ALT 637 W/ HCPCS: Mod: HCNC | Performed by: INTERNAL MEDICINE

## 2020-04-03 PROCEDURE — 27100171 HC OXYGEN HIGH FLOW UP TO 24 HOURS: Mod: HCNC

## 2020-04-03 PROCEDURE — 27100092 HC HIGH FLOW DELIVERY CANNULA: Mod: HCNC

## 2020-04-03 PROCEDURE — 94640 AIRWAY INHALATION TREATMENT: CPT | Mod: HCNC

## 2020-04-03 PROCEDURE — 25000003 PHARM REV CODE 250: Mod: HCNC | Performed by: PHYSICIAN ASSISTANT

## 2020-04-03 PROCEDURE — 99900035 HC TECH TIME PER 15 MIN (STAT): Mod: HCNC

## 2020-04-03 PROCEDURE — 86140 C-REACTIVE PROTEIN: CPT | Mod: HCNC

## 2020-04-03 PROCEDURE — 36415 COLL VENOUS BLD VENIPUNCTURE: CPT | Mod: HCNC

## 2020-04-03 PROCEDURE — 25000003 PHARM REV CODE 250: Mod: HCNC | Performed by: HOSPITALIST

## 2020-04-03 RX ADMIN — METOPROLOL SUCCINATE 50 MG: 50 TABLET, EXTENDED RELEASE ORAL at 08:04

## 2020-04-03 RX ADMIN — ALBUTEROL SULFATE 2.5 MG: 2.5 SOLUTION RESPIRATORY (INHALATION) at 02:04

## 2020-04-03 RX ADMIN — ALBUTEROL SULFATE 2.5 MG: 2.5 SOLUTION RESPIRATORY (INHALATION) at 04:04

## 2020-04-03 RX ADMIN — GUAIFENESIN 600 MG: 600 TABLET, EXTENDED RELEASE ORAL at 10:04

## 2020-04-03 RX ADMIN — PANTOPRAZOLE SODIUM 40 MG: 40 TABLET, DELAYED RELEASE ORAL at 10:04

## 2020-04-03 RX ADMIN — GUAIFENESIN 600 MG: 600 TABLET, EXTENDED RELEASE ORAL at 08:04

## 2020-04-03 RX ADMIN — TIOTROPIUM BROMIDE 18 MCG: 18 CAPSULE ORAL; RESPIRATORY (INHALATION) at 08:04

## 2020-04-03 RX ADMIN — CEFTRIAXONE 1 G: 1 INJECTION, POWDER, FOR SOLUTION INTRAMUSCULAR; INTRAVENOUS at 10:04

## 2020-04-03 RX ADMIN — ALBUTEROL SULFATE 2.5 MG: 2.5 SOLUTION RESPIRATORY (INHALATION) at 08:04

## 2020-04-03 RX ADMIN — CALCIUM CARBONATE (ANTACID) CHEW TAB 500 MG 1000 MG: 500 CHEW TAB at 05:04

## 2020-04-03 RX ADMIN — ALBUTEROL SULFATE 2.5 MG: 2.5 SOLUTION RESPIRATORY (INHALATION) at 12:04

## 2020-04-03 RX ADMIN — HYDROXYCHLOROQUINE SULFATE 400 MG: 200 TABLET ORAL at 08:04

## 2020-04-03 RX ADMIN — ENOXAPARIN SODIUM 40 MG: 100 INJECTION SUBCUTANEOUS at 10:04

## 2020-04-03 NOTE — PHYSICIAN QUERY
PT Name: Jojo Burrows  MR #: [id number]    Physician Query Form - Cause and Effect Relationship Clarification      CDS: Silvia SINGH RN  Contact information: [email or web address]  Phone number: [phone]    This form is a permanent document in the medical record.     Query Date: April 3, 2020    By submitting this query, we are merely seeking further clarification of documentation. Please utilize your independent clinical judgment when addressing the question(s) below.    The Medical record contains the following:  Supporting Clinical Findings   Location in record   Jojo Burrows is a 70F with HTN, obesity who presents for evaluation of SOB. She was seen in the ED on 03/28 with fever, chills, cough. She wasn't hypoxic with ambulation and discharged home. She was tested for COVID 19 at that time and has subsequently ruled in. Her  is admitted for COVID+ complications. She presents today with worsening SOB, cough, pleuritic chest pain, sinus congestions, myalgias. She has been using her albuterol inhaler at home with only mild relief.         Worsening bibasilar subsegmental and interstitial opacities, worrisome for worsening infectious or inflammatory process including pneumonia/pneumonitis in this patient with recent diagnosis of COVID-19.     Covid-19 Virus Infection  Multifocal PNA  Acute Hypoxemic Respiratory Failure                                                                                                                                    SARS-CoV2 (COVID-19) Qualitative PCR   Value: DetectedAbnormal        Respiratory Culture Normal respiratory christopher P  Gram Stain (Respiratory) >10 epithelial cells per low power field   Gram Stain (Respiratory) Moderate WBC's   Gram Stain (Respiratory) Many Gram positive cocci   Gram Stain (Respiratory) Many Gram negative rods   Gram Stain (Respiratory) Few Gram positive rods   Gram Stain (Respiratory) Rare yeast     Interval History:   No acute events  overnight. Pt reports mildly worsened breathing this morning, but weaned down to 10L midday. Coughing exacerbates SOB.                                              H&P Hosp Med 3/31/20              Chest X-Ray 3/31/20      H&P Hosp Med 3/31/20      Labs 3/28/20      Labs Resp Culture 4/2/20              Progress Notes Hosp Med 4/3/20       Provider, please clarify if there is any correlation between ___Covid-19 Virus Infection___ and __Multifocal PNA___.           Are the conditions:    [x  ] Due to or associated with each other   [  ] Unrelated to each other   [  ] Other (Please Specify): _________________________   [  ] Clinically Undetermined

## 2020-04-03 NOTE — PLAN OF CARE
Problem: Adult Inpatient Plan of Care  Goal: Plan of Care Review  Outcome: Ongoing, Progressing       POC reviewed with patient this shift.  Quiet uneventful shift.  Remains A/O x4.  Respirations unlabored but with periods of SOB noted primarily with exertion.  Continues on 15L non-rebreather.  Tolerated meds whole with water without difficulty.  ABX Tx in progress with no s/s of adverse effects.  Remains afebrile.  VSS.  See flowsheet for full assessment.  Able to make needs known.  No c/o pain or discomfort.

## 2020-04-03 NOTE — NURSING
Pt c/o acid reflux.  States she normally takes Nexium.  Call placed to on-call C MD Staples.  States he will input orders shortly.

## 2020-04-03 NOTE — PROGRESS NOTES
Hospital Medicine  Progress Note  [facility]      Patient Name: Jojo Burrows  MRN:  [id number]  Hospital Medicine Team: [facility] C Marisol Jones MD  Date of Admission:  3/31/2020     Length of Stay:  LOS: 3 days       Principal Problem:  COVID-19 virus infection      Hospital Course:  Jojo Burrows is a 70F with HTN, obesity who presents for evaluation of SOB. She was seen in the ED on 03/28 with fever, chills, cough. She wasn't hypoxic with ambulation and discharged home. She was tested for COVID 19 at that time and has subsequently ruled in. Her  is admitted for COVID+ complications. She presents today with worsening SOB, cough, pleuritic chest pain, sinus congestions, myalgias. She has been using her albuterol inhaler at home with only mild relief.      Initially SpO2 of 78%, placed on NRB 15L, weaned to 6L NC, but then returned to requiring 15L 4/2.    Interval History:     No acute events overnight.  Pt reports mildly worsened breathing this morning, but weaned down to 10L midday.  Coughing exacerbates SOB.    Review of Systems:  Respiratory: +SOB, minimal cough  Cardiovascular: no chest pain, palpitations  GI: no N/V/D, abdominal pain    Inpatient Medications:    Current Facility-Administered Medications:     acetaminophen tablet 650 mg, 650 mg, Oral, Q4H PRN, Mikal Staples PA-C    acetaminophen tablet 650 mg, 650 mg, Oral, Q8H PRN, Mikal Staples PA-C    albuterol sulfate nebulizer solution 2.5 mg, 2.5 mg, Nebulization, Q4H, Manuel Vásquez MD, 2.5 mg at 04/03/20 0847    benzonatate capsule 100 mg, 100 mg, Oral, TID PRN, Mikal Staples PA-C, 100 mg at 04/01/20 1107    calcium carbonate 200 mg calcium (500 mg) chewable tablet 1,000 mg, 1,000 mg, Oral, TID PRN, ENRIQUE Aldana-SHERWIN, 1,000 mg at 04/02/20 2233    cefTRIAXone injection 1 g, 1 g, Intravenous, QHS, Marisol Jones MD, 1 g at 04/02/20 2107    dextrose 10% (D10W) Bolus, 12.5 g, Intravenous, PRN, Mikal  BINU Staples    dextrose 10% (D10W) Bolus, 25 g, Intravenous, PRN, ENRIQUE Aldana-C    enoxaparin injection 40 mg, 40 mg, Subcutaneous, QHS, ENRIQUE Aldana-C, 40 mg at 04/02/20 2109    glucagon (human recombinant) injection 1 mg, 1 mg, Intramuscular, PRN, ENRIQUE Aldana-C    glucose chewable tablet 16 g, 16 g, Oral, PRN, ENRIQUE Aldana-C    glucose chewable tablet 24 g, 24 g, Oral, PRN, ENRIQUE Aldana-C    guaiFENesin 12 hr tablet 600 mg, 600 mg, Oral, BID, Marisol Jones MD, 600 mg at 04/03/20 0800    [COMPLETED] hydroxychloroquine tablet 400 mg, 400 mg, Oral, BID, 400 mg at 04/01/20 2016 **FOLLOWED BY** hydroxychloroquine tablet 400 mg, 400 mg, Oral, Daily, Marisol Jones MD, 400 mg at 04/03/20 0800    loperamide capsule 2 mg, 2 mg, Oral, Q6H PRN, ENRIQUE Aldana-C, 2 mg at 04/01/20 1107    melatonin tablet 6 mg, 6 mg, Oral, Nightly PRN, Mikal Staples PA-C    metoprolol succinate (TOPROL-XL) 24 hr tablet 50 mg, 50 mg, Oral, Daily, ENRIQUE Aldana-C, 50 mg at 04/03/20 0800    ondansetron disintegrating tablet 8 mg, 8 mg, Oral, Q8H PRN, ENRIQUE Aldana-C    pantoprazole EC tablet 40 mg, 40 mg, Oral, QHS, Mikal Staples PA-C, 40 mg at 04/02/20 2232    polyethylene glycol packet 17 g, 17 g, Oral, BID PRN, ENRIQUE Aldana-C    sodium chloride 0.9% flush 10 mL, 10 mL, Intravenous, PRN, ENRIQUE Dias-SHERWIN    sodium chloride 0.9% flush 10 mL, 10 mL, Intravenous, PRN, ENRIQUE Aldana-C    tiotropium inhalation capsule 18 mcg, 1 capsule, Inhalation, Daily, Mikal Staples PA-C, 18 mcg at 04/03/20 0801      Physical Exam:      Intake/Output Summary (Last 24 hours) at 4/3/2020 1116  Last data filed at 4/2/2020 2107  Gross per 24 hour   Intake 720 ml   Output --   Net 720 ml     Wt Readings from Last 3 Encounters:   04/02/20 77.4 kg (170 lb 11.2 oz)   03/28/20 75.8 kg (167 lb)   03/27/20 75.8 kg (167 lb)       /74 (Patient Position: Lying)   Pulse 88   Temp 98.7 °F (37.1 °C)  "(Oral)   Resp 18   Ht 5' 2" (1.575 m)   Wt 77.4 kg (170 lb 11.2 oz)   SpO2 (!) 94%   Breastfeeding? No   BMI 31.22 kg/m²     GEN: NAD, conversant  Resp: coarse bilateral breath sounds with poor air movement, no wheezes or rales, mild work of breathing   CV: RRR, no m/r/g, no edema  GI: soft, NTND  Skin: no rash    Laboratory:  Lab Results   Component Value Date    [id number] Detected (A) 03/28/2020       Recent Labs   Lab 03/28/20 1843 03/31/20 2011 04/01/20  0556   WBC 5.79 7.22 7.61   LYMPH 22.8  1.3 21.5  1.6 37.8  2.9   HGB 14.2 13.8 13.5   HCT 43.2 42.0 42.4   * 173 177     Recent Labs   Lab 03/28/20 1843 03/31/20 2011 04/01/20  0556    136 139   K 3.5 3.8 3.7    97 99   CO2 25 31* 28   BUN 9 6* 8   CREATININE 0.8 0.9 0.8    111* 90   CALCIUM 8.6* 8.7 8.8   MG  --  2.3 2.4   PHOS  --  2.5* 3.1     Recent Labs   Lab 03/28/20 1843 03/31/20 2011 04/01/20  0556   ALKPHOS 70 67 63   ALT 30 29 28   AST 52* 45* 40   ALBUMIN 3.7 3.5 3.4*   PROT 7.5 7.6 7.3   BILITOT 0.6 0.6 0.6        Recent Labs     03/31/20 2011 04/02/20  0413 04/03/20  0631   DDIMER 0.73*  --   --    FERRITIN 471*  --   --    CRP 52.4* 44.6* 130.9*   *  --   --    BNP <10  --   --    TROPONINI 0.008  --   --    *  --   --        All labs within the last 24 hours were reviewed.     Microbiology:  Microbiology Results (last 7 days)     Procedure Component Value Units Date/Time    Culture, Respiratory with Gram Stain [[id number]] Collected:  04/02/20 0514    Order Status:  Completed Specimen:  Sputum, Expectorated Updated:  04/03/20 0933     Respiratory Culture Normal respiratory christopher     Gram Stain (Respiratory) >10 epithelial cells per low power field     Gram Stain (Respiratory) Moderate WBC's     Gram Stain (Respiratory) Many Gram positive cocci     Gram Stain (Respiratory) Many Gram negative rods     Gram Stain (Respiratory) Few Gram positive rods     Gram Stain (Respiratory) Rare yeast    " "Blood culture [[id number]] Collected:  03/31/20 2011    Order Status:  Completed Specimen:  Blood from Peripheral, Antecubital, Left Updated:  04/02/20 2213     Blood Culture, Routine No Growth to date      No Growth to date      No Growth to date    Blood culture [[id number]] Collected:  03/31/20 2018    Order Status:  Completed Specimen:  Blood from Peripheral, Antecubital, Right Updated:  04/02/20 2213     Blood Culture, Routine No Growth to date      No Growth to date      No Growth to date    Influenza A & B by Molecular [[id number]] Collected:  03/31/20 2224    Order Status:  Completed Specimen:  Nasopharyngeal Swab Updated:  03/31/20 2258     Influenza A, Molecular Negative     Influenza B, Molecular Negative     Flu A & B Source Nasal swab            Imaging  ECG Results          EKG 12-lead (Final result)  Result time 04/01/20 09:38:21    Final result by Interface, Lab In [facility] (04/01/20 09:38:21)                 Narrative:    Test Reason : R06.02,    Vent. Rate : 079 BPM     Atrial Rate : 079 BPM     P-R Int : 148 ms          QRS Dur : 086 ms      QT Int : 382 ms       P-R-T Axes : 067 -15 014 degrees     QTc Int : 438 ms    Normal sinus rhythm  Minimal voltage criteria for LVH, may be normal variant  Nonspecific T wave abnormality  Abnormal ECG  When compared with ECG of 28-MAR-2020 18:26,  No significant change was found  Confirmed by BLANCA MARTINEZ MD ([id number]) on 4/1/2020 9:38:08 AM    Referred By: [email or web address]   SELF           Confirmed By:BLANCA MARTINEZ MD                              No results found for this or any previous visit.    X-Ray Chest AP Portable  Narrative: EXAMINATION:  XR CHEST AP PORTABLE    CLINICAL HISTORY:  Provided history is "  Shortness of breath".    TECHNIQUE:  One view of the chest.    COMPARISON:  03/28/2020, 03/27/2020, and 08/21/2018.    FINDINGS:  Lung volumes are low.  Cardiac wires overlie the chest.  Platelike subsegmental opacities are again present in the lower lung zones, " slightly worse when compared with the prior study.  New subtle bibasilar and peripheral interstitial opacities.  No confluent area of consolidation.  No sizable pleural effusion.  No pneumothorax.  Impression: Worsening bibasilar subsegmental and interstitial opacities, worrisome for worsening infectious or inflammatory process including pneumonia/pneumonitis in this patient with recent diagnosis of COVID-19.    Electronically signed by: Graham Ortiz MD  Date:    03/31/2020  Time:    20:46      All imaging within the last 24 hours was reviewed.     Assessment and Plan:    Active Hospital Problems    Diagnosis  POA    *COVID-19 virus infection [U07.1]  Yes    Multifocal pneumonia [J18.9]  Yes    Acute hypoxemic respiratory failure [J96.01]  Yes    Obesity (BMI 30.0-34.9) [E66.9]  Yes    Essential hypertension [I10]  Yes     Chronic      Resolved Hospital Problems   No resolved problems to display.       Covid-19 Virus Infection  - COVID-19 testing POSITIVE: Collection Date: 3/28/2020 Collection Time:   6:43 PM  - Infection Control notified    - Isolation:   - Airborne and Droplet Precautions  - Surgical mask on patient   - N95 masks must be fit tested, wear eye protection  - 20 second hand hygiene   - Limit visitors per hospital policy   - Consolidate lab draws, nursing care, and interventions    - Diagnostics: (Lymphopenia, hyponatremia, hyperferritinemia, elevated troponin, elevated d-dimer, age, and comorbidities are significant predictors of poor clinical outcome)   - CBC:   trend Q48hrs  - Ferritin:  471; repeat prior to discharge   - D-dimer:  0.73; repeat prior to discharge  - CMP:        trend Q48hrs  - CRP:        52.4 to 44.6; trend Q48hrs  - BNP:   <10  - CPK:   275  - LDH:   444; repeat prior to discharge  - Troponin:  0.008  - Procalcitonin: 0.04   - ECG with QTc: NSR with QTc 438   - rapid Flu:  negative   - Legionella antigen:   - Blood culture x2: NGTD   - Sputum culture:          -  Management:   Bundle care as able to maintain isolation & minimize in/out of room   - Supplemental O2 to maintain SpO2 92%-96%    - Telemetry & continuous pulse oximetry    - If wheezing   - albuterol inhaler 2-4 puff Q6hr PRN  - ipratropium daily    - acetaminophen 650mg PO Q6hr PRN fever   - loperamide PRN for viral diarrhea  - Empiric antibiotics for CAP- end date: 4/4    - Ceftriaxone 1g IV Q24hrs           - Azithromycin 500mg IV day #1, then 250mg PO daily x4 days   -add Mucinex, Lasix IV x 1 again today   - Investigational Treatment Protocol:     - patient does not meet criteria for statin: atorvastatin 40mg po daily     - patient does meet criteria for HCQ: HCQ 400mg PO BID x1 day, then 400mg PO daily x 4 days   -normal glucose 6 phosphate dehydrogenase, ECG, and start Qshift POCT glucose    Safety notes:   - Avoid NIPPV (CPAP/BiPAP) to prevent aerosolization, use on a case-by-case basis if in neg pressure room   - Cautious use of NSAIDS for fever per WHO recommendations (3/16/2020)   - No new ACEi/ARB start or discontinuation of chronic med unless hypotensive (Esler et al. Journal of Hypertension 2020, 38:000-000)   - Careful use of steroids in the absence of other indications (shock, ARDS)   - Fluid sparing resuscitation, avoid maintenance fluids      Advance Care Planning  Goals of care, counseling/discussion   -patient understand severity of disease and potential to need to go to ICU for mechanical ventilation should oxygen status worsen  -confirms FULL code status  HTN  - continue MTP XL 50 mg daily    Patient's chronic/stable medical conditions noted in the problem list above will be managed with the patient's home medications as tolerated.     VTE High Risk Prophylaxis: enoxaparin 40mg sq QHS @ 2100 (bundled care) if GFR >30    Disposition: home pending improvement    Marisol Jones MD  Hospital Medicine Staff

## 2020-04-03 NOTE — PLAN OF CARE
POC reviewed with patient. Pt verbalized understanding. Pt sating 94-95% on 50% ventimask. Pt continues to have SOBOE moving in bed with a productive cough. Will continue to monitor. See flowsheets for full assessments and VS info.

## 2020-04-04 LAB
ALBUMIN SERPL BCP-MCNC: 2.9 G/DL (ref 3.5–5.2)
ALP SERPL-CCNC: 58 U/L (ref 55–135)
ALT SERPL W/O P-5'-P-CCNC: 25 U/L (ref 10–44)
ANION GAP SERPL CALC-SCNC: 12 MMOL/L (ref 8–16)
AST SERPL-CCNC: 33 U/L (ref 10–40)
BACTERIA BLD CULT: NORMAL
BACTERIA BLD CULT: NORMAL
BACTERIA SPEC AEROBE CULT: NORMAL
BACTERIA SPEC AEROBE CULT: NORMAL
BASOPHILS # BLD AUTO: 0.01 K/UL (ref 0–0.2)
BASOPHILS NFR BLD: 0.1 % (ref 0–1.9)
BILIRUB SERPL-MCNC: 0.9 MG/DL (ref 0.1–1)
BUN SERPL-MCNC: 8 MG/DL (ref 8–23)
CALCIUM SERPL-MCNC: 9.5 MG/DL (ref 8.7–10.5)
CHLORIDE SERPL-SCNC: 93 MMOL/L (ref 95–110)
CO2 SERPL-SCNC: 28 MMOL/L (ref 23–29)
CREAT SERPL-MCNC: 0.7 MG/DL (ref 0.5–1.4)
DIFFERENTIAL METHOD: ABNORMAL
EOSINOPHIL # BLD AUTO: 0.1 K/UL (ref 0–0.5)
EOSINOPHIL NFR BLD: 0.8 % (ref 0–8)
ERYTHROCYTE [DISTWIDTH] IN BLOOD BY AUTOMATED COUNT: 13 % (ref 11.5–14.5)
EST. GFR  (AFRICAN AMERICAN): >60 ML/MIN/1.73 M^2
EST. GFR  (NON AFRICAN AMERICAN): >60 ML/MIN/1.73 M^2
GLUCOSE SERPL-MCNC: 77 MG/DL (ref 70–110)
GRAM STN SPEC: NORMAL
HCT VFR BLD AUTO: 38.6 % (ref 37–48.5)
HGB BLD-MCNC: 12.1 G/DL (ref 12–16)
IMM GRANULOCYTES # BLD AUTO: 0.07 K/UL (ref 0–0.04)
IMM GRANULOCYTES NFR BLD AUTO: 0.8 % (ref 0–0.5)
LYMPHOCYTES # BLD AUTO: 1.1 K/UL (ref 1–4.8)
LYMPHOCYTES NFR BLD: 12.4 % (ref 18–48)
MCH RBC QN AUTO: 27.9 PG (ref 27–31)
MCHC RBC AUTO-ENTMCNC: 31.3 G/DL (ref 32–36)
MCV RBC AUTO: 89 FL (ref 82–98)
MONOCYTES # BLD AUTO: 0.9 K/UL (ref 0.3–1)
MONOCYTES NFR BLD: 9.3 % (ref 4–15)
NEUTROPHILS # BLD AUTO: 7 K/UL (ref 1.8–7.7)
NEUTROPHILS NFR BLD: 76.6 % (ref 38–73)
NRBC BLD-RTO: 0 /100 WBC
PLATELET # BLD AUTO: 261 K/UL (ref 150–350)
PMV BLD AUTO: 10.6 FL (ref 9.2–12.9)
POTASSIUM SERPL-SCNC: 3.5 MMOL/L (ref 3.5–5.1)
PROT SERPL-MCNC: 7.4 G/DL (ref 6–8.4)
RBC # BLD AUTO: 4.33 M/UL (ref 4–5.4)
SODIUM SERPL-SCNC: 133 MMOL/L (ref 136–145)
WBC # BLD AUTO: 9.11 K/UL (ref 3.9–12.7)

## 2020-04-04 PROCEDURE — 99900035 HC TECH TIME PER 15 MIN (STAT): Mod: HCNC

## 2020-04-04 PROCEDURE — 11000001 HC ACUTE MED/SURG PRIVATE ROOM: Mod: HCNC

## 2020-04-04 PROCEDURE — 94761 N-INVAS EAR/PLS OXIMETRY MLT: CPT | Mod: HCNC

## 2020-04-04 PROCEDURE — 99233 SBSQ HOSP IP/OBS HIGH 50: CPT | Mod: HCNC,,, | Performed by: HOSPITALIST

## 2020-04-04 PROCEDURE — 25000003 PHARM REV CODE 250: Mod: HCNC | Performed by: HOSPITALIST

## 2020-04-04 PROCEDURE — 94640 AIRWAY INHALATION TREATMENT: CPT | Mod: HCNC

## 2020-04-04 PROCEDURE — 80053 COMPREHEN METABOLIC PANEL: CPT | Mod: HCNC

## 2020-04-04 PROCEDURE — 27100092 HC HIGH FLOW DELIVERY CANNULA: Mod: HCNC

## 2020-04-04 PROCEDURE — 25000003 PHARM REV CODE 250: Mod: HCNC | Performed by: PHYSICIAN ASSISTANT

## 2020-04-04 PROCEDURE — 27100171 HC OXYGEN HIGH FLOW UP TO 24 HOURS: Mod: HCNC

## 2020-04-04 PROCEDURE — 99233 PR SUBSEQUENT HOSPITAL CARE,LEVL III: ICD-10-PCS | Mod: HCNC,,, | Performed by: HOSPITALIST

## 2020-04-04 PROCEDURE — 27000221 HC OXYGEN, UP TO 24 HOURS: Mod: HCNC

## 2020-04-04 PROCEDURE — 85025 COMPLETE CBC W/AUTO DIFF WBC: CPT | Mod: HCNC

## 2020-04-04 PROCEDURE — 94664 DEMO&/EVAL PT USE INHALER: CPT | Mod: HCNC

## 2020-04-04 PROCEDURE — 36415 COLL VENOUS BLD VENIPUNCTURE: CPT | Mod: HCNC

## 2020-04-04 PROCEDURE — 63600175 PHARM REV CODE 636 W HCPCS: Mod: HCNC | Performed by: PHYSICIAN ASSISTANT

## 2020-04-04 PROCEDURE — 25000242 PHARM REV CODE 250 ALT 637 W/ HCPCS: Mod: HCNC | Performed by: PHYSICIAN ASSISTANT

## 2020-04-04 RX ADMIN — ENOXAPARIN SODIUM 40 MG: 100 INJECTION SUBCUTANEOUS at 09:04

## 2020-04-04 RX ADMIN — PANTOPRAZOLE SODIUM 40 MG: 40 TABLET, DELAYED RELEASE ORAL at 09:04

## 2020-04-04 RX ADMIN — GUAIFENESIN 600 MG: 600 TABLET, EXTENDED RELEASE ORAL at 09:04

## 2020-04-04 RX ADMIN — CALCIUM CARBONATE (ANTACID) CHEW TAB 500 MG 1000 MG: 500 CHEW TAB at 09:04

## 2020-04-04 RX ADMIN — HYDROXYCHLOROQUINE SULFATE 400 MG: 200 TABLET ORAL at 09:04

## 2020-04-04 RX ADMIN — TIOTROPIUM BROMIDE 18 MCG: 18 CAPSULE ORAL; RESPIRATORY (INHALATION) at 08:04

## 2020-04-04 RX ADMIN — METOPROLOL SUCCINATE 50 MG: 50 TABLET, EXTENDED RELEASE ORAL at 09:04

## 2020-04-04 NOTE — PLAN OF CARE
Problem: Adult Inpatient Plan of Care  Goal: Plan of Care Review  Outcome: Ongoing, Progressing  Goal: Optimal Comfort and Wellbeing  Outcome: Ongoing, Progressing     Problem: Fall Injury Risk  Goal: Absence of Fall and Fall-Related Injury  Outcome: Ongoing, Progressing     POC reviewed with patient. All questions and concerns reviewed. Fall/ safety precautions implemented & maintained. Bed locked in lowest position, bed alarm activated & audible, & call light in reach. Will continue to monitor.

## 2020-04-04 NOTE — RESPIRATORY THERAPY
Attempted  To  Ween  Pt pt  On  Oxy   Mask  But  desats   Quickly.. Pt  Needs  therapy such as   Incentive spirometry  Or  Something  With  pressure  Such  As  aerobeka

## 2020-04-04 NOTE — PROGRESS NOTES
Hospital Medicine  Progress Note  [facility]      Patient Name: Jojo Burrows  MRN:  [id number]  Hospital Medicine Team: [facility] [place] [facility] Ken Tejada MD  Date of Admission:  3/31/2020     Length of Stay:  LOS: 4 days       Principal Problem:  COVID-19 virus infection      Hospital Course:  Jojo Burrows is a 70F with HTN, obesity who presents for evaluation of SOB. She was seen in the ED on 03/28 with fever, chills, cough. She wasn't hypoxic with ambulation and discharged home. She was tested for COVID 19 at that time and has subsequently ruled in. Her  is admitted for COVID+ complications. She presented with worsening SOB, cough, pleuritic chest pain, sinus congestions, myalgias. She had been using her albuterol inhaler at home with only mild relief.      Initially SpO2 of 78%, placed on NRB 15L, weaned to 6L NC, but then returned to requiring 15L 4/2.  Has been maintained on 15 L via NRB throughout 4/4.    Interval History:     No acute events overnight.  Patient reports that SOB is stable and that coughing continues to exacerbate SOB (as well as hypoxia, objectively).  Provided emotional support for patient who is worried about her overall clinical trajectory.  Discussed code status with patient - wishes to remain full code with escalation to mechanical ventilation if hypoxemia worsens.    Review of Systems:  Respiratory: +SOB, minimal cough  Cardiovascular: no chest pain, palpitations  GI: no N/V/D, abdominal pain    Inpatient Medications:    Current Facility-Administered Medications:     acetaminophen tablet 650 mg, 650 mg, Oral, Q4H PRN, Mikal Staples PA-C    acetaminophen tablet 650 mg, 650 mg, Oral, Q8H PRN, Mikal Staples PA-C    benzonatate capsule 100 mg, 100 mg, Oral, TID PRN, Mikal Staples PA-C, 100 mg at 04/01/20 1107    calcium carbonate 200 mg calcium (500 mg) chewable tablet 1,000 mg, 1,000 mg, Oral, TID PRN, Mikal Staples PA-C, 1,000 mg at 04/03/20 6740     cefTRIAXone injection 1 g, 1 g, Intravenous, QHS, Marisol Jones MD, 1 g at 04/03/20 2210    dextrose 10% (D10W) Bolus, 12.5 g, Intravenous, PRN, ENRIQUE Aldnaa-SHERWIN    dextrose 10% (D10W) Bolus, 25 g, Intravenous, PRN, ENRIQUE Aldana-SHERWIN    enoxaparin injection 40 mg, 40 mg, Subcutaneous, QHS, ENRIQUE Aldana-C, 40 mg at 04/03/20 2210    glucagon (human recombinant) injection 1 mg, 1 mg, Intramuscular, PRN, Mikal Staples PA-C    glucose chewable tablet 16 g, 16 g, Oral, PRN, ENRIQUE Aldana-SHERWIN    glucose chewable tablet 24 g, 24 g, Oral, PRN, ENRIQUE Aldana-SHERWIN    guaiFENesin 12 hr tablet 600 mg, 600 mg, Oral, BID, Marisol Jones MD, 600 mg at 04/03/20 2211    [COMPLETED] hydroxychloroquine tablet 400 mg, 400 mg, Oral, BID, 400 mg at 04/01/20 2016 **FOLLOWED BY** hydroxychloroquine tablet 400 mg, 400 mg, Oral, Daily, Marisol Jones MD, 400 mg at 04/03/20 0800    loperamide capsule 2 mg, 2 mg, Oral, Q6H PRN, Mikal Staples PA-C, 2 mg at 04/01/20 1107    melatonin tablet 6 mg, 6 mg, Oral, Nightly PRN, Mikal Staples PA-C    metoprolol succinate (TOPROL-XL) 24 hr tablet 50 mg, 50 mg, Oral, Daily, ENRIQUE Aldana-C, 50 mg at 04/03/20 0800    ondansetron disintegrating tablet 8 mg, 8 mg, Oral, Q8H PRN, Mikal Staples PA-C    pantoprazole EC tablet 40 mg, 40 mg, Oral, QHS, ENRIQUE Aldana-C, 40 mg at 04/03/20 2210    polyethylene glycol packet 17 g, 17 g, Oral, BID PRN, ENRIQUE Aldana-SHERWIN    sodium chloride 0.9% flush 10 mL, 10 mL, Intravenous, PRN, Aidan Myrick PA-C    sodium chloride 0.9% flush 10 mL, 10 mL, Intravenous, PRN, Mikal Staples PA-C    tiotropium inhalation capsule 18 mcg, 1 capsule, Inhalation, Daily, Mikal Staples PA-C, 18 mcg at 04/03/20 0801      Physical Exam:      Intake/Output Summary (Last 24 hours) at 4/4/2020 0802  Last data filed at 4/4/2020 0700  Gross per 24 hour   Intake 240 ml   Output --   Net 240 ml     Wt Readings from Last 3 Encounters:   04/02/20 77.4  "kg (170 lb 11.2 oz)   03/28/20 75.8 kg (167 lb)   03/27/20 75.8 kg (167 lb)       /60   Pulse 92   Temp 98.8 °F (37.1 °C)   Resp (!) 32   Ht 5' 2" (1.575 m)   Wt 77.4 kg (170 lb 11.2 oz)   SpO2 (!) 93%   Breastfeeding? No   BMI 31.22 kg/m²     GEN: NAD, conversant  Resp: coarse bilateral breath sounds, no wheezes or rales, normal work of breathing   CV: RRR, no m/r/g, no edema  GI: soft, NTND  Skin: no rash    Laboratory:  Lab Results   Component Value Date    [id number] Detected (A) 03/28/2020       Recent Labs   Lab 03/31/20 2011 04/01/20  0556 04/04/20  0452   WBC 7.22 7.61 9.11   LYMPH 21.5  1.6 37.8  2.9 12.4*  1.1   HGB 13.8 13.5 12.1   HCT 42.0 42.4 38.6    177 261     Recent Labs   Lab 03/31/20 2011 04/01/20  0556 04/04/20  0452    139 133*   K 3.8 3.7 3.5   CL 97 99 93*   CO2 31* 28 28   BUN 6* 8 8   CREATININE 0.9 0.8 0.7   * 90 77   CALCIUM 8.7 8.8 9.5   MG 2.3 2.4  --    PHOS 2.5* 3.1  --      Recent Labs   Lab 03/31/20 2011 04/01/20  0556 04/04/20  0452   ALKPHOS 67 63 58   ALT 29 28 25   AST 45* 40 33   ALBUMIN 3.5 3.4* 2.9*   PROT 7.6 7.3 7.4   BILITOT 0.6 0.6 0.9        Recent Labs     04/02/20  0413 04/03/20  0631   CRP 44.6* 130.9*       All labs within the last 24 hours were reviewed.     Microbiology:  Microbiology Results (last 7 days)     Procedure Component Value Units Date/Time    Blood culture [[id number]] Collected:  03/31/20 2011    Order Status:  Completed Specimen:  Blood from Peripheral, Antecubital, Left Updated:  04/03/20 2212     Blood Culture, Routine No Growth to date      No Growth to date      No Growth to date      No Growth to date    Blood culture [[id number]] Collected:  03/31/20 2018    Order Status:  Completed Specimen:  Blood from Peripheral, Antecubital, Right Updated:  04/03/20 2212     Blood Culture, Routine No Growth to date      No Growth to date      No Growth to date      No Growth to date    Culture, Respiratory with Gram " "Stain [[id number]] Collected:  04/02/20 0514    Order Status:  Completed Specimen:  Sputum, Expectorated Updated:  04/03/20 0933     Respiratory Culture Normal respiratory christopher     Gram Stain (Respiratory) >10 epithelial cells per low power field     Gram Stain (Respiratory) Moderate WBC's     Gram Stain (Respiratory) Many Gram positive cocci     Gram Stain (Respiratory) Many Gram negative rods     Gram Stain (Respiratory) Few Gram positive rods     Gram Stain (Respiratory) Rare yeast    Influenza A & B by Molecular [[id number]] Collected:  03/31/20 2224    Order Status:  Completed Specimen:  Nasopharyngeal Swab Updated:  03/31/20 2258     Influenza A, Molecular Negative     Influenza B, Molecular Negative     Flu A & B Source Nasal swab            Imaging  ECG Results          EKG 12-lead (Final result)  Result time 04/01/20 09:38:21    Final result by Interface, Lab In [facility] (04/01/20 09:38:21)                 Narrative:    Test Reason : R06.02,    Vent. Rate : 079 BPM     Atrial Rate : 079 BPM     P-R Int : 148 ms          QRS Dur : 086 ms      QT Int : 382 ms       P-R-T Axes : 067 -15 014 degrees     QTc Int : 438 ms    Normal sinus rhythm  Minimal voltage criteria for LVH, may be normal variant  Nonspecific T wave abnormality  Abnormal ECG  When compared with ECG of 28-MAR-2020 18:26,  No significant change was found  Confirmed by BLANCA MARTINEZ MD ([id number]) on 4/1/2020 9:38:08 AM    Referred By: [email or web address]   SELF           Confirmed By:BLANCA MARTINEZ MD                              No results found for this or any previous visit.    X-Ray Chest AP Portable  Narrative: EXAMINATION:  XR CHEST AP PORTABLE    CLINICAL HISTORY:  Provided history is "  Shortness of breath".    TECHNIQUE:  One view of the chest.    COMPARISON:  03/28/2020, 03/27/2020, and 08/21/2018.    FINDINGS:  Lung volumes are low.  Cardiac wires overlie the chest.  Platelike subsegmental opacities are again present in the lower lung zones, " slightly worse when compared with the prior study.  New subtle bibasilar and peripheral interstitial opacities.  No confluent area of consolidation.  No sizable pleural effusion.  No pneumothorax.  Impression: Worsening bibasilar subsegmental and interstitial opacities, worrisome for worsening infectious or inflammatory process including pneumonia/pneumonitis in this patient with recent diagnosis of COVID-19.    Electronically signed by: Graham Ortiz MD  Date:    03/31/2020  Time:    20:46      All imaging within the last 24 hours was reviewed.     Assessment and Plan:    Active Hospital Problems    Diagnosis  POA    *COVID-19 virus infection [U07.1]  Yes    Multifocal pneumonia [J18.9]  Yes    Acute hypoxemic respiratory failure [J96.01]  Yes    Obesity (BMI 30.0-34.9) [E66.9]  Yes    Essential hypertension [I10]  Yes     Chronic      Resolved Hospital Problems   No resolved problems to display.     Covid-19 Virus Infection  - COVID-19 testing POSITIVE: Collection Date: 3/28/2020 Collection Time:   6:43 PM  - Infection Control notified     - Isolation:   - Airborne and Droplet Precautions  - Surgical mask on patient   - N95 masks must be fit tested, wear eye protection  - 20 second hand hygiene              - Limit visitors per hospital policy              - Consolidate lab draws, nursing care, and interventions     - Diagnostics: (Lymphopenia, hyponatremia, hyperferritinemia, elevated troponin, elevated d-dimer, age, and comorbidities are significant predictors of poor clinical outcome)              - CBC:                         trend Q48hrs  - Ferritin:                      471; repeat prior to discharge   - D-dimer:                    0.73; repeat prior to discharge  - CMP:                         trend Q48hrs  - CRP:                         52.4 to 44.6; trend Q48hrs  - BNP:                          <10  - CPK:                          275  - LDH:                          444; repeat prior to  discharge  - Troponin:                   0.008  - Procalcitonin:            0.04              - ECG with QTc:          NSR with QTc 438              - rapid Flu:                   negative              - Blood culture x2:       NGTD   - Respiratory Culture No S.Aureus or Pseudomonas, normal respiratory fluora     - Management:   Bundle care as able to maintain isolation & minimize in/out of room   - Supplemental O2 to maintain SpO2 92%-96%               - Telemetry & continuous pulse oximetry               - If wheezing   - albuterol inhaler 2-4 puff Q6hr PRN  - ipratropium daily               - acetaminophen 650mg PO Q6hr PRN fever              - loperamide PRN for viral diarrhea  - Empiric antibiotics for CAP- end date: 4/4                          - Ceftriaxone 1g IV Q24hrs                          - Azithromycin 500mg IV day #1, then 250mg PO daily x4 days              -Mucinex, Lasix IV x 1 again today (4/4)              - Investigational Treatment Protocol:                           - patient does not meet criteria for statin: atorvastatin 40mg po daily                           - patient does meet criteria for HCQ: HCQ 400mg PO BID x1 day, then 400mg PO daily x 4 days    -normal glucose 6 phosphate dehydrogenase, ECG, and start Qshift POCT glucose     Safety notes:              - Avoid NIPPV (CPAP/BiPAP) to prevent aerosolization, use on a case-by-case basis if in neg pressure room              - Cautious use of NSAIDS for fever per WHO recommendations (3/16/2020)              - No new ACEi/ARB start or discontinuation of chronic med unless hypotensive (Esler et al. Journal of Hypertension 2020, 38:000-000)              - Careful use of steroids in the absence of other indications (shock, ARDS)              - Fluid sparing resuscitation, avoid maintenance fluids     Advance Care Planning  Goals of care, counseling/discussion   -patient understand severity of disease and potential to need to go to ICU for  mechanical ventilation should oxygen status worsen  -confirms FULL code status    HTN  - continue MTP XL 50 mg daily     Patient's chronic/stable medical conditions noted in the problem list above will be managed with the patient's home medications as tolerated.      VTE High Risk Prophylaxis: enoxaparin 40mg sq QHS @ 2100 (bundled care) if GFR >30     Disposition: home pending improvement    Subsequent Inpatient Hospital Care   Level 3 43049 Total visit time was 35 minutes or greater with greater than 50% of time spent in counseling and coordination of care.       Ken Tejada M.D.  Hospital Medicine Staff  [facility]   Pager: [phone]

## 2020-04-04 NOTE — MEDICAL/APP STUDENT
I called Mrs. Burrows's  (Afshin Burrows) as part of the patient liaison program medical students are volunteering with.     I tried to call Mr. Burrows three times today (2:15pm, 3:40pm, 3:55pm). Twice on his cell phone and once on his home phone. He did not answer any of the calls. I was unable to leave a message because his mobile and home voicemails were full. Will attempt to call back tomorrow.

## 2020-04-04 NOTE — CARE UPDATE
Rapid Response Nurse Chart Check     Chart check completed, abnormal VS noted. Please call [phone] for further concerns or assistance.

## 2020-04-05 LAB — CRP SERPL-MCNC: 186.4 MG/L (ref 0–8.2)

## 2020-04-05 PROCEDURE — 94664 DEMO&/EVAL PT USE INHALER: CPT | Mod: HCNC

## 2020-04-05 PROCEDURE — 99900035 HC TECH TIME PER 15 MIN (STAT): Mod: HCNC

## 2020-04-05 PROCEDURE — 99233 SBSQ HOSP IP/OBS HIGH 50: CPT | Mod: HCNC,,, | Performed by: HOSPITALIST

## 2020-04-05 PROCEDURE — 36415 COLL VENOUS BLD VENIPUNCTURE: CPT | Mod: HCNC

## 2020-04-05 PROCEDURE — 86140 C-REACTIVE PROTEIN: CPT | Mod: HCNC

## 2020-04-05 PROCEDURE — 94761 N-INVAS EAR/PLS OXIMETRY MLT: CPT | Mod: HCNC

## 2020-04-05 PROCEDURE — 99233 PR SUBSEQUENT HOSPITAL CARE,LEVL III: ICD-10-PCS | Mod: HCNC,,, | Performed by: HOSPITALIST

## 2020-04-05 PROCEDURE — 25000003 PHARM REV CODE 250: Mod: HCNC | Performed by: PHYSICIAN ASSISTANT

## 2020-04-05 PROCEDURE — 27000221 HC OXYGEN, UP TO 24 HOURS: Mod: HCNC

## 2020-04-05 PROCEDURE — 25000242 PHARM REV CODE 250 ALT 637 W/ HCPCS: Mod: HCNC | Performed by: PHYSICIAN ASSISTANT

## 2020-04-05 PROCEDURE — 63600175 PHARM REV CODE 636 W HCPCS: Mod: HCNC | Performed by: HOSPITALIST

## 2020-04-05 PROCEDURE — 25000242 PHARM REV CODE 250 ALT 637 W/ HCPCS: Mod: HCNC | Performed by: HOSPITALIST

## 2020-04-05 PROCEDURE — 11000001 HC ACUTE MED/SURG PRIVATE ROOM: Mod: HCNC

## 2020-04-05 PROCEDURE — 63600175 PHARM REV CODE 636 W HCPCS: Mod: HCNC | Performed by: PHYSICIAN ASSISTANT

## 2020-04-05 PROCEDURE — 25000003 PHARM REV CODE 250: Mod: HCNC | Performed by: HOSPITALIST

## 2020-04-05 RX ORDER — ALBUTEROL SULFATE 90 UG/1
2 AEROSOL, METERED RESPIRATORY (INHALATION)
Status: DISCONTINUED | OUTPATIENT
Start: 2020-04-05 | End: 2020-04-07

## 2020-04-05 RX ORDER — ALBUTEROL SULFATE 90 UG/1
2 AEROSOL, METERED RESPIRATORY (INHALATION)
Status: DISCONTINUED | OUTPATIENT
Start: 2020-04-05 | End: 2020-04-05

## 2020-04-05 RX ORDER — FUROSEMIDE 10 MG/ML
20 INJECTION INTRAMUSCULAR; INTRAVENOUS ONCE
Status: COMPLETED | OUTPATIENT
Start: 2020-04-05 | End: 2020-04-05

## 2020-04-05 RX ADMIN — ALBUTEROL SULFATE 2 PUFF: 90 AEROSOL, METERED RESPIRATORY (INHALATION) at 08:04

## 2020-04-05 RX ADMIN — ALBUTEROL SULFATE 2 PUFF: 90 AEROSOL, METERED RESPIRATORY (INHALATION) at 03:04

## 2020-04-05 RX ADMIN — METOPROLOL SUCCINATE 50 MG: 50 TABLET, EXTENDED RELEASE ORAL at 08:04

## 2020-04-05 RX ADMIN — GUAIFENESIN 600 MG: 600 TABLET, EXTENDED RELEASE ORAL at 08:04

## 2020-04-05 RX ADMIN — ALBUTEROL SULFATE 2 PUFF: 90 AEROSOL, METERED RESPIRATORY (INHALATION) at 09:04

## 2020-04-05 RX ADMIN — HYDROXYCHLOROQUINE SULFATE 400 MG: 200 TABLET ORAL at 08:04

## 2020-04-05 RX ADMIN — CALCIUM CARBONATE (ANTACID) CHEW TAB 500 MG 1000 MG: 500 CHEW TAB at 08:04

## 2020-04-05 RX ADMIN — TIOTROPIUM BROMIDE 18 MCG: 18 CAPSULE ORAL; RESPIRATORY (INHALATION) at 09:04

## 2020-04-05 RX ADMIN — CEFTRIAXONE 1 G: 1 INJECTION, POWDER, FOR SOLUTION INTRAMUSCULAR; INTRAVENOUS at 02:04

## 2020-04-05 RX ADMIN — GUAIFENESIN 600 MG: 600 TABLET, EXTENDED RELEASE ORAL at 11:04

## 2020-04-05 RX ADMIN — FUROSEMIDE 20 MG: 10 INJECTION, SOLUTION INTRAMUSCULAR; INTRAVENOUS at 10:04

## 2020-04-05 RX ADMIN — ENOXAPARIN SODIUM 40 MG: 100 INJECTION SUBCUTANEOUS at 11:04

## 2020-04-05 RX ADMIN — PANTOPRAZOLE SODIUM 40 MG: 40 TABLET, DELAYED RELEASE ORAL at 11:04

## 2020-04-05 NOTE — CARE UPDATE
Rapid Response Nurse Chart Check     Chart check completed, abnormal VS noted. bedside RNCorie contacted, the patient is maintaining SpO2 of 92% via oxymask at 15 LPM, instructed to call [phone] for further concerns, change in oxygenation status, or assistance.

## 2020-04-05 NOTE — PLAN OF CARE
04/05/2020  2:34 PM    I have attempted to call Ms. Burrows's daughter, Lucille, throughout the afternoon x7, but I have received a busy signal each time.  Will continue to attempt to reach out to Lucille for update regarding Ms. Burrows's clinical status.  Tentative plan to move her to new [facility] on [address], pending opening.    Ken Tejada M.D.  Hospital Medicine Staff  [facility]   Pager: [phone]

## 2020-04-05 NOTE — PROGRESS NOTES
Hospital Medicine  Progress Note  [facility]      Patient Name: Jojo Burrows  MRN:  [id number]  Hospital Medicine Team: [facility] Ken Tejada MD  Date of Admission:  3/31/2020     Length of Stay:  LOS: 5 days       Principal Problem:  COVID-19 virus infection      Hospital Course:  Jojo Burrows is a 70F with HTN, obesity who presents for evaluation of SOB. She was seen in the ED on 03/28 with fever, chills, cough. She wasn't hypoxic with ambulation and discharged home. She was tested for COVID 19 at that time and has subsequently ruled in. Her  is admitted for COVID+ complications. She presented with worsening SOB, cough, pleuritic chest pain, sinus congestions, myalgias. She had been using her albuterol inhaler at home with only mild relief.      Initially SpO2 of 78%, placed on NRB 15L, weaned to 6L NC, but then returned to requiring 15L via OxyMask through 4/5.  Albuterol nebs scheduled and CPT t.i.d. added to pulmonary toilet regimen.    Interval History:     No acute events overnight.  Patient continues to report that SOB is stable and that coughing exacerbates SOB (as well as hypoxia, objectively). Discussed code status with patient - wishes to remain full code with escalation to mechanical ventilation if hypoxemia worsens.    Review of Systems:  Respiratory: +SOB, minimal cough  Cardiovascular: no chest pain, palpitations  GI: no N/V/D, abdominal pain    Inpatient Medications:    Current Facility-Administered Medications:     acetaminophen tablet 650 mg, 650 mg, Oral, Q4H PRN, Mikal Staples PA-C    acetaminophen tablet 650 mg, 650 mg, Oral, Q8H PRN, Mikal Staples PA-C    albuterol inhaler 2 puff, 2 puff, Inhalation, Q6H WAKE, Ken Tejada MD, 2 puff at 04/05/20 0936    benzonatate capsule 100 mg, 100 mg, Oral, TID PRN, Mikal Staples PA-C, 100 mg at 04/01/20 1107    calcium carbonate 200 mg calcium (500 mg) chewable tablet 1,000 mg, 1,000 mg, Oral, TID PRN,  "Mikal Staples, PA-C, 1,000 mg at 04/05/20 0845    dextrose 10% (D10W) Bolus, 12.5 g, Intravenous, PRN, Mikal Staples, PA-C    dextrose 10% (D10W) Bolus, 25 g, Intravenous, PRN, Mikal Staples, PA-C    enoxaparin injection 40 mg, 40 mg, Subcutaneous, QHS, Mikal Staples, PA-C, 40 mg at 04/04/20 2132    glucagon (human recombinant) injection 1 mg, 1 mg, Intramuscular, PRN, Mikal Staples, PA-C    glucose chewable tablet 16 g, 16 g, Oral, PRN, Mikal Staples, PA-C    glucose chewable tablet 24 g, 24 g, Oral, PRN, Mikal Staples, PA-C    guaiFENesin 12 hr tablet 600 mg, 600 mg, Oral, BID, Marisol Jones MD, 600 mg at 04/05/20 0846    loperamide capsule 2 mg, 2 mg, Oral, Q6H PRN, Mikal Staples, PA-C, 2 mg at 04/01/20 1107    melatonin tablet 6 mg, 6 mg, Oral, Nightly PRN, Mikal Staples, PA-C    metoprolol succinate (TOPROL-XL) 24 hr tablet 50 mg, 50 mg, Oral, Daily, Mikal Staples, PA-C, 50 mg at 04/05/20 0846    ondansetron disintegrating tablet 8 mg, 8 mg, Oral, Q8H PRN, Mikal Staples, PA-C    pantoprazole EC tablet 40 mg, 40 mg, Oral, QHS, Mikal Staples, PA-C, 40 mg at 04/04/20 2133    polyethylene glycol packet 17 g, 17 g, Oral, BID PRN, Mikal Staples PA-C    sodium chloride 0.9% flush 10 mL, 10 mL, Intravenous, PRN, ENRIQUE Dias-SHERWIN    sodium chloride 0.9% flush 10 mL, 10 mL, Intravenous, PRN, Mikal Staples, PA-C    tiotropium inhalation capsule 18 mcg, 1 capsule, Inhalation, Daily, Mikal Staples, PA-C, 18 mcg at 04/05/20 0934      Physical Exam:      Intake/Output Summary (Last 24 hours) at 4/5/2020 1425  Last data filed at 4/5/2020 0300  Gross per 24 hour   Intake 360 ml   Output --   Net 360 ml     Wt Readings from Last 3 Encounters:   04/02/20 77.4 kg (170 lb 11.2 oz)   03/28/20 75.8 kg (167 lb)   03/27/20 75.8 kg (167 lb)       /70 (BP Location: Right arm, Patient Position: Lying)   Pulse (!) 111   Temp 97.8 °F (36.6 °C) (Axillary)   Resp (!) 22   Ht 5' 2" (1.575 m)   Wt 77.4 kg (170 " lb 11.2 oz)   SpO2 96%   Breastfeeding? No   BMI 31.22 kg/m²     GEN: NAD, conversant, on 15 L via OxyMask  Resp: coarse bilateral breath sounds, no wheezes or rales, normal work of breathing   CV: RRR, no m/r/g, no edema  GI: soft, NTND  Skin: no rash    Laboratory:  Lab Results   Component Value Date    [id number] Detected (A) 03/28/2020       Recent Labs   Lab 03/31/20 2011 04/01/20 0556 04/04/20  0452   WBC 7.22 7.61 9.11   LYMPH 21.5  1.6 37.8  2.9 12.4*  1.1   HGB 13.8 13.5 12.1   HCT 42.0 42.4 38.6    177 261     Recent Labs   Lab 03/31/20 2011 04/01/20  0556 04/04/20  0452    139 133*   K 3.8 3.7 3.5   CL 97 99 93*   CO2 31* 28 28   BUN 6* 8 8   CREATININE 0.9 0.8 0.7   * 90 77   CALCIUM 8.7 8.8 9.5   MG 2.3 2.4  --    PHOS 2.5* 3.1  --      Recent Labs   Lab 03/31/20 2011 04/01/20 0556 04/04/20  0452   ALKPHOS 67 63 58   ALT 29 28 25   AST 45* 40 33   ALBUMIN 3.5 3.4* 2.9*   PROT 7.6 7.3 7.4   BILITOT 0.6 0.6 0.9        Recent Labs     04/03/20 0631 04/05/20  0454   .9* 186.4*       All labs within the last 24 hours were reviewed.     Microbiology:  Microbiology Results (last 7 days)     Procedure Component Value Units Date/Time    Blood culture [[id number]] Collected:  03/31/20 2018    Order Status:  Completed Specimen:  Blood from Peripheral, Antecubital, Right Updated:  04/04/20 2212     Blood Culture, Routine No Growth after 4 days.     Blood culture [[id number]] Collected:  03/31/20 2011    Order Status:  Completed Specimen:  Blood from Peripheral, Antecubital, Left Updated:  04/04/20 2212     Blood Culture, Routine No Growth after 4 days.     Culture, Respiratory with Gram Stain [[id number]] Collected:  04/02/20 0514    Order Status:  Completed Specimen:  Sputum, Expectorated Updated:  04/04/20 0917     Respiratory Culture Normal respiratory christopher      No S aureus or Pseudomonas isolated.     Gram Stain (Respiratory) >10 epithelial cells per low power field      "Gram Stain (Respiratory) Moderate WBC's     Gram Stain (Respiratory) Many Gram positive cocci     Gram Stain (Respiratory) Many Gram negative rods     Gram Stain (Respiratory) Few Gram positive rods     Gram Stain (Respiratory) Rare yeast    Influenza A & B by Molecular [[id number]] Collected:  03/31/20 2224    Order Status:  Completed Specimen:  Nasopharyngeal Swab Updated:  03/31/20 2258     Influenza A, Molecular Negative     Influenza B, Molecular Negative     Flu A & B Source Nasal swab            Imaging  ECG Results          EKG 12-lead (Final result)  Result time 04/01/20 09:38:21    Final result by Interface, Lab In [facility] (04/01/20 09:38:21)                 Narrative:    Test Reason : R06.02,    Vent. Rate : 079 BPM     Atrial Rate : 079 BPM     P-R Int : 148 ms          QRS Dur : 086 ms      QT Int : 382 ms       P-R-T Axes : 067 -15 014 degrees     QTc Int : 438 ms    Normal sinus rhythm  Minimal voltage criteria for LVH, may be normal variant  Nonspecific T wave abnormality  Abnormal ECG  When compared with ECG of 28-MAR-2020 18:26,  No significant change was found  Confirmed by BLANCA MARTINEZ MD ([id number]) on 4/1/2020 9:38:08 AM    Referred By: [email or web address]   SELF           Confirmed By:BLANCA MARTINEZ MD                              No results found for this or any previous visit.    X-Ray Chest AP Portable  Narrative: EXAMINATION:  XR CHEST AP PORTABLE    CLINICAL HISTORY:  Provided history is "  Shortness of breath".    TECHNIQUE:  One view of the chest.    COMPARISON:  03/28/2020, 03/27/2020, and 08/21/2018.    FINDINGS:  Lung volumes are low.  Cardiac wires overlie the chest.  Platelike subsegmental opacities are again present in the lower lung zones, slightly worse when compared with the prior study.  New subtle bibasilar and peripheral interstitial opacities.  No confluent area of consolidation.  No sizable pleural effusion.  No pneumothorax.  Impression: Worsening bibasilar subsegmental and " interstitial opacities, worrisome for worsening infectious or inflammatory process including pneumonia/pneumonitis in this patient with recent diagnosis of COVID-19.    Electronically signed by: Graham Ortiz MD  Date:    03/31/2020  Time:    20:46      All imaging within the last 24 hours was reviewed.     Assessment and Plan:    Active Hospital Problems    Diagnosis  POA    *COVID-19 virus infection [U07.1]  Yes    Multifocal pneumonia [J18.9]  Yes    Acute hypoxemic respiratory failure [J96.01]  Yes    Obesity (BMI 30.0-34.9) [E66.9]  Yes    Essential hypertension [I10]  Yes     Chronic      Resolved Hospital Problems   No resolved problems to display.     Covid-19 Virus Infection  - COVID-19 testing POSITIVE: Collection Date: 3/28/2020 Collection Time:   6:43 PM  - Infection Control notified     - Isolation:   - Airborne and Droplet Precautions  - Surgical mask on patient   - N95 masks must be fit tested, wear eye protection  - 20 second hand hygiene              - Limit visitors per hospital policy              - Consolidate lab draws, nursing care, and interventions     - Diagnostics: (Lymphopenia, hyponatremia, hyperferritinemia, elevated troponin, elevated d-dimer, age, and comorbidities are significant predictors of poor clinical outcome)              - CBC:                         trend Q48hrs  - Ferritin:                      471; repeat prior to discharge   - D-dimer:                    0.73; repeat prior to discharge  - CMP:                         trend Q48hrs  - CRP:                         52.4 to 44.6; trend Q48hrs  - BNP:                          <10  - CPK:                          275  - LDH:                          444; repeat prior to discharge  - Troponin:                   0.008  - Procalcitonin:            0.04              - ECG with QTc:          NSR with QTc 438              - rapid Flu:                   negative              - Blood culture x2:       NGTD   - Respiratory  Culture No S.Aureus or Pseudomonas, normal respiratory fluora     - Management:   Bundle care as able to maintain isolation & minimize in/out of room   - Supplemental O2 to maintain SpO2 92%-96%.  Patient remains on 15 L via OxyMask               - Telemetry & continuous pulse oximetry               - If wheezing   - albuterol inhaler 2-4 puff q.6 scheduled, CPT t.i.d.  - Spiriva daily               - acetaminophen 650mg PO Q6hr PRN fever              - loperamide PRN for viral diarrhea  - Empiric antibiotics for CAP- end date: 4/4                          - Ceftriaxone 1g IV Q24hrs                          - Azithromycin 500mg IV day #1, then 250mg PO daily x4 days              -Mucinex, Lasix IV x 1 again today (4/5)              - Investigational Treatment Protocol:                           - patient does not meet criteria for statin: atorvastatin 40mg po daily                           - patient does meet criteria for HCQ: HCQ 400mg PO BID x1 day, then 400mg PO daily x 4 days    -normal glucose 6 phosphate dehydrogenase, ECG, and start Qshift POCT glucose     Safety notes:              - Avoid NIPPV (CPAP/BiPAP) to prevent aerosolization, use on a case-by-case basis if in neg pressure room              - Cautious use of NSAIDS for fever per WHO recommendations (3/16/2020)              - No new ACEi/ARB start or discontinuation of chronic med unless hypotensive (Esler et al. Journal of Hypertension 2020, 38:000-000)              - Careful use of steroids in the absence of other indications (shock, ARDS)              - Fluid sparing resuscitation, avoid maintenance fluids     Advance Care Planning  Goals of care, counseling/discussion  -patient understands severity of disease and potential to need to go to ICU for mechanical ventilation should oxygen status worsen  -confirms FULL code status    HTN  - continue MTP XL 50 mg daily       Patient's chronic/stable medical conditions noted in the problem list above  will be managed with the patient's home medications as tolerated.      VTE High Risk Prophylaxis: enoxaparin 40mg sq QHS @ 2100 (bundled care) if GFR >30     Disposition: home pending improvement      Subsequent Inpatient Hospital Care   Level 3 05872 Total visit time was 35 minutes or greater with greater than 50% of time spent in counseling and coordination of care.       Ken Tejada M.D.  Hospital Medicine Staff  [facility]   Pager: [phone]

## 2020-04-05 NOTE — PLAN OF CARE
POC reviewed with patient. Pt verbalized understanding. Pt requiring non rebreather as sats dropped on oxymask. Will continue to monitor. See flowsheets for full assessments and VS info.

## 2020-04-05 NOTE — PROGRESS NOTES
Brief palliative care note:   I called via telephone and introduced myself to the patient and described the role of the palliative medicine team in helping to support her and her family through not only her hospitalization but also her 's.    I shared with her the conversations we have had with her  as well as her adult children regarding his care plan.  Her  had previously neglected to have conversations about advance care planning the has been very open to the palliative care team this admission.    I shared with her his desire to avoid intubation and prolonged intensive care unit stay given his multiple comorbidities.  I also shared with her his clinical status and are concerned that he will continue to worsen.  She was upset at the news though was thankful to know an honest picture about not only what his wishes are but also what to expect in the future.  She admitted this helps her to have more open and honest conversations with him.    She was thankful to know that her family is being supported through these difficult times.    I am not seeing her to specifically discuss her advance care planning wishes though through documented discussions it sounds as though she has a clear plan about her desired care path.    Russell Adam MD  Palliative Medicine

## 2020-04-06 LAB
ALBUMIN SERPL BCP-MCNC: 2.7 G/DL (ref 3.5–5.2)
ALP SERPL-CCNC: 56 U/L (ref 55–135)
ALT SERPL W/O P-5'-P-CCNC: 26 U/L (ref 10–44)
ANION GAP SERPL CALC-SCNC: 10 MMOL/L (ref 8–16)
AST SERPL-CCNC: 31 U/L (ref 10–40)
BASOPHILS # BLD AUTO: 0.02 K/UL (ref 0–0.2)
BASOPHILS NFR BLD: 0.2 % (ref 0–1.9)
BILIRUB SERPL-MCNC: 0.7 MG/DL (ref 0.1–1)
BUN SERPL-MCNC: 8 MG/DL (ref 8–23)
CALCIUM SERPL-MCNC: 9.5 MG/DL (ref 8.7–10.5)
CHLORIDE SERPL-SCNC: 93 MMOL/L (ref 95–110)
CO2 SERPL-SCNC: 31 MMOL/L (ref 23–29)
CREAT SERPL-MCNC: 0.7 MG/DL (ref 0.5–1.4)
CRP SERPL-MCNC: 158.2 MG/L (ref 0–8.2)
DIFFERENTIAL METHOD: ABNORMAL
EOSINOPHIL # BLD AUTO: 0.1 K/UL (ref 0–0.5)
EOSINOPHIL NFR BLD: 1.1 % (ref 0–8)
ERYTHROCYTE [DISTWIDTH] IN BLOOD BY AUTOMATED COUNT: 12.8 % (ref 11.5–14.5)
EST. GFR  (AFRICAN AMERICAN): >60 ML/MIN/1.73 M^2
EST. GFR  (NON AFRICAN AMERICAN): >60 ML/MIN/1.73 M^2
GLUCOSE SERPL-MCNC: 94 MG/DL (ref 70–110)
HCT VFR BLD AUTO: 37.8 % (ref 37–48.5)
HGB BLD-MCNC: 12.2 G/DL (ref 12–16)
IMM GRANULOCYTES # BLD AUTO: 0.08 K/UL (ref 0–0.04)
IMM GRANULOCYTES NFR BLD AUTO: 0.8 % (ref 0–0.5)
LYMPHOCYTES # BLD AUTO: 1.2 K/UL (ref 1–4.8)
LYMPHOCYTES NFR BLD: 12.5 % (ref 18–48)
MAGNESIUM SERPL-MCNC: 2.2 MG/DL (ref 1.6–2.6)
MCH RBC QN AUTO: 28.8 PG (ref 27–31)
MCHC RBC AUTO-ENTMCNC: 32.3 G/DL (ref 32–36)
MCV RBC AUTO: 89 FL (ref 82–98)
MONOCYTES # BLD AUTO: 1 K/UL (ref 0.3–1)
MONOCYTES NFR BLD: 10.1 % (ref 4–15)
NEUTROPHILS # BLD AUTO: 7.3 K/UL (ref 1.8–7.7)
NEUTROPHILS NFR BLD: 75.3 % (ref 38–73)
NRBC BLD-RTO: 0 /100 WBC
PLATELET # BLD AUTO: 294 K/UL (ref 150–350)
PMV BLD AUTO: 10.7 FL (ref 9.2–12.9)
POTASSIUM SERPL-SCNC: 3.2 MMOL/L (ref 3.5–5.1)
PROT SERPL-MCNC: 7.2 G/DL (ref 6–8.4)
RBC # BLD AUTO: 4.24 M/UL (ref 4–5.4)
SODIUM SERPL-SCNC: 134 MMOL/L (ref 136–145)
WBC # BLD AUTO: 9.73 K/UL (ref 3.9–12.7)

## 2020-04-06 PROCEDURE — 80053 COMPREHEN METABOLIC PANEL: CPT | Mod: HCNC

## 2020-04-06 PROCEDURE — 85025 COMPLETE CBC W/AUTO DIFF WBC: CPT | Mod: HCNC

## 2020-04-06 PROCEDURE — 63600175 PHARM REV CODE 636 W HCPCS: Mod: HCNC | Performed by: PHYSICIAN ASSISTANT

## 2020-04-06 PROCEDURE — 83735 ASSAY OF MAGNESIUM: CPT | Mod: HCNC

## 2020-04-06 PROCEDURE — 25000003 PHARM REV CODE 250: Mod: HCNC | Performed by: HOSPITALIST

## 2020-04-06 PROCEDURE — 99900035 HC TECH TIME PER 15 MIN (STAT): Mod: HCNC

## 2020-04-06 PROCEDURE — 25000242 PHARM REV CODE 250 ALT 637 W/ HCPCS: Mod: HCNC | Performed by: PHYSICIAN ASSISTANT

## 2020-04-06 PROCEDURE — 86140 C-REACTIVE PROTEIN: CPT | Mod: HCNC

## 2020-04-06 PROCEDURE — 25000003 PHARM REV CODE 250: Mod: HCNC | Performed by: PHYSICIAN ASSISTANT

## 2020-04-06 PROCEDURE — 94664 DEMO&/EVAL PT USE INHALER: CPT | Mod: HCNC

## 2020-04-06 PROCEDURE — 94761 N-INVAS EAR/PLS OXIMETRY MLT: CPT | Mod: HCNC

## 2020-04-06 PROCEDURE — 99233 SBSQ HOSP IP/OBS HIGH 50: CPT | Mod: HCNC,,, | Performed by: HOSPITALIST

## 2020-04-06 PROCEDURE — 27100171 HC OXYGEN HIGH FLOW UP TO 24 HOURS: Mod: HCNC

## 2020-04-06 PROCEDURE — 94640 AIRWAY INHALATION TREATMENT: CPT | Mod: HCNC

## 2020-04-06 PROCEDURE — 11000001 HC ACUTE MED/SURG PRIVATE ROOM: Mod: HCNC

## 2020-04-06 PROCEDURE — 63600175 PHARM REV CODE 636 W HCPCS: Mod: HCNC | Performed by: HOSPITALIST

## 2020-04-06 PROCEDURE — 99233 PR SUBSEQUENT HOSPITAL CARE,LEVL III: ICD-10-PCS | Mod: HCNC,,, | Performed by: HOSPITALIST

## 2020-04-06 PROCEDURE — 36415 COLL VENOUS BLD VENIPUNCTURE: CPT | Mod: HCNC

## 2020-04-06 RX ORDER — FUROSEMIDE 10 MG/ML
40 INJECTION INTRAMUSCULAR; INTRAVENOUS ONCE
Status: COMPLETED | OUTPATIENT
Start: 2020-04-06 | End: 2020-04-06

## 2020-04-06 RX ORDER — POTASSIUM CHLORIDE 20 MEQ/1
20 TABLET, EXTENDED RELEASE ORAL ONCE
Status: COMPLETED | OUTPATIENT
Start: 2020-04-06 | End: 2020-04-06

## 2020-04-06 RX ADMIN — ENOXAPARIN SODIUM 40 MG: 100 INJECTION SUBCUTANEOUS at 08:04

## 2020-04-06 RX ADMIN — TIOTROPIUM BROMIDE 18 MCG: 18 CAPSULE ORAL; RESPIRATORY (INHALATION) at 09:04

## 2020-04-06 RX ADMIN — GUAIFENESIN 600 MG: 600 TABLET, EXTENDED RELEASE ORAL at 09:04

## 2020-04-06 RX ADMIN — BENZONATATE 100 MG: 100 CAPSULE, LIQUID FILLED ORAL at 05:04

## 2020-04-06 RX ADMIN — ACETAMINOPHEN 650 MG: 325 TABLET ORAL at 09:04

## 2020-04-06 RX ADMIN — ALBUTEROL SULFATE 2 PUFF: 90 AEROSOL, METERED RESPIRATORY (INHALATION) at 02:04

## 2020-04-06 RX ADMIN — GUAIFENESIN 600 MG: 600 TABLET, EXTENDED RELEASE ORAL at 08:04

## 2020-04-06 RX ADMIN — METOPROLOL SUCCINATE 50 MG: 50 TABLET, EXTENDED RELEASE ORAL at 09:04

## 2020-04-06 RX ADMIN — ALBUTEROL SULFATE 2 PUFF: 90 AEROSOL, METERED RESPIRATORY (INHALATION) at 08:04

## 2020-04-06 RX ADMIN — TRAZODONE HYDROCHLORIDE 25 MG: 50 TABLET ORAL at 08:04

## 2020-04-06 RX ADMIN — POTASSIUM CHLORIDE 20 MEQ: 1500 TABLET, FILM COATED, EXTENDED RELEASE ORAL at 08:04

## 2020-04-06 RX ADMIN — FUROSEMIDE 40 MG: 10 INJECTION, SOLUTION INTRAMUSCULAR; INTRAVENOUS at 04:04

## 2020-04-06 RX ADMIN — PANTOPRAZOLE SODIUM 40 MG: 40 TABLET, DELAYED RELEASE ORAL at 08:04

## 2020-04-06 NOTE — PLAN OF CARE
Patient is on non-rebreather; not medically stable for discharge.        04/06/20 8962   Discharge Reassessment   Assessment Type Discharge Planning Reassessment   Do you have any problems affording any of your prescribed medications? No   Discharge Plan A Home with family   Discharge Plan B Home Health   DME Needed Upon Discharge  other (see comments)  (TBD)   Anticipated Discharge Disposition Home   Can the patient/caregiver answer the patient profile reliably? Yes, cognitively intact       Marlena Berger RN, CM

## 2020-04-06 NOTE — PLAN OF CARE
POC reviewed with patient. Pt verbalized understanding. Questions and concerns addressed. Pt unable to wean off of non rebreather 15L. Pt easily SOBOE. Pt intake is poor and MD made aware. Dietary consult and boost ordered. Will continue to monitor. See flowsheets for full assessments and VS info.

## 2020-04-06 NOTE — PLAN OF CARE
04/06/20 1409   Post-Acute Status   Post-Acute Authorization Other   Other Status See Comments       SW following patient for any needs. Pt COVID + and still with needing high percentage of O2.  Family coming today to notify the patient that her  passed away this morning.  SW in contact with CM and Medical staff. Will continue to follow and offer support as needed.     Hernandez Correa, BRANDON  [facility]   Ext. [phone]

## 2020-04-06 NOTE — PLAN OF CARE
04/06/2020   9:32 AM    Ms. Burrows's  has passed away this AM. Daughters are requesting to inform patient of this news in person which is ok with Dr. Tejada. OK for daughters to visit Ms. Burrows today (4/6/20).    Ken Tejada M.D.  [facility] Medicine Staff  [facility]   Pager: [phone]

## 2020-04-06 NOTE — PROGRESS NOTES
Hospital Medicine  Progress Note  [facility]      Patient Name: Jojo Burrows  MRN:  [id number]  Hospital Medicine Team: [facility] HOSP MED C Ken Tejada MD  Date of Admission:  3/31/2020     Length of Stay:  LOS: 6 days       Principal Problem:  COVID-19 virus infection      Hospital Course:  Jojo Burrows is a 70F with HTN, obesity who presents for evaluation of SOB. She was seen in the ED on 03/28 with fever, chills, cough. She wasn't hypoxic with ambulation and discharged home. She was tested for COVID 19 at that time and has subsequently ruled in. Her  is admitted for COVID+ complications. She presented with worsening SOB, cough, pleuritic chest pain, sinus congestions, myalgias. She had been using her albuterol inhaler at home with only mild relief.      Initially SpO2 of 78%, placed on NRB 15L, weaned to 6L NC, but then returned to requiring 15L via OxyMask through 4/5.  Albuterol nebs scheduled and CPT t.i.d. added to pulmonary toilet regimen.    Interval History:     No acute events overnight.  Patient remained stable on 15 L NRB.  Denies fevers, nausea, vomiting, and diarrhea.  Maintain full code status.  Patient's  passed away earlier this morning from COVID-19.  Patient was visited by her daughters this afternoon informed her of this tragic news and spend time with her.  Will continue to provide emotional support on daily rounds.    Review of Systems:  Respiratory: +SOB, minimal cough  Cardiovascular: no chest pain, palpitations  GI: no N/V/D, abdominal pain    Inpatient Medications:    Current Facility-Administered Medications:     acetaminophen tablet 650 mg, 650 mg, Oral, Q4H PRN, Mikal Staples PA-C, 650 mg at 04/06/20 0928    acetaminophen tablet 650 mg, 650 mg, Oral, Q8H PRN, Mikal Staples PA-C, 650 mg at 04/06/20 0933    albuterol inhaler 2 puff, 2 puff, Inhalation, Q6H WAKE, Ken Tejada MD, 2 puff at 04/06/20 1409    benzonatate capsule 100 mg, 100 mg,  Oral, TID PRN, Mikal Staples, PA-C, 100 mg at 04/06/20 0531    calcium carbonate 200 mg calcium (500 mg) chewable tablet 1,000 mg, 1,000 mg, Oral, TID PRN, Mikal Staples, PA-C, 1,000 mg at 04/05/20 0845    dextrose 10% (D10W) Bolus, 12.5 g, Intravenous, PRN, Mikal Staples, PA-C    dextrose 10% (D10W) Bolus, 25 g, Intravenous, PRN, Mikal Staples, PA-C    enoxaparin injection 40 mg, 40 mg, Subcutaneous, QHS, Mikal Staples, PA-C, 40 mg at 04/05/20 2315    furosemide injection 40 mg, 40 mg, Intravenous, Once, Ken Tejada MD    glucagon (human recombinant) injection 1 mg, 1 mg, Intramuscular, PRN, Mikal Staples, PA-C    glucose chewable tablet 16 g, 16 g, Oral, PRN, Mikal Staples, PA-C    glucose chewable tablet 24 g, 24 g, Oral, PRN, Mikal Staples, PA-C    guaiFENesin 12 hr tablet 600 mg, 600 mg, Oral, BID, Marisol Jones MD, 600 mg at 04/06/20 0918    loperamide capsule 2 mg, 2 mg, Oral, Q6H PRN, Mikal Staples, PA-C, 2 mg at 04/01/20 1107    melatonin tablet 6 mg, 6 mg, Oral, Nightly PRN, Mikal Staples, PA-C    metoprolol succinate (TOPROL-XL) 24 hr tablet 50 mg, 50 mg, Oral, Daily, Mikal Staples, PA-C, 50 mg at 04/06/20 0918    ondansetron disintegrating tablet 8 mg, 8 mg, Oral, Q8H PRN, Mikal Staples, PA-C    pantoprazole EC tablet 40 mg, 40 mg, Oral, QHS, Mikal Staples, PA-C, 40 mg at 04/05/20 2315    polyethylene glycol packet 17 g, 17 g, Oral, BID PRN, Mikal Staples, PA-C    sodium chloride 0.9% flush 10 mL, 10 mL, Intravenous, PRN, Aidan Myrick PA-C    sodium chloride 0.9% flush 10 mL, 10 mL, Intravenous, PRN, Mikal Staples PA-C    tiotropium inhalation capsule 18 mcg, 1 capsule, Inhalation, Daily, Mikal Staples PA-C, 18 mcg at 04/06/20 0919      Physical Exam:    No intake or output data in the 24 hours ending 04/06/20 1535  Wt Readings from Last 3 Encounters:   04/02/20 77.4 kg (170 lb 11.2 oz)   03/28/20 75.8 kg (167 lb)   03/27/20 75.8 kg (167 lb)       /60 (BP Location:  "Right arm, Patient Position: Sitting)   Pulse 97   Temp 98.1 °F (36.7 °C) (Oral)   Resp 16   Ht 5' 2" (1.575 m)   Wt 77.4 kg (170 lb 11.2 oz)   SpO2 (!) 94%   Breastfeeding? No   BMI 31.22 kg/m²     GEN: NAD, conversant, on 15 L via OxyMask  Resp: coarse bilateral breath sounds, no wheezes or rales, normal work of breathing   CV: RRR, no m/r/g, no edema  GI: soft, NTND  Skin: no rash    Laboratory:  Lab Results   Component Value Date    [id number] Detected (A) 03/28/2020       Recent Labs   Lab 04/01/20 0556 04/04/20 0452 04/06/20  0527   WBC 7.61 9.11 9.73   LYMPH 37.8  2.9 12.4*  1.1 12.5*  1.2   HGB 13.5 12.1 12.2   HCT 42.4 38.6 37.8    261 294     Recent Labs   Lab 03/31/20 2011 04/01/20 0556 04/04/20 0452 04/06/20  0527    139 133* 134*   K 3.8 3.7 3.5 3.2*   CL 97 99 93* 93*   CO2 31* 28 28 31*   BUN 6* 8 8 8   CREATININE 0.9 0.8 0.7 0.7   * 90 77 94   CALCIUM 8.7 8.8 9.5 9.5   MG 2.3 2.4  --  2.2   PHOS 2.5* 3.1  --   --      Recent Labs   Lab 04/01/20 0556 04/04/20 0452 04/06/20  0527   ALKPHOS 63 58 56   ALT 28 25 26   AST 40 33 31   ALBUMIN 3.4* 2.9* 2.7*   PROT 7.3 7.4 7.2   BILITOT 0.6 0.9 0.7        Recent Labs     04/05/20 0454 04/06/20  0527   .4* 158.2*       All labs within the last 24 hours were reviewed.     Microbiology:  Microbiology Results (last 7 days)     Procedure Component Value Units Date/Time    Blood culture [[id number]] Collected:  03/31/20 2018    Order Status:  Completed Specimen:  Blood from Peripheral, Antecubital, Right Updated:  04/04/20 2212     Blood Culture, Routine No Growth after 4 days.     Blood culture [[id number]] Collected:  03/31/20 2011    Order Status:  Completed Specimen:  Blood from Peripheral, Antecubital, Left Updated:  04/04/20 2212     Blood Culture, Routine No Growth after 4 days.     Culture, Respiratory with Gram Stain [[id number]] Collected:  04/02/20 0514    Order Status:  Completed Specimen:  Sputum, " "Expectorated Updated:  04/04/20 0917     Respiratory Culture Normal respiratory christopher      No S aureus or Pseudomonas isolated.     Gram Stain (Respiratory) >10 epithelial cells per low power field     Gram Stain (Respiratory) Moderate WBC's     Gram Stain (Respiratory) Many Gram positive cocci     Gram Stain (Respiratory) Many Gram negative rods     Gram Stain (Respiratory) Few Gram positive rods     Gram Stain (Respiratory) Rare yeast    Influenza A & B by Molecular [[id number]] Collected:  03/31/20 2224    Order Status:  Completed Specimen:  Nasopharyngeal Swab Updated:  03/31/20 2258     Influenza A, Molecular Negative     Influenza B, Molecular Negative     Flu A & B Source Nasal swab            Imaging  ECG Results          EKG 12-lead (Final result)  Result time 04/01/20 09:38:21    Final result by Interface, Lab In [facility] (04/01/20 09:38:21)                 Narrative:    Test Reason : R06.02,    Vent. Rate : 079 BPM     Atrial Rate : 079 BPM     P-R Int : 148 ms          QRS Dur : 086 ms      QT Int : 382 ms       P-R-T Axes : 067 -15 014 degrees     QTc Int : 438 ms    Normal sinus rhythm  Minimal voltage criteria for LVH, may be normal variant  Nonspecific T wave abnormality  Abnormal ECG  When compared with ECG of 28-MAR-2020 18:26,  No significant change was found  Confirmed by BLANCA MARTINEZ MD ([id number]) on 4/1/2020 9:38:08 AM    Referred By: JASON   SELF           Confirmed By:BLANCA MARTINEZ MD                              No results found for this or any previous visit.    X-Ray Chest AP Portable  Narrative: EXAMINATION:  XR CHEST AP PORTABLE    CLINICAL HISTORY:  Provided history is "  Shortness of breath".    TECHNIQUE:  One view of the chest.    COMPARISON:  03/28/2020, 03/27/2020, and 08/21/2018.    FINDINGS:  Lung volumes are low.  Cardiac wires overlie the chest.  Platelike subsegmental opacities are again present in the lower lung zones, slightly worse when compared with the prior study.  New " subtle bibasilar and peripheral interstitial opacities.  No confluent area of consolidation.  No sizable pleural effusion.  No pneumothorax.  Impression: Worsening bibasilar subsegmental and interstitial opacities, worrisome for worsening infectious or inflammatory process including pneumonia/pneumonitis in this patient with recent diagnosis of COVID-19.    Electronically signed by: Graham Ortiz MD  Date:    03/31/2020  Time:    20:46      All imaging within the last 24 hours was reviewed.     Assessment and Plan:    Active Hospital Problems    Diagnosis  POA    *COVID-19 virus infection [U07.1]  Yes    Multifocal pneumonia [J18.9]  Yes    Acute hypoxemic respiratory failure [J96.01]  Yes    Obesity (BMI 30.0-34.9) [E66.9]  Yes    Essential hypertension [I10]  Yes     Chronic      Resolved Hospital Problems   No resolved problems to display.     Covid-19 Virus Infection  - COVID-19 testing POSITIVE: Collection Date: 3/28/2020 Collection Time:   6:43 PM  - Infection Control notified     - Isolation:   - Airborne and Droplet Precautions  - Surgical mask on patient   - N95 masks must be fit tested, wear eye protection  - 20 second hand hygiene              - Limit visitors per hospital policy              - Consolidate lab draws, nursing care, and interventions     - Diagnostics: (Lymphopenia, hyponatremia, hyperferritinemia, elevated troponin, elevated d-dimer, age, and comorbidities are significant predictors of poor clinical outcome)              - CBC:                         trend Q48hrs  - Ferritin:                      471; repeat prior to discharge   - D-dimer:                    0.73; repeat prior to discharge  - CMP:                         trend Q48hrs  - CRP:                         52.4 to 44.6; trend Q48hrs  - BNP:                          <10  - CPK:                          275  - LDH:                          444; repeat prior to discharge  - Troponin:                   0.008  - Procalcitonin:             0.04              - ECG with QTc:          NSR with QTc 438              - rapid Flu:                   negative              - Blood culture x2:       NGTD   - Respiratory Culture No S.Aureus or Pseudomonas, normal respiratory fluora     - Management:   Bundle care as able to maintain isolation & minimize in/out of room   - Supplemental O2 to maintain SpO2 92%-96%.  Patient remains on 15 L via OxyMask               - Telemetry & continuous pulse oximetry               - If wheezing   - albuterol inhaler 2-4 puff q.6 scheduled, CPT t.i.d.  - Spiriva daily               - acetaminophen 650mg PO Q6hr PRN fever              - loperamide PRN for viral diarrhea  -  Completed empiric antibiotics for CAP- end date: 4/4                          - Ceftriaxone 1g IV Q24hrs                          - Azithromycin 500mg IV day #1, then 250mg PO daily x4 days              -Mucinex, Lasix 40 IV x 1 again today (4/6)              - Investigational Treatment Protocol:                           - patient does not meet criteria for statin: atorvastatin 40mg po daily                           - patient completed HCQ 400mg PO BID x1 day, then 400mg PO daily x 4 days    -normal glucose 6 phosphate dehydrogenase, ECG, and start Qshift POCT glucose   -request placed for transfer this patient to high oxygen flow unit on [address] pending bed  availability.  Patient is full code and currently on 15 L NRB.     Safety notes:              - Avoid NIPPV (CPAP/BiPAP) to prevent aerosolization, use on a case-by-case basis if in neg pressure room              - Cautious use of NSAIDS for fever per WHO recommendations (3/16/2020)              - No new ACEi/ARB start or discontinuation of chronic med unless hypotensive (Esler et al. Journal of Hypertension 2020, 38:000-000)              - Careful use of steroids in the absence of other indications (shock, ARDS)              - Fluid sparing resuscitation, avoid maintenance fluids     Advance  Care Planning  Goals of care, counseling/discussion  -patient understands severity of disease and potential to need to go to ICU for mechanical ventilation should oxygen status worsen  -patient's  passed away from COVID-19 at [facility] on 04/06.  All members of healthcare team are encouraged provide emotional support to this patient daily  -confirms FULL code status    HTN  - continue MTP XL 50 mg daily       Patient's chronic/stable medical conditions noted in the problem list above will be managed with the patient's home medications as tolerated.      VTE High Risk Prophylaxis: enoxaparin 40mg sq QHS @ 2100 (bundled care) if GFR >30     Disposition: home pending improvement      Subsequent Inpatient Hospital Care   Level 3 79700 Total visit time was 35 minutes or greater with greater than 50% of time spent in counseling and coordination of care.       Ken Tejada M.D.  Hospital Medicine Staff  [facility]   Pager: [phone]

## 2020-04-06 NOTE — CARE UPDATE
Rapid Response Nurse Chart Check     Chart check completed, abnormal VS noted. Please call [phone] for further concerns or assistance.

## 2020-04-06 NOTE — CONSULTS
NIAS unable to see patient due to high consult volume. Re-consult if unable to obtain peripheral access.

## 2020-04-07 LAB
ANION GAP SERPL CALC-SCNC: 12 MMOL/L (ref 8–16)
BUN SERPL-MCNC: 10 MG/DL (ref 8–23)
CALCIUM SERPL-MCNC: 9.6 MG/DL (ref 8.7–10.5)
CHLORIDE SERPL-SCNC: 94 MMOL/L (ref 95–110)
CO2 SERPL-SCNC: 32 MMOL/L (ref 23–29)
CREAT SERPL-MCNC: 0.7 MG/DL (ref 0.5–1.4)
EST. GFR  (AFRICAN AMERICAN): >60 ML/MIN/1.73 M^2
EST. GFR  (NON AFRICAN AMERICAN): >60 ML/MIN/1.73 M^2
GLUCOSE SERPL-MCNC: 93 MG/DL (ref 70–110)
MAGNESIUM SERPL-MCNC: 2.2 MG/DL (ref 1.6–2.6)
POTASSIUM SERPL-SCNC: 3.4 MMOL/L (ref 3.5–5.1)
SODIUM SERPL-SCNC: 138 MMOL/L (ref 136–145)

## 2020-04-07 PROCEDURE — 25000242 PHARM REV CODE 250 ALT 637 W/ HCPCS: Mod: HCNC | Performed by: HOSPITALIST

## 2020-04-07 PROCEDURE — 63600175 PHARM REV CODE 636 W HCPCS: Mod: HCNC | Performed by: PHYSICIAN ASSISTANT

## 2020-04-07 PROCEDURE — 25000003 PHARM REV CODE 250: Mod: HCNC | Performed by: PHYSICIAN ASSISTANT

## 2020-04-07 PROCEDURE — 94761 N-INVAS EAR/PLS OXIMETRY MLT: CPT | Mod: HCNC

## 2020-04-07 PROCEDURE — 25000003 PHARM REV CODE 250: Mod: HCNC | Performed by: HOSPITALIST

## 2020-04-07 PROCEDURE — 94640 AIRWAY INHALATION TREATMENT: CPT | Mod: HCNC

## 2020-04-07 PROCEDURE — 63600175 PHARM REV CODE 636 W HCPCS: Mod: HCNC

## 2020-04-07 PROCEDURE — 36415 COLL VENOUS BLD VENIPUNCTURE: CPT | Mod: HCNC

## 2020-04-07 PROCEDURE — 80048 BASIC METABOLIC PNL TOTAL CA: CPT | Mod: HCNC

## 2020-04-07 PROCEDURE — 94664 DEMO&/EVAL PT USE INHALER: CPT | Mod: HCNC

## 2020-04-07 PROCEDURE — 99233 PR SUBSEQUENT HOSPITAL CARE,LEVL III: ICD-10-PCS | Mod: HCNC,,, | Performed by: HOSPITALIST

## 2020-04-07 PROCEDURE — 27000221 HC OXYGEN, UP TO 24 HOURS: Mod: HCNC

## 2020-04-07 PROCEDURE — 20000000 HC ICU ROOM: Mod: HCNC

## 2020-04-07 PROCEDURE — 83735 ASSAY OF MAGNESIUM: CPT | Mod: HCNC

## 2020-04-07 PROCEDURE — 99900035 HC TECH TIME PER 15 MIN (STAT): Mod: HCNC

## 2020-04-07 PROCEDURE — 99233 SBSQ HOSP IP/OBS HIGH 50: CPT | Mod: HCNC,,, | Performed by: HOSPITALIST

## 2020-04-07 RX ORDER — FUROSEMIDE 10 MG/ML
INJECTION INTRAMUSCULAR; INTRAVENOUS
Status: DISPENSED
Start: 2020-04-07 | End: 2020-04-08

## 2020-04-07 RX ORDER — ASCORBIC ACID 250 MG
250 TABLET ORAL DAILY
Status: DISCONTINUED | OUTPATIENT
Start: 2020-04-07 | End: 2020-04-11

## 2020-04-07 RX ORDER — CHOLECALCIFEROL (VITAMIN D3) 25 MCG
TABLET ORAL
Status: DISPENSED
Start: 2020-04-07 | End: 2020-04-08

## 2020-04-07 RX ORDER — IPRATROPIUM BROMIDE AND ALBUTEROL SULFATE 2.5; .5 MG/3ML; MG/3ML
3 SOLUTION RESPIRATORY (INHALATION)
Status: DISCONTINUED | OUTPATIENT
Start: 2020-04-07 | End: 2020-04-09

## 2020-04-07 RX ORDER — ENOXAPARIN SODIUM 100 MG/ML
INJECTION SUBCUTANEOUS
Status: COMPLETED
Start: 2020-04-07 | End: 2020-04-07

## 2020-04-07 RX ORDER — GUAIFENESIN 600 MG/1
TABLET, EXTENDED RELEASE ORAL
Status: COMPLETED
Start: 2020-04-07 | End: 2020-04-07

## 2020-04-07 RX ORDER — FUROSEMIDE 10 MG/ML
40 INJECTION INTRAMUSCULAR; INTRAVENOUS ONCE
Status: COMPLETED | OUTPATIENT
Start: 2020-04-07 | End: 2020-04-07

## 2020-04-07 RX ORDER — POTASSIUM CHLORIDE 20 MEQ/1
40 TABLET, EXTENDED RELEASE ORAL ONCE
Status: COMPLETED | OUTPATIENT
Start: 2020-04-07 | End: 2020-04-07

## 2020-04-07 RX ORDER — ATORVASTATIN CALCIUM 20 MG/1
TABLET, FILM COATED ORAL
Status: COMPLETED
Start: 2020-04-07 | End: 2020-04-07

## 2020-04-07 RX ORDER — IPRATROPIUM BROMIDE AND ALBUTEROL SULFATE 2.5; .5 MG/3ML; MG/3ML
SOLUTION RESPIRATORY (INHALATION)
Status: DISPENSED
Start: 2020-04-07 | End: 2020-04-08

## 2020-04-07 RX ORDER — VITAMIN A 3000 MCG
10000 CAPSULE ORAL DAILY
Status: DISCONTINUED | OUTPATIENT
Start: 2020-04-07 | End: 2020-04-18 | Stop reason: HOSPADM

## 2020-04-07 RX ORDER — CHOLECALCIFEROL (VITAMIN D3) 25 MCG
1000 TABLET ORAL DAILY
Status: DISCONTINUED | OUTPATIENT
Start: 2020-04-07 | End: 2020-04-18 | Stop reason: HOSPADM

## 2020-04-07 RX ORDER — ASCORBIC ACID 250 MG
TABLET ORAL
Status: COMPLETED
Start: 2020-04-07 | End: 2020-04-07

## 2020-04-07 RX ORDER — POTASSIUM CHLORIDE 20 MEQ/1
TABLET, EXTENDED RELEASE ORAL
Status: COMPLETED
Start: 2020-04-07 | End: 2020-04-07

## 2020-04-07 RX ORDER — ATORVASTATIN CALCIUM 20 MG/1
40 TABLET, FILM COATED ORAL NIGHTLY
Status: DISCONTINUED | OUTPATIENT
Start: 2020-04-07 | End: 2020-04-18 | Stop reason: HOSPADM

## 2020-04-07 RX ORDER — PANTOPRAZOLE SODIUM 40 MG/1
TABLET, DELAYED RELEASE ORAL
Status: COMPLETED
Start: 2020-04-07 | End: 2020-04-07

## 2020-04-07 RX ORDER — FUROSEMIDE 10 MG/ML
INJECTION INTRAMUSCULAR; INTRAVENOUS
Status: COMPLETED
Start: 2020-04-07 | End: 2020-04-07

## 2020-04-07 RX ADMIN — GUAIFENESIN 600 MG: 600 TABLET, EXTENDED RELEASE ORAL at 10:04

## 2020-04-07 RX ADMIN — ENOXAPARIN SODIUM 40 MG: 100 INJECTION SUBCUTANEOUS at 10:04

## 2020-04-07 RX ADMIN — Medication 10000 UNITS: at 10:04

## 2020-04-07 RX ADMIN — Medication 250 MG: at 10:04

## 2020-04-07 RX ADMIN — FUROSEMIDE 40 MG: 10 INJECTION INTRAMUSCULAR; INTRAVENOUS at 03:04

## 2020-04-07 RX ADMIN — MELATONIN 1000 UNITS: at 10:04

## 2020-04-07 RX ADMIN — METOPROLOL SUCCINATE 50 MG: 50 TABLET, EXTENDED RELEASE ORAL at 08:04

## 2020-04-07 RX ADMIN — IPRATROPIUM BROMIDE AND ALBUTEROL SULFATE 3 ML: .5; 3 SOLUTION RESPIRATORY (INHALATION) at 02:04

## 2020-04-07 RX ADMIN — POTASSIUM CHLORIDE 40 MEQ: 1500 TABLET, EXTENDED RELEASE ORAL at 10:04

## 2020-04-07 RX ADMIN — IPRATROPIUM BROMIDE AND ALBUTEROL SULFATE 3 ML: .5; 3 SOLUTION RESPIRATORY (INHALATION) at 10:04

## 2020-04-07 RX ADMIN — ATORVASTATIN CALCIUM 40 MG: 20 TABLET, FILM COATED ORAL at 10:04

## 2020-04-07 RX ADMIN — PANTOPRAZOLE SODIUM 40 MG: 40 TABLET, DELAYED RELEASE ORAL at 10:04

## 2020-04-07 RX ADMIN — FUROSEMIDE 40 MG: 10 INJECTION, SOLUTION INTRAMUSCULAR; INTRAVENOUS at 03:04

## 2020-04-07 RX ADMIN — ALBUTEROL SULFATE 2 PUFF: 90 AEROSOL, METERED RESPIRATORY (INHALATION) at 07:04

## 2020-04-07 RX ADMIN — GUAIFENESIN 600 MG: 600 TABLET, EXTENDED RELEASE ORAL at 08:04

## 2020-04-07 RX ADMIN — TRAZODONE HYDROCHLORIDE 25 MG: 50 TABLET ORAL at 10:04

## 2020-04-07 NOTE — NURSING TRANSFER
Nursing Transfer Note      4/7/2020     Transfer From:  > RCU [id number]    Transfer via bed    Transfer with 15L NRB to O2, cardiac monitoring    Transported by CARLOS Jasso    Medicines sent: Yes    Chart send with patient: Yes    Notified: daughter    Patient reassessed at: 04/04/2020 at 1800    Upon arrival to floor: cardiac monitor applied, patient oriented to room, call bell in reach and bed in lowest position, wheels locked, Montserrat RN at bedside to assume patient

## 2020-04-07 NOTE — PLAN OF CARE
Problem: Oral Intake Inadequate  Goal: Improved Oral Intake  Outcome: Ongoing, Progressing   Recommendations     1. Continue regular diet with Boost Plus TID. 2. Encourage po intake meals.  Goals: 1. Pt's intake meals >50% by RD follow up.  Nutrition Goal Status: new  Communication of RD Recs: (POC)

## 2020-04-07 NOTE — CONSULTS
"  [facility]  Adult Nutrition  Consult Note    SUMMARY     Recommendations    1. Continue regular diet with Boost Plus TID. 2. Encourage po intake meals.  Goals: 1. Pt's intake meals >50% by RD follow up.  Nutrition Goal Status: new  Communication of RD Recs: (POC)    Reason for Assessment    Reason For Assessment: consult  Diagnosis: (COVID-19 infection)  Relevant Medical History: HTN  Interdisciplinary Rounds: did not attend  General Information Comments: Remote assessment completed. Pt with fair intake meals just learned that her  passed away from COVID-19 and has no appetite. Per chart, pt with stable wt x 2 years.Due to recent hospital wide restrictions to limit the transfer of (COVID-19), we are not performing any physical exams at this time. All S/S will be observational; NFPE to be performed at a future date. Unable to reach pt via telephone. Nutrition to send a condolence basket.   Nutrition Discharge Planning: Pt to follow low sodium diet.    Nutrition Risk Screen    Nutrition Risk Screen: no indicators present    Nutrition/Diet History    Spiritual, Cultural Beliefs, Protestant Practices, Values that Affect Care: no    Anthropometrics    Temp: 96.1 °F (35.6 °C)  Height Method: Stated  Height: 5' 2" (157.5 cm)  Height (inches): 62 in  Weight Method: Bed Scale  Weight: 77.4 kg (170 lb 11.2 oz)  Weight (lb): 170.7 lb  Ideal Body Weight (IBW), Female: 110 lb  % Ideal Body Weight, Female (lb): 155.18 %  BMI (Calculated): 31.2       Lab/Procedures/Meds    Pertinent Labs Reviewed: reviewed  Pertinent Labs Comments: CRP 78.5  Pertinent Medications Reviewed: reviewed  Pertinent Medications Comments: pantoprazole, lasix      Estimated/Assessed Needs    Weight Used For Calorie Calculations: 77.4 kg (170 lb 10.2 oz)  Energy Calorie Requirements (kcal): 1554 kcal  Energy Need Method: Ismael-St Griffin(PAL 1.25)  Protein Requirements: 78-85g/day  Weight Used For Protein Calculations: 77.4 kg " (170 lb 10.2 oz)        RDA Method (mL): 1554         Nutrition Prescription Ordered    Current Diet Order: Regular  Oral Nutrition Supplement: Boost Plus TID    Evaluation of Received Nutrient/Fluid Intake    Comments: LBM 4/03  % Intake of Estimated Energy Needs: 25 - 50 %  % Meal Intake: 25 - 50 %    Nutrition Risk    Level of Risk/Frequency of Follow-up: low     Assessment and Plan    Nutrition Problem  Inadequate oral intake    Related to (etiology):   COVID-19 infection, grief    Signs and Symptoms (as evidenced by):   Pt's  just passed, pt with decreased appetite to begin with due to virus.    Interventions(treatment strategy):  Collaboration of care with providers.  Commercial beverage: Boost Plus TID    Nutrition Diagnosis Status:   New      Monitor and Evaluation    Food and Nutrient Intake: energy intake, food and beverage intake  Food and Nutrient Adminstration: diet order  Biochemical Data, Medical Tests and Procedures: electrolyte and renal panel, gastrointestinal profile, glucose/endocrine profile, inflammatory profile, lipid profile  Nutrition-Focused Physical Findings: overall appearance     Malnutrition Assessment      Unable to complete NFPE due to COVID-19 restrictions.    Nutrition Follow-Up    RD Follow-up?: Yes

## 2020-04-07 NOTE — PLAN OF CARE
Spoke to daughter Lucille to update her about her mother's respiratory status. Discussed that the patient will likely be moved to the [address] for closer monitoring which the daughter understands. She would also like to bring food to her mother once the patient is transferred. Advised her to call back again later tonight once the patient is moved so that she can deliver the food to the nurse to give to her mother. No other questions from Lucille.      Mitch Ryan MD (PGY-3)  Internal Medicine

## 2020-04-07 NOTE — PLAN OF CARE
Problem: Adult Inpatient Plan of Care  Goal: Plan of Care Review  Outcome: Ongoing, Not Progressing     POC reviewed with patient this shift.  A/O x4.  Pt still noted to have increased SOB on exertion.  Continues on 15L/non-rebreather.  Skin w/d.  Continent of b/b.  Utilizes bedpan.  Tolerates meds whole with water without difficulty.  VSS.  See flowsheet for full assessment.  No c/o pain or discomfort at this time.  WCTM.

## 2020-04-07 NOTE — PROGRESS NOTES
Hospital Medicine  Progress Note  [facility]      Patient Name: Jojo Burrows  MRN:  [id number]  Hospital Medicine Team: [facility] Ken Tejada MD  Date of Admission:  3/31/2020     Length of Stay:  LOS: 7 days       Principal Problem:  COVID-19 virus infection      Hospital Course:  Jojo Burrows is a 70F with HTN, obesity who presents for evaluation of SOB. She was seen in the ED on 03/28 with fever, chills, cough. She wasn't hypoxic with ambulation and discharged home. She was tested for COVID 19 at that time and has subsequently ruled in. Her  is admitted for COVID+ complications. She presented with worsening SOB, cough, pleuritic chest pain, sinus congestions, myalgias. She had been using her albuterol inhaler at home with only mild relief.      Initially SpO2 of 78%, placed on NRB 15L, weaned to 6L NC, but then returned to requiring 15L via OxyMask through 4/5.  Albuterol nebs scheduled and CPT t.i.d. added to pulmonary toilet regimen.    Interval History:     No acute events overnight.  This morning, patient doing okay all things considered as her  passed away yesterday morning.  She reports persistence of shortness of breath and dry cough but denies fevers.  She denies nausea, vomiting, and diarrhea.  She is requesting vitamin supplementation be added to her medication regimen.    Review of Systems:  Respiratory: +SOB, dry cough  Cardiovascular: no chest pain, palpitations  GI: no N/V/D, abdominal pain    Inpatient Medications:    Current Facility-Administered Medications:     acetaminophen tablet 650 mg, 650 mg, Oral, Q4H PRN, Mikal Staples PA-C, 650 mg at 04/06/20 0928    acetaminophen tablet 650 mg, 650 mg, Oral, Q8H PRN, Mikal Staples PA-C, 650 mg at 04/06/20 0933    albuterol-ipratropium 2.5 mg-0.5 mg/3 mL nebulizer solution 3 mL, 3 mL, Nebulization, Q6H WAKE, Ken Tejada MD, 3 mL at 04/07/20 1424    ascorbic acid (vitamin C) tablet 250 mg, 250 mg,  Oral, Daily, Ken Tejada MD    benzonatate capsule 100 mg, 100 mg, Oral, TID PRN, Mikal Staples PA-C, 100 mg at 04/06/20 0531    calcium carbonate 200 mg calcium (500 mg) chewable tablet 1,000 mg, 1,000 mg, Oral, TID PRN, Mikal Staples, PA-C, 1,000 mg at 04/05/20 0845    dextrose 10% (D10W) Bolus, 12.5 g, Intravenous, PRN, Mikal Staples, PA-C    dextrose 10% (D10W) Bolus, 25 g, Intravenous, PRN, Mikal Staples, PA-C    enoxaparin injection 40 mg, 40 mg, Subcutaneous, QHS, Mikal Staples, PA-C, 40 mg at 04/06/20 2049    furosemide (LASIX) 10 mg/mL injection, , , ,     glucagon (human recombinant) injection 1 mg, 1 mg, Intramuscular, PRN, Mikal Staples PA-C    glucose chewable tablet 16 g, 16 g, Oral, PRN, Mikal Staples, PA-C    glucose chewable tablet 24 g, 24 g, Oral, PRN, Mikal Staples, PA-C    guaiFENesin 12 hr tablet 600 mg, 600 mg, Oral, BID, Marisol Joens MD, 600 mg at 04/07/20 0827    loperamide capsule 2 mg, 2 mg, Oral, Q6H PRN, Mikal Staples PA-C, 2 mg at 04/01/20 1107    metoprolol succinate (TOPROL-XL) 24 hr tablet 50 mg, 50 mg, Oral, Daily, Mikal Staples PA-C, 50 mg at 04/07/20 0827    ondansetron disintegrating tablet 8 mg, 8 mg, Oral, Q8H PRN, Mikal Staples PA-C    pantoprazole EC tablet 40 mg, 40 mg, Oral, QHS, Mikal Staples, PA-C, 40 mg at 04/06/20 2049    polyethylene glycol packet 17 g, 17 g, Oral, BID PRN, Mikal Staples PA-C    sodium chloride 0.9% flush 10 mL, 10 mL, Intravenous, PRN, Aidan Myrick PA-C    sodium chloride 0.9% flush 10 mL, 10 mL, Intravenous, PRN, Mikal Staples PA-C    tiotropium inhalation capsule 18 mcg, 1 capsule, Inhalation, Daily, Mikal Staples PA-C, 18 mcg at 04/06/20 0919    trazodone split tablet 25 mg, 25 mg, Oral, Nightly PRN, Ken Tejada MD, 25 mg at 04/06/20 2049    vitamin A capsule 10,000 Units, 10,000 Units, Oral, Daily, Ken Tejada MD    vitamin D 1000 units tablet 1,000 Units, 1,000 Units, Oral, Daily, Ken Tejada,  "MD      Physical Exam:      Intake/Output Summary (Last 24 hours) at 4/7/2020 1744  Last data filed at 4/6/2020 2056  Gross per 24 hour   Intake 340 ml   Output 300 ml   Net 40 ml     Wt Readings from Last 3 Encounters:   04/02/20 77.4 kg (170 lb 11.2 oz)   03/28/20 75.8 kg (167 lb)   03/27/20 75.8 kg (167 lb)       /62   Pulse 105   Temp 98.3 °F (36.8 °C)   Resp 18   Ht 5' 2" (1.575 m)   Wt 77.4 kg (170 lb 11.2 oz)   SpO2 (!) 92%   Breastfeeding? No   BMI 31.22 kg/m²     GEN: NAD, conversant, on 15 L via NRB  Resp: coarse bilateral breath sounds, no wheezes or rales, normal work of breathing   CV: RRR, no m/r/g, no edema  GI: soft, NTND  Skin: no rash    Laboratory:  Lab Results   Component Value Date    [id number] Detected (A) 03/28/2020       Recent Labs   Lab 04/01/20 0556 04/04/20 0452 04/06/20  0527   WBC 7.61 9.11 9.73   LYMPH 37.8  2.9 12.4*  1.1 12.5*  1.2   HGB 13.5 12.1 12.2   HCT 42.4 38.6 37.8    261 294     Recent Labs   Lab 03/31/20 2011 04/01/20 0556 04/04/20 0452 04/06/20 0527 04/07/20  1540    139 133* 134* 138   K 3.8 3.7 3.5 3.2* 3.4*   CL 97 99 93* 93* 94*   CO2 31* 28 28 31* 32*   BUN 6* 8 8 8 10   CREATININE 0.9 0.8 0.7 0.7 0.7   * 90 77 94 93   CALCIUM 8.7 8.8 9.5 9.5 9.6   MG 2.3 2.4  --  2.2  --    PHOS 2.5* 3.1  --   --   --      Recent Labs   Lab 04/01/20 0556 04/04/20 0452 04/06/20  0527   ALKPHOS 63 58 56   ALT 28 25 26   AST 40 33 31   ALBUMIN 3.4* 2.9* 2.7*   PROT 7.3 7.4 7.2   BILITOT 0.6 0.9 0.7        Recent Labs     04/05/20  0454 04/06/20  0527   .4* 158.2*       All labs within the last 24 hours were reviewed.     Microbiology:  Microbiology Results (last 7 days)     Procedure Component Value Units Date/Time    Blood culture [[id number]] Collected:  03/31/20 2018    Order Status:  Completed Specimen:  Blood from Peripheral, Antecubital, Right Updated:  04/04/20 2212     Blood Culture, Routine No Growth after 4 days.     Blood " culture [[id number]] Collected:  03/31/20 2011    Order Status:  Completed Specimen:  Blood from Peripheral, Antecubital, Left Updated:  04/04/20 2212     Blood Culture, Routine No Growth after 4 days.     Culture, Respiratory with Gram Stain [[id number]] Collected:  04/02/20 0514    Order Status:  Completed Specimen:  Sputum, Expectorated Updated:  04/04/20 0917     Respiratory Culture Normal respiratory christopher      No S aureus or Pseudomonas isolated.     Gram Stain (Respiratory) >10 epithelial cells per low power field     Gram Stain (Respiratory) Moderate WBC's     Gram Stain (Respiratory) Many Gram positive cocci     Gram Stain (Respiratory) Many Gram negative rods     Gram Stain (Respiratory) Few Gram positive rods     Gram Stain (Respiratory) Rare yeast    Influenza A & B by Molecular [[id number]] Collected:  03/31/20 2224    Order Status:  Completed Specimen:  Nasopharyngeal Swab Updated:  03/31/20 2258     Influenza A, Molecular Negative     Influenza B, Molecular Negative     Flu A & B Source Nasal swab            Imaging  ECG Results          EKG 12-lead (Final result)  Result time 04/01/20 09:38:21    Final result by Interface, Lab In [facility] (04/01/20 09:38:21)                 Narrative:    Test Reason : R06.02,    Vent. Rate : 079 BPM     Atrial Rate : 079 BPM     P-R Int : 148 ms          QRS Dur : 086 ms      QT Int : 382 ms       P-R-T Axes : 067 -15 014 degrees     QTc Int : 438 ms    Normal sinus rhythm  Minimal voltage criteria for LVH, may be normal variant  Nonspecific T wave abnormality  Abnormal ECG  When compared with ECG of 28-MAR-2020 18:26,  No significant change was found  Confirmed by BLANCA MARTINEZ MD ([id number]) on 4/1/2020 9:38:08 AM    Referred By: [email or web address]   SELF           Confirmed By:BLANCA MARTINEZ MD                              No results found for this or any previous visit.    X-Ray Chest AP Portable  Narrative: EXAMINATION:  XR CHEST AP PORTABLE    CLINICAL HISTORY:  Provided  "history is "  Shortness of breath".    TECHNIQUE:  One view of the chest.    COMPARISON:  03/28/2020, 03/27/2020, and 08/21/2018.    FINDINGS:  Lung volumes are low.  Cardiac wires overlie the chest.  Platelike subsegmental opacities are again present in the lower lung zones, slightly worse when compared with the prior study.  New subtle bibasilar and peripheral interstitial opacities.  No confluent area of consolidation.  No sizable pleural effusion.  No pneumothorax.  Impression: Worsening bibasilar subsegmental and interstitial opacities, worrisome for worsening infectious or inflammatory process including pneumonia/pneumonitis in this patient with recent diagnosis of COVID-19.    Electronically signed by: Graham Ortiz MD  Date:    03/31/2020  Time:    20:46      All imaging within the last 24 hours was reviewed.     Assessment and Plan:    Active Hospital Problems    Diagnosis  POA    *COVID-19 virus infection [U07.1]  Yes    Multifocal pneumonia [J18.9]  Yes    Acute hypoxemic respiratory failure [J96.01]  Yes    Obesity (BMI 30.0-34.9) [E66.9]  Yes    Essential hypertension [I10]  Yes     Chronic      Resolved Hospital Problems   No resolved problems to display.     Covid-19 Virus Infection  - COVID-19 testing POSITIVE: Collection Date: 3/28/2020 Collection Time:   6:43 PM  - Infection Control notified     - Isolation:   - Airborne and Droplet Precautions  - Surgical mask on patient   - N95 masks must be fit tested, wear eye protection  - 20 second hand hygiene              - Limit visitors per hospital policy              - Consolidate lab draws, nursing care, and interventions     - Diagnostics: (Lymphopenia, hyponatremia, hyperferritinemia, elevated troponin, elevated d-dimer, age, and comorbidities are significant predictors of poor clinical outcome)              - CBC:                         trend Q48hrs  - Ferritin:                      471; repeat prior to discharge   - D-dimer:                 "    0.73; repeat prior to discharge  - CMP:                         trend Q48hrs  - CRP:                         52.4 to 44.6; trend Q48hrs  - BNP:                          <10  - CPK:                          275  - LDH:                          444; repeat prior to discharge  - Troponin:                   0.008  - Procalcitonin:            0.04              - ECG with QTc:          NSR with QTc 438              - rapid Flu:                   negative              - Blood culture x2:       NGTD   - Respiratory Culture No S.Aureus or Pseudomonas, normal respiratory fluora     - Management:   Bundle care as able to maintain isolation & minimize in/out of room   - Supplemental O2 to maintain SpO2 92%-96%.  Patient remains on 15 L via NRB              - Telemetry & continuous pulse oximetry               - If wheezing   - DuoNebs q.6 scheduled, CPT t.i.d.  - Spiriva daily               - acetaminophen 650mg PO Q6hr PRN fever              - loperamide PRN for viral diarrhea  -  Completed empiric antibiotics for CAP- end date: 4/4                          - Ceftriaxone 1g IV Q24hrs                          - Azithromycin 500mg IV day #1, then 250mg PO daily x4 days              -Mucinex 600 b.i.d.    -Lasix 40 IV daily.  Strict intake and output.  Goal net -1 to 2 L daily.              - Investigational Treatment Protocol:                           - statin: atorvastatin 40mg po daily                           - patient completed HCQ 400mg PO BID x1 day, then 400mg PO daily x 4 days    -normal glucose 6 phosphate dehydrogenase, ECG, and start Qshift POCT glucose   -request placed for transfer this patient to high oxygen flow unit on [address] pending bed  availability.  Patient is full code and currently on 15 L NRB.     Safety notes:              - Avoid NIPPV (CPAP/BiPAP) to prevent aerosolization, use on a case-by-case basis if in neg pressure room              - Cautious use of NSAIDS for fever per WHO recommendations  (3/16/2020)              - No new ACEi/ARB start or discontinuation of chronic med unless hypotensive (Esler et al. Journal of Hypertension 2020, 38:000-000)              - Careful use of steroids in the absence of other indications (shock, ARDS)              - Fluid sparing resuscitation, avoid maintenance fluids     Advance Care Planning  Goals of care, counseling/discussion  -patient understands severity of disease and potential to need to go to ICU for mechanical ventilation should oxygen status worsen  -[place] [facility] [place]  All members of healthcare team are encouraged provide emotional support to this patient daily.  -confirms FULL code status    HTN  - continue MTP XL 50 mg daily       Patient's chronic/stable medical conditions noted in the problem list above will be managed with the patient's home medications as tolerated.      VTE High Risk Prophylaxis: enoxaparin 40mg sq QHS @ 2100 (bundled care) if GFR >30     Disposition: home pending improvement      Subsequent Inpatient Hospital Care   Level 3 00086 Total visit time was 35 minutes or greater with greater than 50% of time spent in counseling and coordination of care.       Ken Tejada M.D.  Hospital Medicine Staff  [facility]   Pager: [phone]

## 2020-04-08 LAB
ALBUMIN SERPL BCP-MCNC: 2.7 G/DL (ref 3.5–5.2)
ALLENS TEST: ABNORMAL
ALP SERPL-CCNC: 58 U/L (ref 55–135)
ALT SERPL W/O P-5'-P-CCNC: 21 U/L (ref 10–44)
ANION GAP SERPL CALC-SCNC: 10 MMOL/L (ref 8–16)
AST SERPL-CCNC: 24 U/L (ref 10–40)
BASOPHILS # BLD AUTO: 0.07 K/UL (ref 0–0.2)
BASOPHILS NFR BLD: 0.6 % (ref 0–1.9)
BILIRUB SERPL-MCNC: 0.7 MG/DL (ref 0.1–1)
BUN SERPL-MCNC: 12 MG/DL (ref 8–23)
CALCIUM SERPL-MCNC: 9.3 MG/DL (ref 8.7–10.5)
CHLORIDE SERPL-SCNC: 93 MMOL/L (ref 95–110)
CO2 SERPL-SCNC: 30 MMOL/L (ref 23–29)
CREAT SERPL-MCNC: 0.8 MG/DL (ref 0.5–1.4)
CRP SERPL-MCNC: 136.7 MG/L (ref 0–8.2)
DELSYS: ABNORMAL
DIFFERENTIAL METHOD: ABNORMAL
EOSINOPHIL # BLD AUTO: 0.1 K/UL (ref 0–0.5)
EOSINOPHIL NFR BLD: 1.2 % (ref 0–8)
ERYTHROCYTE [DISTWIDTH] IN BLOOD BY AUTOMATED COUNT: 12.9 % (ref 11.5–14.5)
EST. GFR  (AFRICAN AMERICAN): >60 ML/MIN/1.73 M^2
EST. GFR  (NON AFRICAN AMERICAN): >60 ML/MIN/1.73 M^2
GLUCOSE SERPL-MCNC: 85 MG/DL (ref 70–110)
HCO3 UR-SCNC: 35.3 MMOL/L (ref 24–28)
HCT VFR BLD AUTO: 41.9 % (ref 37–48.5)
HGB BLD-MCNC: 13.2 G/DL (ref 12–16)
IMM GRANULOCYTES # BLD AUTO: 0.13 K/UL (ref 0–0.04)
IMM GRANULOCYTES NFR BLD AUTO: 1.2 % (ref 0–0.5)
LYMPHOCYTES # BLD AUTO: 1.9 K/UL (ref 1–4.8)
LYMPHOCYTES NFR BLD: 17.7 % (ref 18–48)
MAGNESIUM SERPL-MCNC: 2.1 MG/DL (ref 1.6–2.6)
MCH RBC QN AUTO: 28.5 PG (ref 27–31)
MCHC RBC AUTO-ENTMCNC: 31.5 G/DL (ref 32–36)
MCV RBC AUTO: 91 FL (ref 82–98)
MONOCYTES # BLD AUTO: 1.1 K/UL (ref 0.3–1)
MONOCYTES NFR BLD: 9.8 % (ref 4–15)
NEUTROPHILS # BLD AUTO: 7.5 K/UL (ref 1.8–7.7)
NEUTROPHILS NFR BLD: 69.5 % (ref 38–73)
NRBC BLD-RTO: 0 /100 WBC
PCO2 BLDA: 49.3 MMHG (ref 35–45)
PH SMN: 7.46 [PH] (ref 7.35–7.45)
PLATELET # BLD AUTO: 329 K/UL (ref 150–350)
PMV BLD AUTO: 10.1 FL (ref 9.2–12.9)
PO2 BLDA: 85 MMHG (ref 80–100)
POC BE: 11 MMOL/L
POC SATURATED O2: 97 % (ref 95–100)
POC TCO2: 37 MMOL/L (ref 23–27)
POTASSIUM SERPL-SCNC: 4 MMOL/L (ref 3.5–5.1)
PROT SERPL-MCNC: 7.4 G/DL (ref 6–8.4)
RBC # BLD AUTO: 4.63 M/UL (ref 4–5.4)
SAMPLE: ABNORMAL
SITE: ABNORMAL
SODIUM SERPL-SCNC: 133 MMOL/L (ref 136–145)
WBC # BLD AUTO: 10.79 K/UL (ref 3.9–12.7)

## 2020-04-08 PROCEDURE — 63600175 PHARM REV CODE 636 W HCPCS: Mod: HCNC

## 2020-04-08 PROCEDURE — 94761 N-INVAS EAR/PLS OXIMETRY MLT: CPT | Mod: HCNC

## 2020-04-08 PROCEDURE — 25000242 PHARM REV CODE 250 ALT 637 W/ HCPCS: Mod: HCNC

## 2020-04-08 PROCEDURE — 99233 PR SUBSEQUENT HOSPITAL CARE,LEVL III: ICD-10-PCS | Mod: HCNC,,, | Performed by: HOSPITALIST

## 2020-04-08 PROCEDURE — 94640 AIRWAY INHALATION TREATMENT: CPT | Mod: HCNC

## 2020-04-08 PROCEDURE — 20000000 HC ICU ROOM: Mod: HCNC

## 2020-04-08 PROCEDURE — 36415 COLL VENOUS BLD VENIPUNCTURE: CPT | Mod: HCNC

## 2020-04-08 PROCEDURE — 27000221 HC OXYGEN, UP TO 24 HOURS: Mod: HCNC

## 2020-04-08 PROCEDURE — 25000003 PHARM REV CODE 250: Mod: HCNC

## 2020-04-08 PROCEDURE — 25000003 PHARM REV CODE 250: Mod: HCNC | Performed by: HOSPITALIST

## 2020-04-08 PROCEDURE — 86140 C-REACTIVE PROTEIN: CPT | Mod: HCNC

## 2020-04-08 PROCEDURE — 80053 COMPREHEN METABOLIC PANEL: CPT | Mod: HCNC

## 2020-04-08 PROCEDURE — 94664 DEMO&/EVAL PT USE INHALER: CPT | Mod: HCNC

## 2020-04-08 PROCEDURE — 99900035 HC TECH TIME PER 15 MIN (STAT): Mod: HCNC

## 2020-04-08 PROCEDURE — 25000242 PHARM REV CODE 250 ALT 637 W/ HCPCS: Mod: HCNC | Performed by: PHYSICIAN ASSISTANT

## 2020-04-08 PROCEDURE — 83735 ASSAY OF MAGNESIUM: CPT | Mod: HCNC

## 2020-04-08 PROCEDURE — 99233 SBSQ HOSP IP/OBS HIGH 50: CPT | Mod: HCNC,,, | Performed by: HOSPITALIST

## 2020-04-08 PROCEDURE — 85025 COMPLETE CBC W/AUTO DIFF WBC: CPT | Mod: HCNC

## 2020-04-08 RX ORDER — METOPROLOL SUCCINATE 50 MG/1
TABLET, EXTENDED RELEASE ORAL
Status: DISPENSED
Start: 2020-04-08 | End: 2020-04-08

## 2020-04-08 RX ORDER — GUAIFENESIN 600 MG/1
TABLET, EXTENDED RELEASE ORAL
Status: COMPLETED
Start: 2020-04-08 | End: 2020-04-08

## 2020-04-08 RX ORDER — IPRATROPIUM BROMIDE AND ALBUTEROL SULFATE 2.5; .5 MG/3ML; MG/3ML
SOLUTION RESPIRATORY (INHALATION)
Status: COMPLETED
Start: 2020-04-08 | End: 2020-04-08

## 2020-04-08 RX ORDER — ENOXAPARIN SODIUM 100 MG/ML
INJECTION SUBCUTANEOUS
Status: COMPLETED
Start: 2020-04-08 | End: 2020-04-08

## 2020-04-08 RX ORDER — ASCORBIC ACID 250 MG
TABLET ORAL
Status: DISPENSED
Start: 2020-04-08 | End: 2020-04-08

## 2020-04-08 RX ORDER — CHOLECALCIFEROL (VITAMIN D3) 25 MCG
TABLET ORAL
Status: DISPENSED
Start: 2020-04-08 | End: 2020-04-08

## 2020-04-08 RX ORDER — ATORVASTATIN CALCIUM 20 MG/1
TABLET, FILM COATED ORAL
Status: COMPLETED
Start: 2020-04-08 | End: 2020-04-08

## 2020-04-08 RX ORDER — FUROSEMIDE 10 MG/ML
INJECTION INTRAMUSCULAR; INTRAVENOUS
Status: COMPLETED
Start: 2020-04-08 | End: 2020-04-08

## 2020-04-08 RX ORDER — FUROSEMIDE 10 MG/ML
40 INJECTION INTRAMUSCULAR; INTRAVENOUS ONCE
Status: DISCONTINUED | OUTPATIENT
Start: 2020-04-08 | End: 2020-04-09

## 2020-04-08 RX ORDER — PANTOPRAZOLE SODIUM 40 MG/1
TABLET, DELAYED RELEASE ORAL
Status: COMPLETED
Start: 2020-04-08 | End: 2020-04-08

## 2020-04-08 RX ADMIN — TIOTROPIUM BROMIDE 18 MCG: 18 CAPSULE ORAL; RESPIRATORY (INHALATION) at 09:04

## 2020-04-08 RX ADMIN — ENOXAPARIN SODIUM 40 MG: 100 INJECTION SUBCUTANEOUS at 08:04

## 2020-04-08 RX ADMIN — Medication 250 MG: at 08:04

## 2020-04-08 RX ADMIN — IPRATROPIUM BROMIDE AND ALBUTEROL SULFATE 3 ML: .5; 3 SOLUTION RESPIRATORY (INHALATION) at 09:04

## 2020-04-08 RX ADMIN — ATORVASTATIN CALCIUM 40 MG: 20 TABLET, FILM COATED ORAL at 08:04

## 2020-04-08 RX ADMIN — Medication 10000 UNITS: at 08:04

## 2020-04-08 RX ADMIN — IPRATROPIUM BROMIDE AND ALBUTEROL SULFATE 3 ML: .5; 3 SOLUTION RESPIRATORY (INHALATION) at 10:04

## 2020-04-08 RX ADMIN — IPRATROPIUM BROMIDE AND ALBUTEROL SULFATE 3 ML: .5; 3 SOLUTION RESPIRATORY (INHALATION) at 02:04

## 2020-04-08 RX ADMIN — PANTOPRAZOLE SODIUM 40 MG: 40 TABLET, DELAYED RELEASE ORAL at 08:04

## 2020-04-08 RX ADMIN — GUAIFENESIN 600 MG: 600 TABLET, EXTENDED RELEASE ORAL at 08:04

## 2020-04-08 RX ADMIN — FUROSEMIDE 40 MG: 10 INJECTION, SOLUTION INTRAMUSCULAR; INTRAVENOUS at 08:04

## 2020-04-08 RX ADMIN — MELATONIN 1000 UNITS: at 08:04

## 2020-04-08 NOTE — PROGRESS NOTES
Hospital Medicine  Progress Note  [facility]      Patient Name: Jojo Burrows  MRN:  [id number]  Hospital Medicine Team: [facility] C Ken Tejada MD  Date of Admission:  3/31/2020     Length of Stay:  LOS: 8 days       Principal Problem:  COVID-19 virus infection      Hospital Course:  Jojo Burrows is a 70F with HTN, obesity who presents for evaluation of SOB. She was seen in the ED on 03/28 with fever, chills, cough. She wasn't hypoxic with ambulation and discharged home.  She tested positive for COVID-19. Her  is admitted for COVID+ complications. She presented with worsening SOB, cough, pleuritic chest pain, sinus congestions, myalgias. Initially, SpO2 of 78%, placed on NRB 15L, weaned to 6L NC, but then returned to requiring 15L via NRB through 4/8.  Completed 5 day course of ceftriaxone and azithromycin for CAP.  Completed 5 day course of HCQ per COVID-19 protocol.  Has remained on 15 L via NRB for several days and hence was transferred to 11 W high oxygen unit for higher level of care. Current regimen consists of duo nebs q.6 scheduled, Spiriva daily, CPT t.i.d., flutter valve treatments q.6 while awake, Lasix 40 IV daily (monitoring daily UOP), and nightly CPAP.  Blood cultures remain no growth to date and respiratory culture with no significant growth.  CBC and CMP with no significant abnormalities aside from intermittent hypokalemia and hypomagnesemia with diuresis, responsive to repletion.  CRP has down trended from 186 (Peak) to 136.  Of note, patient's  passed away from COVID-19 related complications at [facility] on 4/6; have been providing daily emotional support for patient.  Daughters have been checking on patient each day.  Continuing to monitor closely for improvement; patient full code and open to all respiratory interventions at this time.    Interval History:     No acute events overnight.  Patient transferred to high oxygen unit given that she remains on 15 L  NRB.  Transition to 15 L via HFNC, tolerated well.  Afebrile of the past 24 hr and reports no significant worsening of shortness of breath.  ABG with respiratory alkalosis but no hypercapnia.  Starting CPAP nightly.  Continuing to monitor closely.      Review of Systems:  Respiratory: +SOB, dry cough  Cardiovascular: no chest pain, palpitations  GI: no N/V/D, abdominal pain    Inpatient Medications:    Current Facility-Administered Medications:     acetaminophen tablet 650 mg, 650 mg, Oral, Q4H PRN, Mikal Staples, PA-C, 650 mg at 04/06/20 0928    acetaminophen tablet 650 mg, 650 mg, Oral, Q8H PRN, Mikal Staples, PA-C, 650 mg at 04/06/20 0933    albuterol-ipratropium 2.5 mg-0.5 mg/3 mL nebulizer solution 3 mL, 3 mL, Nebulization, Q6H WAKE, Ken Tejada MD, 3 mL at 04/08/20 1427    ascorbic acid (vitamin C) (VITAMIN C) 250 MG tablet, , , ,     ascorbic acid (vitamin C) tablet 250 mg, 250 mg, Oral, Daily, Ken Tejada MD, 250 mg at 04/08/20 0851    atorvastatin tablet 40 mg, 40 mg, Oral, QHS, Ken Tejada MD, 40 mg at 04/07/20 2228    benzonatate capsule 100 mg, 100 mg, Oral, TID PRN, Mikal Staples, PA-C, 100 mg at 04/06/20 0531    calcium carbonate 200 mg calcium (500 mg) chewable tablet 1,000 mg, 1,000 mg, Oral, TID PRN, Mikal Staples, PA-C, 1,000 mg at 04/05/20 0845    dextrose 10% (D10W) Bolus, 12.5 g, Intravenous, PRN, Mikal Staples, PA-C    dextrose 10% (D10W) Bolus, 25 g, Intravenous, PRN, Mikal Staples, PA-C    enoxaparin injection 40 mg, 40 mg, Subcutaneous, QHS, Mikal Staples, PA-C, 40 mg at 04/07/20 2228    furosemide injection 40 mg, 40 mg, Intravenous, Once, Ken Tejada MD    glucagon (human recombinant) injection 1 mg, 1 mg, Intramuscular, PRN, Mikal Staples PA-C    glucose chewable tablet 16 g, 16 g, Oral, PRN, ENRIQUE Aldana-SHERWIN    glucose chewable tablet 24 g, 24 g, Oral, PRN, Mikal Staples PA-C    guaiFENesin 12 hr tablet 600 mg, 600 mg, Oral, BID, Marisol Jones MD,  "600 mg at 04/07/20 2227    loperamide capsule 2 mg, 2 mg, Oral, Q6H PRN, Mikal Staples PA-C, 2 mg at 04/01/20 1107    metoprolol succinate (TOPROL-XL) 24 hr tablet 50 mg, 50 mg, Oral, Daily, Mikal Staples PA-C, Stopped at 04/08/20 0900    metoprolol succinate (TOPROL-XL) 50 MG 24 hr tablet, , , ,     ondansetron disintegrating tablet 8 mg, 8 mg, Oral, Q8H PRN, Mikal Staples PA-C    pantoprazole EC tablet 40 mg, 40 mg, Oral, QHS, Mikal Staples PA-C, 40 mg at 04/07/20 2228    polyethylene glycol packet 17 g, 17 g, Oral, BID PRN, Mikal Staples PA-C    sodium chloride 0.9% flush 10 mL, 10 mL, Intravenous, PRN, Aidan Myrick PA-C    sodium chloride 0.9% flush 10 mL, 10 mL, Intravenous, PRN, Mikal Staples PA-C    tiotropium inhalation capsule 18 mcg, 1 capsule, Inhalation, Daily, Mikal Staples PA-C, 18 mcg at 04/08/20 0910    trazodone split tablet 25 mg, 25 mg, Oral, Nightly PRN, Ken Tejada MD, 25 mg at 04/07/20 2229    vitamin A capsule 10,000 Units, 10,000 Units, Oral, Daily, Ken Tejada MD, 10,000 Units at 04/08/20 0851    vitamin D (VITAMIN D3) 1000 units tablet, , , ,     vitamin D (VITAMIN D3) 1000 units tablet, , , ,     vitamin D 1000 units tablet 1,000 Units, 1,000 Units, Oral, Daily, Ken Tejada MD, 1,000 Units at 04/08/20 0853      Physical Exam:      Intake/Output Summary (Last 24 hours) at 4/8/2020 1627  Last data filed at 4/8/2020 1223  Gross per 24 hour   Intake 120 ml   Output 650 ml   Net -530 ml     Wt Readings from Last 3 Encounters:   04/02/20 77.4 kg (170 lb 11.2 oz)   03/28/20 75.8 kg (167 lb)   03/27/20 75.8 kg (167 lb)       BP (!) 117/56 (BP Location: Left arm, Patient Position: Lying)   Pulse (!) 116   Temp 97.7 °F (36.5 °C) (Oral)   Resp (!) 29   Ht 5' 2" (1.575 m)   Wt 77.4 kg (170 lb 11.2 oz)   SpO2 95%   Breastfeeding? No   BMI 31.22 kg/m²     GEN: NAD, conversant, on 15 L via HFNC  Resp: coarse bilateral breath sounds, no wheezes or rales, normal " work of breathing   CV: RRR, no m/r/g, no edema  GI: soft, NTND  Skin: no rash    Laboratory:  Lab Results   Component Value Date    [id number] Detected (A) 03/28/2020       Recent Labs   Lab 04/04/20 0452 04/06/20  0527 04/08/20  0513   WBC 9.11 9.73 10.79   LYMPH 12.4*  1.1 12.5*  1.2 17.7*  1.9   HGB 12.1 12.2 13.2   HCT 38.6 37.8 41.9    294 329     Recent Labs   Lab 04/06/20 0527 04/07/20  1540 04/07/20  1633 04/08/20  0512   * 138  --  133*   K 3.2* 3.4*  --  4.0   CL 93* 94*  --  93*   CO2 31* 32*  --  30*   BUN 8 10  --  12   CREATININE 0.7 0.7  --  0.8   GLU 94 93  --  85   CALCIUM 9.5 9.6  --  9.3   MG 2.2  --  2.2 2.1     Recent Labs   Lab 04/04/20 0452 04/06/20  0527 04/08/20  0512   ALKPHOS 58 56 58   ALT 25 26 21   AST 33 31 24   ALBUMIN 2.9* 2.7* 2.7*   PROT 7.4 7.2 7.4   BILITOT 0.9 0.7 0.7        Recent Labs     04/06/20  0527 04/08/20  0512   .2* 136.7*       All labs within the last 24 hours were reviewed.     Microbiology:  Microbiology Results (last 7 days)     Procedure Component Value Units Date/Time    Blood culture [[id number]] Collected:  03/31/20 2018    Order Status:  Completed Specimen:  Blood from Peripheral, Antecubital, Right Updated:  04/04/20 2212     Blood Culture, Routine No Growth after 4 days.     Blood culture [[id number]] Collected:  03/31/20 2011    Order Status:  Completed Specimen:  Blood from Peripheral, Antecubital, Left Updated:  04/04/20 2212     Blood Culture, Routine No Growth after 4 days.     Culture, Respiratory with Gram Stain [[id number]] Collected:  04/02/20 0514    Order Status:  Completed Specimen:  Sputum, Expectorated Updated:  04/04/20 3125     Respiratory Culture Normal respiratory christopher      No S aureus or Pseudomonas isolated.     Gram Stain (Respiratory) >10 epithelial cells per low power field     Gram Stain (Respiratory) Moderate WBC's     Gram Stain (Respiratory) Many Gram positive cocci     Gram Stain (Respiratory) Many Gram  "negative rods     Gram Stain (Respiratory) Few Gram positive rods     Gram Stain (Respiratory) Rare yeast            Imaging  ECG Results          EKG 12-lead (Final result)  Result time 04/01/20 09:38:21    Final result by Interface, Lab In [facility] (04/01/20 09:38:21)                 Narrative:    Test Reason : R06.02,    Vent. Rate : 079 BPM     Atrial Rate : 079 BPM     P-R Int : 148 ms          QRS Dur : 086 ms      QT Int : 382 ms       P-R-T Axes : 067 -15 014 degrees     QTc Int : 438 ms    Normal sinus rhythm  Minimal voltage criteria for LVH, may be normal variant  Nonspecific T wave abnormality  Abnormal ECG  When compared with ECG of 28-MAR-2020 18:26,  No significant change was found  Confirmed by BLANCA MARTINEZ MD ([id number]) on 4/1/2020 9:38:08 AM    Referred By: JASON   SELF           Confirmed By:BLANCA MARTINEZ MD                              No results found for this or any previous visit.    X-Ray Chest AP Portable  Narrative: EXAMINATION:  XR CHEST AP PORTABLE    CLINICAL HISTORY:  Provided history is "  Shortness of breath".    TECHNIQUE:  One view of the chest.    COMPARISON:  03/28/2020, 03/27/2020, and 08/21/2018.    FINDINGS:  Lung volumes are low.  Cardiac wires overlie the chest.  Platelike subsegmental opacities are again present in the lower lung zones, slightly worse when compared with the prior study.  New subtle bibasilar and peripheral interstitial opacities.  No confluent area of consolidation.  No sizable pleural effusion.  No pneumothorax.  Impression: Worsening bibasilar subsegmental and interstitial opacities, worrisome for worsening infectious or inflammatory process including pneumonia/pneumonitis in this patient with recent diagnosis of COVID-19.    Electronically signed by: Graham Ortiz MD  Date:    03/31/2020  Time:    20:46      All imaging within the last 24 hours was reviewed.     Assessment and Plan:    Active Hospital Problems    Diagnosis  POA    *COVID-19 virus " infection [U07.1]  Yes    Multifocal pneumonia [J18.9]  Yes    Acute hypoxemic respiratory failure [J96.01]  Yes    Obesity (BMI 30.0-34.9) [E66.9]  Yes    Essential hypertension [I10]  Yes     Chronic      Resolved Hospital Problems   No resolved problems to display.     Covid-19 Virus Infection  - COVID-19 testing POSITIVE: Collection Date: 3/28/2020 Collection Time:   6:43 PM  - Infection Control notified     - Isolation:   - Airborne and Droplet Precautions  - Surgical mask on patient   - N95 masks must be fit tested, wear eye protection  - 20 second hand hygiene              - Limit visitors per hospital policy              - Consolidate lab draws, nursing care, and interventions     - Diagnostics: (Lymphopenia, hyponatremia, hyperferritinemia, elevated troponin, elevated d-dimer, age, and comorbidities are significant predictors of poor clinical outcome)              - CBC:                         trend Q48hrs  - Ferritin:                      471; repeat prior to discharge   - D-dimer:                    0.73; repeat prior to discharge  - CMP:                         trend Q48hrs  - CRP:                         52.4 to 44.6; trend Q48hrs  - BNP:                          <10  - CPK:                          275  - LDH:                          444; repeat prior to discharge  - Troponin:                   0.008  - Procalcitonin:            0.04              - ECG with QTc:          NSR with QTc 438              - rapid Flu:                   negative              - Blood culture x2:       NGTD   - Respiratory Culture No S.Aureus or Pseudomonas, normal respiratory fluora     - Management:   Bundle care as able to maintain isolation & minimize in/out of room   - Supplemental O2 to maintain SpO2 92%-96%.  Patient remains on 15 L via HFNC              - Telemetry & continuous pulse oximetry               - If wheezing   - DuoNebs q.6 scheduled, CPT t.i.d., flutter valve treatments q.6 while awake  - Spiriva  daily               - acetaminophen 650mg PO Q6hr PRN fever              - loperamide PRN for viral diarrhea  -  Completed empiric antibiotics for CAP- end date: 4/4                          - Ceftriaxone 1g IV Q24hrs                          - Azithromycin 500mg IV day #1, then 250mg PO daily x4 days              -Mucinex 600 b.i.d.    -Lasix 40 IV daily.  Strict intake and output.  Goal net -1 to 2 L daily.              - Investigational Treatment Protocol:                           - statin: atorvastatin 40mg po daily                           - patient completed HCQ 400mg PO BID x1 day, then 400mg PO daily x 4 days    -normal glucose 6 phosphate dehydrogenase, ECG, and start Qshift POCT glucose   -transferred to high oxygen flow unit on [address] for more aggressive respiratory interventions     Advance Care Planning  Goals of care, counseling/discussion  -patient understands severity of disease and potential to need to go to ICU for mechanical ventilation should oxygen status worsen  -patient's  passed away from [place] [facility] [place].  All members of healthcare team are encouraged provide emotional support to this patient daily.  -confirms FULL code status    HTN  - continue MTP XL 50 mg daily       Patient's chronic/stable medical conditions noted in the problem list above will be managed with the patient's home medications as tolerated.      VTE High Risk Prophylaxis: enoxaparin 40mg sq QHS @ 2100 (bundled care) if GFR >30     Disposition: home pending improvement      Subsequent Inpatient Hospital Care   Level 3 57427 Total visit time was 35 minutes or greater with greater than 50% of time spent in counseling and coordination of care.       Ken Tejada M.D.  Hospital Medicine Staff  [facility]   Pager: [phone]

## 2020-04-08 NOTE — PLAN OF CARE
04/08/2020  1:07 PM    I called Ms. Burrows's niece Annmarie, per patient request.  She did not  her phone so I left a voicemail with an update on Ms. Burrows's clinical condition.  Requested that she call back to nursing station if she has any additional questions that I did not answer on my voicemail.    Ken Tejada M.D.  [facility] Medicine Staff  [facility]   Pager: [phone]

## 2020-04-08 NOTE — MEDICAL/APP STUDENT
I called Mrs. Burrows's daughter (Lucille Burrows) as part of the patient liaison program medical students are volunteering with.      I spoke with Lucille Burrows at around 2:15pm. I called to give them an update about their mother's status. Currently she is on 15L of oxygen. We  her to high flow nasal cannula with bubble flow to keep her oxygenated. She's been otherwise stable and receiving the same amount of oxygen.     Mrs. Burrows's daughters were thankful for the update and expressed their understanding. They had a few questions. They asked if their mother's pneumonia has cleared. They also were hoping that someone could check on her mental status given that she is alone in the hospital and grieving her 's death. Additionally, they asked if there is anyway they could communicate with her in the hospital while they are making  arrangements for their father. Finally, they asked for a timeline to discharge (also for  arrangements). I advised them that it was really hard to say when it would be at this point in time. These questions were relayed to Dr. Ryan. They had no additional questions.

## 2020-04-09 LAB
ALBUMIN SERPL BCP-MCNC: 2.7 G/DL (ref 3.5–5.2)
ALP SERPL-CCNC: 62 U/L (ref 55–135)
ALT SERPL W/O P-5'-P-CCNC: 23 U/L (ref 10–44)
ANION GAP SERPL CALC-SCNC: 15 MMOL/L (ref 8–16)
AST SERPL-CCNC: 32 U/L (ref 10–40)
BILIRUB SERPL-MCNC: 0.7 MG/DL (ref 0.1–1)
BUN SERPL-MCNC: 10 MG/DL (ref 8–23)
CALCIUM SERPL-MCNC: 9.3 MG/DL (ref 8.7–10.5)
CHLORIDE SERPL-SCNC: 94 MMOL/L (ref 95–110)
CO2 SERPL-SCNC: 27 MMOL/L (ref 23–29)
CREAT SERPL-MCNC: 0.7 MG/DL (ref 0.5–1.4)
EST. GFR  (AFRICAN AMERICAN): >60 ML/MIN/1.73 M^2
EST. GFR  (NON AFRICAN AMERICAN): >60 ML/MIN/1.73 M^2
GLUCOSE SERPL-MCNC: 90 MG/DL (ref 70–110)
MAGNESIUM SERPL-MCNC: 2.2 MG/DL (ref 1.6–2.6)
POTASSIUM SERPL-SCNC: 4 MMOL/L (ref 3.5–5.1)
PROT SERPL-MCNC: 7.8 G/DL (ref 6–8.4)
SODIUM SERPL-SCNC: 136 MMOL/L (ref 136–145)

## 2020-04-09 PROCEDURE — 27100092 HC HIGH FLOW DELIVERY CANNULA: Mod: HCNC

## 2020-04-09 PROCEDURE — 99233 PR SUBSEQUENT HOSPITAL CARE,LEVL III: ICD-10-PCS | Mod: HCNC,,, | Performed by: HOSPITALIST

## 2020-04-09 PROCEDURE — 83735 ASSAY OF MAGNESIUM: CPT | Mod: HCNC

## 2020-04-09 PROCEDURE — 27000221 HC OXYGEN, UP TO 24 HOURS: Mod: HCNC

## 2020-04-09 PROCEDURE — 25000242 PHARM REV CODE 250 ALT 637 W/ HCPCS: Mod: HCNC | Performed by: HOSPITALIST

## 2020-04-09 PROCEDURE — 20000000 HC ICU ROOM: Mod: HCNC

## 2020-04-09 PROCEDURE — 94761 N-INVAS EAR/PLS OXIMETRY MLT: CPT | Mod: HCNC

## 2020-04-09 PROCEDURE — 80053 COMPREHEN METABOLIC PANEL: CPT | Mod: HCNC

## 2020-04-09 PROCEDURE — 27100171 HC OXYGEN HIGH FLOW UP TO 24 HOURS: Mod: HCNC

## 2020-04-09 PROCEDURE — 25000003 PHARM REV CODE 250: Mod: HCNC

## 2020-04-09 PROCEDURE — 99233 SBSQ HOSP IP/OBS HIGH 50: CPT | Mod: HCNC,,, | Performed by: HOSPITALIST

## 2020-04-09 PROCEDURE — 94640 AIRWAY INHALATION TREATMENT: CPT | Mod: HCNC

## 2020-04-09 PROCEDURE — 25000003 PHARM REV CODE 250: Mod: HCNC | Performed by: PHYSICIAN ASSISTANT

## 2020-04-09 PROCEDURE — 94664 DEMO&/EVAL PT USE INHALER: CPT | Mod: HCNC

## 2020-04-09 PROCEDURE — 25000003 PHARM REV CODE 250: Mod: HCNC | Performed by: HOSPITALIST

## 2020-04-09 PROCEDURE — 63600175 PHARM REV CODE 636 W HCPCS: Mod: HCNC | Performed by: PHYSICIAN ASSISTANT

## 2020-04-09 PROCEDURE — 99900035 HC TECH TIME PER 15 MIN (STAT): Mod: HCNC

## 2020-04-09 PROCEDURE — 25000242 PHARM REV CODE 250 ALT 637 W/ HCPCS: Mod: HCNC | Performed by: PHYSICIAN ASSISTANT

## 2020-04-09 PROCEDURE — 36415 COLL VENOUS BLD VENIPUNCTURE: CPT | Mod: HCNC

## 2020-04-09 RX ORDER — HYDROXYZINE HYDROCHLORIDE 25 MG/1
25 TABLET, FILM COATED ORAL NIGHTLY PRN
Status: DISCONTINUED | OUTPATIENT
Start: 2020-04-09 | End: 2020-04-18 | Stop reason: HOSPADM

## 2020-04-09 RX ORDER — TALC
6 POWDER (GRAM) TOPICAL NIGHTLY PRN
Status: DISCONTINUED | OUTPATIENT
Start: 2020-04-09 | End: 2020-04-18 | Stop reason: HOSPADM

## 2020-04-09 RX ORDER — METOPROLOL SUCCINATE 50 MG/1
TABLET, EXTENDED RELEASE ORAL
Status: COMPLETED
Start: 2020-04-09 | End: 2020-04-09

## 2020-04-09 RX ORDER — PANTOPRAZOLE SODIUM 40 MG/1
TABLET, DELAYED RELEASE ORAL
Status: DISPENSED
Start: 2020-04-09 | End: 2020-04-10

## 2020-04-09 RX ORDER — GUAIFENESIN 600 MG/1
TABLET, EXTENDED RELEASE ORAL
Status: COMPLETED
Start: 2020-04-09 | End: 2020-04-09

## 2020-04-09 RX ORDER — ASCORBIC ACID 250 MG
TABLET ORAL
Status: COMPLETED
Start: 2020-04-09 | End: 2020-04-09

## 2020-04-09 RX ORDER — ATORVASTATIN CALCIUM 20 MG/1
TABLET, FILM COATED ORAL
Status: DISPENSED
Start: 2020-04-09 | End: 2020-04-10

## 2020-04-09 RX ORDER — GUAIFENESIN 600 MG/1
TABLET, EXTENDED RELEASE ORAL
Status: DISPENSED
Start: 2020-04-09 | End: 2020-04-10

## 2020-04-09 RX ORDER — TALC
POWDER (GRAM) TOPICAL
Status: DISPENSED
Start: 2020-04-09 | End: 2020-04-10

## 2020-04-09 RX ORDER — BENZONATATE 100 MG/1
CAPSULE ORAL
Status: DISPENSED
Start: 2020-04-09 | End: 2020-04-10

## 2020-04-09 RX ORDER — IPRATROPIUM BROMIDE AND ALBUTEROL SULFATE 2.5; .5 MG/3ML; MG/3ML
3 SOLUTION RESPIRATORY (INHALATION)
Status: DISCONTINUED | OUTPATIENT
Start: 2020-04-09 | End: 2020-04-12

## 2020-04-09 RX ORDER — ENOXAPARIN SODIUM 100 MG/ML
INJECTION SUBCUTANEOUS
Status: DISPENSED
Start: 2020-04-09 | End: 2020-04-10

## 2020-04-09 RX ORDER — CHOLECALCIFEROL (VITAMIN D3) 25 MCG
TABLET ORAL
Status: COMPLETED
Start: 2020-04-09 | End: 2020-04-09

## 2020-04-09 RX ADMIN — ATORVASTATIN CALCIUM 40 MG: 20 TABLET, FILM COATED ORAL at 08:04

## 2020-04-09 RX ADMIN — METOPROLOL SUCCINATE 50 MG: 50 TABLET, EXTENDED RELEASE ORAL at 08:04

## 2020-04-09 RX ADMIN — TIOTROPIUM BROMIDE 18 MCG: 18 CAPSULE ORAL; RESPIRATORY (INHALATION) at 08:04

## 2020-04-09 RX ADMIN — GUAIFENESIN 600 MG: 600 TABLET, EXTENDED RELEASE ORAL at 08:04

## 2020-04-09 RX ADMIN — HYDROXYZINE HYDROCHLORIDE 25 MG: 25 TABLET, FILM COATED ORAL at 07:04

## 2020-04-09 RX ADMIN — Medication 250 MG: at 08:04

## 2020-04-09 RX ADMIN — Medication 10000 UNITS: at 08:04

## 2020-04-09 RX ADMIN — BENZONATATE 100 MG: 100 CAPSULE, LIQUID FILLED ORAL at 08:04

## 2020-04-09 RX ADMIN — IPRATROPIUM BROMIDE AND ALBUTEROL SULFATE 3 ML: .5; 3 SOLUTION RESPIRATORY (INHALATION) at 09:04

## 2020-04-09 RX ADMIN — IPRATROPIUM BROMIDE AND ALBUTEROL SULFATE 3 ML: .5; 3 SOLUTION RESPIRATORY (INHALATION) at 03:04

## 2020-04-09 RX ADMIN — VITAMIN D 1000 UNITS: 25 TAB ORAL at 08:04

## 2020-04-09 RX ADMIN — ENOXAPARIN SODIUM 40 MG: 100 INJECTION SUBCUTANEOUS at 08:04

## 2020-04-09 RX ADMIN — MELATONIN 1000 UNITS: at 08:04

## 2020-04-09 RX ADMIN — PANTOPRAZOLE SODIUM 40 MG: 40 TABLET, DELAYED RELEASE ORAL at 08:04

## 2020-04-09 RX ADMIN — Medication 6 MG: at 08:04

## 2020-04-09 NOTE — PLAN OF CARE
Pt is AAOx4, 1 person assist-able to reposition self, and VSS-10L high flow NC applied to pt, achieving goal of >90%. Pt denies pain. Airborne precautions maintained. Pure wick changed and in place. Telemetry monitoring in place. Rn rounded on pt throughout shift. Pt's bed in lowest position, nonskid socks on, RN encouraged frequent weight shift, call bell within reach, and RN encouraged pt to call for any assistance needed. RN rounded on pt throughout shift. Will continue to monitor.

## 2020-04-09 NOTE — PROGRESS NOTES
Hospital Medicine  Progress Note  [facility]      Patient Name: Jojo Burrows  MRN:  [id number]  Hospital Medicine Team: [facility]OSP MED C Marisol Jones MD  Date of Admission:  3/31/2020     Length of Stay:  LOS: 9 days       Principal Problem:  COVID-19 virus infection      Hospital Course:  Jojo Burrows is a 70F with HTN, obesity who presents for evaluation of SOB. She was seen in the ED on 03/28 with fever, chills, cough. She wasn't hypoxic with ambulation and discharged home.  She tested positive for COVID-19. Her  is admitted for COVID+ complications. She presented with worsening SOB, cough, pleuritic chest pain, sinus congestions, myalgias. Initially, SpO2 of 78%, placed on NRB 15L, weaned to 6L NC, but then returned to requiring 15L via NRB through 4/8.  Completed 5 day course of ceftriaxone and azithromycin for CAP.  Completed 5 day course of HCQ per COVID-19 protocol.  Has remained on 15 L via NRB for several days and hence was transferred to 11 W high oxygen unit for higher level of care. Current regimen consists of duo nebs q.6 scheduled, Spiriva daily, CPT t.i.d., flutter valve treatments q.6 while awake, Lasix 40 IV daily (monitoring daily UOP), and nightly CPAP.  Blood cultures remain no growth to date and respiratory culture with no significant growth.  CBC and CMP with no significant abnormalities aside from intermittent hypokalemia and hypomagnesemia with diuresis, responsive to repletion.  CRP has down trended from 186 (Peak) to 136.  Of note, patient's  passed away from COVID-19 related complications at [facility] on 4/6; have been providing daily emotional support for patient.  Daughters have been checking on patient each day.  Continuing to monitor closely for improvement; patient full code and open to all respiratory interventions at this time.  Transferred to high oxygen unit on 15L NRB 4/8.     Interval History:     No acute events overnight.  Patient reports  gradually feeling better, and oxygen weaned to 10L HFNC today.  She is eating better but not sleeping well; reports she takes Benadryl for sleep at home.  Expressed condolences for loss of her ; she feels that she still needs to work on her own health before attempting to process his passing.    Review of Systems:  Respiratory: +SOB, dry cough  Cardiovascular: no chest pain, palpitations  GI: no N/V/D, abdominal pain    Inpatient Medications:    Current Facility-Administered Medications:     acetaminophen tablet 650 mg, 650 mg, Oral, Q4H PRN, Mikal Staples PA-C, 650 mg at 04/06/20 0928    acetaminophen tablet 650 mg, 650 mg, Oral, Q8H PRN, Mikal Staples PA-C, 650 mg at 04/06/20 0933    albuterol-ipratropium 2.5 mg-0.5 mg/3 mL nebulizer solution 3 mL, 3 mL, Nebulization, Q6H WAKE, Ken Tejada MD, 3 mL at 04/09/20 0914    ascorbic acid (vitamin C) tablet 250 mg, 250 mg, Oral, Daily, Ken Tejada MD, 250 mg at 04/09/20 0804    atorvastatin tablet 40 mg, 40 mg, Oral, QHS, Ken Tejada MD, 40 mg at 04/08/20 2010    benzonatate capsule 100 mg, 100 mg, Oral, TID PRN, Mikal Staples PA-C, 100 mg at 04/06/20 0531    calcium carbonate 200 mg calcium (500 mg) chewable tablet 1,000 mg, 1,000 mg, Oral, TID PRN, Mikal Staples PA-C, 1,000 mg at 04/05/20 0845    dextrose 10% (D10W) Bolus, 12.5 g, Intravenous, PRN, Mikal Staples PA-C    dextrose 10% (D10W) Bolus, 25 g, Intravenous, PRN, Mikal Staples PA-C    enoxaparin injection 40 mg, 40 mg, Subcutaneous, QHS, Mikal Staples PA-C, 40 mg at 04/08/20 2010    furosemide injection 40 mg, 40 mg, Intravenous, Once, Ken Tejada MD    glucagon (human recombinant) injection 1 mg, 1 mg, Intramuscular, PRN, Mikal Staples PA-C    glucose chewable tablet 16 g, 16 g, Oral, PRN, Mikal Staples PA-C    glucose chewable tablet 24 g, 24 g, Oral, PRN, Mikal Staples PA-C    guaiFENesin 12 hr tablet 600 mg, 600 mg, Oral, BID, Marisol Jones MD, 600 mg at  "04/09/20 0803    hydroxyzine HCL tablet 25 mg, 25 mg, Oral, Nightly PRN, Marisol Jones MD    loperamide capsule 2 mg, 2 mg, Oral, Q6H PRN, Mikal Staples PA-C, 2 mg at 04/01/20 1107    metoprolol succinate (TOPROL-XL) 24 hr tablet 50 mg, 50 mg, Oral, Daily, Mikal Staples PA-C, 50 mg at 04/09/20 0804    ondansetron disintegrating tablet 8 mg, 8 mg, Oral, Q8H PRN, Mikal Staples PA-C    pantoprazole EC tablet 40 mg, 40 mg, Oral, QHS, Mikal Staples PA-C, 40 mg at 04/08/20 2011    polyethylene glycol packet 17 g, 17 g, Oral, BID PRN, Mikal Staples PA-C    sodium chloride 0.9% flush 10 mL, 10 mL, Intravenous, PRN, Aidan Myrick PA-C    sodium chloride 0.9% flush 10 mL, 10 mL, Intravenous, PRN, Mikal Staples PA-C    tiotropium inhalation capsule 18 mcg, 1 capsule, Inhalation, Daily, Mikal Staples PA-C, 18 mcg at 04/09/20 0815    trazodone split tablet 25 mg, 25 mg, Oral, Nightly PRN, Ken Tejada MD, 25 mg at 04/07/20 2229    vitamin A capsule 10,000 Units, 10,000 Units, Oral, Daily, Ken Tejada MD, 10,000 Units at 04/09/20 0802    vitamin D 1000 units tablet 1,000 Units, 1,000 Units, Oral, Daily, Ken Tejada MD, 1,000 Units at 04/09/20 0803      Physical Exam:      Intake/Output Summary (Last 24 hours) at 4/9/2020 1148  Last data filed at 4/9/2020 0400  Gross per 24 hour   Intake 600 ml   Output 1000 ml   Net -400 ml     Wt Readings from Last 3 Encounters:   04/02/20 77.4 kg (170 lb 11.2 oz)   03/28/20 75.8 kg (167 lb)   03/27/20 75.8 kg (167 lb)       BP (!) 104/53   Pulse 88   Temp 97.9 °F (36.6 °C) (Oral)   Resp (!) 21   Ht 5' 2" (1.575 m)   Wt 77.4 kg (170 lb 11.2 oz)   SpO2 97%   Breastfeeding? No   BMI 31.22 kg/m²     GEN: NAD, conversant, on HFNC  Resp: coarse bilateral breath sounds, no wheezes or rales, normal work of breathing   CV: RRR, no m/r/g, no edema  GI: soft, NTND  Skin: no rash    Laboratory:  Lab Results   Component Value Date    [id number] Detected (A) " 03/28/2020       Recent Labs   Lab 04/04/20  0452 04/06/20  0527 04/08/20  0513   WBC 9.11 9.73 10.79   LYMPH 12.4*  1.1 12.5*  1.2 17.7*  1.9   HGB 12.1 12.2 13.2   HCT 38.6 37.8 41.9    294 329     Recent Labs   Lab 04/07/20  1540 04/07/20  1633 04/08/20  0512 04/09/20  0330     --  133* 136   K 3.4*  --  4.0 4.0   CL 94*  --  93* 94*   CO2 32*  --  30* 27   BUN 10  --  12 10   CREATININE 0.7  --  0.8 0.7   GLU 93  --  85 90   CALCIUM 9.6  --  9.3 9.3   MG  --  2.2 2.1 2.2     Recent Labs   Lab 04/06/20  0527 04/08/20  0512 04/09/20  0330   ALKPHOS 56 58 62   ALT 26 21 23   AST 31 24 32   ALBUMIN 2.7* 2.7* 2.7*   PROT 7.2 7.4 7.8   BILITOT 0.7 0.7 0.7        Recent Labs     04/08/20  0512   .7*       All labs within the last 24 hours were reviewed.     Microbiology:  Microbiology Results (last 7 days)     Procedure Component Value Units Date/Time    Blood culture [[id number]] Collected:  03/31/20 2018    Order Status:  Completed Specimen:  Blood from Peripheral, Antecubital, Right Updated:  04/04/20 2212     Blood Culture, Routine No Growth after 4 days.     Blood culture [[id number]] Collected:  03/31/20 2011    Order Status:  Completed Specimen:  Blood from Peripheral, Antecubital, Left Updated:  04/04/20 2212     Blood Culture, Routine No Growth after 4 days.     Culture, Respiratory with Gram Stain [[id number]] Collected:  04/02/20 0514    Order Status:  Completed Specimen:  Sputum, Expectorated Updated:  04/04/20 0917     Respiratory Culture Normal respiratory christopher      No S aureus or Pseudomonas isolated.     Gram Stain (Respiratory) >10 epithelial cells per low power field     Gram Stain (Respiratory) Moderate WBC's     Gram Stain (Respiratory) Many Gram positive cocci     Gram Stain (Respiratory) Many Gram negative rods     Gram Stain (Respiratory) Few Gram positive rods     Gram Stain (Respiratory) Rare yeast            Imaging  ECG Results          EKG 12-lead (Final result)  Result  "time 04/01/20 09:38:21    Final result by Interface, Lab In [facility] (04/01/20 09:38:21)                 Narrative:    Test Reason : R06.02,    Vent. Rate : 079 BPM     Atrial Rate : 079 BPM     P-R Int : 148 ms          QRS Dur : 086 ms      QT Int : 382 ms       P-R-T Axes : 067 -15 014 degrees     QTc Int : 438 ms    Normal sinus rhythm  Minimal voltage criteria for LVH, may be normal variant  Nonspecific T wave abnormality  Abnormal ECG  When compared with ECG of 28-MAR-2020 18:26,  No significant change was found  Confirmed by BLANCA MARTINEZ MD ([id number]) on 4/1/2020 9:38:08 AM    Referred By: [email or web address]   SELF           Confirmed By:BLANCA MARTINEZ MD                              No results found for this or any previous visit.    X-Ray Chest AP Portable  Narrative: EXAMINATION:  XR CHEST AP PORTABLE    CLINICAL HISTORY:  Provided history is "  Shortness of breath".    TECHNIQUE:  One view of the chest.    COMPARISON:  03/28/2020, 03/27/2020, and 08/21/2018.    FINDINGS:  Lung volumes are low.  Cardiac wires overlie the chest.  Platelike subsegmental opacities are again present in the lower lung zones, slightly worse when compared with the prior study.  New subtle bibasilar and peripheral interstitial opacities.  No confluent area of consolidation.  No sizable pleural effusion.  No pneumothorax.  Impression: Worsening bibasilar subsegmental and interstitial opacities, worrisome for worsening infectious or inflammatory process including pneumonia/pneumonitis in this patient with recent diagnosis of COVID-19.    Electronically signed by: Graham Ortiz MD  Date:    03/31/2020  Time:    20:46      All imaging within the last 24 hours was reviewed.     Assessment and Plan:    Active Hospital Problems    Diagnosis  POA    *COVID-19 virus infection [U07.1]  Yes    Multifocal pneumonia [J18.9]  Yes    Acute hypoxemic respiratory failure [J96.01]  Yes    Obesity (BMI 30.0-34.9) [E66.9]  Yes    Essential hypertension " [I10]  Yes     Chronic      Resolved Hospital Problems   No resolved problems to display.     Covid-19 Virus Infection  - COVID-19 testing POSITIVE: Collection Date: 3/28/2020 Collection Time:   6:43 PM  - Infection Control notified     - Isolation:   - Airborne and Droplet Precautions  - Surgical mask on patient   - N95 masks must be fit tested, wear eye protection  - 20 second hand hygiene              - Limit visitors per hospital policy              - Consolidate lab draws, nursing care, and interventions     - Diagnostics: (Lymphopenia, hyponatremia, hyperferritinemia, elevated troponin, elevated d-dimer, age, and comorbidities are significant predictors of poor clinical outcome)              - CBC:                         trend Q48hrs  - Ferritin:                      471; repeat prior to discharge   - D-dimer:                    0.73; repeat prior to discharge  - CMP:                         trend Q48hrs  - CRP:                         134; trend Q48hrs  - BNP:                          <10  - CPK:                          275  - LDH:                          444; repeat prior to discharge  - Troponin:                   0.008  - Procalcitonin:            0.04              - ECG with QTc:          NSR with QTc 438              - rapid Flu:                   negative              - Blood culture x2:       NGTD              - Respiratory Culture   No S.Aureus or Pseudomonas, normal respiratory fluora     - Management:   Bundle care as able to maintain isolation & minimize in/out of room   - Supplemental O2 to maintain SpO2 92%-96%.  Patient weaned to 10L via HFNC              - Telemetry & continuous pulse oximetry               - If wheezing   - DuoNebs q.6 scheduled, CPT t.i.d., flutter valve treatments q.6 while awake  - Spiriva daily               - acetaminophen 650mg PO Q6hr PRN fever              - loperamide PRN for viral diarrhea  -  Completed empiric antibiotics for CAP- end date:  4/4                          - Ceftriaxone 1g IV Q24hrs                          - Azithromycin 500mg IV day #1, then 250mg PO daily x4 days              -Mucinex 600 b.i.d.               -d/c Lasix               - Investigational Treatment Protocol:                           - statin: atorvastatin 40mg po daily                           - patient completed HCQ 400mg PO BID x1 day, then 400mg PO daily x 4 days               -normal glucose 6 phosphate dehydrogenase, ECG, and start Qshift POCT glucose              -transferred to high oxygen flow unit on [address] for more aggressive respiratory interventions     Advance Care Planning  Goals of care, counseling/discussion  -patient understands severity of disease and potential to need to go to ICU for mechanical ventilation should oxygen status worsen  -patient's  passed away from COVID-19 at [facility] on 04/06.  All members of healthcare team are encouraged provide emotional support to this patient daily.  -confirms FULL code status     HTN  - continue MTP XL 50 mg daily     Patient's chronic/stable medical conditions noted in the problem list above will be managed with the patient's home medications as tolerated.      VTE High Risk Prophylaxis: enoxaparin 40mg sq QHS @ 2100 (bundled care) if GFR >30     Disposition: home pending improvement      Marisol Jones MD  Hospital Medicine Staff

## 2020-04-09 NOTE — PLAN OF CARE
AOx4, VSS, now on 12L HFNC. SpO2 WDL this shift. Denies pain. Able to reposition self w/ little assistance. Pt remains free from falls. Bed locked and lowered. Personal items & call light w/in reach. Frequency checks completed for safety. Fall risk reviewed with pt. Pt verbalized understanding. NAD, WCTM.

## 2020-04-10 LAB — CRP SERPL-MCNC: 62.8 MG/L (ref 0–8.2)

## 2020-04-10 PROCEDURE — 99233 SBSQ HOSP IP/OBS HIGH 50: CPT | Mod: HCNC,,, | Performed by: HOSPITALIST

## 2020-04-10 PROCEDURE — 27000190 HC CPAP FULL FACE MASK W/VALVE: Mod: HCNC

## 2020-04-10 PROCEDURE — 94660 CPAP INITIATION&MGMT: CPT | Mod: HCNC

## 2020-04-10 PROCEDURE — 36415 COLL VENOUS BLD VENIPUNCTURE: CPT | Mod: HCNC

## 2020-04-10 PROCEDURE — 94761 N-INVAS EAR/PLS OXIMETRY MLT: CPT | Mod: HCNC

## 2020-04-10 PROCEDURE — 94640 AIRWAY INHALATION TREATMENT: CPT | Mod: HCNC

## 2020-04-10 PROCEDURE — 25000242 PHARM REV CODE 250 ALT 637 W/ HCPCS: Mod: HCNC

## 2020-04-10 PROCEDURE — 86140 C-REACTIVE PROTEIN: CPT | Mod: HCNC

## 2020-04-10 PROCEDURE — 63600175 PHARM REV CODE 636 W HCPCS: Mod: HCNC

## 2020-04-10 PROCEDURE — 25000003 PHARM REV CODE 250: Mod: HCNC | Performed by: PHYSICIAN ASSISTANT

## 2020-04-10 PROCEDURE — 20000000 HC ICU ROOM: Mod: HCNC

## 2020-04-10 PROCEDURE — 63600175 PHARM REV CODE 636 W HCPCS: Mod: HCNC | Performed by: PHYSICIAN ASSISTANT

## 2020-04-10 PROCEDURE — 27000221 HC OXYGEN, UP TO 24 HOURS: Mod: HCNC

## 2020-04-10 PROCEDURE — 25000242 PHARM REV CODE 250 ALT 637 W/ HCPCS: Mod: HCNC | Performed by: PHYSICIAN ASSISTANT

## 2020-04-10 PROCEDURE — 25000003 PHARM REV CODE 250: Mod: HCNC | Performed by: HOSPITALIST

## 2020-04-10 PROCEDURE — 99900035 HC TECH TIME PER 15 MIN (STAT): Mod: HCNC

## 2020-04-10 PROCEDURE — 99233 PR SUBSEQUENT HOSPITAL CARE,LEVL III: ICD-10-PCS | Mod: HCNC,,, | Performed by: HOSPITALIST

## 2020-04-10 PROCEDURE — 94664 DEMO&/EVAL PT USE INHALER: CPT | Mod: HCNC

## 2020-04-10 PROCEDURE — 25000242 PHARM REV CODE 250 ALT 637 W/ HCPCS: Mod: HCNC | Performed by: HOSPITALIST

## 2020-04-10 PROCEDURE — 25000003 PHARM REV CODE 250: Mod: HCNC

## 2020-04-10 RX ORDER — PANTOPRAZOLE SODIUM 40 MG/1
TABLET, DELAYED RELEASE ORAL
Status: DISPENSED
Start: 2020-04-10 | End: 2020-04-11

## 2020-04-10 RX ORDER — GUAIFENESIN 600 MG/1
TABLET, EXTENDED RELEASE ORAL
Status: COMPLETED
Start: 2020-04-10 | End: 2020-04-10

## 2020-04-10 RX ORDER — ENOXAPARIN SODIUM 100 MG/ML
INJECTION SUBCUTANEOUS
Status: COMPLETED
Start: 2020-04-10 | End: 2020-04-10

## 2020-04-10 RX ORDER — ASCORBIC ACID 250 MG
TABLET ORAL
Status: COMPLETED
Start: 2020-04-10 | End: 2020-04-10

## 2020-04-10 RX ORDER — METOPROLOL SUCCINATE 50 MG/1
50 TABLET, EXTENDED RELEASE ORAL DAILY
Status: DISCONTINUED | OUTPATIENT
Start: 2020-04-11 | End: 2020-04-18 | Stop reason: HOSPADM

## 2020-04-10 RX ORDER — GUAIFENESIN 600 MG/1
TABLET, EXTENDED RELEASE ORAL
Status: DISPENSED
Start: 2020-04-10 | End: 2020-04-11

## 2020-04-10 RX ORDER — ATORVASTATIN CALCIUM 20 MG/1
TABLET, FILM COATED ORAL
Status: DISPENSED
Start: 2020-04-10 | End: 2020-04-11

## 2020-04-10 RX ORDER — IPRATROPIUM BROMIDE AND ALBUTEROL SULFATE 2.5; .5 MG/3ML; MG/3ML
SOLUTION RESPIRATORY (INHALATION)
Status: COMPLETED
Start: 2020-04-10 | End: 2020-04-10

## 2020-04-10 RX ORDER — CHOLECALCIFEROL (VITAMIN D3) 25 MCG
TABLET ORAL
Status: COMPLETED
Start: 2020-04-10 | End: 2020-04-10

## 2020-04-10 RX ORDER — METOPROLOL SUCCINATE 50 MG/1
TABLET, EXTENDED RELEASE ORAL
Status: DISPENSED
Start: 2020-04-10 | End: 2020-04-10

## 2020-04-10 RX ADMIN — IPRATROPIUM BROMIDE AND ALBUTEROL SULFATE 3 ML: .5; 3 SOLUTION RESPIRATORY (INHALATION) at 08:04

## 2020-04-10 RX ADMIN — IPRATROPIUM BROMIDE AND ALBUTEROL SULFATE 3 ML: .5; 3 SOLUTION RESPIRATORY (INHALATION) at 03:04

## 2020-04-10 RX ADMIN — Medication 10000 UNITS: at 08:04

## 2020-04-10 RX ADMIN — MELATONIN 1000 UNITS: at 08:04

## 2020-04-10 RX ADMIN — VITAMIN D 1000 UNITS: 25 TAB ORAL at 08:04

## 2020-04-10 RX ADMIN — TIOTROPIUM BROMIDE 18 MCG: 18 CAPSULE ORAL; RESPIRATORY (INHALATION) at 10:04

## 2020-04-10 RX ADMIN — IPRATROPIUM BROMIDE AND ALBUTEROL SULFATE 3 ML: .5; 3 SOLUTION RESPIRATORY (INHALATION) at 10:04

## 2020-04-10 RX ADMIN — ATORVASTATIN CALCIUM 40 MG: 20 TABLET, FILM COATED ORAL at 07:04

## 2020-04-10 RX ADMIN — GUAIFENESIN 600 MG: 600 TABLET, EXTENDED RELEASE ORAL at 07:04

## 2020-04-10 RX ADMIN — GUAIFENESIN 600 MG: 600 TABLET, EXTENDED RELEASE ORAL at 08:04

## 2020-04-10 RX ADMIN — ENOXAPARIN SODIUM 40 MG: 100 INJECTION SUBCUTANEOUS at 07:04

## 2020-04-10 RX ADMIN — PANTOPRAZOLE SODIUM 40 MG: 40 TABLET, DELAYED RELEASE ORAL at 07:04

## 2020-04-10 RX ADMIN — Medication 250 MG: at 07:04

## 2020-04-10 NOTE — PROGRESS NOTES
Hospital Medicine  Progress Note  [facility]      Patient Name: Jojo Burrows  MRN:  [id number]  Hospital Medicine Team: [facility] Marisol Jones MD  Date of Admission:  3/31/2020     Length of Stay:  LOS: 10 days       Principal Problem:  COVID-19 virus infection      Hospital Course:  Jojo Burrows is a 70F with HTN, obesity who presents for evaluation of SOB. She was seen in the ED on 03/28 with fever, chills, cough. She wasn't hypoxic with ambulation and discharged home.  She tested positive for COVID-19. Her  is admitted for COVID+ complications. She presented with worsening SOB, cough, pleuritic chest pain, sinus congestions, myalgias. Initially, SpO2 of 78%, placed on NRB 15L, weaned to 6L NC, but then returned to requiring 15L via NRB through 4/8.  Completed 5 day course of ceftriaxone and azithromycin for CAP.  Completed 5 day course of HCQ per COVID-19 protocol.  Has remained on 15 L via NRB for several days and hence was transferred to 11 W high oxygen unit for higher level of care. Current regimen consists of duo nebs q.6 scheduled, Spiriva daily, CPT t.i.d., flutter valve treatments q.6 while awake, Lasix 40 IV daily (monitoring daily UOP), and nightly CPAP.  Blood cultures remain no growth to date and respiratory culture with no significant growth.  CBC and CMP with no significant abnormalities aside from intermittent hypokalemia and hypomagnesemia with diuresis, responsive to repletion.  CRP has down trended from 186 (Peak) to 136.  Of note, patient's  passed away from COVID-19 related complications at [facility] [place]; have been providing daily emotional support for patient.  Daughters have been checking on patient each day.  Continuing to monitor closely for improvement; patient full code and open to all respiratory interventions at this time.  Transferred to high oxygen unit on 15L NRB 4/8.     Interval History:     No acute events overnight.  Patient reports she  is still continuing to gradually feel better.  Occasionally tachypneic due to sputum production or mobility in bed.  Metoprolol held this morning due to asymptomatic borderline hypotension.      Review of Systems:  Respiratory: +SOB, dry cough  Cardiovascular: no chest pain, palpitations  GI: no N/V/D, abdominal pain    Inpatient Medications:    Current Facility-Administered Medications:     acetaminophen tablet 650 mg, 650 mg, Oral, Q4H PRN, Mikal Staples, PA-C, 650 mg at 04/06/20 0928    acetaminophen tablet 650 mg, 650 mg, Oral, Q8H PRN, Mikal Staples, PA-C, 650 mg at 04/06/20 0933    albuterol-ipratropium 2.5 mg-0.5 mg/3 mL nebulizer solution 3 mL, 3 mL, Nebulization, Q6H WAKE, Ken Tejada MD, 3 mL at 04/10/20 1008    ascorbic acid (vitamin C) tablet 250 mg, 250 mg, Oral, Daily, Ken Tejada MD, 250 mg at 04/10/20 0759    atorvastatin tablet 40 mg, 40 mg, Oral, QHS, Ken Tejada MD, 40 mg at 04/09/20 2000    benzonatate capsule 100 mg, 100 mg, Oral, TID PRN, ENRIQUE Aldana-C, 100 mg at 04/09/20 2000    calcium carbonate 200 mg calcium (500 mg) chewable tablet 1,000 mg, 1,000 mg, Oral, TID PRN, Mikal Staples, PA-C, 1,000 mg at 04/05/20 0845    dextrose 10% (D10W) Bolus, 12.5 g, Intravenous, PRN, Mikal Staples PA-C    dextrose 10% (D10W) Bolus, 25 g, Intravenous, PRN, Mikal Staples PA-C    enoxaparin injection 40 mg, 40 mg, Subcutaneous, QHS, ENRIQUE Aldana-C, 40 mg at 04/09/20 2000    glucagon (human recombinant) injection 1 mg, 1 mg, Intramuscular, PRN, Mikal Staples PA-C    glucose chewable tablet 16 g, 16 g, Oral, PRN, Mikal Staples, PA-C    glucose chewable tablet 24 g, 24 g, Oral, PRN, Mikal Staples PA-C    guaiFENesin 12 hr tablet 600 mg, 600 mg, Oral, BID, Marisol Jones MD, 600 mg at 04/10/20 0801    hydroxyzine HCL tablet 25 mg, 25 mg, Oral, Nightly PRN, Marisol Jones MD, 25 mg at 04/09/20 1959    loperamide capsule 2 mg, 2 mg, Oral, Q6H PRN, Mikal Staples PA-C, 2  "mg at 04/01/20 1107    melatonin tablet 6 mg, 6 mg, Oral, Nightly PRN, Naomi Newell Jr., BINU, 6 mg at 04/09/20 2027    [START ON 4/11/2020] metoprolol succinate (TOPROL-XL) 24 hr tablet 50 mg, 50 mg, Oral, Daily, Marisol Jones MD    ondansetron disintegrating tablet 8 mg, 8 mg, Oral, Q8H PRN, Mikal Staples PA-C    pantoprazole EC tablet 40 mg, 40 mg, Oral, QHS, Mikal Staples PA-C, 40 mg at 04/09/20 2001    polyethylene glycol packet 17 g, 17 g, Oral, BID PRN, Mikal Staples PA-C    sodium chloride 0.9% flush 10 mL, 10 mL, Intravenous, PRN, Aidan Myrick PA-C    sodium chloride 0.9% flush 10 mL, 10 mL, Intravenous, PRN, Mikal Staples PA-C    tiotropium inhalation capsule 18 mcg, 1 capsule, Inhalation, Daily, Mikal Staples PA-C, 18 mcg at 04/10/20 1010    trazodone split tablet 25 mg, 25 mg, Oral, Nightly PRN, Ken Tejada MD, 25 mg at 04/07/20 2229    vitamin A capsule 10,000 Units, 10,000 Units, Oral, Daily, Ken Tejada MD, 10,000 Units at 04/10/20 0801    vitamin D 1000 units tablet 1,000 Units, 1,000 Units, Oral, Daily, Ken Tejada MD, 1,000 Units at 04/10/20 0800      Physical Exam:      Intake/Output Summary (Last 24 hours) at 4/10/2020 1224  Last data filed at 4/10/2020 0600  Gross per 24 hour   Intake 560 ml   Output 1300 ml   Net -740 ml     Wt Readings from Last 3 Encounters:   04/02/20 77.4 kg (170 lb 11.2 oz)   03/28/20 75.8 kg (167 lb)   03/27/20 75.8 kg (167 lb)       BP (!) 101/56   Pulse 107   Temp 98.4 °F (36.9 °C) (Oral)   Resp (!) 25   Ht 5' 2" (1.575 m)   Wt 77.4 kg (170 lb 11.2 oz)   SpO2 96%   Breastfeeding? No   BMI 31.22 kg/m²     GEN: NAD, conversant, on HFNC  Resp: coarse bilateral breath sounds, few crackles, poor air entry, normal work of breathing   CV: RRR, no m/r/g, no edema  GI: soft, NTND  Skin: no rash    Laboratory:  Lab Results   Component Value Date    [id number] Detected (A) 03/28/2020       Recent Labs   Lab 04/04/20  0452 " 04/06/20  0527 04/08/20  0513   WBC 9.11 9.73 10.79   LYMPH 12.4*  1.1 12.5*  1.2 17.7*  1.9   HGB 12.1 12.2 13.2   HCT 38.6 37.8 41.9    294 329     Recent Labs   Lab 04/07/20  1540 04/07/20  1633 04/08/20  0512 04/09/20  0330     --  133* 136   K 3.4*  --  4.0 4.0   CL 94*  --  93* 94*   CO2 32*  --  30* 27   BUN 10  --  12 10   CREATININE 0.7  --  0.8 0.7   GLU 93  --  85 90   CALCIUM 9.6  --  9.3 9.3   MG  --  2.2 2.1 2.2     Recent Labs   Lab 04/06/20  0527 04/08/20  0512 04/09/20  0330   ALKPHOS 56 58 62   ALT 26 21 23   AST 31 24 32   ALBUMIN 2.7* 2.7* 2.7*   PROT 7.2 7.4 7.8   BILITOT 0.7 0.7 0.7        Recent Labs     04/08/20  0512 04/10/20  0744   .7* 62.8*       All labs within the last 24 hours were reviewed.     Microbiology:  Microbiology Results (last 7 days)     Procedure Component Value Units Date/Time    Blood culture [[id number]] Collected:  03/31/20 2018    Order Status:  Completed Specimen:  Blood from Peripheral, Antecubital, Right Updated:  04/04/20 2212     Blood Culture, Routine No Growth after 4 days.     Blood culture [[id number]] Collected:  03/31/20 2011    Order Status:  Completed Specimen:  Blood from Peripheral, Antecubital, Left Updated:  04/04/20 2212     Blood Culture, Routine No Growth after 4 days.     Culture, Respiratory with Gram Stain [[id number]] Collected:  04/02/20 0514    Order Status:  Completed Specimen:  Sputum, Expectorated Updated:  04/04/20 0917     Respiratory Culture Normal respiratory christopher      No S aureus or Pseudomonas isolated.     Gram Stain (Respiratory) >10 epithelial cells per low power field     Gram Stain (Respiratory) Moderate WBC's     Gram Stain (Respiratory) Many Gram positive cocci     Gram Stain (Respiratory) Many Gram negative rods     Gram Stain (Respiratory) Few Gram positive rods     Gram Stain (Respiratory) Rare yeast            Imaging  ECG Results          EKG 12-lead (Final result)  Result time 04/01/20 09:38:21     "Final result by Interface, Lab In [facility] (04/01/20 09:38:21)                 Narrative:    Test Reason : R06.02,    Vent. Rate : 079 BPM     Atrial Rate : 079 BPM     P-R Int : 148 ms          QRS Dur : 086 ms      QT Int : 382 ms       P-R-T Axes : 067 -15 014 degrees     QTc Int : 438 ms    Normal sinus rhythm  Minimal voltage criteria for LVH, may be normal variant  Nonspecific T wave abnormality  Abnormal ECG  When compared with ECG of 28-MAR-2020 18:26,  No significant change was found  Confirmed by BLANCA MARTINEZ MD ([id number]) on 4/1/2020 9:38:08 AM    Referred By: JASON   SELF           Confirmed By:BLANCA MARTINEZ MD                              No results found for this or any previous visit.    X-Ray Chest AP Portable  Narrative: EXAMINATION:  XR CHEST AP PORTABLE    CLINICAL HISTORY:  Provided history is "  Shortness of breath".    TECHNIQUE:  One view of the chest.    COMPARISON:  03/28/2020, 03/27/2020, and 08/21/2018.    FINDINGS:  Lung volumes are low.  Cardiac wires overlie the chest.  Platelike subsegmental opacities are again present in the lower lung zones, slightly worse when compared with the prior study.  New subtle bibasilar and peripheral interstitial opacities.  No confluent area of consolidation.  No sizable pleural effusion.  No pneumothorax.  Impression: Worsening bibasilar subsegmental and interstitial opacities, worrisome for worsening infectious or inflammatory process including pneumonia/pneumonitis in this patient with recent diagnosis of COVID-19.    Electronically signed by: Graham Ortiz MD  Date:    03/31/2020  Time:    20:46      All imaging within the last 24 hours was reviewed.     Assessment and Plan:    Active Hospital Problems    Diagnosis  POA    *COVID-19 virus infection [U07.1]  Yes    Multifocal pneumonia [J18.9]  Yes    Acute hypoxemic respiratory failure [J96.01]  Yes    Obesity (BMI 30.0-34.9) [E66.9]  Yes    Essential hypertension [I10]  Yes     Chronic    "   Resolved Hospital Problems   No resolved problems to display.     Covid-19 Virus Infection  - COVID-19 testing POSITIVE: Collection Date: 3/28/2020 Collection Time:   6:43 PM  - Infection Control notified     - Isolation:   - Airborne and Droplet Precautions  - Surgical mask on patient   - N95 masks must be fit tested, wear eye protection  - 20 second hand hygiene              - Limit visitors per hospital policy              - Consolidate lab draws, nursing care, and interventions     - Diagnostics: (Lymphopenia, hyponatremia, hyperferritinemia, elevated troponin, elevated d-dimer, age, and comorbidities are significant predictors of poor clinical outcome)              - CBC:                         trend Q48hrs  - Ferritin:                      471; repeat prior to discharge   - D-dimer:                    0.73; repeat prior to discharge  - CMP:                         trend Q48hrs  - CRP:                         134 to 63; trend Q48hrs  - BNP:                          <10  - CPK:                          275  - LDH:                          444; repeat prior to discharge  - Troponin:                   0.008  - Procalcitonin:            0.04              - ECG with QTc:          NSR with QTc 438              - rapid Flu:                   negative              - Blood culture x2:       NGTD              - Respiratory Culture   No S.Aureus or Pseudomonas, normal respiratory fluora     - Management:   Bundle care as able to maintain isolation & minimize in/out of room   - Supplemental O2 to maintain SpO2 92%-96%.  Patient weaned to 7L via HFNC              - Telemetry & continuous pulse oximetry               - If wheezing   - DuoNebs q.6 scheduled, CPT t.i.d., flutter valve treatments q.6 while awake  - Spiriva daily               - acetaminophen 650mg PO Q6hr PRN fever              - loperamide PRN for viral diarrhea  -  Completed empiric antibiotics for CAP- end date: 4/4                          - Ceftriaxone  1g IV Q24hrs                          - Azithromycin 500mg IV day #1, then 250mg PO daily x4 days              -Mucinex 600 b.i.d.               -d/c Lasix               - Investigational Treatment Protocol:                           - statin: atorvastatin 40mg po daily                           - patient completed HCQ 400mg PO BID x1 day, then 400mg PO daily x 4 days               -normal glucose 6 phosphate dehydrogenase, ECG, and start Qshift POCT glucose              -transferred to high oxygen flow unit on [address] for more aggressive respiratory interventions     Advance Care Planning  Goals of care, counseling/discussion  -patient understands severity of disease and potential to need to go to ICU for mechanical ventilation should oxygen status worsen  -patient's  [place] [facility]  All members of healthcare team are encouraged provide emotional support to this patient daily.  -confirms FULL code status     HTN  - continue MTP XL 50 mg daily (held today)     Patient's chronic/stable medical conditions noted in the problem list above will be managed with the patient's home medications as tolerated.      VTE High Risk Prophylaxis: enoxaparin 40mg sq QHS @ 2100 (bundled care) if GFR >30     Disposition: home pending improvement; per discussion with daughters yesterday, pt will have ample help at home. If oxygen requirements continue to improve, can likely step down to floor tomorrow.      Marisol Jones MD  Hospital Medicine Staff

## 2020-04-10 NOTE — PLAN OF CARE
Pt is AAOx4, 1 person assist, and VSS-7L HFNC applied to pt. Pt denies pain. 1 BM this shift. Airborne contact maintained. Telemetry monitoring in place. RN encouraged frequent position changes- pt able to reposition self. Pt's bed in lowest position, call bell within reach, nonskid socks on, and RN encouraged pt to call for any assistance needed. RN rounded on pt throughout shift. Will continue to monitor.

## 2020-04-11 LAB
25(OH)D3+25(OH)D2 SERPL-MCNC: 29 NG/ML (ref 30–96)
ANION GAP SERPL CALC-SCNC: 8 MMOL/L (ref 8–16)
BASOPHILS # BLD AUTO: 0.03 K/UL (ref 0–0.2)
BASOPHILS NFR BLD: 0.3 % (ref 0–1.9)
BUN SERPL-MCNC: 10 MG/DL (ref 8–23)
CALCIUM SERPL-MCNC: 9.2 MG/DL (ref 8.7–10.5)
CHLORIDE SERPL-SCNC: 98 MMOL/L (ref 95–110)
CO2 SERPL-SCNC: 31 MMOL/L (ref 23–29)
CREAT SERPL-MCNC: 0.7 MG/DL (ref 0.5–1.4)
D DIMER PPP IA.FEU-MCNC: 2.28 MG/L FEU
DIFFERENTIAL METHOD: ABNORMAL
EOSINOPHIL # BLD AUTO: 0.1 K/UL (ref 0–0.5)
EOSINOPHIL NFR BLD: 1.2 % (ref 0–8)
ERYTHROCYTE [DISTWIDTH] IN BLOOD BY AUTOMATED COUNT: 12.9 % (ref 11.5–14.5)
EST. GFR  (AFRICAN AMERICAN): >60 ML/MIN/1.73 M^2
EST. GFR  (NON AFRICAN AMERICAN): >60 ML/MIN/1.73 M^2
GLUCOSE SERPL-MCNC: 104 MG/DL (ref 70–110)
HCT VFR BLD AUTO: 37 % (ref 37–48.5)
HGB BLD-MCNC: 11.8 G/DL (ref 12–16)
IMM GRANULOCYTES # BLD AUTO: 0.08 K/UL (ref 0–0.04)
IMM GRANULOCYTES NFR BLD AUTO: 0.9 % (ref 0–0.5)
LYMPHOCYTES # BLD AUTO: 1.9 K/UL (ref 1–4.8)
LYMPHOCYTES NFR BLD: 21.2 % (ref 18–48)
MCH RBC QN AUTO: 29 PG (ref 27–31)
MCHC RBC AUTO-ENTMCNC: 31.9 G/DL (ref 32–36)
MCV RBC AUTO: 91 FL (ref 82–98)
MONOCYTES # BLD AUTO: 1.2 K/UL (ref 0.3–1)
MONOCYTES NFR BLD: 12.7 % (ref 4–15)
NEUTROPHILS # BLD AUTO: 5.8 K/UL (ref 1.8–7.7)
NEUTROPHILS NFR BLD: 63.7 % (ref 38–73)
NRBC BLD-RTO: 0 /100 WBC
PLATELET # BLD AUTO: 327 K/UL (ref 150–350)
PMV BLD AUTO: 10.6 FL (ref 9.2–12.9)
POTASSIUM SERPL-SCNC: 3.6 MMOL/L (ref 3.5–5.1)
RBC # BLD AUTO: 4.07 M/UL (ref 4–5.4)
SODIUM SERPL-SCNC: 137 MMOL/L (ref 136–145)
WBC # BLD AUTO: 9.04 K/UL (ref 3.9–12.7)

## 2020-04-11 PROCEDURE — 82306 VITAMIN D 25 HYDROXY: CPT | Mod: HCNC

## 2020-04-11 PROCEDURE — 85379 FIBRIN DEGRADATION QUANT: CPT | Mod: HCNC

## 2020-04-11 PROCEDURE — 94664 DEMO&/EVAL PT USE INHALER: CPT | Mod: HCNC

## 2020-04-11 PROCEDURE — 27000646 HC AEROBIKA DEVICE: Mod: HCNC

## 2020-04-11 PROCEDURE — 36415 COLL VENOUS BLD VENIPUNCTURE: CPT | Mod: HCNC

## 2020-04-11 PROCEDURE — 94640 AIRWAY INHALATION TREATMENT: CPT | Mod: HCNC

## 2020-04-11 PROCEDURE — 25000003 PHARM REV CODE 250: Mod: HCNC | Performed by: PHYSICIAN ASSISTANT

## 2020-04-11 PROCEDURE — 25000242 PHARM REV CODE 250 ALT 637 W/ HCPCS: Mod: HCNC | Performed by: HOSPITALIST

## 2020-04-11 PROCEDURE — 27000221 HC OXYGEN, UP TO 24 HOURS: Mod: HCNC

## 2020-04-11 PROCEDURE — 25000003 PHARM REV CODE 250: Mod: HCNC | Performed by: HOSPITALIST

## 2020-04-11 PROCEDURE — 11000001 HC ACUTE MED/SURG PRIVATE ROOM: Mod: HCNC

## 2020-04-11 PROCEDURE — 27100092 HC HIGH FLOW DELIVERY CANNULA: Mod: HCNC

## 2020-04-11 PROCEDURE — 99233 PR SUBSEQUENT HOSPITAL CARE,LEVL III: ICD-10-PCS | Mod: HCNC,,, | Performed by: HOSPITALIST

## 2020-04-11 PROCEDURE — 99900035 HC TECH TIME PER 15 MIN (STAT): Mod: HCNC

## 2020-04-11 PROCEDURE — 94761 N-INVAS EAR/PLS OXIMETRY MLT: CPT | Mod: HCNC

## 2020-04-11 PROCEDURE — 94660 CPAP INITIATION&MGMT: CPT | Mod: HCNC

## 2020-04-11 PROCEDURE — 25000242 PHARM REV CODE 250 ALT 637 W/ HCPCS: Mod: HCNC | Performed by: PHYSICIAN ASSISTANT

## 2020-04-11 PROCEDURE — 99233 SBSQ HOSP IP/OBS HIGH 50: CPT | Mod: HCNC,,, | Performed by: HOSPITALIST

## 2020-04-11 PROCEDURE — 27100171 HC OXYGEN HIGH FLOW UP TO 24 HOURS: Mod: HCNC

## 2020-04-11 PROCEDURE — 80048 BASIC METABOLIC PNL TOTAL CA: CPT | Mod: HCNC

## 2020-04-11 PROCEDURE — 85025 COMPLETE CBC W/AUTO DIFF WBC: CPT | Mod: HCNC

## 2020-04-11 PROCEDURE — 63600175 PHARM REV CODE 636 W HCPCS: Mod: HCNC | Performed by: PHYSICIAN ASSISTANT

## 2020-04-11 RX ORDER — ASCORBIC ACID 500 MG
500 TABLET ORAL 2 TIMES DAILY
Status: DISCONTINUED | OUTPATIENT
Start: 2020-04-11 | End: 2020-04-18 | Stop reason: HOSPADM

## 2020-04-11 RX ORDER — ASCORBIC ACID 250 MG
TABLET ORAL
Status: DISPENSED
Start: 2020-04-11 | End: 2020-04-11

## 2020-04-11 RX ORDER — GUAIFENESIN 600 MG/1
TABLET, EXTENDED RELEASE ORAL
Status: DISPENSED
Start: 2020-04-11 | End: 2020-04-11

## 2020-04-11 RX ORDER — CHOLECALCIFEROL (VITAMIN D3) 25 MCG
TABLET ORAL
Status: DISPENSED
Start: 2020-04-11 | End: 2020-04-11

## 2020-04-11 RX ADMIN — Medication 10000 UNITS: at 09:04

## 2020-04-11 RX ADMIN — ENOXAPARIN SODIUM 40 MG: 100 INJECTION SUBCUTANEOUS at 08:04

## 2020-04-11 RX ADMIN — TIOTROPIUM BROMIDE 18 MCG: 18 CAPSULE ORAL; RESPIRATORY (INHALATION) at 09:04

## 2020-04-11 RX ADMIN — GUAIFENESIN 600 MG: 600 TABLET, EXTENDED RELEASE ORAL at 09:04

## 2020-04-11 RX ADMIN — PANTOPRAZOLE SODIUM 40 MG: 40 TABLET, DELAYED RELEASE ORAL at 09:04

## 2020-04-11 RX ADMIN — IPRATROPIUM BROMIDE AND ALBUTEROL SULFATE 3 ML: .5; 3 SOLUTION RESPIRATORY (INHALATION) at 09:04

## 2020-04-11 RX ADMIN — GUAIFENESIN 600 MG: 600 TABLET, EXTENDED RELEASE ORAL at 08:04

## 2020-04-11 RX ADMIN — METOPROLOL SUCCINATE 50 MG: 50 TABLET, EXTENDED RELEASE ORAL at 09:04

## 2020-04-11 RX ADMIN — ATORVASTATIN CALCIUM 40 MG: 20 TABLET, FILM COATED ORAL at 08:04

## 2020-04-11 RX ADMIN — IPRATROPIUM BROMIDE AND ALBUTEROL SULFATE 3 ML: .5; 3 SOLUTION RESPIRATORY (INHALATION) at 10:04

## 2020-04-11 RX ADMIN — HYDROXYZINE HYDROCHLORIDE 25 MG: 25 TABLET, FILM COATED ORAL at 10:04

## 2020-04-11 RX ADMIN — Medication 500 MG: at 08:04

## 2020-04-11 RX ADMIN — Medication 500 MG: at 09:04

## 2020-04-11 RX ADMIN — MELATONIN 1000 UNITS: at 09:04

## 2020-04-11 RX ADMIN — IPRATROPIUM BROMIDE AND ALBUTEROL SULFATE 3 ML: .5; 3 SOLUTION RESPIRATORY (INHALATION) at 02:04

## 2020-04-11 NOTE — PLAN OF CARE
Problem: Adult Inpatient Plan of Care  Goal: Plan of Care Review  Outcome: Ongoing, Progressing   POC reviewed with patient. AAOx4. Oxygen titrated down to 6L [place], goal met >92%, tolerated well. Advised to increase physical activity and use of incentive spirometer, pt voiced understanding. Non skid socks remained in place. Call light within reach. Safety maintained. Will continue to monitor.

## 2020-04-11 NOTE — PROGRESS NOTES
Hospital Medicine  Progress Note  [facility]      Patient Name: Jojo Burrows  MRN:  [id number]  Hospital Medicine Team: [facility] MED C Marisol Jones MD  Date of Admission:  3/31/2020     Length of Stay:  LOS: 11 days       Principal Problem:  COVID-19 virus infection      Hospital Course:  Jojo Burrows is a 70F with HTN, obesity who presents for evaluation of SOB. She was seen in the ED on 03/28 with fever, chills, cough. She wasn't hypoxic with ambulation and discharged home.  She tested positive for COVID-19. Her  is admitted for COVID+ complications. She presented with worsening SOB, cough, pleuritic chest pain, sinus congestions, myalgias. Initially, SpO2 of 78%, placed on NRB 15L, weaned to 6L NC, but then returned to requiring 15L via NRB through 4/8.  Completed 5 day course of ceftriaxone and azithromycin for CAP.  Completed 5 day course of HCQ per COVID-19 protocol.  Has remained on 15 L via NRB for several days and hence was transferred to 11 W high oxygen unit for higher level of care. Current regimen consists of duo nebs q.6 scheduled, Spiriva daily, CPT t.i.d., flutter valve treatments q.6 while awake, Lasix 40 IV daily (monitoring daily UOP), and nightly CPAP.  Blood cultures remain no growth to date and respiratory culture with no significant growth.  CBC and CMP with no significant abnormalities aside from intermittent hypokalemia and hypomagnesemia with diuresis, responsive to repletion.  CRP has down trended from 186 (Peak) to 136.  Of note, patient's  passed away from COVID-19 related complications at [facility] on 4/6; have been providing daily emotional support for patient.  Daughters have been checking on patient each day.  Continuing to monitor closely for improvement; patient full code and open to all respiratory interventions at this time.  Transferred to high oxygen unit on 15L NRB 4/8.     Interval History:     No acute events overnight.  Patient reports she  is still continuing to gradually feel better.  HR is elevated with cough or activity.    Review of Systems:  Respiratory: +SOB, dry cough  Cardiovascular: no chest pain, palpitations  GI: no N/V/D, abdominal pain    Inpatient Medications:    Current Facility-Administered Medications:     acetaminophen tablet 650 mg, 650 mg, Oral, Q4H PRN, Mikal Staples, PA-C, 650 mg at 04/06/20 0928    acetaminophen tablet 650 mg, 650 mg, Oral, Q8H PRN, Mikal Staples, PA-C, 650 mg at 04/06/20 0933    albuterol-ipratropium 2.5 mg-0.5 mg/3 mL nebulizer solution 3 mL, 3 mL, Nebulization, Q6H WAKE, Ken Tjeada MD, 3 mL at 04/11/20 1008    ascorbic acid (vitamin C) (VITAMIN C) 250 MG tablet, , , ,     ascorbic acid (vitamin C) tablet 500 mg, 500 mg, Oral, BID, Marisol Jones MD, 500 mg at 04/11/20 0926    atorvastatin tablet 40 mg, 40 mg, Oral, QHS, Ken Tejada MD, Stopped at 04/10/20 2100    benzonatate capsule 100 mg, 100 mg, Oral, TID PRN, ENRIQUE Aldana-C, 100 mg at 04/09/20 2000    calcium carbonate 200 mg calcium (500 mg) chewable tablet 1,000 mg, 1,000 mg, Oral, TID PRN, ENRIQUE Aldana-C, 1,000 mg at 04/05/20 0845    dextrose 10% (D10W) Bolus, 12.5 g, Intravenous, PRN, ENRIQUE Aldana-C    dextrose 10% (D10W) Bolus, 25 g, Intravenous, PRN, ENRIQUE Aldana-C    enoxaparin injection 40 mg, 40 mg, Subcutaneous, QHS, Mikal Staples PA-C, Stopped at 04/10/20 2100    glucagon (human recombinant) injection 1 mg, 1 mg, Intramuscular, PRN, Mikal Staples PA-C    glucose chewable tablet 16 g, 16 g, Oral, PRN, ENRIQUE Aldana-C    glucose chewable tablet 24 g, 24 g, Oral, PRN, Mikal Staples PA-C    guaiFENesin (MUCINEX) 600 mg 12 hr tablet, , , ,     guaiFENesin 12 hr tablet 600 mg, 600 mg, Oral, BID, Marisol Jones MD, 600 mg at 04/11/20 0926    hydroxyzine HCL tablet 25 mg, 25 mg, Oral, Nightly PRN, Marisol Jones MD, 25 mg at 04/09/20 1959    loperamide capsule 2 mg, 2 mg, Oral, Q6H PRN, Mikal  "BINU Staples, 2 mg at 04/01/20 1107    melatonin tablet 6 mg, 6 mg, Oral, Nightly PRN, Naomi Newell Jr., PA-C, 6 mg at 04/09/20 2027    metoprolol succinate (TOPROL-XL) 24 hr tablet 50 mg, 50 mg, Oral, Daily, Marisol Jones MD, 50 mg at 04/11/20 0928    ondansetron disintegrating tablet 8 mg, 8 mg, Oral, Q8H PRN, Mikal Staples PA-C    pantoprazole EC tablet 40 mg, 40 mg, Oral, QHS, Mikal Staples PA-C, Stopped at 04/10/20 2100    polyethylene glycol packet 17 g, 17 g, Oral, BID PRN, Mikal Staples PA-C    sodium chloride 0.9% flush 10 mL, 10 mL, Intravenous, PRN, Aidan Myrick PA-C    sodium chloride 0.9% flush 10 mL, 10 mL, Intravenous, PRN, Mikal Staples PA-C    tiotropium inhalation capsule 18 mcg, 1 capsule, Inhalation, Daily, Mikal Staples PA-C, 18 mcg at 04/11/20 0929    trazodone split tablet 25 mg, 25 mg, Oral, Nightly PRN, Ken Tejada MD, 25 mg at 04/07/20 2229    vitamin A capsule 10,000 Units, 10,000 Units, Oral, Daily, Ken Tejada MD, 10,000 Units at 04/11/20 0926    vitamin D (VITAMIN D3) 1000 units tablet, , , ,     vitamin D 1000 units tablet 1,000 Units, 1,000 Units, Oral, Daily, Ken Tejada MD, 1,000 Units at 04/11/20 0925      Physical Exam:      Intake/Output Summary (Last 24 hours) at 4/11/2020 1234  Last data filed at 4/11/2020 0900  Gross per 24 hour   Intake 240 ml   Output 1576 ml   Net -1336 ml     Wt Readings from Last 3 Encounters:   04/11/20 78 kg (171 lb 15.3 oz)   03/28/20 75.8 kg (167 lb)   03/27/20 75.8 kg (167 lb)       /64 (BP Location: Right arm, Patient Position: Lying)   Pulse 101   Temp 98 °F (36.7 °C) (Axillary)   Resp (!) 25   Ht 5' 2" (1.575 m)   Wt 78 kg (171 lb 15.3 oz)   SpO2 100%   Breastfeeding? No   BMI 31.45 kg/m²     GEN: NAD, conversant, on HFNC  Resp: coarse bilateral breath sounds, few crackles, poor air entry, normal work of breathing   CV: RRR, no m/r/g, no edema  GI: soft, NTND  Skin: no rash    Laboratory:  Lab " Results   Component Value Date    [id number] Detected (A) 03/28/2020       Recent Labs   Lab 04/06/20  0527 04/08/20  0513 04/11/20  0549   WBC 9.73 10.79 9.04   LYMPH 12.5*  1.2 17.7*  1.9 21.2  1.9   HGB 12.2 13.2 11.8*   HCT 37.8 41.9 37.0    329 327     Recent Labs   Lab 04/07/20  1633 04/08/20  0512 04/09/20  0330 04/11/20  0549   NA  --  133* 136 137   K  --  4.0 4.0 3.6   CL  --  93* 94* 98   CO2  --  30* 27 31*   BUN  --  12 10 10   CREATININE  --  0.8 0.7 0.7   GLU  --  85 90 104   CALCIUM  --  9.3 9.3 9.2   MG 2.2 2.1 2.2  --      Recent Labs   Lab 04/06/20 0527 04/08/20  0512 04/09/20  0330   ALKPHOS 56 58 62   ALT 26 21 23   AST 31 24 32   ALBUMIN 2.7* 2.7* 2.7*   PROT 7.2 7.4 7.8   BILITOT 0.7 0.7 0.7        Recent Labs     04/10/20  0744   CRP 62.8*       All labs within the last 24 hours were reviewed.     Microbiology:  Microbiology Results (last 7 days)     Procedure Component Value Units Date/Time    Blood culture [[id number]] Collected:  03/31/20 2018    Order Status:  Completed Specimen:  Blood from Peripheral, Antecubital, Right Updated:  04/04/20 2212     Blood Culture, Routine No Growth after 4 days.     Blood culture [[id number]] Collected:  03/31/20 2011    Order Status:  Completed Specimen:  Blood from Peripheral, Antecubital, Left Updated:  04/04/20 2212     Blood Culture, Routine No Growth after 4 days.             Imaging  ECG Results          EKG 12-lead (Final result)  Result time 04/01/20 09:38:21    Final result by Interface, Lab In [facility] (04/01/20 09:38:21)                 Narrative:    Test Reason : R06.02,    Vent. Rate : 079 BPM     Atrial Rate : 079 BPM     P-R Int : 148 ms          QRS Dur : 086 ms      QT Int : 382 ms       P-R-T Axes : 067 -15 014 degrees     QTc Int : 438 ms    Normal sinus rhythm  Minimal voltage criteria for LVH, may be normal variant  Nonspecific T wave abnormality  Abnormal ECG  When compared with ECG of 28-MAR-2020 18:26,  No significant  "change was found  Confirmed by BLANCA MARTINEZ MD ([id number]) on 4/1/2020 9:38:08 AM    Referred By: [email or web address]   SELF           Confirmed By:BLANCA MARTINEZ MD                              No results found for this or any previous visit.    X-Ray Chest AP Portable  Narrative: EXAMINATION:  XR CHEST AP PORTABLE    CLINICAL HISTORY:  Provided history is "  Shortness of breath".    TECHNIQUE:  One view of the chest.    COMPARISON:  03/28/2020, 03/27/2020, and 08/21/2018.    FINDINGS:  Lung volumes are low.  Cardiac wires overlie the chest.  Platelike subsegmental opacities are again present in the lower lung zones, slightly worse when compared with the prior study.  New subtle bibasilar and peripheral interstitial opacities.  No confluent area of consolidation.  No sizable pleural effusion.  No pneumothorax.  Impression: Worsening bibasilar subsegmental and interstitial opacities, worrisome for worsening infectious or inflammatory process including pneumonia/pneumonitis in this patient with recent diagnosis of COVID-19.    Electronically signed by: Graham Ortiz MD  Date:    03/31/2020  Time:    20:46      All imaging within the last 24 hours was reviewed.     Assessment and Plan:    Active Hospital Problems    Diagnosis  POA    *COVID-19 virus infection [U07.1]  Yes    Multifocal pneumonia [J18.9]  Yes    Acute hypoxemic respiratory failure [J96.01]  Yes    Obesity (BMI 30.0-34.9) [E66.9]  Yes    Essential hypertension [I10]  Yes     Chronic      Resolved Hospital Problems   No resolved problems to display.     Covid-19 Virus Infection  - COVID-19 testing POSITIVE: Collection Date: 3/28/2020 Collection Time:   6:43 PM  - Infection Control notified     - Isolation:   - Airborne and Droplet Precautions  - Surgical mask on patient   - N95 masks must be fit tested, wear eye protection  - 20 second hand hygiene              - Limit visitors per hospital policy              - Consolidate lab draws, nursing care, and " interventions     - Diagnostics: (Lymphopenia, hyponatremia, hyperferritinemia, elevated troponin, elevated d-dimer, age, and comorbidities are significant predictors of poor clinical outcome)              - CBC:                         trend Q48hrs  - Ferritin:                      471; repeat prior to discharge   - D-dimer:                    0.73; repeat prior to discharge  - CMP:                         trend Q48hrs  - CRP:                         134 to 63; trend Q48hrs  - BNP:                          <10  - CPK:                          275  - LDH:                          444; repeat prior to discharge  - Troponin:                   0.008  - Procalcitonin:            0.04              - ECG with QTc:          NSR with QTc 438              - rapid Flu:                   negative              - Blood culture x2:       NGTD              - Respiratory Culture   No S.Aureus or Pseudomonas, normal respiratory fluora     - Management:   Bundle care as able to maintain isolation & minimize in/out of room   - Supplemental O2 to maintain SpO2 92%-96%.  Patient weaned to 6L via HFNC              - Telemetry & continuous pulse oximetry               - If wheezing   - DuoNebs q.6 scheduled, CPT t.i.d., flutter valve treatments q.6 while awake  - Spiriva daily               - acetaminophen 650mg PO Q6hr PRN fever              - loperamide PRN for viral diarrhea  -  Completed empiric antibiotics for CAP- end date: 4/4                          - Ceftriaxone 1g IV Q24hrs                          - Azithromycin 500mg IV day #1, then 250mg PO daily x4 days              -Mucinex 600 b.i.d.               -d/c Lasix               - Investigational Treatment Protocol:                           - statin: atorvastatin 40mg po daily                           - patient completed HCQ 400mg PO BID x1 day, then 400mg PO daily x 4 days               -normal glucose 6 phosphate dehydrogenase, ECG, and start Qshift POCT glucose               -transferred to high oxygen flow unit on [address] for more aggressive respiratory interventions     Advance Care Planning  Goals of care, counseling/discussion  -patient understands severity of disease and potential to need to go to ICU for mechanical ventilation should oxygen status worsen  -patient's  passed away from COVID-19 at [facility] on 04/06.  All members of healthcare team are encouraged provide emotional support to this patient daily.  -confirms FULL code status     HTN  - continue MTP XL 50 mg daily (held today)     Patient's chronic/stable medical conditions noted in the problem list above will be managed with the patient's home medications as tolerated.      VTE High Risk Prophylaxis: enoxaparin 40mg sq QHS @ 2100 (bundled care) if GFR >30     Disposition: home pending improvement.  If oxygen requirements continue to improve, can likely step down to floor later today or tomorrow.      Marisol Jones MD  Hospital Medicine Staff

## 2020-04-12 PROCEDURE — 25000242 PHARM REV CODE 250 ALT 637 W/ HCPCS: Mod: HCNC | Performed by: HOSPITALIST

## 2020-04-12 PROCEDURE — 63600175 PHARM REV CODE 636 W HCPCS: Mod: HCNC | Performed by: PHYSICIAN ASSISTANT

## 2020-04-12 PROCEDURE — 25000003 PHARM REV CODE 250: Mod: HCNC | Performed by: INTERNAL MEDICINE

## 2020-04-12 PROCEDURE — 97165 OT EVAL LOW COMPLEX 30 MIN: CPT | Mod: HCNC

## 2020-04-12 PROCEDURE — 99900035 HC TECH TIME PER 15 MIN (STAT): Mod: HCNC

## 2020-04-12 PROCEDURE — 25000003 PHARM REV CODE 250: Mod: HCNC | Performed by: PHYSICIAN ASSISTANT

## 2020-04-12 PROCEDURE — 94640 AIRWAY INHALATION TREATMENT: CPT | Mod: HCNC

## 2020-04-12 PROCEDURE — 99233 PR SUBSEQUENT HOSPITAL CARE,LEVL III: ICD-10-PCS | Mod: HCNC,,, | Performed by: INTERNAL MEDICINE

## 2020-04-12 PROCEDURE — 27000221 HC OXYGEN, UP TO 24 HOURS: Mod: HCNC

## 2020-04-12 PROCEDURE — 11000001 HC ACUTE MED/SURG PRIVATE ROOM: Mod: HCNC

## 2020-04-12 PROCEDURE — 25000003 PHARM REV CODE 250: Mod: HCNC | Performed by: HOSPITALIST

## 2020-04-12 PROCEDURE — 94761 N-INVAS EAR/PLS OXIMETRY MLT: CPT | Mod: HCNC

## 2020-04-12 PROCEDURE — 25000242 PHARM REV CODE 250 ALT 637 W/ HCPCS: Mod: HCNC | Performed by: PHYSICIAN ASSISTANT

## 2020-04-12 PROCEDURE — 94664 DEMO&/EVAL PT USE INHALER: CPT | Mod: HCNC

## 2020-04-12 PROCEDURE — 94660 CPAP INITIATION&MGMT: CPT | Mod: HCNC

## 2020-04-12 PROCEDURE — 97530 THERAPEUTIC ACTIVITIES: CPT | Mod: HCNC

## 2020-04-12 PROCEDURE — 25000242 PHARM REV CODE 250 ALT 637 W/ HCPCS: Mod: HCNC | Performed by: INTERNAL MEDICINE

## 2020-04-12 PROCEDURE — 99233 SBSQ HOSP IP/OBS HIGH 50: CPT | Mod: HCNC,,, | Performed by: INTERNAL MEDICINE

## 2020-04-12 RX ORDER — ALBUTEROL SULFATE 90 UG/1
4 AEROSOL, METERED RESPIRATORY (INHALATION)
Status: DISCONTINUED | OUTPATIENT
Start: 2020-04-12 | End: 2020-04-18 | Stop reason: HOSPADM

## 2020-04-12 RX ORDER — ALBUTEROL SULFATE 90 UG/1
2 AEROSOL, METERED RESPIRATORY (INHALATION)
Status: DISCONTINUED | OUTPATIENT
Start: 2020-04-12 | End: 2020-04-12

## 2020-04-12 RX ADMIN — GUAIFENESIN 600 MG: 600 TABLET, EXTENDED RELEASE ORAL at 09:04

## 2020-04-12 RX ADMIN — PANTOPRAZOLE SODIUM 40 MG: 40 TABLET, DELAYED RELEASE ORAL at 09:04

## 2020-04-12 RX ADMIN — Medication 500 MG: at 09:04

## 2020-04-12 RX ADMIN — ENOXAPARIN SODIUM 40 MG: 100 INJECTION SUBCUTANEOUS at 09:04

## 2020-04-12 RX ADMIN — TIOTROPIUM BROMIDE 18 MCG: 18 CAPSULE ORAL; RESPIRATORY (INHALATION) at 12:04

## 2020-04-12 RX ADMIN — ATORVASTATIN CALCIUM 40 MG: 20 TABLET, FILM COATED ORAL at 09:04

## 2020-04-12 RX ADMIN — TRAZODONE HYDROCHLORIDE 25 MG: 50 TABLET ORAL at 09:04

## 2020-04-12 RX ADMIN — Medication 6 MG: at 09:04

## 2020-04-12 RX ADMIN — ALBUTEROL SULFATE 4 PUFF: 90 AEROSOL, METERED RESPIRATORY (INHALATION) at 07:04

## 2020-04-12 RX ADMIN — IPRATROPIUM BROMIDE AND ALBUTEROL SULFATE 3 ML: .5; 3 SOLUTION RESPIRATORY (INHALATION) at 06:04

## 2020-04-12 RX ADMIN — MELATONIN 1000 UNITS: at 09:04

## 2020-04-12 RX ADMIN — METOPROLOL SUCCINATE 50 MG: 50 TABLET, EXTENDED RELEASE ORAL at 09:04

## 2020-04-12 RX ADMIN — GUAIFENESIN AND DEXTROMETHORPHAN HYDROBROMIDE 1 TABLET: 600; 30 TABLET, EXTENDED RELEASE ORAL at 09:04

## 2020-04-12 RX ADMIN — Medication 10000 UNITS: at 09:04

## 2020-04-12 RX ADMIN — ALBUTEROL SULFATE 4 PUFF: 90 AEROSOL, METERED RESPIRATORY (INHALATION) at 12:04

## 2020-04-12 NOTE — PROGRESS NOTES
Hospital Medicine  Progress Note  [facility]      Patient Name: Jojo Burrows  MRN:  [id number]  Hospital Medicine Team: [facility] HOSP MED B Karlos Epperson MD  Date of Admission:  3/31/2020     Length of Stay:  LOS: 12 days       Principal Problem:  COVID-19 virus infection      HPI:  Jojo Burrows is a 70F with HTN, obesity who presents for evaluation of SOB. She was seen in the ED on 03/28 with fever, chills, cough. She wasn't hypoxic with ambulation and discharged home.  She tested positive for COVID-19. Her  is admitted for COVID+ complications. She presented with worsening SOB, cough, pleuritic chest pain, sinus congestions, myalgias.     Hospital Course:   Initially, SpO2 of 78%, placed on NRB 15L, weaned to 6L NC, but then returned to requiring 15L via NRB through 4/8.  Completed 5 day course of ceftriaxone and azithromycin for CAP.  Completed 5 day course of HCQ per COVID-19 protocol.  Has remained on 15 L via NRB for several days and hence was transferred to 11 W high oxygen unit for higher level of care. Current regimen consists of duo nebs q.6 scheduled, Spiriva daily, CPT t.i.d., flutter valve treatments q.6 while awake, Lasix 40 IV daily (monitoring daily UOP), and nightly CPAP.  Blood cultures remain no growth to date and respiratory culture with no significant growth.  CBC and CMP with no significant abnormalities aside from intermittent hypokalemia and hypomagnesemia with diuresis, responsive to repletion.  CRP has down trended from 186 (Peak) to 136.  Of note, patient's  passed away from COVID-19 related complications at [facility] on 4/6; have been providing daily emotional support for patient.  Daughters have been checking on patient each day.  Continuing to monitor closely for improvement; patient full code and open to all respiratory interventions at this time.  Transferred to high oxygen unit on 15L NRB 4/8.    Interval History:     Patient lying in bed, no acute  distress. Reports SOB and cough improving. Notes that she is grieving from the loss of her  due to COVID.     Review of Systems:  Respiratory: +SOB, dry cough  Cardiovascular: no chest pain, palpitations  GI: no N/V/D, abdominal pain    Inpatient Medications:    Current Facility-Administered Medications:     acetaminophen tablet 650 mg, 650 mg, Oral, Q4H PRN, Mikal Staples, PA-C, 650 mg at 04/06/20 0928    acetaminophen tablet 650 mg, 650 mg, Oral, Q8H PRN, Mikal Staples, PA-C, 650 mg at 04/06/20 0933    albuterol-ipratropium 2.5 mg-0.5 mg/3 mL nebulizer solution 3 mL, 3 mL, Nebulization, Q6H WAKE, Ken Tejada MD, 3 mL at 04/12/20 0607    ascorbic acid (vitamin C) tablet 500 mg, 500 mg, Oral, BID, Marisol Jones MD, 500 mg at 04/11/20 2050    atorvastatin tablet 40 mg, 40 mg, Oral, QHS, Ken Tejada MD, 40 mg at 04/11/20 2052    benzonatate capsule 100 mg, 100 mg, Oral, TID PRN, ENRIQUE Aldana-C, 100 mg at 04/09/20 2000    calcium carbonate 200 mg calcium (500 mg) chewable tablet 1,000 mg, 1,000 mg, Oral, TID PRN, Mikal Staples PA-C, 1,000 mg at 04/05/20 0845    dextrose 10% (D10W) Bolus, 12.5 g, Intravenous, PRN, ENRIQUE Aldana-C    dextrose 10% (D10W) Bolus, 25 g, Intravenous, PRN, ENRIQUE Aldana-C    enoxaparin injection 40 mg, 40 mg, Subcutaneous, QHS, ENRIQUE Aldana-C, 40 mg at 04/11/20 2050    glucagon (human recombinant) injection 1 mg, 1 mg, Intramuscular, PRN, ENRIQUE Aldana-C    glucose chewable tablet 16 g, 16 g, Oral, PRN, Mikal Staples PA-C    glucose chewable tablet 24 g, 24 g, Oral, PRN, Mikal Staples PA-C    guaiFENesin 12 hr tablet 600 mg, 600 mg, Oral, BID, Marisol Jones MD, 600 mg at 04/11/20 2050    hydroxyzine HCL tablet 25 mg, 25 mg, Oral, Nightly PRN, Marisol Jones MD, 25 mg at 04/11/20 2205    loperamide capsule 2 mg, 2 mg, Oral, Q6H PRN, Mikal Staples PA-C, 2 mg at 04/01/20 1107    melatonin tablet 6 mg, 6 mg, Oral, Nightly PRN, Naomi  "JOSS Newell Jr., BINU, 6 mg at 04/09/20 2027    metoprolol succinate (TOPROL-XL) 24 hr tablet 50 mg, 50 mg, Oral, Daily, Marisol Jones MD, 50 mg at 04/11/20 0928    ondansetron disintegrating tablet 8 mg, 8 mg, Oral, Q8H PRN, Mikal Staples PA-C    pantoprazole EC tablet 40 mg, 40 mg, Oral, QHS, Mikal Staples PA-C, 40 mg at 04/11/20 2100    polyethylene glycol packet 17 g, 17 g, Oral, BID PRN, Mikal Staples PA-C    sodium chloride 0.9% flush 10 mL, 10 mL, Intravenous, PRN, Aidan Myrick PA-C    sodium chloride 0.9% flush 10 mL, 10 mL, Intravenous, PRN, Mikal Staples PA-C    tiotropium inhalation capsule 18 mcg, 1 capsule, Inhalation, Daily, Mikal Staples PA-C, 18 mcg at 04/11/20 0929    trazodone split tablet 25 mg, 25 mg, Oral, Nightly PRN, Ken Tejada MD, 25 mg at 04/07/20 2229    vitamin A capsule 10,000 Units, 10,000 Units, Oral, Daily, Ken Tejada MD, 10,000 Units at 04/11/20 0926    vitamin D 1000 units tablet 1,000 Units, 1,000 Units, Oral, Daily, Ken Tejada MD, 1,000 Units at 04/11/20 0925      Physical Exam:    No intake or output data in the 24 hours ending 04/12/20 0918  Wt Readings from Last 3 Encounters:   04/11/20 78 kg (171 lb 15.3 oz)   03/28/20 75.8 kg (167 lb)   03/27/20 75.8 kg (167 lb)       /68   Pulse 110   Temp 98.1 °F (36.7 °C) (Axillary)   Resp (!) 25   Ht 5' 2" (1.575 m)   Wt 78 kg (171 lb 15.3 oz)   SpO2 97%   Breastfeeding? No   BMI 31.45 kg/m²     GEN: NAD, conversant  Resp: coarse bilateral breath sounds, no wheezes or rales, normal work of breathing   CV: RRR, no m/r/g, no edema  GI: soft, NTND  Skin: no rash  Neuro: AAOx3, CN grossly intact, no focal neurologic deficits    Laboratory:  Lab Results   Component Value Date    [id number] Detected (A) 03/28/2020       Recent Labs   Lab 04/06/20  0527 04/08/20  0513 04/11/20  0549   WBC 9.73 10.79 9.04   LYMPH 12.5*  1.2 17.7*  1.9 21.2  1.9   HGB 12.2 13.2 11.8*   HCT 37.8 41.9 37.0   PLT " "294 329 327     Recent Labs   Lab 04/07/20  1633 04/08/20  0512 04/09/20  0330 04/11/20  0549   NA  --  133* 136 137   K  --  4.0 4.0 3.6   CL  --  93* 94* 98   CO2  --  30* 27 31*   BUN  --  12 10 10   CREATININE  --  0.8 0.7 0.7   GLU  --  85 90 104   CALCIUM  --  9.3 9.3 9.2   MG 2.2 2.1 2.2  --      Recent Labs   Lab 04/06/20  0527 04/08/20  0512 04/09/20  0330   ALKPHOS 56 58 62   ALT 26 21 23   AST 31 24 32   ALBUMIN 2.7* 2.7* 2.7*   PROT 7.2 7.4 7.8   BILITOT 0.7 0.7 0.7        Recent Labs     04/10/20  0744 04/11/20  1153   DDIMER  --  2.28*   CRP 62.8*  --        All labs within the last 24 hours were reviewed.     Microbiology:  Microbiology Results (last 7 days)     ** No results found for the last 168 hours. **            Imaging  ECG Results          EKG 12-lead (Final result)  Result time 04/01/20 09:38:21    Final result by Interface, Lab [facility] (04/01/20 09:38:21)                 Narrative:    Test Reason : R06.02,    Vent. Rate : 079 BPM     Atrial Rate : 079 BPM     P-R Int : 148 ms          QRS Dur : 086 ms      QT Int : 382 ms       P-R-T Axes : 067 -15 014 degrees     QTc Int : 438 ms    Normal sinus rhythm  Minimal voltage criteria for LVH, may be normal variant  Nonspecific T wave abnormality  Abnormal ECG  When compared with ECG of 28-MAR-2020 18:26,  No significant change was found  Confirmed by BLANCA MARTINEZ MD ([id number]) on 4/1/2020 9:38:08 AM    Referred By: [email or web address]   SELF           Confirmed By:BLANCA MARTINEZ MD                              No results found for this or any previous visit.    X-Ray Chest AP Portable  Narrative: EXAMINATION:  XR CHEST AP PORTABLE    CLINICAL HISTORY:  Provided history is "  Shortness of breath".    TECHNIQUE:  One view of the chest.    COMPARISON:  03/28/2020, 03/27/2020, and 08/21/2018.    FINDINGS:  Lung volumes are low.  Cardiac wires overlie the chest.  Platelike subsegmental opacities are again present in the lower lung zones, slightly worse when " compared with the prior study.  New subtle bibasilar and peripheral interstitial opacities.  No confluent area of consolidation.  No sizable pleural effusion.  No pneumothorax.  Impression: Worsening bibasilar subsegmental and interstitial opacities, worrisome for worsening infectious or inflammatory process including pneumonia/pneumonitis in this patient with recent diagnosis of COVID-19.    Electronically signed by: Graham Ortiz MD  Date:    03/31/2020  Time:    20:46      All imaging within the last 24 hours was reviewed.     Assessment and Plan:    Active Hospital Problems    Diagnosis  POA    *COVID-19 virus infection [U07.1]  Yes    Multifocal pneumonia [J18.9]  Yes    Acute hypoxemic respiratory failure [J96.01]  Yes    Obesity (BMI 30.0-34.9) [E66.9]  Yes    Essential hypertension [I10]  Yes     Chronic      Resolved Hospital Problems   No resolved problems to display.     Scheduled Meds:   albuterol-ipratropium  3 mL Nebulization Q6H WAKE    ascorbic acid (vitamin C)  500 mg Oral BID    atorvastatin  40 mg Oral QHS    enoxaparin  40 mg Subcutaneous QHS    guaiFENesin  600 mg Oral BID    metoprolol succinate  50 mg Oral Daily    pantoprazole  40 mg Oral QHS    tiotropium  1 capsule Inhalation Daily    vitamin A  10,000 Units Oral Daily    vitamin D  1,000 Units Oral Daily     Continuous Infusions:  PRN Meds:.acetaminophen, acetaminophen, benzonatate, calcium carbonate, Dextrose 10% Bolus, Dextrose 10% Bolus, glucagon (human recombinant), glucose, glucose, hydroxyzine HCL, loperamide, melatonin, ondansetron, polyethylene glycol, sodium chloride 0.9%, sodium chloride 0.9%, traZODone    PLAN:    Covid-19 Virus Infection  - COVID-19 testing POSITIVE: Collection Date: 3/28/2020 Collection Time:   6:43 PM  - Infection Control notified     - Isolation:   - Airborne and Droplet Precautions  - Surgical mask on patient   - N95 masks must be fit tested, wear eye protection  - 20 second hand  hygiene              - Limit visitors per hospital policy              - Consolidate lab draws, nursing care, and interventions     - Diagnostics: (Lymphopenia, hyponatremia, hyperferritinemia, elevated troponin, elevated d-dimer, age, and comorbidities are significant predictors of poor clinical outcome)              - CBC:      No lymphopenia       trend Q48hrs  - Ferritin:                      471; repeat prior to discharge   - D-dimer:                    0.73; repeat prior to discharge  - CMP:   NA wnl               trend Q48hrs  - CRP:                         134 to 63; trend Q48hrs  - BNP:                          <10  - CPK:                          275  - LDH:                          444; repeat prior to discharge  - Troponin:                   0.008  - Procalcitonin:            0.04              - ECG with QTc:          NSR with QTc 438              - rapid Flu:                   negative              - Blood culture x2:       NGTD              - Respiratory Culture   No S.Aureus or Pseudomonas, normal respiratory fluora     - Management:   Bundle care as able to maintain isolation & minimize in/out of room   - Supplemental O2 to maintain SpO2 92%-96%.  Patient weaned to 5L via HFNC              - Telemetry & continuous pulse oximetry               - If wheezing     - DuoNebs q.6 scheduled, CPT t.i.d., flutter valve treatments q.6 while awake  - Spiriva daily               - acetaminophen 650mg PO Q6hr PRN fever              - loperamide PRN for viral diarrhea  -  Completed empiric antibiotics for CAP- end date: 4/4                          - Ceftriaxone 1g IV Q24hrs                          - Azithromycin 500mg IV day #1, then 250mg PO daily x4 days              -Mucinex 600 b.i.d.               -d/c Lasix               - Investigational Treatment Protocol:                           - statin: atorvastatin 40mg po daily                           - patient completed HCQ 400mg PO BID x1 day, then 400mg PO  "daily x 4 days               -normal glucose 6 phosphate dehydrogenase, ECG, and start Qshift POCT glucose              -transferred to high oxygen flow unit on [address] for more aggressive respiratory interventions       HTN  - continue MTP XL 50 mg daily      Advance Care Planning  Goals of care, counseling/discussion  -patient understands severity of disease and potential to need to go to ICU for mechanical ventilation should oxygen status worsen  -patient's  passed away from COVID-19 at [facility] on 04/06.  All members of healthcare team are encouraged provide emotional support to this patient daily.  -confirms FULL code status    Advance Care Planning       If patient transitions to Comfort-Focused Care, please place "Nurse Communication: End of Life Care, family members allowed to visit, including spouse/partner and adult children [please list names]. Please ask family to visit as a group and leave as a group.       VTE High Risk Prophylaxis:   VTE Risk Mitigation (From admission, onward)         Ordered     enoxaparin (LOVENOX) 40 mg/0.4 mL injection      04/09/20 1932     enoxaparin injection 40 mg  Nightly      03/31/20 2158     IP VTE HIGH RISK PATIENT  Once      03/31/20 2157     Place KELSIE hose  Until discontinued      03/31/20 2150                  Patient's chronic/stable medical conditions noted in the problem list above will be managed with the patient's home medications as tolerated.         Subsequent Inpatient Hospital Care    Level 3 [id number] Total visit time was 35 minutes or greater with greater than 50% of time spent in counseling and coordination of care.           Karlos Epperson MD  Hospital Medicine Staff  Pager: [phone]; Spectra: [phone]          "

## 2020-04-12 NOTE — PLAN OF CARE
Problem: Occupational Therapy Goal  Goal: Occupational Therapy Goal  Description  Goals to be met by: 4/26/2020     Patient will increase functional independence with ADLs by performing:    UE Dressing with [place].  LE Dressing with Stand-by Assistance.  Grooming while standing with Stand-by Assistance.  Toileting from bedside commode with Stand-by Assistance for hygiene and clothing management.   Toilet transfer to bedside commode with Contact Guard Assistance.     Outcome: Ongoing, Progressing    OT evaluation completed and POC established.  Trisha Velez OT  4/12/2020

## 2020-04-12 NOTE — PT/OT/SLP EVAL
"Occupational Therapy   Evaluation    Name: Jojo Burrows  MRN: [id number]  Admitting Diagnosis:  COVID-19 virus infection      Recommendations:     Discharge Recommendations: nursing facility, skilled  Discharge Equipment Recommendations:  (TBD)  Barriers to discharge:  Other (Comment), Inaccessible home environment, Decreased caregiver support(Requires increased skilled assistance)    Assessment:     Jojo Burrows is a 70 y.o. female with a medical diagnosis of COVID-19 virus infection.  She presents with a decline in occupational participation and functional mobility following hospitalization from COVID. Performance deficits affecting function: weakness, impaired endurance, impaired self care skills, impaired functional mobilty, gait instability, impaired balance, impaired cardiopulmonary response to activity.      Rehab Prognosis: Good; patient would benefit from acute skilled OT services to address these deficits and reach maximum level of function.       Plan:     Patient to be seen 4 x/week to address the above listed problems via self-care/home management, therapeutic activities, therapeutic exercises  · Plan of Care Expires: 05/12/20  · Plan of Care Reviewed with: patient    Subjective     Chief Complaint: weakness/fatigue "This is really my first time out of bed."  Patient/Family Comments/goals: To feel better and return home    Occupational Profile:  Living Environment: Patient lives alone ( recently passed away from COVID) in a 2SH with 0 [address]. Her bedroom/bathroom are located on the 2nd floor with ~14 stairs, R HR. Her bathroom has a WIS with a built-in bench.   Previous level of function: Independent PTA  Roles and Routines: Mother. 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 She enjoys crafts, working around her home and in her yard, and she is a caregiver for her parents.  Equipment Used at Home:  bath bench, cane, straight  Assistance upon Discharge: Daughters are available to " assist    Pain/Comfort:  Pain Rating 1: 0/10  Pain Rating Post-Intervention 1: 0/10    Patients cultural, spiritual, Protestant conflicts given the current situation: no    Objective:     Communicated with: RN prior to session.  Patient found HOB elevated with oxygen, telemetry, pulse ox (continuous)(hep lock IV) upon OT entry to room.    General Precautions: Standard, fall, airborne, contact, droplet   Orthopedic Precautions:N/A   Braces: N/A     Occupational Performance:    Bed Mobility:    · Patient completed Scooting anteriorly to EOB for foot placement on floor with stand by assistance  · Patient completed Supine to Sit to L side EOB with minimum assistance for trunk advancement off bed  · Patient completed Sit to Supine with contact guard assistance  · Patient completed rolling to her left for bed pan placement with contact guard assistance    Functional Mobility/Transfers:  · Patient completed Sit <> Stand Transfer with minimum assistance  with  hand-held assist   · Functional Mobility: Patient completed functional mobility ~8' with min assist/HHA and no AD. Patient with increased SOB but no LOB noted.    Activities of Daily Living:  · Grooming: Patient participated in a face wash with a washcloth while sitting EOB with set-up assist.    Cognitive/Visual Perceptual:  Cognitive/Psychosocial Skills:     -       Oriented to: Person, Place, Time and Situation   -       Follows Commands/attention:Follows multistep  commands    Physical Exam:  Upper Extremity Range of Motion:     -       Right Upper Extremity: WFL  -       Left Upper Extremity: WFL  Upper Extremity Strength:    -       Right Upper Extremity: WFL  -       Left Upper Extremity: WFL   Strength:    -       Right Upper Extremity: WFL  -       Left Upper Extremity: WFL  Fine Motor Coordination:    -       Intact  Left hand thumb/finger opposition skills and Right hand thumb/finger opposition skills    AMPA 6 Click ADL:  AMPAC Total Score:  19    Treatment & Education:  Role of OT/evaluation  Call button for assistance  Education:    Patient left HOB elevated on bed pan with all lines intact, call button in reach and RN notified    GOALS:   Multidisciplinary Problems     Occupational Therapy Goals        Problem: Occupational Therapy Goal    Goal Priority Disciplines Outcome Interventions   Occupational Therapy Goal     OT, PT/OT Ongoing, Progressing    Description:  Goals to be met by: 4/26/2020     Patient will increase functional independence with ADLs by performing:    UE Dressing with [place].  LE Dressing with Stand-by Assistance.  Grooming while standing with Stand-by Assistance.  Toileting from bedside commode with Stand-by Assistance for hygiene and clothing management.   Toilet transfer to bedside commode with Contact Guard Assistance.                      History:     Past Medical History:   Diagnosis Date    Acute hypoxemic respiratory failure 3/31/2020    ALLERGIC RHINITIS     Cataract     Degenerative disc disease, cervical 9/13/2018    GERD (gastroesophageal reflux disease)     Hyperlipidemia     Hypertension     Migraine headache     Multifocal pneumonia 3/31/2020    Nuclear sclerosis 4/30/2014    Sleep disorder     Thyroid disease     nodular goiter    TIA (transient ischemic attack)          Past Surgical History:   Procedure Laterality Date    BREAST BIOPSY      BREAST CYST ASPIRATION      BREAST CYST EXCISION      COLONOSCOPY N/A 4/29/2016    Procedure: COLONOSCOPY;  Surgeon: Sergio Vickers MD;  Location: [address]);  Service: Endoscopy;  Laterality: N/A;    HYSTERECTOMY         Time Tracking:     OT Date of Treatment: 04/12/20  OT Start Time: 1110  OT Stop Time: 1141  OT Total Time (min): 31 min    Billable Minutes:Evaluation 15 minutes  Therapeutic Activity 16 minutes    Trisha Velez OT  4/12/2020

## 2020-04-12 NOTE — CARE UPDATE
Rapid Response Nurse Chart Check     Chart check completed, abnormal VS noted. Please call [phone] for further concerns or assistance.

## 2020-04-12 NOTE — PLAN OF CARE
Problem: Adult Inpatient Plan of Care  Goal: Patient-Specific Goal (Individualization)  Outcome: Ongoing, Progressing  Goal: Absence of Hospital-Acquired Illness or Injury  Outcome: Ongoing, Progressing  Goal: Optimal Comfort and Wellbeing  Outcome: Ongoing, Progressing     Problem: Fluid Imbalance (Pneumonia)  Goal: Fluid Balance  Outcome: Ongoing, Progressing     Problem: Infection (Pneumonia)  Goal: Resolution of Infection Signs/Symptoms  Outcome: Ongoing, Progressing     Problem: Respiratory Compromise (Pneumonia)  Goal: Effective Oxygenation and Ventilation  Outcome: Ongoing, Progressing     Problem: Oral Intake Inadequate  Goal: Improved Oral Intake  Outcome: Ongoing, Progressing         Patient is free from falls and injuries. Vital signs stable.  Fluid intake increasing slowly. Encouraged patient to drink more fluids thru out the day.  Patient is sitting up in chair to help with oxygenation. Patient tolerating well.  Will continue  to monitor patient.

## 2020-04-13 PROCEDURE — 25000003 PHARM REV CODE 250: Mod: HCNC | Performed by: HOSPITALIST

## 2020-04-13 PROCEDURE — 27100171 HC OXYGEN HIGH FLOW UP TO 24 HOURS: Mod: HCNC

## 2020-04-13 PROCEDURE — 27000221 HC OXYGEN, UP TO 24 HOURS: Mod: HCNC

## 2020-04-13 PROCEDURE — 11000001 HC ACUTE MED/SURG PRIVATE ROOM: Mod: HCNC

## 2020-04-13 PROCEDURE — 99900035 HC TECH TIME PER 15 MIN (STAT): Mod: HCNC

## 2020-04-13 PROCEDURE — 94761 N-INVAS EAR/PLS OXIMETRY MLT: CPT | Mod: HCNC

## 2020-04-13 PROCEDURE — 97530 THERAPEUTIC ACTIVITIES: CPT | Mod: HCNC

## 2020-04-13 PROCEDURE — 94660 CPAP INITIATION&MGMT: CPT | Mod: HCNC

## 2020-04-13 PROCEDURE — 25000242 PHARM REV CODE 250 ALT 637 W/ HCPCS: Mod: HCNC | Performed by: PHYSICIAN ASSISTANT

## 2020-04-13 PROCEDURE — 63600175 PHARM REV CODE 636 W HCPCS: Mod: HCNC | Performed by: PHYSICIAN ASSISTANT

## 2020-04-13 PROCEDURE — 97162 PT EVAL MOD COMPLEX 30 MIN: CPT | Mod: HCNC

## 2020-04-13 PROCEDURE — 94640 AIRWAY INHALATION TREATMENT: CPT | Mod: HCNC

## 2020-04-13 PROCEDURE — 97116 GAIT TRAINING THERAPY: CPT | Mod: HCNC

## 2020-04-13 PROCEDURE — 97535 SELF CARE MNGMENT TRAINING: CPT | Mod: HCNC

## 2020-04-13 PROCEDURE — 25000003 PHARM REV CODE 250: Mod: HCNC | Performed by: PHYSICIAN ASSISTANT

## 2020-04-13 PROCEDURE — 25000003 PHARM REV CODE 250: Mod: HCNC | Performed by: INTERNAL MEDICINE

## 2020-04-13 PROCEDURE — 99232 PR SUBSEQUENT HOSPITAL CARE,LEVL II: ICD-10-PCS | Mod: HCNC,,, | Performed by: INTERNAL MEDICINE

## 2020-04-13 PROCEDURE — 99232 SBSQ HOSP IP/OBS MODERATE 35: CPT | Mod: HCNC,,, | Performed by: INTERNAL MEDICINE

## 2020-04-13 RX ADMIN — HYDROXYZINE HYDROCHLORIDE 25 MG: 25 TABLET, FILM COATED ORAL at 02:04

## 2020-04-13 RX ADMIN — Medication 500 MG: at 08:04

## 2020-04-13 RX ADMIN — MELATONIN 1000 UNITS: at 08:04

## 2020-04-13 RX ADMIN — GUAIFENESIN AND DEXTROMETHORPHAN HYDROBROMIDE 1 TABLET: 600; 30 TABLET, EXTENDED RELEASE ORAL at 08:04

## 2020-04-13 RX ADMIN — ALBUTEROL SULFATE 4 PUFF: 90 AEROSOL, METERED RESPIRATORY (INHALATION) at 09:04

## 2020-04-13 RX ADMIN — Medication 10000 UNITS: at 08:04

## 2020-04-13 RX ADMIN — Medication 6 MG: at 08:04

## 2020-04-13 RX ADMIN — ATORVASTATIN CALCIUM 40 MG: 20 TABLET, FILM COATED ORAL at 08:04

## 2020-04-13 RX ADMIN — ALBUTEROL SULFATE 4 PUFF: 90 AEROSOL, METERED RESPIRATORY (INHALATION) at 08:04

## 2020-04-13 RX ADMIN — METOPROLOL SUCCINATE 50 MG: 50 TABLET, EXTENDED RELEASE ORAL at 08:04

## 2020-04-13 RX ADMIN — ENOXAPARIN SODIUM 40 MG: 100 INJECTION SUBCUTANEOUS at 08:04

## 2020-04-13 RX ADMIN — PANTOPRAZOLE SODIUM 40 MG: 40 TABLET, DELAYED RELEASE ORAL at 08:04

## 2020-04-13 RX ADMIN — TIOTROPIUM BROMIDE 18 MCG: 18 CAPSULE ORAL; RESPIRATORY (INHALATION) at 09:04

## 2020-04-13 RX ADMIN — ALBUTEROL SULFATE 4 PUFF: 90 AEROSOL, METERED RESPIRATORY (INHALATION) at 02:04

## 2020-04-13 NOTE — PROGRESS NOTES
Hospital Medicine  Progress Note  [facility]      Patient Name: Jojo Burrows  MRN:  [id number]  Hospital Medicine Team: [facility] HOSP MED B Karlos Epperson MD  Date of Admission:  3/31/2020     Length of Stay:  LOS: 13 days       Principal Problem:  COVID-19 virus infection      HPI:  Jojo Burrows is a 70F with HTN, obesity who presents for evaluation of SOB. She was seen in the ED on 03/28 with fever, chills, cough. She wasn't hypoxic with ambulation and discharged home.  She tested positive for COVID-19. Her  is admitted for COVID+ complications. She presented with worsening SOB, cough, pleuritic chest pain, sinus congestions, myalgias.        Hospital Course:   Initially, SpO2 of 78%, placed on NRB 15L, weaned to 6L NC, but then returned to requiring 15L via NRB through 4/8.  Completed 5 day course of ceftriaxone and azithromycin for CAP.  Completed 5 day course of HCQ per COVID-19 protocol.  Has remained on 15 L via NRB for several days and hence was transferred to 11 W high oxygen unit for higher level of care. Current regimen consists of duo nebs q.6 scheduled, Spiriva daily, CPT t.i.d., flutter valve treatments q.6 while awake, Lasix 40 IV daily (monitoring daily UOP), and nightly CPAP.  Blood cultures remain no growth to date and respiratory culture with no significant growth.  CBC and CMP with no significant abnormalities aside from intermittent hypokalemia and hypomagnesemia with diuresis, responsive to repletion.  CRP has down trended from 186 (Peak) to 136.  Of note, patient's  passed away from COVID-19 related complications at [facility] on 4/6; have been providing daily emotional support for patient.  Daughters have been checking on patient each day.  Continuing to monitor closely for improvement; patient full code and open to all respiratory interventions at this time.  Transferred to high oxygen unit on 15L NRB 4/8.    Interval History:     Patient sitting in chair, no  acute distress. Reports slight improvement in SOB and cough. Report lip sore due to CPAP. Denies fever, chills, diarrhea, chest pain or abdominal pain.     Review of Systems:  Respiratory: +SOB, dry cough  Cardiovascular: no chest pain, palpitations  GI: no N/V/D, abdominal pain    Inpatient Medications:    Current Facility-Administered Medications:     acetaminophen tablet 650 mg, 650 mg, Oral, Q4H PRN, Mikal Staples, PA-C, 650 mg at 04/06/20 0928    acetaminophen tablet 650 mg, 650 mg, Oral, Q8H PRN, Mikal Staples, PA-C, 650 mg at 04/06/20 0933    albuterol inhaler 4 puff, 4 puff, Inhalation, Q6H WAKE, Karlos Epperson MD, 4 puff at 04/13/20 0940    ascorbic acid (vitamin C) tablet 500 mg, 500 mg, Oral, BID, Marisol Jones MD, 500 mg at 04/13/20 0823    atorvastatin tablet 40 mg, 40 mg, Oral, QHS, Ken Tejada MD, 40 mg at 04/12/20 2136    benzonatate capsule 100 mg, 100 mg, Oral, TID PRN, ENRIQUE Aldana-C, 100 mg at 04/09/20 2000    calcium carbonate 200 mg calcium (500 mg) chewable tablet 1,000 mg, 1,000 mg, Oral, TID PRN, Mikal Staples PA-C, 1,000 mg at 04/05/20 0845    dextromethorphan-guaifenesin  mg per 12 hr tablet 1 tablet, 1 tablet, Oral, BID, Karlos Epperson MD, 1 tablet at 04/13/20 0823    dextrose 10% (D10W) Bolus, 12.5 g, Intravenous, PRN, ENRIQUE Aldana-C    dextrose 10% (D10W) Bolus, 25 g, Intravenous, PRN, ENRIQUE Aldana-C    enoxaparin injection 40 mg, 40 mg, Subcutaneous, QHS, Mikal Staples PA-C, 40 mg at 04/12/20 2136    glucagon (human recombinant) injection 1 mg, 1 mg, Intramuscular, PRN, ENRIQUE Aldana-SHERWIN    glucose chewable tablet 16 g, 16 g, Oral, PRN, Mikal Staples PA-C    glucose chewable tablet 24 g, 24 g, Oral, PRN, Mikal Staples PA-C    hydroxyzine HCL tablet 25 mg, 25 mg, Oral, Nightly PRN, Marisol Jones MD, 25 mg at 04/13/20 0229    loperamide capsule 2 mg, 2 mg, Oral, Q6H PRN, Mikal Staples PA-C, 2 mg at 04/01/20 1107    melatonin  "tablet 6 mg, 6 mg, Oral, Nightly PRN, Naomi Newell Jr., PA-SHERWIN, 6 mg at 04/12/20 2153    metoprolol succinate (TOPROL-XL) 24 hr tablet 50 mg, 50 mg, Oral, Daily, Marisol Jones MD, 50 mg at 04/13/20 0823    ondansetron disintegrating tablet 8 mg, 8 mg, Oral, Q8H PRN, Mikal Staples PA-C    pantoprazole EC tablet 40 mg, 40 mg, Oral, QHS, Mikal Staples PA-C, 40 mg at 04/12/20 2136    polyethylene glycol packet 17 g, 17 g, Oral, BID PRN, Mikal Staples PA-C    sodium chloride 0.9% flush 10 mL, 10 mL, Intravenous, PRN, Aidan Myrick PA-C    sodium chloride 0.9% flush 10 mL, 10 mL, Intravenous, PRN, Mikal Staples PA-C    tiotropium inhalation capsule 18 mcg, 1 capsule, Inhalation, Daily, Mikal Staples PA-C, 18 mcg at 04/13/20 0945    trazodone split tablet 25 mg, 25 mg, Oral, Nightly PRN, Ken Tejada MD, 25 mg at 04/12/20 2153    vitamin A capsule 10,000 Units, 10,000 Units, Oral, Daily, Ken Tejada MD, 10,000 Units at 04/13/20 0822    vitamin D 1000 units tablet 1,000 Units, 1,000 Units, Oral, Daily, Ken Tejada MD, 1,000 Units at 04/13/20 0823      Physical Exam:      Intake/Output Summary (Last 24 hours) at 4/13/2020 1032  Last data filed at 4/12/2020 1815  Gross per 24 hour   Intake --   Output 600 ml   Net -600 ml     Wt Readings from Last 3 Encounters:   04/11/20 78 kg (171 lb 15.3 oz)   03/28/20 75.8 kg (167 lb)   03/27/20 75.8 kg (167 lb)       /82 (BP Location: Right arm, Patient Position: Lying)   Pulse 78   Temp 97.7 °F (36.5 °C) (Axillary)   Resp 20   Ht 5' 2" (1.575 m)   Wt 78 kg (171 lb 15.3 oz)   SpO2 96%   Breastfeeding? No   BMI 31.45 kg/m²     GEN: NAD, conversant  Resp: coarse bilateral breath sounds, no wheezes or rales, normal work of breathing   CV: RRR, no m/r/g, no edema  GI: soft, NTND  Skin: no rash  Neuro: AAOx3, CN grossly intact, no focal neurologic deficits    Laboratory:  Lab Results   Component Value Date    [id number] Detected (A) " "03/28/2020       Recent Labs   Lab 04/08/20  0513 04/11/20  0549   WBC 10.79 9.04   LYMPH 17.7*  1.9 21.2  1.9   HGB 13.2 11.8*   HCT 41.9 37.0    327     Recent Labs   Lab 04/07/20  1633 04/08/20  0512 04/09/20  0330 04/11/20  0549   NA  --  133* 136 137   K  --  4.0 4.0 3.6   CL  --  93* 94* 98   CO2  --  30* 27 31*   BUN  --  12 10 10   CREATININE  --  0.8 0.7 0.7   GLU  --  85 90 104   CALCIUM  --  9.3 9.3 9.2   MG 2.2 2.1 2.2  --      Recent Labs   Lab 04/08/20  0512 04/09/20  0330   ALKPHOS 58 62   ALT 21 23   AST 24 32   ALBUMIN 2.7* 2.7*   PROT 7.4 7.8   BILITOT 0.7 0.7        Recent Labs     04/11/20  1153   DDIMER 2.28*       All labs within the last 24 hours were reviewed.     Microbiology:  Microbiology Results (last 7 days)     ** No results found for the last 168 hours. **            Imaging  ECG Results          EKG 12-lead (Final result)  Result time 04/01/20 09:38:21    Final result by Interface, Lab In [facility] (04/01/20 09:38:21)                 Narrative:    Test Reason : R06.02,    Vent. Rate : 079 BPM     Atrial Rate : 079 BPM     P-R Int : 148 ms          QRS Dur : 086 ms      QT Int : 382 ms       P-R-T Axes : 067 -15 014 degrees     QTc Int : 438 ms    Normal sinus rhythm  Minimal voltage criteria for LVH, may be normal variant  Nonspecific T wave abnormality  Abnormal ECG  When compared with ECG of 28-MAR-2020 18:26,  No significant change was found  Confirmed by BLANCA MARTINEZ MD ([id number]) on 4/1/2020 9:38:08 AM    Referred By: MAGDIELERR   SELF           Confirmed By:BLANCA MARTINEZ MD                              No results found for this or any previous visit.    X-Ray Chest AP Portable  Narrative: EXAMINATION:  XR CHEST AP PORTABLE    CLINICAL HISTORY:  Provided history is "  Shortness of breath".    TECHNIQUE:  One view of the chest.    COMPARISON:  03/28/2020, 03/27/2020, and 08/21/2018.    FINDINGS:  Lung volumes are low.  Cardiac wires overlie the chest.  Platelike subsegmental " opacities are again present in the lower lung zones, slightly worse when compared with the prior study.  New subtle bibasilar and peripheral interstitial opacities.  No confluent area of consolidation.  No sizable pleural effusion.  No pneumothorax.  Impression: Worsening bibasilar subsegmental and interstitial opacities, worrisome for worsening infectious or inflammatory process including pneumonia/pneumonitis in this patient with recent diagnosis of COVID-19.    Electronically signed by: Graham Ortiz MD  Date:    03/31/2020  Time:    20:46      All imaging within the last 24 hours was reviewed.     Assessment and Plan:    Active Hospital Problems    Diagnosis  POA    *COVID-19 virus infection [U07.1]  Yes    Multifocal pneumonia [J18.9]  Yes    Acute hypoxemic respiratory failure [J96.01]  Yes    Obesity (BMI 30.0-34.9) [E66.9]  Yes    Essential hypertension [I10]  Yes     Chronic      Resolved Hospital Problems   No resolved problems to display.     Scheduled Meds:   albuterol  4 puff Inhalation Q6H WAKE    ascorbic acid (vitamin C)  500 mg Oral BID    atorvastatin  40 mg Oral QHS    dextromethorphan-guaifenesin  mg  1 tablet Oral BID    enoxaparin  40 mg Subcutaneous QHS    metoprolol succinate  50 mg Oral Daily    pantoprazole  40 mg Oral QHS    tiotropium  1 capsule Inhalation Daily    vitamin A  10,000 Units Oral Daily    vitamin D  1,000 Units Oral Daily     Continuous Infusions:  PRN Meds:.acetaminophen, acetaminophen, benzonatate, calcium carbonate, Dextrose 10% Bolus, Dextrose 10% Bolus, glucagon (human recombinant), glucose, glucose, hydroxyzine HCL, loperamide, melatonin, ondansetron, polyethylene glycol, sodium chloride 0.9%, sodium chloride 0.9%, traZODone    PLAN:    Covid-19 Virus Infection  - COVID-19 testing POSITIVE: Collection Date: 3/28/2020 Collection Time:   6:43 PM  - Infection Control notified     - Isolation:   - Airborne and Droplet Precautions  - Surgical mask on  patient   - N95 masks must be fit tested, wear eye protection  - 20 second hand hygiene              - Limit visitors per hospital policy              - Consolidate lab draws, nursing care, and interventions     - Diagnostics: (Lymphopenia, hyponatremia, hyperferritinemia, elevated troponin, elevated d-dimer, age, and comorbidities are significant predictors of poor clinical outcome)              - CBC:      No lymphopenia       trend Q48hrs  - Ferritin:                      471; repeat prior to discharge   - D-dimer:                    0.73; repeat prior to discharge  - CMP:   NA wnl               trend Q48hrs  - CRP:                         134 to 63; trend Q48hrs  - BNP:                          <10  - CPK:                          275  - LDH:                          444; repeat prior to discharge  - Troponin:                   0.008  - Procalcitonin:            0.04              - ECG with QTc:          NSR with QTc 438              - rapid Flu:                   negative              - Blood culture x2:       NGTD              - Respiratory Culture   No S.Aureus or Pseudomonas, normal respiratory fluora     - Management:   Bundle care as able to maintain isolation & minimize in/out of room   - Supplemental O2 to maintain SpO2 92%-96%.  Patient weaned to 5L via HFNC              - Telemetry & continuous pulse oximetry               - If wheezing                           - DuoNebs q.6 scheduled, CPT t.i.d., flutter valve treatments q.6 while awake  - Spiriva daily               - acetaminophen 650mg PO Q6hr PRN fever              - loperamide PRN for viral diarrhea  -  Completed empiric antibiotics for CAP- end date: 4/4                          - Ceftriaxone 1g IV Q24hrs                          - Azithromycin 500mg IV day #1, then 250mg PO daily x4 days              -Mucinex 600 b.i.d.               -d/c Lasix               - Investigational Treatment Protocol:                           - statin: atorvastatin  "40mg po daily                           - patient completed HCQ 400mg PO BID x1 day, then 400mg PO daily x 4 days               -normal glucose 6 phosphate dehydrogenase, ECG, and start Qshift POCT glucose              -transferred to high oxygen flow unit on [address] for more aggressive respiratory interventions        HTN  - continue MTP XL 50 mg daily      Advance Care Planning  Goals of care, counseling/discussion  -patient understands severity of disease and potential to need to go to ICU for mechanical ventilation should oxygen status worsen  -patient's  passed away from COVID-19 at [facility] on 04/06.  All members of healthcare team are encouraged provide emotional support to this patient daily.  -confirms FULL code status  Advance Care Planning       If patient transitions to Comfort-Focused Care, please place "Nurse Communication: End of Life Care, family members allowed to visit, including spouse/partner and adult children [please list names]. Please ask family to visit as a group and leave as a group.       VTE High Risk Prophylaxis:   VTE Risk Mitigation (From admission, onward)         Ordered     enoxaparin (LOVENOX) 40 mg/0.4 mL injection      04/09/20 1932     enoxaparin injection 40 mg  Nightly      03/31/20 2158     IP VTE HIGH RISK PATIENT  Once      03/31/20 2157     Place KELSIE hose  Until discontinued      03/31/20 2150                  Patient's chronic/stable medical conditions noted in the problem list above will be managed with the patient's home medications as tolerated.         Subsequent Inpatient Hospital Care      Level 2 01532 Total visit time was 25 minutes or greater with greater than 50% of time spent in counseling and coordination of care.             Karlos Epperson MD  Hospital Medicine Staff  Pager: [phone]; Spectra: [phone]          "

## 2020-04-13 NOTE — PLAN OF CARE
Problem: Occupational Therapy Goal  Goal: Occupational Therapy Goal  Description  Goals to be met by: 4/26/2020     Patient will increase functional independence with ADLs by performing:    UE Dressing with [place].  LE Dressing with Stand-by Assistance.  Grooming while standing with Stand-by Assistance.  Toileting from bedside commode with Stand-by Assistance for hygiene and clothing management.   Toilet transfer to bedside commode with Contact Guard Assistance.     Outcome: Ongoing, Progressing    Progressing with POC.  Trisha Velze OT  4/13/2020

## 2020-04-13 NOTE — PLAN OF CARE
Problem: Adult Inpatient Plan of Care  Goal: Patient-Specific Goal (Individualization)  Outcome: Ongoing, Progressing     Problem: Adult Inpatient Plan of Care  Goal: Absence of Hospital-Acquired Illness or Injury  Outcome: Ongoing, Progressing     Problem: Skin Injury Risk Increased  Goal: Skin Health and Integrity  Outcome: Ongoing, Progressing       Pt AAPx4, VSS. Weaned from 5L high flow to 4L nasal cannula- sats range between 91-97%. Will keep at 4 for the rest of today and continue to wean in the am. Pt in agreement with Plan of Care, call light in reach, bed at lowest position. Will continue to monitor

## 2020-04-13 NOTE — PT/OT/SLP PROGRESS
Occupational Therapy   Treatment (Co-treat with PT)    Name: Jojo Burrows  MRN: [id number]  Admitting Diagnosis:  COVID-19 virus infection      * No surgery found *      Recommendations:     Discharge Recommendations: nursing facility, skilled  Discharge Equipment Recommendations:  (TBD)  Barriers to discharge:  Inaccessible home environment, Decreased caregiver support(Requires increased skilled assistance)    Assessment:     Jojo Burrows is a 70 y.o. female with a medical diagnosis of COVID-19 virus infection.  She presents with good effort this day and tolerates session well. Patient is motivated to participate with therapy to return to [facility]. Performance deficits affecting function are weakness, impaired endurance, impaired self care skills, impaired functional mobilty, gait instability, impaired balance, impaired cardiopulmonary response to activity.     Rehab Prognosis:  Good; patient would benefit from acute skilled OT services to address these deficits and reach maximum level of function.       Plan:     Patient to be seen 4 x/week to address the above listed problems via self-care/home management, therapeutic activities, therapeutic exercises  · Plan of Care Expires: 05/12/20  · Plan of Care Reviewed with: patient    Subjective     Pain/Comfort:  · Pain Rating 1: 0/10    Objective:     Communicated with: RN prior to session.  Patient found up in chair with oxygen, PureWick, pulse ox (continuous), telemetry upon OT entry to room.    General Precautions: Standard, airborne, contact, droplet, fall   Orthopedic Precautions:N/A   Braces: N/A     Occupational Performance:     Bed Mobility:    · Did not observe    Functional Mobility/Transfers:  · Patient completed Sit <> Stand Transfer x3 trials (1 from bedside chair, 1 from sofa, 1 from EOB) with minimum assistance with hand-held assist   · Functional Mobility: Patient completed functional mobility x3 trials with CGA/HHA and no AD: 1st trial ~5', 2nd trial ~8', 3rd  trial ~12'. Patient took a seated rest break between each.    Activities of Daily Living:  · Grooming: set-up assistance Patient participated in oral hygiene and face wash with a washcloth while sitting on the sofa with set-up assist.     Therapeutic Exercise:    · Patient completed BUE exercises while sitting EOB with patient completing 1 x8 of the following: chest presses, bicep curls, shoulder flex/ext    AMPAC 6 Click ADL: 19    Treatment & Education:  Role of OT/POC  Call button for assistance    Patient left up in chair with all lines intact, call button in reach and RN notifiedEducation:      GOALS:   Multidisciplinary Problems     Occupational Therapy Goals        Problem: Occupational Therapy Goal    Goal Priority Disciplines Outcome Interventions   Occupational Therapy Goal     OT, PT/OT Ongoing, Progressing    Description:  Goals to be met by: 4/26/2020     Patient will increase functional independence with ADLs by performing:    UE Dressing with [place].  LE Dressing with Stand-by Assistance.  Grooming while standing with Stand-by Assistance.  Toileting from bedside commode with Stand-by Assistance for hygiene and clothing management.   Toilet transfer to bedside commode with Contact Guard Assistance.                      Time Tracking:     OT Date of Treatment: 04/13/20  OT Start Time: 1100  OT Stop Time: 1133  OT Total Time (min): 33 min    Billable Minutes:Self Care/Home Management 10 minutes  Therapeutic Activity 15 minutes    Trisha Velez OT  4/13/2020

## 2020-04-13 NOTE — PLAN OF CARE
Problem: Oral Intake Inadequate  Goal: Improved Oral Intake  Outcome: Ongoing, Progressing   Recommendations      1. Continue regular diet. 2. Discontinue Boost Plus TID and add Boost Glucose Control TID instead (per pt's preference).     Goals: 1. Pt's intake meals >50% by RD follow up.  Nutrition Goal Status: goal met  Communication of RD Recs: (POC)

## 2020-04-13 NOTE — PROGRESS NOTES
"[facility]  Adult Nutrition  Progress Note    SUMMARY       Recommendations     1. Continue regular diet. 2. Discontinue Boost Plus TID and add Boost Glucose Control TID instead (per pt's preference).    Goals: 1. Pt's intake meals >50% by RD follow up.  Nutrition Goal Status: goal met  Communication of RD Recs: (POC)    Reason for Assessment    Reason For Assessment: RD follow-up  Diagnosis: (COVID-19 infection)  Relevant Medical History: HTN  Interdisciplinary Rounds: did not attend  General Information Comments: Remote assessment completed. Spoke with pt via telephone: Pt reports improved intake meals (50-75%) and stable wt and good po intake PTA, NFPE not warranted. Pt is also drinking the Boost ONS. Educated pt on low sodium diet. Nutrition Discharge Planning: Pt to follow low sodium diet.    Nutrition Risk Screen    Nutrition Risk Screen: no indicators present    Nutrition/Diet History    Spiritual, Cultural Beliefs, Sabianist Practices, Values that Affect Care: no    Anthropometrics    Temp: 98.6 °F (37 °C)  Height Method: Stated  Height: 5' 2" (157.5 cm)  Height (inches): 62 in  Weight Method: Bed Scale  Weight: 78 kg (171 lb 15.3 oz)  Weight (lb): 171.96 lb  Ideal Body Weight (IBW), Female: 110 lb  % Ideal Body Weight, Female (lb): 156.33 %  BMI (Calculated): 31.4       Lab/Procedures/Meds    Pertinent Labs Reviewed: reviewed  Pertinent Labs Comments: CO2 31  Pertinent Medications Reviewed: reviewed  Pertinent Medications Comments: pantoprazole, vitamin A,D,C; statin      Estimated/Assessed Needs    Weight Used For Calorie Calculations: 77.4 kg (170 lb 10.2 oz)  Energy Calorie Requirements (kcal): 1554 kcal  Energy Need Method: [place]-St Jeor(PAL 1.25)  Protein Requirements: 78-85g/day  Weight Used For Protein Calculations: 77.4 kg (170 lb 10.2 oz)        RDA Method (mL): 1554         Nutrition Prescription Ordered    Current Diet Order: Regular  Oral Nutrition Supplement: Boost Plus " TID    Evaluation of Received Nutrient/Fluid Intake    Comments: LBM 4/12  % Intake of Estimated Energy Needs: 50 - 75 %  % Meal Intake: 75 - 100 %    Nutrition Risk    Level of Risk/Frequency of Follow-up: low     Assessment and Plan    Nutrition Problem  Inadequate oral intake    Related to (etiology):   COVID-19 and grief over  passing    Signs and Symptoms (as evidenced by):   Pt with poor intake previously, now with adequate intake meals.    Interventions(treatment strategy):  Collaboration of care with providers.  Commercial beverage: Boost Plus TID    Nutrition Diagnosis Status:   Improving       Monitor and Evaluation    Food and Nutrient Intake: energy intake, food and beverage intake  Food and Nutrient Adminstration: diet order  Biochemical Data, Medical Tests and Procedures: electrolyte and renal panel, gastrointestinal profile, glucose/endocrine profile, inflammatory profile, lipid profile  Nutrition-Focused Physical Findings: overall appearance     Malnutrition Assessment       Pt does not meet criteria for malnutrition.    Nutrition Follow-Up    RD Follow-up?: Yes

## 2020-04-13 NOTE — PLAN OF CARE
Susannaal completed and POC established     Ken Oleary, PT, DPT  2020      Problem: Physical Therapy Goal  Goal: Physical Therapy Goal  Description  Goals to be met by: 2020    Patient will increase functional independence with mobility by performin. Supine to sit with Stand-by Assistance  2. Sit to supine with Stand-by Assistance  3. Sit to stand transfer with Stand-by Assistance  4. Gait  x 30 feet with Stand-by Assistance using LRAD  5. Perform stairs x1 flight w/R handrail CGA    6. Lower extremity exercise program x15 reps per handout, with independence    Outcome: Ongoing, Progressing

## 2020-04-13 NOTE — PLAN OF CARE
Oxygen saturations 96-97% on 5L HiFlow oxygen.  Discharge plan is for home with home health versus SNF.     04/13/20 1051   Discharge Reassessment   Assessment Type Discharge Planning Reassessment   Discharge Plan A Home Health;Home with family   Discharge Plan B Skilled Nursing Facility   Anticipated Discharge Disposition Home-Health   Post-Acute Status   Post-Acute Authorization Home Health   Home Health Status Awaiting Internal Medical Clearance

## 2020-04-13 NOTE — PT/OT/SLP EVAL
Physical Therapy Evaluation    Patient Name:  Jojo Burrows   MRN:  [id number]    Recommendations:     Discharge Recommendations:  nursing facility, skilled   Discharge Equipment Recommendations: (TBD)   Barriers to discharge: Decreased caregiver support and decreased functional mobility requiring increased assist      Assessment:     Jojo Burrows is a 70 y.o. female admitted with a medical diagnosis of COVID-19 virus infection.  She presents with the following impairments/functional limitations:  weakness, impaired functional mobilty, impaired cardiopulmonary response to activity, impaired coordination, impaired endurance, gait instability, impaired balance, impaired self care skills. Pt presenting on this date with impairments in functional mobility d/t generalized weakness and decreased cardiopulmonary response to activity. Pt currently requires CGA-Min A for all mobility and limited in amb distance d/t poor endurance. Pt's O2 sats remained above 94% throughout session on O2 but d/t decreased caregiver support and 14 steps to enter second floor bedroom pt will require additional therapy prior to discharging home. Pt would benefit from continued skilled acute PT 4x/wk to improve functional mobility.  Recommending pt receive PT services in SNF setting following discharge from hospital once medically cleared.     Rehab Prognosis: Fair; patient would benefit from acute skilled PT services to address these deficits and reach maximum level of function.    Recent Surgery: * No surgery found *      Plan:     During this hospitalization, patient to be seen 4 x/week to address the identified rehab impairments via gait training, therapeutic exercises, therapeutic activities, neuromuscular re-education and progress toward the following goals:    · Plan of Care Expires:  05/13/20    Subjective     Patient/Family Comments/goals: Pt pleasant and willing to participate with therapy on this date.    Pain/Comfort:  · Pain Rating 1:  0/10    Patients cultural, spiritual, L[employer] conflicts given the current situation: no    Living Environment:  Pt lives alone as pt's  recently passed d/t COVID. Pt resides in 2 story home w/0 exterior BR[place]FORD and 14 interior [place] w/R handrail and bedroom located on second floor.   Prior to admission, patients level of function was (I).  Equipment used at home: bath bench, cane, straight.  DME owned (not currently used): none.  Upon discharge, patient will have assistance from daughter.    Objective:     Communicated with NSG prior to session.  Patient found up in chair with oxygen, telemetry, pulse ox (continuous)  upon PT entry to room.    General Precautions: Standard, fall   Orthopedic Precautions:N/A   Braces: N/A     Exams:  · RLE ROM: WFL  · RLE Strength: 4/5  · LLE ROM: WFL  · LLE Strength: 4/5    Functional Mobility:  · Transfers:     · Sit to Stand:  minimum assistance with hand-held assist  · Gait: 5ft, 8ft, and 12ft CGA w/HHA demonstrating decreased ana and short shuffling steps   · Balance: Sitting: SBA     Standing: CGA      Therapeutic Activities and Exercises:   - EOB Sittinmins and 6mins SBA performing ADLs and functional reaching   - Sit-to-Stand Trials x3 all w/Min A and HHA   - Pt educated on:   -PT roles, expectations, and POC    -Safety with mobility   -Benefits of OOB activities to increase strength and functional mobility    -Performing ther ex for increasing LE ROM and strength   -Discharge recommendations     AM-PAC 6 CLICK MOBILITY  Total Score:17     Patient left up in chair with all lines intact and call button in reach.    GOALS:   Multidisciplinary Problems     Physical Therapy Goals        Problem: Physical Therapy Goal    Goal Priority Disciplines Outcome Goal Variances Interventions   Physical Therapy Goal     PT, PT/OT Ongoing, Progressing     Description:  Goals to be met by: 2020    Patient will increase functional independence with mobility by  performin. Supine to sit with Stand-by Assistance  2. Sit to supine with Stand-by Assistance  3. Sit to stand transfer with Stand-by Assistance  4. Gait  x 30 feet with Stand-by Assistance using LRAD  5. Perform stairs x1 flight w/R handrail CGA    6. Lower extremity exercise program x15 reps per handout, with independence                     History:     Past Medical History:   Diagnosis Date    Acute hypoxemic respiratory failure 3/31/2020    ALLERGIC RHINITIS     Cataract     Degenerative disc disease, cervical 2018    GERD (gastroesophageal reflux disease)     Hyperlipidemia     Hypertension     Migraine headache     Multifocal pneumonia 3/31/2020    Nuclear sclerosis 2014    Sleep disorder     Thyroid disease     nodular goiter    TIA (transient ischemic attack)        Past Surgical History:   Procedure Laterality Date    BREAST BIOPSY      BREAST CYST ASPIRATION      BREAST CYST EXCISION      COLONOSCOPY N/A 2016    Procedure: COLONOSCOPY;  Surgeon: Sergio Vickers MD;  Location: [address];  Service: Endoscopy;  Laterality: N/A;    HYSTERECTOMY         Time Tracking:     PT Received On: 20  PT Start Time: 1101     PT Stop Time: 1134  PT Total Time (min): 33 min     Billable Minutes: Evaluation 8, Gait Training 14 and Therapeutic Activity 11      Deonte Oleary, PT  2020

## 2020-04-14 LAB
ALBUMIN SERPL BCP-MCNC: 2.7 G/DL (ref 3.5–5.2)
ALP SERPL-CCNC: 64 U/L (ref 55–135)
ALT SERPL W/O P-5'-P-CCNC: 53 U/L (ref 10–44)
ANION GAP SERPL CALC-SCNC: 5 MMOL/L (ref 8–16)
AST SERPL-CCNC: 39 U/L (ref 10–40)
BASOPHILS # BLD AUTO: 0.04 K/UL (ref 0–0.2)
BASOPHILS NFR BLD: 0.4 % (ref 0–1.9)
BILIRUB SERPL-MCNC: 0.4 MG/DL (ref 0.1–1)
BUN SERPL-MCNC: 10 MG/DL (ref 8–23)
CALCIUM SERPL-MCNC: 9 MG/DL (ref 8.7–10.5)
CHLORIDE SERPL-SCNC: 100 MMOL/L (ref 95–110)
CO2 SERPL-SCNC: 31 MMOL/L (ref 23–29)
CREAT SERPL-MCNC: 0.7 MG/DL (ref 0.5–1.4)
CRP SERPL-MCNC: 28.5 MG/L (ref 0–8.2)
D DIMER PPP IA.FEU-MCNC: 3.22 MG/L FEU
DIFFERENTIAL METHOD: ABNORMAL
EOSINOPHIL # BLD AUTO: 0.3 K/UL (ref 0–0.5)
EOSINOPHIL NFR BLD: 2.7 % (ref 0–8)
ERYTHROCYTE [DISTWIDTH] IN BLOOD BY AUTOMATED COUNT: 13.1 % (ref 11.5–14.5)
EST. GFR  (AFRICAN AMERICAN): >60 ML/MIN/1.73 M^2
EST. GFR  (NON AFRICAN AMERICAN): >60 ML/MIN/1.73 M^2
GLUCOSE SERPL-MCNC: 90 MG/DL (ref 70–110)
HCT VFR BLD AUTO: 37.1 % (ref 37–48.5)
HGB BLD-MCNC: 11.5 G/DL (ref 12–16)
IMM GRANULOCYTES # BLD AUTO: 0.08 K/UL (ref 0–0.04)
IMM GRANULOCYTES NFR BLD AUTO: 0.9 % (ref 0–0.5)
LYMPHOCYTES # BLD AUTO: 2.5 K/UL (ref 1–4.8)
LYMPHOCYTES NFR BLD: 27.2 % (ref 18–48)
MCH RBC QN AUTO: 28.5 PG (ref 27–31)
MCHC RBC AUTO-ENTMCNC: 31 G/DL (ref 32–36)
MCV RBC AUTO: 92 FL (ref 82–98)
MONOCYTES # BLD AUTO: 1.2 K/UL (ref 0.3–1)
MONOCYTES NFR BLD: 12.8 % (ref 4–15)
NEUTROPHILS # BLD AUTO: 5.2 K/UL (ref 1.8–7.7)
NEUTROPHILS NFR BLD: 56 % (ref 38–73)
NRBC BLD-RTO: 0 /100 WBC
PLATELET # BLD AUTO: 284 K/UL (ref 150–350)
PMV BLD AUTO: 10.9 FL (ref 9.2–12.9)
POTASSIUM SERPL-SCNC: 3.8 MMOL/L (ref 3.5–5.1)
PROT SERPL-MCNC: 7.1 G/DL (ref 6–8.4)
RBC # BLD AUTO: 4.04 M/UL (ref 4–5.4)
SODIUM SERPL-SCNC: 136 MMOL/L (ref 136–145)
WBC # BLD AUTO: 9.29 K/UL (ref 3.9–12.7)

## 2020-04-14 PROCEDURE — 97535 SELF CARE MNGMENT TRAINING: CPT | Mod: HCNC

## 2020-04-14 PROCEDURE — 25000003 PHARM REV CODE 250: Mod: HCNC | Performed by: PHYSICIAN ASSISTANT

## 2020-04-14 PROCEDURE — 80053 COMPREHEN METABOLIC PANEL: CPT | Mod: HCNC

## 2020-04-14 PROCEDURE — 86140 C-REACTIVE PROTEIN: CPT | Mod: HCNC

## 2020-04-14 PROCEDURE — 97530 THERAPEUTIC ACTIVITIES: CPT | Mod: HCNC

## 2020-04-14 PROCEDURE — 97116 GAIT TRAINING THERAPY: CPT | Mod: HCNC

## 2020-04-14 PROCEDURE — 85025 COMPLETE CBC W/AUTO DIFF WBC: CPT | Mod: HCNC

## 2020-04-14 PROCEDURE — 99233 SBSQ HOSP IP/OBS HIGH 50: CPT | Mod: HCNC,,, | Performed by: INTERNAL MEDICINE

## 2020-04-14 PROCEDURE — 99900035 HC TECH TIME PER 15 MIN (STAT): Mod: HCNC

## 2020-04-14 PROCEDURE — 25000003 PHARM REV CODE 250: Mod: HCNC | Performed by: HOSPITALIST

## 2020-04-14 PROCEDURE — 94761 N-INVAS EAR/PLS OXIMETRY MLT: CPT | Mod: HCNC

## 2020-04-14 PROCEDURE — 99233 PR SUBSEQUENT HOSPITAL CARE,LEVL III: ICD-10-PCS | Mod: HCNC,,, | Performed by: INTERNAL MEDICINE

## 2020-04-14 PROCEDURE — 25000003 PHARM REV CODE 250: Mod: HCNC | Performed by: INTERNAL MEDICINE

## 2020-04-14 PROCEDURE — 94640 AIRWAY INHALATION TREATMENT: CPT | Mod: HCNC

## 2020-04-14 PROCEDURE — 36415 COLL VENOUS BLD VENIPUNCTURE: CPT | Mod: HCNC

## 2020-04-14 PROCEDURE — 94664 DEMO&/EVAL PT USE INHALER: CPT | Mod: HCNC

## 2020-04-14 PROCEDURE — 94660 CPAP INITIATION&MGMT: CPT | Mod: HCNC

## 2020-04-14 PROCEDURE — 63600175 PHARM REV CODE 636 W HCPCS: Mod: HCNC | Performed by: PHYSICIAN ASSISTANT

## 2020-04-14 PROCEDURE — 11000001 HC ACUTE MED/SURG PRIVATE ROOM: Mod: HCNC

## 2020-04-14 PROCEDURE — 85379 FIBRIN DEGRADATION QUANT: CPT | Mod: HCNC

## 2020-04-14 PROCEDURE — 27000221 HC OXYGEN, UP TO 24 HOURS: Mod: HCNC

## 2020-04-14 RX ADMIN — ENOXAPARIN SODIUM 40 MG: 100 INJECTION SUBCUTANEOUS at 09:04

## 2020-04-14 RX ADMIN — Medication 6 MG: at 09:04

## 2020-04-14 RX ADMIN — ALBUTEROL SULFATE 4 PUFF: 90 AEROSOL, METERED RESPIRATORY (INHALATION) at 08:04

## 2020-04-14 RX ADMIN — HYDROXYZINE HYDROCHLORIDE 25 MG: 25 TABLET, FILM COATED ORAL at 09:04

## 2020-04-14 RX ADMIN — ATORVASTATIN CALCIUM 40 MG: 20 TABLET, FILM COATED ORAL at 09:04

## 2020-04-14 RX ADMIN — PANTOPRAZOLE SODIUM 40 MG: 40 TABLET, DELAYED RELEASE ORAL at 09:04

## 2020-04-14 RX ADMIN — Medication 500 MG: at 08:04

## 2020-04-14 RX ADMIN — MELATONIN 1000 UNITS: at 08:04

## 2020-04-14 RX ADMIN — GUAIFENESIN AND DEXTROMETHORPHAN HYDROBROMIDE 1 TABLET: 600; 30 TABLET, EXTENDED RELEASE ORAL at 09:04

## 2020-04-14 RX ADMIN — ALBUTEROL SULFATE 4 PUFF: 90 AEROSOL, METERED RESPIRATORY (INHALATION) at 07:04

## 2020-04-14 RX ADMIN — GUAIFENESIN AND DEXTROMETHORPHAN HYDROBROMIDE 1 TABLET: 600; 30 TABLET, EXTENDED RELEASE ORAL at 08:04

## 2020-04-14 RX ADMIN — METOPROLOL SUCCINATE 50 MG: 50 TABLET, EXTENDED RELEASE ORAL at 08:04

## 2020-04-14 RX ADMIN — ALBUTEROL SULFATE 4 PUFF: 90 AEROSOL, METERED RESPIRATORY (INHALATION) at 12:04

## 2020-04-14 RX ADMIN — Medication 10000 UNITS: at 08:04

## 2020-04-14 RX ADMIN — Medication 500 MG: at 09:04

## 2020-04-14 NOTE — PT/OT/SLP PROGRESS
Occupational Therapy   Treatment    Name: Jojo Burrows  MRN: [id number]  Admitting Diagnosis:  COVID-19 virus infection       Recommendations:     Discharge Recommendations: nursing facility, skilled  Discharge Equipment Recommendations:  other (see comments)(TBD)  Barriers to discharge:  Inaccessible home environment, Decreased caregiver support    Assessment:     Jojo Burrows is a 70 y.o. female with a medical diagnosis of COVID-19 virus infection.  She presents with impaired ADL and mobility performance deficits. Pt participated in bed mobility, EOB ADLs, sit>stand t/f trials, and mobility to bedside chair. Pt on 3L 02 throughout session with Sp02 92-95%. Pt demo SBA with bed mobility and CGA-min (A) for sit<stand and ambulation. Pt required increased time between tasks 2/2 cardiovascular performance status while using pursed lip breathing technique. Pt very motivated to continue therapy and rapport established during session. Michelle discussed with pt throughout our therapy session as well to meet her spiritual needs.Pt voiced appreciation for spiritual services suggested for pt as she grieves for the recent death of her spouse.  BSC and RW ordered for room. Performance deficits affecting function are weakness, impaired endurance, impaired self care skills, impaired functional mobilty, impaired balance, impaired cardiopulmonary response to activity. Pt would benefit from continued OT skilled services 4x/wk to improve daily living skills to optimize QOL. Pt is recommended to discharge to SNF at this time.      Rehab Prognosis:  Good; patient would benefit from acute skilled OT services to address these deficits and reach maximum level of function.       Plan:     Patient to be seen 4 x/week to address the above listed problems via self-care/home management, therapeutic activities, therapeutic exercises  · Plan of Care Expires: 05/12/20  · Plan of Care Reviewed with: patient    Subjective     Pain/Comfort:  · Pain  Rating 1: 0/10  · Pain Rating Post-Intervention 1: 0/10    Objective:     Communicated with: RN prior to session.  Patient found HOB elevated with oxygen, PureWick, telemetry, pulse ox (continuous) upon OT entry to room.    General Precautions: Standard, airborne, contact, fall, droplet   Orthopedic Precautions:N/A   Braces: N/A     Occupational Performance:     Bed Mobility:    · Patient completed Rolling/Turning to Right with stand by assistance  · Patient completed Scooting/Bridging with stand by assistance  · Patient completed Supine to Sit with stand by assistance     Functional Mobility/Transfers:  · Patient completed Sit <> Stand Transfer with contact guard assistance  with  hand-held assist   · Patient completed Bed <> Chair Transfer using Step Transfer technique with minimum assistance with hand-held assist  · Functional Mobility: Pt completed standing for ADL tasks however required sitting snf through task 2/2 fatigue. Pt then mobilized from bed to bedside chair with CGA-min (A) using HHA and no AD. Pt on 3L 02 throughout session.    Activities of Daily Living:  · Grooming: setup (A) at EOB for pt to wash face and brush teeth. Pt also used Kleenex throughout session for bouts of coughing.      [facility] 6 Click ADL: 19    Treatment & Education:  Pt educated on role of occupational therapy, POC, and safety during ADLs and functional mobility. Pt and OT discussed importance of safe, continued mobility to optimize daily living skills. Pt verbalized understanding.  Pt completed the following during session: bed mobility, EOB ADLs (attempted in standing however requested to finish task in sitting), sit<stand t/f, bedroom mobility to bedside chair. Spiritual needs discussed with this pt and rapport established. Pt explained positive outlook for her current status. White board updated during session. Pt given instruction to call for medical staff/nurse for assistance.       Patient left up in chair with all lines  intact, call button in reach, CARLOS Dyer notified and pt's Zoë placedEducation:      GOALS:   Multidisciplinary Problems     Occupational Therapy Goals        Problem: Occupational Therapy Goal    Goal Priority Disciplines Outcome Interventions   Occupational Therapy Goal     OT, PT/OT Ongoing, Progressing    Description:  Goals to be met by: 4/26/2020     Patient will increase functional independence with ADLs by performing:    UE Dressing with [place].  LE Dressing with Stand-by Assistance.  Grooming while standing with Stand-by Assistance (progressing 4/14).  Toileting from bedside commode with Stand-by Assistance for hygiene and clothing management.   Toilet transfer to bedside commode with Contact Guard Assistance.                       Time Tracking:     OT Date of Treatment: 04/14/20  OT Start Time: 0911  OT Stop Time: 1000  OT Total Time (min): 49 min    Billable Minutes:Self Care/Home Management 30 min  Therapeutic Activity 19 min    Alysha Rodgers OT  4/14/2020

## 2020-04-14 NOTE — PLAN OF CARE
[facility] follow up appointment scheduled for the patient with Dr. Joo Munoz (PCP) on 4/24/2020 at 0900. Will continue to follow.

## 2020-04-14 NOTE — PT/OT/SLP PROGRESS
"Physical Therapy Treatment    Patient Name:  Jojo Burrows   MRN:  [id number]    Recommendations:     Discharge Recommendations:  nursing facility, skilled   Discharge Equipment Recommendations: other (see comments)(TBD)   Barriers to discharge: Inaccessible home and Decreased caregiver support    Assessment:     Jojo Burrows is a 70 y.o. female admitted with a medical diagnosis of COVID-19 virus infection.  She presents with the following impairments/functional limitations:  weakness, impaired functional mobilty, impaired balance, impaired cardiopulmonary response to activity, impaired endurance, gait instability, impaired self care skills. Patient tolerated session well. She was on 3L O2 throughout with sats between 92-95%. She is motivated to improve and return home with family support. Therapeutic listening provided due to patient  passing away a week ago while hospital. Patient very grateful for time spent with her.  Patient would continue to benefit from skilled PT services while in the hospital. At this time, upon d/c she is an excellent candidate for SNF in order to progress towards her PLOF.     Rehab Prognosis: Good; patient would benefit from acute skilled PT services to address these deficits and reach maximum level of function.    Recent Surgery: * No surgery found *      Plan:     During this hospitalization, patient to be seen 4 x/week to address the identified rehab impairments via gait training, therapeutic activities, therapeutic exercises, neuromuscular re-education and progress toward the following goals:    · Plan of Care Expires:  05/13/20    Subjective     Chief Complaint: SOB/cough   Patient/Family Comments/goals: to return home; "I know God is with me."  Pain/Comfort:  · Pain Rating 1: 0/10  · Pain Rating Post-Intervention 1: 0/10      Objective:     Communicated with RN prior to session.  Patient found supine with oxygen, PureWick, telemetry, pulse ox (continuous)(VISI monitor ) upon PT " entry to room.     General Precautions: Standard, airborne, contact, droplet, fall(COVID+)   Orthopedic Precautions:N/A   Braces: N/A     Functional Mobility:  · Bed Mobility:     · Rolling Right: stand by assistance  · Scooting: stand by assistance  · Supine to Sit: stand by assistance  · Transfers:     · Sit to Stand:  contact guard assistance with no AD   · Extended sitting rest break needed following brushing teeth in standing prior to chair t/f   · Bed to Chair: minimum assistance with  hand-held assist  using  Step Transfer  · Gait: ~7ft to bedside chair with Bala and HHA  · mildly unsteady, no LOB; FFP, wide BHARGAVI   · Balance: sitting EOB - Sup; static standing - CGA; dynamic standing - Bala       AM-PAC 6 CLICK MOBILITY  Turning over in bed (including adjusting bedclothes, sheets and blankets)?: 4  Sitting down on and standing up from a chair with arms (e.g., wheelchair, bedside commode, etc.): 3  Moving from lying on back to sitting on the side of the bed?: 4  Moving to and from a bed to a chair (including a wheelchair)?: 3  Need to walk in hospital room?: 3  Climbing 3-5 steps with a railing?: 2  Basic Mobility Total Score: 19       Therapeutic Activities and Exercises:  -Patient educated on the continued role and goal of PT  -Questions and concerns answered within the the PT scope of practice.   -White board updated in patient room to reflect level of assistance needed with nursing.     Patient left up in chair with all lines intact, call button in reach and RN notified..    GOALS:   Multidisciplinary Problems     Physical Therapy Goals        Problem: Physical Therapy Goal    Goal Priority Disciplines Outcome Goal Variances Interventions   Physical Therapy Goal     PT, PT/OT Ongoing, Progressing     Description:  Goals to be met by: 2020    Patient will increase functional independence with mobility by performin. Supine to sit with Stand-by Assistance - MET  2. Sit to supine with Stand-by  Assistance  3. Sit to stand transfer with Stand-by Assistance  4. Gait  x 30 feet with Stand-by Assistance using LRAD  5. Perform stairs x1 flight w/R handrail CGA    6. Lower extremity exercise program x15 reps per handout, with independence                      Time Tracking:     PT Received On: 04/14/20  PT Start Time: 0913     PT Stop Time: 1000  PT Total Time (min): 47 min     Billable Minutes: Gait Training 10 and Therapeutic Activity 37    Treatment Type: Treatment  PT/PTA: PT     PTA Visit Number: 0     Jhoana Herrera, PT  04/14/2020

## 2020-04-14 NOTE — PLAN OF CARE
Problem: Occupational Therapy Goal  Goal: Occupational Therapy Goal  Description  Goals to be met by: 4/26/2020     Patient will increase functional independence with ADLs by performing:    UE Dressing with [place].  LE Dressing with Stand-by Assistance.  Grooming while standing with Stand-by Assistance (progressing 4/14).  Toileting from bedside commode with Stand-by Assistance for hygiene and clothing management.   Toilet transfer to bedside commode with Contact Guard Assistance.    Pt progressing well towards standing ADL tasks this date.  Continue OT as tolerated.  Alysha Rodgers OT  4/14/2020    Outcome: Ongoing, Progressing

## 2020-04-14 NOTE — PROGRESS NOTES
Hospital Medicine  Progress Note  [facility]      Patient Name: Jojo Burrows  MRN:  [id number]  [place]ospital Medicine [place] [facility] [place] [place] Karlos Epperson MD  Date of Admission:  3/31/2020     Length of Stay:  LOS: 14 days       Principal Problem:  COVID-19 virus infection      HPI:  Jojo Burrows is a 70F with HTN, obesity who presents for evaluation of SOB. She was seen in the ED on 03/28 with fever, chills, cough. She wasn't hypoxic with ambulation and discharged home.  She tested positive for COVID-19. Her  is admitted for COVID+ complications. She presented with worsening SOB, cough, pleuritic chest pain, sinus congestions, myalgias.     Hospital Course:   Initially, SpO2 of 78%, placed on NRB 15L, weaned to 6L NC, but then returned to requiring 15L via NRB through 4/8.  Completed 5 day course of ceftriaxone and azithromycin for CAP.  Completed 5 day course of HCQ per COVID-19 protocol.  Has remained on 15 L via NRB for several days and hence was transferred to 11 W high oxygen unit for higher level of care. Current regimen consists of duo nebs q.6 scheduled, Spiriva daily, CPT t.i.d., flutter valve treatments q.6 while awake, Lasix 40 IV daily (monitoring daily UOP), and nightly CPAP.  Blood cultures remain no growth to date and respiratory culture with no significant growth.  CBC and CMP with no significant abnormalities aside from intermittent hypokalemia and hypomagnesemia with diuresis, responsive to repletion.  CRP has down trended from 186 (Peak) to 136.  Of note, patient's  [place] related [place] [facility] [place]; have been providing daily emotional support for patient.  Daughters have been checking on patient each day.  Continuing to monitor closely for improvement; patient full code and open to all respiratory interventions at this time.  Transferred to high oxygen unit on 15L NRB 4/8.    Interval History:     Patient sitting in chair, mild  respiratory distress. Reports SOB and cough is improving. Working well with therapy. No other complaints.     Review of Systems:  Respiratory: +SOB, dry cough  Cardiovascular: no chest pain, palpitations  GI: no N/V/D, abdominal pain    Inpatient Medications:    Current Facility-Administered Medications:     acetaminophen tablet 650 mg, 650 mg, Oral, Q4H PRN, Mikal Staples, PA-C, 650 mg at 04/06/20 0928    acetaminophen tablet 650 mg, 650 mg, Oral, Q8H PRN, Mikal Staples, PA-C, 650 mg at 04/06/20 0933    albuterol inhaler 4 puff, 4 puff, Inhalation, Q6H WAKE, Karlos Epperson MD, 4 puff at 04/14/20 0809    ascorbic acid (vitamin C) tablet 500 mg, 500 mg, Oral, BID, Marisol Jones MD, 500 mg at 04/14/20 0832    atorvastatin tablet 40 mg, 40 mg, Oral, QHS, eKn Tejada MD, 40 mg at 04/13/20 2058    benzonatate capsule 100 mg, 100 mg, Oral, TID PRN, Mikal Staples, PA-C, 100 mg at 04/09/20 2000    calcium carbonate 200 mg calcium (500 mg) chewable tablet 1,000 mg, 1,000 mg, Oral, TID PRN, Mikal Staples, PA-C, 1,000 mg at 04/05/20 0845    dextromethorphan-guaifenesin  mg per 12 hr tablet 1 tablet, 1 tablet, Oral, BID, Karlos Epperson MD, 1 tablet at 04/14/20 0832    dextrose 10% (D10W) Bolus, 12.5 g, Intravenous, PRN, Mikal Staples, PA-C    dextrose 10% (D10W) Bolus, 25 g, Intravenous, PRN, Mikal Staples, PA-C    enoxaparin injection 40 mg, 40 mg, Subcutaneous, QHS, Mikal Staples, PA-C, 40 mg at 04/13/20 2058    glucagon (human recombinant) injection 1 mg, 1 mg, Intramuscular, PRN, Mikal Staples PA-C    glucose chewable tablet 16 g, 16 g, Oral, PRN, Mikal Staples PA-C    glucose chewable tablet 24 g, 24 g, Oral, PRN, Mikal Staples PA-C    hydroxyzine HCL tablet 25 mg, 25 mg, Oral, Nightly PRN, Marisol Jones MD, 25 mg at 04/13/20 0229    loperamide capsule 2 mg, 2 mg, Oral, Q6H PRN, Mikal Staples PA-C, 2 mg at 04/01/20 1107    melatonin tablet 6 mg, 6 mg, Oral, Nightly PRN, Naomi COREAS  "Osiris Olvera, BINU, 6 mg at 04/13/20 2058    metoprolol succinate (TOPROL-XL) 24 hr tablet 50 mg, 50 mg, Oral, Daily, Marisol Jones MD, 50 mg at 04/14/20 0831    ondansetron disintegrating tablet 8 mg, 8 mg, Oral, Q8H PRN, Mikal Staples PA-C    pantoprazole EC tablet 40 mg, 40 mg, Oral, QHS, Mikal Staples PA-C, 40 mg at 04/13/20 2058    polyethylene glycol packet 17 g, 17 g, Oral, BID PRN, Mikal Staples PA-C    sodium chloride 0.9% flush 10 mL, 10 mL, Intravenous, PRN, Aidan Myrick PA-C    sodium chloride 0.9% flush 10 mL, 10 mL, Intravenous, PRN, Mikal Staples PA-C    tiotropium inhalation capsule 18 mcg, 1 capsule, Inhalation, Daily, Mikal Staples PA-C, 18 mcg at 04/13/20 0945    trazodone split tablet 25 mg, 25 mg, Oral, Nightly PRN, Ken Tejada MD, 25 mg at 04/12/20 2153    tuberculin injection 5 Units, 5 Units, Intradermal, Once **AND** [START ON 4/16/2020] POCT TB Skin Test Read, , , Once, Karlos Epperson MD    vitamin A capsule 10,000 Units, 10,000 Units, Oral, Daily, Ken Tejada MD, 10,000 Units at 04/14/20 0831    vitamin D 1000 units tablet 1,000 Units, 1,000 Units, Oral, Daily, Ken Tejada MD, 1,000 Units at 04/14/20 0831    white petrolatum 41 % ointment, , Topical (Top), TID, Karlos Epperson MD      Physical Exam:      Intake/Output Summary (Last 24 hours) at 4/14/2020 1101  Last data filed at 4/14/2020 0500  Gross per 24 hour   Intake 240 ml   Output 400 ml   Net -160 ml     Wt Readings from Last 3 Encounters:   04/11/20 78 kg (171 lb 15.3 oz)   03/28/20 75.8 kg (167 lb)   03/27/20 75.8 kg (167 lb)       BP (!) 140/72   Pulse 105   Temp 97.7 °F (36.5 °C) (Oral)   Resp (!) 35   Ht 5' 2" (1.575 m)   Wt 78 kg (171 lb 15.3 oz)   SpO2 95%   Breastfeeding? No   BMI 31.45 kg/m²     GEN: NAD, conversant  Resp: coarse bilateral breath sounds, no wheezes or rales, normal work of breathing   CV: RRR, no m/r/g, no edema  GI: soft, NTND  Skin: no rash  Neuro: AAOx3, CN " grossly intact, no focal neurologic deficits    Laboratory:  Lab Results   Component Value Date    [id number] Detected (A) 03/28/2020       Recent Labs   Lab 04/08/20  0513 04/11/20  0549 04/14/20  0456   WBC 10.79 9.04 9.29   LYMPH 17.7*  1.9 21.2  1.9 27.2  2.5   HGB 13.2 11.8* 11.5*   HCT 41.9 37.0 37.1    327 284     Recent Labs   Lab 04/07/20  1633 04/08/20  0512 04/09/20  0330 04/11/20  0549 04/14/20  0456   NA  --  133* 136 137 136   K  --  4.0 4.0 3.6 3.8   CL  --  93* 94* 98 100   CO2  --  30* 27 31* 31*   BUN  --  12 10 10 10   CREATININE  --  0.8 0.7 0.7 0.7   GLU  --  85 90 104 90   CALCIUM  --  9.3 9.3 9.2 9.0   MG 2.2 2.1 2.2  --   --      Recent Labs   Lab 04/08/20  0512 04/09/20  0330 04/14/20  0456   ALKPHOS 58 62 64   ALT 21 23 53*   AST 24 32 39   ALBUMIN 2.7* 2.7* 2.7*   PROT 7.4 7.8 7.1   BILITOT 0.7 0.7 0.4        Recent Labs     04/11/20  1153 04/14/20  0456   DDIMER 2.28* 3.22*   CRP  --  28.5*       All labs within the last 24 hours were reviewed.     Microbiology:  Microbiology Results (last 7 days)     ** No results found for the last 168 hours. **            Imaging  ECG Results          EKG 12-lead (Final result)  Result time 04/01/20 09:38:21    Final result by Interface, Lab In [facility] (04/01/20 09:38:21)                 Narrative:    Test Reason : R06.02,    Vent. Rate : 079 BPM     Atrial Rate : 079 BPM     P-R Int : 148 ms          QRS Dur : 086 ms      QT Int : 382 ms       P-R-T Axes : 067 -15 014 degrees     QTc Int : 438 ms    Normal sinus rhythm  Minimal voltage criteria for LVH, may be normal variant  Nonspecific T wave abnormality  Abnormal ECG  When compared with ECG of 28-MAR-2020 18:26,  No significant change was found  Confirmed by BLANCA MARTINEZ MD ([id number]) on 4/1/2020 9:38:08 AM    Referred By: JASON   SELF           Confirmed By:BLANCA MARTINEZ MD                              No results found for this or any previous visit.    X-Ray Chest AP  "Portable  Narrative: EXAMINATION:  XR CHEST AP PORTABLE    CLINICAL HISTORY:  Provided history is "  Shortness of breath".    TECHNIQUE:  One view of the chest.    COMPARISON:  03/28/2020, 03/27/2020, and 08/21/2018.    FINDINGS:  Lung volumes are low.  Cardiac wires overlie the chest.  Platelike subsegmental opacities are again present in the lower lung zones, slightly worse when compared with the prior study.  New subtle bibasilar and peripheral interstitial opacities.  No confluent area of consolidation.  No sizable pleural effusion.  No pneumothorax.  Impression: Worsening bibasilar subsegmental and interstitial opacities, worrisome for worsening infectious or inflammatory process including pneumonia/pneumonitis in this patient with recent diagnosis of COVID-19.    Electronically signed by: Graham Ortiz MD  Date:    03/31/2020  Time:    20:46      All imaging within the last 24 hours was reviewed.     Assessment and Plan:    Active Hospital Problems    Diagnosis  POA    *COVID-19 virus infection [U07.1]  Yes    Multifocal pneumonia [J18.9]  Yes    Acute hypoxemic respiratory failure [J96.01]  Yes    Obesity (BMI 30.0-34.9) [E66.9]  Yes    Essential hypertension [I10]  Yes     Chronic      Resolved Hospital Problems   No resolved problems to display.     Scheduled Meds:   albuterol  4 puff Inhalation Q6H WAKE    ascorbic acid (vitamin C)  500 mg Oral BID    atorvastatin  40 mg Oral QHS    dextromethorphan-guaifenesin  mg  1 tablet Oral BID    enoxaparin  40 mg Subcutaneous QHS    metoprolol succinate  50 mg Oral Daily    pantoprazole  40 mg Oral QHS    tiotropium  1 capsule Inhalation Daily    tuberculin  5 Units Intradermal Once    vitamin A  10,000 Units Oral Daily    vitamin D  1,000 Units Oral Daily    white petrolatum   Topical (Top) TID     Continuous Infusions:  PRN Meds:.acetaminophen, acetaminophen, benzonatate, calcium carbonate, Dextrose 10% Bolus, Dextrose 10% Bolus, glucagon " (human recombinant), glucose, glucose, hydroxyzine HCL, loperamide, melatonin, ondansetron, polyethylene glycol, sodium chloride 0.9%, sodium chloride 0.9%, traZODone    PLAN:    Acute respiratory distress with hypoxia due to Covid-19 Virus Infection  - COVID-19 testing POSITIVE: Collection Date: 3/28/2020 Collection Time:   6:43 PM  - Infection Control notified     - Isolation:   - Airborne and Droplet Precautions  - Surgical mask on patient   - N95 masks must be fit tested, wear eye protection  - 20 second hand hygiene              - Limit visitors per hospital policy              - Consolidate lab draws, nursing care, and interventions     - Diagnostics: (Lymphopenia, hyponatremia, hyperferritinemia, elevated troponin, elevated d-dimer, age, and comorbidities are significant predictors of poor clinical outcome)              - CBC:      No lymphopenia       trend Q48hrs  - Ferritin:                      471; repeat prior to discharge   - D-dimer:                    0.73; repeat prior to discharge  - CMP:   NA wnl               trend Q48hrs  - CRP:                         134 to 63; trend Q48hrs  - BNP:                          <10  - CPK:                          275  - LDH:                          444; repeat prior to discharge  - Troponin:                   0.008  - Procalcitonin:            0.04              - ECG with QTc:          NSR with QTc 438              - rapid Flu:                   negative              - Blood culture x2:       NGTD              - Respiratory Culture   No S.Aureus or Pseudomonas, normal respiratory fluora     - Management:   Bundle care as able to maintain isolation & minimize in/out of room   - Supplemental O2 to maintain SpO2 92%-96%.  Patient weaned to 5L via HFNC              - Telemetry & continuous pulse oximetry               - If wheezing                           - DuoNebs q.6 scheduled, CPT t.i.d., flutter valve treatments q.6 while awake  - Spiriva daily               -  "acetaminophen 650mg PO Q6hr PRN fever              - loperamide PRN for viral diarrhea  -  Completed empiric antibiotics for CAP- end date: 4/4                          - Ceftriaxone 1g IV Q24hrs                          - Azithromycin 500mg IV day #1, then 250mg PO daily x4 days              -Mucinex 600 b.i.d.               -d/c Lasix               - Investigational Treatment Protocol:                           - statin: atorvastatin 40mg po daily                           - patient completed HCQ 400mg PO BID x1 day, then 400mg PO daily x 4 days               -normal glucose 6 phosphate dehydrogenase, ECG, and start Qshift POCT glucose              -transferred to high oxygen flow unit on [address] for more aggressive respiratory interventions        HTN  - continue MTP XL 50 mg daily      Advance Care Planning  Goals of care, counseling/discussion  -patient understands severity of disease and potential to need to go to ICU for mechanical ventilation should oxygen status worsen  -patient's  [place] [facility]  All members of healthcare team are encouraged provide emotional support to this patient daily.  -confirms FULL code status  Advance Care Planning       If patient transitions to Comfort-Focused Care, please place "Nurse Communication: End of Life Care, family members allowed to visit, including spouse/partner and adult children [please list names]. Please ask family to visit as a group and leave as a group.       VTE High Risk Prophylaxis:   VTE Risk Mitigation (From admission, onward)         Ordered     enoxaparin (LOVENOX) 40 mg/0.4 mL injection      04/09/20 1932     enoxaparin injection 40 mg  Nightly      03/31/20 2158     IP VTE HIGH RISK PATIENT  Once      03/31/20 2157     Place KELSIE hose  Until discontinued      03/31/20 2150                  Patient's chronic/stable medical conditions noted in the problem list above will be managed with the patient's home medications as " tolerated.         Subsequent Inpatient Hospital Care    Level 3 11296 Total visit time was 35 minutes or greater with greater than 50% of time spent in counseling and coordination of care.             Karlos Epperson MD  Hospital Medicine Staff  Pager: [phone]; Spectra: [phone]

## 2020-04-14 NOTE — PLAN OF CARE
Problem: Infection (Pneumonia)  Goal: Resolution of Infection Signs/Symptoms  Outcome: Ongoing, Progressing     Problem: Respiratory Compromise (Pneumonia)  Goal: Effective Oxygenation and Ventilation  Outcome: Ongoing, Progressing     Problem: Oral Intake Inadequate  Goal: Improved Oral Intake  Outcome: Ongoing, Progressing

## 2020-04-14 NOTE — MEDICAL/APP STUDENT
Called daughter Lucille Burrows [phone] to update on patient status. Informed her that the patient is gradually improving.    The daughter also was distressed with planning the patient's 's  (passed on ). She does not think they can hold off the  too much longer and were probably going to hold it on Friday. Her daugher is wondering if there is any way that she can be present for the  if it's on Friday. I told her I would talk to the team about that and get back to her tomorrow. Addressed all questions and concerns.

## 2020-04-14 NOTE — PLAN OF CARE
04/14/20 1519   Post-Acute Status   Post-Acute Authorization Placement   Post-Acute Placement Status Referrals Sent       SW sent blast referrals for SNF placement for pt. SW updated CM and ps physician . SW will continue to follow for updates.     Mabel Morales LMSW   Licensed Master Social Worker

## 2020-04-14 NOTE — PLAN OF CARE
Problem: Physical Therapy Goal  Goal: Physical Therapy Goal  Description  Goals to be met by: 2020    Patient will increase functional independence with mobility by performin. Supine to sit with Stand-by Assistance - MET  2. Sit to supine with Stand-by Assistance  3. Sit to stand transfer with Stand-by Assistance  4. Gait  x 30 feet with Stand-by Assistance using LRAD  5. Perform stairs x1 flight w/R handrail CGA    6. Lower extremity exercise program x15 reps per handout, with independence     Outcome: Ongoing, Progressing   Patient tolerated treatment well. Established POC and goals reviewed and remain appropriate. Plan is to continue to improve patient's functional mobility capabilities.      Jhoana Herrera, PT, DPT  2020

## 2020-04-15 PROCEDURE — 97535 SELF CARE MNGMENT TRAINING: CPT | Mod: HCNC

## 2020-04-15 PROCEDURE — 94640 AIRWAY INHALATION TREATMENT: CPT | Mod: HCNC

## 2020-04-15 PROCEDURE — 97110 THERAPEUTIC EXERCISES: CPT | Mod: HCNC

## 2020-04-15 PROCEDURE — 25000003 PHARM REV CODE 250: Mod: HCNC | Performed by: INTERNAL MEDICINE

## 2020-04-15 PROCEDURE — 94761 N-INVAS EAR/PLS OXIMETRY MLT: CPT | Mod: HCNC

## 2020-04-15 PROCEDURE — 63600175 PHARM REV CODE 636 W HCPCS: Mod: HCNC | Performed by: PHYSICIAN ASSISTANT

## 2020-04-15 PROCEDURE — 97530 THERAPEUTIC ACTIVITIES: CPT | Mod: HCNC

## 2020-04-15 PROCEDURE — 99232 PR SUBSEQUENT HOSPITAL CARE,LEVL II: ICD-10-PCS | Mod: HCNC,,, | Performed by: INTERNAL MEDICINE

## 2020-04-15 PROCEDURE — 11000001 HC ACUTE MED/SURG PRIVATE ROOM: Mod: HCNC

## 2020-04-15 PROCEDURE — 25000242 PHARM REV CODE 250 ALT 637 W/ HCPCS: Mod: HCNC | Performed by: PHYSICIAN ASSISTANT

## 2020-04-15 PROCEDURE — 99232 SBSQ HOSP IP/OBS MODERATE 35: CPT | Mod: HCNC,,, | Performed by: INTERNAL MEDICINE

## 2020-04-15 PROCEDURE — 94660 CPAP INITIATION&MGMT: CPT | Mod: HCNC

## 2020-04-15 PROCEDURE — 25000003 PHARM REV CODE 250: Mod: HCNC | Performed by: HOSPITALIST

## 2020-04-15 PROCEDURE — 97112 NEUROMUSCULAR REEDUCATION: CPT | Mod: HCNC

## 2020-04-15 PROCEDURE — 99900035 HC TECH TIME PER 15 MIN (STAT): Mod: HCNC

## 2020-04-15 PROCEDURE — 97116 GAIT TRAINING THERAPY: CPT | Mod: HCNC

## 2020-04-15 PROCEDURE — 25000003 PHARM REV CODE 250: Mod: HCNC | Performed by: PHYSICIAN ASSISTANT

## 2020-04-15 PROCEDURE — 27000221 HC OXYGEN, UP TO 24 HOURS: Mod: HCNC

## 2020-04-15 PROCEDURE — 94664 DEMO&/EVAL PT USE INHALER: CPT | Mod: HCNC

## 2020-04-15 PROCEDURE — 27000646 HC AEROBIKA DEVICE: Mod: HCNC

## 2020-04-15 RX ADMIN — ALBUTEROL SULFATE 4 PUFF: 90 AEROSOL, METERED RESPIRATORY (INHALATION) at 08:04

## 2020-04-15 RX ADMIN — GUAIFENESIN AND DEXTROMETHORPHAN HYDROBROMIDE 1 TABLET: 600; 30 TABLET, EXTENDED RELEASE ORAL at 08:04

## 2020-04-15 RX ADMIN — Medication 6 MG: at 09:04

## 2020-04-15 RX ADMIN — ATORVASTATIN CALCIUM 40 MG: 20 TABLET, FILM COATED ORAL at 09:04

## 2020-04-15 RX ADMIN — Medication 500 MG: at 08:04

## 2020-04-15 RX ADMIN — GUAIFENESIN AND DEXTROMETHORPHAN HYDROBROMIDE 1 TABLET: 600; 30 TABLET, EXTENDED RELEASE ORAL at 09:04

## 2020-04-15 RX ADMIN — ALBUTEROL SULFATE 4 PUFF: 90 AEROSOL, METERED RESPIRATORY (INHALATION) at 02:04

## 2020-04-15 RX ADMIN — ENOXAPARIN SODIUM 40 MG: 100 INJECTION SUBCUTANEOUS at 09:04

## 2020-04-15 RX ADMIN — PANTOPRAZOLE SODIUM 40 MG: 40 TABLET, DELAYED RELEASE ORAL at 09:04

## 2020-04-15 RX ADMIN — Medication 10000 UNITS: at 08:04

## 2020-04-15 RX ADMIN — Medication 500 MG: at 09:04

## 2020-04-15 RX ADMIN — HYDROXYZINE HYDROCHLORIDE 25 MG: 25 TABLET, FILM COATED ORAL at 09:04

## 2020-04-15 RX ADMIN — METOPROLOL SUCCINATE 50 MG: 50 TABLET, EXTENDED RELEASE ORAL at 08:04

## 2020-04-15 RX ADMIN — BENZONATATE 100 MG: 100 CAPSULE, LIQUID FILLED ORAL at 09:04

## 2020-04-15 RX ADMIN — TIOTROPIUM BROMIDE 18 MCG: 18 CAPSULE ORAL; RESPIRATORY (INHALATION) at 02:04

## 2020-04-15 RX ADMIN — MELATONIN 1000 UNITS: at 08:04

## 2020-04-15 NOTE — PT/OT/SLP PROGRESS
Occupational Therapy   Treatment    Name: Jojo Burrows  MRN: [id number]  Admitting Diagnosis:  COVID-19 virus infection       Recommendations:     Discharge Recommendations: nursing facility, skilled  Discharge Equipment Recommendations:  other (see comments)(TBD)  Barriers to discharge:  Decreased caregiver support, Inaccessible home environment    Assessment:     Jojo Burrows is a 70 y.o. female with a medical diagnosis of COVID-19 virus infection.  She presents with impaired ADL and mobility performance deficits. Pt found [facility] and very eager to participate in therapy. Session focused on sit>stand, UE therapeutic exercises, bedroom and bathroom mobility, standing ADL tasks, and safe return to bedside chair. Pt on 2L 02 throughout with sats ~90s throughout mobility. Pt SBA for mobility using RW.  Performance deficits affecting function are weakness, impaired endurance, impaired self care skills, impaired functional mobilty, gait instability, impaired balance, impaired cardiopulmonary response to activity. Pt would benefit from continued OT skilled services 4x/wk to improve daily living skills to optimize QOL. Pt is recommended to discharge to SNF at this time.      Rehab Prognosis:  Good; patient would benefit from acute skilled OT services to address these deficits and reach maximum level of function.       Plan:     Patient to be seen 4 x/week to address the above listed problems via self-care/home management, therapeutic exercises, therapeutic activities  · Plan of Care Expires: 05/12/20  · Plan of Care Reviewed with: patient    Subjective     Pain/Comfort:  · Pain Rating 1: 0/10  · Pain Rating Post-Intervention 1: 0/10  · Pain Rating Post-Intervention 2: 0/10    Objective:     Communicated with: RN prior to session.  Patient found up in chair with oxygen, PureWick, telemetry, pulse ox (continuous) upon OT entry to room.    General Precautions: Standard, airborne, droplet, fall(COVID (+))   Orthopedic  Precautions:N/A   Braces: N/A     Occupational Performance:     Bed Mobility:    · NT as pt found [facility]     Functional Mobility/Transfers:  · Patient completed Sit <> Stand Transfer with stand by assistance  with  rolling walker   · Patient completed Toilet Transfer Step Transfer technique with stand by assistance with  rolling walker  · Functional Mobility: Pt completed bedroom and bathroom ambulation with SBA using RW. Pt able to tolerate standing ~3 min at sink for ADLs.     Activities of Daily Living:  · Grooming: stand by assistance brushing teeth at sink and washing face   · Toileting: stand by assistance using standard commode       [facility] 6 Click ADL: 19    Treatment & Education:  Pt educated on role of occupational therapy, POC, and safety during ADLs and functional mobility. Pt and OT discussed importance of safe, continued mobility to optimize daily living skills. Pt verbalized understanding.   Pt completed the following during session: sit<stand t/f, bedroom mobility and restroom ambulation, toileting, standing ADLs at sink, safe return to bedside chair.  White board updated during session. Pt given instruction to call for medical staff/nurse for assistance.       Patient left up in chair with all lines intact, call button in reach and RN Gomez notifiedEducation:      GOALS:   Multidisciplinary Problems     Occupational Therapy Goals        Problem: Occupational Therapy Goal    Goal Priority Disciplines Outcome Interventions   Occupational Therapy Goal     OT, PT/OT Ongoing, Progressing    Description:  Goals to be met by: 4/26/2020     Patient will increase functional independence with ADLs by performing:    UE Dressing with [place].  LE Dressing with Stand-by Assistance.  Grooming while standing with Stand-by Assistance (progressing 4/14).  Toileting from bedside commode with Stand-by Assistance for hygiene and clothing management.   Toilet transfer to bedside commode with Contact Guard Assistance.                        Time Tracking:     OT Date of Treatment: 04/15/20  OT Start Time: 1048  OT Stop Time: 1105  OT Total Time (min): 17 min    Billable Minutes:Self Care/Home Management 17 min    Alysha Rodgers OT  4/15/2020

## 2020-04-15 NOTE — PLAN OF CARE
Problem: Adult Inpatient Plan of Care  Goal: Optimal Comfort and Wellbeing  Outcome: Ongoing, Progressing     Problem: Infection (Pneumonia)  Goal: Resolution of Infection Signs/Symptoms  Outcome: Ongoing, Progressing     Problem: Respiratory Compromise (Pneumonia)  Goal: Effective Oxygenation and Ventilation  Outcome: Ongoing, Progressing     Problem: Oral Intake Inadequate  Goal: Improved Oral Intake  Outcome: Ongoing, Progressing

## 2020-04-15 NOTE — PLAN OF CARE
Pt would benefit from continued skilled acute PT services to improve functional mobility. POC is to continue towards current goals set to progress towards PLOF.       Problem: Physical Therapy Goal  Goal: Physical Therapy Goal  Description  Goals to be met by: 2020    Patient will increase functional independence with mobility by performin. Supine to sit with Stand-by Assistance - MET  2. Sit to supine with Stand-by Assistance  3. Sit to stand transfer with Stand-by Assistance  4. Gait  x 30 feet with Stand-by Assistance using LRAD  5. Perform stairs x1 flight w/R handrail CGA    6. Lower extremity exercise program x15 reps per handout, with independence     Outcome: Ongoing, Progressing

## 2020-04-15 NOTE — PLAN OF CARE
Problem: Occupational Therapy Goal  Goal: Occupational Therapy Goal  Description  Goals to be met by: 4/26/2020     Patient will increase functional independence with ADLs by performing:    UE Dressing with [place].  LE Dressing with Stand-by Assistance.  Grooming while standing with Supervision.  Toileting from standard  commode with Supervision for hygiene and clothing management.   Toilet transfer to standard  commode with Supervision      Outcome: Ongoing, Progressing

## 2020-04-15 NOTE — PROGRESS NOTES
Hospital Medicine  Progress Note  [facility]      Patient Name: Jojo Burrows  MRN:  [id number]  Hospital Medicine Team: [facility]OS[facility] MED B Karlos Epperson MD  Date of Admission:  3/31/2020     Length of Stay:  LOS: 15 days       Principal Problem:  COVID-19 virus infection      HPI:  Jojo Burrows is a 70F with HTN, obesity who presents for evaluation of SOB. She was seen in the ED on 03/28 with fever, chills, cough. She wasn't hypoxic with ambulation and discharged home.  She tested positive for COVID-19. Her  is admitted for COVID+ complications. She presented with worsening SOB, cough, pleuritic chest pain, sinus congestions, myalgias.     Hospital Course:   Initially, SpO2 of 78%, placed on NRB 15L, weaned to 6L NC, but then returned to requiring 15L via NRB through 4/8.  Completed 5 day course of ceftriaxone and azithromycin for CAP.  Completed 5 day course of HCQ per COVID-19 protocol.  Has remained on 15 L via NRB for several days and hence was transferred to 11 W high oxygen unit for higher level of care. Current regimen consists of duo nebs q.6 scheduled, Spiriva daily, CPT t.i.d., flutter valve treatments q.6 while awake, Lasix 40 IV daily (monitoring daily UOP), and nightly CPAP.  Blood cultures remain no growth to date and respiratory culture with no significant growth.  CBC and CMP with no significant abnormalities aside from intermittent hypokalemia and hypomagnesemia with diuresis, responsive to repletion.  CRP has down trended from 186 (Peak) to 136.  Of note, patient's  passed away from COVID-19 related complications at [facility] on 4/6; have been providing daily emotional support for patient.  Daughters have been checking on patient each day.  Continuing to monitor closely for improvement; patient full code and open to all respiratory interventions at this time.  Transferred to high oxygen unit on 15L NRB 4/8.    Interval History:     Patient sitting in chair, mild  respiratory distress. Reports feeling better each day and SOB is improving. She denies fever, chills, chest pain, abdominal pain or diarrhea. Working well with therapy and tolerating diet. Also reports that lip sores from CPAP mask are healing well with Aquaphor.     Review of Systems:  Respiratory: +SOB, dry cough  Cardiovascular: no chest pain, palpitations  GI: no N/V/D, abdominal pain    Inpatient Medications:    Current Facility-Administered Medications:     acetaminophen tablet 650 mg, 650 mg, Oral, Q4H PRN, ENRIQUE Aldana-C, 650 mg at 04/06/20 0928    acetaminophen tablet 650 mg, 650 mg, Oral, Q8H PRN, ENRIQUE Aldana-C, 650 mg at 04/06/20 0933    albuterol inhaler 4 puff, 4 puff, Inhalation, Q6H WAKE, Karlos Epperson MD, 4 puff at 04/15/20 1424    ascorbic acid (vitamin C) tablet 500 mg, 500 mg, Oral, BID, Marisol Jones MD, 500 mg at 04/15/20 0844    atorvastatin tablet 40 mg, 40 mg, Oral, QHS, Ken Tejada MD, 40 mg at 04/14/20 2125    benzonatate capsule 100 mg, 100 mg, Oral, TID PRN, Mikal Staples PA-C, 100 mg at 04/09/20 2000    calcium carbonate 200 mg calcium (500 mg) chewable tablet 1,000 mg, 1,000 mg, Oral, TID PRN, ENRIQUE Aldana-C, 1,000 mg at 04/05/20 0845    dextromethorphan-guaifenesin  mg per 12 hr tablet 1 tablet, 1 tablet, Oral, BID, Karlos Epperson MD, 1 tablet at 04/15/20 0844    dextrose 10% (D10W) Bolus, 12.5 g, Intravenous, PRN, ENRIQUE Aldana-SHERWIN    dextrose 10% (D10W) Bolus, 25 g, Intravenous, PRN, Mikal Staples PA-C    enoxaparin injection 40 mg, 40 mg, Subcutaneous, QHS, ENRIQUE Aldana-SHERWIN, 40 mg at 04/14/20 2126    glucagon (human recombinant) injection 1 mg, 1 mg, Intramuscular, PRN, Mikal Staples PA-C    glucose chewable tablet 16 g, 16 g, Oral, PRN, Mikal Staples PA-C    glucose chewable tablet 24 g, 24 g, Oral, PRN, Mikal Staples PA-C    hydroxyzine HCL tablet 25 mg, 25 mg, Oral, Nightly PRN, Marisol Jones MD, 25 mg at 04/14/20  "2127    loperamide capsule 2 mg, 2 mg, Oral, Q6H PRN, Mikal Staples PA-C, 2 mg at 04/01/20 1107    melatonin tablet 6 mg, 6 mg, Oral, Nightly PRN, Naomi Newell Jr., PA-C, 6 mg at 04/14/20 2125    metoprolol succinate (TOPROL-XL) 24 hr tablet 50 mg, 50 mg, Oral, Daily, Marisol Jones MD, 50 mg at 04/15/20 0844    ondansetron disintegrating tablet 8 mg, 8 mg, Oral, Q8H PRN, Mikal Staples PA-C    pantoprazole EC tablet 40 mg, 40 mg, Oral, QHS, Mikal Staples PA-C, 40 mg at 04/14/20 2127    polyethylene glycol packet 17 g, 17 g, Oral, BID PRN, Mikal Staples PA-C    sodium chloride 0.9% flush 10 mL, 10 mL, Intravenous, PRN, Aidan Myrick PA-C    sodium chloride 0.9% flush 10 mL, 10 mL, Intravenous, PRN, Mikal Staples PA-C    tiotropium inhalation capsule 18 mcg, 1 capsule, Inhalation, Daily, Mikal Staples PA-C, 18 mcg at 04/15/20 1424    trazodone split tablet 25 mg, 25 mg, Oral, Nightly PRN, Ken Tejada MD, 25 mg at 04/12/20 2153    tuberculin injection 5 Units, 5 Units, Intradermal, Once **AND** [START ON 4/16/2020] POCT TB Skin Test Read, , , Once, Karlos Epperson MD    vitamin A capsule 10,000 Units, 10,000 Units, Oral, Daily, Ken Tejada MD, 10,000 Units at 04/15/20 0852    vitamin D 1000 units tablet 1,000 Units, 1,000 Units, Oral, Daily, Ken Tejada MD, 1,000 Units at 04/15/20 0844    white petrolatum 41 % ointment, , Topical (Top), TID, Karlos Epperson MD      Physical Exam:    No intake or output data in the 24 hours ending 04/15/20 1751  Wt Readings from Last 3 Encounters:   04/11/20 78 kg (171 lb 15.3 oz)   03/28/20 75.8 kg (167 lb)   03/27/20 75.8 kg (167 lb)       /79   Pulse 97   Temp 96.8 °F (36 °C)   Resp 18   Ht 5' 2" (1.575 m)   Wt 78 kg (171 lb 15.3 oz)   SpO2 97%   Breastfeeding? No   BMI 31.45 kg/m²     GEN: NAD, conversant  HEENT: healing lip sores  Resp: coarse bilateral breath sounds, no wheezes or rales, normal work of breathing   CV: " "RRR, no m/r/g, no edema  GI: soft, NTND  Skin: no rash  Neuro: AAOx3, CN grossly intact, no focal neurologic deficits    Laboratory:  Lab Results   Component Value Date    [id number] Detected (A) 03/28/2020       Recent Labs   Lab 04/11/20  0549 04/14/20  0456   WBC 9.04 9.29   LYMPH 21.2  1.9 27.2  2.5   HGB 11.8* 11.5*   HCT 37.0 37.1    284     Recent Labs   Lab 04/09/20  0330 04/11/20  0549 04/14/20  0456    137 136   K 4.0 3.6 3.8   CL 94* 98 100   CO2 27 31* 31*   BUN 10 10 10   CREATININE 0.7 0.7 0.7   GLU 90 104 90   CALCIUM 9.3 9.2 9.0   MG 2.2  --   --      Recent Labs   Lab 04/09/20  0330 04/14/20  0456   ALKPHOS 62 64   ALT 23 53*   AST 32 39   ALBUMIN 2.7* 2.7*   PROT 7.8 7.1   BILITOT 0.7 0.4        Recent Labs     04/14/20  0456   DDIMER 3.22*   CRP 28.5*       All labs within the last 24 hours were reviewed.     Microbiology:  Microbiology Results (last 7 days)     ** No results found for the last 168 hours. **            Imaging  ECG Results          EKG 12-lead (Final result)  Result time 04/01/20 09:38:21    Final result by Interface, Lab In [facility] (04/01/20 09:38:21)                 Narrative:    Test Reason : R06.02,    Vent. Rate : 079 BPM     Atrial Rate : 079 BPM     P-R Int : 148 ms          QRS Dur : 086 ms      QT Int : 382 ms       P-R-T Axes : 067 -15 014 degrees     QTc Int : 438 ms    Normal sinus rhythm  Minimal voltage criteria for LVH, may be normal variant  Nonspecific T wave abnormality  Abnormal ECG  When compared with ECG of 28-MAR-2020 18:26,  No significant change was found  Confirmed by BLACNA MARTINEZ MD ([id number]) on 4/1/2020 9:38:08 AM    Referred By: [email or web address]   SELF           Confirmed By:BLANCA MARTINEZ MD                              No results found for this or any previous visit.    X-Ray Chest AP Portable  Narrative: EXAMINATION:  XR CHEST AP PORTABLE    CLINICAL HISTORY:  Provided history is "  Shortness of breath".    TECHNIQUE:  One view of the " chest.    COMPARISON:  03/28/2020, 03/27/2020, and 08/21/2018.    FINDINGS:  Lung volumes are low.  Cardiac wires overlie the chest.  Platelike subsegmental opacities are again present in the lower lung zones, slightly worse when compared with the prior study.  New subtle bibasilar and peripheral interstitial opacities.  No confluent area of consolidation.  No sizable pleural effusion.  No pneumothorax.  Impression: Worsening bibasilar subsegmental and interstitial opacities, worrisome for worsening infectious or inflammatory process including pneumonia/pneumonitis in this patient with recent diagnosis of COVID-19.    Electronically signed by: Graham Ortiz MD  Date:    03/31/2020  Time:    20:46      All imaging within the last 24 hours was reviewed.     Assessment and Plan:    Active Hospital Problems    Diagnosis  POA    *COVID-19 virus infection [U07.1]  Yes    Multifocal pneumonia [J18.9]  Yes    Acute hypoxemic respiratory failure [J96.01]  Yes    Obesity (BMI 30.0-34.9) [E66.9]  Yes    Essential hypertension [I10]  Yes     Chronic      Resolved Hospital Problems   No resolved problems to display.     Scheduled Meds:   albuterol  4 puff Inhalation Q6H WAKE    ascorbic acid (vitamin C)  500 mg Oral BID    atorvastatin  40 mg Oral QHS    dextromethorphan-guaifenesin  mg  1 tablet Oral BID    enoxaparin  40 mg Subcutaneous QHS    metoprolol succinate  50 mg Oral Daily    pantoprazole  40 mg Oral QHS    tiotropium  1 capsule Inhalation Daily    tuberculin  5 Units Intradermal Once    vitamin A  10,000 Units Oral Daily    vitamin D  1,000 Units Oral Daily    white petrolatum   Topical (Top) TID     Continuous Infusions:  PRN Meds:.acetaminophen, acetaminophen, benzonatate, calcium carbonate, Dextrose 10% Bolus, Dextrose 10% Bolus, glucagon (human recombinant), glucose, glucose, hydroxyzine HCL, loperamide, melatonin, ondansetron, polyethylene glycol, sodium chloride 0.9%, sodium chloride  0.9%, traZODone    PLAN:    Acute respiratory distress with hypoxia due to Covid-19 Virus Infection  - COVID-19 testing POSITIVE: Collection Date: 3/28/2020 Collection Time:   6:43 PM  - Infection Control notified     - Isolation:   - Airborne and Droplet Precautions  - Surgical mask on patient   - N95 masks must be fit tested, wear eye protection  - 20 second hand hygiene              - Limit visitors per hospital policy              - Consolidate lab draws, nursing care, and interventions     - Diagnostics: (Lymphopenia, hyponatremia, hyperferritinemia, elevated troponin, elevated d-dimer, age, and comorbidities are significant predictors of poor clinical outcome)              - CBC:      No lymphopenia       trend Q48hrs  - Ferritin:                      471; repeat prior to discharge   - D-dimer:                    0.73; repeat prior to discharge  - CMP:   NA wnl               trend Q48hrs  - CRP:                         134 to 63; trend Q48hrs  - BNP:                          <10  - CPK:                          275  - LDH:                          444; repeat prior to discharge  - Troponin:                   0.008  - Procalcitonin:            0.04              - ECG with QTc:          NSR with QTc 438              - rapid Flu:                   negative              - Blood culture x2:       NGTD              - Respiratory Culture   No S.Aureus or Pseudomonas, normal respiratory fluora     - Management:   Bundle care as able to maintain isolation & minimize in/out of room   - Supplemental O2 to maintain SpO2 92%-96%.  Patient weaned to 5L via HFNC              - Telemetry & continuous pulse oximetry               - If wheezing                           - DuoNebs q.6 scheduled, CPT t.i.d., flutter valve treatments q.6 while awake  - Spiriva daily               - acetaminophen 650mg PO Q6hr PRN fever              - loperamide PRN for viral diarrhea  -  Completed empiric antibiotics for CAP- end date:  "4/4                          - Ceftriaxone 1g IV Q24hrs                          - Azithromycin 500mg IV day #1, then 250mg PO daily x4 days              -Mucinex 600 b.i.d.               -d/c Lasix               - Investigational Treatment Protocol:                           - statin: atorvastatin 40mg po daily                           - patient completed HCQ 400mg PO BID x1 day, then 400mg PO daily x 4 days               -normal glucose 6 phosphate dehydrogenase, ECG, and start Qshift POCT glucose              -transferred to high oxygen flow unit on [address] for more aggressive respiratory interventions        HTN  - continue MTP XL 50 mg daily      Advance Care Planning  Goals of care, counseling/discussion  -patient understands severity of disease and potential to need to go to ICU for mechanical ventilation should oxygen status worsen  -patient's  [place] [facility] [place]  All members of healthcare team are encouraged provide emotional support to this patient daily.  -confirms FULL code status  Advance Care Planning       If patient transitions to Comfort-Focused Care, please place "Nurse Communication: End of Life Care, family members allowed to visit, including spouse/partner and adult children [please list names]. Please ask family to visit as a group and leave as a group.       VTE High Risk Prophylaxis:   VTE Risk Mitigation (From admission, onward)         Ordered     enoxaparin (LOVENOX) 40 mg/0.4 mL injection      04/09/20 1932     enoxaparin injection 40 mg  Nightly      03/31/20 2158     IP VTE HIGH RISK PATIENT  Once      03/31/20 2157     Place KELSIE hose  Until discontinued      03/31/20 2150                  Patient's chronic/stable medical conditions noted in the problem list above will be managed with the patient's home medications as tolerated.         Subsequent Inpatient Hospital Care    Level 2 [id number] Total visit time was 25 minutes or greater with greater than 50% of " time spent in counseling and coordination of care.               Karlos Epperson MD  Hospital Medicine Staff  Pager: [phone]; Spectra: [phone]

## 2020-04-15 NOTE — PT/OT/SLP PROGRESS
Physical Therapy Treatment    Patient Name:  Jojo Burrows   MRN:  [id number]    Recommendations:     Discharge Recommendations:  nursing facility, skilled   Discharge Equipment Recommendations: walker, rolling   Barriers to discharge:     Assessment:     Jojo Burrows is a 70 y.o. female admitted with a medical diagnosis of COVID-19 virus infection.  She presents with the following impairments/functional limitations:  weakness, impaired functional mobilty, impaired cardiopulmonary response to activity, impaired endurance, gait instability, impaired balance, impaired self care skills. Pt tolerated session well w/focus on exercises, gait training, and transfers. Pt would benefit from continued skilled acute PT 4x/wk to improve functional mobility.  Recommending pt receive PT services in SNF setting but is progressing well and likely can transition to  w/continued improvements.     Rehab Prognosis: Good; patient would benefit from acute skilled PT services to address these deficits and reach maximum level of function.    Recent Surgery: * No surgery found *      Plan:     During this hospitalization, patient to be seen 4 x/week to address the identified rehab impairments via gait training, therapeutic activities, therapeutic exercises, neuromuscular re-education and progress toward the following goals:    · Plan of Care Expires:  05/13/20    Subjective     Chief Complaint: none noted   Pain/Comfort:  Pain Rating 1: 0/10      Objective:     Communicated with NSG prior to session.  Patient found up in chair with oxygen, PureWick, telemetry, pulse ox (continuous) upon PT entry to room.     General Precautions: Standard, airborne, droplet, fall   Orthopedic Precautions:N/A   Braces: N/A     Functional Mobility:  · Transfers:     · Sit to Stand:  contact guard assistance with rolling walker  · Toilet Transfer: contact guard assistance with  rolling walker and grab bars  using  Step Transfer  · Gait: 20ft x2 CGA w/RW on 2L O2  demonstrating decreased ana but good safety awareness   · Balance: Sitting: SBA      Standing: CGA->SBA      AM-PAC 6 CLICK MOBILITY  Turning over in bed (including adjusting bedclothes, sheets and blankets)?: 4  Sitting down on and standing up from a chair with arms (e.g., wheelchair, bedside commode, etc.): 3  Moving from lying on back to sitting on the side of the bed?: 4  Moving to and from a bed to a chair (including a wheelchair)?: 3  Need to walk in hospital room?: 3  Climbing 3-5 steps with a railing?: 3  Basic Mobility Total Score: 20       Therapeutic Activities and Exercises:   - Seated TherEx:    - Ankle Pumps: x30 each    -LAQ: x15 each    -Marches: x15 each   - Static Standinmins performing ADL tasks  - Pt educated on:   -PT roles, expectations, and POC    -Safety with mobility   -Benefits of OOB activities to increase strength and functional mobility    -Performing ther ex for increasing LE ROM and strength   -Discharge recommendations     Patient left up in chair with all lines intact and call button in reach..    GOALS:   Multidisciplinary Problems     Physical Therapy Goals        Problem: Physical Therapy Goal    Goal Priority Disciplines Outcome Goal Variances Interventions   Physical Therapy Goal     PT, PT/OT Ongoing, Progressing     Description:  Goals to be met by: 2020    Patient will increase functional independence with mobility by performin. Supine to sit with Stand-by Assistance - MET  2. Sit to supine with Stand-by Assistance  3. Sit to stand transfer with Stand-by Assistance  4. Gait  x 30 feet with Stand-by Assistance using LRAD  5. Perform stairs x1 flight w/R handrail CGA    6. Lower extremity exercise program x15 reps per handout, with independence                      Time Tracking:     PT Received On: 04/15/20  PT Start Time: 1113     PT Stop Time: 1151  PT Total Time (min): 38 min     Billable Minutes: Gait Training 15, Therapeutic Activity 12 and Therapeutic  Exercise 11    Treatment Type: Treatment  PT/PTA: PT     PTA Visit Number: 0     Deonte Oleary, PT  04/15/2020

## 2020-04-15 NOTE — CONSULTS
Jojo Burrows  has been accepted for transfer to [facility] and will be followed through telemedicine services beginning 04/16/20  at 7am.    Anna Negron

## 2020-04-15 NOTE — PLAN OF CARE
Problem: Occupational Therapy Goal  Goal: Occupational Therapy Goal  Description  Goals to be met by: 4/26/2020     Patient will increase functional independence with ADLs by performing:    UE Dressing with [place].  LE Dressing with Stand-by Assistance.  Grooming while standing with Stand-by Assistance (progressing 4/14).  Toileting from bedside commode with Stand-by Assistance for hygiene and clothing management.   Toilet transfer to bedside commode with Contact Guard Assistance.      Pt making excellent progress in overall therapy goals. Pt met goal related to grooming and toileting this date. POC updated. Continue OT as tolerated.  Alysha Rodgers OT  4/15/2020    4/15/2020 1427 by Alysha Rodgers OT  Outcome: Ongoing, Progressing  4/15/2020 1426 by Alysha Rodgers OT  Outcome: Ongoing, Progressing

## 2020-04-16 LAB
ALBUMIN SERPL BCP-MCNC: 2.8 G/DL (ref 3.5–5.2)
ALP SERPL-CCNC: 66 U/L (ref 55–135)
ALT SERPL W/O P-5'-P-CCNC: 47 U/L (ref 10–44)
ANION GAP SERPL CALC-SCNC: 9 MMOL/L (ref 8–16)
AST SERPL-CCNC: 30 U/L (ref 10–40)
BASOPHILS # BLD AUTO: 0.03 K/UL (ref 0–0.2)
BASOPHILS NFR BLD: 0.3 % (ref 0–1.9)
BILIRUB SERPL-MCNC: 0.4 MG/DL (ref 0.1–1)
BUN SERPL-MCNC: 12 MG/DL (ref 8–23)
CALCIUM SERPL-MCNC: 9.5 MG/DL (ref 8.7–10.5)
CHLORIDE SERPL-SCNC: 101 MMOL/L (ref 95–110)
CO2 SERPL-SCNC: 30 MMOL/L (ref 23–29)
CREAT SERPL-MCNC: 0.8 MG/DL (ref 0.5–1.4)
CRP SERPL-MCNC: 17.1 MG/L (ref 0–8.2)
DIFFERENTIAL METHOD: ABNORMAL
EOSINOPHIL # BLD AUTO: 0.4 K/UL (ref 0–0.5)
EOSINOPHIL NFR BLD: 3.8 % (ref 0–8)
ERYTHROCYTE [DISTWIDTH] IN BLOOD BY AUTOMATED COUNT: 13.2 % (ref 11.5–14.5)
EST. GFR  (AFRICAN AMERICAN): >60 ML/MIN/1.73 M^2
EST. GFR  (NON AFRICAN AMERICAN): >60 ML/MIN/1.73 M^2
FERRITIN SERPL-MCNC: 504 NG/ML (ref 20–300)
GLUCOSE SERPL-MCNC: 94 MG/DL (ref 70–110)
HCT VFR BLD AUTO: 35.9 % (ref 37–48.5)
HGB BLD-MCNC: 10.9 G/DL (ref 12–16)
IMM GRANULOCYTES # BLD AUTO: 0.06 K/UL (ref 0–0.04)
IMM GRANULOCYTES NFR BLD AUTO: 0.6 % (ref 0–0.5)
LYMPHOCYTES # BLD AUTO: 3 K/UL (ref 1–4.8)
LYMPHOCYTES NFR BLD: 31 % (ref 18–48)
MCH RBC QN AUTO: 28.2 PG (ref 27–31)
MCHC RBC AUTO-ENTMCNC: 30.4 G/DL (ref 32–36)
MCV RBC AUTO: 93 FL (ref 82–98)
MONOCYTES # BLD AUTO: 1.2 K/UL (ref 0.3–1)
MONOCYTES NFR BLD: 12.8 % (ref 4–15)
NEUTROPHILS # BLD AUTO: 5 K/UL (ref 1.8–7.7)
NEUTROPHILS NFR BLD: 51.5 % (ref 38–73)
NRBC BLD-RTO: 0 /100 WBC
PLATELET # BLD AUTO: 274 K/UL (ref 150–350)
PMV BLD AUTO: 11.1 FL (ref 9.2–12.9)
POTASSIUM SERPL-SCNC: 4.3 MMOL/L (ref 3.5–5.1)
PROT SERPL-MCNC: 7.1 G/DL (ref 6–8.4)
RBC # BLD AUTO: 3.87 M/UL (ref 4–5.4)
SODIUM SERPL-SCNC: 140 MMOL/L (ref 136–145)
WBC # BLD AUTO: 9.65 K/UL (ref 3.9–12.7)

## 2020-04-16 PROCEDURE — 25000242 PHARM REV CODE 250 ALT 637 W/ HCPCS: Mod: HCNC | Performed by: PHYSICIAN ASSISTANT

## 2020-04-16 PROCEDURE — 25000003 PHARM REV CODE 250: Mod: HCNC | Performed by: INTERNAL MEDICINE

## 2020-04-16 PROCEDURE — 97535 SELF CARE MNGMENT TRAINING: CPT | Mod: HCNC

## 2020-04-16 PROCEDURE — 27000221 HC OXYGEN, UP TO 24 HOURS: Mod: HCNC

## 2020-04-16 PROCEDURE — 97530 THERAPEUTIC ACTIVITIES: CPT | Mod: HCNC

## 2020-04-16 PROCEDURE — 82728 ASSAY OF FERRITIN: CPT | Mod: HCNC

## 2020-04-16 PROCEDURE — 85025 COMPLETE CBC W/AUTO DIFF WBC: CPT | Mod: HCNC

## 2020-04-16 PROCEDURE — 99900035 HC TECH TIME PER 15 MIN (STAT): Mod: HCNC

## 2020-04-16 PROCEDURE — 94761 N-INVAS EAR/PLS OXIMETRY MLT: CPT | Mod: HCNC

## 2020-04-16 PROCEDURE — 11000001 HC ACUTE MED/SURG PRIVATE ROOM: Mod: HCNC

## 2020-04-16 PROCEDURE — 94664 DEMO&/EVAL PT USE INHALER: CPT | Mod: HCNC

## 2020-04-16 PROCEDURE — 25000003 PHARM REV CODE 250: Mod: HCNC | Performed by: PHYSICIAN ASSISTANT

## 2020-04-16 PROCEDURE — 99231 SBSQ HOSP IP/OBS SF/LOW 25: CPT | Mod: GT,HCNC,, | Performed by: INTERNAL MEDICINE

## 2020-04-16 PROCEDURE — 86140 C-REACTIVE PROTEIN: CPT | Mod: HCNC

## 2020-04-16 PROCEDURE — 25000003 PHARM REV CODE 250: Mod: HCNC | Performed by: HOSPITALIST

## 2020-04-16 PROCEDURE — 99231 PR SUBSEQUENT HOSPITAL CARE,LEVL I: ICD-10-PCS | Mod: GT,HCNC,, | Performed by: INTERNAL MEDICINE

## 2020-04-16 PROCEDURE — 94660 CPAP INITIATION&MGMT: CPT | Mod: HCNC

## 2020-04-16 PROCEDURE — 80053 COMPREHEN METABOLIC PANEL: CPT | Mod: HCNC

## 2020-04-16 PROCEDURE — 36415 COLL VENOUS BLD VENIPUNCTURE: CPT | Mod: HCNC

## 2020-04-16 PROCEDURE — 94640 AIRWAY INHALATION TREATMENT: CPT | Mod: HCNC

## 2020-04-16 PROCEDURE — 97116 GAIT TRAINING THERAPY: CPT | Mod: HCNC

## 2020-04-16 PROCEDURE — 63600175 PHARM REV CODE 636 W HCPCS: Mod: HCNC | Performed by: PHYSICIAN ASSISTANT

## 2020-04-16 RX ADMIN — TIOTROPIUM BROMIDE 18 MCG: 18 CAPSULE ORAL; RESPIRATORY (INHALATION) at 08:04

## 2020-04-16 RX ADMIN — HYDROXYZINE HYDROCHLORIDE 25 MG: 25 TABLET, FILM COATED ORAL at 10:04

## 2020-04-16 RX ADMIN — MELATONIN 1000 UNITS: at 09:04

## 2020-04-16 RX ADMIN — GUAIFENESIN AND DEXTROMETHORPHAN HYDROBROMIDE 1 TABLET: 600; 30 TABLET, EXTENDED RELEASE ORAL at 10:04

## 2020-04-16 RX ADMIN — Medication 500 MG: at 09:04

## 2020-04-16 RX ADMIN — ALBUTEROL SULFATE 4 PUFF: 90 AEROSOL, METERED RESPIRATORY (INHALATION) at 01:04

## 2020-04-16 RX ADMIN — METOPROLOL SUCCINATE 50 MG: 50 TABLET, EXTENDED RELEASE ORAL at 09:04

## 2020-04-16 RX ADMIN — GUAIFENESIN AND DEXTROMETHORPHAN HYDROBROMIDE 1 TABLET: 600; 30 TABLET, EXTENDED RELEASE ORAL at 09:04

## 2020-04-16 RX ADMIN — Medication 6 MG: at 10:04

## 2020-04-16 RX ADMIN — ALBUTEROL SULFATE 4 PUFF: 90 AEROSOL, METERED RESPIRATORY (INHALATION) at 08:04

## 2020-04-16 RX ADMIN — BENZONATATE 100 MG: 100 CAPSULE, LIQUID FILLED ORAL at 10:04

## 2020-04-16 RX ADMIN — PANTOPRAZOLE SODIUM 40 MG: 40 TABLET, DELAYED RELEASE ORAL at 10:04

## 2020-04-16 RX ADMIN — BENZONATATE 100 MG: 100 CAPSULE, LIQUID FILLED ORAL at 09:04

## 2020-04-16 RX ADMIN — ATORVASTATIN CALCIUM 40 MG: 20 TABLET, FILM COATED ORAL at 10:04

## 2020-04-16 RX ADMIN — Medication 10000 UNITS: at 09:04

## 2020-04-16 RX ADMIN — ENOXAPARIN SODIUM 40 MG: 100 INJECTION SUBCUTANEOUS at 10:04

## 2020-04-16 RX ADMIN — Medication 500 MG: at 10:04

## 2020-04-16 NOTE — PT/OT/SLP PROGRESS
Physical Therapy Treatment    Patient Name:  Jojo Burrows   MRN:  [id number]    Recommendations:     Discharge Recommendations:  nursing facility, skilled   Discharge Equipment Recommendations: walker, rolling   Barriers to discharge: Inaccessible home and Decreased caregiver support    Assessment:     Jojo Burrows is a 70 y.o. female admitted with a medical diagnosis of COVID-19 virus infection.  She presents with the following impairments/functional limitations:  weakness, impaired functional mobilty, impaired balance, impaired cardiopulmonary response to activity, impaired endurance, impaired self care skills, gait instability. Patient tolerated session well. She is eager and motivated to continue to improve. She was on 2L O2 throughout, and with sats between 91-95%. Extensive therapeutic listening regarding functional progress and plans for d/c recs with potentially progression to HH. Patient very grateful for time spent with her. Patient would continue to benefit from skilled PT services while in the hospital. At this time, upon d/c she is an appropriate candidate for SNF in order to progress towards an improved level of functional mobility independence.     Rehab Prognosis: Good; patient would benefit from acute skilled PT services to address these deficits and reach maximum level of function.    Recent Surgery: * No surgery found *      Plan:     During this hospitalization, patient to be seen 4 x/week to address the identified rehab impairments via gait training, therapeutic activities, therapeutic exercises, neuromuscular re-education and progress toward the following goals:    · Plan of Care Expires:  05/13/20    Subjective     Chief Complaint: HARVEY   Patient/Family Comments/goals: to go home   Pain/Comfort:  · Pain Rating 1: 0/10  · Pain Rating Post-Intervention 1: 0/10      Objective:     Communicated with RN prior to session.  Patient found up in chair with telemetry, pulse ox (continuous), oxygen(VISI  monitor ) upon PT entry to room.     General Precautions: Standard, airborne, contact, droplet, fall(COVID+)   Orthopedic Precautions:N/A   Braces: N/A     Functional Mobility:  · Transfers:     · Sit to Stand:  stand by assistance with rolling walker  · Gait: 40ft with SBA and RW  ·  slow gait speed, cautious with directional changes, narrow BHARGAVI with vc's to imporve step width; FFP   · Balance: good throughout, no LOB       AM-PAC 6 CLICK MOBILITY  Turning over in bed (including adjusting bedclothes, sheets and blankets)?: 4  Sitting down on and standing up from a chair with arms (e.g., wheelchair, bedside commode, etc.): 4  Moving from lying on back to sitting on the side of the bed?: 4  Moving to and from a bed to a chair (including a wheelchair)?: 3  Need to walk in hospital room?: 3  Climbing 3-5 steps with a railing?: 3  Basic Mobility Total Score: 21       Therapeutic Activities and Exercises:  -Patient educated on the continued role and goal of PT  -Questions and concerns answered within the the PT scope of practice.   -White board updated in patient room to reflect level of assistance needed with nursing.     Patient left up in chair with all lines intact, call button in reach and RN notified..    GOALS:   Multidisciplinary Problems     Physical Therapy Goals        Problem: Physical Therapy Goal    Goal Priority Disciplines Outcome Goal Variances Interventions   Physical Therapy Goal     PT, PT/OT Ongoing, Progressing     Description:  Goals to be met by: 2020    Patient will increase functional independence with mobility by performin. Supine to sit with Stand-by Assistance - MET  2. Sit to supine with Stand-by Assistance  3. Sit to stand transfer with Stand-by Assistance - MET         Sit to stand with Mod(I)  4. Gait  x 30 feet with Stand-by Assistance using LRAD        Gait x50ft with SBA using RW   5. Perform stairs x1 flight w/R handrail CGA    6. Lower extremity exercise program x15 reps  per handout, with independence                       Time Tracking:     PT Received On: 04/16/20  PT Start Time: 0926     PT Stop Time: 1013  PT Total Time (min): 47 min     Billable Minutes: Gait Training 15 and Therapeutic Activity 32    Treatment Type: Treatment  PT/PTA: PT     PTA Visit Number: 0     Jhoana Herrera, PT  04/16/2020

## 2020-04-16 NOTE — PLAN OF CARE
Problem: Occupational Therapy Goal  Goal: Occupational Therapy Goal  Description  Goals to be met by: 4/26/2020     Patient will increase functional independence with ADLs by performing:    UE Dressing with [place].  LE Dressing with Stand-by Assistance.  Grooming while standing with Stand-by Assistance (progressing 4/14).  Toileting from bedside commode with Stand-by Assistance for hygiene and clothing management.   Toilet transfer to bedside commode with Contact Guard Assistance.      Outcome: Ongoing, Progressing

## 2020-04-16 NOTE — PLAN OF CARE
Problem: Adult Inpatient Plan of Care  Goal: Patient-Specific Goal (Individualization)  Outcome: Ongoing, Progressing     Problem: Adult Inpatient Plan of Care  Goal: Optimal Comfort and Wellbeing  Outcome: Ongoing, Progressing     Problem: Infection (Pneumonia)  Goal: Resolution of Infection Signs/Symptoms  Outcome: Ongoing, Progressing       AAOx4. Continues on 2l per nc to maintain o2 sats >90%. SOB with activities such as repositioning; rest periods encouraged. Tolerates ambulating with walker for toileting with stand by assistance. Denies discomfort. No signs and symptoms of respiratory distress.

## 2020-04-16 NOTE — PLAN OF CARE
Problem: Physical Therapy Goal  Goal: Physical Therapy Goal  Description  Goals to be met by: 2020    Patient will increase functional independence with mobility by performin. Supine to sit with Stand-by Assistance - MET  2. Sit to supine with Stand-by Assistance  3. Sit to stand transfer with Stand-by Assistance - MET         Sit to stand with Mod(I)  4. Gait  x 30 feet with Stand-by Assistance using LRAD        Gait x50ft with SBA using RW   5. Perform stairs x1 flight w/R handrail CGA    6. Lower extremity exercise program x15 reps per handout, with independence      Outcome: Ongoing, Progressing   Patient tolerated treatment well. Established POC and goals reviewed and remain appropriate. Plan is to continue to improve patient's functional mobility capabilities.      Jhoana Herrera, PT, DPT  2020

## 2020-04-16 NOTE — PROGRESS NOTES
Hospital Medicine / Tele Medicine  Progress Note  [facility]      Patient Name: Jojo Burrows  MRN:  [id number]  Hospital Medicine Team: VIRTUAL HOSPITAL MEDICINE TEAM 8 Endy Ni MD  Date of Admission:  3/31/2020     Length of Stay:  LOS: 16 days     VIRTUAL TELENOTE    Start time: 930 am  Chief complaint: Suspect COVID-19  End time:  945 am  Total time spent with patient: 15mins    The attending portion of this evaluation, treatment, and documentation was performed per Endy Ni MD via audio only.    Principal Problem:  COVID-19 virus infection      HPI:  Jojo Burrows is a 70F with HTN, obesity who presents for evaluation of SOB. She was seen in the ED on 03/28 with fever, chills, cough. She wasn't hypoxic with ambulation and discharged home.  She tested positive for COVID-19. Her  is admitted for COVID+ complications. She presented with worsening SOB, cough, pleuritic chest pain, sinus congestions, myalgias.     Hospital Course:  Initially, SpO2 of 78%, placed on NRB 15L, weaned to 6L NC, but then returned to requiring 15L via NRB through 4/8.  Completed 5 day course of ceftriaxone and azithromycin for CAP.  Completed 5 day course of HCQ per COVID-19 protocol.  Has remained on 15 L via NRB for several days and hence was transferred to 11 W high oxygen unit for higher level of care. Current regimen consists of duo nebs q.6 scheduled, Spiriva daily, CPT t.i.d., flutter valve treatments q.6 while awake, Lasix 40 IV daily (monitoring daily UOP), and nightly CPAP.  Blood cultures remain no growth to date and respiratory culture with no significant growth.  CBC and CMP with no significant abnormalities aside from intermittent hypokalemia and hypomagnesemia with diuresis, responsive to repletion.  CRP has down trended from 186 (Peak) to 136.  Of note, patient's  [place] [facility] [place]; have been providing daily emotional support for  patient.  Daughters have been checking on patient each day.  Continuing to monitor closely for improvement; patient full code and open to all respiratory interventions at this time. Transferred to high oxygen unit on 15L NRB 4/8. Patient improved with supportive care and completed course of abx for CAP. Transferred to telemedicine 4/16. PT OT rec SNF. Currently medically stable for discharge to SNF.       Interval History:     Patient noted to be hemodynamically stable.  No acute events overnight.  Patient continues to complain of some shortness of breath, especially dyspnea on exertion, cough, weakness all are improving. Walking around with a walker.  Eating better. Encourage use of incentive spirometer, albuterol inhaler, p.r.n. antitussives.  Attempt to get out of bed, sit up in chair and walk around the room. Medially stable for SNF at this time, pending placement.     Review of Systems:  ROS (Positive in Bold, otherwise negative)  Constitutional: fever, chills, night sweats, malaise, weakness  CV: chest pain, edema, palpitations  Resp: SOB, cough, sputum production  GI: changes in appetite, NVDC, pain, melena, hematochezia, GERD, hematemesis  : Dysuria, hematuria, urinary urgency, frequency  MSK: arthralgia/myalgia, joint swelling  All other systems were reviewed & are negative.     Inpatient Medications:    Current Facility-Administered Medications:     acetaminophen tablet 650 mg, 650 mg, Oral, Q4H PRN, Mikal Staples PA-C, 650 mg at 04/06/20 0928    acetaminophen tablet 650 mg, 650 mg, Oral, Q8H PRN, Mikal Staples PA-C, 650 mg at 04/06/20 0933    albuterol inhaler 4 puff, 4 puff, Inhalation, Q6H WAKE, Karlos Epperson MD, 4 puff at 04/16/20 0800    ascorbic acid (vitamin C) tablet 500 mg, 500 mg, Oral, BID, Marisol Jones MD, 500 mg at 04/16/20 0900    atorvastatin tablet 40 mg, 40 mg, Oral, QHS, Ken Tejada MD, 40 mg at 04/15/20 2145    benzonatate capsule 100 mg, 100 mg, Oral, TID PRN,  Mikal Staples PA-C, 100 mg at 04/16/20 0900    calcium carbonate 200 mg calcium (500 mg) chewable tablet 1,000 mg, 1,000 mg, Oral, TID PRN, ENRIQUE Aldana-C, 1,000 mg at 04/05/20 0845    dextromethorphan-guaifenesin  mg per 12 hr tablet 1 tablet, 1 tablet, Oral, BID, Karlos Epperson MD, 1 tablet at 04/16/20 0900    dextrose 10% (D10W) Bolus, 12.5 g, Intravenous, PRN, ENRIQUE Aldana-SHERWIN    dextrose 10% (D10W) Bolus, 25 g, Intravenous, PRN, Mikal Staples PA-C    enoxaparin injection 40 mg, 40 mg, Subcutaneous, QHS, ENRIQUE Aldana-C, 40 mg at 04/15/20 2145    glucagon (human recombinant) injection 1 mg, 1 mg, Intramuscular, PRN, Mikal Staples PA-C    glucose chewable tablet 16 g, 16 g, Oral, PRN, Mikal Staples PA-C    glucose chewable tablet 24 g, 24 g, Oral, PRN, Mikal Staples PA-C    hydroxyzine HCL tablet 25 mg, 25 mg, Oral, Nightly PRN, Marisol Jones MD, 25 mg at 04/15/20 2145    loperamide capsule 2 mg, 2 mg, Oral, Q6H PRN, Mikal Staples PA-C, 2 mg at 04/01/20 1107    melatonin tablet 6 mg, 6 mg, Oral, Nightly PRN, Naomi Newell Jr., PA-C, 6 mg at 04/15/20 2145    metoprolol succinate (TOPROL-XL) 24 hr tablet 50 mg, 50 mg, Oral, Daily, Marisol Jones MD, 50 mg at 04/16/20 0900    ondansetron disintegrating tablet 8 mg, 8 mg, Oral, Q8H PRN, Mikal Staples PA-C    pantoprazole EC tablet 40 mg, 40 mg, Oral, QHS, ENRIQUE Aldana-SHERWIN, 40 mg at 04/15/20 2145    polyethylene glycol packet 17 g, 17 g, Oral, BID PRN, Mikal Staples PA-C    sodium chloride 0.9% flush 10 mL, 10 mL, Intravenous, PRN, Aidan Myrick PA-C    sodium chloride 0.9% flush 10 mL, 10 mL, Intravenous, PRN, Mikal Staples PA-C    tiotropium inhalation capsule 18 mcg, 1 capsule, Inhalation, Daily, Mikal Staples PA-C, 18 mcg at 04/16/20 0800    trazodone split tablet 25 mg, 25 mg, Oral, Nightly PRN, Ken Tejada MD, 25 mg at 04/12/20 2153    tuberculin injection 5 Units, 5 Units, Intradermal, Once  "**AND** POCT TB Skin Test Read, , , Once, Karlos Epperson MD    vitamin A capsule 10,000 Units, 10,000 Units, Oral, Daily, Ken Tejada MD, 10,000 Units at 04/16/20 0900    vitamin D 1000 units tablet 1,000 Units, 1,000 Units, Oral, Daily, Ken Tejada MD, 1,000 Units at 04/16/20 0900    white petrolatum 41 % ointment, , Topical (Top), TID, Karlos Epperson MD      Physical Exam:      Intake/Output Summary (Last 24 hours) at 4/16/2020 1133  Last data filed at 4/16/2020 0900  Gross per 24 hour   Intake 240 ml   Output --   Net 240 ml     Wt Readings from Last 3 Encounters:   04/11/20 78 kg (171 lb 15.3 oz)   03/28/20 75.8 kg (167 lb)   03/27/20 75.8 kg (167 lb)       /76 (BP Location: Right arm, Patient Position: Sitting)   Pulse 79   Temp 97.5 °F (36.4 °C) (Axillary)   Resp 20   Ht 5' 2" (1.575 m)   Wt 78 kg (171 lb 15.3 oz)   SpO2 97%   Breastfeeding? No   BMI 31.45 kg/m²     Done by RN:  GEN: NAD  HEENT: EOMI  CV: RRR  Resp: CTAB, diminished BS at bases   GI: soft, NTND  Neuro: alert, oriented  Skin: no rash  MSK: no edema  Psych: normal affect    Laboratory:  Lab Results   Component Value Date    [id number] Detected (A) 03/28/2020       Recent Labs   Lab 04/11/20  0549 04/14/20 0456 04/16/20  0159   WBC 9.04 9.29 9.65   LYMPH 21.2  1.9 27.2  2.5 31.0  3.0   HGB 11.8* 11.5* 10.9*   HCT 37.0 37.1 35.9*    284 274     Recent Labs   Lab 04/11/20  0549 04/14/20 0456 04/16/20  0159    136 140   K 3.6 3.8 4.3   CL 98 100 101   CO2 31* 31* 30*   BUN 10 10 12   CREATININE 0.7 0.7 0.8    90 94   CALCIUM 9.2 9.0 9.5     Recent Labs   Lab 04/14/20  0456 04/16/20  0159   ALKPHOS 64 66   ALT 53* 47*   AST 39 30   ALBUMIN 2.7* 2.8*   PROT 7.1 7.1   BILITOT 0.4 0.4        Recent Labs     04/14/20  0456 04/16/20  0159   DDIMER 3.22*  --    FERRITIN  --  504*   CRP 28.5* 17.1*       All labs within the last 24 hours were reviewed.     Microbiology:  Microbiology Results (last 7 " "days)     ** No results found for the last 168 hours. **            Imaging  ECG Results          EKG 12-lead (Final result)  Result time 04/01/20 09:38:21    Final result by Interface, Lab In [facility] (04/01/20 09:38:21)                 Narrative:    Test Reason : R06.02,    Vent. Rate : 079 BPM     Atrial Rate : 079 BPM     P-R Int : 148 ms          QRS Dur : 086 ms      QT Int : 382 ms       P-R-T Axes : 067 -15 014 degrees     QTc Int : 438 ms    Normal sinus rhythm  Minimal voltage criteria for LVH, may be normal variant  Nonspecific T wave abnormality  Abnormal ECG  When compared with ECG of 28-MAR-2020 18:26,  No significant change was found  Confirmed by BLANCA MARTINEZ MD ([id number]) on 4/1/2020 9:38:08 AM    Referred By: JASON   SELF           Confirmed By:BLANCA MARTINEZ MD                              No results found for this or any previous visit.    X-Ray Chest AP Portable  Narrative: EXAMINATION:  XR CHEST AP PORTABLE    CLINICAL HISTORY:  Provided history is "  Shortness of breath".    TECHNIQUE:  One view of the chest.    COMPARISON:  03/28/2020, 03/27/2020, and 08/21/2018.    FINDINGS:  Lung volumes are low.  Cardiac wires overlie the chest.  Platelike subsegmental opacities are again present in the lower lung zones, slightly worse when compared with the prior study.  New subtle bibasilar and peripheral interstitial opacities.  No confluent area of consolidation.  No sizable pleural effusion.  No pneumothorax.  Impression: Worsening bibasilar subsegmental and interstitial opacities, worrisome for worsening infectious or inflammatory process including pneumonia/pneumonitis in this patient with recent diagnosis of COVID-19.    Electronically signed by: Graham Ortiz MD  Date:    03/31/2020  Time:    20:46      All imaging within the last 24 hours was reviewed.     Assessment and Plan:    Active Hospital Problems    Diagnosis  POA    *COVID-19 virus infection [U07.1]  Yes    Multifocal pneumonia " [J18.9]  Yes    Acute hypoxemic respiratory failure [J96.01]  Yes    Obesity (BMI 30.0-34.9) [E66.9]  Yes    Essential hypertension [I10]  Yes     Chronic      Resolved Hospital Problems   No resolved problems to display.         Acute respiratory distress with hypoxia due to Covid-19 Virus Infection  - COVID-19 testing POSITIVE: Collection Date: 3/28/2020 Collection Time:   6:43 PM  - Infection Control notified     - Isolation:   - Airborne and Droplet Precautions  - Surgical mask on patient   - N95 masks must be fit tested, wear eye protection  - 20 second hand hygiene              - Limit visitors per hospital policy              - Consolidate lab draws, nursing care, and interventions     - Diagnostics: (Lymphopenia, hyponatremia, hyperferritinemia, elevated troponin, elevated d-dimer, age, and comorbidities are significant predictors of poor clinical outcome)              - CBC:      No lymphopenia       trend Q48hrs  - Ferritin:                      471  ; repeat prior to discharge   - D-dimer:                    0.73   ; repeat prior to discharge  - CMP:   NA wnl               trend Q48hrs  - CRP:                         134 > 63 > 17 ; trend Q48hrs  - BNP:                          <10  - CPK:                          275  - LDH:                          444; repeat prior to discharge  - Troponin:                   0.008  - Procalcitonin:            0.04              - ECG with QTc:          NSR with QTc 438              - rapid Flu:                   negative              - Blood culture x2:       NGTD              - Respiratory Culture   No S.Aureus or Pseudomonas, normal respiratory fluora     - Management:   Bundle care as able to maintain isolation & minimize in/out of room   - Supplemental O2 to maintain SpO2 92%-96%.               - Telemetry & continuous pulse oximetry               - If wheezing                           - DuoNebs q.6 scheduled, CPT t.i.d., flutter valve treatments q.6 while  awake  - Spiriva daily               - acetaminophen 650mg PO Q6hr PRN fever              - loperamide PRN for viral diarrhea  -  Completed empiric antibiotics for CAP- end date: 4/4                          - Ceftriaxone 1g IV Q24hrs                          - Azithromycin 500mg IV day #1, then 250mg PO daily x4 days              -Mucinex 600 b.i.d.               -d/c Lasix               - Investigational Treatment Protocol:                           - statin: atorvastatin 40mg po daily                           - patient completed HCQ 400mg PO BID x1 day, then 400mg PO daily x 4 days               -normal glucose 6 phosphate dehydrogenase, ECG, and start Qshift POCT glucose              -transferred to high oxygen flow unit on [address] for more aggressive respiratory interventions        HTN  - continue MTP XL 50 mg daily      Advance Care Planning  Goals of care, counseling/discussion  -patient understands severity of disease and potential to need to go to ICU for mechanical ventilation should oxygen status worsen  -patient's  passed [place] from [place] [facility] [place].  All members of healthcare team are encouraged provide emotional support to this patient daily.  -confirms FULL code status  Advance Care Planning     Dispo - medically stable for SNF at this time, pending placement         Patient's chronic/stable medical conditions noted in the problem list above will be managed with the patient's home medications as tolerated.     VTE High Risk Prophylaxis: enoxaparin 40mg sq QHS @ 2100 (bundled care) if GFR >30    Endy Ni MD  Department of Hospital medicine

## 2020-04-16 NOTE — PT/OT/SLP PROGRESS
Occupational Therapy   Treatment    Name: Jojo Burrows  MRN: [id number]  Admitting Diagnosis:  COVID-19 virus infection       Recommendations:     Discharge Recommendations: nursing facility, skilled  Discharge Equipment Recommendations:  walker, rolling  Barriers to discharge:  Decreased caregiver support, Inaccessible home environment    Assessment:     Jojo Burrows is a 70 y.o. female with a medical diagnosis of COVID-19 virus infection.  She presents with significant deficits in activity tolerance limiting her ability to complete basic self-care tasks without rest breaks. Performance deficits affecting function are weakness, impaired endurance, impaired self care skills, impaired functional mobilty, impaired balance, impaired cardiopulmonary response to activity.     Rehab Prognosis:  Good; patient would benefit from acute skilled OT services to address these deficits and reach maximum level of function.       Plan:     Patient to be seen 4 x/week to address the above listed problems via self-care/home management, therapeutic activities, therapeutic exercises  · Plan of Care Expires: 05/12/20  · Plan of Care Reviewed with: patient    Subjective     Pain/Comfort:  · Pain Rating 1: 0/10  · Pain Rating 2: 0/10    Objective:     Communicated with: nsg prior to session.  Patient found HOB elevated with telemetry, pulse ox (continuous), oxygen(VISI monitor) upon OT entry to room.    General Precautions: Standard, airborne, contact, droplet, fall(Covid+)   Orthopedic Precautions:N/A   Braces: N/A     Occupational Performance:     Functional Mobility/Transfers:  · Pt completed bedroom ambulation. 2 LPM O2 w/ sats 95-97%. See PT note for further details.    Activities of Daily Living:  · Pt initially resistant to grooming activities due to fatigue after getting up to chair and having breathing treatment. However, once she decided to participate, she completed grooming w/ set-up assist only for oral hygiene and washing  face.  · OT established rapport with therapeutic listening and conversation, allowing pt to process her grief and discuss discharge planning options.    [facility] 6 Click ADL: 19    Patient left up in chair with call button in reach and nsg notifiedEducation:      GOALS:   Multidisciplinary Problems     Occupational Therapy Goals        Problem: Occupational Therapy Goal    Goal Priority Disciplines Outcome Interventions   Occupational Therapy Goal     OT, PT/OT Ongoing, Progressing    Description:  Goals to be met by: 4/26/2020  (updated to 5/12/2020)    Patient will increase functional independence with ADLs by performing:    UE Dressing with [place].  LE Dressing with Stand-by Assistance.  Grooming while standing with Stand-by Assistance (progressing 4/14).  Toileting from bedside commode with Stand-by Assistance for hygiene and clothing management.   Toilet transfer to bedside commode with Contact Guard Assistance.                        Time Tracking:     OT Date of Treatment: 04/16/20  OT Start Time: 0926  OT Stop Time: 1013  OT Total Time (min): 47 min    Billable Minutes:Self Care/Home Management 30    Pretty Mckeon OT  4/16/2020

## 2020-04-17 VITALS
BODY MASS INDEX: 31.64 KG/M2 | RESPIRATION RATE: 28 BRPM | HEIGHT: 62 IN | HEART RATE: 90 BPM | WEIGHT: 171.94 LBS | DIASTOLIC BLOOD PRESSURE: 77 MMHG | OXYGEN SATURATION: 99 % | SYSTOLIC BLOOD PRESSURE: 132 MMHG | TEMPERATURE: 98 F

## 2020-04-17 PROCEDURE — 25000242 PHARM REV CODE 250 ALT 637 W/ HCPCS: Mod: HCNC | Performed by: PHYSICIAN ASSISTANT

## 2020-04-17 PROCEDURE — 25000003 PHARM REV CODE 250: Mod: HCNC | Performed by: PHYSICIAN ASSISTANT

## 2020-04-17 PROCEDURE — 97530 THERAPEUTIC ACTIVITIES: CPT | Mod: HCNC

## 2020-04-17 PROCEDURE — 25000003 PHARM REV CODE 250: Mod: HCNC | Performed by: HOSPITALIST

## 2020-04-17 PROCEDURE — 99900035 HC TECH TIME PER 15 MIN (STAT): Mod: HCNC

## 2020-04-17 PROCEDURE — 94660 CPAP INITIATION&MGMT: CPT | Mod: HCNC

## 2020-04-17 PROCEDURE — 94668 MNPJ CHEST WALL SBSQ: CPT | Mod: HCNC

## 2020-04-17 PROCEDURE — 27000221 HC OXYGEN, UP TO 24 HOURS: Mod: HCNC

## 2020-04-17 PROCEDURE — 94761 N-INVAS EAR/PLS OXIMETRY MLT: CPT | Mod: HCNC

## 2020-04-17 PROCEDURE — 99231 PR SUBSEQUENT HOSPITAL CARE,LEVL I: ICD-10-PCS | Mod: GT,HCNC,, | Performed by: INTERNAL MEDICINE

## 2020-04-17 PROCEDURE — 99231 SBSQ HOSP IP/OBS SF/LOW 25: CPT | Mod: GT,HCNC,, | Performed by: INTERNAL MEDICINE

## 2020-04-17 PROCEDURE — 25000003 PHARM REV CODE 250: Mod: HCNC | Performed by: INTERNAL MEDICINE

## 2020-04-17 PROCEDURE — 30200315 PPD INTRADERMAL TEST REV CODE 302: Mod: HCNC | Performed by: INTERNAL MEDICINE

## 2020-04-17 PROCEDURE — 97535 SELF CARE MNGMENT TRAINING: CPT | Mod: HCNC

## 2020-04-17 PROCEDURE — 86580 TB INTRADERMAL TEST: CPT | Mod: HCNC | Performed by: INTERNAL MEDICINE

## 2020-04-17 PROCEDURE — 94664 DEMO&/EVAL PT USE INHALER: CPT | Mod: HCNC

## 2020-04-17 PROCEDURE — 11000001 HC ACUTE MED/SURG PRIVATE ROOM: Mod: HCNC

## 2020-04-17 PROCEDURE — 63600175 PHARM REV CODE 636 W HCPCS: Mod: HCNC | Performed by: PHYSICIAN ASSISTANT

## 2020-04-17 PROCEDURE — 97116 GAIT TRAINING THERAPY: CPT | Mod: HCNC

## 2020-04-17 PROCEDURE — 94640 AIRWAY INHALATION TREATMENT: CPT | Mod: HCNC

## 2020-04-17 RX ORDER — HYDROXYZINE HYDROCHLORIDE 25 MG/1
25 TABLET, FILM COATED ORAL NIGHTLY PRN
Qty: 30 TABLET | Refills: 0 | Status: SHIPPED | OUTPATIENT
Start: 2020-04-17 | End: 2020-04-24 | Stop reason: SDUPTHER

## 2020-04-17 RX ORDER — ASCORBIC ACID 500 MG
500 TABLET ORAL 2 TIMES DAILY
COMMUNITY
Start: 2020-04-17

## 2020-04-17 RX ORDER — ALBUTEROL SULFATE 90 UG/1
2 AEROSOL, METERED RESPIRATORY (INHALATION) EVERY 6 HOURS PRN
Qty: 18 G | Refills: 0 | Status: SHIPPED | OUTPATIENT
Start: 2020-04-17 | End: 2020-04-24 | Stop reason: SDUPTHER

## 2020-04-17 RX ORDER — ACETAMINOPHEN 325 MG/1
650 TABLET ORAL EVERY 4 HOURS PRN
Qty: 30 TABLET | Refills: 3 | Status: SHIPPED | OUTPATIENT
Start: 2020-04-17 | End: 2022-06-27

## 2020-04-17 RX ORDER — TIOTROPIUM BROMIDE 18 UG/1
1 CAPSULE ORAL; RESPIRATORY (INHALATION) DAILY
Qty: 90 CAPSULE | Refills: 3 | Status: SHIPPED | OUTPATIENT
Start: 2020-04-18 | End: 2022-12-27

## 2020-04-17 RX ORDER — BENZONATATE 100 MG/1
100 CAPSULE ORAL 3 TIMES DAILY PRN
Qty: 20 CAPSULE | Refills: 0 | OUTPATIENT
Start: 2020-04-17 | End: 2020-04-27

## 2020-04-17 RX ORDER — CALCIUM CARBONATE 200(500)MG
1000 TABLET,CHEWABLE ORAL 3 TIMES DAILY PRN
COMMUNITY
Start: 2020-04-17 | End: 2024-02-10 | Stop reason: SDUPTHER

## 2020-04-17 RX ORDER — ATORVASTATIN CALCIUM 40 MG/1
40 TABLET, FILM COATED ORAL NIGHTLY
Qty: 90 TABLET | Refills: 3 | Status: SHIPPED | OUTPATIENT
Start: 2020-04-17 | End: 2021-11-30

## 2020-04-17 RX ADMIN — ENOXAPARIN SODIUM 40 MG: 100 INJECTION SUBCUTANEOUS at 08:04

## 2020-04-17 RX ADMIN — TRAZODONE HYDROCHLORIDE 25 MG: 50 TABLET ORAL at 10:04

## 2020-04-17 RX ADMIN — Medication 10000 UNITS: at 08:04

## 2020-04-17 RX ADMIN — TUBERCULIN PURIFIED PROTEIN DERIVATIVE 5 UNITS: 5 INJECTION, SOLUTION INTRADERMAL at 11:04

## 2020-04-17 RX ADMIN — Medication 500 MG: at 08:04

## 2020-04-17 RX ADMIN — ALBUTEROL SULFATE 4 PUFF: 90 AEROSOL, METERED RESPIRATORY (INHALATION) at 08:04

## 2020-04-17 RX ADMIN — MELATONIN 1000 UNITS: at 08:04

## 2020-04-17 RX ADMIN — ALBUTEROL SULFATE 4 PUFF: 90 AEROSOL, METERED RESPIRATORY (INHALATION) at 02:04

## 2020-04-17 RX ADMIN — METOPROLOL SUCCINATE 50 MG: 50 TABLET, EXTENDED RELEASE ORAL at 08:04

## 2020-04-17 RX ADMIN — PANTOPRAZOLE SODIUM 40 MG: 40 TABLET, DELAYED RELEASE ORAL at 08:04

## 2020-04-17 RX ADMIN — GUAIFENESIN AND DEXTROMETHORPHAN HYDROBROMIDE 1 TABLET: 600; 30 TABLET, EXTENDED RELEASE ORAL at 08:04

## 2020-04-17 RX ADMIN — Medication 6 MG: at 10:04

## 2020-04-17 RX ADMIN — ATORVASTATIN CALCIUM 40 MG: 20 TABLET, FILM COATED ORAL at 08:04

## 2020-04-17 RX ADMIN — TIOTROPIUM BROMIDE 18 MCG: 18 CAPSULE ORAL; RESPIRATORY (INHALATION) at 09:04

## 2020-04-17 RX ADMIN — BENZONATATE 100 MG: 100 CAPSULE, LIQUID FILLED ORAL at 03:04

## 2020-04-17 NOTE — PT/OT/SLP PROGRESS
Occupational Therapy   Treatment    Name: Jojo Burrows  MRN: [id number]  Admitting Diagnosis:  COVID-19 virus infection       Recommendations:     Discharge Recommendations: nursing facility, skilled  Discharge Equipment Recommendations:  walker, rolling, grab bar, raised toilet  Barriers to discharge:  Decreased caregiver support, Inaccessible home environment    Assessment:     Jojo Burrows is a 70 y.o. female with a medical diagnosis of COVID-19 virus infection.  She presents with impaired ADL performance and decrease in fx'l mobility ability. Pt found [facility] and very pleasant during therapy co-treatment (PT/OT).  Therapy session consisted of bedroom ambulation and standing ADLs at sink. Pt SBA for ambulation using RW however began having coughing episode lasting ~10 minutes with little resolution initially. Pt on 2L 02 however saturation levels fluctuating ~85-96% Sp02. Pt required extensive time for saturation to return to normal and for her to comfortable with continued mobility in therapy. Pt demonstrated competence in pursed lip breathing technique during this episode returning to WNL of SP02. Pt then safely returned to bedside chair with setup for lunch.    Pt familiar to writing therapy team and rapport strongly established. Pt explained concerns with d/c in relation to placement and overall disposition of returning home. Pt explains that she feels she is getting stronger however still concerned about her impaired endurance and current cardiovascular performance. Writing therapist educated on DME to use for energy conservation and for implementation of using a RW, raised toilet, and GB upon d/c for improvements in safe daily care. Pt voiced appreciation. Pt continues to grieve for her  and reports she wants to make the 'right' decision for herself and for her health. Pt explains she has great family support at home that can care for her (daughters and son) and she can potentially reside on first level of  home if decision is to return home. LEONIDES Jason contacted via secure chat to help this pt make pertinent decisions and receive clarity for SNF availability at d/c given current pandemic.  Performance deficits affecting function are weakness, impaired endurance, impaired self care skills, impaired functional mobilty, gait instability, impaired balance, decreased safety awareness. Pt would benefit from continued OT skilled services 4x/wk to improve daily living skills to optimize QOL. Pt is recommended to discharge to SNF at this time.      Rehab Prognosis:  Good; patient would benefit from acute skilled OT services to address these deficits and reach maximum level of function.       Plan:     Patient to be seen 4 x/week to address the above listed problems via self-care/home management, therapeutic activities, therapeutic exercises  · Plan of Care Expires: 05/12/20  · Plan of Care Reviewed with: patient    Subjective     Pain/Comfort:  · Pain Rating 1: 0/10  · Pain Rating Post-Intervention 1: 0/10  · Pain Rating Post-Intervention 2: 0/10    Objective:     Communicated with: RN prior to session.  Patient found up in chair with telemetry, pulse ox (continuous), oxygen upon OT entry to room.    General Precautions: Standard, airborne, contact, droplet, fall(COVID (+))   Orthopedic Precautions:N/A   Braces: N/A     Occupational Performance:     Bed Mobility:    · NT as pt found [facility]    Functional Mobility/Transfers:  · Patient completed Sit <> Stand Transfer with stand by assistance  with  rolling walker   · Patient completed Bed <> Chair Transfer using Step Transfer technique with stand by assistance with rolling walker  · Patient completed Toilet Transfer Step Transfer technique with stand by assistance with  rolling walker  · Functional Mobility: Pt completed bedroom and bathroom ambulation with SBA using RW. Pt began having severe, un-resolving coughing episode where pt had to sit on BSC to regain control of  cardiovascular status.     Activities of Daily Living:  · Grooming: stand by assistance standing at sink initating task of brushing teeth. Pt then had to   · Upper Body Dressing: stand by assistance donning gown at EOB   · Toileting: stand by assistance using standard commode       [facility] 6 Click ADL: 19    Treatment & Education:  Pt educated on role of occupational therapy, POC, and safety during ADLs and functional mobility. Pt and OT discussed importance of safe, continued mobility to optimize daily living skills. Pt verbalized understanding.  Pt completed the following during session: bedroom and bathroom ambulation, standing grooming tasks at sink, safe return to bedside chair. Pt required ~1 rest break 2/2 coughing episode.  White board updated during session. Pt given instruction to call for medical staff/nurse for assistance.       Patient left up in chair with all lines intact, call button in reach and RN Edda notifiedEducation:      GOALS:   Multidisciplinary Problems     Occupational Therapy Goals        Problem: Occupational Therapy Goal    Goal Priority Disciplines Outcome Interventions   Occupational Therapy Goal     OT, PT/OT Ongoing, Progressing    Description:  Goals to be met by: 4/26/2020  (updated to 5/12/2020)    Patient will increase functional independence with ADLs by performing:    UE Dressing with [place].  LE Dressing with Stand-by Assistance.  Grooming while standing with Supervision  Toileting from commode with Supervision  for hygiene and clothing management.   [facility]  Pt will demo controlled pursed lip breathing technique to normalize cardiovascular fx.                        Time Tracking:     OT Date of Treatment: 04/17/20  OT Start Time: 1126  OT Stop Time: 1213  OT Total Time (min): 47 min    Billable Minutes:Self Care/Home Management 27 min  Therapeutic Activity 20 min    Alysha Rodgers OT  4/17/2020

## 2020-04-17 NOTE — PLAN OF CARE
Addendum: 4-17-20 [id number]    MAYANK spoke with [facility] , they are speaking with pts family regarding financials this afternoon, SW updated CM and pts physician. SW will continue to follow.     Mabel Morales LMSW   Licensed Master Social Worker       MAYANK attempted to contact [facility] on 2 occassions yesterday afternoon and this morning and got no answer and no call back from admissions staff. MAYANK spoke with pt daughter this morning and informed her that there was acceptance from [facility] for her mothers placement. Pts daughter is agreeable. MAYANK attempted to contact admissions at [facility] and was on hold for 10 minutes. SW will attempt again in 15 minutes. SW may need to escalate to leadership if an answer is not attained from admissions by noon. CM updated.     Mabel Morales LMSW   Licensed Master Social Worker

## 2020-04-17 NOTE — PLAN OF CARE
Problem: Adult Inpatient Plan of Care  Goal: Patient-Specific Goal (Individualization)  Outcome: Ongoing, Progressing     Problem: Adult Inpatient Plan of Care  Goal: Optimal Comfort and Wellbeing  Outcome: Ongoing, Progressing     Problem: Adult Inpatient Plan of Care  Goal: Readiness for Transition of Care  Outcome: Ongoing, Progressing     Problem: Infection (Pneumonia)  Goal: Resolution of Infection Signs/Symptoms  Outcome: Ongoing, Progressing       AAOx4. Continues with 1.5l of o2 per nc to maintain o2 > 90%  Episodes of sob.Deep breathing exercises encouraged; Voiced understanding. Awaiting discharge to [facility]. Report provided to receiving nurse Charissa sheikh

## 2020-04-17 NOTE — PROGRESS NOTES
Home Oxygen Evaluation    Date Performed: 2020    1) Patient's Home O2 Sat on room air, while at rest: 88        If O2 sats on room air at rest are 88% or below, patient qualifies. No additional testing needed. Document N/A in steps 2 and 3. If 89% or above, complete steps 2.      2) Patient's O2 Sat on room air while exercisin        If O2 sats on room air while exercising remain 89% or above patient does not qualify, no further testing needed Document N/A in step 3. If O2 sats on room air while exercising are 88% or below, continue to step 3.      3) Patient's O2 Sat while exercising on O2: 94 at 2 LPM         (Must show improvement from #2 for patients to qualify)    If O2 sats improve on oxygen, patient qualifies for portable oxygen. If not, the patient does not qualify.

## 2020-04-17 NOTE — PT/OT/SLP PROGRESS
Physical Therapy Treatment    Patient Name:  Jojo Burrows   MRN:  [id number]    Recommendations:     Discharge Recommendations:  nursing facility, skilled   Discharge Equipment Recommendations: walker, rolling, grab bar, raised toilet   Barriers to discharge: Inaccessible home and Decreased caregiver support    Assessment:     Jojo Burrows is a 70 y.o. female admitted with a medical diagnosis of COVID-19 virus infection.  She presents with the following impairments/functional limitations:  weakness, impaired functional mobilty, impaired cardiopulmonary response to activity, gait instability, impaired endurance, impaired balance, impaired self care skills. Patient continues to tolerate sessions well. She is motivated to participate. Today she was on 2L O2 throughout with sats ranging from 85-96%; during standing ADLs patient hard coughing spell and required prolonged seated rest break. Patient encouraged to perform pursed lip breathing to recover. Discussion at length regarding d/c recs - patients plans for DC and DME at home. Patient would continue to benefit from skilled PT services while in the hospital. At this time, upon d/c she is an appropriate candidate for SNF in order to progress towards her PLOF.     Rehab Prognosis: Good; patient would benefit from acute skilled PT services to address these deficits and reach maximum level of function.    Recent Surgery: * No surgery found *      Plan:     During this hospitalization, patient to be seen 4 x/week to address the identified rehab impairments via therapeutic activities, therapeutic exercises, neuromuscular re-education, gait training and progress toward the following goals:    · Plan of Care Expires:  05/13/20    Subjective     Chief Complaint: HARVEY   Patient/Family Comments/goals: to go home   Pain/Comfort:  · Pain Rating 1: 0/10  · Pain Rating Post-Intervention 1: 0/10      Objective:     Communicated with RN prior to session.  Patient found up in chair with  pulse ox (continuous), telemetry, oxygen(VISI monitor) upon PT entry to room.     General Precautions: Standard, airborne, contact, droplet, fall(COVID+)   Orthopedic Precautions:N/A   Braces: N/A     Functional Mobility:  · Transfers:     · Sit to Stand:  stand by assistance with rolling walker  · Gait: 2x20ft with SBA and RW; FFP, mildly unsteady, steadg gait speed, narrow BHARGAVI   · Balance: static standing - SBA; dynamic standing - SBA    AM-PAC 6 CLICK MOBILITY  Turning over in bed (including adjusting bedclothes, sheets and blankets)?: 4  Sitting down on and standing up from a chair with arms (e.g., wheelchair, bedside commode, etc.): 4  Moving from lying on back to sitting on the side of the bed?: 4  Moving to and from a bed to a chair (including a wheelchair)?: 3  Need to walk in hospital room?: 3  Climbing 3-5 steps with a railing?: 3  Basic Mobility Total Score: 21       Therapeutic Activities and Exercises:  -Patient educated on the continued role and goal of PT  -Questions and concerns answered within the the PT scope of practice.   -White board updated in patient room to reflect level of assistance needed with nursing.     Patient left up in chair with all lines intact, call button in reach and RN notified..    GOALS:   Multidisciplinary Problems     Physical Therapy Goals        Problem: Physical Therapy Goal    Goal Priority Disciplines Outcome Goal Variances Interventions   Physical Therapy Goal     PT, PT/OT Ongoing, Progressing     Description:  Goals to be met by: 2020    Patient will increase functional independence with mobility by performin. Supine to sit with Stand-by Assistance - MET  2. Sit to supine with Stand-by Assistance  3. Sit to stand transfer with Stand-by Assistance - MET         Sit to stand with Mod(I)  4. Gait  x 30 feet with Stand-by Assistance using LRAD        Gait x50ft with SBA using RW   5. Perform stairs x1 flight w/R handrail CGA    6. Lower extremity exercise  program x15 reps per handout, with independence                       Time Tracking:     PT Received On: 04/17/20  PT Start Time: 1126     PT Stop Time: 1213  PT Total Time (min): 47 min     Billable Minutes: Gait Training 20 and Therapeutic Activity 27    Treatment Type: Treatment  PT/PTA: PT     PTA Visit Number: 0     Jhoana Herrera, PT  04/17/2020

## 2020-04-17 NOTE — PLAN OF CARE
Problem: Occupational Therapy Goal  Goal: Occupational Therapy Goal  Description  Goals to be met by: 4/26/2020  (updated to 5/12/2020)    Patient will increase functional independence with ADLs by performing:    UE Dressing with [place].  LE Dressing with Stand-by Assistance.  Grooming while standing with Supervision  Toileting from commode with Supervision  for hygiene and clothing management.   Toilet transfer to commode with Supervision.  Pt will demo controlled pursed lip breathing technique to normalize cardiovascular fx.   Continue OT as tolerated. Pt met grooming and toileting goals with POC updated for this progression.   Alysha Rodgers OT  4/17/2020        Outcome: Ongoing, Progressing

## 2020-04-17 NOTE — PLAN OF CARE
[facility]     Department of Hospital Medicine     [address] [place] [address]     [phone] after hours  [phone] fax       NURSING HOME ORDERS    04/17/2020    Admit to Nursing Home:  Skilled Bed            Diagnoses:  Active Hospital Problems    Diagnosis  POA    *COVID-19 virus infection [U07.1]  Yes    Multifocal pneumonia [J18.9]  Yes    Acute hypoxemic respiratory failure [J96.01]  Yes    Obesity (BMI 30.0-34.9) [E66.9]  Yes    Essential hypertension [I10]  Yes     Chronic      Resolved Hospital Problems   No resolved problems to display.       Patient is homebound due to:  COVID-19 virus infection    Allergies:  Review of patient's allergies indicates:   Allergen Reactions    Iodine Rash    Iodine and iodide containing products Itching and Rash    Shellfish containing products Itching and Rash       Vitals:      Every shift (Skilled Nursing patients)    Diet: Cardiac diet   Supplement:  1 can every three times a day with meals                         Type:  Ensure    Acitivities: As tolerated    LABS:  Per facility protocol    Nursing Precautions:   - Aspiration precautions:             - Total assistance with meals            -  Upright 90 degrees befor during and after meals             -  Suction at bedside          - Fall precautions per nursing home protocol   - Decubitus precautions:        -  for positioning   - Pressure reducing foam mattress   - Turn patient every two hours. Use wedge pillows to anchor patient    CONSULTS:   Physical Therapy to evaluate and treat     Occupational Therapy to evaluate and treat    MISCELLANEOUS CARE:     Routine Skin for Bedridden Patients:  Apply moisture barrier cream to all    skin folds and wet areas in perineal area daily and after baths and                           all bowel movements.         Medications: Discontinue all previous medication orders, if any. See new list below.      Jojo Burrows   Home Medication Instructions SD:[id number]    Printed on:04/17/20 1610   Medication Information                      acetaminophen (TYLENOL) 325 MG tablet  Take 2 tablets (650 mg total) by mouth every 4 (four) hours as needed for pain or fever > 101.             albuterol (PROVENTIL/VENTOLIN HFA) 90 mcg/actuation inhaler  Inhale 2 puffs into the lungs every 6 (six) hours as needed for Wheezing or Shortness of Breath. Rescue             ascorbic acid, vitamin C, (VITAMIN C) 500 MG tablet  Take 1 tablet (500 mg total) by mouth 2 (two) times daily for 10 days             atorvastatin (LIPITOR) 40 MG tablet  Take 1 tablet (40 mg total) by mouth every evening.             benzonatate (TESSALON) 100 MG capsule  Take 1 capsule (100 mg total) by mouth 3 (three) times daily as needed for Cough.             calcium carbonate (TUMS) 200 mg calcium (500 mg) chewable tablet. Take 2 tablets (1,000 mg total) by mouth 3 (three) times daily as needed.             clotrimazole-betamethasone 1-0.05% (LOTRISONE) cream  Apply topically 2 (two) times daily.             cyanocobalamin (VITAMIN B-12) 250 MCG tablet  Take 250 mcg by mouth once daily.             dextromethorphan-guaifenesin  mg (MUCINEX DM)  mg per 12 hr tablet  Take 1 tablet by mouth 2 (two) times daily. for 7 days             diclofenac sodium (VOLTAREN) 1 % Gel  Apply 2 g topically 2 (two) times daily as needed.             ergocalciferol, vitamin D2, (VITAMIN D ORAL)  Take 2,000 Int'l Units by mouth once daily.             esomeprazole (NEXIUM) 40 MG capsule  Take 1 capsule (40 mg total) by mouth before breakfast.             folic acid/multivit-min/lutein (CENTRUM SILVER ORAL)  Take 1 tablet by mouth once daily.             hydroxyzine HCL (ATARAX) 25 MG tablet  Take 1 tablet (25 mg total) by mouth nightly as needed for Anxiety (insomnia or anxiety).             levocetirizine (XYZAL) 5 MG tablet  Take 1 tablet (5 mg total) by mouth daily  as needed (cold and sinus).             metoprolol succinate (TOPROL-XL) 50 MG 24 hr tablet  TAKE 1 TABLET (50 MG TOTAL) BY MOUTH ONCE DAILY.             ondansetron (ZOFRAN-ODT) 4 MG TbDL  Take 1 tablet (4 mg total) by mouth every 6 (six) hours as needed for nausea              tiotropium (SPIRIVA) 18 mcg inhalation capsule  Inhale 1 capsule (18 mcg total) into the lungs once daily. Controller               2 liter continuous oxygen                           _________________________________  Endy Ni MD  04/17/2020

## 2020-04-17 NOTE — PLAN OF CARE
2578   was informed by Dr Ni that the patient is medically stable to discharge today. Patient has been accepted to [facility]. CM questioned nurse Edda ([id number]) regarding PPD results. Edda stated that the PPD was not previously administered. PPD reordered & CM requested that it be placed today prior to discharge.    2495  CM informed the patient's nurse, Edda ([id number]), of order noted for a six minute walk test & requested that results be documented for patient's possible need for home O2 is she discharges home with family. [facility] follow up appointment scheduled for the patient with Dr. Joo Munoz (PCP) on 4/24/2020 at 0900.    Will continue to follow.

## 2020-04-17 NOTE — PROGRESS NOTES
Hospital Medicine / Tele Medicine  Progress Note  [facility]      Patient Name: Jojo Burrows  MRN:  [id number]  Hospital Medicine Team: VIRTUAL HOSPITAL MEDICINE TEAM 8 Endy Ni MD  Date of Admission:  3/31/2020     Length of Stay:  LOS: 17 days     VIRTUAL TELENOTE    Start time: 8:45 am  Chief complaint: Suspect COVID-19  End time:  9:00 am  Total time spent with patient: 15mins    The attending portion of this evaluation, treatment, and documentation was performed per Endy Ni MD via audio only.    Principal Problem:  COVID-19 virus infection      HPI:  Jojo Burrows is a 70F with HTN, obesity who presents for evaluation of SOB. She was seen in the ED on 03/28 with fever, chills, cough. She wasn't hypoxic with ambulation and discharged home.  She tested positive for COVID-19. Her  is admitted for COVID+ complications. She presented with worsening SOB, cough, pleuritic chest pain, sinus congestions, myalgias.     Hospital Course:  Initially, SpO2 of 78%, placed on NRB 15L, weaned to 6L NC, but then returned to requiring 15L via NRB through 4/8.  Completed 5 day course of ceftriaxone and azithromycin for CAP.  Completed 5 day course of HCQ per COVID-19 protocol.  Has remained on 15 L via NRB for several days and hence was transferred to 11 W high oxygen unit for higher level of care. Current regimen consists of duo nebs q.6 scheduled, Spiriva daily, CPT t.i.d., flutter valve treatments q.6 while awake, Lasix 40 IV daily (monitoring daily UOP), and nightly CPAP.  Blood cultures remain no growth to date and respiratory culture with no significant growth.  CBC and CMP with no significant abnormalities aside from intermittent hypokalemia and hypomagnesemia with diuresis, responsive to repletion.  CRP has down trended from 186 (Peak) to 136.  Of note, patient's  passed away from COVID-19 related complications at [facility] on 4/6; have been providing daily emotional support  for patient.  Daughters have been checking on patient each day.  Continuing to monitor closely for improvement; patient full code and open to all respiratory interventions at this time. Transferred to high oxygen unit on 15L NRB 4/8. Patient improved with supportive care and completed course of abx for CAP. Transferred to telemedicine 4/16. PT OT rec SNF. Currently medically stable for discharge to SNF. Pending placement at this time.      Interval History:     Patient noted to be hemodynamically stable.  No acute events overnight. No new complaints other than ongoing weakness. Appetite, dyspnea, cough has improved. Walking around with a walker. Encourage use of incentive spirometer, albuterol inhaler, p.r.n. antitussives.  Attempt to get out of bed, sit up in chair and walk around the room. Medially stable for SNF at this time, pending placement.     Review of Systems:  ROS (Positive in Bold, otherwise negative)  Constitutional: fever, chills, night sweats, malaise, weakness  CV: chest pain, edema, palpitations  Resp: SOB, cough, sputum production  GI: changes in appetite, NVDC, pain, melena, hematochezia, GERD, hematemesis  : Dysuria, hematuria, urinary urgency, frequency  MSK: arthralgia/myalgia, joint swelling  All other systems were reviewed & are negative.     Inpatient Medications:    Current Facility-Administered Medications:     acetaminophen tablet 650 mg, 650 mg, Oral, Q4H PRN, Mikal Staples PA-C, 650 mg at 04/06/20 0928    acetaminophen tablet 650 mg, 650 mg, Oral, Q8H PRN, Mikal Staples PA-C, 650 mg at 04/06/20 0933    albuterol inhaler 4 puff, 4 puff, Inhalation, Q6H WAKE, Karlos Epperson MD, 4 puff at 04/17/20 0813    ascorbic acid (vitamin C) tablet 500 mg, 500 mg, Oral, BID, Marisol Jones MD, 500 mg at 04/17/20 0804    atorvastatin tablet 40 mg, 40 mg, Oral, QHS, Ken Tejada MD, 40 mg at 04/16/20 2213    benzonatate capsule 100 mg, 100 mg, Oral, TID PRN, Mikal Staples PA-C, 100 mg  at 04/16/20 2213    calcium carbonate 200 mg calcium (500 mg) chewable tablet 1,000 mg, 1,000 mg, Oral, TID PRN, ENRIQUE Aldana-C, 1,000 mg at 04/05/20 0845    dextromethorphan-guaifenesin  mg per 12 hr tablet 1 tablet, 1 tablet, Oral, BID, Karlos Epperson MD, 1 tablet at 04/17/20 0804    dextrose 10% (D10W) Bolus, 12.5 g, Intravenous, PRN, ENRIQUE Aldana-C    dextrose 10% (D10W) Bolus, 25 g, Intravenous, PRN, ENRIQUE Aldana-C    enoxaparin injection 40 mg, 40 mg, Subcutaneous, QHS, ENRIQUE Aldana-C, 40 mg at 04/16/20 2213    glucagon (human recombinant) injection 1 mg, 1 mg, Intramuscular, PRN, Mikal Staples PA-C    glucose chewable tablet 16 g, 16 g, Oral, PRN, ENRIQUE Aldana-SHERWIN    glucose chewable tablet 24 g, 24 g, Oral, PRN, Mikal Staples PA-C    hydroxyzine HCL tablet 25 mg, 25 mg, Oral, Nightly PRN, Marisol Jones MD, 25 mg at 04/16/20 2213    loperamide capsule 2 mg, 2 mg, Oral, Q6H PRN, Mikal Staples PA-C, 2 mg at 04/01/20 1107    melatonin tablet 6 mg, 6 mg, Oral, Nightly PRN, Naomi Newell Jr., PA-SHERWIN, 6 mg at 04/16/20 2212    metoprolol succinate (TOPROL-XL) 24 hr tablet 50 mg, 50 mg, Oral, Daily, Marisol Jones MD, 50 mg at 04/17/20 0804    ondansetron disintegrating tablet 8 mg, 8 mg, Oral, Q8H PRN, Mikal Staples PA-C    pantoprazole EC tablet 40 mg, 40 mg, Oral, QHS, ENRIQUE Aldana-C, 40 mg at 04/16/20 2212    polyethylene glycol packet 17 g, 17 g, Oral, BID PRN, Mikal Staples PA-C    sodium chloride 0.9% flush 10 mL, 10 mL, Intravenous, PRN, Aidan Myrick PA-C    sodium chloride 0.9% flush 10 mL, 10 mL, Intravenous, PRN, Mikal Staples PA-C    tiotropium inhalation capsule 18 mcg, 1 capsule, Inhalation, Daily, Mikal Staples PA-C, 18 mcg at 04/16/20 0800    trazodone split tablet 25 mg, 25 mg, Oral, Nightly PRN, Ken Tejada MD, 25 mg at 04/12/20 2153    tuberculin injection 5 Units, 5 Units, Intradermal, Once **AND** [START ON 4/19/2020]  "POCT TB Skin Test Read, , , Once, Endy Ni MD    vitamin A capsule 10,000 Units, 10,000 Units, Oral, Daily, Ken Tejada MD, 10,000 Units at 04/17/20 0804    vitamin D 1000 units tablet 1,000 Units, 1,000 Units, Oral, Daily, Ken Tejada MD, 1,000 Units at 04/17/20 0804    white petrolatum 41 % ointment, , Topical (Top), TID, Karlos Epperson MD      Physical Exam:      Intake/Output Summary (Last 24 hours) at 4/17/2020 0906  Last data filed at 4/17/2020 0600  Gross per 24 hour   Intake 480 ml   Output 1401 ml   Net -921 ml     Wt Readings from Last 3 Encounters:   04/11/20 78 kg (171 lb 15.3 oz)   03/28/20 75.8 kg (167 lb)   03/27/20 75.8 kg (167 lb)       /88 (BP Location: Right arm, Patient Position: Lying)   Pulse (!) 111   Temp 98.2 °F (36.8 °C) (Axillary)   Resp 18   Ht 5' 2" (1.575 m)   Wt 78 kg (171 lb 15.3 oz)   SpO2 96%   Breastfeeding? No   BMI 31.45 kg/m²     Done by RN:  GEN: NAD  HEENT: EOMI  CV: RRR  Resp: CTAB, diminished BS at bases   GI: soft, NTND  Neuro: alert, oriented  Skin: no rash  MSK: no edema  Psych: normal affect    Laboratory:  Lab Results   Component Value Date    [id number] Detected (A) 03/28/2020       Recent Labs   Lab 04/11/20  0549 04/14/20 0456 04/16/20  0159   WBC 9.04 9.29 9.65   LYMPH 21.2  1.9 27.2  2.5 31.0  3.0   HGB 11.8* 11.5* 10.9*   HCT 37.0 37.1 35.9*    284 274     Recent Labs   Lab 04/11/20  0549 04/14/20 0456 04/16/20  0159    136 140   K 3.6 3.8 4.3   CL 98 100 101   CO2 31* 31* 30*   BUN 10 10 12   CREATININE 0.7 0.7 0.8    90 94   CALCIUM 9.2 9.0 9.5     Recent Labs   Lab 04/14/20  0456 04/16/20  0159   ALKPHOS 64 66   ALT 53* 47*   AST 39 30   ALBUMIN 2.7* 2.8*   PROT 7.1 7.1   BILITOT 0.4 0.4        Recent Labs     04/16/20  0159   FERRITIN 504*   CRP 17.1*       All labs within the last 24 hours were reviewed.     Microbiology:  Microbiology Results (last 7 days)     ** No results found for the last 168 " "hours. **            Imaging  ECG Results          EKG 12-lead (Final result)  Result time 04/01/20 09:38:21    Final result by Interface, Lab In [facility] (04/01/20 09:38:21)                 Narrative:    Test Reason : R06.02,    Vent. Rate : 079 BPM     Atrial Rate : 079 BPM     P-R Int : 148 ms          QRS Dur : 086 ms      QT Int : 382 ms       P-R-T Axes : 067 -15 014 degrees     QTc Int : 438 ms    Normal sinus rhythm  Minimal voltage criteria for LVH, may be normal variant  Nonspecific T wave abnormality  Abnormal ECG  When compared with ECG of 28-MAR-2020 18:26,  No significant change was found  Confirmed by BLANCA MARTINEZ MD ([id number]) on 4/1/2020 9:38:08 AM    Referred By: [email or web address]   SELF           Confirmed By:BLANCA MARTINEZ MD                              No results found for this or any previous visit.    X-Ray Chest AP Portable  Narrative: EXAMINATION:  XR CHEST AP PORTABLE    CLINICAL HISTORY:  Provided history is "  Shortness of breath".    TECHNIQUE:  One view of the chest.    COMPARISON:  03/28/2020, 03/27/2020, and 08/21/2018.    FINDINGS:  Lung volumes are low.  Cardiac wires overlie the chest.  Platelike subsegmental opacities are again present in the lower lung zones, slightly worse when compared with the prior study.  New subtle bibasilar and peripheral interstitial opacities.  No confluent area of consolidation.  No sizable pleural effusion.  No pneumothorax.  Impression: Worsening bibasilar subsegmental and interstitial opacities, worrisome for worsening infectious or inflammatory process including pneumonia/pneumonitis in this patient with recent diagnosis of COVID-19.    Electronically signed by: Graham Ortiz MD  Date:    03/31/2020  Time:    20:46      All imaging within the last 24 hours was reviewed.     Assessment and Plan:    Active Hospital Problems    Diagnosis  POA    *COVID-19 virus infection [U07.1]  Yes    Multifocal pneumonia [J18.9]  Yes    Acute hypoxemic respiratory " failure [J96.01]  Yes    Obesity (BMI 30.0-34.9) [E66.9]  Yes    Essential hypertension [I10]  Yes     Chronic      Resolved Hospital Problems   No resolved problems to display.         Acute respiratory distress with hypoxia due to Covid-19 Virus Infection - improved  - COVID-19 testing POSITIVE: Collection Date: 3/28/2020 Collection Time:   6:43 PM  - Infection Control notified  - Improved with supportive care, now on 2L NC  - Medically stable for dc to SNF     - Isolation:   - Airborne and Droplet Precautions  - Surgical mask on patient   - N95 masks must be fit tested, wear eye protection  - 20 second hand hygiene              - Limit visitors per hospital policy              - Consolidate lab draws, nursing care, and interventions     - Diagnostics: (Lymphopenia, hyponatremia, hyperferritinemia, elevated troponin, elevated d-dimer, age, and comorbidities are significant predictors of poor clinical outcome)              - CBC:      No lymphopenia       trend Q48hrs  - Ferritin:                      471  ; repeat prior to discharge   - D-dimer:                    0.73   ; repeat prior to discharge  - CMP:   NA wnl               trend Q48hrs  - CRP:                         134 > 63 > 17 ; trend Q48hrs  - BNP:                          <10  - CPK:                          275  - LDH:                          444; repeat prior to discharge  - Troponin:                   0.008  - Procalcitonin:            0.04              - ECG with QTc:          NSR with QTc 438              - rapid Flu:                   negative              - Blood culture x2:       NGTD              - Respiratory Culture   No S.Aureus or Pseudomonas, normal respiratory fluora     - Management:   Bundle care as able to maintain isolation & minimize in/out of room   - Supplemental O2 to maintain SpO2 92%-96%.               - Telemetry & continuous pulse oximetry               - If wheezing                           - albuterol inhaler q.6  scheduled, CPT t.i.d., flutter valve treatments q.6 while awake               - acetaminophen 650mg PO Q6hr PRN fever              - loperamide PRN for viral diarrhea  -  Completed empiric antibiotics for CAP- end date: 4/4                          - Ceftriaxone 1g IV Q24hrs                          - Azithromycin 500mg IV day #1, then 250mg PO daily x4 days              -Mucinex 600 b.i.d.               -d/c Lasix               - Investigational Treatment Protocol:                           - statin: atorvastatin 40mg po daily                           - patient completed HCQ 400mg PO BID x1 day, then 400mg PO daily x 4 days               -normal glucose 6 phosphate dehydrogenase, ECG, and start Qshift POCT glucose              -transferred to high oxygen flow unit on [address] for more aggressive respiratory interventions        HTN  - continue MTP XL 50 mg daily      Advance Care Planning  Goals of care, counseling/discussion  -patient understands severity of disease and potential to need to go to ICU for mechanical ventilation should oxygen status worsen  -patient's  passed away from COVID-19 at [facility] on 04/06.  All members of healthcare team are encouraged provide emotional support to this patient daily.  -confirms FULL code status  Advance Care Planning     Dispo - medically stable for SNF at this time, pending placement         Patient's chronic/stable medical conditions noted in the problem list above will be managed with the patient's home medications as tolerated.     VTE High Risk Prophylaxis: enoxaparin 40mg sq QHS @ 2100 (bundled care) if GFR >30    Endy Ni MD  Department of Hospital medicine

## 2020-04-17 NOTE — PLAN OF CARE
Received call from , Hiral, at [facility] stating that patient has authorization for admittance today.  Family has signed paperwork and patient will be going to room 410.   Informed patient's nurse that report can be called to Charissa at [phone].   Transportation by ambulance with stretcher requested with [facility] for pickup time at 19:15.

## 2020-04-17 NOTE — NURSING
Patient continues to complain about CPAP machine.  Patient states that she cannot rest with the CPAP machine on and she feels as though it is making her breathing increase and her blood pressure.  I took the CPAP off and put her back on O2 at 2L per NC.  Patient states that she feels better.

## 2020-04-18 PROCEDURE — 99239 PR HOSPITAL DISCHARGE DAY,>30 MIN: ICD-10-PCS | Mod: HCNC,,, | Performed by: INTERNAL MEDICINE

## 2020-04-18 PROCEDURE — 99239 HOSP IP/OBS DSCHRG MGMT >30: CPT | Mod: HCNC,,, | Performed by: INTERNAL MEDICINE

## 2020-04-18 NOTE — DISCHARGE SUMMARY
Discharge Summary  Hospital Medicine       Name: Jojo Burrows  YOB: 1950 (Age: 70 y.o.)  Date of Admission: 3/31/2020  Date of Discharge: 4/18/2020  Attending Provider on Discharge: Endy Ni MD  [facility] Medicine Team: [facility] [place] MEDICINE TEAM 8  Code status: Full Code    Primary Care Provider: Joo Munoz MD    Discharge Diagnosis:  Active Hospital Problems    Diagnosis  POA    *COVID-19 virus infection [U07.1]  Yes    Multifocal pneumonia [J18.9]  Yes    Acute hypoxemic respiratory failure [J96.01]  Yes    Obesity (BMI 30.0-34.9) [E66.9]  Yes    Essential hypertension [I10]  Yes     Chronic      Resolved Hospital Problems   No resolved problems to display.       HPI  Jojo Burrows is a 70F with HTN, obesity who presents for evaluation of SOB. She was seen in the ED on 03/28 with fever, chills, cough. She wasn't hypoxic with ambulation and discharged home.  She tested positive for COVID-19. Her  is admitted for COVID+ complications. She presented with worsening SOB, cough, pleuritic chest pain, sinus congestions, myalgias.        Hospital Course:  Jojo Burrows was admitted to [facility] Medicine for treatment of suspected COVID-19 viral infection and was treated with supportive care following a comprehensive physical, radiographic, and lab evaluation tailored to the current standard of care for COVID19. Please review the admission H&P and the studies listed below for details.    Initially, SpO2 of 78%, placed on NRB 15L, weaned to 6L NC, but then returned to requiring 15L via NRB through 4/8.  Completed 5 day course of ceftriaxone and azithromycin for CAP.  Completed 5 day course of HCQ per COVID-19 protocol.  Has remained on 15 L via NRB for several days and hence was transferred to 11 W high oxygen unit for higher level of care. Current regimen consists of duo nebs q.6 scheduled, Spiriva daily, CPT t.i.d., flutter valve treatments q.6 while awake, Lasix 40 IV daily  (monitoring daily UOP), and nightly CPAP.  Blood cultures remain no growth to date and respiratory culture with no significant growth.  CBC and CMP with no significant abnormalities aside from intermittent hypokalemia and hypomagnesemia with diuresis, responsive to repletion.  CRP has down trended from 186 (Peak) to 136.  Of note, patient's  passed away from COVID-[place] related [place] [facility] [place]; have been providing daily emotional support for patient.  Daughters have been checking on patient each day.  Continuing to monitor closely for improvement; patient full code and open to all respiratory interventions at this time. Transferred to high oxygen unit on 15L NRB 4/8. Patient improved with supportive care and completed course of abx for CAP. Transferred to telemedicine 4/16. PT OT rec SNF. Currently medically stable for discharge to SNF. Pending placement at this time. She was accepted to [facility] SNF 4/17 and discharged on 2L oxygen.     On the day of discharge, isolation precautions were reviewed at length verbally. The patient was also provided with written isolation guidelines modified from the [facility] as well as the CDC, as part of discharge paperwork. An updated phone number was obtained, which will be used to contact the patient when results are available, and positive patients will be enrolled in both the COVID-19 Home Symptom Monitoring program.      Labs:  Recent Labs   Lab 04/14/20 0456 04/16/20 0159   WBC 9.29 9.65   HGB 11.5* 10.9*   HCT 37.1 35.9*    274     Recent Labs   Lab 04/14/20 0456 04/16/20 0159    140   K 3.8 4.3    101   CO2 31* 30*   BUN 10 12   CREATININE 0.7 0.8   GLU 90 94   CALCIUM 9.0 9.5     Recent Labs   Lab 04/14/20 0456 04/16/20 0159   ALKPHOS 64 66   ALT 53* 47*   AST 39 30   ALBUMIN 2.7* 2.8*   PROT 7.1 7.1   BILITOT 0.4 0.4      No results for input(s): POCTGLUCOSE in the last 168 hours.  No results for  input(s): CPK, CPKMB, MB, TROPONINI in the last 72 hours.    ROS (Positive in Bold, otherwise negative)  Constitutional: fever, chills, night sweats  CV: chest pain, edema, palpitations  Resp: SOB, cough, sputum production  GI: changes in appetite, NVDC, pain, melena, hematochezia, GERD, hematemesis  : Dysuria, hematuria, urinary urgency, frequency  MSK: arthralgia/myalgia, joint swelling  Neuro/Psych: anxiety, depression    Discharge Exam: Patient was seen and examined on the date of discharge and determined to be suitable for discharge.    Procedures: CXR    Consultants: HM, nutrition     Discharge Medication List as of 4/18/2020  1:06 AM      START taking these medications    Details   acetaminophen (TYLENOL) 325 MG tablet Take 2 tablets (650 mg total) by mouth every 4 (four) hours as needed., Starting Fri 4/17/2020, Normal      ascorbic acid, vitamin C, (VITAMIN C) 500 MG tablet Take 1 tablet (500 mg total) by mouth 2 (two) times daily., Starting Fri 4/17/2020, OTC      atorvastatin (LIPITOR) 40 MG tablet Take 1 tablet (40 mg total) by mouth every evening., Starting Fri 4/17/2020, Until Sat 4/17/2021, Normal      calcium carbonate (TUMS) 200 mg calcium (500 mg) chewable tablet Take 2 tablets (1,000 mg total) by mouth 3 (three) times daily as needed., Starting Fri 4/17/2020, Until Sat 4/17/2021, OTC      dextromethorphan-guaifenesin  mg (MUCINEX DM)  mg per 12 hr tablet Take 1 tablet by mouth 2 (two) times daily. for 10 days, Starting Fri 4/17/2020, Until Mon 4/27/2020, OTC      hydroxyzine HCL (ATARAX) 25 MG tablet Take 1 tablet (25 mg total) by mouth nightly as needed for Anxiety (insomnia or anixety)., Starting Fri 4/17/2020, Normal      tiotropium (SPIRIVA) 18 mcg inhalation capsule Inhale 1 capsule (18 mcg total) into the lungs once daily. Controller, Starting Sat 4/18/2020, Until Sun 4/18/2021, Normal         CONTINUE these medications which have CHANGED    Details   albuterol  (PROVENTIL/VENTOLIN HFA) 90 mcg/actuation inhaler Inhale 2 puffs into the lungs every 6 (six) hours as needed for Wheezing or Shortness of Breath. Rescue, Starting Fri 4/17/2020, Until Sat 4/17/2021, Normal      benzonatate (TESSALON) 100 MG capsule Take 1 capsule (100 mg total) by mouth 3 (three) times daily as needed for Cough., Starting Fri 4/17/2020, Until Mon 4/27/2020, Print         CONTINUE these medications which have NOT CHANGED    Details   clotrimazole-betamethasone 1-0.05% (LOTRISONE) cream Apply topically 2 (two) times daily., Starting Tue 10/23/2018, Normal      cyanocobalamin (VITAMIN B-12) 250 MCG tablet Take 250 mcg by mouth once daily., Historical Med      diclofenac sodium (VOLTAREN) 1 % Gel Apply 2 g topically 2 (two) times daily as needed., Starting u 12/19/2019, Normal      ergocalciferol, vitamin D2, (VITAMIN D ORAL) Take 2,000 Int'l Units by mouth once daily., Historical Med      esomeprazole (NEXIUM) 40 MG capsule Take 1 capsule (40 mg total) by mouth before breakfast., Starting 4/11/2014, Until Discontinued, Normal      folic acid/multivit-min/lutein (CENTRUM SILVER ORAL) Take 1 tablet by mouth once daily., Historical Med      levocetirizine (XYZAL) 5 MG tablet Take 1 tablet (5 mg total) by mouth daily as needed (cold and sinus)., Starting Mon 3/23/2020, Until Tue 3/23/2021, Normal      metoprolol succinate (TOPROL-XL) 50 MG 24 hr tablet TAKE 1 TABLET (50 MG TOTAL) BY MOUTH ONCE DAILY., Starting Sun 11/3/2019, Normal      ondansetron (ZOFRAN-ODT) 4 MG TbDL Take 1 tablet (4 mg total) by mouth every 6 (six) hours as needed., Starting Fri 3/27/2020, Normal         STOP taking these medications       acetaminophen (TYLENOL ARTHRITIS ORAL) Comments:   Reason for Stopping:         ammonium lactate (LAC-HYDRIN) 12 % lotion Comments:   Reason for Stopping:         carbamide peroxide (DEBROX) 6.5 % otic solution Comments:   Reason for Stopping:         cetirizine (ZYRTEC) 10 MG tablet Comments:    Reason for Stopping:         ibuprofen (ADVIL,MOTRIN) 200 MG tablet Comments:   Reason for Stopping:         methylPREDNISolone (MEDROL DOSEPACK) 4 mg tablet Comments:   Reason for Stopping:         naproxen (NAPROSYN) 375 MG tablet Comments:   Reason for Stopping:         ondansetron (ZOFRAN) 4 MG tablet Comments:   Reason for Stopping:         promethazine-codeine 6.25-10 mg/5 ml (PHENERGAN WITH CODEINE) 6.25-10 mg/5 mL syrup Comments:   Reason for Stopping:               The relevant and important risks, side effects, and benefits of their medications were reviewed with patient during hospitalization and at discharge. The patient was given the opportunity to discuss and ask questions about their medications, including target symptoms, potential risks, side effects and benefits of their medications, as well as their expected prognosis if non-medication treatment options were chosen.  The patient expresses understanding of all these options and information and voluntarily consents to treatment.    Discharge Diet:cardiac diet    Activity: activity as tolerated    Discharge Condition: Stable    Disposition: Skilled Nursing Facility     Time spent  on the discharge of the patient including review of hospital course with the patient. reviewing discharge medications and arranging follow-up care: 39 mins    Discharge examination Patient was seen and examined on the date of discharge and determined to be suitable for discharge.    Discharge plan and follow up:  It is critical that you make your follow-up appointment(s). If you are discharged on the weekend or after business hours, or if we are unable to schedule these appointments for you for any reason, you or a family member need to call during the next business day to schedule your appointment(s).    -Follow up with your PCP, Joo Munoz MD within 1-2 weeks as arranged with  and treatment team prior to discharge  -Follow up with [place] [id number] home monitoring program    -Take all medications as prescribed and listed above.     - Please return to ED or call your physician if you have:         1. Fevers > 101.5 unresponsive to tylenol.       2. Abdominal pain and/or distention       3. Intractable nausea, vomiting or diarrhea       4. Inability to tolerate adequate oral intake of food       5. Neurologic changes, chest pain or shortness of breath     6. Worsening shortness of breath        Future Appointments   Date Time Provider Department Center   4/21/2020  9:00 AM Joo Munoz MD [facility] [place]   4/24/2020  9:00 AM Joo Munoz MD [facility] Ni MD  Hospital Medicine Staff  [phone] pager

## 2020-04-18 NOTE — PLAN OF CARE
04/18/20 0748   Final Note   Assessment Type Final Discharge Note     Patient discharged to [facility] SNF on 4/17/2020. Discharge summary faxed to [employer].

## 2020-04-20 ENCOUNTER — OUTPATIENT CASE MANAGEMENT (OUTPATIENT)
Dept: ADMINISTRATIVE | Facility: OTHER | Age: 70
End: 2020-04-20

## 2020-04-20 NOTE — PT/OT/SLP DISCHARGE
Occupational Therapy Discharge Summary    Jojo Burrows  MRN: [id number]   Principal Problem: COVID-19 virus infection      Patient Discharged from acute Occupational Therapy on 4/17/2020.  Please refer to prior OT note dated 4/17/2020 for functional status.    Assessment:      Goals partially met.    Objective:     GOALS:   Multidisciplinary Problems     Occupational Therapy Goals        Problem: Occupational Therapy Goal    Goal Priority Disciplines Outcome Interventions   Occupational Therapy Goal     OT, PT/OT Ongoing, Progressing    Description:  Goals to be met by: 4/26/2020  (updated to 5/12/2020)    Patient will increase functional independence with ADLs by performing:    UE Dressing with [place].  LE Dressing with Stand-by Assistance.  Grooming while standing with Supervision  Toileting from commode with Supervision  for hygiene and clothing management.   Toilet transfer to commode with Supervision.  Pt will demo controlled pursed lip breathing technique to normalize cardiovascular fx.                        Reasons for Discontinuation of Therapy Services  Transfer to alternate level of care.      Plan:     Patient Discharged to: [facility]    Alysha Rodgers OT  4/20/2020

## 2020-04-22 RX ORDER — LEVOCETIRIZINE DIHYDROCHLORIDE 5 MG/1
5 TABLET, FILM COATED ORAL DAILY PRN
Qty: 30 TABLET | Refills: 3 | Status: SHIPPED | OUTPATIENT
Start: 2020-04-22 | End: 2020-09-16 | Stop reason: SDUPTHER

## 2020-04-22 NOTE — TELEPHONE ENCOUNTER
----- Message from Alysha Reyes sent at 4/22/2020  2:06 PM CDT -----  Contact: lucille (daughter) [phone]  Lucille is requesting a call regarding location pt is currently at. Please advise.

## 2020-04-22 NOTE — TELEPHONE ENCOUNTER
Please advise, pts daughter feels like her mother is being treated unfairly. Residents are wondering in her room, shes been given the the wrong medicine and doesn't feel the nurses are treating her right. Wants mother to come home, wants orders for home health.     Pt is past her 14 days quarantine. She was tested on 03/31/2020

## 2020-04-22 NOTE — TELEPHONE ENCOUNTER
Spoke with Dayne the  at the facility. They need to complete O2 studies before pt can be discharged and he does not have a physician there to sign off on the o2 or HH orders. Pt is addiment about leaving today but if she leaves they cannot give her orders w/o a drs signature. Will call daughter to explain and make her aware

## 2020-04-22 NOTE — TELEPHONE ENCOUNTER
----- Message from Dariana Toledo sent at 4/22/2020  1:30 PM CDT -----  Contact: Daughter Lucille @ [phone]  Good Afternoon  Patient is in a nursing home for rehab but she is not safe and need to be taken out. Daughter would like Home Health    Thank you

## 2020-04-22 NOTE — TELEPHONE ENCOUNTER
Discharge orders should be discussed with the nursing facility doctor who is discharging the patient.  He will have the patient's current oxygen levels and will know if the patient meets requirements for home oxygen.  The oxygen levels must be documented in her medical record on the day of discharge.

## 2020-04-22 NOTE — TELEPHONE ENCOUNTER
----- Message from Graciela Pierre sent at 4/22/2020 11:02 AM CDT -----  Contact: [phone]/Lucille Burrows/ daughter  Patient's daughter is requesting a call from the doctor regarding her mother being at [facility].  Patient's daughter needs to speak with the doctor, she has been the wrong medication, patient's walking in her room.     The daughter and family wants her out of the place.  She would like to discuss home health and the patient's oxygen.    Please advise, thank you.

## 2020-04-22 NOTE — TELEPHONE ENCOUNTER
Pt is on 2% oxygen, the facility is discharging her without the oxygen. Pts daughter is asking for an order for oxygen

## 2020-04-24 ENCOUNTER — TELEPHONE (OUTPATIENT)
Dept: INTERNAL MEDICINE | Facility: CLINIC | Age: 70
End: 2020-04-24

## 2020-04-24 ENCOUNTER — OFFICE VISIT (OUTPATIENT)
Dept: HOME HEALTH SERVICES | Facility: CLINIC | Age: 70
End: 2020-04-24
Payer: MEDICARE

## 2020-04-24 VITALS — HEIGHT: 62 IN | WEIGHT: 171 LBS | BODY MASS INDEX: 31.47 KG/M2

## 2020-04-24 DIAGNOSIS — Z99.81 OXYGEN DEPENDENT: ICD-10-CM

## 2020-04-24 DIAGNOSIS — R06.02 SOB (SHORTNESS OF BREATH): ICD-10-CM

## 2020-04-24 DIAGNOSIS — U07.1 COVID-19 VIRUS INFECTION: Primary | ICD-10-CM

## 2020-04-24 DIAGNOSIS — R06.02 SHORTNESS OF BREATH: ICD-10-CM

## 2020-04-24 DIAGNOSIS — I10 ESSENTIAL HYPERTENSION: Chronic | ICD-10-CM

## 2020-04-24 DIAGNOSIS — R26.2 DIFFICULTY IN WALKING: ICD-10-CM

## 2020-04-24 PROCEDURE — 99441 PR PHYSICIAN TELEPHONE EVALUATION 5-10 MIN: ICD-10-PCS | Mod: 95,,, | Performed by: NURSE PRACTITIONER

## 2020-04-24 PROCEDURE — 99441 PR PHYSICIAN TELEPHONE EVALUATION 5-10 MIN: CPT | Mod: 95,,, | Performed by: NURSE PRACTITIONER

## 2020-04-24 RX ORDER — ALBUTEROL SULFATE 90 UG/1
2 AEROSOL, METERED RESPIRATORY (INHALATION) EVERY 6 HOURS PRN
Qty: 18 G | Refills: 0 | Status: SHIPPED | OUTPATIENT
Start: 2020-04-24 | End: 2021-04-20 | Stop reason: SDUPTHER

## 2020-04-24 RX ORDER — HYDROXYZINE HYDROCHLORIDE 25 MG/1
25 TABLET, FILM COATED ORAL NIGHTLY PRN
Qty: 30 TABLET | Refills: 0 | Status: SHIPPED | OUTPATIENT
Start: 2020-04-24 | End: 2020-05-13

## 2020-04-24 NOTE — TELEPHONE ENCOUNTER
----- Message from Sary Darryn sent at 4/24/2020  1:23 PM CDT -----  Contact: Lucille Burrows (daughter)  [phone]  Patient want to be released from the [facility] but they keep changing her release date due to no one being able to bring out of her Oxygen or a doctor to sign for it. There are incidents that is causing her harm happening there, almost gave her the wrong Rx, spiders crawling on her neck.. Will you order her Oxygen for her today  and she will pick it up? Her mother want to be out of the Nursing home today. Will you order this Oxygen?  The family feels it's urgent.

## 2020-04-24 NOTE — TELEPHONE ENCOUNTER
Marcy toledo, np with home care transition put in orders for ox/ home health and dme eqyipment.  I called nurse at nursing home and advised ox should be delivered by 5.  I called daughter and made her aware that patient can't go home with o2 hook up.    She understood.

## 2020-04-24 NOTE — TELEPHONE ENCOUNTER
"----- Message from Melba Bergeron sent at 4/24/2020  3:02 PM CDT -----  Contact: jaziel " [facility]"  .Type: Patient Call Back    Who called jaziel" [facility]"    What is the request in detail: Pt was released from the hop on today and family is requesting to get orders for pt to go home with oxygen asap. Fax#[phone]    Can the clinic reply by [facility]? Call back     Would the patient rather a call back or a response via [facility]?    Best call back number: [phone]          "

## 2020-04-24 NOTE — PROGRESS NOTES
Established Patient Care - Audio Only Telehealth Visit     The patient location is: Home  The chief complaint leading to consultation is: Transition of Care, COVID19, Shortness of Breath, Oxygen Dependent  Visit type: Virtual visit with audio only (telephone)     The reason for the audio only service rather than synchronous audio and video virtual visit was related to technical difficulties or patient preference/necessity.     Each patient to whom I provide medical services by telemedicine is:  (1) informed of the relationship between the physician and patient and the respective role of any other health care provider with respect to management of the patient; and (2) notified that they may decline to receive medical services by telemedicine and may withdraw from such care at any time. Patient verbally consented to receive this service via voice-only telephone call.    [facility]    Date of Admission: 3/31/2020  Date of Discharge: 4/18/2020  Hospital Course:  Jojo Burrows was admitted to [facility] Medicine for treatment of suspected COVID-19 viral infection and was treated with supportive care following a comprehensive physical, radiographic, and lab evaluation tailored to the current standard of care for COVID19. Please review the admission H&P and the studies listed below for details.     Initially, SpO2 of 78%, placed on NRB 15L, weaned to 6L NC, but then returned to requiring 15L via NRB through 4/8.  Completed 5 day course of ceftriaxone and azithromycin for CAP.  Completed 5 day course of HCQ per COVID-19 protocol.  Has remained on 15 L via NRB for several days and hence was transferred to 11 W high oxygen unit for higher level of care. Current regimen consists of duo nebs q.6 scheduled, Spiriva daily, CPT t.i.d., flutter valve treatments q.6 while awake, Lasix 40 IV daily (monitoring daily UOP), and nightly CPAP.  Blood cultures remain no growth to date and respiratory culture  with no significant growth.  CBC and CMP with no significant abnormalities aside from intermittent hypokalemia and hypomagnesemia with diuresis, responsive to repletion.  CRP has down trended from 186 (Peak) to 136.  Of note, patient's  passed away from COVID-19 related complications at [facility] on ; have been providing daily emotional support for patient.  Daughters have been checking on patient each day.  Continuing to monitor closely for improvement; patient full code and open to all respiratory interventions at this time. Transferred to high oxygen unit on 15L NRB . Patient improved with supportive care and completed course of abx for CAP. Transferred to telemedicine . PT OT rec SNF. Currently medically stable for discharge to SNF. Pending placement at this time. She was accepted to [facility]  and discharged on 2L oxygen. She transitioned from [facility] to home 2020.     On the day of discharge, isolation precautions were reviewed at length verbally. The patient was also provided with written isolation guidelines modified from the [facility] as well as the [facility], as part of discharge paperwork. An updated phone number was obtained, which will be used to contact the patient when results are available, and positive patients will be enrolled in both the COVID-19 Home Symptom Monitoring program.         HPI: Jojo Burrows is a 70F with HTN, obesity who presents for evaluation of SOB. She was seen in the ED on  with fever, chills, cough. She wasn't hypoxic with ambulation and discharged home.  She tested positive for COVID-19. Her  is admitted for COVID+ complications. She presented with worsening SOB, cough, pleuritic chest pain, sinus congestions, myalgias. (see hospital course above). Her   during hospitalization. S/p SNF, she is discharged home with her 3 daughters and her son. [facility] for RN/PT/OT.       Assessment and plan    Covid-19 Virus Infection  Shortness of Breath  Oxygen dependent  - COVID-19 testing POSITIVE: Collection Date: 3/28/2020 Collection Time:   6:43 PM  Airborne and Droplet Precautions  Home oxygen 2L/NC  Albuterol inhaler 2-4 puff Q6hr PRN  shortness of breath and wheezing  Continue Vit C 500mg po BID  Tylenol 650mg every 4 hours as needed for temp/headache  Spiriva 1 capsule daily  Tessalon pearls as needed    Hypertension  Continue metoprolol     Hyperlipidemia  Continue atorvastatin      Anxiety  Insomnia  Hydroxyzine HCL 25mg by mouth nightly as needed for Anxiety or Insomnia       This service was not originating from a related E/M service provided within the previous 7 days nor will  to an E/M service or procedure within the next 24 hours or my soonest available appointment.  Prevailing standard of care was able to be met in this audio-only visit.

## 2020-04-24 NOTE — TELEPHONE ENCOUNTER
I spoke to jaziel at [facility].    Daughter told them we are ordering o2 and she is trying to verify.    Dr mendiola is not ordering  and I told daughter that in message earlier.  Looks like hospitalist ordered o2 and other things and not sure why she got involved in this.  I told jaziel she needs to verify that will be started at nursing home before discharge.  We have no control over this order or when or if it will be delivered.      We don't want patient to have to go to er because doesn't have oxygen..  She has to stay on oxygen.

## 2020-04-24 NOTE — TELEPHONE ENCOUNTER
I explained to daughter we cannot get in middle of this.  Nursing home has to take lead in discharge and handle orders to get her stablized at home till we can see her for fol up.  She has appt next Friday with dr young for hospfol up.    Was covid positive 3/30. Still on o2.   I told her I will let dr mendiola know her concerns.    I told dr mendiola about the call and she had called 4/22 about the same and he agrees with what I told her.

## 2020-04-27 NOTE — PATIENT INSTRUCTIONS
Instructions:  1. Take all medications as prescribed  2. Keep all follow-up appointments  3. Return to the hospital or call your primary care physicians if any worsening symptoms such as fever, chest pain, shortness of breath, return of symptoms, or any other concerns.  4. Home visit 4 weeks

## 2020-04-29 PROCEDURE — G0180 PR HOME HEALTH MD CERTIFICATION: ICD-10-PCS | Mod: ,,, | Performed by: INTERNAL MEDICINE

## 2020-04-29 PROCEDURE — G0180 MD CERTIFICATION HHA PATIENT: HCPCS | Mod: ,,, | Performed by: INTERNAL MEDICINE

## 2020-05-01 ENCOUNTER — OFFICE VISIT (OUTPATIENT)
Dept: INTERNAL MEDICINE | Facility: CLINIC | Age: 70
End: 2020-05-01
Payer: MEDICARE

## 2020-05-01 ENCOUNTER — TELEPHONE (OUTPATIENT)
Dept: INTERNAL MEDICINE | Facility: CLINIC | Age: 70
End: 2020-05-01

## 2020-05-01 VITALS
HEIGHT: 62 IN | TEMPERATURE: 97 F | HEART RATE: 72 BPM | DIASTOLIC BLOOD PRESSURE: 70 MMHG | OXYGEN SATURATION: 100 % | BODY MASS INDEX: 31.28 KG/M2 | SYSTOLIC BLOOD PRESSURE: 130 MMHG | RESPIRATION RATE: 24 BRPM

## 2020-05-01 DIAGNOSIS — F41.9 ANXIETY: ICD-10-CM

## 2020-05-01 DIAGNOSIS — I10 ESSENTIAL HYPERTENSION: Chronic | ICD-10-CM

## 2020-05-01 DIAGNOSIS — G47.00 INSOMNIA, UNSPECIFIED TYPE: ICD-10-CM

## 2020-05-01 DIAGNOSIS — Z09 HOSPITAL DISCHARGE FOLLOW-UP: Primary | ICD-10-CM

## 2020-05-01 DIAGNOSIS — U07.1 COVID-19 VIRUS INFECTION: ICD-10-CM

## 2020-05-01 DIAGNOSIS — J18.9 MULTIFOCAL PNEUMONIA: ICD-10-CM

## 2020-05-01 DIAGNOSIS — Z99.81 OXYGEN DEPENDENT: ICD-10-CM

## 2020-05-01 PROCEDURE — 99999 PR PBB SHADOW E&M-EST. PATIENT-LVL III: CPT | Mod: PBBFAC,HCNC,, | Performed by: HOSPITALIST

## 2020-05-01 PROCEDURE — 99213 PR OFFICE/OUTPT VISIT, EST, LEVL III, 20-29 MIN: ICD-10-PCS | Mod: HCNC,S$GLB,, | Performed by: HOSPITALIST

## 2020-05-01 PROCEDURE — 99213 OFFICE O/P EST LOW 20 MIN: CPT | Mod: HCNC,S$GLB,, | Performed by: HOSPITALIST

## 2020-05-01 PROCEDURE — 99999 PR PBB SHADOW E&M-EST. PATIENT-LVL III: ICD-10-PCS | Mod: PBBFAC,HCNC,, | Performed by: HOSPITALIST

## 2020-05-01 NOTE — TELEPHONE ENCOUNTER
Patient daughter was advised that is ok for her mother to use the office oxygen tan for her office visit.

## 2020-05-01 NOTE — PROGRESS NOTES
Transitional Care Note  Subjective:       Patient ID: Jojo Burrows is a 70 y.o. female.  Chief Complaint: hospital f/u    Family and/or Caretaker present at visit?  Yes.  Diagnostic tests reviewed/disposition: No diagnosic tests pending after this hospitalization.  Disease/illness education: yes  Home health/community services discussion/referrals: Patient has home health established at home  Establishment or re-establishment of referral orders for community resources: No other necessary community resources.   Discussion with other health care providers: No discussion with other health care providers necessary.     @Patient ID: Jojo Burrows is a 70 y.o. female.    Chief Complaint: Hospital Follow Up    HPI  71 yo F with pmhx of HTN, obesity was admitted to [facility] from 3/31-20 for Covid19 and pna. During hospital stay pt required NRB 15L was weaned to supplemental O2. Completed 5 day course of ceftriaxone and azithromycin for CAP.  Completed 5 day course of HCQ per COVID-19 protocol. She was accepted to [facility] SNF  and discharged on 2L oxygen. She reports she has been anxious and not sleeping as much during the night due to that her   from COVID19 when she was in the hospital. Was unable to attend his .  States she is dealing with the grief but does have family support. One of her daughters is currently living with her. She reports still using home oxygen. She reports that her O2 sat is usually 98% at rest.  She reports home health Physical therapy works with her a few times a weak to wean her O2. States it has been going into low 90s after exertion. Reports she does still feel sob after exertion but is getting better        Review of Systems   Constitutional: Negative for chills and fever.   HENT: Positive for rhinorrhea. Negative for congestion and sore throat.    Eyes: Negative for pain and visual disturbance.   Respiratory: Positive for cough. Negative for shortness of breath.   "  Cardiovascular: Negative for chest pain and leg swelling.   Gastrointestinal: Negative for abdominal pain, nausea and vomiting.   Genitourinary: Negative for difficulty urinating and dysuria.   Musculoskeletal: Negative for arthralgias and back pain.   Neurological: Negative for dizziness, weakness and headaches.   Psychiatric/Behavioral: Negative for agitation and confusion.     Past medical history, surgical history, and family medical history reviewed and updated as appropriate.    Medications and allergies reviewed.     Objective:     Vitals:    05/01/20 1548   BP: 130/70   BP Location: Right arm   Patient Position: Sitting   Pulse: 72   Resp: (!) 24   Temp: 97.3 °F (36.3 °C)   SpO2: 100%   Height: 5' 2" (1.575 m)     Body mass index is 31.28 kg/m².  Physical Exam   Constitutional: She is oriented to person, place, and time. She appears well-developed and well-nourished. No distress.   HENT:   Head: Normocephalic and atraumatic.   Mouth/Throat: Oropharynx is clear and moist. No oropharyngeal exudate.   Eyes: Conjunctivae are normal. Right eye exhibits no discharge. Left eye exhibits no discharge.   Neck: Normal range of motion. Neck supple.   Cardiovascular: Normal rate, regular rhythm and intact distal pulses. Exam reveals no friction rub.   No murmur heard.  Pulmonary/Chest: Effort normal and breath sounds normal.   Musculoskeletal: Normal range of motion. She exhibits no edema.   Neurological: She is alert and oriented to person, place, and time.   Skin: Skin is warm and dry.   Psychiatric: She has a normal mood and affect. Her behavior is normal.   Vitals reviewed.      Lab Results   Component Value Date    WBC 9.65 04/16/2020    HGB 10.9 (L) 04/16/2020    HCT 35.9 (L) 04/16/2020     04/16/2020    CHOL 196 04/09/2019    TRIG 51 04/09/2019    HDL 72 04/09/2019    ALT 47 (H) 04/16/2020    AST 30 04/16/2020     04/16/2020    K 4.3 04/16/2020     04/16/2020    CREATININE 0.8 04/16/2020    " BUN 12 04/16/2020    CO2 30 (H) 04/16/2020    TSH 0.926 04/09/2019    INR 1.0 07/12/2011    HGBA1C 5.6 06/14/2005       Assessment:     1. Hospital discharge follow-up    2. COVID-19 virus infection    3. Multifocal pneumonia    4. Oxygen dependent    5. Essential hypertension    6. Anxiety    7. Insomnia, unspecified type      Plan:   Jooj was seen today for hospital follow up.    Diagnoses and all orders for this visit:    Hospital discharge follow-up  COVID-19 virus infection  Multifocal pneumonia  Oxygen dependent  - Pt appears to be slowly improving from recent hospital stay and Covid19 infection. She will try nasal saline spray, mucinex, xyzal and flonase with her allergy symptoms. She appears improved from pna standpoint but is still requiring O2. She is working with home health physical therapy to be weaned off it. Patient and family's questions were answered today.     Essential hypertension  - BP Stable. Continue home meds     Anxiety/Insomnia  - Pt reports hydroxyzine has been helping at nighttime. She also takes melatonin. However counseled ok to take additional 1/2 tablet- 1 tablet of hydroxyzine if still unable to get to sleep at night. Notify PCP if any issues.       No follow-ups on file.    Kaci Meadows MD  Internal Medicine    5/1/2020

## 2020-05-01 NOTE — TELEPHONE ENCOUNTER
----- Message from Alissa Wells sent at 5/1/2020  2:29 PM CDT -----  Contact: Lucille (daughter)   Lucille phone number . This is the number to confirm the patient is able to use the clinic oxygen while at the clinic. Please call and advise.

## 2020-05-11 ENCOUNTER — EXTERNAL HOME HEALTH (OUTPATIENT)
Dept: HOME HEALTH SERVICES | Facility: HOSPITAL | Age: 70
End: 2020-05-11
Payer: MEDICARE

## 2020-05-13 ENCOUNTER — TELEPHONE (OUTPATIENT)
Dept: INTERNAL MEDICINE | Facility: CLINIC | Age: 70
End: 2020-05-13

## 2020-05-13 RX ORDER — ZOLPIDEM TARTRATE 5 MG/1
5 TABLET ORAL NIGHTLY PRN
Qty: 30 TABLET | Refills: 1 | Status: SHIPPED | OUTPATIENT
Start: 2020-05-13 | End: 2020-09-11

## 2020-05-13 NOTE — TELEPHONE ENCOUNTER
----- Message from Griselda Sebastian sent at 5/13/2020 12:38 PM CDT -----  Contact: Gerda/[facility] [phone]  Patient is having trouble sleeping. Rx hydroxyzine HCL (ATARAX) 25 MG tablet not working.    Please call and advise.    Thank You

## 2020-05-13 NOTE — TELEPHONE ENCOUNTER
Spoke to Keyanta with [facility] and she will call patient to give the doctor instructions.

## 2020-05-22 ENCOUNTER — TELEPHONE (OUTPATIENT)
Dept: INTERNAL MEDICINE | Facility: CLINIC | Age: 70
End: 2020-05-22

## 2020-05-22 ENCOUNTER — OFFICE VISIT (OUTPATIENT)
Dept: HOME HEALTH SERVICES | Facility: CLINIC | Age: 70
End: 2020-05-22
Payer: MEDICARE

## 2020-05-22 VITALS — OXYGEN SATURATION: 96 % | BODY MASS INDEX: 31.47 KG/M2 | WEIGHT: 171 LBS | HEIGHT: 62 IN

## 2020-05-22 DIAGNOSIS — R53.1 WEAKNESS GENERALIZED: ICD-10-CM

## 2020-05-22 DIAGNOSIS — J18.9 MULTIFOCAL PNEUMONIA: ICD-10-CM

## 2020-05-22 DIAGNOSIS — U07.1 COVID-19 VIRUS INFECTION: Primary | ICD-10-CM

## 2020-05-22 DIAGNOSIS — Z99.81 OXYGEN DEPENDENT: ICD-10-CM

## 2020-05-22 DIAGNOSIS — R06.02 SHORTNESS OF BREATH: ICD-10-CM

## 2020-05-22 PROCEDURE — 99441 PR PHYSICIAN TELEPHONE EVALUATION 5-10 MIN: ICD-10-PCS | Mod: 95,,, | Performed by: NURSE PRACTITIONER

## 2020-05-22 PROCEDURE — 99441 PR PHYSICIAN TELEPHONE EVALUATION 5-10 MIN: CPT | Mod: 95,,, | Performed by: NURSE PRACTITIONER

## 2020-05-22 NOTE — PROGRESS NOTES
Established Patient Care - Audio Only Telehealth Visit     The patient location is: Home  The chief complaint leading to consultation is: Hospital Follow up, COVID19, Shortness of Breath, Oxygen Dependent  Visit type: Virtual visit with audio only (telephone)     The reason for the audio only service rather than synchronous audio and video virtual visit was related to technical difficulties or patient preference/necessity.     Each patient to whom I provide medical services by telemedicine is:  (1) informed of the relationship between the physician and patient and the respective role of any other health care provider with respect to management of the patient; and (2) notified that they may decline to receive medical services by telemedicine and may withdraw from such care at any time. Patient verbally consented to receive this service via voice-only telephone call.    [facility]    Date of Admission: 3/31/2020  Date of Discharge: 4/18/2020  Hospital Course:  Jojo Burrows was admitted to [facility] for treatment of suspected COVID-19 viral infection and was treated with supportive care following a comprehensive physical, radiographic, and lab evaluation tailored to the current standard of care for COVID19. Please review the admission H&P and the studies listed below for details.     Initially, SpO2 of 78%, placed on NRB 15L, weaned to 6L NC, but then returned to requiring 15L via NRB through 4/8.  Completed 5 day course of ceftriaxone and azithromycin for CAP.  Completed 5 day course of HCQ per COVID-19 protocol.  Has remained on 15 L via NRB for several days and hence was transferred to 11 W high oxygen unit for higher level of care. Current regimen consists of duo nebs q.6 scheduled, Spiriva daily, CPT t.i.d., flutter valve treatments q.6 while awake, Lasix 40 IV daily (monitoring daily UOP), and nightly CPAP.  Blood cultures remain no growth to date and respiratory culture  with no significant growth.  CBC and CMP with no significant abnormalities aside from intermittent hypokalemia and hypomagnesemia with diuresis, responsive to repletion.  CRP has down trended from 186 (Peak) to 136.  Of note, patient's  passed away from COVID-19 related complications at [facility] on ; have been providing daily emotional support for patient.  Daughters have been checking on patient each day.  Continuing to monitor closely for improvement; patient full code and open to all respiratory interventions at this time. Transferred to high oxygen unit on 15L NRB . Patient improved with supportive care and completed course of abx for CAP. Transferred to telemedicine . PT OT rec SNF. Currently medically stable for discharge to SNF. Pending placement at this time. She was accepted to [facility] SNF  and discharged on 2L oxygen. She transitioned from [facility] to home 2020.     On the day of discharge, isolation precautions were reviewed at length verbally. The patient was also provided with written isolation guidelines modified from the [place] Department of Health and [facility] as well as the CDC, as part of discharge paperwork. An updated phone number was obtained, which will be used to contact the patient when results are available, and positive patients will be enrolled in both the COVID-19 Home Symptom Monitoring program.         HPI: Jojo Burrows is a 70F with HTN, obesity who was hospitalized at [facility] from 3/31/2020 to 2020 after presenting the ED on  with fever, chills, cough. She wasn't hypoxic with ambulation and discharged home.  She presented with worsening SOB, cough, pleuritic chest pain, sinus congestions, myalgias. (see hospital course above).She tested positive for COVID-19 on 3/31/2020. Her  of 49 years was admitted for COVID19+ complications. She remained in the hospital and was unable to attend his  services.  S/p SNF, she is discharged home  with her 3 daughters and her son. DME with [facility], [facility] following for RN/PT/OT who continues to follow. PT is currently weaning her O2.      Assessment and plan    Covid-19 Virus Infection  Shortness of Breath  Oxygen dependent  - COVID-19 testing POSITIVE  Airborne and Droplet Precautions  Home oxygen 2L/NC  Albuterol inhaler 2-4 puff Q6hr PRN  shortness of breath and wheezing  Continue Vit C 500mg po BID  Tylenol 650mg every 4 hours as needed for temp/headache  Spiriva 1 capsule daily  Tessalon pearls as needed    Hypertension  Continue metoprolol     Hyperlipidemia  Continue atorvastatin      Anxiety  Insomnia  Continue Ambien as needed for Insomnia       This service was not originating from a related E/M service provided within the previous 7 days nor will  to an E/M service or procedure within the next 24 hours or my soonest available appointment.  Prevailing standard of care was able to be met in this audio-only visit.

## 2020-05-22 NOTE — TELEPHONE ENCOUNTER
----- Message from Yelena Ferrell sent at 5/22/2020  8:34 AM CDT -----  Contact: Sergio with [facility]   [phone]  Sergio states he would like orders to continue HH PT. Sergio also states he would like to start weaning the pt off of the oxygen. Please call and advise.

## 2020-06-02 ENCOUNTER — DOCUMENT SCAN (OUTPATIENT)
Dept: HOME HEALTH SERVICES | Facility: HOSPITAL | Age: 70
End: 2020-06-02
Payer: MEDICARE

## 2020-06-03 ENCOUNTER — TELEPHONE (OUTPATIENT)
Dept: INTERNAL MEDICINE | Facility: CLINIC | Age: 70
End: 2020-06-03

## 2020-06-03 NOTE — TELEPHONE ENCOUNTER
Per sergio at home health.  FYI    She is down to 1 liter of ox and doing well.    Was fine with therapy off oxygen during pt.  Lowest o2 got was 92.  And end of session was 98.    Serigo with home health told her to use intermittently and come off o2 for 1 hour at a time.    Only complains of  Back pain and using tylenol   As need.    Patient is doing well.

## 2020-06-03 NOTE — TELEPHONE ENCOUNTER
----- Message from Jasper Campbell sent at 6/3/2020  2:35 PM CDT -----  Contact: Sergio ROBB/CLARISSE Nurse [phone]  Patient would like to get medical advice.     Comments: Stating patient is doing good using around one liter of oxygen, some low back pain, does take Tylenol Sergio PARKER Nurse [phone]    Please call an advise  Thank you

## 2020-06-08 ENCOUNTER — TELEPHONE (OUTPATIENT)
Dept: INTERNAL MEDICINE | Facility: CLINIC | Age: 70
End: 2020-06-08

## 2020-06-08 DIAGNOSIS — Z99.81 OXYGEN DEPENDENT: ICD-10-CM

## 2020-06-08 DIAGNOSIS — U07.1 COVID-19 VIRUS INFECTION: Primary | ICD-10-CM

## 2020-06-08 NOTE — TELEPHONE ENCOUNTER
----- Message from Griselda Sebastian sent at 6/8/2020  1:44 PM CDT -----  Contact: Patient [phone]  Patient stated that she was supposed o be referred to Pulmonary. Requesting to speakw ith you.    Please call and advise.    Thank You

## 2020-06-10 ENCOUNTER — HOSPITAL ENCOUNTER (OUTPATIENT)
Dept: RADIOLOGY | Facility: HOSPITAL | Age: 70
Discharge: HOME | End: 2020-06-10
Attending: INTERNAL MEDICINE
Payer: MEDICARE

## 2020-06-10 ENCOUNTER — OFFICE VISIT (OUTPATIENT)
Dept: PULMONOLOGY | Facility: CLINIC | Age: 70
End: 2020-06-10
Payer: MEDICARE

## 2020-06-10 ENCOUNTER — HOSPITAL ENCOUNTER (OUTPATIENT)
Dept: PULMONOLOGY | Facility: CLINIC | Age: 70
Discharge: HOME | End: 2020-06-10
Payer: MEDICARE

## 2020-06-10 ENCOUNTER — TELEPHONE (OUTPATIENT)
Dept: PULMONOLOGY | Facility: CLINIC | Age: 70
End: 2020-06-10

## 2020-06-10 VITALS
HEART RATE: 79 BPM | WEIGHT: 163.13 LBS | HEIGHT: 62 IN | OXYGEN SATURATION: 98 % | SYSTOLIC BLOOD PRESSURE: 110 MMHG | DIASTOLIC BLOOD PRESSURE: 79 MMHG | BODY MASS INDEX: 30.02 KG/M2

## 2020-06-10 VITALS — WEIGHT: 163 LBS | HEIGHT: 62 IN | BODY MASS INDEX: 30 KG/M2

## 2020-06-10 DIAGNOSIS — U07.1 COVID-19 VIRUS INFECTION: ICD-10-CM

## 2020-06-10 PROCEDURE — 3078F PR MOST RECENT DIASTOLIC BLOOD PRESSURE < 80 MM HG: ICD-10-PCS | Mod: HCNC,CPTII,S$GLB, | Performed by: INTERNAL MEDICINE

## 2020-06-10 PROCEDURE — 3074F SYST BP LT 130 MM HG: CPT | Mod: HCNC,CPTII,S$GLB, | Performed by: INTERNAL MEDICINE

## 2020-06-10 PROCEDURE — 71046 X-RAY EXAM CHEST 2 VIEWS: CPT | Mod: 26,HCNC,, | Performed by: RADIOLOGY

## 2020-06-10 PROCEDURE — 1101F PR PT FALLS ASSESS DOC 0-1 FALLS W/OUT INJ PAST YR: ICD-10-PCS | Mod: HCNC,CPTII,S$GLB, | Performed by: INTERNAL MEDICINE

## 2020-06-10 PROCEDURE — 99204 OFFICE O/P NEW MOD 45 MIN: CPT | Mod: 25,HCNC,S$GLB, | Performed by: INTERNAL MEDICINE

## 2020-06-10 PROCEDURE — 3078F DIAST BP <80 MM HG: CPT | Mod: HCNC,CPTII,S$GLB, | Performed by: INTERNAL MEDICINE

## 2020-06-10 PROCEDURE — 99999 PR PBB SHADOW E&M-EST. PATIENT-LVL IV: ICD-10-PCS | Mod: PBBFAC,HCNC,, | Performed by: INTERNAL MEDICINE

## 2020-06-10 PROCEDURE — 71046 XR CHEST PA AND LATERAL: ICD-10-PCS | Mod: 26,HCNC,, | Performed by: RADIOLOGY

## 2020-06-10 PROCEDURE — 1101F PT FALLS ASSESS-DOCD LE1/YR: CPT | Mod: HCNC,CPTII,S$GLB, | Performed by: INTERNAL MEDICINE

## 2020-06-10 PROCEDURE — 71046 X-RAY EXAM CHEST 2 VIEWS: CPT | Mod: TC,HCNC,FY

## 2020-06-10 PROCEDURE — 94618 PULMONARY STRESS TESTING: CPT | Mod: HCNC,S$GLB,, | Performed by: INTERNAL MEDICINE

## 2020-06-10 PROCEDURE — 1159F PR MEDICATION LIST DOCUMENTED IN MEDICAL RECORD: ICD-10-PCS | Mod: HCNC,S$GLB,, | Performed by: INTERNAL MEDICINE

## 2020-06-10 PROCEDURE — 1159F MED LIST DOCD IN RCRD: CPT | Mod: HCNC,S$GLB,, | Performed by: INTERNAL MEDICINE

## 2020-06-10 PROCEDURE — 94618 PULMONARY STRESS TESTING: ICD-10-PCS | Mod: HCNC,S$GLB,, | Performed by: INTERNAL MEDICINE

## 2020-06-10 PROCEDURE — 3074F PR MOST RECENT SYSTOLIC BLOOD PRESSURE < 130 MM HG: ICD-10-PCS | Mod: HCNC,CPTII,S$GLB, | Performed by: INTERNAL MEDICINE

## 2020-06-10 PROCEDURE — 99999 PR PBB SHADOW E&M-EST. PATIENT-LVL IV: CPT | Mod: PBBFAC,HCNC,, | Performed by: INTERNAL MEDICINE

## 2020-06-10 PROCEDURE — 99204 PR OFFICE/OUTPT VISIT, NEW, LEVL IV, 45-59 MIN: ICD-10-PCS | Mod: 25,HCNC,S$GLB, | Performed by: INTERNAL MEDICINE

## 2020-06-10 RX ORDER — HYDROXYZINE HYDROCHLORIDE 25 MG/1
TABLET, FILM COATED ORAL
COMMUNITY
Start: 2020-05-13 | End: 2023-09-07

## 2020-06-10 NOTE — TELEPHONE ENCOUNTER
----- Message from Katelin Limon MA sent at 6/10/2020  8:11 AM CDT -----  Contact: PTs daughter (Lucille) RE-PT Jojo Burrows MRN [id number] @# [phone]      ----- Message -----  From: Dana Sosa  Sent: 6/10/2020   8:06 AM CDT  To: Kapil MCARTHUR Staff        They are on their way to appt - had difficulty with the patients oxygen to come on  PTs daughter (Lucille) RE-PT Jojo Burrows MRN [id number] @# [phone]

## 2020-06-10 NOTE — PROCEDURES
Jojo Burrows is a 70 y.o.  female patient, who presents for a 6 minute walk test ordered by MD Kapil.  The diagnosis is Shortness of Breath; COVID pneumonia.  The patient's BMI is 29.8 kg/m2.  Predicted distance (lower limit of normal) is 278.12 meters.      Test Results:    The test was completed with stops.  The patient stopped 1 time for a total of 16 seconds.  The total time walked was 344 seconds.  During walking, the patient reported:  No complaints.  The patient used supplemental oxygen during testing.     06/10/2020---------Distance: 274.32 meters (900 feet)     O2 Sat % Supplemental Oxygen Heart Rate Blood Pressure Teodora Scale   Pre-exercise  (Resting) 100 % 2 L/M 66 bpm 131/62 mmHg 1   During Exercise 98 % 2 L/M 92 bpm 145/65 mmHg 3   Post-exercise  (Recovery) 99 % 2 L/M  68 bpm       Recovery Time: 105 seconds    Performing nurse/tech: ROBIN Mcnally      PREVIOUS STUDY:   The patient has not had a previous study.      CLINICAL INTERPRETATION:  Six minute walk distance is 274.32 meters (900 feet) with moderate dyspnea.  During exercise, there was no significant desaturation while breathing supplemental oxygen.  Blood pressure remained stable and Heart rate increased significantly with walking.  The patient did not report non-pulmonary symptoms during exercise.  Significant exercise impairment is likely due to subjective symptoms.  The patient did complete the study, walking 344 seconds of the 360 second test.  No previous study performed.  Based upon age and body mass index, exercise capacity is less than predicted.

## 2020-06-10 NOTE — TELEPHONE ENCOUNTER
Patient  have arrived to her Pulmonary Appointment with .  Daughter stated she was having trouble with new oxygen tank.   is in the exam room now with  and her daughter Lucille.

## 2020-06-10 NOTE — LETTER
Luly 15, 2020      Joo Munoz MD  [address]  [place] [address]           [facility]  [address]  [place] [address]  Phone: [phone]          Patient: Jojo Burrows   MR Number: [id number]   YOB: 1950   Date of Visit: 6/10/2020       Dear Dr. Joo Munoz:    Thank you for referring Jojo Burrows to me for evaluation. Attached you will find relevant portions of my assessment and plan of care.    If you have questions, please do not hesitate to call me. I look forward to following Jojo Burrows along with you.    Sincerely,    Nathan Dick MD    Enclosure  CC:  No Recipients    If you would like to receive this communication electronically, please contact [email or web address] or [phone] to request more information on [employer] Link access.    For providers and/or their staff who would like to refer a patient to [facility], please contact us through our one-stop-shop provider referral line, [facility], at [phone].    If you feel you have received this communication in error or would no longer like to receive these types of communications, please e-mail [email or web address]

## 2020-06-12 ENCOUNTER — TELEPHONE (OUTPATIENT)
Dept: PULMONOLOGY | Facility: CLINIC | Age: 70
End: 2020-06-12

## 2020-06-12 DIAGNOSIS — J18.9 PNEUMONIA OF BOTH LUNGS DUE TO INFECTIOUS ORGANISM, UNSPECIFIED PART OF LUNG: Primary | ICD-10-CM

## 2020-06-12 NOTE — TELEPHONE ENCOUNTER
----- Message from Katelin Limon MA sent at 6/12/2020  3:10 PM CDT -----  Good After Noon Dr Dick,    I have received your message about scheduling patient to do a 6 minute walk test but I do not see a order in patient chart for me to schedule. Please advise.    Thanks Katelin

## 2020-06-15 NOTE — PROGRESS NOTES
Subjective:       Patient ID: Jojo Burrows is a 70 y.o. female.    Chief Complaint: Pneumonia    HPI   Jojo Burrows 70 y.o. female    has a past medical history of Acute hypoxemic respiratory failure (3/31/2020), ALLERGIC RHINITIS, Cataract, Degenerative disc disease, cervical (2018), GERD (gastroesophageal reflux disease), Hyperlipidemia, Hypertension, Migraine headache, Multifocal pneumonia (3/31/2020), Nuclear sclerosis (2014), Sleep disorder, Thyroid disease, and TIA (transient ischemic attack).    has a past surgical history that includes Hysterectomy; Colonoscopy (N/A, 2016); Breast biopsy; Breast cyst excision; and Breast cyst aspiration.   reports that she has never smoked. She has never used smokeless tobacco. She reports that she does not drink alcohol or use drugs.  Referred by: Dr. Joo Munoz  Who had concerns including Pneumonia.  The patient's last visit with me was on Visit date not found.    Patient admitted to hospital with covid- dc home end of March    in hospital.   DC home with oxygen and has been improving.   Daily getting stronger      No fever chills, ns, wt changes, nausea, vomiting, diarrhea, constipation, chest pain, tightness, pressure. Remainder of review of systems is negative.  Otherwise asymptomatic from pulmonary standpoint.    Review of Systems   All other systems reviewed and are negative.      Objective:      Physical Exam   Constitutional: She is oriented to person, place, and time. She appears well-developed and well-nourished. She appears not cachectic. No distress. She is not obese.   HENT:   Head: Normocephalic.   Nose: Nose normal. No mucosal edema.   Mouth/Throat: Normal dentition. No oropharyngeal exudate.   Neck: Normal range of motion. Neck supple. No tracheal deviation present.   Cardiovascular: Normal rate, regular rhythm, normal heart sounds and intact distal pulses. Exam reveals no gallop and no friction rub.   No murmur  heard.  Pulmonary/Chest: Normal expansion, symmetric chest wall expansion, effort normal and breath sounds normal. No stridor.   Abdominal: Soft. Bowel sounds are normal.   Musculoskeletal: Normal range of motion.         General: No tenderness, deformity or edema.   Lymphadenopathy: No supraclavicular adenopathy is present.     She has no cervical adenopathy.   Neurological: She is alert and oriented to person, place, and time. No cranial nerve deficit. Gait normal.   Skin: Skin is warm and dry. No rash noted. She is not diaphoretic. No cyanosis or erythema. No pallor. Nails show no clubbing.   Psychiatric: She has a normal mood and affect. Her behavior is normal. Judgment and thought content normal.     Personal Diagnostic Review    No flowsheet data found.      Assessment:       1. COVID-19 virus infection        Outpatient Encounter Medications as of 6/10/2020   Medication Sig Dispense Refill    acetaminophen (TYLENOL) 325 MG tablet Take 2 tablets (650 mg total) by mouth every 4 (four) hours as needed. 30 tablet 3    albuterol (PROVENTIL/VENTOLIN HFA) 90 mcg/actuation inhaler Inhale 2 puffs into the lungs every 6 (six) hours as needed for Wheezing or Shortness of Breath. Rescue 18 g 0    ascorbic acid, vitamin C, (VITAMIN C) 500 MG tablet Take 1 tablet (500 mg total) by mouth 2 (two) times daily.      atorvastatin (LIPITOR) 40 MG tablet Take 1 tablet (40 mg total) by mouth every evening. 90 tablet 3    calcium carbonate (TUMS) 200 mg calcium (500 mg) chewable tablet Take 2 tablets (1,000 mg total) by mouth 3 (three) times daily as needed.      clotrimazole-betamethasone 1-0.05% (LOTRISONE) cream Apply topically 2 (two) times daily. 45 g 0    cyanocobalamin (VITAMIN B-12) 250 MCG tablet Take 250 mcg by mouth once daily.      diclofenac sodium (VOLTAREN) 1 % Gel Apply 2 g topically 2 (two) times daily as needed. 1 Tube 1    ergocalciferol, vitamin D2, (VITAMIN D ORAL) Take 2,000 Int'l Units by mouth once  daily.      esomeprazole (NEXIUM) 40 MG capsule Take 1 capsule (40 mg total) by mouth before breakfast. 90 capsule 3    folic acid/multivit-min/lutein (CENTRUM SILVER ORAL) Take 1 tablet by mouth once daily.      hydrOXYzine HCL (ATARAX) 25 MG tablet       levocetirizine (XYZAL) 5 MG tablet Take 1 tablet (5 mg total) by mouth daily as needed (cold and sinus). 30 tablet 3    metoprolol succinate (TOPROL-XL) 50 MG 24 hr tablet TAKE 1 TABLET (50 MG TOTAL) BY MOUTH ONCE DAILY. 90 tablet 3    tiotropium (SPIRIVA) 18 mcg inhalation capsule Inhale 1 capsule (18 mcg total) into the lungs once daily. Controller 90 capsule 3    zolpidem (AMBIEN) 5 MG Tab Take 1 tablet (5 mg total) by mouth nightly as needed (insomnia). 30 tablet 1     No facility-administered encounter medications on file as of 6/10/2020.      Orders Placed This Encounter   Procedures    X-Ray Chest PA And Lateral     Standing Status:   Future     Number of Occurrences:   1     Standing Expiration Date:   6/10/2021     Order Specific Question:   Reason for Exam:     Answer:   shortness of breath    Stress test, pulmonary     Standing Status:   Future     Number of Occurrences:   1     Standing Expiration Date:   6/10/2021     Order Specific Question:   Reason for study     Answer:   Functional status       Plan:               Assessment:  Jojo was seen today for pneumonia.    Diagnoses and all orders for this visit:    COVID-19 virus infection  -     Ambulatory referral/consult to Pulmonology  -     X-Ray Chest PA And Lateral; Future  -     Stress test, pulmonary; Future        Plan:  Problem List Items Addressed This Visit     COVID-19 virus infection    Relevant Orders    X-Ray Chest PA And Lateral (Completed)    Stress test, pulmonary (Completed)          Cxr showed resolution  6mwd done on oxygen- plan to repeat in a few weeks without oxygen and determine further need.       No follow-ups on file.    There are no Patient Instructions on file for  this visit.    Immunization History   Administered Date(s) Administered    Influenza 10/26/2009, 09/28/2010    Influenza - High Dose - PF (65 years and older) 11/21/2014, 10/26/2015    Influenza - Quadrivalent - PF (6 months and older) 10/26/2009, 09/28/2010    PPD Test 04/17/2020    Pneumococcal Conjugate - 13 Valent 06/29/2015    Pneumococcal Polysaccharide - 23 Valent 08/11/2017    Tdap 10/16/2019

## 2020-06-15 NOTE — PROGRESS NOTES
Subjective:       Patient ID: Jojo Burrows is a 70 y.o. female.    Chief Complaint: Pneumonia    HPI Jojo Burrows 70 y.o. female    has a past medical history of Acute hypoxemic respiratory failure (3/31/2020), ALLERGIC RHINITIS, Cataract, Degenerative disc disease, cervical (9/13/2018), GERD (gastroesophageal reflux disease), Hyperlipidemia, Hypertension, Migraine headache, Multifocal pneumonia (3/31/2020), Nuclear sclerosis (4/30/2014), Sleep disorder, Thyroid disease, and TIA (transient ischemic attack).    has a past surgical history that includes Hysterectomy; Colonoscopy (N/A, 4/29/2016); Breast biopsy; Breast cyst excision; and Breast cyst aspiration.   reports that she has never smoked. She has never used smokeless tobacco. She reports that she does not drink alcohol or use drugs.  Referred by: Dr. Joo Munoz  Who had concerns including Pneumonia.  The patient's last visit with me was on Visit date not found.    Patient   Review of Systems    Objective:      Physical Exam  Personal Diagnostic Review  {Pulmonary diagnostics:71348}  No flowsheet data found.      Assessment:       1. COVID-19 virus infection        Outpatient Encounter Medications as of 6/10/2020   Medication Sig Dispense Refill    acetaminophen (TYLENOL) 325 MG tablet Take 2 tablets (650 mg total) by mouth every 4 (four) hours as needed. 30 tablet 3    albuterol (PROVENTIL/VENTOLIN HFA) 90 mcg/actuation inhaler Inhale 2 puffs into the lungs every 6 (six) hours as needed for Wheezing or Shortness of Breath. Rescue 18 g 0    ascorbic acid, vitamin C, (VITAMIN C) 500 MG tablet Take 1 tablet (500 mg total) by mouth 2 (two) times daily.      atorvastatin (LIPITOR) 40 MG tablet Take 1 tablet (40 mg total) by mouth every evening. 90 tablet 3    calcium carbonate (TUMS) 200 mg calcium (500 mg) chewable tablet Take 2 tablets (1,000 mg total) by mouth 3 (three) times daily as needed.      clotrimazole-betamethasone 1-0.05% (LOTRISONE) cream  Apply topically 2 (two) times daily. 45 g 0    cyanocobalamin (VITAMIN B-12) 250 MCG tablet Take 250 mcg by mouth once daily.      diclofenac sodium (VOLTAREN) 1 % Gel Apply 2 g topically 2 (two) times daily as needed. 1 Tube 1    ergocalciferol, vitamin D2, (VITAMIN D ORAL) Take 2,000 Int'l Units by mouth once daily.      esomeprazole (NEXIUM) 40 MG capsule Take 1 capsule (40 mg total) by mouth before breakfast. 90 capsule 3    folic acid/multivit-min/lutein (CENTRUM SILVER ORAL) Take 1 tablet by mouth once daily.      hydrOXYzine HCL (ATARAX) 25 MG tablet       levocetirizine (XYZAL) 5 MG tablet Take 1 tablet (5 mg total) by mouth daily as needed (cold and sinus). 30 tablet 3    metoprolol succinate (TOPROL-XL) 50 MG 24 hr tablet TAKE 1 TABLET (50 MG TOTAL) BY MOUTH ONCE DAILY. 90 tablet 3    tiotropium (SPIRIVA) 18 mcg inhalation capsule Inhale 1 capsule (18 mcg total) into the lungs once daily. Controller 90 capsule 3    zolpidem (AMBIEN) 5 MG Tab Take 1 tablet (5 mg total) by mouth nightly as needed (insomnia). 30 tablet 1     No facility-administered encounter medications on file as of 6/10/2020.      Orders Placed This Encounter   Procedures    X-Ray Chest PA And Lateral     Standing Status:   Future     Number of Occurrences:   1     Standing Expiration Date:   6/10/2021     Order Specific Question:   Reason for Exam:     Answer:   shortness of breath    Stress test, pulmonary     Standing Status:   Future     Number of Occurrences:   1     Standing Expiration Date:   6/10/2021     Order Specific Question:   Reason for study     Answer:   Functional status       Plan:       ***

## 2020-06-17 ENCOUNTER — TELEPHONE (OUTPATIENT)
Dept: INTERNAL MEDICINE | Facility: CLINIC | Age: 70
End: 2020-06-17

## 2020-06-17 NOTE — TELEPHONE ENCOUNTER
----- Message from Emperatriz Limon sent at 6/17/2020 10:16 AM CDT -----  Contact: Nelli/ [facility]/ [phone]  Continuation orders for P.T  on patient . Please call Sergio for additional P.T. @ [phone]

## 2020-06-17 NOTE — TELEPHONE ENCOUNTER
She is doing better but therapist is asking for more sessions to help her with stairs in home.  I gave verbal ok.    Please document  his was ok.

## 2020-06-23 ENCOUNTER — DOCUMENT SCAN (OUTPATIENT)
Dept: HOME HEALTH SERVICES | Facility: HOSPITAL | Age: 70
End: 2020-06-23
Payer: MEDICARE

## 2020-06-26 ENCOUNTER — HOSPITAL ENCOUNTER (OUTPATIENT)
Dept: PULMONOLOGY | Facility: CLINIC | Age: 70
Discharge: HOME | End: 2020-06-26
Payer: MEDICARE

## 2020-06-26 VITALS — BODY MASS INDEX: 30 KG/M2 | HEIGHT: 62 IN | WEIGHT: 163 LBS

## 2020-06-26 DIAGNOSIS — J18.9 PNEUMONIA OF BOTH LUNGS DUE TO INFECTIOUS ORGANISM, UNSPECIFIED PART OF LUNG: ICD-10-CM

## 2020-06-26 PROCEDURE — 94618 PULMONARY STRESS TESTING: CPT | Mod: HCNC,S$GLB,, | Performed by: INTERNAL MEDICINE

## 2020-06-26 PROCEDURE — 94618 PULMONARY STRESS TESTING: ICD-10-PCS | Mod: HCNC,S$GLB,, | Performed by: INTERNAL MEDICINE

## 2020-06-28 PROCEDURE — G0179 MD RECERTIFICATION HHA PT: HCPCS | Mod: ,,, | Performed by: INTERNAL MEDICINE

## 2020-06-28 PROCEDURE — G0179 PR HOME HEALTH MD RECERTIFICATION: ICD-10-PCS | Mod: ,,, | Performed by: INTERNAL MEDICINE

## 2020-06-29 NOTE — PROCEDURES
Jojo Burrows is a 70 y.o.  female patient, who presents for a 6 minute walk test ordered by MD Kapil.  The diagnosis is Shortness of Breath; COVID pneumonia.  The patient's BMI is 29.8 kg/m2.  Predicted distance (lower limit of normal) is 278.12 meters.      Test Results:    The test was completed without stopping.  The total time walked was 360 seconds.  During walking, the patient reported:  No complaints.  The patient used no assistive devices during testing.     06/26/2020---------Distance: 304.8 meters (1000 feet)     O2 Sat % Supplemental Oxygen Heart Rate Blood Pressure Teodora Scale   Pre-exercise  (Resting) 98 % Room Air 73 bpm 143/74 mmHg 0   During Exercise 93 % Room Air 106 bpm 171/89 mmHg 1   Post-exercise  (Recovery) 99 % Room Air  81 bpm 151/73 mmHg      Recovery Time: 130 seconds    Performing nurse/tech: Vianca KELLY      PREVIOUS STUDY:   06/10/2020---------Distance: 274.32 meters (900 feet)       O2 Sat % Supplemental Oxygen Heart Rate Blood Pressure Teodora Scale   Pre-exercise  (Resting) 100 % 2 L/M 66 bpm 131/62 mmHg 1   During Exercise   98 % 2 L/M 92 bpm 145/65 mmHg 3   Post-exercise  (Recovery) 99 % 2 L/M  68 bpm           CLINICAL INTERPRETATION:  Six minute walk distance is 304.8 meters (1000 feet) with very light dyspnea.  During exercise, there was significant desaturation while breathing room air.  Both blood pressure and heart rate increased significantly with walking.  The patient did not report non-pulmonary symptoms during exercise.  Since the previous study in June 2020, exercise capacity may be somewhat improved.  Based upon age and body mass index, exercise capacity is normal.

## 2020-07-06 ENCOUNTER — EXTERNAL HOME HEALTH (OUTPATIENT)
Dept: HOME HEALTH SERVICES | Facility: HOSPITAL | Age: 70
End: 2020-07-06
Payer: MEDICARE

## 2020-07-06 NOTE — PROGRESS NOTES
60 Day Recert Order # [id number]  New Cert Period: 06/28 to 08/26/2020 with [facility] - Dr. Joo Munoz

## 2020-07-15 ENCOUNTER — PES CALL (OUTPATIENT)
Dept: ADMINISTRATIVE | Facility: CLINIC | Age: 70
End: 2020-07-15

## 2020-07-21 ENCOUNTER — OFFICE VISIT (OUTPATIENT)
Dept: INTERNAL MEDICINE | Facility: CLINIC | Age: 70
End: 2020-07-21
Payer: MEDICARE

## 2020-07-21 VITALS
DIASTOLIC BLOOD PRESSURE: 72 MMHG | WEIGHT: 163 LBS | OXYGEN SATURATION: 97 % | TEMPERATURE: 97 F | SYSTOLIC BLOOD PRESSURE: 118 MMHG | HEART RATE: 67 BPM | BODY MASS INDEX: 30 KG/M2 | HEIGHT: 62 IN

## 2020-07-21 DIAGNOSIS — N60.09 CYST OF BREAST, UNSPECIFIED LATERALITY: ICD-10-CM

## 2020-07-21 DIAGNOSIS — K21.9 GASTROESOPHAGEAL REFLUX DISEASE, ESOPHAGITIS PRESENCE NOT SPECIFIED: ICD-10-CM

## 2020-07-21 DIAGNOSIS — G89.29 CHRONIC PAIN OF BOTH KNEES: ICD-10-CM

## 2020-07-21 DIAGNOSIS — M25.561 CHRONIC PAIN OF BOTH KNEES: ICD-10-CM

## 2020-07-21 DIAGNOSIS — M50.30 DEGENERATIVE DISC DISEASE, CERVICAL: Primary | Chronic | ICD-10-CM

## 2020-07-21 DIAGNOSIS — M46.96 INFLAMMATORY SPONDYLOPATHY OF LUMBAR REGION: ICD-10-CM

## 2020-07-21 DIAGNOSIS — E55.9 VITAMIN D DEFICIENCY: ICD-10-CM

## 2020-07-21 DIAGNOSIS — M47.816 LUMBAR FACET ARTHROPATHY: ICD-10-CM

## 2020-07-21 DIAGNOSIS — E66.9 OBESITY (BMI 30.0-34.9): ICD-10-CM

## 2020-07-21 DIAGNOSIS — E04.2 MULTIPLE THYROID NODULES: ICD-10-CM

## 2020-07-21 DIAGNOSIS — Z99.81 DEPENDENCE ON SUPPLEMENTAL OXYGEN: ICD-10-CM

## 2020-07-21 DIAGNOSIS — M54.12 CERVICAL RADICULOPATHY: Chronic | ICD-10-CM

## 2020-07-21 DIAGNOSIS — I77.1 TORTUOUS AORTA: ICD-10-CM

## 2020-07-21 DIAGNOSIS — M25.562 CHRONIC PAIN OF BOTH KNEES: ICD-10-CM

## 2020-07-21 DIAGNOSIS — M17.0 PRIMARY OSTEOARTHRITIS OF BOTH KNEES: ICD-10-CM

## 2020-07-21 DIAGNOSIS — Z00.00 ENCOUNTER FOR PREVENTIVE HEALTH EXAMINATION: ICD-10-CM

## 2020-07-21 DIAGNOSIS — I10 ESSENTIAL HYPERTENSION: Chronic | ICD-10-CM

## 2020-07-21 DIAGNOSIS — H25.13 NUCLEAR SCLEROSIS OF BOTH EYES: ICD-10-CM

## 2020-07-21 DIAGNOSIS — Z13.31 POSITIVE DEPRESSION SCREENING: ICD-10-CM

## 2020-07-21 PROBLEM — J18.9 MULTIFOCAL PNEUMONIA: Status: RESOLVED | Noted: 2020-03-31 | Resolved: 2020-07-21

## 2020-07-21 PROBLEM — R06.02 SHORTNESS OF BREATH: Status: RESOLVED | Noted: 2020-04-24 | Resolved: 2020-07-21

## 2020-07-21 PROBLEM — J96.01 ACUTE HYPOXEMIC RESPIRATORY FAILURE: Status: RESOLVED | Noted: 2020-03-31 | Resolved: 2020-07-21

## 2020-07-21 PROBLEM — U07.1 COVID-19 VIRUS INFECTION: Status: RESOLVED | Noted: 2020-03-31 | Resolved: 2020-07-21

## 2020-07-21 PROBLEM — R26.2 DIFFICULTY IN WALKING: Status: RESOLVED | Noted: 2020-01-08 | Resolved: 2020-07-21

## 2020-07-21 PROCEDURE — 99499 RISK ADDL DX/OHS AUDIT: ICD-10-PCS | Mod: HCNC,S$GLB,, | Performed by: NURSE PRACTITIONER

## 2020-07-21 PROCEDURE — G0439 PR MEDICARE ANNUAL WELLNESS SUBSEQUENT VISIT: ICD-10-PCS | Mod: HCNC,S$GLB,, | Performed by: NURSE PRACTITIONER

## 2020-07-21 PROCEDURE — 99499 UNLISTED E&M SERVICE: CPT | Mod: HCNC,S$GLB,, | Performed by: NURSE PRACTITIONER

## 2020-07-21 PROCEDURE — G0439 PPPS, SUBSEQ VISIT: HCPCS | Mod: HCNC,S$GLB,, | Performed by: NURSE PRACTITIONER

## 2020-07-21 NOTE — PROGRESS NOTES
"I offered to discuss end of life issues, including information on how to make advance directives that the patient could use to name someone who would make medical decisions on their behalf if they became too ill to make themselves.    ___Patient declined  _X_Patient is interested, I provided paper work and offered to discuss.      Jojo Burrows presented for a  Medicare AWV and comprehensive Health Risk Assessment today. The following components were reviewed and updated:    · Medical history  · Family History  · Social history  · Allergies and Current Medications  · Health Risk Assessment  · Health Maintenance  · Care Team     ** See Completed Assessments for Annual Wellness Visit within the encounter summary.**         The following assessments were completed:  · Living Situation  · CAGE  · Depression Screening  · Timed Get Up and Go  · Whisper Test  · Cognitive Function Screening  · Nutrition Screening  · ADL Screening  · PAQ Screening        Vitals:    07/21/20 1601   BP: 118/72   Pulse: 67   Temp: 97.1 °F (36.2 °C)   SpO2: 97%   Weight: 73.9 kg (163 lb)   Height: 5' 2" (1.575 m)     Body mass index is 29.81 kg/m².  Physical Exam  Vitals signs and nursing note reviewed.   Constitutional:       Appearance: Normal appearance.   HENT:      Head: Normocephalic and atraumatic.   Eyes:      Extraocular Movements: Extraocular movements intact.      Conjunctiva/sclera: Conjunctivae normal.      Pupils: Pupils are equal, round, and reactive to light.   Cardiovascular:      Rate and Rhythm: Normal rate and regular rhythm.      Pulses: Normal pulses. No decreased pulses.      Heart sounds: Normal heart sounds, S1 normal and S2 normal.   Pulmonary:      Effort: Pulmonary effort is normal. No accessory muscle usage or respiratory distress.      Breath sounds: Normal breath sounds. No decreased air movement. No decreased breath sounds, wheezing, rhonchi or rales.   Abdominal:      General: Abdomen is flat. Bowel sounds are " normal.      Palpations: Abdomen is soft.   Musculoskeletal:      Right lower leg: No edema.      Left lower leg: No edema.   Lymphadenopathy:      Cervical: No cervical adenopathy.   Skin:     General: Skin is warm and dry.   Neurological:      General: No focal deficit present.      Mental Status: She is alert.      Gait: Gait is intact.   Psychiatric:         Attention and Perception: Attention and perception normal.         Mood and Affect: Mood and affect normal.         Speech: Speech normal.         Behavior: Behavior normal. Behavior is cooperative.         Thought Content: Thought content normal.         Cognition and Memory: Cognition and memory normal.         Judgment: Judgment normal.               Diagnoses and health risks identified today and associated recommendations/orders:    1. Encounter for preventive health examination  - No  current complaints today.    2. Dependence on supplemental oxygen  -  Stable patient reports she has not use oxygen in the last couple of weeks.  Oxygen was initially used immediately post to discharge from hospital after having been diagnosed and treated for COVID-19 virus.  Followed by PCP.      3. Degenerative disc disease, cervical  --  Chronic, last PT/OT in April 2020.  Patient reports utilizing at home exercises given by PT.  Followed by PCP.      4. Cervical radiculopathy  -  Chronic, last PT/OT in April 2020.  Patient reports utilizing at home exercises given by PT.  Followed by PCP.    5. Essential hypertension  -  Chronic, stable with meds, BP normal at today's visit;  patient reports medication compliance, followed by PCP    6. Nuclear sclerosis of both eyes  -  Chronic, no acute complaints with vision, followed by ophthalmology    7. Tortuous aorta  -  Chronic, stable with meds, Denies any acute CP, Palpitations, SOB, HARVEY;  patient reports medication compliance, followed by PCP    8. Cyst of breast, unspecified laterality  -  Chronic, stable, last mammogram  in April of 2019 completed and revealed No mammographic evidence of malignancy;   Next mammogram due April of 2021    9. Obesity (BMI 30.0-34.9)  -  Chronic, discuss risk factors for cardiovascular disease due to current BMI.  Patient aware of healthy lifestyle changes to lower risk of cardiovascular disease;  followed by PCP    10. Multiple thyroid nodules  - Chronic, stable, no meds initiated.  Continual observation followed by Endocrinologist.    11. Vitamin D deficiency  -  Chronic, stable with meds, patient reports medication compliance; her last vitamin-D level from 3 months ago was 29 however vitamin-D level is slightly increased when compared to a year ago;  followed by PCP    12. Gastroesophageal reflux disease, esophagitis presence not specified  -  Chronic, stable with meds, followed by PCP    13. Chronic pain of both knees  - Chronic, stable with physical therapy and meds, followed by both Orthopedics and PCP    14. Primary osteoarthritis of both knees  - Chronic, stable with physical therapy and meds, followed by both Orthopedics and PCP    15. Inflammatory spondylopathy of lumbar region  - Chronic, stable with physical therapy and meds, followed by both Orthopedics and PCP    16. Lumbar facet arthropathy  - Chronic, stable with physical therapy and meds, followed by both Orthopedics and PCP    17. Positive Depression Screening   -  Patient denies any suicidal ideations or harm to others/self; Advised patient to follow up with her PCP to discuss this more.  Will send a note to the PCP.      Provided Jojo with a 5-10 year written screening schedule and personal prevention plan. Recommendations were developed using the USPSTF age appropriate recommendations. Education, counseling, and referrals were provided as needed. After Visit Summary printed and given to patient which includes a list of additional screenings\tests needed.    Follow up in about 1 year (around 7/21/2021) for for your next wellness  exam.    Rivera Porter, NP

## 2020-07-21 NOTE — PATIENT INSTRUCTIONS
Counseling and Referral of Other Preventative  (Italic type indicates deductible and co-insurance are waived)    Patient Name: Jojo Burrows  Today's Date: 7/21/2020    Health Maintenance       Date Due Completion Date    Shingles Vaccine (1 of 2) 09/23/2020 (Originally 2/4/2000) ---    Influenza Vaccine (1) 09/01/2020 10/26/2015    Colorectal Cancer Screening 04/29/2021 4/29/2016    Override on 2/16/2011: Done    Mammogram 04/30/2021 4/30/2019    DEXA SCAN 04/30/2022 4/30/2019    Lipid Panel 04/09/2024 4/9/2019    TETANUS VACCINE 10/16/2029 10/16/2019        No orders of the defined types were placed in this encounter.    The following information is provided to all patients.  This information is to help you find resources for any of the problems found today that may be affecting your health:                Living healthy guide: www.[facility].[place]      Understanding Diabetes: www.diabetes.org      Eating healthy: www.cdc.gov/healthyweight      CDC home safety checklist: www.cdc.gov/steadi/patient.html      Agency on Aging: www.goea.[place].[facility]      Alcoholics anonymous (AA): www.aa.org      Physical Activity: www.soraya.nih.gov/vx1xurv      Tobacco use: www.quitwithusla.org

## 2020-07-21 NOTE — Clinical Note
Nina Munoz,  It was a pleasure taking care of your patient today.  Upon discharge of our visit Mrs. Burrows mention that she would consider speaking with someone regarding the recent death of her  who  from COVID-19 virus.  As you know Ms. Burrows was also hospitalized from COVID-19 virus and discharged.  While she did present with a Positive Depression Screen, she did not report any suicidal ideations or harm to others/self.  I did speak with her about trying to talk to someone regarding her grief.   I instructed her to follow-up with you at your next visit to discuss this more.  She seemed receptive and said she would think about it more but is considering speaking with someone.  Post my visit, I walked her to the  where she tried to make the appointment with you, but the  had left for the day.  If you have any further questions, please feel free to reach out to me.  Rivera Porter, FNP-BC

## 2020-07-23 PROBLEM — Z13.31 POSITIVE DEPRESSION SCREENING: Status: ACTIVE | Noted: 2020-07-23

## 2020-07-23 NOTE — PROGRESS NOTES
"  Jojo Burrows presented for a  Medicare AWV and comprehensive Health Risk Assessment today. The following components were reviewed and updated:    · Medical history  · Family History  · Social history  · Allergies and Current Medications  · Health Risk Assessment  · Health Maintenance  · Care Team     ** See Completed Assessments for Annual Wellness Visit within the encounter summary.**         The following assessments were completed:  · Living Situation  · CAGE  · Depression Screening  · Timed Get Up and Go  · Whisper Test  · Cognitive Function Screening  · Nutrition Screening  · ADL Screening  · PAQ Screening        Vitals:    07/21/20 1601   BP: 118/72   Pulse: 67   Temp: 97.1 °F (36.2 °C)   SpO2: 97%   Weight: 73.9 kg (163 lb)   Height: 5' 2" (1.575 m)     Body mass index is 29.81 kg/m².  Physical Exam          Diagnoses and health risks identified today and associated recommendations/orders:    1. Dependence on supplemental oxygen  ***    2. Encounter for preventive health examination  ***    3. Degenerative disc disease, cervical  ***    4. Cervical radiculopathy  ***    5. Essential hypertension  ***    6. Nuclear sclerosis of both eyes  ***    7. Tortuous aorta  ***    8. Cyst of breast, unspecified laterality  ***    9. Obesity (BMI 30.0-34.9)  ***    10. Multiple thyroid nodules  ***    11. Vitamin D deficiency  ***    12. Gastroesophageal reflux disease, esophagitis presence not specified  ***    13. Chronic pain of both knees  ***    14. Primary osteoarthritis of both knees  ***    15. Inflammatory spondylopathy of lumbar region  ***    16. Lumbar facet arthropathy  ***      Provided Jojo with a 5-10 year written screening schedule and personal prevention plan. Recommendations were developed using the USPSTF age appropriate recommendations. Education, counseling, and referrals were provided as needed. After Visit Summary printed and given to patient which includes a list of additional screenings\tests " needed.    Follow up in about 1 year (around 7/21/2021) for for your next wellness exam.    Rivera Porter NP

## 2020-07-27 ENCOUNTER — TELEPHONE (OUTPATIENT)
Dept: INTERNAL MEDICINE | Facility: CLINIC | Age: 70
End: 2020-07-27

## 2020-07-27 DIAGNOSIS — Z12.31 BREAST CANCER SCREENING BY MAMMOGRAM: Primary | ICD-10-CM

## 2020-07-27 NOTE — TELEPHONE ENCOUNTER
----- Message from Dariana Toledo sent at 7/27/2020 10:22 AM CDT -----  Regarding: Mammo  Contact: Patient @ [phone]  Caller is requesting to schedule their annual screening mammogram appointment. Order is not listed in Epic.  Please enter order and contact patient to schedule.  Where would they like the mammogram performed?:  [facility]  Would the patient rather receive a phone call back or a response via [facility]?:  Call  Additional information:

## 2020-07-27 NOTE — TELEPHONE ENCOUNTER
Tried reaching 3 times.  Left msg to call us back.    I have put in mammo order.  Need to discuss short of breath and weakness symptoms.

## 2020-07-27 NOTE — TELEPHONE ENCOUNTER
----- Message from Dariana Toledo sent at 7/27/2020 10:15 AM CDT -----  Regarding: Short of Breath and Weak  Contact: Patient @ [phone]  Good Morning  Would like to get medical advice.  Symptoms (please be specific):  Short of Breath and Weak  How long has patient had these symptoms:    Pharmacy name and phone #:    Any drug allergies (copy from chart):      Would the patient rather a call back or a response via [facility]?:    Comments:  Patient would like a call from Dr Munoz

## 2020-07-28 NOTE — TELEPHONE ENCOUNTER
She said she is not short of breath but feels fatigue.  Has been off oxygen 2-3 weeks now.     Was doing home pt and that finished last week.   Nurse is coming this week.

## 2020-08-11 ENCOUNTER — HOSPITAL ENCOUNTER (OUTPATIENT)
Dept: RADIOLOGY | Facility: HOSPITAL | Age: 70
Discharge: HOME | End: 2020-08-11
Attending: INTERNAL MEDICINE
Payer: MEDICARE

## 2020-08-11 DIAGNOSIS — Z12.31 BREAST CANCER SCREENING BY MAMMOGRAM: ICD-10-CM

## 2020-08-11 PROCEDURE — 77067 MAMMO DIGITAL SCREENING BILAT WITH TOMOSYNTHESIS_CAD: ICD-10-PCS | Mod: 26,HCNC,, | Performed by: RADIOLOGY

## 2020-08-11 PROCEDURE — 77067 SCR MAMMO BI INCL CAD: CPT | Mod: 26,HCNC,, | Performed by: RADIOLOGY

## 2020-08-11 PROCEDURE — 77063 MAMMO DIGITAL SCREENING BILAT WITH TOMOSYNTHESIS_CAD: ICD-10-PCS | Mod: 26,HCNC,, | Performed by: RADIOLOGY

## 2020-08-11 PROCEDURE — 77067 SCR MAMMO BI INCL CAD: CPT | Mod: TC,HCNC

## 2020-08-11 PROCEDURE — 77063 BREAST TOMOSYNTHESIS BI: CPT | Mod: 26,HCNC,, | Performed by: RADIOLOGY

## 2020-08-14 ENCOUNTER — TELEPHONE (OUTPATIENT)
Dept: RADIOLOGY | Facility: HOSPITAL | Age: 70
End: 2020-08-14

## 2020-08-14 NOTE — TELEPHONE ENCOUNTER
Spoke with patient and explained mammogram findings.Patient expressed understanding of results. Patient scheduled abnormal mammogram follow up appointment at [facility] [place] on 8/19/2020.

## 2020-08-19 ENCOUNTER — HOSPITAL ENCOUNTER (OUTPATIENT)
Dept: RADIOLOGY | Facility: HOSPITAL | Age: 70
Discharge: HOME | End: 2020-08-19
Attending: INTERNAL MEDICINE
Payer: MEDICARE

## 2020-08-19 DIAGNOSIS — R92.8 ABNORMAL MAMMOGRAM: ICD-10-CM

## 2020-08-19 PROCEDURE — 76642 US BREAST LEFT LIMITED: ICD-10-PCS | Mod: 26,HCNC,LT, | Performed by: RADIOLOGY

## 2020-08-19 PROCEDURE — 76642 ULTRASOUND BREAST LIMITED: CPT | Mod: TC,HCNC,LT

## 2020-08-19 PROCEDURE — 76642 ULTRASOUND BREAST LIMITED: CPT | Mod: 26,HCNC,LT, | Performed by: RADIOLOGY

## 2020-09-04 ENCOUNTER — LAB VISIT (OUTPATIENT)
Dept: LAB | Facility: HOSPITAL | Age: 70
End: 2020-09-04
Attending: INTERNAL MEDICINE
Payer: MEDICARE

## 2020-09-04 DIAGNOSIS — Z00.00 WELL ADULT EXAM: ICD-10-CM

## 2020-09-04 DIAGNOSIS — E04.2 MULTIPLE THYROID NODULES: ICD-10-CM

## 2020-09-04 DIAGNOSIS — I10 ESSENTIAL HYPERTENSION: ICD-10-CM

## 2020-09-04 DIAGNOSIS — E66.9 OBESITY (BMI 30.0-34.9): ICD-10-CM

## 2020-09-04 DIAGNOSIS — E55.9 VITAMIN D DEFICIENCY: ICD-10-CM

## 2020-09-04 LAB
BILIRUB UR QL STRIP: NEGATIVE
CLARITY UR REFRACT.AUTO: CLEAR
COLOR UR AUTO: YELLOW
GLUCOSE UR QL STRIP: NEGATIVE
HGB UR QL STRIP: NEGATIVE
KETONES UR QL STRIP: NEGATIVE
LEUKOCYTE ESTERASE UR QL STRIP: NEGATIVE
NITRITE UR QL STRIP: NEGATIVE
PH UR STRIP: 6 [PH] (ref 5–8)
PROT UR QL STRIP: NEGATIVE
SP GR UR STRIP: 1.01 (ref 1–1.03)
URN SPEC COLLECT METH UR: NORMAL

## 2020-09-04 PROCEDURE — 81003 URINALYSIS AUTO W/O SCOPE: CPT | Mod: HCNC

## 2020-09-11 ENCOUNTER — OFFICE VISIT (OUTPATIENT)
Dept: INTERNAL MEDICINE | Facility: CLINIC | Age: 70
End: 2020-09-11
Payer: MEDICARE

## 2020-09-11 VITALS
DIASTOLIC BLOOD PRESSURE: 78 MMHG | SYSTOLIC BLOOD PRESSURE: 128 MMHG | WEIGHT: 167 LBS | RESPIRATION RATE: 16 BRPM | BODY MASS INDEX: 30.73 KG/M2 | HEIGHT: 62 IN | TEMPERATURE: 98 F | HEART RATE: 68 BPM

## 2020-09-11 DIAGNOSIS — R53.83 FATIGUE, UNSPECIFIED TYPE: ICD-10-CM

## 2020-09-11 DIAGNOSIS — K21.9 GASTROESOPHAGEAL REFLUX DISEASE, ESOPHAGITIS PRESENCE NOT SPECIFIED: ICD-10-CM

## 2020-09-11 DIAGNOSIS — G47.00 INSOMNIA, UNSPECIFIED TYPE: ICD-10-CM

## 2020-09-11 DIAGNOSIS — R79.89 LFT ELEVATION: ICD-10-CM

## 2020-09-11 DIAGNOSIS — Z86.16 HISTORY OF 2019 NOVEL CORONAVIRUS DISEASE (COVID-19): Primary | ICD-10-CM

## 2020-09-11 DIAGNOSIS — R53.1 WEAKNESS GENERALIZED: ICD-10-CM

## 2020-09-11 DIAGNOSIS — I10 ESSENTIAL HYPERTENSION: ICD-10-CM

## 2020-09-11 PROCEDURE — 3078F DIAST BP <80 MM HG: CPT | Mod: HCNC,CPTII,S$GLB, | Performed by: INTERNAL MEDICINE

## 2020-09-11 PROCEDURE — 99999 PR PBB SHADOW E&M-EST. PATIENT-LVL IV: CPT | Mod: PBBFAC,HCNC,, | Performed by: INTERNAL MEDICINE

## 2020-09-11 PROCEDURE — 99397 PER PM REEVAL EST PAT 65+ YR: CPT | Mod: HCNC,S$GLB,, | Performed by: INTERNAL MEDICINE

## 2020-09-11 PROCEDURE — 3074F PR MOST RECENT SYSTOLIC BLOOD PRESSURE < 130 MM HG: ICD-10-PCS | Mod: HCNC,CPTII,S$GLB, | Performed by: INTERNAL MEDICINE

## 2020-09-11 PROCEDURE — 99999 PR PBB SHADOW E&M-EST. PATIENT-LVL IV: ICD-10-PCS | Mod: PBBFAC,HCNC,, | Performed by: INTERNAL MEDICINE

## 2020-09-11 PROCEDURE — 99499 RISK ADDL DX/OHS AUDIT: ICD-10-PCS | Mod: HCNC,S$GLB,, | Performed by: INTERNAL MEDICINE

## 2020-09-11 PROCEDURE — 3078F PR MOST RECENT DIASTOLIC BLOOD PRESSURE < 80 MM HG: ICD-10-PCS | Mod: HCNC,CPTII,S$GLB, | Performed by: INTERNAL MEDICINE

## 2020-09-11 PROCEDURE — 99499 UNLISTED E&M SERVICE: CPT | Mod: HCNC,S$GLB,, | Performed by: INTERNAL MEDICINE

## 2020-09-11 PROCEDURE — 3074F SYST BP LT 130 MM HG: CPT | Mod: HCNC,CPTII,S$GLB, | Performed by: INTERNAL MEDICINE

## 2020-09-11 PROCEDURE — 99397 PR PREVENTIVE VISIT,EST,65 & OVER: ICD-10-PCS | Mod: HCNC,S$GLB,, | Performed by: INTERNAL MEDICINE

## 2020-09-11 RX ORDER — TRAZODONE HYDROCHLORIDE 50 MG/1
50 TABLET ORAL NIGHTLY PRN
Qty: 30 TABLET | Refills: 3 | Status: SHIPPED | OUTPATIENT
Start: 2020-09-11 | End: 2020-12-16

## 2020-09-11 NOTE — PROGRESS NOTES
Subjective:       Patient ID: Jojo Burrows is a 70 y.o. female.    Chief Complaint: Annual Exam (see labs.  right knee pain at 4)    HPI   Patient presents for scheduled annual examination today.  This is my 1st visit with the patient since she was admitted to the hospital on 2020 with COVID-19 associated pneumonia.  The patient was ultimately discharged home with oxygen.  She states she no longer has need for the oxygen.  She denies having any shortness of breath with her routine activities.  A chest x-ray obtained at the time of her evaluation by her pulmonary specialist showed resolution of the pneumonia.  The patient denies having any cough for chest pain.  She is afebrile with no complaints of chills or sweats.  She does complain of her stamina being diminished.  It has been gradually improving.  She has difficulty sleeping at night.  Zolpidem was discontinued by the pulmonary specialist so she is using Benadryl at night for sleep.  She is complaining of symptoms of mild depression and anxiety.  She would like to try a prescription medicine for sleep.  She believes she was given trazodone by her daughter and this was helpful.    She has chronic bilateral knee pain the symptoms being worse on the right side.    Reflux symptoms have been stable.  She intermittently uses Nexium.    Active medical conditions also include hypertension, degenerative disc disease of the cervical spine, cervical radiculopathy, tortuous aorta, obesity, multiple thyroid nodules, vitamin-D deficiency, osteoarthritis of both knees, lumbar facet arthropathy.    Immunization record was reviewed.    Screening tests were reviewed.    No interval change in past medical history, family history, or social history since prior evaluations.  The main exception is that her   in the hospital earlier this year due to COVID -19.  She still helps with the management of her elderly parents who are back in their own home.  They both  suffer from dementia.  She also has a brother who also has a mild form of dementia.    Review of Systems   Constitutional: Positive for activity change and fatigue. Negative for fever and unexpected weight change.   HENT: Negative for nasal congestion, postnasal drip, rhinorrhea and sore throat.    Eyes: Negative for visual disturbance.   Respiratory: Negative for cough, chest tightness, shortness of breath and wheezing.    Cardiovascular: Negative for chest pain, palpitations and leg swelling.   Gastrointestinal: Negative for abdominal pain and blood in stool.   Genitourinary: Negative for dysuria, frequency and hematuria.   Musculoskeletal: Positive for arthralgias, back pain and neck pain. Negative for joint swelling and myalgias.   Integumentary:  Negative for rash.   Neurological: Negative for dizziness, syncope, weakness, numbness and headaches.   Psychiatric/Behavioral: Positive for dysphoric mood and sleep disturbance. Negative for confusion and suicidal ideas. The patient is nervous/anxious.          Objective:      Physical Exam  Vitals signs and nursing note reviewed.   Constitutional:       General: She is not in acute distress.     Appearance: She is well-developed.   HENT:      Head: Normocephalic and atraumatic.      Right Ear: External ear normal.      Left Ear: External ear normal.      Nose: Nose normal.      Mouth/Throat:      Pharynx: No oropharyngeal exudate.   Eyes:      General: No scleral icterus.     Conjunctiva/sclera: Conjunctivae normal.      Pupils: Pupils are equal, round, and reactive to light.   Neck:      Musculoskeletal: Normal range of motion and neck supple.      Thyroid: No thyromegaly.      Vascular: No carotid bruit or JVD.   Cardiovascular:      Rate and Rhythm: Normal rate and regular rhythm.      Pulses: Normal pulses.      Heart sounds: Normal heart sounds. No murmur. No friction rub. No gallop.    Pulmonary:      Effort: Pulmonary effort is normal. No respiratory distress.       Breath sounds: Normal breath sounds. No wheezing or rales.   Abdominal:      General: Bowel sounds are normal. There is no abdominal bruit.      Palpations: Abdomen is soft. There is no hepatomegaly, splenomegaly or mass.      Tenderness: There is no abdominal tenderness.      Hernia: No hernia is present.   Musculoskeletal: Normal range of motion.         General: No tenderness.      Right shoulder: She exhibits no effusion and no deformity.      Comments: Knee crepitus is present examination.  Negative straight leg raising test bilaterally.  Hip range of motion is intact.   Lymphadenopathy:      Cervical: No cervical adenopathy.      Upper Body:      Right upper body: No supraclavicular adenopathy.      Left upper body: No supraclavicular adenopathy.   Skin:     General: Skin is warm and dry.      Findings: No rash.   Neurological:      Mental Status: She is alert and oriented to person, place, and time.      Cranial Nerves: No cranial nerve deficit.   Psychiatric:         Mood and Affect: Mood normal.         Speech: Speech normal.         Behavior: Behavior normal.         Thought Content: Thought content normal.      Comments: There is no suicidal homicidal ideation.  No hallucinosis or delusions are present.  Judgment and insight are intact.  She does have sad feelings of course regarding the loss of her .         Results for orders placed or performed in visit on 09/04/20   CBC auto differential   Result Value Ref Range    WBC 6.20 3.90 - 12.70 K/uL    RBC 4.20 4.00 - 5.40 M/uL    Hemoglobin 12.5 12.0 - 16.0 g/dL    Hematocrit 39.7 37.0 - 48.5 %    Mean Corpuscular Volume 95 82 - 98 fL    Mean Corpuscular Hemoglobin 29.8 27.0 - 31.0 pg    Mean Corpuscular Hemoglobin Conc 31.5 (L) 32.0 - 36.0 g/dL    RDW 14.0 11.5 - 14.5 %    Platelets 156 150 - 350 K/uL    MPV 12.3 9.2 - 12.9 fL    Immature Granulocytes 0.2 0.0 - 0.5 %    Gran # (ANC) 2.5 1.8 - 7.7 K/uL    Immature Grans (Abs) 0.01 0.00 - 0.04  K/uL    Lymph # 2.9 1.0 - 4.8 K/uL    Mono # 0.7 0.3 - 1.0 K/uL    Eos # 0.1 0.0 - 0.5 K/uL    Baso # 0.02 0.00 - 0.20 K/uL    nRBC 0 0 /100 WBC    Gran% 40.6 38.0 - 73.0 %    Lymph% 46.1 18.0 - 48.0 %    Mono% 11.8 4.0 - 15.0 %    Eosinophil% 1.0 0.0 - 8.0 %    Basophil% 0.3 0.0 - 1.9 %    Differential Method Automated    Comprehensive metabolic panel   Result Value Ref Range    Sodium 138 136 - 145 mmol/L    Potassium 3.7 3.5 - 5.1 mmol/L    Chloride 104 95 - 110 mmol/L    CO2 26 23 - 29 mmol/L    Glucose 87 70 - 110 mg/dL    BUN, Bld 12 8 - 23 mg/dL    Creatinine 0.8 0.5 - 1.4 mg/dL    Calcium 9.5 8.7 - 10.5 mg/dL    Total Protein 7.2 6.0 - 8.4 g/dL    Albumin 4.0 3.5 - 5.2 g/dL    Total Bilirubin 0.4 0.1 - 1.0 mg/dL    Alkaline Phosphatase 95 55 - 135 U/L    AST 39 10 - 40 U/L    ALT 52 (H) 10 - 44 U/L    Anion Gap 8 8 - 16 mmol/L    eGFR if African American >60.0 >60 mL/min/1.73 m^2    eGFR if non African American >60.0 >60 mL/min/1.73 m^2   Lipid panel   Result Value Ref Range    Cholesterol 166 120 - 199 mg/dL    Triglycerides 39 30 - 150 mg/dL    HDL 71 40 - 75 mg/dL    LDL Cholesterol 87.2 63.0 - 159.0 mg/dL    Hdl/Cholesterol Ratio 42.8 20.0 - 50.0 %    Total Cholesterol/HDL Ratio 2.3 2.0 - 5.0    Non-HDL Cholesterol 95 mg/dL   TSH   Result Value Ref Range    TSH 2.233 0.400 - 4.000 uIU/mL   CBC auto differential   Result Value Ref Range    WBC 6.20 3.90 - 12.70 K/uL    RBC 4.20 4.00 - 5.40 M/uL    Hemoglobin 12.5 12.0 - 16.0 g/dL    Hematocrit 39.7 37.0 - 48.5 %    Mean Corpuscular Volume 95 82 - 98 fL    Mean Corpuscular Hemoglobin 29.8 27.0 - 31.0 pg    Mean Corpuscular Hemoglobin Conc 31.5 (L) 32.0 - 36.0 g/dL    RDW 14.0 11.5 - 14.5 %    Platelets 156 150 - 350 K/uL    MPV 12.3 9.2 - 12.9 fL    Immature Granulocytes 0.2 0.0 - 0.5 %    Gran # (ANC) 2.5 1.8 - 7.7 K/uL    Immature Grans (Abs) 0.01 0.00 - 0.04 K/uL    Lymph # 2.9 1.0 - 4.8 K/uL    Mono # 0.7 0.3 - 1.0 K/uL    Eos # 0.1 0.0 - 0.5 K/uL     Baso # 0.02 0.00 - 0.20 K/uL    nRBC 0 0 /100 WBC    Gran% 40.6 38.0 - 73.0 %    Lymph% 46.1 18.0 - 48.0 %    Mono% 11.8 4.0 - 15.0 %    Eosinophil% 1.0 0.0 - 8.0 %    Basophil% 0.3 0.0 - 1.9 %    Differential Method Automated    Comprehensive metabolic panel   Result Value Ref Range    Sodium 138 136 - 145 mmol/L    Potassium 3.7 3.5 - 5.1 mmol/L    Chloride 104 95 - 110 mmol/L    CO2 26 23 - 29 mmol/L    Glucose 87 70 - 110 mg/dL    BUN, Bld 12 8 - 23 mg/dL    Creatinine 0.8 0.5 - 1.4 mg/dL    Calcium 9.5 8.7 - 10.5 mg/dL    Total Protein 7.2 6.0 - 8.4 g/dL    Albumin 4.0 3.5 - 5.2 g/dL    Total Bilirubin 0.4 0.1 - 1.0 mg/dL    Alkaline Phosphatase 95 55 - 135 U/L    AST 39 10 - 40 U/L    ALT 52 (H) 10 - 44 U/L    Anion Gap 8 8 - 16 mmol/L    eGFR if African American >60.0 >60 mL/min/1.73 m^2    eGFR if non African American >60.0 >60 mL/min/1.73 m^2   Lipid panel   Result Value Ref Range    Cholesterol 166 120 - 199 mg/dL    Triglycerides 39 30 - 150 mg/dL    HDL 71 40 - 75 mg/dL    LDL Cholesterol 87.2 63.0 - 159.0 mg/dL    Hdl/Cholesterol Ratio 42.8 20.0 - 50.0 %    Total Cholesterol/HDL Ratio 2.3 2.0 - 5.0    Non-HDL Cholesterol 95 mg/dL   TSH   Result Value Ref Range    TSH 2.233 0.400 - 4.000 uIU/mL        Assessment:       1. History of 2019 novel coronavirus disease (COVID-19)    2. Essential hypertension    3. Weakness generalized    4. Gastroesophageal reflux disease, esophagitis presence not specified    5. Insomnia, unspecified type    6. LFT elevation    7. Fatigue, unspecified type        Plan:       Jojo was seen today for annual exam.  An abdominal ultrasound will be obtained for further assessment of the abnormal liver enzyme levels.  Trazodone will be ordered for sleep and to help with mild depression.  An echocardiogram will be obtained to assess cardiovascular function in view of symptoms of fatigue.  The patient will be due for next colonoscopy in 2021 (5 year follow-up surveillance of  adenomatous colon polyp ups).    Diagnoses and all orders for this visit:    History of 2019 novel coronavirus disease (COVID-19)    Essential hypertension  -     Echo Color Flow Doppler? Yes; Future    Weakness generalized    Gastroesophageal reflux disease, esophagitis presence not specified    Insomnia, unspecified type    LFT elevation  -     US Abdomen Complete; Future    Fatigue, unspecified type  -     Echo Color Flow Doppler? Yes; Future    Other orders  -     traZODone (DESYREL) 50 MG tablet; Take 1 tablet (50 mg total) by mouth nightly as needed for Insomnia.

## 2020-09-16 ENCOUNTER — TELEPHONE (OUTPATIENT)
Dept: INTERNAL MEDICINE | Facility: CLINIC | Age: 70
End: 2020-09-16

## 2020-09-16 RX ORDER — LEVOCETIRIZINE DIHYDROCHLORIDE 5 MG/1
5 TABLET, FILM COATED ORAL DAILY PRN
Qty: 30 TABLET | Refills: 11 | Status: SHIPPED | OUTPATIENT
Start: 2020-09-16 | End: 2024-02-10 | Stop reason: ALTCHOICE

## 2020-09-16 NOTE — TELEPHONE ENCOUNTER
I didn't reach her to discuss this message.  Did you ask for this?  Do I need to do something else?    Please advise.  Thanks lillian

## 2020-09-16 NOTE — TELEPHONE ENCOUNTER
----- Message from Vicki Brambila sent at 9/16/2020  4:01 PM CDT -----  Contact: Sudeep [phone]--  Needs Advice    Reason for call:--Medical equipment--         Communication Preference:--Jojo--[phone]--    Additional Information:Pt calling to speak with a nurse regarding to  let the doctor know that her on oxygen machine is from [facility].. Please call to advise.

## 2020-09-22 ENCOUNTER — HOSPITAL ENCOUNTER (OUTPATIENT)
Dept: RADIOLOGY | Facility: HOSPITAL | Age: 70
Discharge: HOME | End: 2020-09-22
Attending: INTERNAL MEDICINE
Payer: MEDICARE

## 2020-09-22 DIAGNOSIS — R79.89 LFT ELEVATION: ICD-10-CM

## 2020-09-22 PROCEDURE — 76700 US ABDOMEN COMPLETE: ICD-10-PCS | Mod: 26,HCNC,, | Performed by: RADIOLOGY

## 2020-09-22 PROCEDURE — 76700 US EXAM ABDOM COMPLETE: CPT | Mod: TC,HCNC

## 2020-09-22 PROCEDURE — 76700 US EXAM ABDOM COMPLETE: CPT | Mod: 26,HCNC,, | Performed by: RADIOLOGY

## 2020-09-25 ENCOUNTER — CLINICAL SUPPORT (OUTPATIENT)
Dept: CARDIOLOGY | Facility: CLINIC | Age: 70
End: 2020-09-25
Attending: INTERNAL MEDICINE
Payer: MEDICARE

## 2020-09-25 VITALS — HEART RATE: 60 BPM | BODY MASS INDEX: 30.73 KG/M2 | HEIGHT: 62 IN | WEIGHT: 167 LBS

## 2020-09-25 DIAGNOSIS — R53.83 FATIGUE, UNSPECIFIED TYPE: ICD-10-CM

## 2020-09-25 DIAGNOSIS — I10 ESSENTIAL HYPERTENSION: ICD-10-CM

## 2020-09-25 LAB
ASCENDING AORTA: 2.93 CM
AV INDEX (PROSTH): 0.73
AV MEAN GRADIENT: 3 MMHG
AV PEAK GRADIENT: 6 MMHG
AV VALVE AREA: 2.17 CM2
AV VELOCITY RATIO: 0.69
BSA FOR ECHO PROCEDURE: 1.82 M2
CV ECHO LV RWT: 0.3 CM
DOP CALC AO PEAK VEL: 1.18 M/S
DOP CALC AO VTI: 29.53 CM
DOP CALC LVOT AREA: 3 CM2
DOP CALC LVOT DIAMETER: 1.95 CM
DOP CALC LVOT PEAK VEL: 0.81 M/S
DOP CALC LVOT STROKE VOLUME: 64 CM3
DOP CALCLVOT PEAK VEL VTI: 21.44 CM
E WAVE DECELERATION TIME: 210.14 MSEC
E/A RATIO: 0.67
E/E' RATIO: 8.93 M/S
ECHO LV POSTERIOR WALL: 0.61 CM (ref 0.6–1.1)
FRACTIONAL SHORTENING: 38 % (ref 28–44)
INTERVENTRICULAR SEPTUM: 0.77 CM (ref 0.6–1.1)
IVRT: 97.05 MSEC
LA MAJOR: 4.63 CM
LA MINOR: 4.6 CM
LA WIDTH: 3.76 CM
LEFT ATRIUM SIZE: 3.26 CM
LEFT ATRIUM VOLUME INDEX: 27.2 ML/M2
LEFT ATRIUM VOLUME: 48.08 CM3
LEFT INTERNAL DIMENSION IN SYSTOLE: 2.48 CM (ref 2.1–4)
LEFT VENTRICLE DIASTOLIC VOLUME INDEX: 39.9 ML/M2
LEFT VENTRICLE DIASTOLIC VOLUME: 70.65 ML
LEFT VENTRICLE MASS INDEX: 44 G/M2
LEFT VENTRICLE SYSTOLIC VOLUME INDEX: 12.3 ML/M2
LEFT VENTRICLE SYSTOLIC VOLUME: 21.79 ML
LEFT VENTRICULAR INTERNAL DIMENSION IN DIASTOLE: 4.02 CM (ref 3.5–6)
LEFT VENTRICULAR MASS: 77.56 G
LV LATERAL E/E' RATIO: 7.44 M/S
LV SEPTAL E/E' RATIO: 11.17 M/S
MV PEAK A VEL: 1 M/S
MV PEAK E VEL: 0.67 M/S
MV STENOSIS PRESSURE HALF TIME: 60.94 MS
MV VALVE AREA P 1/2 METHOD: 3.61 CM2
PISA TR MAX VEL: 2.88 M/S
PULM VEIN S/D RATIO: 1.44
PV PEAK D VEL: 0.43 M/S
PV PEAK S VEL: 0.62 M/S
RA MAJOR: 4.14 CM
RA PRESSURE: 3 MMHG
RA WIDTH: 2.74 CM
RIGHT VENTRICULAR END-DIASTOLIC DIMENSION: 2.45 CM
SINUS: 3.02 CM
STJ: 2.72 CM
TDI LATERAL: 0.09 M/S
TDI SEPTAL: 0.06 M/S
TDI: 0.08 M/S
TR MAX PG: 33 MMHG
TRICUSPID ANNULAR PLANE SYSTOLIC EXCURSION: 1.71 CM
TV REST PULMONARY ARTERY PRESSURE: 36 MMHG

## 2020-09-25 PROCEDURE — 93306 TTE W/DOPPLER COMPLETE: CPT | Mod: HCNC,S$GLB,, | Performed by: INTERNAL MEDICINE

## 2020-09-25 PROCEDURE — 99999 PR PBB SHADOW E&M-EST. PATIENT-LVL I: CPT | Mod: PBBFAC,HCNC,,

## 2020-09-25 PROCEDURE — 99999 PR PBB SHADOW E&M-EST. PATIENT-LVL I: ICD-10-PCS | Mod: PBBFAC,HCNC,,

## 2020-09-25 PROCEDURE — 93306 ECHO (CUPID ONLY): ICD-10-PCS | Mod: HCNC,S$GLB,, | Performed by: INTERNAL MEDICINE

## 2020-09-29 ENCOUNTER — PATIENT MESSAGE (OUTPATIENT)
Dept: OTHER | Facility: OTHER | Age: 70
End: 2020-09-29

## 2020-10-07 NOTE — TELEPHONE ENCOUNTER
I gave her pulmonary dept phone number.    They called to make an appt for later this month and she wanted sooner.  I gave her phone number to get appt with them.   Sudden numbness or weakness of the face, arm, or leg, especially on one side of the body. Confusion, trouble speaking or understanding. Trouble seeing in one or both eyes. Trouble walking, dizziness, loss of balance or coordination. Severe headache.

## 2020-10-14 ENCOUNTER — TELEPHONE (OUTPATIENT)
Dept: INTERNAL MEDICINE | Facility: CLINIC | Age: 70
End: 2020-10-14

## 2020-10-14 NOTE — TELEPHONE ENCOUNTER
----- Message from Mary Ellen Tejada sent at 10/14/2020 10:50 AM CDT -----  Regarding: vaccines  Contact: [phone]  Needs Advice    Reason for call:      Mrs. Uribe is requesting a call back to see if the provider would like her to have the flu shot and the pneumonia shot taken this year and if so should she come to the clinic to have it done.     Communication Preference: [phone]    Additional Information:    Please call pt to advise.

## 2020-10-23 ENCOUNTER — TELEPHONE (OUTPATIENT)
Dept: OPTOMETRY | Facility: CLINIC | Age: 70
End: 2020-10-23

## 2020-10-23 NOTE — TELEPHONE ENCOUNTER
Scheduled urgent appt 10/28 @ 140 due to floaters and pain. Pt will kepp 11/20 appt for routine .

## 2020-10-27 ENCOUNTER — PATIENT OUTREACH (OUTPATIENT)
Dept: ADMINISTRATIVE | Facility: OTHER | Age: 70
End: 2020-10-27

## 2020-10-29 ENCOUNTER — TELEPHONE (OUTPATIENT)
Dept: OPTOMETRY | Facility: CLINIC | Age: 70
End: 2020-10-29

## 2020-10-29 NOTE — TELEPHONE ENCOUNTER
Pt denies increase in symptoms . Has been advised if any changes to call back to office. Pt verbalized understanding by saying ok. rscheduled 11/04 @140

## 2020-11-04 ENCOUNTER — OFFICE VISIT (OUTPATIENT)
Dept: OPTOMETRY | Facility: CLINIC | Age: 70
End: 2020-11-04
Payer: MEDICARE

## 2020-11-04 DIAGNOSIS — H04.123 DRY EYE SYNDROME OF BOTH EYES: ICD-10-CM

## 2020-11-04 DIAGNOSIS — H25.13 SENILE NUCLEAR SCLEROSIS, BILATERAL: ICD-10-CM

## 2020-11-04 DIAGNOSIS — H52.203 MYOPIA WITH ASTIGMATISM AND PRESBYOPIA, BILATERAL: ICD-10-CM

## 2020-11-04 DIAGNOSIS — H52.4 MYOPIA WITH ASTIGMATISM AND PRESBYOPIA, BILATERAL: ICD-10-CM

## 2020-11-04 DIAGNOSIS — H52.13 MYOPIA WITH ASTIGMATISM AND PRESBYOPIA, BILATERAL: ICD-10-CM

## 2020-11-04 DIAGNOSIS — H43.393 VISUAL FLOATERS, BILATERAL: Primary | ICD-10-CM

## 2020-11-04 PROCEDURE — 92015 PR REFRACTION: ICD-10-PCS | Mod: HCNC,S$GLB,, | Performed by: OPTOMETRIST

## 2020-11-04 PROCEDURE — 92015 DETERMINE REFRACTIVE STATE: CPT | Mod: HCNC,S$GLB,, | Performed by: OPTOMETRIST

## 2020-11-04 PROCEDURE — 99999 PR PBB SHADOW E&M-EST. PATIENT-LVL III: CPT | Mod: PBBFAC,HCNC,, | Performed by: OPTOMETRIST

## 2020-11-04 PROCEDURE — 92014 PR EYE EXAM, EST PATIENT,COMPREHESV: ICD-10-PCS | Mod: HCNC,S$GLB,, | Performed by: OPTOMETRIST

## 2020-11-04 PROCEDURE — 92014 COMPRE OPH EXAM EST PT 1/>: CPT | Mod: HCNC,S$GLB,, | Performed by: OPTOMETRIST

## 2020-11-04 PROCEDURE — 99999 PR PBB SHADOW E&M-EST. PATIENT-LVL III: ICD-10-PCS | Mod: PBBFAC,HCNC,, | Performed by: OPTOMETRIST

## 2020-11-04 NOTE — PROGRESS NOTES
HPI     Pt is coming in for increase floaters and sharp pain OS   Symptoms have been staying the same but has noticed and overall increase   in floaters in the past month    OSIEL:08/2019  Glasses? no  Contacts? no  H/o eye surgery, injections or laser: no  H/o eye injury: no  Known eye conditions? Cataracts  Family h/o eye conditions? Parents-cataracts , MU-Glaucoma   Eye gtts?no      (-) Flashes (+) Floaters OU OS>OD  (-) Mucous   (-) Tearing (-) Itching (-) Burning   (-) Headaches (+) Eye Pain/discomfort sharp pain intermittent OS  (+)   Irritation foreign body sensation   (-) Redness (-) Double vision (+) Blurry vision harder to focus,   intermittent     Diabetic? (-)  A1c?  (Hemoglobin A1C       Date                     Value               Ref Range             Status                06/14/2005               5.6                 4.5 - 6.2 %           Final            ----------)        Last edited by Jory Lane on 11/4/2020  2:34 PM. (History)            Assessment /Plan     For exam results, see Encounter Report.    Visual floaters, bilateral    Dry eye syndrome of both eyes    Myopia with astigmatism and presbyopia, bilateral    Senile nuclear sclerosis, bilateral      1. No e/o h/b/t 360 degrees OU. Monitor for worsening of symptoms or S/Sx of RD. RTC 1 mo DFE  2. Recommend Systane Ultra or Refresh Optive BID-TID OU to aid with symptoms of dry eyes.  3. SRx released to patient. Patient educated on lens options. Normal ocular health. RTC 1 year for routine exam.   4. Nuclear sclerotic cataract - mildy visually significant. Observe.    Pt lost her  due to COVID in April. They were  for 50 years. Pt's  was  of [facility].

## 2020-12-08 ENCOUNTER — PATIENT OUTREACH (OUTPATIENT)
Dept: ADMINISTRATIVE | Facility: OTHER | Age: 70
End: 2020-12-08

## 2020-12-10 ENCOUNTER — OFFICE VISIT (OUTPATIENT)
Dept: OPTOMETRY | Facility: CLINIC | Age: 70
End: 2020-12-10
Payer: MEDICARE

## 2020-12-10 DIAGNOSIS — H43.813 POSTERIOR VITREOUS DETACHMENT OF BOTH EYES: ICD-10-CM

## 2020-12-10 DIAGNOSIS — H43.393 VISUAL FLOATERS, BILATERAL: ICD-10-CM

## 2020-12-10 DIAGNOSIS — H04.123 DRY EYE SYNDROME OF BOTH EYES: Primary | ICD-10-CM

## 2020-12-10 DIAGNOSIS — H25.13 SENILE NUCLEAR SCLEROSIS, BILATERAL: ICD-10-CM

## 2020-12-10 PROCEDURE — 92014 COMPRE OPH EXAM EST PT 1/>: CPT | Mod: HCNC,S$GLB,, | Performed by: OPTOMETRIST

## 2020-12-10 PROCEDURE — 3288F FALL RISK ASSESSMENT DOCD: CPT | Mod: HCNC,CPTII,S$GLB, | Performed by: OPTOMETRIST

## 2020-12-10 PROCEDURE — 1101F PR PT FALLS ASSESS DOC 0-1 FALLS W/OUT INJ PAST YR: ICD-10-PCS | Mod: HCNC,CPTII,S$GLB, | Performed by: OPTOMETRIST

## 2020-12-10 PROCEDURE — 1126F PR PAIN SEVERITY QUANTIFIED, NO PAIN PRESENT: ICD-10-PCS | Mod: HCNC,S$GLB,, | Performed by: OPTOMETRIST

## 2020-12-10 PROCEDURE — 92014 PR EYE EXAM, EST PATIENT,COMPREHESV: ICD-10-PCS | Mod: HCNC,S$GLB,, | Performed by: OPTOMETRIST

## 2020-12-10 PROCEDURE — 1101F PT FALLS ASSESS-DOCD LE1/YR: CPT | Mod: HCNC,CPTII,S$GLB, | Performed by: OPTOMETRIST

## 2020-12-10 PROCEDURE — 99999 PR PBB SHADOW E&M-EST. PATIENT-LVL III: ICD-10-PCS | Mod: PBBFAC,HCNC,, | Performed by: OPTOMETRIST

## 2020-12-10 PROCEDURE — 3288F PR FALLS RISK ASSESSMENT DOCUMENTED: ICD-10-PCS | Mod: HCNC,CPTII,S$GLB, | Performed by: OPTOMETRIST

## 2020-12-10 PROCEDURE — 99999 PR PBB SHADOW E&M-EST. PATIENT-LVL III: CPT | Mod: PBBFAC,HCNC,, | Performed by: OPTOMETRIST

## 2020-12-10 PROCEDURE — 1126F AMNT PAIN NOTED NONE PRSNT: CPT | Mod: HCNC,S$GLB,, | Performed by: OPTOMETRIST

## 2020-12-10 NOTE — PROGRESS NOTES
HPI     Pt is coming in for f/u floaters       Last edited by Jory Lane on 12/10/2020  3:11 PM. (History)            Assessment /Plan     For exam results, see Encounter Report.    Dry eye syndrome of both eyes    Visual floaters, bilateral    Senile nuclear sclerosis, bilateral    Posterior vitreous detachment of both eyes      1. Recommend Systane Ultra or Refresh Optive BID-TID OU to aid with symptoms of dry eyes.  2, 4. No e/o h/b/t 360 degrees OU. Monitor for worsening of symptoms or S/Sx of RD.   3. Nuclear sclerotic cataract - not visually significant. Observe.    RTC 1 year Routine

## 2020-12-11 ENCOUNTER — PATIENT MESSAGE (OUTPATIENT)
Dept: OTHER | Facility: OTHER | Age: 70
End: 2020-12-11

## 2020-12-15 ENCOUNTER — OFFICE VISIT (OUTPATIENT)
Dept: INTERNAL MEDICINE | Facility: CLINIC | Age: 70
End: 2020-12-15
Payer: MEDICARE

## 2020-12-15 VITALS
RESPIRATION RATE: 16 BRPM | OXYGEN SATURATION: 98 % | HEART RATE: 62 BPM | WEIGHT: 171.75 LBS | HEIGHT: 62 IN | BODY MASS INDEX: 31.6 KG/M2 | DIASTOLIC BLOOD PRESSURE: 72 MMHG | SYSTOLIC BLOOD PRESSURE: 116 MMHG | TEMPERATURE: 98 F

## 2020-12-15 DIAGNOSIS — I10 ESSENTIAL HYPERTENSION: Primary | Chronic | ICD-10-CM

## 2020-12-15 DIAGNOSIS — R82.90 UNSPECIFIED ABNORMAL FINDINGS IN URINE: ICD-10-CM

## 2020-12-15 DIAGNOSIS — I77.1 TORTUOUS AORTA: ICD-10-CM

## 2020-12-15 DIAGNOSIS — N32.81 OAB (OVERACTIVE BLADDER): ICD-10-CM

## 2020-12-15 DIAGNOSIS — K21.9 GASTROESOPHAGEAL REFLUX DISEASE, UNSPECIFIED WHETHER ESOPHAGITIS PRESENT: ICD-10-CM

## 2020-12-15 DIAGNOSIS — M17.0 PRIMARY OSTEOARTHRITIS OF BOTH KNEES: ICD-10-CM

## 2020-12-15 PROCEDURE — 1101F PT FALLS ASSESS-DOCD LE1/YR: CPT | Mod: HCNC,CPTII,S$GLB, | Performed by: INTERNAL MEDICINE

## 2020-12-15 PROCEDURE — 99999 PR PBB SHADOW E&M-EST. PATIENT-LVL IV: CPT | Mod: PBBFAC,HCNC,, | Performed by: INTERNAL MEDICINE

## 2020-12-15 PROCEDURE — 90662 IIV NO PRSV INCREASED AG IM: CPT | Mod: HCNC,S$GLB,, | Performed by: INTERNAL MEDICINE

## 2020-12-15 PROCEDURE — 3008F BODY MASS INDEX DOCD: CPT | Mod: HCNC,CPTII,S$GLB, | Performed by: INTERNAL MEDICINE

## 2020-12-15 PROCEDURE — 1126F PR PAIN SEVERITY QUANTIFIED, NO PAIN PRESENT: ICD-10-PCS | Mod: HCNC,S$GLB,, | Performed by: INTERNAL MEDICINE

## 2020-12-15 PROCEDURE — 99499 UNLISTED E&M SERVICE: CPT | Mod: HCNC,S$GLB,, | Performed by: INTERNAL MEDICINE

## 2020-12-15 PROCEDURE — 99499 RISK ADDL DX/OHS AUDIT: ICD-10-PCS | Mod: HCNC,S$GLB,, | Performed by: INTERNAL MEDICINE

## 2020-12-15 PROCEDURE — G0008 ADMIN INFLUENZA VIRUS VAC: HCPCS | Mod: HCNC,S$GLB,, | Performed by: INTERNAL MEDICINE

## 2020-12-15 PROCEDURE — 3074F PR MOST RECENT SYSTOLIC BLOOD PRESSURE < 130 MM HG: ICD-10-PCS | Mod: HCNC,CPTII,S$GLB, | Performed by: INTERNAL MEDICINE

## 2020-12-15 PROCEDURE — 3078F DIAST BP <80 MM HG: CPT | Mod: HCNC,CPTII,S$GLB, | Performed by: INTERNAL MEDICINE

## 2020-12-15 PROCEDURE — 3288F PR FALLS RISK ASSESSMENT DOCUMENTED: ICD-10-PCS | Mod: HCNC,CPTII,S$GLB, | Performed by: INTERNAL MEDICINE

## 2020-12-15 PROCEDURE — G0008 FLU VACCINE - HIGH DOSE (65+) PRESERVATIVE FREE IM: ICD-10-PCS | Mod: HCNC,S$GLB,, | Performed by: INTERNAL MEDICINE

## 2020-12-15 PROCEDURE — 99999 PR PBB SHADOW E&M-EST. PATIENT-LVL IV: ICD-10-PCS | Mod: PBBFAC,HCNC,, | Performed by: INTERNAL MEDICINE

## 2020-12-15 PROCEDURE — 99214 OFFICE O/P EST MOD 30 MIN: CPT | Mod: 25,HCNC,S$GLB, | Performed by: INTERNAL MEDICINE

## 2020-12-15 PROCEDURE — 3074F SYST BP LT 130 MM HG: CPT | Mod: HCNC,CPTII,S$GLB, | Performed by: INTERNAL MEDICINE

## 2020-12-15 PROCEDURE — 3078F PR MOST RECENT DIASTOLIC BLOOD PRESSURE < 80 MM HG: ICD-10-PCS | Mod: HCNC,CPTII,S$GLB, | Performed by: INTERNAL MEDICINE

## 2020-12-15 PROCEDURE — 3288F FALL RISK ASSESSMENT DOCD: CPT | Mod: HCNC,CPTII,S$GLB, | Performed by: INTERNAL MEDICINE

## 2020-12-15 PROCEDURE — 1126F AMNT PAIN NOTED NONE PRSNT: CPT | Mod: HCNC,S$GLB,, | Performed by: INTERNAL MEDICINE

## 2020-12-15 PROCEDURE — 90662 FLU VACCINE - HIGH DOSE (65+) PRESERVATIVE FREE IM: ICD-10-PCS | Mod: HCNC,S$GLB,, | Performed by: INTERNAL MEDICINE

## 2020-12-15 PROCEDURE — 1159F MED LIST DOCD IN RCRD: CPT | Mod: HCNC,S$GLB,, | Performed by: INTERNAL MEDICINE

## 2020-12-15 PROCEDURE — 3008F PR BODY MASS INDEX (BMI) DOCUMENTED: ICD-10-PCS | Mod: HCNC,CPTII,S$GLB, | Performed by: INTERNAL MEDICINE

## 2020-12-15 PROCEDURE — 99214 PR OFFICE/OUTPT VISIT, EST, LEVL IV, 30-39 MIN: ICD-10-PCS | Mod: 25,HCNC,S$GLB, | Performed by: INTERNAL MEDICINE

## 2020-12-15 PROCEDURE — 1101F PR PT FALLS ASSESS DOC 0-1 FALLS W/OUT INJ PAST YR: ICD-10-PCS | Mod: HCNC,CPTII,S$GLB, | Performed by: INTERNAL MEDICINE

## 2020-12-15 PROCEDURE — 1159F PR MEDICATION LIST DOCUMENTED IN MEDICAL RECORD: ICD-10-PCS | Mod: HCNC,S$GLB,, | Performed by: INTERNAL MEDICINE

## 2020-12-15 RX ORDER — TOLTERODINE 4 MG/1
4 CAPSULE, EXTENDED RELEASE ORAL DAILY
Qty: 30 CAPSULE | Refills: 11 | Status: SHIPPED | OUTPATIENT
Start: 2020-12-15 | End: 2022-12-27

## 2020-12-31 NOTE — PROGRESS NOTES
Subjective:       Patient ID: Jojo Burrows is a 70 y.o. female.    Chief Complaint: Follow-up and Immunizations (Flu shot)    HPI   The patient presents for follow-up of medical conditions which include hypertension, osteoarthritis, and overactive bladder.  The patient also has been experiencing intermittent symptoms of acid reflux.  Nexium has been helpful in alleviating her symptoms.  She has been under increased stress lately.  She is caring for both of her parents at home.  Both parents have varying degrees of dementia.    She has been using albuterol inhaler as needed for chest congestion.  Symptoms have been gradually getting better.  Breathing treatments have also been helpful.  She has had access to the nebulizer bronchodilator treatments since her COVID -19 infection.  She states that exposure to dust appears to exacerbate her chest congestion.  She does not monitor blood pressures.  She is tolerating her medication well without side effects.    Review of Systems   Constitutional: Negative for activity change, appetite change, fatigue and unexpected weight change.   Eyes: Negative for visual disturbance.   Respiratory: Positive for chest tightness. Negative for cough and shortness of breath.    Cardiovascular: Negative for chest pain, palpitations and leg swelling.   Gastrointestinal: Positive for reflux. Negative for abdominal pain, blood in stool, constipation and diarrhea.   Genitourinary: Positive for frequency and urgency. Negative for dysuria, flank pain and hematuria.   Musculoskeletal: Positive for arthralgias. Negative for neck pain and neck stiffness.   Integumentary:  Negative for rash.   Neurological: Negative for dizziness, syncope and headaches.   Psychiatric/Behavioral: Negative for sleep disturbance.         Objective:      Physical Exam  Vitals signs and nursing note reviewed.   Constitutional:       General: She is not in acute distress.     Appearance: She is well-developed.       Comments: The patient has gained 4 lb since 09/11/2020.   HENT:      Head: Normocephalic and atraumatic.   Eyes:      General: No scleral icterus.     Conjunctiva/sclera: Conjunctivae normal.   Neck:      Musculoskeletal: Normal range of motion and neck supple.      Thyroid: No thyromegaly.      Vascular: No JVD.   Cardiovascular:      Rate and Rhythm: Normal rate and regular rhythm.      Heart sounds: Normal heart sounds. No murmur. No friction rub. No gallop.    Pulmonary:      Effort: Pulmonary effort is normal. No respiratory distress.      Breath sounds: Normal breath sounds. No wheezing or rales.   Abdominal:      General: Bowel sounds are normal.      Palpations: Abdomen is soft. There is no mass.      Tenderness: There is no abdominal tenderness.   Musculoskeletal:         General: Tenderness and deformity present.      Right lower leg: No edema.      Left lower leg: No edema.      Comments: Knee joints exhibit hypertrophic changes bilaterally.  Crepitus is present on range-of-motion testing.  No effusion is appreciated.   Lymphadenopathy:      Cervical: No cervical adenopathy.   Skin:     General: Skin is warm and dry.      Findings: No rash.   Neurological:      Mental Status: She is alert and oriented to person, place, and time.             Assessment:       1. Essential hypertension    2. Gastroesophageal reflux disease, unspecified whether esophagitis present    3. OAB (overactive bladder)    4. Unspecified abnormal findings in urine     5. Tortuous aorta    6. Primary osteoarthritis of both knees        Plan:     Jojo was seen today for follow-up and immunizations.  Urine studies will be obtained today.  A trial of Detrol LA will be ordered for treatment of overactive bladder symptoms.      The patient may use Pepcid AC as needed for mild reflux symptoms.  The patient may reserve Nexium for more persistent symptoms.      Influenza vaccine will be administered today.    Diagnoses and all orders for this  visit:    Essential hypertension    Gastroesophageal reflux disease, unspecified whether esophagitis present    OAB (overactive bladder)  -     Urinalysis; Future  -     Urine culture; Future    Unspecified abnormal findings in urine   -     Urine culture; Future    Tortuous aorta    Primary osteoarthritis of both knees    Other orders  -     tolterodine (DETROL LA) 4 MG 24 hr capsule; Take 1 capsule (4 mg total) by mouth once daily. For overactive bladder  -     Influenza - High Dose (65+) (PF) (IM)

## 2021-02-03 ENCOUNTER — IMMUNIZATION (OUTPATIENT)
Dept: PHARMACY | Facility: CLINIC | Age: 71
End: 2021-02-03
Payer: MEDICARE

## 2021-02-03 DIAGNOSIS — Z23 NEED FOR VACCINATION: Primary | ICD-10-CM

## 2021-03-03 ENCOUNTER — IMMUNIZATION (OUTPATIENT)
Dept: PHARMACY | Facility: CLINIC | Age: 71
End: 2021-03-03
Payer: MEDICARE

## 2021-03-03 DIAGNOSIS — Z23 NEED FOR VACCINATION: Primary | ICD-10-CM

## 2021-03-09 ENCOUNTER — TELEPHONE (OUTPATIENT)
Dept: INTERNAL MEDICINE | Facility: CLINIC | Age: 71
End: 2021-03-09

## 2021-03-09 RX ORDER — MECLIZINE HYDROCHLORIDE 25 MG/1
25 TABLET ORAL EVERY 8 HOURS PRN
Qty: 40 TABLET | Refills: 1 | Status: SHIPPED | OUTPATIENT
Start: 2021-03-09 | End: 2023-09-07

## 2021-03-18 ENCOUNTER — TELEPHONE (OUTPATIENT)
Dept: INTERNAL MEDICINE | Facility: CLINIC | Age: 71
End: 2021-03-18

## 2021-03-18 DIAGNOSIS — M62.838 OTHER MUSCLE SPASM: Primary | ICD-10-CM

## 2021-03-19 ENCOUNTER — LAB VISIT (OUTPATIENT)
Dept: LAB | Facility: HOSPITAL | Age: 71
End: 2021-03-19
Attending: INTERNAL MEDICINE
Payer: MEDICARE

## 2021-03-19 DIAGNOSIS — M62.838 OTHER MUSCLE SPASM: ICD-10-CM

## 2021-03-19 PROCEDURE — 36415 COLL VENOUS BLD VENIPUNCTURE: CPT | Mod: PO | Performed by: INTERNAL MEDICINE

## 2021-03-19 PROCEDURE — 80048 BASIC METABOLIC PNL TOTAL CA: CPT | Performed by: INTERNAL MEDICINE

## 2021-03-19 PROCEDURE — 83735 ASSAY OF MAGNESIUM: CPT | Performed by: INTERNAL MEDICINE

## 2021-03-20 LAB
ANION GAP SERPL CALC-SCNC: 9 MMOL/L (ref 8–16)
BUN SERPL-MCNC: 10 MG/DL (ref 8–23)
CALCIUM SERPL-MCNC: 9.2 MG/DL (ref 8.7–10.5)
CHLORIDE SERPL-SCNC: 103 MMOL/L (ref 95–110)
CO2 SERPL-SCNC: 26 MMOL/L (ref 23–29)
CREAT SERPL-MCNC: 0.8 MG/DL (ref 0.5–1.4)
EST. GFR  (AFRICAN AMERICAN): >60 ML/MIN/1.73 M^2
EST. GFR  (NON AFRICAN AMERICAN): >60 ML/MIN/1.73 M^2
GLUCOSE SERPL-MCNC: 90 MG/DL (ref 70–110)
MAGNESIUM SERPL-MCNC: 1.8 MG/DL (ref 1.6–2.6)
POTASSIUM SERPL-SCNC: 4 MMOL/L (ref 3.5–5.1)
SODIUM SERPL-SCNC: 138 MMOL/L (ref 136–145)

## 2021-03-23 ENCOUNTER — TELEPHONE (OUTPATIENT)
Dept: INTERNAL MEDICINE | Facility: CLINIC | Age: 71
End: 2021-03-23

## 2021-04-08 ENCOUNTER — TELEPHONE (OUTPATIENT)
Dept: INTERNAL MEDICINE | Facility: CLINIC | Age: 71
End: 2021-04-08

## 2021-04-20 ENCOUNTER — OFFICE VISIT (OUTPATIENT)
Dept: INTERNAL MEDICINE | Facility: CLINIC | Age: 71
End: 2021-04-20
Payer: MEDICARE

## 2021-04-20 VITALS
DIASTOLIC BLOOD PRESSURE: 70 MMHG | BODY MASS INDEX: 31.36 KG/M2 | OXYGEN SATURATION: 95 % | TEMPERATURE: 97 F | HEART RATE: 73 BPM | HEIGHT: 62 IN | SYSTOLIC BLOOD PRESSURE: 102 MMHG | WEIGHT: 170.44 LBS | RESPIRATION RATE: 16 BRPM

## 2021-04-20 DIAGNOSIS — I10 ESSENTIAL HYPERTENSION: Primary | ICD-10-CM

## 2021-04-20 DIAGNOSIS — Z12.31 ENCOUNTER FOR SCREENING MAMMOGRAM FOR BREAST CANCER: ICD-10-CM

## 2021-04-20 DIAGNOSIS — E55.9 VITAMIN D DEFICIENCY: ICD-10-CM

## 2021-04-20 DIAGNOSIS — K63.5 POLYP OF COLON, UNSPECIFIED PART OF COLON, UNSPECIFIED TYPE: ICD-10-CM

## 2021-04-20 DIAGNOSIS — R22.9 MASS OF SKIN: ICD-10-CM

## 2021-04-20 DIAGNOSIS — M17.0 PRIMARY OSTEOARTHRITIS OF BOTH KNEES: ICD-10-CM

## 2021-04-20 DIAGNOSIS — Z12.11 COLON CANCER SCREENING: ICD-10-CM

## 2021-04-20 DIAGNOSIS — R06.02 SOB (SHORTNESS OF BREATH): ICD-10-CM

## 2021-04-20 DIAGNOSIS — K21.9 GASTROESOPHAGEAL REFLUX DISEASE, UNSPECIFIED WHETHER ESOPHAGITIS PRESENT: ICD-10-CM

## 2021-04-20 PROCEDURE — 3078F DIAST BP <80 MM HG: CPT | Mod: CPTII,S$GLB,, | Performed by: INTERNAL MEDICINE

## 2021-04-20 PROCEDURE — 3008F BODY MASS INDEX DOCD: CPT | Mod: CPTII,S$GLB,, | Performed by: INTERNAL MEDICINE

## 2021-04-20 PROCEDURE — 3008F PR BODY MASS INDEX (BMI) DOCUMENTED: ICD-10-PCS | Mod: CPTII,S$GLB,, | Performed by: INTERNAL MEDICINE

## 2021-04-20 PROCEDURE — 3074F PR MOST RECENT SYSTOLIC BLOOD PRESSURE < 130 MM HG: ICD-10-PCS | Mod: CPTII,S$GLB,, | Performed by: INTERNAL MEDICINE

## 2021-04-20 PROCEDURE — 99214 OFFICE O/P EST MOD 30 MIN: CPT | Mod: S$GLB,,, | Performed by: INTERNAL MEDICINE

## 2021-04-20 PROCEDURE — 1126F AMNT PAIN NOTED NONE PRSNT: CPT | Mod: S$GLB,,, | Performed by: INTERNAL MEDICINE

## 2021-04-20 PROCEDURE — 3288F FALL RISK ASSESSMENT DOCD: CPT | Mod: CPTII,S$GLB,, | Performed by: INTERNAL MEDICINE

## 2021-04-20 PROCEDURE — 3288F PR FALLS RISK ASSESSMENT DOCUMENTED: ICD-10-PCS | Mod: CPTII,S$GLB,, | Performed by: INTERNAL MEDICINE

## 2021-04-20 PROCEDURE — 1101F PT FALLS ASSESS-DOCD LE1/YR: CPT | Mod: CPTII,S$GLB,, | Performed by: INTERNAL MEDICINE

## 2021-04-20 PROCEDURE — 3078F PR MOST RECENT DIASTOLIC BLOOD PRESSURE < 80 MM HG: ICD-10-PCS | Mod: CPTII,S$GLB,, | Performed by: INTERNAL MEDICINE

## 2021-04-20 PROCEDURE — 99999 PR PBB SHADOW E&M-EST. PATIENT-LVL V: CPT | Mod: PBBFAC,,, | Performed by: INTERNAL MEDICINE

## 2021-04-20 PROCEDURE — 1101F PR PT FALLS ASSESS DOC 0-1 FALLS W/OUT INJ PAST YR: ICD-10-PCS | Mod: CPTII,S$GLB,, | Performed by: INTERNAL MEDICINE

## 2021-04-20 PROCEDURE — 3074F SYST BP LT 130 MM HG: CPT | Mod: CPTII,S$GLB,, | Performed by: INTERNAL MEDICINE

## 2021-04-20 PROCEDURE — 1159F PR MEDICATION LIST DOCUMENTED IN MEDICAL RECORD: ICD-10-PCS | Mod: S$GLB,,, | Performed by: INTERNAL MEDICINE

## 2021-04-20 PROCEDURE — 1159F MED LIST DOCD IN RCRD: CPT | Mod: S$GLB,,, | Performed by: INTERNAL MEDICINE

## 2021-04-20 PROCEDURE — 1126F PR PAIN SEVERITY QUANTIFIED, NO PAIN PRESENT: ICD-10-PCS | Mod: S$GLB,,, | Performed by: INTERNAL MEDICINE

## 2021-04-20 PROCEDURE — 99999 PR PBB SHADOW E&M-EST. PATIENT-LVL V: ICD-10-PCS | Mod: PBBFAC,,, | Performed by: INTERNAL MEDICINE

## 2021-04-20 PROCEDURE — 99214 PR OFFICE/OUTPT VISIT, EST, LEVL IV, 30-39 MIN: ICD-10-PCS | Mod: S$GLB,,, | Performed by: INTERNAL MEDICINE

## 2021-04-20 RX ORDER — ALBUTEROL SULFATE 90 UG/1
2 AEROSOL, METERED RESPIRATORY (INHALATION) EVERY 6 HOURS PRN
Qty: 18 G | Refills: 1 | Status: SHIPPED | OUTPATIENT
Start: 2021-04-20 | End: 2022-01-20

## 2021-05-10 ENCOUNTER — PATIENT OUTREACH (OUTPATIENT)
Dept: ADMINISTRATIVE | Facility: OTHER | Age: 71
End: 2021-05-10

## 2021-05-18 ENCOUNTER — OFFICE VISIT (OUTPATIENT)
Dept: SPORTS MEDICINE | Facility: CLINIC | Age: 71
End: 2021-05-18
Payer: MEDICARE

## 2021-05-18 ENCOUNTER — HOSPITAL ENCOUNTER (OUTPATIENT)
Dept: RADIOLOGY | Facility: HOSPITAL | Age: 71
Discharge: HOME | End: 2021-05-18
Attending: ORTHOPAEDIC SURGERY
Payer: MEDICARE

## 2021-05-18 VITALS
BODY MASS INDEX: 31.28 KG/M2 | WEIGHT: 170 LBS | DIASTOLIC BLOOD PRESSURE: 65 MMHG | HEART RATE: 60 BPM | HEIGHT: 62 IN | SYSTOLIC BLOOD PRESSURE: 110 MMHG

## 2021-05-18 DIAGNOSIS — M17.0 PRIMARY OSTEOARTHRITIS OF BOTH KNEES: ICD-10-CM

## 2021-05-18 DIAGNOSIS — Z12.11 SPECIAL SCREENING FOR MALIGNANT NEOPLASM OF COLON: Primary | ICD-10-CM

## 2021-05-18 PROCEDURE — 99999 PR PBB SHADOW E&M-EST. PATIENT-LVL IV: CPT | Mod: PBBFAC,,, | Performed by: ORTHOPAEDIC SURGERY

## 2021-05-18 PROCEDURE — 3008F BODY MASS INDEX DOCD: CPT | Mod: CPTII,S$GLB,, | Performed by: ORTHOPAEDIC SURGERY

## 2021-05-18 PROCEDURE — 99214 PR OFFICE/OUTPT VISIT, EST, LEVL IV, 30-39 MIN: ICD-10-PCS | Mod: S$GLB,,, | Performed by: ORTHOPAEDIC SURGERY

## 2021-05-18 PROCEDURE — 73564 X-RAY EXAM KNEE 4 OR MORE: CPT | Mod: 26,50,, | Performed by: RADIOLOGY

## 2021-05-18 PROCEDURE — 73564 X-RAY EXAM KNEE 4 OR MORE: CPT | Mod: TC,50

## 2021-05-18 PROCEDURE — 1159F PR MEDICATION LIST DOCUMENTED IN MEDICAL RECORD: ICD-10-PCS | Mod: S$GLB,,, | Performed by: ORTHOPAEDIC SURGERY

## 2021-05-18 PROCEDURE — 1159F MED LIST DOCD IN RCRD: CPT | Mod: S$GLB,,, | Performed by: ORTHOPAEDIC SURGERY

## 2021-05-18 PROCEDURE — 1126F AMNT PAIN NOTED NONE PRSNT: CPT | Mod: S$GLB,,, | Performed by: ORTHOPAEDIC SURGERY

## 2021-05-18 PROCEDURE — 99214 OFFICE O/P EST MOD 30 MIN: CPT | Mod: S$GLB,,, | Performed by: ORTHOPAEDIC SURGERY

## 2021-05-18 PROCEDURE — 1126F PR PAIN SEVERITY QUANTIFIED, NO PAIN PRESENT: ICD-10-PCS | Mod: S$GLB,,, | Performed by: ORTHOPAEDIC SURGERY

## 2021-05-18 PROCEDURE — 99999 PR PBB SHADOW E&M-EST. PATIENT-LVL IV: ICD-10-PCS | Mod: PBBFAC,,, | Performed by: ORTHOPAEDIC SURGERY

## 2021-05-18 PROCEDURE — 3008F PR BODY MASS INDEX (BMI) DOCUMENTED: ICD-10-PCS | Mod: CPTII,S$GLB,, | Performed by: ORTHOPAEDIC SURGERY

## 2021-05-18 PROCEDURE — 73564 XR KNEE ORTHO BILAT WITH FLEXION: ICD-10-PCS | Mod: 26,50,, | Performed by: RADIOLOGY

## 2021-05-18 RX ORDER — CELECOXIB 200 MG/1
200 CAPSULE ORAL 2 TIMES DAILY
Qty: 60 CAPSULE | Refills: 2 | Status: SHIPPED | OUTPATIENT
Start: 2021-05-18 | End: 2022-12-27 | Stop reason: SDUPTHER

## 2021-05-19 ENCOUNTER — TELEPHONE (OUTPATIENT)
Dept: ENDOSCOPY | Facility: HOSPITAL | Age: 71
End: 2021-05-19

## 2021-05-19 DIAGNOSIS — Z12.11 SCREENING FOR COLON CANCER: Primary | ICD-10-CM

## 2021-05-19 RX ORDER — SODIUM, POTASSIUM,MAG SULFATES 17.5-3.13G
1 SOLUTION, RECONSTITUTED, ORAL ORAL DAILY
Qty: 1 KIT | Refills: 0 | Status: SHIPPED | OUTPATIENT
Start: 2021-05-19 | End: 2021-05-21

## 2021-05-28 ENCOUNTER — PES CALL (OUTPATIENT)
Dept: ADMINISTRATIVE | Facility: CLINIC | Age: 71
End: 2021-05-28

## 2021-05-28 ENCOUNTER — CLINICAL SUPPORT (OUTPATIENT)
Dept: REHABILITATION | Facility: HOSPITAL | Age: 71
End: 2021-05-28
Payer: MEDICARE

## 2021-05-28 DIAGNOSIS — M17.0 PRIMARY OSTEOARTHRITIS OF BOTH KNEES: ICD-10-CM

## 2021-05-28 PROCEDURE — 97110 THERAPEUTIC EXERCISES: CPT | Performed by: PHYSICAL THERAPIST

## 2021-05-28 PROCEDURE — 97162 PT EVAL MOD COMPLEX 30 MIN: CPT | Performed by: PHYSICAL THERAPIST

## 2021-05-31 ENCOUNTER — CLINICAL SUPPORT (OUTPATIENT)
Dept: REHABILITATION | Facility: HOSPITAL | Age: 71
End: 2021-05-31
Payer: MEDICARE

## 2021-05-31 DIAGNOSIS — M25.561 CHRONIC PAIN OF BOTH KNEES: ICD-10-CM

## 2021-05-31 DIAGNOSIS — G89.29 CHRONIC PAIN OF BOTH KNEES: ICD-10-CM

## 2021-05-31 DIAGNOSIS — M25.562 CHRONIC PAIN OF BOTH KNEES: ICD-10-CM

## 2021-05-31 PROCEDURE — 97113 AQUATIC THERAPY/EXERCISES: CPT | Performed by: PHYSICAL THERAPIST

## 2021-06-03 ENCOUNTER — CLINICAL SUPPORT (OUTPATIENT)
Dept: REHABILITATION | Facility: HOSPITAL | Age: 71
End: 2021-06-03
Payer: MEDICARE

## 2021-06-03 DIAGNOSIS — M25.561 CHRONIC PAIN OF BOTH KNEES: ICD-10-CM

## 2021-06-03 DIAGNOSIS — G89.29 CHRONIC PAIN OF BOTH KNEES: ICD-10-CM

## 2021-06-03 DIAGNOSIS — M25.562 CHRONIC PAIN OF BOTH KNEES: ICD-10-CM

## 2021-06-03 PROCEDURE — 97113 AQUATIC THERAPY/EXERCISES: CPT | Performed by: PHYSICAL THERAPIST

## 2021-06-15 ENCOUNTER — TELEPHONE (OUTPATIENT)
Dept: OTOLARYNGOLOGY | Facility: CLINIC | Age: 71
End: 2021-06-15

## 2021-06-15 ENCOUNTER — OFFICE VISIT (OUTPATIENT)
Dept: DERMATOLOGY | Facility: CLINIC | Age: 71
End: 2021-06-15
Payer: MEDICARE

## 2021-06-15 DIAGNOSIS — D48.9 NEOPLASM OF UNCERTAIN BEHAVIOR: ICD-10-CM

## 2021-06-15 PROCEDURE — 3288F FALL RISK ASSESSMENT DOCD: CPT | Mod: CPTII,S$GLB,, | Performed by: DERMATOLOGY

## 2021-06-15 PROCEDURE — 99202 OFFICE O/P NEW SF 15 MIN: CPT | Mod: S$GLB,,, | Performed by: DERMATOLOGY

## 2021-06-15 PROCEDURE — 99999 PR PBB SHADOW E&M-EST. PATIENT-LVL IV: CPT | Mod: PBBFAC,,, | Performed by: DERMATOLOGY

## 2021-06-15 PROCEDURE — 3288F PR FALLS RISK ASSESSMENT DOCUMENTED: ICD-10-PCS | Mod: CPTII,S$GLB,, | Performed by: DERMATOLOGY

## 2021-06-15 PROCEDURE — 1126F PR PAIN SEVERITY QUANTIFIED, NO PAIN PRESENT: ICD-10-PCS | Mod: S$GLB,,, | Performed by: DERMATOLOGY

## 2021-06-15 PROCEDURE — 1101F PT FALLS ASSESS-DOCD LE1/YR: CPT | Mod: CPTII,S$GLB,, | Performed by: DERMATOLOGY

## 2021-06-15 PROCEDURE — 99999 PR PBB SHADOW E&M-EST. PATIENT-LVL IV: ICD-10-PCS | Mod: PBBFAC,,, | Performed by: DERMATOLOGY

## 2021-06-15 PROCEDURE — 99202 PR OFFICE/OUTPT VISIT, NEW, LEVL II, 15-29 MIN: ICD-10-PCS | Mod: S$GLB,,, | Performed by: DERMATOLOGY

## 2021-06-15 PROCEDURE — 1159F MED LIST DOCD IN RCRD: CPT | Mod: S$GLB,,, | Performed by: DERMATOLOGY

## 2021-06-15 PROCEDURE — 1126F AMNT PAIN NOTED NONE PRSNT: CPT | Mod: S$GLB,,, | Performed by: DERMATOLOGY

## 2021-06-15 PROCEDURE — 1159F PR MEDICATION LIST DOCUMENTED IN MEDICAL RECORD: ICD-10-PCS | Mod: S$GLB,,, | Performed by: DERMATOLOGY

## 2021-06-15 PROCEDURE — 1101F PR PT FALLS ASSESS DOC 0-1 FALLS W/OUT INJ PAST YR: ICD-10-PCS | Mod: CPTII,S$GLB,, | Performed by: DERMATOLOGY

## 2021-06-16 ENCOUNTER — CLINICAL SUPPORT (OUTPATIENT)
Dept: REHABILITATION | Facility: HOSPITAL | Age: 71
End: 2021-06-16
Payer: MEDICARE

## 2021-06-16 DIAGNOSIS — G89.29 CHRONIC PAIN OF BOTH KNEES: ICD-10-CM

## 2021-06-16 DIAGNOSIS — M25.561 CHRONIC PAIN OF BOTH KNEES: ICD-10-CM

## 2021-06-16 DIAGNOSIS — M25.562 CHRONIC PAIN OF BOTH KNEES: ICD-10-CM

## 2021-06-16 PROCEDURE — 97113 AQUATIC THERAPY/EXERCISES: CPT

## 2021-06-18 ENCOUNTER — CLINICAL SUPPORT (OUTPATIENT)
Dept: REHABILITATION | Facility: HOSPITAL | Age: 71
End: 2021-06-18
Payer: MEDICARE

## 2021-06-18 PROCEDURE — 97110 THERAPEUTIC EXERCISES: CPT

## 2021-06-21 ENCOUNTER — PATIENT OUTREACH (OUTPATIENT)
Dept: ADMINISTRATIVE | Facility: OTHER | Age: 71
End: 2021-06-21

## 2021-06-22 ENCOUNTER — OFFICE VISIT (OUTPATIENT)
Dept: OTOLARYNGOLOGY | Facility: CLINIC | Age: 71
End: 2021-06-22
Payer: MEDICARE

## 2021-06-22 VITALS
HEART RATE: 80 BPM | WEIGHT: 170.19 LBS | DIASTOLIC BLOOD PRESSURE: 76 MMHG | BODY MASS INDEX: 31.13 KG/M2 | TEMPERATURE: 98 F | SYSTOLIC BLOOD PRESSURE: 133 MMHG

## 2021-06-22 DIAGNOSIS — R22.9 SUBCUTANEOUS MASS: Primary | ICD-10-CM

## 2021-06-22 PROCEDURE — 3008F PR BODY MASS INDEX (BMI) DOCUMENTED: ICD-10-PCS | Mod: CPTII,S$GLB,, | Performed by: OTOLARYNGOLOGY

## 2021-06-22 PROCEDURE — 1159F PR MEDICATION LIST DOCUMENTED IN MEDICAL RECORD: ICD-10-PCS | Mod: S$GLB,,, | Performed by: OTOLARYNGOLOGY

## 2021-06-22 PROCEDURE — 1159F MED LIST DOCD IN RCRD: CPT | Mod: S$GLB,,, | Performed by: OTOLARYNGOLOGY

## 2021-06-22 PROCEDURE — 3008F BODY MASS INDEX DOCD: CPT | Mod: CPTII,S$GLB,, | Performed by: OTOLARYNGOLOGY

## 2021-06-22 PROCEDURE — 99214 PR OFFICE/OUTPT VISIT, EST, LEVL IV, 30-39 MIN: ICD-10-PCS | Mod: S$GLB,,, | Performed by: OTOLARYNGOLOGY

## 2021-06-22 PROCEDURE — 1126F PR PAIN SEVERITY QUANTIFIED, NO PAIN PRESENT: ICD-10-PCS | Mod: S$GLB,,, | Performed by: OTOLARYNGOLOGY

## 2021-06-22 PROCEDURE — 99214 OFFICE O/P EST MOD 30 MIN: CPT | Mod: S$GLB,,, | Performed by: OTOLARYNGOLOGY

## 2021-06-22 PROCEDURE — 1126F AMNT PAIN NOTED NONE PRSNT: CPT | Mod: S$GLB,,, | Performed by: OTOLARYNGOLOGY

## 2021-06-23 ENCOUNTER — TELEPHONE (OUTPATIENT)
Dept: ENDOSCOPY | Facility: HOSPITAL | Age: 71
End: 2021-06-23

## 2021-06-24 ENCOUNTER — TELEPHONE (OUTPATIENT)
Dept: GASTROENTEROLOGY | Facility: CLINIC | Age: 71
End: 2021-06-24

## 2021-06-28 ENCOUNTER — ANESTHESIA EVENT (OUTPATIENT)
Dept: ENDOSCOPY | Facility: HOSPITAL | Age: 71
End: 2021-06-28
Payer: MEDICARE

## 2021-06-28 ENCOUNTER — HOSPITAL ENCOUNTER (OUTPATIENT)
Facility: HOSPITAL | Age: 71
Discharge: HOME | End: 2021-06-28
Attending: INTERNAL MEDICINE | Admitting: INTERNAL MEDICINE
Payer: MEDICARE

## 2021-06-28 ENCOUNTER — ANESTHESIA (OUTPATIENT)
Dept: ENDOSCOPY | Facility: HOSPITAL | Age: 71
End: 2021-06-28
Payer: MEDICARE

## 2021-06-28 VITALS
SYSTOLIC BLOOD PRESSURE: 123 MMHG | TEMPERATURE: 98 F | WEIGHT: 170 LBS | HEART RATE: 59 BPM | BODY MASS INDEX: 31.28 KG/M2 | DIASTOLIC BLOOD PRESSURE: 62 MMHG | HEIGHT: 62 IN | OXYGEN SATURATION: 100 % | RESPIRATION RATE: 16 BRPM

## 2021-06-28 DIAGNOSIS — D12.6 COLON ADENOMA: ICD-10-CM

## 2021-06-28 PROCEDURE — 37000009 HC ANESTHESIA EA ADD 15 MINS: Performed by: INTERNAL MEDICINE

## 2021-06-28 PROCEDURE — 45385 PR COLONOSCOPY,REMV LESN,SNARE: ICD-10-PCS | Mod: PT,,, | Performed by: INTERNAL MEDICINE

## 2021-06-28 PROCEDURE — 88305 TISSUE EXAM BY PATHOLOGIST: CPT | Performed by: PATHOLOGY

## 2021-06-28 PROCEDURE — 63600175 PHARM REV CODE 636 W HCPCS: Performed by: NURSE ANESTHETIST, CERTIFIED REGISTERED

## 2021-06-28 PROCEDURE — 45385 COLONOSCOPY W/LESION REMOVAL: CPT | Mod: PT,,, | Performed by: INTERNAL MEDICINE

## 2021-06-28 PROCEDURE — 45385 COLONOSCOPY W/LESION REMOVAL: CPT | Performed by: INTERNAL MEDICINE

## 2021-06-28 PROCEDURE — 25000003 PHARM REV CODE 250: Performed by: INTERNAL MEDICINE

## 2021-06-28 PROCEDURE — 27200997: Performed by: INTERNAL MEDICINE

## 2021-06-28 PROCEDURE — 88305 TISSUE EXAM BY PATHOLOGIST: CPT | Mod: 26,,, | Performed by: PATHOLOGY

## 2021-06-28 PROCEDURE — E9220 PRA ENDO ANESTHESIA: ICD-10-PCS | Mod: PT,,, | Performed by: NURSE ANESTHETIST, CERTIFIED REGISTERED

## 2021-06-28 PROCEDURE — 37000008 HC ANESTHESIA 1ST 15 MINUTES: Performed by: INTERNAL MEDICINE

## 2021-06-28 PROCEDURE — 88305 TISSUE EXAM BY PATHOLOGIST: ICD-10-PCS | Mod: 26,,, | Performed by: PATHOLOGY

## 2021-06-28 PROCEDURE — 27201089 HC SNARE, DISP (ANY): Performed by: INTERNAL MEDICINE

## 2021-06-28 PROCEDURE — E9220 PRA ENDO ANESTHESIA: HCPCS | Mod: PT,,, | Performed by: NURSE ANESTHETIST, CERTIFIED REGISTERED

## 2021-06-28 RX ORDER — PROPOFOL 10 MG/ML
VIAL (ML) INTRAVENOUS
Status: DISCONTINUED | OUTPATIENT
Start: 2021-06-28 | End: 2021-06-28

## 2021-06-28 RX ORDER — SODIUM CHLORIDE 9 MG/ML
INJECTION, SOLUTION INTRAVENOUS CONTINUOUS
Status: DISCONTINUED | OUTPATIENT
Start: 2021-06-28 | End: 2021-06-28 | Stop reason: HOSPADM

## 2021-06-28 RX ORDER — LIDOCAINE HCL/PF 100 MG/5ML
SYRINGE (ML) INTRAVENOUS
Status: DISCONTINUED | OUTPATIENT
Start: 2021-06-28 | End: 2021-06-28

## 2021-06-28 RX ORDER — PROPOFOL 10 MG/ML
VIAL (ML) INTRAVENOUS CONTINUOUS PRN
Status: DISCONTINUED | OUTPATIENT
Start: 2021-06-28 | End: 2021-06-28

## 2021-06-28 RX ADMIN — SODIUM CHLORIDE: 0.9 INJECTION, SOLUTION INTRAVENOUS at 10:06

## 2021-06-28 RX ADMIN — Medication 80 MG: at 11:06

## 2021-06-28 RX ADMIN — PROPOFOL 30 MG: 10 INJECTION, EMULSION INTRAVENOUS at 11:06

## 2021-06-28 RX ADMIN — Medication 150 MCG/KG/MIN: at 11:06

## 2021-06-29 ENCOUNTER — OFFICE VISIT (OUTPATIENT)
Dept: SPORTS MEDICINE | Facility: CLINIC | Age: 71
End: 2021-06-29
Payer: MEDICARE

## 2021-06-29 VITALS
DIASTOLIC BLOOD PRESSURE: 72 MMHG | HEIGHT: 62 IN | BODY MASS INDEX: 31.28 KG/M2 | HEART RATE: 90 BPM | WEIGHT: 170 LBS | SYSTOLIC BLOOD PRESSURE: 109 MMHG

## 2021-06-29 DIAGNOSIS — M17.0 PRIMARY OSTEOARTHRITIS OF BOTH KNEES: Primary | ICD-10-CM

## 2021-06-29 PROCEDURE — 3288F FALL RISK ASSESSMENT DOCD: CPT | Mod: CPTII,S$GLB,, | Performed by: ORTHOPAEDIC SURGERY

## 2021-06-29 PROCEDURE — 1101F PR PT FALLS ASSESS DOC 0-1 FALLS W/OUT INJ PAST YR: ICD-10-PCS | Mod: CPTII,S$GLB,, | Performed by: ORTHOPAEDIC SURGERY

## 2021-06-29 PROCEDURE — 3008F PR BODY MASS INDEX (BMI) DOCUMENTED: ICD-10-PCS | Mod: CPTII,S$GLB,, | Performed by: ORTHOPAEDIC SURGERY

## 2021-06-29 PROCEDURE — 1126F AMNT PAIN NOTED NONE PRSNT: CPT | Mod: S$GLB,,, | Performed by: ORTHOPAEDIC SURGERY

## 2021-06-29 PROCEDURE — 99999 PR PBB SHADOW E&M-EST. PATIENT-LVL IV: ICD-10-PCS | Mod: PBBFAC,,, | Performed by: ORTHOPAEDIC SURGERY

## 2021-06-29 PROCEDURE — 1159F MED LIST DOCD IN RCRD: CPT | Mod: S$GLB,,, | Performed by: ORTHOPAEDIC SURGERY

## 2021-06-29 PROCEDURE — 1159F PR MEDICATION LIST DOCUMENTED IN MEDICAL RECORD: ICD-10-PCS | Mod: S$GLB,,, | Performed by: ORTHOPAEDIC SURGERY

## 2021-06-29 PROCEDURE — 1126F PR PAIN SEVERITY QUANTIFIED, NO PAIN PRESENT: ICD-10-PCS | Mod: S$GLB,,, | Performed by: ORTHOPAEDIC SURGERY

## 2021-06-29 PROCEDURE — 3288F PR FALLS RISK ASSESSMENT DOCUMENTED: ICD-10-PCS | Mod: CPTII,S$GLB,, | Performed by: ORTHOPAEDIC SURGERY

## 2021-06-29 PROCEDURE — 99999 PR PBB SHADOW E&M-EST. PATIENT-LVL IV: CPT | Mod: PBBFAC,,, | Performed by: ORTHOPAEDIC SURGERY

## 2021-06-29 PROCEDURE — 99214 PR OFFICE/OUTPT VISIT, EST, LEVL IV, 30-39 MIN: ICD-10-PCS | Mod: S$GLB,,, | Performed by: ORTHOPAEDIC SURGERY

## 2021-06-29 PROCEDURE — 3008F BODY MASS INDEX DOCD: CPT | Mod: CPTII,S$GLB,, | Performed by: ORTHOPAEDIC SURGERY

## 2021-06-29 PROCEDURE — 1101F PT FALLS ASSESS-DOCD LE1/YR: CPT | Mod: CPTII,S$GLB,, | Performed by: ORTHOPAEDIC SURGERY

## 2021-06-29 PROCEDURE — 99214 OFFICE O/P EST MOD 30 MIN: CPT | Mod: S$GLB,,, | Performed by: ORTHOPAEDIC SURGERY

## 2021-06-30 ENCOUNTER — CLINICAL SUPPORT (OUTPATIENT)
Dept: REHABILITATION | Facility: HOSPITAL | Age: 71
End: 2021-06-30
Payer: MEDICARE

## 2021-06-30 DIAGNOSIS — M54.12 CERVICAL RADICULOPATHY: Chronic | ICD-10-CM

## 2021-06-30 PROCEDURE — 97113 AQUATIC THERAPY/EXERCISES: CPT

## 2021-07-01 ENCOUNTER — TELEPHONE (OUTPATIENT)
Dept: SPORTS MEDICINE | Facility: CLINIC | Age: 71
End: 2021-07-01

## 2021-07-01 DIAGNOSIS — M17.0 PRIMARY OSTEOARTHRITIS OF BOTH KNEES: Primary | ICD-10-CM

## 2021-07-01 LAB
FINAL PATHOLOGIC DIAGNOSIS: NORMAL
GROSS: NORMAL
Lab: NORMAL

## 2021-07-07 ENCOUNTER — TELEPHONE (OUTPATIENT)
Dept: OTOLARYNGOLOGY | Facility: CLINIC | Age: 71
End: 2021-07-07

## 2021-07-07 ENCOUNTER — CLINICAL SUPPORT (OUTPATIENT)
Dept: REHABILITATION | Facility: HOSPITAL | Age: 71
End: 2021-07-07
Payer: MEDICARE

## 2021-07-07 DIAGNOSIS — M54.12 CERVICAL RADICULOPATHY: Chronic | ICD-10-CM

## 2021-07-07 PROCEDURE — 97113 AQUATIC THERAPY/EXERCISES: CPT

## 2021-07-14 ENCOUNTER — TELEPHONE (OUTPATIENT)
Dept: GASTROENTEROLOGY | Facility: CLINIC | Age: 71
End: 2021-07-14

## 2021-07-14 ENCOUNTER — CLINICAL SUPPORT (OUTPATIENT)
Dept: REHABILITATION | Facility: HOSPITAL | Age: 71
End: 2021-07-14
Payer: MEDICARE

## 2021-07-14 DIAGNOSIS — M25.562 CHRONIC PAIN OF BOTH KNEES: ICD-10-CM

## 2021-07-14 DIAGNOSIS — M25.561 CHRONIC PAIN OF BOTH KNEES: ICD-10-CM

## 2021-07-14 DIAGNOSIS — G89.29 CHRONIC PAIN OF BOTH KNEES: ICD-10-CM

## 2021-07-14 PROCEDURE — 97113 AQUATIC THERAPY/EXERCISES: CPT | Performed by: PHYSICAL THERAPIST

## 2021-07-20 ENCOUNTER — CLINICAL SUPPORT (OUTPATIENT)
Dept: REHABILITATION | Facility: HOSPITAL | Age: 71
End: 2021-07-20
Payer: MEDICARE

## 2021-07-20 DIAGNOSIS — M25.561 CHRONIC PAIN OF BOTH KNEES: Primary | ICD-10-CM

## 2021-07-20 DIAGNOSIS — M25.562 CHRONIC PAIN OF BOTH KNEES: Primary | ICD-10-CM

## 2021-07-20 DIAGNOSIS — G89.29 CHRONIC PAIN OF BOTH KNEES: Primary | ICD-10-CM

## 2021-07-20 DIAGNOSIS — M47.816 LUMBAR FACET ARTHROPATHY: ICD-10-CM

## 2021-07-20 PROCEDURE — 97113 AQUATIC THERAPY/EXERCISES: CPT

## 2021-07-21 ENCOUNTER — PES CALL (OUTPATIENT)
Dept: ADMINISTRATIVE | Facility: CLINIC | Age: 71
End: 2021-07-21

## 2021-07-26 ENCOUNTER — CLINICAL SUPPORT (OUTPATIENT)
Dept: REHABILITATION | Facility: HOSPITAL | Age: 71
End: 2021-07-26
Payer: MEDICARE

## 2021-07-26 DIAGNOSIS — M50.30 DEGENERATIVE DISC DISEASE, CERVICAL: Chronic | ICD-10-CM

## 2021-07-26 PROCEDURE — 97113 AQUATIC THERAPY/EXERCISES: CPT | Performed by: PHYSICAL THERAPIST

## 2021-07-29 ENCOUNTER — OFFICE VISIT (OUTPATIENT)
Dept: SPORTS MEDICINE | Facility: CLINIC | Age: 71
End: 2021-07-29
Payer: MEDICARE

## 2021-07-29 VITALS
DIASTOLIC BLOOD PRESSURE: 70 MMHG | HEART RATE: 69 BPM | WEIGHT: 168 LBS | SYSTOLIC BLOOD PRESSURE: 126 MMHG | BODY MASS INDEX: 30.91 KG/M2 | HEIGHT: 62 IN

## 2021-07-29 DIAGNOSIS — M17.0 PRIMARY OSTEOARTHRITIS OF BOTH KNEES: Primary | ICD-10-CM

## 2021-07-29 PROCEDURE — 3008F PR BODY MASS INDEX (BMI) DOCUMENTED: ICD-10-PCS | Mod: CPTII,S$GLB,, | Performed by: ORTHOPAEDIC SURGERY

## 2021-07-29 PROCEDURE — 1125F AMNT PAIN NOTED PAIN PRSNT: CPT | Mod: CPTII,S$GLB,, | Performed by: ORTHOPAEDIC SURGERY

## 2021-07-29 PROCEDURE — 99999 PR PBB SHADOW E&M-EST. PATIENT-LVL III: ICD-10-PCS | Mod: PBBFAC,,, | Performed by: ORTHOPAEDIC SURGERY

## 2021-07-29 PROCEDURE — 3074F SYST BP LT 130 MM HG: CPT | Mod: CPTII,S$GLB,, | Performed by: ORTHOPAEDIC SURGERY

## 2021-07-29 PROCEDURE — 1160F RVW MEDS BY RX/DR IN RCRD: CPT | Mod: CPTII,S$GLB,, | Performed by: ORTHOPAEDIC SURGERY

## 2021-07-29 PROCEDURE — 1159F MED LIST DOCD IN RCRD: CPT | Mod: CPTII,S$GLB,, | Performed by: ORTHOPAEDIC SURGERY

## 2021-07-29 PROCEDURE — 3288F FALL RISK ASSESSMENT DOCD: CPT | Mod: CPTII,S$GLB,, | Performed by: ORTHOPAEDIC SURGERY

## 2021-07-29 PROCEDURE — 3078F PR MOST RECENT DIASTOLIC BLOOD PRESSURE < 80 MM HG: ICD-10-PCS | Mod: CPTII,S$GLB,, | Performed by: ORTHOPAEDIC SURGERY

## 2021-07-29 PROCEDURE — 3074F PR MOST RECENT SYSTOLIC BLOOD PRESSURE < 130 MM HG: ICD-10-PCS | Mod: CPTII,S$GLB,, | Performed by: ORTHOPAEDIC SURGERY

## 2021-07-29 PROCEDURE — 1159F PR MEDICATION LIST DOCUMENTED IN MEDICAL RECORD: ICD-10-PCS | Mod: CPTII,S$GLB,, | Performed by: ORTHOPAEDIC SURGERY

## 2021-07-29 PROCEDURE — 20610 LARGE JOINT ASPIRATION/INJECTION: BILATERAL KNEE JOINT: ICD-10-PCS | Mod: 50,S$GLB,, | Performed by: ORTHOPAEDIC SURGERY

## 2021-07-29 PROCEDURE — 3078F DIAST BP <80 MM HG: CPT | Mod: CPTII,S$GLB,, | Performed by: ORTHOPAEDIC SURGERY

## 2021-07-29 PROCEDURE — 1101F PT FALLS ASSESS-DOCD LE1/YR: CPT | Mod: CPTII,S$GLB,, | Performed by: ORTHOPAEDIC SURGERY

## 2021-07-29 PROCEDURE — 3008F BODY MASS INDEX DOCD: CPT | Mod: CPTII,S$GLB,, | Performed by: ORTHOPAEDIC SURGERY

## 2021-07-29 PROCEDURE — 99499 NO LOS: ICD-10-PCS | Mod: S$GLB,,, | Performed by: ORTHOPAEDIC SURGERY

## 2021-07-29 PROCEDURE — 20610 DRAIN/INJ JOINT/BURSA W/O US: CPT | Mod: 50,S$GLB,, | Performed by: ORTHOPAEDIC SURGERY

## 2021-07-29 PROCEDURE — 99999 PR PBB SHADOW E&M-EST. PATIENT-LVL III: CPT | Mod: PBBFAC,,, | Performed by: ORTHOPAEDIC SURGERY

## 2021-07-29 PROCEDURE — 99499 UNLISTED E&M SERVICE: CPT | Mod: S$GLB,,, | Performed by: ORTHOPAEDIC SURGERY

## 2021-07-29 PROCEDURE — 1101F PR PT FALLS ASSESS DOC 0-1 FALLS W/OUT INJ PAST YR: ICD-10-PCS | Mod: CPTII,S$GLB,, | Performed by: ORTHOPAEDIC SURGERY

## 2021-07-29 PROCEDURE — 1160F PR REVIEW ALL MEDS BY PRESCRIBER/CLIN PHARMACIST DOCUMENTED: ICD-10-PCS | Mod: CPTII,S$GLB,, | Performed by: ORTHOPAEDIC SURGERY

## 2021-07-29 PROCEDURE — 1125F PR PAIN SEVERITY QUANTIFIED, PAIN PRESENT: ICD-10-PCS | Mod: CPTII,S$GLB,, | Performed by: ORTHOPAEDIC SURGERY

## 2021-07-29 PROCEDURE — 3288F PR FALLS RISK ASSESSMENT DOCUMENTED: ICD-10-PCS | Mod: CPTII,S$GLB,, | Performed by: ORTHOPAEDIC SURGERY

## 2021-08-04 ENCOUNTER — PATIENT OUTREACH (OUTPATIENT)
Dept: ADMINISTRATIVE | Facility: OTHER | Age: 71
End: 2021-08-04

## 2021-08-05 ENCOUNTER — OFFICE VISIT (OUTPATIENT)
Dept: SPORTS MEDICINE | Facility: CLINIC | Age: 71
End: 2021-08-05
Payer: MEDICARE

## 2021-08-05 VITALS
BODY MASS INDEX: 30.91 KG/M2 | SYSTOLIC BLOOD PRESSURE: 113 MMHG | WEIGHT: 168 LBS | HEIGHT: 62 IN | HEART RATE: 87 BPM | DIASTOLIC BLOOD PRESSURE: 75 MMHG | TEMPERATURE: 98 F

## 2021-08-05 DIAGNOSIS — M17.0 PRIMARY OSTEOARTHRITIS OF BOTH KNEES: Primary | ICD-10-CM

## 2021-08-05 PROCEDURE — 1125F AMNT PAIN NOTED PAIN PRSNT: CPT | Mod: CPTII,S$GLB,, | Performed by: ORTHOPAEDIC SURGERY

## 2021-08-05 PROCEDURE — 3288F FALL RISK ASSESSMENT DOCD: CPT | Mod: CPTII,S$GLB,, | Performed by: ORTHOPAEDIC SURGERY

## 2021-08-05 PROCEDURE — 20610 DRAIN/INJ JOINT/BURSA W/O US: CPT | Mod: 50,S$GLB,, | Performed by: ORTHOPAEDIC SURGERY

## 2021-08-05 PROCEDURE — 99999 PR PBB SHADOW E&M-EST. PATIENT-LVL IV: ICD-10-PCS | Mod: PBBFAC,,, | Performed by: ORTHOPAEDIC SURGERY

## 2021-08-05 PROCEDURE — 3078F DIAST BP <80 MM HG: CPT | Mod: CPTII,S$GLB,, | Performed by: ORTHOPAEDIC SURGERY

## 2021-08-05 PROCEDURE — 99999 PR PBB SHADOW E&M-EST. PATIENT-LVL IV: CPT | Mod: PBBFAC,,, | Performed by: ORTHOPAEDIC SURGERY

## 2021-08-05 PROCEDURE — 1159F MED LIST DOCD IN RCRD: CPT | Mod: CPTII,S$GLB,, | Performed by: ORTHOPAEDIC SURGERY

## 2021-08-05 PROCEDURE — 3008F PR BODY MASS INDEX (BMI) DOCUMENTED: ICD-10-PCS | Mod: CPTII,S$GLB,, | Performed by: ORTHOPAEDIC SURGERY

## 2021-08-05 PROCEDURE — 99499 NO LOS: ICD-10-PCS | Mod: S$GLB,,, | Performed by: ORTHOPAEDIC SURGERY

## 2021-08-05 PROCEDURE — 1160F RVW MEDS BY RX/DR IN RCRD: CPT | Mod: CPTII,S$GLB,, | Performed by: ORTHOPAEDIC SURGERY

## 2021-08-05 PROCEDURE — 99499 UNLISTED E&M SERVICE: CPT | Mod: S$GLB,,, | Performed by: ORTHOPAEDIC SURGERY

## 2021-08-05 PROCEDURE — 20610 LARGE JOINT ASPIRATION/INJECTION: BILATERAL KNEE JOINT: ICD-10-PCS | Mod: 50,S$GLB,, | Performed by: ORTHOPAEDIC SURGERY

## 2021-08-05 PROCEDURE — 3008F BODY MASS INDEX DOCD: CPT | Mod: CPTII,S$GLB,, | Performed by: ORTHOPAEDIC SURGERY

## 2021-08-05 PROCEDURE — 3074F PR MOST RECENT SYSTOLIC BLOOD PRESSURE < 130 MM HG: ICD-10-PCS | Mod: CPTII,S$GLB,, | Performed by: ORTHOPAEDIC SURGERY

## 2021-08-05 PROCEDURE — 1159F PR MEDICATION LIST DOCUMENTED IN MEDICAL RECORD: ICD-10-PCS | Mod: CPTII,S$GLB,, | Performed by: ORTHOPAEDIC SURGERY

## 2021-08-05 PROCEDURE — 1160F PR REVIEW ALL MEDS BY PRESCRIBER/CLIN PHARMACIST DOCUMENTED: ICD-10-PCS | Mod: CPTII,S$GLB,, | Performed by: ORTHOPAEDIC SURGERY

## 2021-08-05 PROCEDURE — 1101F PT FALLS ASSESS-DOCD LE1/YR: CPT | Mod: CPTII,S$GLB,, | Performed by: ORTHOPAEDIC SURGERY

## 2021-08-05 PROCEDURE — 3288F PR FALLS RISK ASSESSMENT DOCUMENTED: ICD-10-PCS | Mod: CPTII,S$GLB,, | Performed by: ORTHOPAEDIC SURGERY

## 2021-08-05 PROCEDURE — 3074F SYST BP LT 130 MM HG: CPT | Mod: CPTII,S$GLB,, | Performed by: ORTHOPAEDIC SURGERY

## 2021-08-05 PROCEDURE — 1101F PR PT FALLS ASSESS DOC 0-1 FALLS W/OUT INJ PAST YR: ICD-10-PCS | Mod: CPTII,S$GLB,, | Performed by: ORTHOPAEDIC SURGERY

## 2021-08-05 PROCEDURE — 1125F PR PAIN SEVERITY QUANTIFIED, PAIN PRESENT: ICD-10-PCS | Mod: CPTII,S$GLB,, | Performed by: ORTHOPAEDIC SURGERY

## 2021-08-05 PROCEDURE — 3078F PR MOST RECENT DIASTOLIC BLOOD PRESSURE < 80 MM HG: ICD-10-PCS | Mod: CPTII,S$GLB,, | Performed by: ORTHOPAEDIC SURGERY

## 2021-08-10 ENCOUNTER — PES CALL (OUTPATIENT)
Dept: ADMINISTRATIVE | Facility: CLINIC | Age: 71
End: 2021-08-10

## 2021-08-10 ENCOUNTER — DOCUMENTATION ONLY (OUTPATIENT)
Dept: INTERNAL MEDICINE | Facility: CLINIC | Age: 71
End: 2021-08-10

## 2021-08-10 ENCOUNTER — CLINICAL SUPPORT (OUTPATIENT)
Dept: REHABILITATION | Facility: HOSPITAL | Age: 71
End: 2021-08-10
Payer: MEDICARE

## 2021-08-10 DIAGNOSIS — M25.561 CHRONIC PAIN OF BOTH KNEES: Primary | ICD-10-CM

## 2021-08-10 DIAGNOSIS — G89.29 CHRONIC PAIN OF BOTH KNEES: Primary | ICD-10-CM

## 2021-08-10 DIAGNOSIS — M25.562 CHRONIC PAIN OF BOTH KNEES: Primary | ICD-10-CM

## 2021-08-10 PROCEDURE — 97113 AQUATIC THERAPY/EXERCISES: CPT

## 2021-08-11 ENCOUNTER — OFFICE VISIT (OUTPATIENT)
Dept: INTERNAL MEDICINE | Facility: CLINIC | Age: 71
End: 2021-08-11
Payer: MEDICARE

## 2021-08-11 VITALS
HEIGHT: 62 IN | BODY MASS INDEX: 30.44 KG/M2 | OXYGEN SATURATION: 99 % | WEIGHT: 165.38 LBS | HEART RATE: 67 BPM | SYSTOLIC BLOOD PRESSURE: 110 MMHG | DIASTOLIC BLOOD PRESSURE: 70 MMHG | RESPIRATION RATE: 15 BRPM

## 2021-08-11 DIAGNOSIS — M47.816 LUMBAR FACET ARTHROPATHY: ICD-10-CM

## 2021-08-11 DIAGNOSIS — M54.12 CERVICAL RADICULOPATHY: Chronic | ICD-10-CM

## 2021-08-11 DIAGNOSIS — M46.96 INFLAMMATORY SPONDYLOPATHY OF LUMBAR REGION: ICD-10-CM

## 2021-08-11 DIAGNOSIS — M25.561 CHRONIC PAIN OF BOTH KNEES: ICD-10-CM

## 2021-08-11 DIAGNOSIS — G89.29 CHRONIC PAIN OF BOTH KNEES: ICD-10-CM

## 2021-08-11 DIAGNOSIS — M81.6 LOCALIZED OSTEOPOROSIS WITHOUT CURRENT PATHOLOGICAL FRACTURE: ICD-10-CM

## 2021-08-11 DIAGNOSIS — D12.6 COLON ADENOMA: ICD-10-CM

## 2021-08-11 DIAGNOSIS — H91.93 DECREASED HEARING OF BOTH EARS: ICD-10-CM

## 2021-08-11 DIAGNOSIS — I10 ESSENTIAL HYPERTENSION: Chronic | ICD-10-CM

## 2021-08-11 DIAGNOSIS — Z00.00 ENCOUNTER FOR PREVENTIVE HEALTH EXAMINATION: Primary | ICD-10-CM

## 2021-08-11 DIAGNOSIS — I77.1 TORTUOUS AORTA: ICD-10-CM

## 2021-08-11 DIAGNOSIS — N60.09 CYST OF BREAST, UNSPECIFIED LATERALITY: ICD-10-CM

## 2021-08-11 DIAGNOSIS — E04.2 MULTIPLE THYROID NODULES: ICD-10-CM

## 2021-08-11 DIAGNOSIS — M17.0 PRIMARY OSTEOARTHRITIS OF BOTH KNEES: ICD-10-CM

## 2021-08-11 DIAGNOSIS — H25.10 NUCLEAR SCLEROSIS, UNSPECIFIED LATERALITY: ICD-10-CM

## 2021-08-11 DIAGNOSIS — M25.562 CHRONIC PAIN OF BOTH KNEES: ICD-10-CM

## 2021-08-11 DIAGNOSIS — F32.0 MAJOR DEPRESSIVE DISORDER, SINGLE EPISODE, MILD: ICD-10-CM

## 2021-08-11 DIAGNOSIS — Z86.16 HISTORY OF 2019 NOVEL CORONAVIRUS DISEASE (COVID-19): ICD-10-CM

## 2021-08-11 DIAGNOSIS — E66.9 OBESITY (BMI 30.0-34.9): ICD-10-CM

## 2021-08-11 DIAGNOSIS — K21.9 GASTROESOPHAGEAL REFLUX DISEASE, UNSPECIFIED WHETHER ESOPHAGITIS PRESENT: ICD-10-CM

## 2021-08-11 DIAGNOSIS — M50.30 DEGENERATIVE DISC DISEASE, CERVICAL: Chronic | ICD-10-CM

## 2021-08-11 DIAGNOSIS — E55.9 VITAMIN D DEFICIENCY: ICD-10-CM

## 2021-08-11 PROBLEM — Z13.31 POSITIVE DEPRESSION SCREENING: Status: RESOLVED | Noted: 2020-07-23 | Resolved: 2021-08-11

## 2021-08-11 PROCEDURE — 1160F RVW MEDS BY RX/DR IN RCRD: CPT | Mod: CPTII,S$GLB,, | Performed by: NURSE PRACTITIONER

## 2021-08-11 PROCEDURE — 99999 PR PBB SHADOW E&M-EST. PATIENT-LVL V: CPT | Mod: PBBFAC,,, | Performed by: NURSE PRACTITIONER

## 2021-08-11 PROCEDURE — 1160F PR REVIEW ALL MEDS BY PRESCRIBER/CLIN PHARMACIST DOCUMENTED: ICD-10-PCS | Mod: CPTII,S$GLB,, | Performed by: NURSE PRACTITIONER

## 2021-08-11 PROCEDURE — 3074F SYST BP LT 130 MM HG: CPT | Mod: CPTII,S$GLB,, | Performed by: NURSE PRACTITIONER

## 2021-08-11 PROCEDURE — 1159F PR MEDICATION LIST DOCUMENTED IN MEDICAL RECORD: ICD-10-PCS | Mod: CPTII,S$GLB,, | Performed by: NURSE PRACTITIONER

## 2021-08-11 PROCEDURE — 99499 RISK ADDL DX/OHS AUDIT: ICD-10-PCS | Mod: S$GLB,,, | Performed by: NURSE PRACTITIONER

## 2021-08-11 PROCEDURE — 1126F AMNT PAIN NOTED NONE PRSNT: CPT | Mod: CPTII,S$GLB,, | Performed by: NURSE PRACTITIONER

## 2021-08-11 PROCEDURE — G0439 PR MEDICARE ANNUAL WELLNESS SUBSEQUENT VISIT: ICD-10-PCS | Mod: S$GLB,,, | Performed by: NURSE PRACTITIONER

## 2021-08-11 PROCEDURE — 3288F FALL RISK ASSESSMENT DOCD: CPT | Mod: CPTII,S$GLB,, | Performed by: NURSE PRACTITIONER

## 2021-08-11 PROCEDURE — 3008F PR BODY MASS INDEX (BMI) DOCUMENTED: ICD-10-PCS | Mod: CPTII,S$GLB,, | Performed by: NURSE PRACTITIONER

## 2021-08-11 PROCEDURE — G0439 PPPS, SUBSEQ VISIT: HCPCS | Mod: S$GLB,,, | Performed by: NURSE PRACTITIONER

## 2021-08-11 PROCEDURE — 1101F PR PT FALLS ASSESS DOC 0-1 FALLS W/OUT INJ PAST YR: ICD-10-PCS | Mod: CPTII,S$GLB,, | Performed by: NURSE PRACTITIONER

## 2021-08-11 PROCEDURE — 99499 UNLISTED E&M SERVICE: CPT | Mod: S$GLB,,, | Performed by: NURSE PRACTITIONER

## 2021-08-11 PROCEDURE — 3078F PR MOST RECENT DIASTOLIC BLOOD PRESSURE < 80 MM HG: ICD-10-PCS | Mod: CPTII,S$GLB,, | Performed by: NURSE PRACTITIONER

## 2021-08-11 PROCEDURE — 3288F PR FALLS RISK ASSESSMENT DOCUMENTED: ICD-10-PCS | Mod: CPTII,S$GLB,, | Performed by: NURSE PRACTITIONER

## 2021-08-11 PROCEDURE — 3078F DIAST BP <80 MM HG: CPT | Mod: CPTII,S$GLB,, | Performed by: NURSE PRACTITIONER

## 2021-08-11 PROCEDURE — 99999 PR PBB SHADOW E&M-EST. PATIENT-LVL V: ICD-10-PCS | Mod: PBBFAC,,, | Performed by: NURSE PRACTITIONER

## 2021-08-11 PROCEDURE — 1101F PT FALLS ASSESS-DOCD LE1/YR: CPT | Mod: CPTII,S$GLB,, | Performed by: NURSE PRACTITIONER

## 2021-08-11 PROCEDURE — 3008F BODY MASS INDEX DOCD: CPT | Mod: CPTII,S$GLB,, | Performed by: NURSE PRACTITIONER

## 2021-08-11 PROCEDURE — 3074F PR MOST RECENT SYSTOLIC BLOOD PRESSURE < 130 MM HG: ICD-10-PCS | Mod: CPTII,S$GLB,, | Performed by: NURSE PRACTITIONER

## 2021-08-11 PROCEDURE — 1126F PR PAIN SEVERITY QUANTIFIED, NO PAIN PRESENT: ICD-10-PCS | Mod: CPTII,S$GLB,, | Performed by: NURSE PRACTITIONER

## 2021-08-11 PROCEDURE — 1159F MED LIST DOCD IN RCRD: CPT | Mod: CPTII,S$GLB,, | Performed by: NURSE PRACTITIONER

## 2021-08-13 ENCOUNTER — CLINICAL SUPPORT (OUTPATIENT)
Dept: REHABILITATION | Facility: HOSPITAL | Age: 71
End: 2021-08-13
Payer: MEDICARE

## 2021-08-13 DIAGNOSIS — M25.561 CHRONIC PAIN OF BOTH KNEES: ICD-10-CM

## 2021-08-13 DIAGNOSIS — G89.29 CHRONIC PAIN OF BOTH KNEES: ICD-10-CM

## 2021-08-13 DIAGNOSIS — M25.562 CHRONIC PAIN OF BOTH KNEES: ICD-10-CM

## 2021-08-13 PROCEDURE — 97113 AQUATIC THERAPY/EXERCISES: CPT

## 2021-08-18 ENCOUNTER — HOSPITAL ENCOUNTER (OUTPATIENT)
Dept: RADIOLOGY | Facility: HOSPITAL | Age: 71
Discharge: HOME | End: 2021-08-18
Attending: INTERNAL MEDICINE
Payer: MEDICARE

## 2021-08-18 VITALS — BODY MASS INDEX: 30.36 KG/M2 | HEIGHT: 62 IN | WEIGHT: 165 LBS

## 2021-08-18 DIAGNOSIS — Z12.31 ENCOUNTER FOR SCREENING MAMMOGRAM FOR BREAST CANCER: ICD-10-CM

## 2021-08-18 PROCEDURE — 77063 BREAST TOMOSYNTHESIS BI: CPT | Mod: 26,,, | Performed by: RADIOLOGY

## 2021-08-18 PROCEDURE — 77067 MAMMO DIGITAL SCREENING BILAT WITH TOMO: ICD-10-PCS | Mod: 26,,, | Performed by: RADIOLOGY

## 2021-08-18 PROCEDURE — 77067 SCR MAMMO BI INCL CAD: CPT | Mod: TC

## 2021-08-18 PROCEDURE — 77067 SCR MAMMO BI INCL CAD: CPT | Mod: 26,,, | Performed by: RADIOLOGY

## 2021-08-18 PROCEDURE — 77063 MAMMO DIGITAL SCREENING BILAT WITH TOMO: ICD-10-PCS | Mod: 26,,, | Performed by: RADIOLOGY

## 2021-08-19 ENCOUNTER — CLINICAL SUPPORT (OUTPATIENT)
Dept: REHABILITATION | Facility: HOSPITAL | Age: 71
End: 2021-08-19
Payer: MEDICARE

## 2021-08-19 DIAGNOSIS — M25.562 CHRONIC PAIN OF BOTH KNEES: ICD-10-CM

## 2021-08-19 DIAGNOSIS — M25.561 CHRONIC PAIN OF BOTH KNEES: ICD-10-CM

## 2021-08-19 DIAGNOSIS — G89.29 CHRONIC PAIN OF BOTH KNEES: ICD-10-CM

## 2021-08-19 PROCEDURE — 97113 AQUATIC THERAPY/EXERCISES: CPT

## 2021-08-24 ENCOUNTER — HOSPITAL ENCOUNTER (OUTPATIENT)
Dept: RADIOLOGY | Facility: HOSPITAL | Age: 71
Discharge: HOME | End: 2021-08-24
Attending: INTERNAL MEDICINE
Payer: MEDICARE

## 2021-08-24 ENCOUNTER — OFFICE VISIT (OUTPATIENT)
Dept: INTERNAL MEDICINE | Facility: CLINIC | Age: 71
End: 2021-08-24
Payer: MEDICARE

## 2021-08-24 VITALS
RESPIRATION RATE: 16 BRPM | HEIGHT: 67 IN | DIASTOLIC BLOOD PRESSURE: 78 MMHG | BODY MASS INDEX: 26.78 KG/M2 | HEART RATE: 62 BPM | SYSTOLIC BLOOD PRESSURE: 114 MMHG | WEIGHT: 170.63 LBS | TEMPERATURE: 99 F | OXYGEN SATURATION: 97 %

## 2021-08-24 DIAGNOSIS — M54.9 DORSALGIA, UNSPECIFIED: ICD-10-CM

## 2021-08-24 DIAGNOSIS — M54.50 RIGHT-SIDED LOW BACK PAIN WITHOUT SCIATICA, UNSPECIFIED CHRONICITY: ICD-10-CM

## 2021-08-24 DIAGNOSIS — M17.0 PRIMARY OSTEOARTHRITIS OF BOTH KNEES: ICD-10-CM

## 2021-08-24 DIAGNOSIS — R10.9 RIGHT FLANK PAIN: ICD-10-CM

## 2021-08-24 DIAGNOSIS — I10 ESSENTIAL HYPERTENSION: Primary | ICD-10-CM

## 2021-08-24 DIAGNOSIS — E55.9 VITAMIN D DEFICIENCY: ICD-10-CM

## 2021-08-24 PROCEDURE — 72100 X-RAY EXAM L-S SPINE 2/3 VWS: CPT | Mod: 26,,, | Performed by: RADIOLOGY

## 2021-08-24 PROCEDURE — 3074F SYST BP LT 130 MM HG: CPT | Mod: CPTII,S$GLB,, | Performed by: INTERNAL MEDICINE

## 2021-08-24 PROCEDURE — 3008F BODY MASS INDEX DOCD: CPT | Mod: CPTII,S$GLB,, | Performed by: INTERNAL MEDICINE

## 2021-08-24 PROCEDURE — 99214 PR OFFICE/OUTPT VISIT, EST, LEVL IV, 30-39 MIN: ICD-10-PCS | Mod: S$GLB,,, | Performed by: INTERNAL MEDICINE

## 2021-08-24 PROCEDURE — 72100 X-RAY EXAM L-S SPINE 2/3 VWS: CPT | Mod: TC,PO

## 2021-08-24 PROCEDURE — 1101F PR PT FALLS ASSESS DOC 0-1 FALLS W/OUT INJ PAST YR: ICD-10-PCS | Mod: CPTII,S$GLB,, | Performed by: INTERNAL MEDICINE

## 2021-08-24 PROCEDURE — 99214 OFFICE O/P EST MOD 30 MIN: CPT | Mod: S$GLB,,, | Performed by: INTERNAL MEDICINE

## 2021-08-24 PROCEDURE — 1126F PR PAIN SEVERITY QUANTIFIED, NO PAIN PRESENT: ICD-10-PCS | Mod: CPTII,S$GLB,, | Performed by: INTERNAL MEDICINE

## 2021-08-24 PROCEDURE — 3288F PR FALLS RISK ASSESSMENT DOCUMENTED: ICD-10-PCS | Mod: CPTII,S$GLB,, | Performed by: INTERNAL MEDICINE

## 2021-08-24 PROCEDURE — 72100 XR LUMBAR SPINE AP AND LATERAL: ICD-10-PCS | Mod: 26,,, | Performed by: RADIOLOGY

## 2021-08-24 PROCEDURE — 3288F FALL RISK ASSESSMENT DOCD: CPT | Mod: CPTII,S$GLB,, | Performed by: INTERNAL MEDICINE

## 2021-08-24 PROCEDURE — 1126F AMNT PAIN NOTED NONE PRSNT: CPT | Mod: CPTII,S$GLB,, | Performed by: INTERNAL MEDICINE

## 2021-08-24 PROCEDURE — 3078F DIAST BP <80 MM HG: CPT | Mod: CPTII,S$GLB,, | Performed by: INTERNAL MEDICINE

## 2021-08-24 PROCEDURE — 3074F PR MOST RECENT SYSTOLIC BLOOD PRESSURE < 130 MM HG: ICD-10-PCS | Mod: CPTII,S$GLB,, | Performed by: INTERNAL MEDICINE

## 2021-08-24 PROCEDURE — 1101F PT FALLS ASSESS-DOCD LE1/YR: CPT | Mod: CPTII,S$GLB,, | Performed by: INTERNAL MEDICINE

## 2021-08-24 PROCEDURE — 3078F PR MOST RECENT DIASTOLIC BLOOD PRESSURE < 80 MM HG: ICD-10-PCS | Mod: CPTII,S$GLB,, | Performed by: INTERNAL MEDICINE

## 2021-08-24 PROCEDURE — 99999 PR PBB SHADOW E&M-EST. PATIENT-LVL V: CPT | Mod: PBBFAC,,, | Performed by: INTERNAL MEDICINE

## 2021-08-24 PROCEDURE — 3008F PR BODY MASS INDEX (BMI) DOCUMENTED: ICD-10-PCS | Mod: CPTII,S$GLB,, | Performed by: INTERNAL MEDICINE

## 2021-08-24 PROCEDURE — 99999 PR PBB SHADOW E&M-EST. PATIENT-LVL V: ICD-10-PCS | Mod: PBBFAC,,, | Performed by: INTERNAL MEDICINE

## 2021-08-25 ENCOUNTER — CLINICAL SUPPORT (OUTPATIENT)
Dept: REHABILITATION | Facility: HOSPITAL | Age: 71
End: 2021-08-25
Payer: MEDICARE

## 2021-08-25 DIAGNOSIS — G89.29 CHRONIC PAIN OF BOTH KNEES: ICD-10-CM

## 2021-08-25 DIAGNOSIS — M25.561 CHRONIC PAIN OF BOTH KNEES: ICD-10-CM

## 2021-08-25 DIAGNOSIS — M25.562 CHRONIC PAIN OF BOTH KNEES: ICD-10-CM

## 2021-08-25 PROCEDURE — 97110 THERAPEUTIC EXERCISES: CPT

## 2021-08-26 ENCOUNTER — OFFICE VISIT (OUTPATIENT)
Dept: SPORTS MEDICINE | Facility: CLINIC | Age: 71
End: 2021-08-26
Payer: MEDICARE

## 2021-08-26 VITALS
DIASTOLIC BLOOD PRESSURE: 71 MMHG | HEIGHT: 67 IN | SYSTOLIC BLOOD PRESSURE: 111 MMHG | BODY MASS INDEX: 26.68 KG/M2 | HEART RATE: 84 BPM | TEMPERATURE: 98 F | WEIGHT: 170 LBS

## 2021-08-26 DIAGNOSIS — M17.0 PRIMARY OSTEOARTHRITIS OF BOTH KNEES: Primary | ICD-10-CM

## 2021-08-26 PROCEDURE — 3288F PR FALLS RISK ASSESSMENT DOCUMENTED: ICD-10-PCS | Mod: CPTII,S$GLB,, | Performed by: ORTHOPAEDIC SURGERY

## 2021-08-26 PROCEDURE — 3078F DIAST BP <80 MM HG: CPT | Mod: CPTII,S$GLB,, | Performed by: ORTHOPAEDIC SURGERY

## 2021-08-26 PROCEDURE — 1159F MED LIST DOCD IN RCRD: CPT | Mod: CPTII,S$GLB,, | Performed by: ORTHOPAEDIC SURGERY

## 2021-08-26 PROCEDURE — 3008F BODY MASS INDEX DOCD: CPT | Mod: CPTII,S$GLB,, | Performed by: ORTHOPAEDIC SURGERY

## 2021-08-26 PROCEDURE — 1101F PR PT FALLS ASSESS DOC 0-1 FALLS W/OUT INJ PAST YR: ICD-10-PCS | Mod: CPTII,S$GLB,, | Performed by: ORTHOPAEDIC SURGERY

## 2021-08-26 PROCEDURE — 3074F SYST BP LT 130 MM HG: CPT | Mod: CPTII,S$GLB,, | Performed by: ORTHOPAEDIC SURGERY

## 2021-08-26 PROCEDURE — 99999 PR PBB SHADOW E&M-EST. PATIENT-LVL III: ICD-10-PCS | Mod: PBBFAC,,, | Performed by: ORTHOPAEDIC SURGERY

## 2021-08-26 PROCEDURE — 20610 DRAIN/INJ JOINT/BURSA W/O US: CPT | Mod: 50,S$GLB,, | Performed by: ORTHOPAEDIC SURGERY

## 2021-08-26 PROCEDURE — 1159F PR MEDICATION LIST DOCUMENTED IN MEDICAL RECORD: ICD-10-PCS | Mod: CPTII,S$GLB,, | Performed by: ORTHOPAEDIC SURGERY

## 2021-08-26 PROCEDURE — 99499 UNLISTED E&M SERVICE: CPT | Mod: S$GLB,,, | Performed by: ORTHOPAEDIC SURGERY

## 2021-08-26 PROCEDURE — 99999 PR PBB SHADOW E&M-EST. PATIENT-LVL III: CPT | Mod: PBBFAC,,, | Performed by: ORTHOPAEDIC SURGERY

## 2021-08-26 PROCEDURE — 3008F PR BODY MASS INDEX (BMI) DOCUMENTED: ICD-10-PCS | Mod: CPTII,S$GLB,, | Performed by: ORTHOPAEDIC SURGERY

## 2021-08-26 PROCEDURE — 1101F PT FALLS ASSESS-DOCD LE1/YR: CPT | Mod: CPTII,S$GLB,, | Performed by: ORTHOPAEDIC SURGERY

## 2021-08-26 PROCEDURE — 3288F FALL RISK ASSESSMENT DOCD: CPT | Mod: CPTII,S$GLB,, | Performed by: ORTHOPAEDIC SURGERY

## 2021-08-26 PROCEDURE — 3074F PR MOST RECENT SYSTOLIC BLOOD PRESSURE < 130 MM HG: ICD-10-PCS | Mod: CPTII,S$GLB,, | Performed by: ORTHOPAEDIC SURGERY

## 2021-08-26 PROCEDURE — 1125F AMNT PAIN NOTED PAIN PRSNT: CPT | Mod: CPTII,S$GLB,, | Performed by: ORTHOPAEDIC SURGERY

## 2021-08-26 PROCEDURE — 3078F PR MOST RECENT DIASTOLIC BLOOD PRESSURE < 80 MM HG: ICD-10-PCS | Mod: CPTII,S$GLB,, | Performed by: ORTHOPAEDIC SURGERY

## 2021-08-26 PROCEDURE — 1125F PR PAIN SEVERITY QUANTIFIED, PAIN PRESENT: ICD-10-PCS | Mod: CPTII,S$GLB,, | Performed by: ORTHOPAEDIC SURGERY

## 2021-08-26 PROCEDURE — 20610 LARGE JOINT ASPIRATION/INJECTION: BILATERAL KNEE: ICD-10-PCS | Mod: 50,S$GLB,, | Performed by: ORTHOPAEDIC SURGERY

## 2021-08-26 PROCEDURE — 99499 NO LOS: ICD-10-PCS | Mod: S$GLB,,, | Performed by: ORTHOPAEDIC SURGERY

## 2021-09-09 ENCOUNTER — HOSPITAL ENCOUNTER (OUTPATIENT)
Dept: RADIOLOGY | Facility: HOSPITAL | Age: 71
Discharge: HOME | End: 2021-09-09
Attending: INTERNAL MEDICINE
Payer: MEDICARE

## 2021-09-09 DIAGNOSIS — R10.9 RIGHT FLANK PAIN: ICD-10-CM

## 2021-09-09 PROCEDURE — 76700 US EXAM ABDOM COMPLETE: CPT | Mod: TC

## 2021-09-09 PROCEDURE — 76700 US ABDOMEN COMPLETE: ICD-10-PCS | Mod: 26,,, | Performed by: RADIOLOGY

## 2021-09-09 PROCEDURE — 76700 US EXAM ABDOM COMPLETE: CPT | Mod: 26,,, | Performed by: RADIOLOGY

## 2021-11-01 ENCOUNTER — IMMUNIZATION (OUTPATIENT)
Dept: PHARMACY | Facility: CLINIC | Age: 71
End: 2021-11-01
Payer: MEDICARE

## 2021-11-01 DIAGNOSIS — Z23 NEED FOR VACCINATION: Primary | ICD-10-CM

## 2021-11-03 ENCOUNTER — TELEPHONE (OUTPATIENT)
Dept: OTOLARYNGOLOGY | Facility: CLINIC | Age: 71
End: 2021-11-03
Payer: MEDICARE

## 2021-11-04 NOTE — Clinical Note
Primary Care Providers: Joo Munoz MD, MD (General)  Your patient was seen today for a HRA visit. Gap(s) in care (HEDIS gaps) have been identified during this visit that require additional testing and possible follow up.  Orders Placed This Encounter     Ambulatory Referral to Ophthalmology         Referral Priority:Routine         Referral Type:Consultation         Referral Reason:Specialty Services Required         Requested Specialty:Ophthalmology         Number of Visits Requested:1   These orders were placed using [facility] approved protocol and any results will be forwarded to your office for appropriate follow up. I have included a copy of my visit note; please review the note and feel free to contact me with any questions.   Thank you for allowing me to participate in the care of your patients. Zohreh Hoffman NP Area M Indication Text: Tumors in this location are included in Area M (cheek, forehead, scalp, neck, jawline and pretibial skin).  Mohs surgery is indicated for tumors in these anatomic locations.

## 2021-11-26 ENCOUNTER — PROCEDURE VISIT (OUTPATIENT)
Dept: OTOLARYNGOLOGY | Facility: CLINIC | Age: 71
End: 2021-11-26
Payer: MEDICARE

## 2021-11-26 VITALS
DIASTOLIC BLOOD PRESSURE: 76 MMHG | SYSTOLIC BLOOD PRESSURE: 134 MMHG | TEMPERATURE: 98 F | RESPIRATION RATE: 16 BRPM | BODY MASS INDEX: 26.94 KG/M2 | WEIGHT: 171.63 LBS | HEART RATE: 68 BPM | OXYGEN SATURATION: 96 % | HEIGHT: 67 IN

## 2021-11-26 DIAGNOSIS — R22.9 SUBCUTANEOUS MASS: Primary | ICD-10-CM

## 2021-11-26 PROCEDURE — 11440 PR EXC SKIN BENIG <0.5 CM FACE,FACIAL: ICD-10-PCS | Mod: 59,S$GLB,, | Performed by: OTOLARYNGOLOGY

## 2021-11-26 PROCEDURE — 12051 PR INTERMED WOUND REPAIR FACE/EAR/EYELID/NOSE/LIP/MUC MEBR, 2.5CM OR LESS: ICD-10-PCS | Mod: S$GLB,,, | Performed by: OTOLARYNGOLOGY

## 2021-11-26 PROCEDURE — 88305 TISSUE EXAM BY PATHOLOGIST: ICD-10-PCS | Mod: 26,,, | Performed by: PATHOLOGY

## 2021-11-26 PROCEDURE — 88305 TISSUE EXAM BY PATHOLOGIST: CPT | Mod: HCNC | Performed by: PATHOLOGY

## 2021-11-26 PROCEDURE — 88305 TISSUE EXAM BY PATHOLOGIST: CPT | Mod: 26,,, | Performed by: PATHOLOGY

## 2021-11-26 PROCEDURE — 12051 INTMD RPR FACE/MM 2.5 CM/<: CPT | Mod: S$GLB,,, | Performed by: OTOLARYNGOLOGY

## 2021-11-26 PROCEDURE — 11440 EXC FACE-MM B9+MARG 0.5 CM/<: CPT | Mod: 59,S$GLB,, | Performed by: OTOLARYNGOLOGY

## 2021-12-02 ENCOUNTER — PATIENT MESSAGE (OUTPATIENT)
Dept: OTOLARYNGOLOGY | Facility: CLINIC | Age: 71
End: 2021-12-02
Payer: MEDICARE

## 2021-12-03 ENCOUNTER — OFFICE VISIT (OUTPATIENT)
Dept: OTOLARYNGOLOGY | Facility: CLINIC | Age: 71
End: 2021-12-03
Payer: MEDICARE

## 2021-12-03 VITALS
WEIGHT: 169.19 LBS | TEMPERATURE: 98 F | HEART RATE: 93 BPM | HEIGHT: 67 IN | BODY MASS INDEX: 26.55 KG/M2 | SYSTOLIC BLOOD PRESSURE: 117 MMHG | RESPIRATION RATE: 15 BRPM | DIASTOLIC BLOOD PRESSURE: 59 MMHG | OXYGEN SATURATION: 97 %

## 2021-12-03 DIAGNOSIS — R22.9 SUBCUTANEOUS MASS: Primary | ICD-10-CM

## 2021-12-03 PROCEDURE — 99024 POSTOP FOLLOW-UP VISIT: CPT | Mod: S$GLB,,, | Performed by: OTOLARYNGOLOGY

## 2021-12-03 PROCEDURE — 99024 PR POST-OP FOLLOW-UP VISIT: ICD-10-PCS | Mod: S$GLB,,, | Performed by: OTOLARYNGOLOGY

## 2021-12-07 LAB
FINAL PATHOLOGIC DIAGNOSIS: NORMAL
Lab: NORMAL

## 2021-12-12 ENCOUNTER — NURSE TRIAGE (OUTPATIENT)
Dept: ADMINISTRATIVE | Facility: CLINIC | Age: 71
End: 2021-12-12
Payer: MEDICARE

## 2021-12-14 ENCOUNTER — HOSPITAL ENCOUNTER (EMERGENCY)
Facility: HOSPITAL | Age: 71
Discharge: HOME | End: 2021-12-14
Attending: EMERGENCY MEDICINE
Payer: MEDICARE

## 2021-12-14 VITALS
BODY MASS INDEX: 30.73 KG/M2 | SYSTOLIC BLOOD PRESSURE: 136 MMHG | HEIGHT: 62 IN | DIASTOLIC BLOOD PRESSURE: 67 MMHG | OXYGEN SATURATION: 98 % | RESPIRATION RATE: 18 BRPM | WEIGHT: 167 LBS | TEMPERATURE: 99 F | HEART RATE: 70 BPM

## 2021-12-14 DIAGNOSIS — R05.9 COUGH: ICD-10-CM

## 2021-12-14 DIAGNOSIS — J11.1 INFLUENZA: Primary | ICD-10-CM

## 2021-12-14 LAB
INFLUENZA A, MOLECULAR: POSITIVE
INFLUENZA B, MOLECULAR: NEGATIVE
SARS-COV-2 RDRP RESP QL NAA+PROBE: NEGATIVE
SPECIMEN SOURCE: ABNORMAL

## 2021-12-14 PROCEDURE — 25000003 PHARM REV CODE 250: Mod: HCNC | Performed by: EMERGENCY MEDICINE

## 2021-12-14 PROCEDURE — 99284 EMERGENCY DEPT VISIT MOD MDM: CPT | Mod: 25,HCNC

## 2021-12-14 PROCEDURE — U0002 COVID-19 LAB TEST NON-CDC: HCPCS | Mod: HCNC | Performed by: EMERGENCY MEDICINE

## 2021-12-14 PROCEDURE — 63600175 PHARM REV CODE 636 W HCPCS: Mod: HCNC | Performed by: EMERGENCY MEDICINE

## 2021-12-14 PROCEDURE — 96372 THER/PROPH/DIAG INJ SC/IM: CPT | Mod: 59,HCNC

## 2021-12-14 PROCEDURE — 87502 INFLUENZA DNA AMP PROBE: CPT | Mod: HCNC | Performed by: EMERGENCY MEDICINE

## 2021-12-14 RX ORDER — KETOROLAC TROMETHAMINE 30 MG/ML
30 INJECTION, SOLUTION INTRAMUSCULAR; INTRAVENOUS
Status: COMPLETED | OUTPATIENT
Start: 2021-12-14 | End: 2021-12-14

## 2021-12-14 RX ORDER — ACETAMINOPHEN 325 MG/1
650 TABLET ORAL
Status: COMPLETED | OUTPATIENT
Start: 2021-12-14 | End: 2021-12-14

## 2021-12-14 RX ORDER — BENZONATATE 100 MG/1
100 CAPSULE ORAL EVERY 8 HOURS PRN
Qty: 14 CAPSULE | Refills: 0 | Status: SHIPPED | OUTPATIENT
Start: 2021-12-14 | End: 2021-12-24

## 2021-12-14 RX ORDER — BUTALBITAL, ACETAMINOPHEN AND CAFFEINE 50; 325; 40 MG/1; MG/1; MG/1
1 TABLET ORAL EVERY 6 HOURS PRN
Qty: 14 TABLET | Refills: 0 | Status: SHIPPED | OUTPATIENT
Start: 2021-12-14 | End: 2022-01-13

## 2021-12-14 RX ORDER — ONDANSETRON 4 MG/1
4 TABLET, ORALLY DISINTEGRATING ORAL EVERY 6 HOURS PRN
Qty: 10 TABLET | Refills: 0 | Status: SHIPPED | OUTPATIENT
Start: 2021-12-14 | End: 2022-04-19

## 2021-12-14 RX ORDER — PROCHLORPERAZINE EDISYLATE 5 MG/ML
10 INJECTION INTRAMUSCULAR; INTRAVENOUS
Status: COMPLETED | OUTPATIENT
Start: 2021-12-14 | End: 2021-12-14

## 2021-12-14 RX ADMIN — ACETAMINOPHEN 650 MG: 325 TABLET ORAL at 08:12

## 2021-12-14 RX ADMIN — PROCHLORPERAZINE EDISYLATE 10 MG: 5 INJECTION INTRAMUSCULAR; INTRAVENOUS at 08:12

## 2021-12-14 RX ADMIN — KETOROLAC TROMETHAMINE 30 MG: 30 INJECTION, SOLUTION INTRAMUSCULAR; INTRAVENOUS at 08:12

## 2021-12-15 ENCOUNTER — TELEPHONE (OUTPATIENT)
Dept: INTERNAL MEDICINE | Facility: CLINIC | Age: 71
End: 2021-12-15

## 2021-12-15 RX ORDER — OSELTAMIVIR PHOSPHATE 75 MG/1
75 CAPSULE ORAL 2 TIMES DAILY
Qty: 10 CAPSULE | Refills: 0 | Status: SHIPPED | OUTPATIENT
Start: 2021-12-15 | End: 2021-12-20

## 2021-12-15 NOTE — TELEPHONE ENCOUNTER
The patient's influenza test was positive from the ER visit yesterday.  The antiviral medication Tamiflu is also recommended for treatment. A new prescription order will be sent to her pharmacy.

## 2022-01-04 ENCOUNTER — TELEPHONE (OUTPATIENT)
Dept: INTERNAL MEDICINE | Facility: CLINIC | Age: 72
End: 2022-01-04
Payer: MEDICARE

## 2022-01-04 RX ORDER — AMOXICILLIN AND CLAVULANATE POTASSIUM 875; 125 MG/1; MG/1
1 TABLET, FILM COATED ORAL EVERY 12 HOURS
Qty: 20 TABLET | Refills: 0 | Status: SHIPPED | OUTPATIENT
Start: 2022-01-04 | End: 2022-04-19

## 2022-01-04 NOTE — TELEPHONE ENCOUNTER
with covid 2020 so concerned with mucus building cough.    Was screened at er  and covid neg.   Having hard time getting well.  No fever.   No new symptoms except Phlegm is yellow now & Noticed some wheezing and is using albuterol and is helping that.  Doing mucinex one to twice daily and hot tea.  Doing flonase and zyrtec  and saline spray daily.  Has spiriva .    Been careful with masking. Most of her family has been vaccinated.     antib to [facility]. Ok?

## 2022-01-04 NOTE — TELEPHONE ENCOUNTER
----- Message from Elenita Mcintosh sent at 1/4/2022 12:09 PM CST -----  Contact: [phone]  Patient would like to get medical advice.  Symptoms (please be specific):  cough , mucus build up   How long have you had these symptoms: x1 month   Would you like a call back, or a response through your [facility] portal?:  call  Pharmacy name and phone # (copy from chart):  [facility]  Comments:

## 2022-01-04 NOTE — TELEPHONE ENCOUNTER
A prescription order for Augmentin has been set to the patient's [facility] pharmacy.

## 2022-01-04 NOTE — TELEPHONE ENCOUNTER
----- Message from Elenita Mcintosh sent at 1/4/2022 12:10 PM CST -----  Contact: [phone]  Patient would like to get medical advice.  Symptoms (please be specific):  cough, mucus build up   How long have you had these symptoms: x1 month   Would you like a call back, or a response through your [facility] portal?:  call  Pharmacy name and phone # (copy from chart):    [id number] - [address] [place] [address] [place] [id number]  Phone: [phone] Fax: [phone]

## 2022-01-05 NOTE — TELEPHONE ENCOUNTER
Left msg antib. Sent pharmacy. Continue over the counter meds and inhaler as need and call if any other advice needed.

## 2022-01-07 ENCOUNTER — LAB VISIT (OUTPATIENT)
Dept: PRIMARY CARE CLINIC | Facility: CLINIC | Age: 72
End: 2022-01-07
Payer: MEDICARE

## 2022-01-07 DIAGNOSIS — Z20.822 CONTACT WITH AND (SUSPECTED) EXPOSURE TO COVID-19: ICD-10-CM

## 2022-01-07 LAB
CTP QC/QA: YES
SARS-COV-2 AG RESP QL IA.RAPID: NEGATIVE

## 2022-01-07 PROCEDURE — 87811 SARS-COV-2 COVID19 W/OPTIC: CPT | Mod: HCNC

## 2022-01-14 ENCOUNTER — TELEPHONE (OUTPATIENT)
Dept: OTOLARYNGOLOGY | Facility: CLINIC | Age: 72
End: 2022-01-14
Payer: MEDICARE

## 2022-01-15 DIAGNOSIS — R06.02 SOB (SHORTNESS OF BREATH): ICD-10-CM

## 2022-01-15 NOTE — TELEPHONE ENCOUNTER
No new care gaps identified.  Powered by [employer]. Reference number: [id number].   1/15/2022 12:07:35 AM CST

## 2022-01-20 RX ORDER — ALBUTEROL SULFATE 90 UG/1
AEROSOL, METERED RESPIRATORY (INHALATION)
Qty: 54 G | Refills: 2 | Status: SHIPPED | OUTPATIENT
Start: 2022-01-20 | End: 2023-01-19 | Stop reason: SDUPTHER

## 2022-01-20 NOTE — TELEPHONE ENCOUNTER
Refill Routing Note   Medication(s) are not appropriate for processing by [facility] for the following reason(s):      - Patient has been seen in the Emergency Room/Department since the last PCP visit    [facility] action(s):  Defer       Medication Therapy Plan: Recent ED visit for Influenza (12/14/21); Defer to PCP  --->Care Gap information included in message below if applicable.   Medication reconciliation completed: No   Automatic Epic Generated Protocol Data:        Requested Prescriptions   Pending Prescriptions Disp Refills    albuterol (PROVENTIL/VENTOLIN HFA) 90 mcg/actuation inhaler [Pharmacy Med Name: ALBUTEROL HFA (VENTOLIN) INH] 54 g 2     Sig: INHALE 2 PUFFS INTO THE LUNGS EVERY 6 (SIX) HOURS AS NEEDED FOR WHEEZING       Pulmonology:  Beta Agonists Passed - 1/15/2022 12:06 AM        Passed - Patient is at least 18 years old        Passed - Last Heart Rate in normal range within 360 days     Pulse Readings from Last 1 Encounters:   12/14/21 70              Passed - Valid encounter within last 15 months     Recent Visits  Date Type Provider Dept   08/24/21 Office Visit Joo Munoz MD [facility]   04/20/21 Office Visit Joo Munoz MD [facility]   12/15/20 Office Visit Joo Munoz MD [facility]   09/11/20 Office Visit Joo Munoz MD [facility]   Showing recent visits within past 720 days and meeting all other requirements  Future Appointments  No visits were found meeting these conditions.  Showing future appointments within next 150 days and meeting all other requirements                      Appointments  past 12m or future 3m with PCP    Date Provider   Last Visit   8/24/2021 Joo Munoz MD   Next Visit   Visit date not found Joo Munoz MD   ED visits in past 90 days: 1        Note composed:12:04 PM 01/20/2022

## 2022-02-16 ENCOUNTER — PATIENT MESSAGE (OUTPATIENT)
Dept: RESEARCH | Facility: HOSPITAL | Age: 72
End: 2022-02-16
Payer: MEDICARE

## 2022-02-22 ENCOUNTER — OFFICE VISIT (OUTPATIENT)
Dept: OTOLARYNGOLOGY | Facility: CLINIC | Age: 72
End: 2022-02-22
Payer: MEDICARE

## 2022-02-22 VITALS
TEMPERATURE: 98 F | RESPIRATION RATE: 12 BRPM | DIASTOLIC BLOOD PRESSURE: 69 MMHG | WEIGHT: 172.31 LBS | HEART RATE: 97 BPM | OXYGEN SATURATION: 95 % | BODY MASS INDEX: 31.71 KG/M2 | SYSTOLIC BLOOD PRESSURE: 121 MMHG | HEIGHT: 62 IN

## 2022-02-22 DIAGNOSIS — D18.09 HEMANGIOMA OF OTHER SITES: Primary | ICD-10-CM

## 2022-02-22 PROCEDURE — 3078F DIAST BP <80 MM HG: CPT | Mod: CPTII,S$GLB,, | Performed by: OTOLARYNGOLOGY

## 2022-02-22 PROCEDURE — 1126F PR PAIN SEVERITY QUANTIFIED, NO PAIN PRESENT: ICD-10-PCS | Mod: CPTII,S$GLB,, | Performed by: OTOLARYNGOLOGY

## 2022-02-22 PROCEDURE — 1101F PT FALLS ASSESS-DOCD LE1/YR: CPT | Mod: CPTII,S$GLB,, | Performed by: OTOLARYNGOLOGY

## 2022-02-22 PROCEDURE — 1101F PR PT FALLS ASSESS DOC 0-1 FALLS W/OUT INJ PAST YR: ICD-10-PCS | Mod: CPTII,S$GLB,, | Performed by: OTOLARYNGOLOGY

## 2022-02-22 PROCEDURE — 3288F FALL RISK ASSESSMENT DOCD: CPT | Mod: CPTII,S$GLB,, | Performed by: OTOLARYNGOLOGY

## 2022-02-22 PROCEDURE — 3074F SYST BP LT 130 MM HG: CPT | Mod: CPTII,S$GLB,, | Performed by: OTOLARYNGOLOGY

## 2022-02-22 PROCEDURE — 3074F PR MOST RECENT SYSTOLIC BLOOD PRESSURE < 130 MM HG: ICD-10-PCS | Mod: CPTII,S$GLB,, | Performed by: OTOLARYNGOLOGY

## 2022-02-22 PROCEDURE — 1160F PR REVIEW ALL MEDS BY PRESCRIBER/CLIN PHARMACIST DOCUMENTED: ICD-10-PCS | Mod: CPTII,S$GLB,, | Performed by: OTOLARYNGOLOGY

## 2022-02-22 PROCEDURE — 99024 PR POST-OP FOLLOW-UP VISIT: ICD-10-PCS | Mod: S$GLB,,, | Performed by: OTOLARYNGOLOGY

## 2022-02-22 PROCEDURE — 99024 POSTOP FOLLOW-UP VISIT: CPT | Mod: S$GLB,,, | Performed by: OTOLARYNGOLOGY

## 2022-02-22 PROCEDURE — 3008F BODY MASS INDEX DOCD: CPT | Mod: CPTII,S$GLB,, | Performed by: OTOLARYNGOLOGY

## 2022-02-22 PROCEDURE — 1159F MED LIST DOCD IN RCRD: CPT | Mod: CPTII,S$GLB,, | Performed by: OTOLARYNGOLOGY

## 2022-02-22 PROCEDURE — 3078F PR MOST RECENT DIASTOLIC BLOOD PRESSURE < 80 MM HG: ICD-10-PCS | Mod: CPTII,S$GLB,, | Performed by: OTOLARYNGOLOGY

## 2022-02-22 PROCEDURE — 1159F PR MEDICATION LIST DOCUMENTED IN MEDICAL RECORD: ICD-10-PCS | Mod: CPTII,S$GLB,, | Performed by: OTOLARYNGOLOGY

## 2022-02-22 PROCEDURE — 1160F RVW MEDS BY RX/DR IN RCRD: CPT | Mod: CPTII,S$GLB,, | Performed by: OTOLARYNGOLOGY

## 2022-02-22 PROCEDURE — 1126F AMNT PAIN NOTED NONE PRSNT: CPT | Mod: CPTII,S$GLB,, | Performed by: OTOLARYNGOLOGY

## 2022-02-22 PROCEDURE — 3288F PR FALLS RISK ASSESSMENT DOCUMENTED: ICD-10-PCS | Mod: CPTII,S$GLB,, | Performed by: OTOLARYNGOLOGY

## 2022-02-22 PROCEDURE — 3008F PR BODY MASS INDEX (BMI) DOCUMENTED: ICD-10-PCS | Mod: CPTII,S$GLB,, | Performed by: OTOLARYNGOLOGY

## 2022-02-22 NOTE — PROGRESS NOTES
Subjective:     Chief Complaint: postop     Jojo Burrows is a 72 y.o. female who present for postoperative visit.     Patient underwent right upper lip mass removal on 11/26/21.   Since then, she has been doing well.  She did have some bruising and swelling but this has been improving with time.       Past Medical History  She has a past medical history of Acute hypoxemic respiratory failure, ALLERGIC RHINITIS, Cataract, COVID-19 virus infection, Degenerative disc disease, cervical, GERD (gastroesophageal reflux disease), Hyperlipidemia, Hypertension, Migraine headache, Multifocal pneumonia, Nuclear sclerosis, Sleep disorder, Thyroid disease, and TIA (transient ischemic attack).    Past Surgical History  She has a past surgical history that includes Hysterectomy; Colonoscopy (N/A, 4/29/2016); Breast biopsy; Breast cyst excision; Breast cyst aspiration; and Colonoscopy (N/A, 6/28/2021).    Family History  Her family history includes Asthma in her father; Breast cancer in her mother and sister; Diabetes in her mother; Glaucoma in her father; Heart disease in her maternal grandmother; Hypertension in her mother; No Known Problems in her daughter and son; Ovarian cancer in her sister.    Social History  She reports that she has never smoked. She has never used smokeless tobacco. She reports that she does not drink alcohol and does not use drugs.    Allergies  She is allergic to iodine, iodine and iodide containing products, and shellfish containing products.    Medications  She has a current medication list which includes the following prescription(s): acetaminophen, albuterol, amoxicillin-clavulanate 875-125mg, ascorbic acid (vitamin c), atorvastatin, celecoxib, clotrimazole-betamethasone 1-0.05%, cyanocobalamin, diclofenac sodium, ergocalciferol (vitamin d2), esomeprazole, folic acid/multivit-min/lutein, hydroxyzine hcl, meclizine, metoprolol succinate, ondansetron, sars-cov-2 (covid-19), calcium carbonate,  "levocetirizine, tiotropium, and tolterodine.       Objective:     /69 (BP Location: Right arm, Patient Position: Sitting, BP Method: Large (Automatic))   Pulse 97   Temp 98.4 °F (36.9 °C) (Temporal)   Resp 12   Ht 5' 2" (1.575 m)   Wt 78.2 kg (172 lb 4.8 oz)   SpO2 95%   BMI 31.51 kg/m²      Constitutional:   Vital signs are normal. She appears well-developed and well-nourished.     Mouth/Throat               Procedure    None    Data Reviewed   RELIAPATH DIAGNOSIS:   RIGHT UPPER LIP MASS:   -Intramuscular hemangioma, benign.       Assessment:     1. Hemangioma of other sites       s/p removal on 11/26/21  Plan:     Patient is doing well.  Discussed gentle massage a few times a day as the area continues to heal.  She will notify me with any concerns in the future.  All questions answered and patient was encouraged call with any questions or concerns.        "

## 2022-04-19 ENCOUNTER — OFFICE VISIT (OUTPATIENT)
Dept: INTERNAL MEDICINE | Facility: CLINIC | Age: 72
End: 2022-04-19
Payer: MEDICARE

## 2022-04-19 ENCOUNTER — HOSPITAL ENCOUNTER (OUTPATIENT)
Dept: RADIOLOGY | Facility: HOSPITAL | Age: 72
Discharge: HOME | End: 2022-04-19
Attending: INTERNAL MEDICINE
Payer: MEDICARE

## 2022-04-19 VITALS
WEIGHT: 172.63 LBS | BODY MASS INDEX: 31.77 KG/M2 | TEMPERATURE: 98 F | HEART RATE: 80 BPM | HEIGHT: 62 IN | OXYGEN SATURATION: 95 % | SYSTOLIC BLOOD PRESSURE: 110 MMHG | DIASTOLIC BLOOD PRESSURE: 80 MMHG

## 2022-04-19 DIAGNOSIS — R10.9 RIGHT FLANK PAIN: ICD-10-CM

## 2022-04-19 DIAGNOSIS — R10.13 EPIGASTRIC PAIN: ICD-10-CM

## 2022-04-19 DIAGNOSIS — M54.6 ACUTE RIGHT-SIDED THORACIC BACK PAIN: ICD-10-CM

## 2022-04-19 DIAGNOSIS — M54.9 MUSCULOSKELETAL BACK PAIN: ICD-10-CM

## 2022-04-19 DIAGNOSIS — M54.6 ACUTE RIGHT-SIDED THORACIC BACK PAIN: Primary | ICD-10-CM

## 2022-04-19 PROCEDURE — 3079F PR MOST RECENT DIASTOLIC BLOOD PRESSURE 80-89 MM HG: ICD-10-PCS | Mod: CPTII,S$GLB,, | Performed by: INTERNAL MEDICINE

## 2022-04-19 PROCEDURE — 99214 PR OFFICE/OUTPT VISIT, EST, LEVL IV, 30-39 MIN: ICD-10-PCS | Mod: S$GLB,,, | Performed by: INTERNAL MEDICINE

## 2022-04-19 PROCEDURE — 99999 PR PBB SHADOW E&M-EST. PATIENT-LVL V: ICD-10-PCS | Mod: PBBFAC,,, | Performed by: INTERNAL MEDICINE

## 2022-04-19 PROCEDURE — 1160F RVW MEDS BY RX/DR IN RCRD: CPT | Mod: CPTII,S$GLB,, | Performed by: INTERNAL MEDICINE

## 2022-04-19 PROCEDURE — 72070 XR THORACIC SPINE AP LATERAL: ICD-10-PCS | Mod: 26,,, | Performed by: RADIOLOGY

## 2022-04-19 PROCEDURE — 3074F PR MOST RECENT SYSTOLIC BLOOD PRESSURE < 130 MM HG: ICD-10-PCS | Mod: CPTII,S$GLB,, | Performed by: INTERNAL MEDICINE

## 2022-04-19 PROCEDURE — 72070 X-RAY EXAM THORAC SPINE 2VWS: CPT | Mod: TC,PO

## 2022-04-19 PROCEDURE — 1126F PR PAIN SEVERITY QUANTIFIED, NO PAIN PRESENT: ICD-10-PCS | Mod: CPTII,S$GLB,, | Performed by: INTERNAL MEDICINE

## 2022-04-19 PROCEDURE — 1159F MED LIST DOCD IN RCRD: CPT | Mod: CPTII,S$GLB,, | Performed by: INTERNAL MEDICINE

## 2022-04-19 PROCEDURE — 3288F FALL RISK ASSESSMENT DOCD: CPT | Mod: CPTII,S$GLB,, | Performed by: INTERNAL MEDICINE

## 2022-04-19 PROCEDURE — 99999 PR PBB SHADOW E&M-EST. PATIENT-LVL V: CPT | Mod: PBBFAC,,, | Performed by: INTERNAL MEDICINE

## 2022-04-19 PROCEDURE — 1101F PR PT FALLS ASSESS DOC 0-1 FALLS W/OUT INJ PAST YR: ICD-10-PCS | Mod: CPTII,S$GLB,, | Performed by: INTERNAL MEDICINE

## 2022-04-19 PROCEDURE — 3008F PR BODY MASS INDEX (BMI) DOCUMENTED: ICD-10-PCS | Mod: CPTII,S$GLB,, | Performed by: INTERNAL MEDICINE

## 2022-04-19 PROCEDURE — 72070 X-RAY EXAM THORAC SPINE 2VWS: CPT | Mod: 26,,, | Performed by: RADIOLOGY

## 2022-04-19 PROCEDURE — 3008F BODY MASS INDEX DOCD: CPT | Mod: CPTII,S$GLB,, | Performed by: INTERNAL MEDICINE

## 2022-04-19 PROCEDURE — 3074F SYST BP LT 130 MM HG: CPT | Mod: CPTII,S$GLB,, | Performed by: INTERNAL MEDICINE

## 2022-04-19 PROCEDURE — 1159F PR MEDICATION LIST DOCUMENTED IN MEDICAL RECORD: ICD-10-PCS | Mod: CPTII,S$GLB,, | Performed by: INTERNAL MEDICINE

## 2022-04-19 PROCEDURE — 1101F PT FALLS ASSESS-DOCD LE1/YR: CPT | Mod: CPTII,S$GLB,, | Performed by: INTERNAL MEDICINE

## 2022-04-19 PROCEDURE — 3288F PR FALLS RISK ASSESSMENT DOCUMENTED: ICD-10-PCS | Mod: CPTII,S$GLB,, | Performed by: INTERNAL MEDICINE

## 2022-04-19 PROCEDURE — 1126F AMNT PAIN NOTED NONE PRSNT: CPT | Mod: CPTII,S$GLB,, | Performed by: INTERNAL MEDICINE

## 2022-04-19 PROCEDURE — 3079F DIAST BP 80-89 MM HG: CPT | Mod: CPTII,S$GLB,, | Performed by: INTERNAL MEDICINE

## 2022-04-19 PROCEDURE — 1160F PR REVIEW ALL MEDS BY PRESCRIBER/CLIN PHARMACIST DOCUMENTED: ICD-10-PCS | Mod: CPTII,S$GLB,, | Performed by: INTERNAL MEDICINE

## 2022-04-19 PROCEDURE — 99214 OFFICE O/P EST MOD 30 MIN: CPT | Mod: S$GLB,,, | Performed by: INTERNAL MEDICINE

## 2022-04-19 RX ORDER — KETOROLAC TROMETHAMINE 30 MG/ML
INJECTION, SOLUTION INTRAMUSCULAR; INTRAVENOUS
COMMUNITY
Start: 2021-12-14 | End: 2022-04-19

## 2022-04-19 RX ORDER — PROCHLORPERAZINE EDISYLATE 5 MG/ML
INJECTION INTRAMUSCULAR; INTRAVENOUS
COMMUNITY
Start: 2021-12-14 | End: 2022-06-27

## 2022-04-19 RX ORDER — AMOXICILLIN 500 MG/1
CAPSULE ORAL
COMMUNITY
Start: 2022-01-04 | End: 2022-06-27

## 2022-04-19 RX ORDER — METHOCARBAMOL 500 MG/1
500 TABLET, FILM COATED ORAL NIGHTLY PRN
Qty: 20 TABLET | Refills: 0 | Status: SHIPPED | OUTPATIENT
Start: 2022-04-19 | End: 2022-09-05

## 2022-04-19 NOTE — PROGRESS NOTES
Subjective:     Jojo Burrows is a 72 y.o. female who presents for   Chief Complaint   Patient presents with    Back Pain    Abdominal Pain       Back Pain  This is a new problem. The current episode started more than 1 month ago. The problem occurs intermittently. The problem has been waxing and waning since onset. The pain is present in the thoracic spine. The quality of the pain is described as aching. The pain does not radiate. The pain is moderate. The symptoms are aggravated by position. Associated symptoms include abdominal pain (right of umbilicus, occurs after eating) and numbness. Pertinent negatives include no chest pain, dysuria, fever, headaches, paresthesias, tingling or weakness. She has tried nothing for the symptoms.   Abdominal Pain  This is a new problem. The current episode started 1 to 4 weeks ago. The problem occurs intermittently. The problem has been waxing and waning. The pain is located in the periumbilical region. The pain is moderate. The quality of the pain is aching. Pertinent negatives include no arthralgias, constipation, diarrhea, dysuria, fever, frequency, headaches, myalgias, nausea or vomiting. The pain is relieved by nothing. She has tried nothing for the symptoms. Her past medical history is significant for abdominal surgery. There is no history of irritable bowel syndrome.         Review of Systems   Constitutional: Negative for chills, diaphoresis and fever.   HENT: Negative for congestion, ear pain and sinus pressure.    Eyes: Negative for discharge and redness.   Respiratory: Negative for cough and shortness of breath.    Cardiovascular: Negative for chest pain, palpitations and leg swelling.   Gastrointestinal: Positive for abdominal pain (right of umbilicus, occurs after eating). Negative for blood in stool, constipation, diarrhea, nausea and vomiting.   Genitourinary: Positive for flank pain. Negative for dysuria, frequency and urgency.   Musculoskeletal: Positive  for back pain. Negative for arthralgias and myalgias.   Skin: Negative for rash and wound.   Neurological: Positive for numbness. Negative for dizziness, tingling, tremors, weakness, headaches and paresthesias.       Answers for HPI/ROS submitted by the patient on 4/19/2022  Chronicity: new  Onset: 1 to 4 weeks ago  Frequency: daily  Progression since onset: gradually improving  Pain location: lumbar spine  Pain quality: aching  Radiates to: does not radiate  Pain - numeric: 5/10  Pain is: worse during the night  Aggravated by: position, lying down, twisting  Stiffness is present: in the morning, at night  abdominal pain: Yes  bladder incontinence: No  chest pain: No  numbness: Yes  paresthesias: Yes  Pain severity: moderate  Improvement on treatment: mild        Objective:     Physical Exam  Vitals reviewed.   Constitutional:       General: She is awake. She is not in acute distress.     Appearance: Normal appearance. She is well-developed and well-groomed.   HENT:      Head: Normocephalic and atraumatic.      Right Ear: Hearing and external ear normal.      Left Ear: Hearing and external ear normal.      Nose: Nose normal.   Eyes:      General: Lids are normal. Vision grossly intact.   Cardiovascular:      Rate and Rhythm: Normal rate and regular rhythm.      Heart sounds: Normal heart sounds. No murmur heard.  Pulmonary:      Effort: Pulmonary effort is normal.      Breath sounds: Normal breath sounds. No decreased breath sounds or wheezing.   Abdominal:      General: Bowel sounds are normal. There is no distension.      Palpations: There is no mass.      Tenderness: There is abdominal tenderness in the epigastric area. There is no guarding or rebound.   Musculoskeletal:         General: Normal range of motion.      Right lower leg: No edema.      Left lower leg: No edema.   Skin:     General: Skin is warm and dry.      Findings: No lesion or rash.   Neurological:      Mental Status: She is alert and oriented to  person, place, and time.   Psychiatric:         Attention and Perception: Attention normal.         Mood and Affect: Mood normal.         Behavior: Behavior is cooperative.            Assessment:      1. Acute right-sided thoracic back pain    2. Right flank pain    3. Musculoskeletal back pain    4. Epigastric pain           Plan:     1. Acute right-sided thoracic back pain  - X-Ray Thoracic Spine AP Lateral; Future    2. Right flank pain  - CBC Auto Differential; Future  - Comprehensive Metabolic Panel; Future  - Urinalysis; Future  - Urine culture; Future    3. Musculoskeletal back pain  - X-Ray Thoracic Spine AP Lateral; Future    4. Epigastric pain  - Lipase; Future      Call if there is no improvement in symptoms    __________________________    Myrtle Harmon MD, PharmD  [facility]- Internal Medicine  American Board of Obesity Medicine diplomate  Office [phone]

## 2022-05-12 ENCOUNTER — IMMUNIZATION (OUTPATIENT)
Dept: PRIMARY CARE CLINIC | Facility: CLINIC | Age: 72
End: 2022-05-12
Payer: MEDICARE

## 2022-05-12 DIAGNOSIS — Z23 NEED FOR VACCINATION: Primary | ICD-10-CM

## 2022-05-12 PROCEDURE — 0064A COVID-19, MRNA, LNP-S, PF, 100 MCG/0.25 ML DOSE VACCINE (MODERNA BOOSTER): CPT | Mod: CV19,PBBFAC | Performed by: INTERNAL MEDICINE

## 2022-05-18 ENCOUNTER — PATIENT MESSAGE (OUTPATIENT)
Dept: INTERNAL MEDICINE | Facility: CLINIC | Age: 72
End: 2022-05-18
Payer: MEDICARE

## 2022-05-18 DIAGNOSIS — Z00.00 ANNUAL PHYSICAL EXAM: Primary | ICD-10-CM

## 2022-05-18 DIAGNOSIS — Z12.31 ENCOUNTER FOR SCREENING MAMMOGRAM FOR MALIGNANT NEOPLASM OF BREAST: ICD-10-CM

## 2022-05-18 DIAGNOSIS — E55.9 VITAMIN D DEFICIENCY: ICD-10-CM

## 2022-05-18 DIAGNOSIS — E04.2 MULTIPLE THYROID NODULES: ICD-10-CM

## 2022-05-18 DIAGNOSIS — I10 ESSENTIAL HYPERTENSION: ICD-10-CM

## 2022-06-24 ENCOUNTER — LAB VISIT (OUTPATIENT)
Dept: LAB | Facility: HOSPITAL | Age: 72
End: 2022-06-24
Attending: INTERNAL MEDICINE
Payer: MEDICARE

## 2022-06-24 DIAGNOSIS — E04.2 MULTIPLE THYROID NODULES: ICD-10-CM

## 2022-06-24 DIAGNOSIS — I10 ESSENTIAL HYPERTENSION: ICD-10-CM

## 2022-06-24 DIAGNOSIS — E55.9 VITAMIN D DEFICIENCY: ICD-10-CM

## 2022-06-24 DIAGNOSIS — Z00.00 ANNUAL PHYSICAL EXAM: ICD-10-CM

## 2022-06-24 LAB
25(OH)D3+25(OH)D2 SERPL-MCNC: 24 NG/ML (ref 30–96)
ALBUMIN SERPL BCP-MCNC: 4 G/DL (ref 3.5–5.2)
ALP SERPL-CCNC: 85 U/L (ref 55–135)
ALT SERPL W/O P-5'-P-CCNC: 17 U/L (ref 10–44)
ANION GAP SERPL CALC-SCNC: 9 MMOL/L (ref 8–16)
AST SERPL-CCNC: 19 U/L (ref 10–40)
BASOPHILS # BLD AUTO: 0.02 K/UL (ref 0–0.2)
BASOPHILS NFR BLD: 0.3 % (ref 0–1.9)
BILIRUB SERPL-MCNC: 0.6 MG/DL (ref 0.1–1)
BUN SERPL-MCNC: 10 MG/DL (ref 8–23)
CALCIUM SERPL-MCNC: 9.5 MG/DL (ref 8.7–10.5)
CHLORIDE SERPL-SCNC: 105 MMOL/L (ref 95–110)
CHOLEST SERPL-MCNC: 161 MG/DL (ref 120–199)
CHOLEST/HDLC SERPL: 2.4 {RATIO} (ref 2–5)
CO2 SERPL-SCNC: 25 MMOL/L (ref 23–29)
CREAT SERPL-MCNC: 0.8 MG/DL (ref 0.5–1.4)
DIFFERENTIAL METHOD: NORMAL
EOSINOPHIL # BLD AUTO: 0 K/UL (ref 0–0.5)
EOSINOPHIL NFR BLD: 0.6 % (ref 0–8)
ERYTHROCYTE [DISTWIDTH] IN BLOOD BY AUTOMATED COUNT: 14.1 % (ref 11.5–14.5)
EST. GFR  (AFRICAN AMERICAN): >60 ML/MIN/1.73 M^2
EST. GFR  (NON AFRICAN AMERICAN): >60 ML/MIN/1.73 M^2
ESTIMATED AVG GLUCOSE: 111 MG/DL (ref 68–131)
GLUCOSE SERPL-MCNC: 91 MG/DL (ref 70–110)
HBA1C MFR BLD: 5.5 % (ref 4–5.6)
HCT VFR BLD AUTO: 38.6 % (ref 37–48.5)
HDLC SERPL-MCNC: 67 MG/DL (ref 40–75)
HDLC SERPL: 41.6 % (ref 20–50)
HGB BLD-MCNC: 12.5 G/DL (ref 12–16)
IMM GRANULOCYTES # BLD AUTO: 0.02 K/UL (ref 0–0.04)
IMM GRANULOCYTES NFR BLD AUTO: 0.3 % (ref 0–0.5)
LDLC SERPL CALC-MCNC: 86 MG/DL (ref 63–159)
LYMPHOCYTES # BLD AUTO: 2.2 K/UL (ref 1–4.8)
LYMPHOCYTES NFR BLD: 34.4 % (ref 18–48)
MCH RBC QN AUTO: 29.6 PG (ref 27–31)
MCHC RBC AUTO-ENTMCNC: 32.4 G/DL (ref 32–36)
MCV RBC AUTO: 92 FL (ref 82–98)
MONOCYTES # BLD AUTO: 0.7 K/UL (ref 0.3–1)
MONOCYTES NFR BLD: 10.2 % (ref 4–15)
NEUTROPHILS # BLD AUTO: 3.5 K/UL (ref 1.8–7.7)
NEUTROPHILS NFR BLD: 54.2 % (ref 38–73)
NONHDLC SERPL-MCNC: 94 MG/DL
NRBC BLD-RTO: 0 /100 WBC
PLATELET # BLD AUTO: 184 K/UL (ref 150–450)
PMV BLD AUTO: 12.3 FL (ref 9.2–12.9)
POTASSIUM SERPL-SCNC: 4 MMOL/L (ref 3.5–5.1)
PROT SERPL-MCNC: 7.3 G/DL (ref 6–8.4)
RBC # BLD AUTO: 4.22 M/UL (ref 4–5.4)
SODIUM SERPL-SCNC: 139 MMOL/L (ref 136–145)
T4 FREE SERPL-MCNC: 0.87 NG/DL (ref 0.71–1.51)
TRIGL SERPL-MCNC: 40 MG/DL (ref 30–150)
TSH SERPL DL<=0.005 MIU/L-ACNC: 1.32 UIU/ML (ref 0.4–4)
WBC # BLD AUTO: 6.49 K/UL (ref 3.9–12.7)

## 2022-06-24 PROCEDURE — 84439 ASSAY OF FREE THYROXINE: CPT | Performed by: INTERNAL MEDICINE

## 2022-06-24 PROCEDURE — 85025 COMPLETE CBC W/AUTO DIFF WBC: CPT | Performed by: INTERNAL MEDICINE

## 2022-06-24 PROCEDURE — 80061 LIPID PANEL: CPT | Performed by: INTERNAL MEDICINE

## 2022-06-24 PROCEDURE — 83036 HEMOGLOBIN GLYCOSYLATED A1C: CPT | Performed by: INTERNAL MEDICINE

## 2022-06-24 PROCEDURE — 84443 ASSAY THYROID STIM HORMONE: CPT | Performed by: INTERNAL MEDICINE

## 2022-06-24 PROCEDURE — 36415 COLL VENOUS BLD VENIPUNCTURE: CPT | Mod: PO | Performed by: INTERNAL MEDICINE

## 2022-06-24 PROCEDURE — 80053 COMPREHEN METABOLIC PANEL: CPT | Performed by: INTERNAL MEDICINE

## 2022-06-24 PROCEDURE — 82306 VITAMIN D 25 HYDROXY: CPT | Performed by: INTERNAL MEDICINE

## 2022-06-27 ENCOUNTER — OFFICE VISIT (OUTPATIENT)
Dept: INTERNAL MEDICINE | Facility: CLINIC | Age: 72
End: 2022-06-27
Payer: MEDICARE

## 2022-06-27 VITALS
DIASTOLIC BLOOD PRESSURE: 72 MMHG | HEIGHT: 62 IN | HEART RATE: 66 BPM | TEMPERATURE: 98 F | SYSTOLIC BLOOD PRESSURE: 116 MMHG | BODY MASS INDEX: 31.48 KG/M2 | RESPIRATION RATE: 16 BRPM | WEIGHT: 171.06 LBS | OXYGEN SATURATION: 97 %

## 2022-06-27 DIAGNOSIS — E78.49 OTHER HYPERLIPIDEMIA: ICD-10-CM

## 2022-06-27 DIAGNOSIS — E55.9 VITAMIN D DEFICIENCY: ICD-10-CM

## 2022-06-27 DIAGNOSIS — E04.2 MULTIPLE THYROID NODULES: ICD-10-CM

## 2022-06-27 DIAGNOSIS — Z00.00 WELL ADULT EXAM: Primary | ICD-10-CM

## 2022-06-27 DIAGNOSIS — M17.0 PRIMARY OSTEOARTHRITIS OF BOTH KNEES: ICD-10-CM

## 2022-06-27 DIAGNOSIS — I10 ESSENTIAL HYPERTENSION: ICD-10-CM

## 2022-06-27 PROCEDURE — 1160F RVW MEDS BY RX/DR IN RCRD: CPT | Mod: CPTII,S$GLB,, | Performed by: INTERNAL MEDICINE

## 2022-06-27 PROCEDURE — 99397 PR PREVENTIVE VISIT,EST,65 & OVER: ICD-10-PCS | Mod: S$GLB,,, | Performed by: INTERNAL MEDICINE

## 2022-06-27 PROCEDURE — 1126F AMNT PAIN NOTED NONE PRSNT: CPT | Mod: CPTII,S$GLB,, | Performed by: INTERNAL MEDICINE

## 2022-06-27 PROCEDURE — 3078F PR MOST RECENT DIASTOLIC BLOOD PRESSURE < 80 MM HG: ICD-10-PCS | Mod: CPTII,S$GLB,, | Performed by: INTERNAL MEDICINE

## 2022-06-27 PROCEDURE — 1160F PR REVIEW ALL MEDS BY PRESCRIBER/CLIN PHARMACIST DOCUMENTED: ICD-10-PCS | Mod: CPTII,S$GLB,, | Performed by: INTERNAL MEDICINE

## 2022-06-27 PROCEDURE — 3078F DIAST BP <80 MM HG: CPT | Mod: CPTII,S$GLB,, | Performed by: INTERNAL MEDICINE

## 2022-06-27 PROCEDURE — 99999 PR PBB SHADOW E&M-EST. PATIENT-LVL V: ICD-10-PCS | Mod: PBBFAC,,, | Performed by: INTERNAL MEDICINE

## 2022-06-27 PROCEDURE — 3074F SYST BP LT 130 MM HG: CPT | Mod: CPTII,S$GLB,, | Performed by: INTERNAL MEDICINE

## 2022-06-27 PROCEDURE — 3044F HG A1C LEVEL LT 7.0%: CPT | Mod: CPTII,S$GLB,, | Performed by: INTERNAL MEDICINE

## 2022-06-27 PROCEDURE — 3288F PR FALLS RISK ASSESSMENT DOCUMENTED: ICD-10-PCS | Mod: CPTII,S$GLB,, | Performed by: INTERNAL MEDICINE

## 2022-06-27 PROCEDURE — 1126F PR PAIN SEVERITY QUANTIFIED, NO PAIN PRESENT: ICD-10-PCS | Mod: CPTII,S$GLB,, | Performed by: INTERNAL MEDICINE

## 2022-06-27 PROCEDURE — 3044F PR MOST RECENT HEMOGLOBIN A1C LEVEL <7.0%: ICD-10-PCS | Mod: CPTII,S$GLB,, | Performed by: INTERNAL MEDICINE

## 2022-06-27 PROCEDURE — 1101F PR PT FALLS ASSESS DOC 0-1 FALLS W/OUT INJ PAST YR: ICD-10-PCS | Mod: CPTII,S$GLB,, | Performed by: INTERNAL MEDICINE

## 2022-06-27 PROCEDURE — 1159F PR MEDICATION LIST DOCUMENTED IN MEDICAL RECORD: ICD-10-PCS | Mod: CPTII,S$GLB,, | Performed by: INTERNAL MEDICINE

## 2022-06-27 PROCEDURE — 3008F PR BODY MASS INDEX (BMI) DOCUMENTED: ICD-10-PCS | Mod: CPTII,S$GLB,, | Performed by: INTERNAL MEDICINE

## 2022-06-27 PROCEDURE — 99999 PR PBB SHADOW E&M-EST. PATIENT-LVL V: CPT | Mod: PBBFAC,,, | Performed by: INTERNAL MEDICINE

## 2022-06-27 PROCEDURE — 99397 PER PM REEVAL EST PAT 65+ YR: CPT | Mod: S$GLB,,, | Performed by: INTERNAL MEDICINE

## 2022-06-27 PROCEDURE — 3288F FALL RISK ASSESSMENT DOCD: CPT | Mod: CPTII,S$GLB,, | Performed by: INTERNAL MEDICINE

## 2022-06-27 PROCEDURE — 1159F MED LIST DOCD IN RCRD: CPT | Mod: CPTII,S$GLB,, | Performed by: INTERNAL MEDICINE

## 2022-06-27 PROCEDURE — 1101F PT FALLS ASSESS-DOCD LE1/YR: CPT | Mod: CPTII,S$GLB,, | Performed by: INTERNAL MEDICINE

## 2022-06-27 PROCEDURE — 3008F BODY MASS INDEX DOCD: CPT | Mod: CPTII,S$GLB,, | Performed by: INTERNAL MEDICINE

## 2022-06-27 PROCEDURE — 3074F PR MOST RECENT SYSTOLIC BLOOD PRESSURE < 130 MM HG: ICD-10-PCS | Mod: CPTII,S$GLB,, | Performed by: INTERNAL MEDICINE

## 2022-06-27 NOTE — PROGRESS NOTES
Subjective:       Patient ID: Jojo Burrows is a 72 y.o. female.    Chief Complaint: Annual Exam    HPI   The patient presents for annual physical examination.  Active medical conditions include hypertension, osteoarthritis of the knees, vitamin-D deficiency, GERD, and nodular thyroid.  The patient has not been experiencing any severe back pain lately.  She has been exercising more regularly.  She uses a stationary bike and treadmill and she also walks outside for additional exercise.  Her sleep quality is variable.    Immunization record was reviewed.    Screening tests were reviewed.    No interval change in past medical history or social history since prior evaluations.  The patient reports that her 37-year-old daughter did suffer a stroke which she stated was due to a small hole in her heart (? PFO).  She states that they were able to close the hole in her heart and she is currently on anticoagulants for a period of time.  She did recover from her left-sided neurologic deficit.    Review of Systems   Constitutional: Negative for fatigue, fever and unexpected weight change.   HENT: Negative for nasal congestion, postnasal drip, rhinorrhea and sore throat.    Eyes: Negative for visual disturbance.   Respiratory: Negative for cough, chest tightness, shortness of breath and wheezing.    Cardiovascular: Negative for chest pain, palpitations and leg swelling.   Gastrointestinal: Negative for abdominal pain and blood in stool.   Genitourinary: Negative for dysuria, frequency and hematuria.   Musculoskeletal: Positive for arthralgias and back pain. Negative for joint swelling and myalgias.   Integumentary:  Negative for rash.   Neurological: Negative for dizziness, syncope, weakness, numbness and headaches.   Psychiatric/Behavioral: Negative for sleep disturbance. The patient is not nervous/anxious.             Physical Exam  Vitals and nursing note reviewed.   Constitutional:       General: She is not in acute  distress.     Appearance: She is well-developed.   HENT:      Head: Normocephalic and atraumatic.      Right Ear: External ear normal.      Left Ear: External ear normal.      Nose: Nose normal.      Mouth/Throat:      Pharynx: No oropharyngeal exudate.   Eyes:      General: No scleral icterus.     Conjunctiva/sclera: Conjunctivae normal.      Pupils: Pupils are equal, round, and reactive to light.   Neck:      Thyroid: No thyromegaly.      Vascular: No carotid bruit or JVD.   Cardiovascular:      Rate and Rhythm: Normal rate and regular rhythm.      Pulses: Normal pulses.      Heart sounds: Normal heart sounds. No murmur heard.    No friction rub. No gallop.   Pulmonary:      Effort: Pulmonary effort is normal. No respiratory distress.      Breath sounds: Normal breath sounds. No wheezing or rales.   Chest:   Breasts:      Right: No supraclavicular adenopathy.      Left: No supraclavicular adenopathy.       Abdominal:      General: Bowel sounds are normal. There is no abdominal bruit.      Palpations: Abdomen is soft. There is no hepatomegaly, splenomegaly or mass.      Tenderness: There is no abdominal tenderness.      Hernia: No hernia is present.   Musculoskeletal:         General: Tenderness present. Normal range of motion.      Right shoulder: No deformity or effusion.      Cervical back: Normal range of motion and neck supple.      Comments: Mild knee joint tenderness is present bilaterally on flexion and extension.  Hip range of motion is intact.  Negative straight leg raising test bilaterally.   Lymphadenopathy:      Cervical: No cervical adenopathy.      Upper Body:      Right upper body: No supraclavicular adenopathy.      Left upper body: No supraclavicular adenopathy.   Skin:     General: Skin is warm and dry.      Findings: No rash.   Neurological:      Mental Status: She is alert and oriented to person, place, and time.      Cranial Nerves: No cranial nerve deficit.   Psychiatric:         Speech:  Speech normal.         Behavior: Behavior normal.           Lab Visit on 06/24/2022   Component Date Value Ref Range Status    Sodium 06/24/2022 139  136 - 145 mmol/L Final    Potassium 06/24/2022 4.0  3.5 - 5.1 mmol/L Final    Chloride 06/24/2022 105  95 - 110 mmol/L Final    CO2 06/24/2022 25  23 - 29 mmol/L Final    Glucose 06/24/2022 91  70 - 110 mg/dL Final    BUN 06/24/2022 10  8 - 23 mg/dL Final    Creatinine 06/24/2022 0.8  0.5 - 1.4 mg/dL Final    Calcium 06/24/2022 9.5  8.7 - 10.5 mg/dL Final    Total Protein 06/24/2022 7.3  6.0 - 8.4 g/dL Final    Albumin 06/24/2022 4.0  3.5 - 5.2 g/dL Final    Total Bilirubin 06/24/2022 0.6  0.1 - 1.0 mg/dL Final    Comment: For infants and newborns, interpretation of results should be based  on gestational age, weight and in agreement with clinical  observations.    Premature Infant recommended reference ranges:  Up to 24 hours.............<8.0 mg/dL  Up to 48 hours............<12.0 mg/dL  3-5 days..................<15.0 mg/dL  6-29 days.................<15.0 mg/dL      Alkaline Phosphatase 06/24/2022 85  55 - 135 U/L Final    AST 06/24/2022 19  10 - 40 U/L Final    ALT 06/24/2022 17  10 - 44 U/L Final    Anion Gap 06/24/2022 9  8 - 16 mmol/L Final    eGFR if African American 06/24/2022 >60.0  >60 mL/min/1.73 m^2 Final    eGFR if non African American 06/24/2022 >60.0  >60 mL/min/1.73 m^2 Final    Comment: Calculation used to obtain the estimated glomerular filtration  rate (eGFR) is the CKD-EPI equation.       Cholesterol 06/24/2022 161  120 - 199 mg/dL Final    Comment: The National Cholesterol Education Program (NCEP) has set the  following guidelines (reference ranges) for Cholesterol:  Optimal.....................<200 mg/dL  Borderline High.............200-239 mg/dL  High........................> or = 240 mg/dL      Triglycerides 06/24/2022 40  30 - 150 mg/dL Final    Comment: The National Cholesterol Education Program (NCEP) has set  the  following guidelines (reference values) for triglycerides:  Normal......................<150 mg/dL  Borderline High.............150-199 mg/dL  High........................200-499 mg/dL      HDL 06/24/2022 67  40 - 75 mg/dL Final    Comment: The National Cholesterol Education Program (NCEP) has set the  following guidelines (reference values) for HDL Cholesterol:  Low...............<40 mg/dL  Optimal...........>60 mg/dL      LDL Cholesterol 06/24/2022 86.0  63.0 - 159.0 mg/dL Final    Comment: The National Cholesterol Education Program (NCEP) has set the  following guidelines (reference values) for LDL Cholesterol:  Optimal.......................<130 mg/dL  Borderline High...............130-159 mg/dL  High..........................160-189 mg/dL  Very High.....................>190 mg/dL      HDL/Cholesterol Ratio 06/24/2022 41.6  20.0 - 50.0 % Final    Total Cholesterol/HDL Ratio 06/24/2022 2.4  2.0 - 5.0 Final    Non-HDL Cholesterol 06/24/2022 94  mg/dL Final    Comment: Risk category and Non-HDL cholesterol goals:  Coronary heart disease (CHD)or equivalent (10-year risk of CHD >20%):  Non-HDL cholesterol goal     <130 mg/dL  Two or more CHD risk factors and 10-year risk of CHD <= 20%:  Non-HDL cholesterol goal     <160 mg/dL  0 to 1 CHD risk factor:  Non-HDL cholesterol goal     <190 mg/dL      WBC 06/24/2022 6.49  3.90 - 12.70 K/uL Final    RBC 06/24/2022 4.22  4.00 - 5.40 M/uL Final    Hemoglobin 06/24/2022 12.5  12.0 - 16.0 g/dL Final    Hematocrit 06/24/2022 38.6  37.0 - 48.5 % Final    MCV 06/24/2022 92  82 - 98 fL Final    MCH 06/24/2022 29.6  27.0 - 31.0 pg Final    MCHC 06/24/2022 32.4  32.0 - 36.0 g/dL Final    RDW 06/24/2022 14.1  11.5 - 14.5 % Final    Platelets 06/24/2022 184  150 - 450 K/uL Final    MPV 06/24/2022 12.3  9.2 - 12.9 fL Final    Immature Granulocytes 06/24/2022 0.3  0.0 - 0.5 % Final    Gran # (ANC) 06/24/2022 3.5  1.8 - 7.7 K/uL Final    Immature Grans (Abs)  06/24/2022 0.02  0.00 - 0.04 K/uL Final    Comment: Mild elevation in immature granulocytes is non specific and   can be seen in a variety of conditions including stress response,   acute inflammation, trauma and pregnancy. Correlation with other   laboratory and clinical findings is essential.      Lymph # 06/24/2022 2.2  1.0 - 4.8 K/uL Final    Mono # 06/24/2022 0.7  0.3 - 1.0 K/uL Final    Eos # 06/24/2022 0.0  0.0 - 0.5 K/uL Final    Baso # 06/24/2022 0.02  0.00 - 0.20 K/uL Final    nRBC 06/24/2022 0  0 /100 WBC Final    Gran % 06/24/2022 54.2  38.0 - 73.0 % Final    Lymph % 06/24/2022 34.4  18.0 - 48.0 % Final    Mono % 06/24/2022 10.2  4.0 - 15.0 % Final    Eosinophil % 06/24/2022 0.6  0.0 - 8.0 % Final    Basophil % 06/24/2022 0.3  0.0 - 1.9 % Final    Differential Method 06/24/2022 Automated   Final    TSH 06/24/2022 1.322  0.400 - 4.000 uIU/mL Final    Free T4 06/24/2022 0.87  0.71 - 1.51 ng/dL Final    Vit D, 25-Hydroxy 06/24/2022 24 (A) 30 - 96 ng/mL Final    Comment: Vitamin D deficiency.........<10 ng/mL                              Vitamin D insufficiency......10-29 ng/mL       Vitamin D sufficiency........> or equal to 30 ng/mL  Vitamin D toxicity............>100 ng/mL      Hemoglobin A1C 06/24/2022 5.5  4.0 - 5.6 % Final    Comment: ADA Screening Guidelines:  5.7-6.4%  Consistent with prediabetes  >or=6.5%  Consistent with diabetes    High levels of fetal hemoglobin interfere with the HbA1C  assay. Heterozygous hemoglobin variants (HbS, HgC, etc)do  not significantly interfere with this assay.   However, presence of multiple variants may affect accuracy.         Assessment & Plan:      Jojo was seen today for annual exam.  Current therapy will be continued.  A thyroid ultrasound will be obtained for follow-up of nodular thyroid.    Diagnoses and all orders for this visit:    Well adult exam    Essential hypertension    Vitamin D deficiency    Other hyperlipidemia    Primary  osteoarthritis of both knees    Multiple thyroid nodules  -     US Soft Tissue Head Neck Thyroid; Future         Follow up in about 6 months (around 12/27/2022).     Joo Munoz MD

## 2022-06-28 ENCOUNTER — TELEPHONE (OUTPATIENT)
Dept: INTERNAL MEDICINE | Facility: CLINIC | Age: 72
End: 2022-06-28
Payer: MEDICARE

## 2022-06-28 NOTE — TELEPHONE ENCOUNTER
----- Message from Elenita Mcintosh sent at 6/28/2022  9:43 AM CDT -----  Contact: [phone]  Pt needs handicap form filled out and emailed to her or sent to her [facility] my chart! Please f/u once completed

## 2022-07-06 ENCOUNTER — HOSPITAL ENCOUNTER (OUTPATIENT)
Dept: RADIOLOGY | Facility: HOSPITAL | Age: 72
Discharge: HOME | End: 2022-07-06
Attending: INTERNAL MEDICINE
Payer: MEDICARE

## 2022-07-06 DIAGNOSIS — E04.2 MULTIPLE THYROID NODULES: ICD-10-CM

## 2022-07-06 PROCEDURE — 76536 US EXAM OF HEAD AND NECK: CPT | Mod: 26,,, | Performed by: RADIOLOGY

## 2022-07-06 PROCEDURE — 76536 US SOFT TISSUE HEAD NECK THYROID: ICD-10-PCS | Mod: 26,,, | Performed by: RADIOLOGY

## 2022-07-06 PROCEDURE — 76536 US EXAM OF HEAD AND NECK: CPT | Mod: TC

## 2022-07-13 ENCOUNTER — TELEPHONE (OUTPATIENT)
Dept: INTERNAL MEDICINE | Facility: CLINIC | Age: 72
End: 2022-07-13
Payer: MEDICARE

## 2022-07-13 NOTE — TELEPHONE ENCOUNTER
It was sent to [facility] 7/5 for stamp after dr mendiola signed.  She should have it by 7/15.

## 2022-07-13 NOTE — TELEPHONE ENCOUNTER
----- Message from Elizabeth Russell sent at 7/13/2022  3:04 PM CDT -----  Regarding: advice  Contact: patient [phone]  [employer]  is following up on request for handicap tag.  Please call and advise.

## 2022-08-10 ENCOUNTER — OFFICE VISIT (OUTPATIENT)
Dept: OPTOMETRY | Facility: CLINIC | Age: 72
End: 2022-08-10
Payer: MEDICARE

## 2022-08-10 DIAGNOSIS — H35.372 EPIRETINAL MEMBRANE (ERM) OF LEFT EYE: ICD-10-CM

## 2022-08-10 DIAGNOSIS — H43.811 POSTERIOR VITREOUS DETACHMENT, RIGHT EYE: ICD-10-CM

## 2022-08-10 DIAGNOSIS — H43.393 VISUAL FLOATERS, BILATERAL: ICD-10-CM

## 2022-08-10 DIAGNOSIS — H52.4 PRESBYOPIA OF BOTH EYES: ICD-10-CM

## 2022-08-10 DIAGNOSIS — H52.203 ASTIGMATISM OF BOTH EYES, UNSPECIFIED TYPE: ICD-10-CM

## 2022-08-10 DIAGNOSIS — H25.012 CORTICAL AGE-RELATED CATARACT, LEFT EYE: ICD-10-CM

## 2022-08-10 DIAGNOSIS — H25.13 SENILE NUCLEAR SCLEROSIS, BILATERAL: Primary | ICD-10-CM

## 2022-08-10 PROCEDURE — 92134 CPTRZ OPH DX IMG PST SGM RTA: CPT | Mod: S$GLB,,, | Performed by: OPTOMETRIST

## 2022-08-10 PROCEDURE — 1159F MED LIST DOCD IN RCRD: CPT | Mod: CPTII,S$GLB,, | Performed by: OPTOMETRIST

## 2022-08-10 PROCEDURE — 99999 PR PBB SHADOW E&M-EST. PATIENT-LVL III: CPT | Mod: PBBFAC,,, | Performed by: OPTOMETRIST

## 2022-08-10 PROCEDURE — 1159F PR MEDICATION LIST DOCUMENTED IN MEDICAL RECORD: ICD-10-PCS | Mod: CPTII,S$GLB,, | Performed by: OPTOMETRIST

## 2022-08-10 PROCEDURE — 3044F PR MOST RECENT HEMOGLOBIN A1C LEVEL <7.0%: ICD-10-PCS | Mod: CPTII,S$GLB,, | Performed by: OPTOMETRIST

## 2022-08-10 PROCEDURE — 99999 PR PBB SHADOW E&M-EST. PATIENT-LVL III: ICD-10-PCS | Mod: PBBFAC,,, | Performed by: OPTOMETRIST

## 2022-08-10 PROCEDURE — 92015 PR REFRACTION: ICD-10-PCS | Mod: S$GLB,,, | Performed by: OPTOMETRIST

## 2022-08-10 PROCEDURE — 92134 POSTERIOR SEGMENT OCT RETINA (OCULAR COHERENCE TOMOGRAPHY)-BOTH EYES: ICD-10-PCS | Mod: S$GLB,,, | Performed by: OPTOMETRIST

## 2022-08-10 PROCEDURE — 92014 PR EYE EXAM, EST PATIENT,COMPREHESV: ICD-10-PCS | Mod: S$GLB,,, | Performed by: OPTOMETRIST

## 2022-08-10 PROCEDURE — 92015 DETERMINE REFRACTIVE STATE: CPT | Mod: S$GLB,,, | Performed by: OPTOMETRIST

## 2022-08-10 PROCEDURE — 92014 COMPRE OPH EXAM EST PT 1/>: CPT | Mod: S$GLB,,, | Performed by: OPTOMETRIST

## 2022-08-10 PROCEDURE — 3044F HG A1C LEVEL LT 7.0%: CPT | Mod: CPTII,S$GLB,, | Performed by: OPTOMETRIST

## 2022-08-10 NOTE — PATIENT INSTRUCTIONS
Bilateral nuclear sclerosis of lens, consistent with age.  Early peripheral cortical cataract in the left eye.    S/P posterior vitreous detachment in the right eye.  Vitreous floaters in both eyes, more apparent in the right eye    Mild epiretinal membrane at the macula/posterior pole of the left eye.      Astigmatic refractive error in both eyes, with better best-corrected visual acuity in the right eye than in the left eye, as noted previously.  Presbyopia consistent with age.  New spectacle lens Rx issued for use as desired.  Recommend full-time wear.    Symptoms of tearing in both eyes, suggestive of dry eye/  Suggest regular use of Systane Complete artificial tears in both eyes throughout the day.    OCT of retina at the macula to be done today to aid in following epiretinal membrane in the left eye.    Recheck in one year - or prior, if any problems or bothersome changes in vision noted in the interim

## 2022-08-10 NOTE — PROGRESS NOTES
"HPI     eye exam      Comments: General eye exam and refraction.  Has glasses, but does not wear full-time.  Uses mainly for reading.    Feels that visual acuity has decreased somewhat.               Comments     Patient's age: 72 y.o.  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 Gramajo  Wears glasses? yes     If yes, wears  Full-time or part-time?  Part-time  Present glasses are: Bifocal, SV Distance, SV Reading?  Bifocal   (progessive) - did not bring glasses today    Got new glasses following last exam, or subsequently?: unsure, but thinks   no   Any problem with VA with glasses?  Feels that VA has decreased somewhat  Wears CLs?:  no           I  Headaches?  no  Eye pain/discomfort?  no                                                                                     Flashes?  no  Floaters?  Yes - mainly in OD  Diplopia/Double vision?  no  Patient's Ocular History:         Any eye surgeries? no         Any eye injury?  no         Any treatment for eye disease?  no  Family history of eye disease?  M, uncle has glaucoma, mother and father:   cataracts  Significant patient medical history:         1. Diabetes?  no       If yes, IDDM or NIDDM? N/a   2. HBP?  Yes - takes meds.  States well-controlled                 ! OTC eyedrops currently using:  eyedrops for dry eye   ! Prescription eye meds currently using:  no   ! Any history of allergy/adverse reaction to any eye meds used   previously?  no    ! Any history of allergy/adverse reaction to eyedrops used during prior   eye exam(s)? no     ! Patient okay with use of anesthetic eyedrops to check eye pressure?    yes    ! Patient okay with use of eyedrops to dilate pupils today?  yes   !  Allergies/Medications/Medical History/Family History reviewed today?    yes      PD 60/56     Desired reading distance =  15"  Approx computer distance =                      "                                                    Last edited by Kamlesh Mike, OD on 8/10/2022  1:14 PM. (History)            Assessment /Plan     For exam results, see Encounter Report.    1. Senile nuclear sclerosis, bilateral     2. Cortical age-related cataract, left eye     3. Visual floaters, bilateral     4. Posterior vitreous detachment, right eye     5. Epiretinal membrane (ERM) of left eye  Posterior Segment OCT Retina-Both eyes   6. Astigmatism of both eyes, unspecified type     7. Presbyopia of both eyes                  Bilateral nuclear sclerosis of lens, consistent with age.  Early peripheral cortical cataract in the left eye.    S/P posterior vitreous detachment in the right eye.  Vitreous floaters in both eyes, more apparent in the right eye    Mild epiretinal membrane at the macula/posterior pole of the left eye.      Astigmatic refractive error in both eyes, with better best-corrected visual acuity in the right eye than in the left eye, as noted previously.  Presbyopia consistent with age.  New spectacle lens Rx issued for use as desired.  Recommend full-time wear.    Symptoms of tearing in both eyes, suggestive of dry eye/  Suggest regular use of Systane Complete artificial tears in both eyes throughout the day.    OCT of retina at the macula to be done today to aid in following epiretinal membrane in the left eye.    Recheck in one year - or prior, if any problems or bothersome changes in vision noted in the interim

## 2022-08-19 ENCOUNTER — HOSPITAL ENCOUNTER (OUTPATIENT)
Dept: RADIOLOGY | Facility: HOSPITAL | Age: 72
Discharge: HOME | End: 2022-08-19
Attending: INTERNAL MEDICINE
Payer: MEDICARE

## 2022-08-19 VITALS — WEIGHT: 167 LBS | BODY MASS INDEX: 30.73 KG/M2 | HEIGHT: 62 IN

## 2022-08-19 DIAGNOSIS — Z00.00 ANNUAL PHYSICAL EXAM: ICD-10-CM

## 2022-08-19 DIAGNOSIS — E55.9 VITAMIN D DEFICIENCY: ICD-10-CM

## 2022-08-19 DIAGNOSIS — E04.2 MULTIPLE THYROID NODULES: ICD-10-CM

## 2022-08-19 DIAGNOSIS — Z12.31 ENCOUNTER FOR SCREENING MAMMOGRAM FOR MALIGNANT NEOPLASM OF BREAST: ICD-10-CM

## 2022-08-19 DIAGNOSIS — I10 ESSENTIAL HYPERTENSION: ICD-10-CM

## 2022-08-19 PROCEDURE — 77067 SCR MAMMO BI INCL CAD: CPT | Mod: 26,,, | Performed by: RADIOLOGY

## 2022-08-19 PROCEDURE — 77067 SCR MAMMO BI INCL CAD: CPT | Mod: TC

## 2022-08-19 PROCEDURE — 77063 BREAST TOMOSYNTHESIS BI: CPT | Mod: TC

## 2022-08-19 PROCEDURE — 77067 MAMMO DIGITAL SCREENING BILAT WITH TOMO: ICD-10-PCS | Mod: 26,,, | Performed by: RADIOLOGY

## 2022-08-19 PROCEDURE — 77063 MAMMO DIGITAL SCREENING BILAT WITH TOMO: ICD-10-PCS | Mod: 26,,, | Performed by: RADIOLOGY

## 2022-08-19 PROCEDURE — 77063 BREAST TOMOSYNTHESIS BI: CPT | Mod: 26,,, | Performed by: RADIOLOGY

## 2022-10-26 ENCOUNTER — PATIENT OUTREACH (OUTPATIENT)
Dept: ADMINISTRATIVE | Facility: HOSPITAL | Age: 72
End: 2022-10-26
Payer: MEDICARE

## 2022-11-16 ENCOUNTER — PES CALL (OUTPATIENT)
Dept: ADMINISTRATIVE | Facility: CLINIC | Age: 72
End: 2022-11-16
Payer: MEDICARE

## 2022-12-19 ENCOUNTER — PES CALL (OUTPATIENT)
Dept: ADMINISTRATIVE | Facility: CLINIC | Age: 72
End: 2022-12-19
Payer: MEDICARE

## 2022-12-19 ENCOUNTER — TELEPHONE (OUTPATIENT)
Dept: INTERNAL MEDICINE | Facility: CLINIC | Age: 72
End: 2022-12-19
Payer: MEDICARE

## 2022-12-19 RX ORDER — AMOXICILLIN AND CLAVULANATE POTASSIUM 875; 125 MG/1; MG/1
1 TABLET, FILM COATED ORAL EVERY 12 HOURS
Qty: 20 TABLET | Refills: 0 | Status: SHIPPED | OUTPATIENT
Start: 2022-12-19 | End: 2023-09-07 | Stop reason: ALTCHOICE

## 2022-12-19 NOTE — TELEPHONE ENCOUNTER
A prescription order for Augmentin has been sent to the patient's pharmacy. Per dr mendiola.     Patient advised

## 2022-12-19 NOTE — TELEPHONE ENCOUNTER
Sick for about a week.  Last couple of days seems to have worsened.  Coughing for couple of weeks.  Top of mouth is irritated and tender and noticed back of throat - looks like white pus on side  of left tonsil .    Been doing zyrtec daily she she felt she needed.  Cough worsens when lies. Down.  Clear phlegm when coughs up.  Feels a little hoarse. Also fatigue.    Can we send Rx to [facility] val[facility] delgado ok?

## 2022-12-19 NOTE — TELEPHONE ENCOUNTER
----- Message from Elina Cullen sent at 12/19/2022  8:04 AM CST -----  Contact: self/[phone]  Pt called in regards to getting a Rx for antibiotic due to throat and top of mouth/puss, call back ASAP      [id number] - [place] - [address] [place] [id number]  Phone: [phone] Fax: [phone]    Please advise

## 2022-12-21 DIAGNOSIS — E78.49 OTHER HYPERLIPIDEMIA: ICD-10-CM

## 2022-12-21 DIAGNOSIS — I10 ESSENTIAL HYPERTENSION: Primary | ICD-10-CM

## 2022-12-22 ENCOUNTER — LAB VISIT (OUTPATIENT)
Dept: LAB | Facility: HOSPITAL | Age: 72
End: 2022-12-22
Attending: INTERNAL MEDICINE
Payer: MEDICARE

## 2022-12-22 DIAGNOSIS — E78.49 OTHER HYPERLIPIDEMIA: ICD-10-CM

## 2022-12-22 DIAGNOSIS — I10 ESSENTIAL HYPERTENSION: ICD-10-CM

## 2022-12-22 LAB
ALBUMIN SERPL BCP-MCNC: 3.9 G/DL (ref 3.5–5.2)
ALP SERPL-CCNC: 96 U/L (ref 55–135)
ALT SERPL W/O P-5'-P-CCNC: 17 U/L (ref 10–44)
ANION GAP SERPL CALC-SCNC: 9 MMOL/L (ref 8–16)
AST SERPL-CCNC: 18 U/L (ref 10–40)
BASOPHILS # BLD AUTO: 0.01 K/UL (ref 0–0.2)
BASOPHILS NFR BLD: 0.1 % (ref 0–1.9)
BILIRUB SERPL-MCNC: 0.5 MG/DL (ref 0.1–1)
BUN SERPL-MCNC: 13 MG/DL (ref 8–23)
CALCIUM SERPL-MCNC: 9.1 MG/DL (ref 8.7–10.5)
CHLORIDE SERPL-SCNC: 104 MMOL/L (ref 95–110)
CHOLEST SERPL-MCNC: 138 MG/DL (ref 120–199)
CHOLEST/HDLC SERPL: 2.1 {RATIO} (ref 2–5)
CO2 SERPL-SCNC: 26 MMOL/L (ref 23–29)
CREAT SERPL-MCNC: 0.8 MG/DL (ref 0.5–1.4)
DIFFERENTIAL METHOD: NORMAL
EOSINOPHIL # BLD AUTO: 0.1 K/UL (ref 0–0.5)
EOSINOPHIL NFR BLD: 2 % (ref 0–8)
ERYTHROCYTE [DISTWIDTH] IN BLOOD BY AUTOMATED COUNT: 14 % (ref 11.5–14.5)
EST. GFR  (NO RACE VARIABLE): >60 ML/MIN/1.73 M^2
GLUCOSE SERPL-MCNC: 87 MG/DL (ref 70–110)
HCT VFR BLD AUTO: 38.8 % (ref 37–48.5)
HDLC SERPL-MCNC: 65 MG/DL (ref 40–75)
HDLC SERPL: 47.1 % (ref 20–50)
HGB BLD-MCNC: 12.7 G/DL (ref 12–16)
IMM GRANULOCYTES # BLD AUTO: 0.02 K/UL (ref 0–0.04)
IMM GRANULOCYTES NFR BLD AUTO: 0.3 % (ref 0–0.5)
LDLC SERPL CALC-MCNC: 67 MG/DL (ref 63–159)
LYMPHOCYTES # BLD AUTO: 2.3 K/UL (ref 1–4.8)
LYMPHOCYTES NFR BLD: 32.6 % (ref 18–48)
MCH RBC QN AUTO: 29.8 PG (ref 27–31)
MCHC RBC AUTO-ENTMCNC: 32.7 G/DL (ref 32–36)
MCV RBC AUTO: 91 FL (ref 82–98)
MONOCYTES # BLD AUTO: 0.8 K/UL (ref 0.3–1)
MONOCYTES NFR BLD: 11.6 % (ref 4–15)
NEUTROPHILS # BLD AUTO: 3.8 K/UL (ref 1.8–7.7)
NEUTROPHILS NFR BLD: 53.4 % (ref 38–73)
NONHDLC SERPL-MCNC: 73 MG/DL
NRBC BLD-RTO: 0 /100 WBC
PLATELET # BLD AUTO: 172 K/UL (ref 150–450)
PMV BLD AUTO: 12.2 FL (ref 9.2–12.9)
POTASSIUM SERPL-SCNC: 3.9 MMOL/L (ref 3.5–5.1)
PROT SERPL-MCNC: 7.2 G/DL (ref 6–8.4)
RBC # BLD AUTO: 4.26 M/UL (ref 4–5.4)
SODIUM SERPL-SCNC: 139 MMOL/L (ref 136–145)
TRIGL SERPL-MCNC: 30 MG/DL (ref 30–150)
WBC # BLD AUTO: 7.08 K/UL (ref 3.9–12.7)

## 2022-12-22 PROCEDURE — 80061 LIPID PANEL: CPT | Mod: HCNC | Performed by: INTERNAL MEDICINE

## 2022-12-22 PROCEDURE — 36415 COLL VENOUS BLD VENIPUNCTURE: CPT | Mod: HCNC | Performed by: INTERNAL MEDICINE

## 2022-12-22 PROCEDURE — 85025 COMPLETE CBC W/AUTO DIFF WBC: CPT | Mod: HCNC | Performed by: INTERNAL MEDICINE

## 2022-12-22 PROCEDURE — 80053 COMPREHEN METABOLIC PANEL: CPT | Mod: HCNC | Performed by: INTERNAL MEDICINE

## 2022-12-27 ENCOUNTER — OFFICE VISIT (OUTPATIENT)
Dept: INTERNAL MEDICINE | Facility: CLINIC | Age: 72
End: 2022-12-27
Payer: MEDICARE

## 2022-12-27 VITALS
HEIGHT: 62 IN | DIASTOLIC BLOOD PRESSURE: 78 MMHG | HEART RATE: 64 BPM | SYSTOLIC BLOOD PRESSURE: 132 MMHG | WEIGHT: 169.75 LBS | TEMPERATURE: 98 F | RESPIRATION RATE: 16 BRPM | BODY MASS INDEX: 31.24 KG/M2

## 2022-12-27 DIAGNOSIS — E55.9 VITAMIN D DEFICIENCY: ICD-10-CM

## 2022-12-27 DIAGNOSIS — I10 ESSENTIAL HYPERTENSION: Primary | ICD-10-CM

## 2022-12-27 DIAGNOSIS — R10.84 GENERALIZED ABDOMINAL PAIN: ICD-10-CM

## 2022-12-27 DIAGNOSIS — I77.1 TORTUOUS AORTA: ICD-10-CM

## 2022-12-27 DIAGNOSIS — E04.2 MULTIPLE THYROID NODULES: ICD-10-CM

## 2022-12-27 DIAGNOSIS — E78.49 OTHER HYPERLIPIDEMIA: ICD-10-CM

## 2022-12-27 DIAGNOSIS — M17.0 PRIMARY OSTEOARTHRITIS OF BOTH KNEES: ICD-10-CM

## 2022-12-27 PROBLEM — M46.96 INFLAMMATORY SPONDYLOPATHY OF LUMBAR REGION: Status: RESOLVED | Noted: 2019-09-23 | Resolved: 2022-12-27

## 2022-12-27 PROCEDURE — 1101F PT FALLS ASSESS-DOCD LE1/YR: CPT | Mod: HCNC,CPTII,S$GLB, | Performed by: INTERNAL MEDICINE

## 2022-12-27 PROCEDURE — 3008F BODY MASS INDEX DOCD: CPT | Mod: HCNC,CPTII,S$GLB, | Performed by: INTERNAL MEDICINE

## 2022-12-27 PROCEDURE — 1160F RVW MEDS BY RX/DR IN RCRD: CPT | Mod: HCNC,CPTII,S$GLB, | Performed by: INTERNAL MEDICINE

## 2022-12-27 PROCEDURE — 99499 RISK ADDL DX/OHS AUDIT: ICD-10-PCS | Mod: S$GLB,,, | Performed by: INTERNAL MEDICINE

## 2022-12-27 PROCEDURE — 3078F PR MOST RECENT DIASTOLIC BLOOD PRESSURE < 80 MM HG: ICD-10-PCS | Mod: HCNC,CPTII,S$GLB, | Performed by: INTERNAL MEDICINE

## 2022-12-27 PROCEDURE — 3288F FALL RISK ASSESSMENT DOCD: CPT | Mod: HCNC,CPTII,S$GLB, | Performed by: INTERNAL MEDICINE

## 2022-12-27 PROCEDURE — 3008F PR BODY MASS INDEX (BMI) DOCUMENTED: ICD-10-PCS | Mod: HCNC,CPTII,S$GLB, | Performed by: INTERNAL MEDICINE

## 2022-12-27 PROCEDURE — 99214 PR OFFICE/OUTPT VISIT, EST, LEVL IV, 30-39 MIN: ICD-10-PCS | Mod: HCNC,S$GLB,, | Performed by: INTERNAL MEDICINE

## 2022-12-27 PROCEDURE — 3075F SYST BP GE 130 - 139MM HG: CPT | Mod: HCNC,CPTII,S$GLB, | Performed by: INTERNAL MEDICINE

## 2022-12-27 PROCEDURE — 99499 UNLISTED E&M SERVICE: CPT | Mod: S$GLB,,, | Performed by: INTERNAL MEDICINE

## 2022-12-27 PROCEDURE — 3075F PR MOST RECENT SYSTOLIC BLOOD PRESS GE 130-139MM HG: ICD-10-PCS | Mod: HCNC,CPTII,S$GLB, | Performed by: INTERNAL MEDICINE

## 2022-12-27 PROCEDURE — 1126F PR PAIN SEVERITY QUANTIFIED, NO PAIN PRESENT: ICD-10-PCS | Mod: HCNC,CPTII,S$GLB, | Performed by: INTERNAL MEDICINE

## 2022-12-27 PROCEDURE — 1126F AMNT PAIN NOTED NONE PRSNT: CPT | Mod: HCNC,CPTII,S$GLB, | Performed by: INTERNAL MEDICINE

## 2022-12-27 PROCEDURE — 3078F DIAST BP <80 MM HG: CPT | Mod: HCNC,CPTII,S$GLB, | Performed by: INTERNAL MEDICINE

## 2022-12-27 PROCEDURE — 3288F PR FALLS RISK ASSESSMENT DOCUMENTED: ICD-10-PCS | Mod: HCNC,CPTII,S$GLB, | Performed by: INTERNAL MEDICINE

## 2022-12-27 PROCEDURE — 1160F PR REVIEW ALL MEDS BY PRESCRIBER/CLIN PHARMACIST DOCUMENTED: ICD-10-PCS | Mod: HCNC,CPTII,S$GLB, | Performed by: INTERNAL MEDICINE

## 2022-12-27 PROCEDURE — 99999 PR PBB SHADOW E&M-EST. PATIENT-LVL V: ICD-10-PCS | Mod: PBBFAC,HCNC,, | Performed by: INTERNAL MEDICINE

## 2022-12-27 PROCEDURE — 1159F MED LIST DOCD IN RCRD: CPT | Mod: HCNC,CPTII,S$GLB, | Performed by: INTERNAL MEDICINE

## 2022-12-27 PROCEDURE — 1159F PR MEDICATION LIST DOCUMENTED IN MEDICAL RECORD: ICD-10-PCS | Mod: HCNC,CPTII,S$GLB, | Performed by: INTERNAL MEDICINE

## 2022-12-27 PROCEDURE — 99999 PR PBB SHADOW E&M-EST. PATIENT-LVL V: CPT | Mod: PBBFAC,HCNC,, | Performed by: INTERNAL MEDICINE

## 2022-12-27 PROCEDURE — 1101F PR PT FALLS ASSESS DOC 0-1 FALLS W/OUT INJ PAST YR: ICD-10-PCS | Mod: HCNC,CPTII,S$GLB, | Performed by: INTERNAL MEDICINE

## 2022-12-27 PROCEDURE — 99214 OFFICE O/P EST MOD 30 MIN: CPT | Mod: HCNC,S$GLB,, | Performed by: INTERNAL MEDICINE

## 2022-12-27 RX ORDER — ESOMEPRAZOLE MAGNESIUM 40 MG/1
40 CAPSULE, DELAYED RELEASE ORAL
Qty: 90 CAPSULE | Refills: 3 | Status: SHIPPED | OUTPATIENT
Start: 2022-12-27

## 2022-12-27 RX ORDER — CELECOXIB 200 MG/1
200 CAPSULE ORAL 2 TIMES DAILY
Qty: 60 CAPSULE | Refills: 2 | Status: SHIPPED | OUTPATIENT
Start: 2022-12-27 | End: 2023-04-27 | Stop reason: SDUPTHER

## 2022-12-27 NOTE — PROGRESS NOTES
Subjective:       Patient ID: Jojo Burrows is a 72 y.o. female.    Chief Complaint: Hypertension (6 mos w/labs)    HPI    The patient presents for follow-up of medical conditions which include hypertension, GERD, osteoarthritis of the knees, overactive bladder, chronic rhinitis, and abdominal pain.    The patient has been using Celebrex and turmeric, and cumin for knee joint pain.  Right knee pain is more prominent than left knee pain.  The right knee stays swollen.  Celebrex has been helpful in alleviating joint pain.  She remains active.  She is using a stationary bike for exercise.  She is caring for her brother who has early Alzheimer's dementia.  He tends to wander off.  Since her last visit her mother  on 2022 and her father  on 2022.  She has been dealing with some emotional stress.    Overactive bladder symptoms manifested by increased frequency and urgency are still noted however she states the Detrol is too expensive to use.  She has not been using it recently.    She recently had a flare of acid reflux symptoms.  She has initiated therapy with Nexium which has been helpful.  She also has noted intermittent sticking mid abdominal pain of recent onset.  Her appetite remains good.  Bowel movements are normal.  No melena or gross rectal bleeding is noted.  No nausea, vomiting, or dizziness noted.    The patient was recently treated for pharyngitis.  Her throat pain is improving with antibiotic therapy.  She has chronic rhinitis and uses Zyrtec as needed for management.    Lower back pain is primarily present in the left lumbar area.  The pain is nonradiating.  There is no bowel or bladder sphincter dysfunction related to the back pain.    Review of Systems   Constitutional:  Negative for activity change, appetite change, chills, fatigue, fever and unexpected weight change.   HENT:  Positive for postnasal drip, rhinorrhea and sore throat. Negative for ear pain.    Eyes:  Negative  for visual disturbance.   Respiratory:  Negative for cough, chest tightness and shortness of breath.    Cardiovascular:  Negative for chest pain, palpitations and leg swelling.   Gastrointestinal:  Positive for abdominal pain and reflux. Negative for blood in stool, change in bowel habit, constipation, diarrhea, nausea, vomiting and change in bowel habit.   Genitourinary:  Positive for frequency and urgency. Negative for dysuria and hematuria.   Musculoskeletal:  Positive for arthralgias, back pain and joint swelling (Right knee joint swelling is chronic.). Negative for gait problem, neck pain and neck stiffness.   Integumentary:  Negative for rash.   Neurological:  Negative for dizziness, syncope and headaches.   Psychiatric/Behavioral:  Positive for dysphoric mood. Negative for decreased concentration, hallucinations, sleep disturbance and suicidal ideas. The patient is not nervous/anxious.           Physical Exam  Vitals and nursing note reviewed.   Constitutional:       General: She is not in acute distress.     Appearance: Normal appearance. She is well-developed.      Comments: The patient has lost 2 lb since 06/27/2022.   HENT:      Head: Normocephalic and atraumatic.      Right Ear: Tympanic membrane normal.      Left Ear: Tympanic membrane normal.      Mouth/Throat:      Pharynx: No oropharyngeal exudate or posterior oropharyngeal erythema.   Eyes:      General: No scleral icterus.     Extraocular Movements: Extraocular movements intact.      Conjunctiva/sclera: Conjunctivae normal.   Neck:      Thyroid: No thyromegaly.      Vascular: No JVD.   Cardiovascular:      Rate and Rhythm: Normal rate and regular rhythm.      Heart sounds: Normal heart sounds. No murmur heard.    No friction rub. No gallop.   Pulmonary:      Effort: Pulmonary effort is normal. No respiratory distress.      Breath sounds: Normal breath sounds. No wheezing or rales.   Abdominal:      General: Bowel sounds are normal.      Palpations:  Abdomen is soft. There is no mass.      Tenderness: There is abdominal tenderness (Mild epigastric tenderness is present on palpation.).   Musculoskeletal:         General: No tenderness. Normal range of motion.      Cervical back: Normal range of motion and neck supple.      Right lower leg: No edema.      Left lower leg: No edema.      Comments: Back:  Negative straight leg raising test bilaterally.    Hips:  Negative CLARA test bilaterally.    Knee joints:  Bilateral crepitus is noted.  Right knee effusion is present.  Tenderness is noted on full flexion.  No ligament instability is noted.   Lymphadenopathy:      Cervical: No cervical adenopathy.   Skin:     General: Skin is warm and dry.      Findings: No rash.   Neurological:      General: No focal deficit present.      Mental Status: She is alert and oriented to person, place, and time.      Motor: No weakness.      Gait: Gait normal.   Psychiatric:         Mood and Affect: Mood normal.         Behavior: Behavior normal.         Thought Content: Thought content normal.         Lab Visit on 12/22/2022   Component Date Value Ref Range Status    Sodium 12/22/2022 139  136 - 145 mmol/L Final    Potassium 12/22/2022 3.9  3.5 - 5.1 mmol/L Final    Chloride 12/22/2022 104  95 - 110 mmol/L Final    CO2 12/22/2022 26  23 - 29 mmol/L Final    Glucose 12/22/2022 87  70 - 110 mg/dL Final    BUN 12/22/2022 13  8 - 23 mg/dL Final    Creatinine 12/22/2022 0.8  0.5 - 1.4 mg/dL Final    Calcium 12/22/2022 9.1  8.7 - 10.5 mg/dL Final    Total Protein 12/22/2022 7.2  6.0 - 8.4 g/dL Final    Albumin 12/22/2022 3.9  3.5 - 5.2 g/dL Final    Total Bilirubin 12/22/2022 0.5  0.1 - 1.0 mg/dL Final    Comment: For infants and newborns, interpretation of results should be based  on gestational age, weight and in agreement with clinical  observations.    Premature Infant recommended reference ranges:  Up to 24 hours.............<8.0 mg/dL  Up to 48 hours............<12.0  mg/dL  3-5 days..................<15.0 mg/dL  6-29 days.................<15.0 mg/dL      Alkaline Phosphatase 12/22/2022 96  55 - 135 U/L Final    AST 12/22/2022 18  10 - 40 U/L Final    ALT 12/22/2022 17  10 - 44 U/L Final    Anion Gap 12/22/2022 9  8 - 16 mmol/L Final    eGFR 12/22/2022 >60.0  >60 mL/min/1.73 m^2 Final    Cholesterol 12/22/2022 138  120 - 199 mg/dL Final    Comment: The National Cholesterol Education Program (NCEP) has set the  following guidelines (reference ranges) for Cholesterol:  Optimal.....................<200 mg/dL  Borderline High.............200-239 mg/dL  High........................> or = 240 mg/dL      Triglycerides 12/22/2022 30  30 - 150 mg/dL Final    Comment: The National Cholesterol Education Program (NCEP) has set the  following guidelines (reference values) for triglycerides:  Normal......................<150 mg/dL  Borderline High.............150-199 mg/dL  High........................200-499 mg/dL      HDL 12/22/2022 65  40 - 75 mg/dL Final    Comment: The National Cholesterol Education Program (NCEP) has set the  following guidelines (reference values) for HDL Cholesterol:  Low...............<40 mg/dL  Optimal...........>60 mg/dL      LDL Cholesterol 12/22/2022 67.0  63.0 - 159.0 mg/dL Final    Comment: The National Cholesterol Education Program (NCEP) has set the  following guidelines (reference values) for LDL Cholesterol:  Optimal.......................<130 mg/dL  Borderline High...............130-159 mg/dL  High..........................160-189 mg/dL  Very High.....................>190 mg/dL      HDL/Cholesterol Ratio 12/22/2022 47.1  20.0 - 50.0 % Final    Total Cholesterol/HDL Ratio 12/22/2022 2.1  2.0 - 5.0 Final    Non-HDL Cholesterol 12/22/2022 73  mg/dL Final    Comment: Risk category and Non-HDL cholesterol goals:  Coronary heart disease (CHD)or equivalent (10-year risk of CHD >20%):  Non-HDL cholesterol goal     <130 mg/dL  Two or more CHD risk  factors and 10-year risk of CHD <= 20%:  Non-HDL cholesterol goal     <160 mg/dL  0 to 1 CHD risk factor:  Non-HDL cholesterol goal     <190 mg/dL      WBC 12/22/2022 7.08  3.90 - 12.70 K/uL Final    RBC 12/22/2022 4.26  4.00 - 5.40 M/uL Final    Hemoglobin 12/22/2022 12.7  12.0 - 16.0 g/dL Final    Hematocrit 12/22/2022 38.8  37.0 - 48.5 % Final    MCV 12/22/2022 91  82 - 98 fL Final    MCH 12/22/2022 29.8  27.0 - 31.0 pg Final    MCHC 12/22/2022 32.7  32.0 - 36.0 g/dL Final    RDW 12/22/2022 14.0  11.5 - 14.5 % Final    Platelets 12/22/2022 172  150 - 450 K/uL Final    MPV 12/22/2022 12.2  9.2 - 12.9 fL Final    Immature Granulocytes 12/22/2022 0.3  0.0 - 0.5 % Final    Gran # (ANC) 12/22/2022 3.8  1.8 - 7.7 K/uL Final    Immature Grans (Abs) 12/22/2022 0.02  0.00 - 0.04 K/uL Final    Comment: Mild elevation in immature granulocytes is non specific and   can be seen in a variety of conditions including stress response,   acute inflammation, trauma and pregnancy. Correlation with other   laboratory and clinical findings is essential.      Lymph # 12/22/2022 2.3  1.0 - 4.8 K/uL Final    Mono # 12/22/2022 0.8  0.3 - 1.0 K/uL Final    Eos # 12/22/2022 0.1  0.0 - 0.5 K/uL Final    Baso # 12/22/2022 0.01  0.00 - 0.20 K/uL Final    nRBC 12/22/2022 0  0 /100 WBC Final    Gran % 12/22/2022 53.4  38.0 - 73.0 % Final    Lymph % 12/22/2022 32.6  18.0 - 48.0 % Final    Mono % 12/22/2022 11.6  4.0 - 15.0 % Final    Eosinophil % 12/22/2022 2.0  0.0 - 8.0 % Final    Basophil % 12/22/2022 0.1  0.0 - 1.9 % Final    Differential Method 12/22/2022 Automated   Final       Assessment & Plan:      Jojo was seen today for hypertension.  Detrol will be discontinued due to the expense of the medication as requested by the patient.    Abdominal ultrasound, EGD, and abdominal CT scan will be ordered for further assessment of unexplained abdominal pain.  The patient will continue trial of Nexium 40 mg  daily.    Diagnoses and all orders for this visit:    Essential hypertension    Other hyperlipidemia    Primary osteoarthritis of both knees  -     celecoxib (CELEBREX) 200 MG capsule; Take 1 capsule (200 mg total) by mouth 2 (two) times daily. For joint pain.  -     Ambulatory referral/consult to Orthopedics; Future    Multiple thyroid nodules    Vitamin D deficiency    Tortuous aorta    Generalized abdominal pain  -     CT Abdomen Pelvis  Without Contrast; Future  -     Ambulatory referral/consult to Endo Procedure ; Future  -     US Abdomen Complete; Future    Other orders  -     esomeprazole (NEXIUM) 40 MG capsule; Take 1 capsule (40 mg total) by mouth before breakfast.         No follow-ups on file.     Joo Munoz MD

## 2023-01-05 ENCOUNTER — HOSPITAL ENCOUNTER (OUTPATIENT)
Dept: RADIOLOGY | Facility: HOSPITAL | Age: 73
Discharge: HOME | End: 2023-01-05
Attending: INTERNAL MEDICINE
Payer: MEDICARE

## 2023-01-05 DIAGNOSIS — R10.84 GENERALIZED ABDOMINAL PAIN: ICD-10-CM

## 2023-01-05 PROCEDURE — 76700 US EXAM ABDOM COMPLETE: CPT | Mod: 26,HCNC,, | Performed by: RADIOLOGY

## 2023-01-05 PROCEDURE — 76700 US ABDOMEN COMPLETE: ICD-10-PCS | Mod: 26,HCNC,, | Performed by: RADIOLOGY

## 2023-01-05 PROCEDURE — 76700 US EXAM ABDOM COMPLETE: CPT | Mod: TC,HCNC

## 2023-01-09 ENCOUNTER — HOSPITAL ENCOUNTER (OUTPATIENT)
Dept: RADIOLOGY | Facility: HOSPITAL | Age: 73
Discharge: HOME | End: 2023-01-09
Attending: INTERNAL MEDICINE
Payer: MEDICARE

## 2023-01-09 DIAGNOSIS — R10.84 GENERALIZED ABDOMINAL PAIN: ICD-10-CM

## 2023-01-09 PROCEDURE — 74176 CT ABD & PELVIS W/O CONTRAST: CPT | Mod: 26,HCNC,, | Performed by: RADIOLOGY

## 2023-01-09 PROCEDURE — 74176 CT ABDOMEN PELVIS WITHOUT CONTRAST: ICD-10-PCS | Mod: 26,HCNC,, | Performed by: RADIOLOGY

## 2023-01-09 PROCEDURE — 74176 CT ABD & PELVIS W/O CONTRAST: CPT | Mod: TC,HCNC

## 2023-01-11 NOTE — PROGRESS NOTES
The abdominal ultrasound is negative for any acute abnormalities producing abdominal pain.  Dilatation of the abdominal aorta and right iliac artery shows minimal change when compared to previous imaging studies from 09/2020 and 09/2021.  Ultrasound follow-up of these arterial blood vessels will be obtained in 1 year.

## 2023-01-18 ENCOUNTER — TELEPHONE (OUTPATIENT)
Dept: SPORTS MEDICINE | Facility: CLINIC | Age: 73
End: 2023-01-18
Payer: MEDICARE

## 2023-01-18 NOTE — TELEPHONE ENCOUNTER
Called and spoke to patient.  She would like to see Dr. Harley for a second opinion for her knee pain.

## 2023-01-19 ENCOUNTER — PATIENT MESSAGE (OUTPATIENT)
Dept: SPORTS MEDICINE | Facility: CLINIC | Age: 73
End: 2023-01-19
Payer: MEDICARE

## 2023-01-19 DIAGNOSIS — M17.0 PRIMARY OSTEOARTHRITIS OF BOTH KNEES: Primary | ICD-10-CM

## 2023-01-23 ENCOUNTER — HOSPITAL ENCOUNTER (OUTPATIENT)
Dept: RADIOLOGY | Facility: HOSPITAL | Age: 73
Discharge: HOME | End: 2023-01-23
Attending: STUDENT IN AN ORGANIZED HEALTH CARE EDUCATION/TRAINING PROGRAM
Payer: MEDICARE

## 2023-01-23 ENCOUNTER — OFFICE VISIT (OUTPATIENT)
Dept: SPORTS MEDICINE | Facility: CLINIC | Age: 73
End: 2023-01-23
Payer: MEDICARE

## 2023-01-23 VITALS
DIASTOLIC BLOOD PRESSURE: 68 MMHG | SYSTOLIC BLOOD PRESSURE: 119 MMHG | HEIGHT: 62 IN | BODY MASS INDEX: 31.24 KG/M2 | WEIGHT: 169.75 LBS

## 2023-01-23 DIAGNOSIS — G89.29 BILATERAL CHRONIC KNEE PAIN: ICD-10-CM

## 2023-01-23 DIAGNOSIS — M25.562 BILATERAL CHRONIC KNEE PAIN: ICD-10-CM

## 2023-01-23 DIAGNOSIS — M25.561 BILATERAL CHRONIC KNEE PAIN: ICD-10-CM

## 2023-01-23 DIAGNOSIS — M17.0 PRIMARY OSTEOARTHRITIS OF BOTH KNEES: Primary | ICD-10-CM

## 2023-01-23 DIAGNOSIS — M17.0 PRIMARY OSTEOARTHRITIS OF BOTH KNEES: ICD-10-CM

## 2023-01-23 PROCEDURE — 99999 PR PBB SHADOW E&M-EST. PATIENT-LVL IV: ICD-10-PCS | Mod: PBBFAC,HCNC,, | Performed by: STUDENT IN AN ORGANIZED HEALTH CARE EDUCATION/TRAINING PROGRAM

## 2023-01-23 PROCEDURE — 1160F PR REVIEW ALL MEDS BY PRESCRIBER/CLIN PHARMACIST DOCUMENTED: ICD-10-PCS | Mod: HCNC,CPTII,S$GLB, | Performed by: STUDENT IN AN ORGANIZED HEALTH CARE EDUCATION/TRAINING PROGRAM

## 2023-01-23 PROCEDURE — 99999 PR PBB SHADOW E&M-EST. PATIENT-LVL IV: CPT | Mod: PBBFAC,HCNC,, | Performed by: STUDENT IN AN ORGANIZED HEALTH CARE EDUCATION/TRAINING PROGRAM

## 2023-01-23 PROCEDURE — 73564 X-RAY EXAM KNEE 4 OR MORE: CPT | Mod: 26,50,HCNC, | Performed by: RADIOLOGY

## 2023-01-23 PROCEDURE — 99213 OFFICE O/P EST LOW 20 MIN: CPT | Mod: HCNC,S$GLB,, | Performed by: STUDENT IN AN ORGANIZED HEALTH CARE EDUCATION/TRAINING PROGRAM

## 2023-01-23 PROCEDURE — 1160F RVW MEDS BY RX/DR IN RCRD: CPT | Mod: HCNC,CPTII,S$GLB, | Performed by: STUDENT IN AN ORGANIZED HEALTH CARE EDUCATION/TRAINING PROGRAM

## 2023-01-23 PROCEDURE — 73564 XR KNEE ORTHO BILAT WITH FLEXION: ICD-10-PCS | Mod: 26,50,HCNC, | Performed by: RADIOLOGY

## 2023-01-23 PROCEDURE — 3078F DIAST BP <80 MM HG: CPT | Mod: HCNC,CPTII,S$GLB, | Performed by: STUDENT IN AN ORGANIZED HEALTH CARE EDUCATION/TRAINING PROGRAM

## 2023-01-23 PROCEDURE — 73564 X-RAY EXAM KNEE 4 OR MORE: CPT | Mod: TC,50,HCNC,PO

## 2023-01-23 PROCEDURE — 3078F PR MOST RECENT DIASTOLIC BLOOD PRESSURE < 80 MM HG: ICD-10-PCS | Mod: HCNC,CPTII,S$GLB, | Performed by: STUDENT IN AN ORGANIZED HEALTH CARE EDUCATION/TRAINING PROGRAM

## 2023-01-23 PROCEDURE — 1159F MED LIST DOCD IN RCRD: CPT | Mod: HCNC,CPTII,S$GLB, | Performed by: STUDENT IN AN ORGANIZED HEALTH CARE EDUCATION/TRAINING PROGRAM

## 2023-01-23 PROCEDURE — 1125F AMNT PAIN NOTED PAIN PRSNT: CPT | Mod: HCNC,CPTII,S$GLB, | Performed by: STUDENT IN AN ORGANIZED HEALTH CARE EDUCATION/TRAINING PROGRAM

## 2023-01-23 PROCEDURE — 3008F PR BODY MASS INDEX (BMI) DOCUMENTED: ICD-10-PCS | Mod: HCNC,CPTII,S$GLB, | Performed by: STUDENT IN AN ORGANIZED HEALTH CARE EDUCATION/TRAINING PROGRAM

## 2023-01-23 PROCEDURE — 1159F PR MEDICATION LIST DOCUMENTED IN MEDICAL RECORD: ICD-10-PCS | Mod: HCNC,CPTII,S$GLB, | Performed by: STUDENT IN AN ORGANIZED HEALTH CARE EDUCATION/TRAINING PROGRAM

## 2023-01-23 PROCEDURE — 3074F PR MOST RECENT SYSTOLIC BLOOD PRESSURE < 130 MM HG: ICD-10-PCS | Mod: HCNC,CPTII,S$GLB, | Performed by: STUDENT IN AN ORGANIZED HEALTH CARE EDUCATION/TRAINING PROGRAM

## 2023-01-23 PROCEDURE — 3008F BODY MASS INDEX DOCD: CPT | Mod: HCNC,CPTII,S$GLB, | Performed by: STUDENT IN AN ORGANIZED HEALTH CARE EDUCATION/TRAINING PROGRAM

## 2023-01-23 PROCEDURE — 1125F PR PAIN SEVERITY QUANTIFIED, PAIN PRESENT: ICD-10-PCS | Mod: HCNC,CPTII,S$GLB, | Performed by: STUDENT IN AN ORGANIZED HEALTH CARE EDUCATION/TRAINING PROGRAM

## 2023-01-23 PROCEDURE — 99213 PR OFFICE/OUTPT VISIT, EST, LEVL III, 20-29 MIN: ICD-10-PCS | Mod: HCNC,S$GLB,, | Performed by: STUDENT IN AN ORGANIZED HEALTH CARE EDUCATION/TRAINING PROGRAM

## 2023-01-23 PROCEDURE — 3074F SYST BP LT 130 MM HG: CPT | Mod: HCNC,CPTII,S$GLB, | Performed by: STUDENT IN AN ORGANIZED HEALTH CARE EDUCATION/TRAINING PROGRAM

## 2023-01-23 NOTE — PROGRESS NOTES
"CC: bilateral knee pain    72 y.o. Female presents today for evaluation of her bilateral knee pain. Pt reports she has had knee pain for "years." Pt states she had a fall on her left knee, and states she tore her meniscus due to the fall; pt underwent arthroscopic surgery (2014 with Dr. Hassan). Pt states she started putting more pressure on her right knee due to the surgery. Pt states she has noticed swelling, right > left, especially after prolonged standing or walking. Pt reports her pain is 2/10 today, and states it can get up to 9/10 at its worst. Pt localizes pain to anteromedial knees bilaterally, with intermittent pain into posterior knees. Pt notes buckling in her knees. Pt denies mechanical symptoms. Pt denies numbness/tingling.     SYMPTOMS:   Pain Score: 2/10   Pain location: anteromedial, posterior  Time of onset: "years"  Trauma, injury: left knee- fall in 2014; right knee - gradual onset    Audible pop: no  Clicking: no  Catching: no  Locking: no  Giving out, instability: yes  Swelling: yes (R > L)  Theater sign:  yes  Problems with stairs: yes    INTERVENTIONS:   Medications tried: celebrex, voltaren gel, tylenol  Braces/devices: walker (post-COVID)  Physical therapy: aqua therapy before pandemic  Injections: steroid injection (years ago), temporary relief in L knee     RELEVANT HISTORY:   Imaging to date: 01/23/23  Previous significant knee injuries: L knee - fell in 2014  Previous knee surgeries: L knee arthroscopy 2014    Occupation: Retired     REVIEW OF SYSTEMS:   Constitution: Patient denies fever or chills.  Eyes: Patient denies eye pain or vision changes.  HEENT: Patient denies ear pain, sore throat, or nasal discharge.  CVS: Patient denies chest pain.  Lungs: Patient denies shortness of breath or cough.  Abdomen: Patient denies any stomach pain, nausea, vomiting, or diarrhea  Skin: Patient denies skin rash or itching.    Musculoskeletal: Patient notes mid-back pain.   Neuro: Patient denies " any numbness or tingling in upper or lower extremities.  Psych: Patient denies any current anxiety or nervousness.    PAST MEDICAL HISTORY:   Past Medical History:   Diagnosis Date    Acute hypoxemic respiratory failure 3/31/2020    ALLERGIC RHINITIS     Cataract     COVID-19 virus infection 03/2020    Degenerative disc disease, cervical 9/13/2018    GERD (gastroesophageal reflux disease)     Hyperlipidemia     Hypertension     Migraine headache     Multifocal pneumonia 3/31/2020    Nuclear sclerosis 4/30/2014    Sleep disorder     Thyroid disease     nodular goiter    TIA (transient ischemic attack)        PAST SURGICAL HISTORY:  Past Surgical History:   Procedure Laterality Date    BREAST BIOPSY      BREAST CYST ASPIRATION      BREAST CYST EXCISION      COLONOSCOPY N/A 4/29/2016    Procedure: COLONOSCOPY;  Surgeon: Sergio Vickers MD;  Location: [facility] ENDO (4TH FLR);  Service: Endoscopy;  Laterality: N/A;    COLONOSCOPY N/A 6/28/2021    Procedure: COLONOSCOPY;  Surgeon: Sergio Vickers MD;  Location: [facility] ENDO (4TH FLR);  Service: Endoscopy;  Laterality: N/A;  COVID + 3/2020 and fully vaccinated -   pt requesting Dr. vickers/ instructions emailed-     HYSTERECTOMY         FAMILY HISTORY:  Family History   Problem Relation Age of Onset    Diabetes Mother     Hypertension Mother     Breast cancer Mother     Asthma Father     Glaucoma Father     Heart disease Maternal Grandmother     No Known Problems Son     No Known Problems Daughter     Breast cancer Sister     Ovarian cancer Sister     Amblyopia Neg Hx     Blindness Neg Hx     Cancer Neg Hx     Cataracts Neg Hx     Macular degeneration Neg Hx     Retinal detachment Neg Hx     Strabismus Neg Hx     Stroke Neg Hx     Thyroid disease Neg Hx        SOCIAL HISTORY:  Social History     Socioeconomic History    Marital status:    Tobacco Use    Smoking status: Never    Smokeless tobacco: Never   Substance and Sexual Activity    Alcohol use: No    Drug  use: No       MEDICATIONS:     Current Outpatient Medications:     albuterol (PROVENTIL/VENTOLIN HFA) 90 mcg/actuation inhaler, Inhale 2 puffs into the lungs every 6 (six) hours as needed for Wheezing. Rescue, Disp: 54 g, Rfl: 3    amoxicillin-clavulanate 875-125mg (AUGMENTIN) 875-125 mg per tablet, Take 1 tablet by mouth every 12 (twelve) hours., Disp: 20 tablet, Rfl: 0    ascorbic acid, vitamin C, (VITAMIN C) 500 MG tablet, Take 1 tablet (500 mg total) by mouth 2 (two) times daily., Disp: , Rfl:     atorvastatin (LIPITOR) 40 MG tablet, TAKE 1 TABLET BY MOUTH EVERY DAY IN THE EVENING, Disp: 90 tablet, Rfl: 3    calcium carbonate (TUMS) 200 mg calcium (500 mg) chewable tablet, Take 2 tablets (1,000 mg total) by mouth 3 (three) times daily as needed., Disp: , Rfl:     celecoxib (CELEBREX) 200 MG capsule, Take 1 capsule (200 mg total) by mouth 2 (two) times daily. For joint pain., Disp: 60 capsule, Rfl: 2    clotrimazole-betamethasone 1-0.05% (LOTRISONE) cream, Apply topically 2 (two) times daily., Disp: 45 g, Rfl: 0    cyanocobalamin (VITAMIN B-12) 250 MCG tablet, Take 250 mcg by mouth once daily., Disp: , Rfl:     diclofenac sodium (VOLTAREN) 1 % Gel, Apply 2 g topically 2 (two) times daily as needed., Disp: 1 Tube, Rfl: 1    ergocalciferol, vitamin D2, (VITAMIN D ORAL), Take 2,000 Int'l Units by mouth once daily., Disp: , Rfl:     esomeprazole (NEXIUM) 40 MG capsule, Take 1 capsule (40 mg total) by mouth before breakfast., Disp: 90 capsule, Rfl: 3    folic acid/multivit-min/lutein (CENTRUM SILVER ORAL), Take 1 tablet by mouth once daily., Disp: , Rfl:     hydrOXYzine HCL (ATARAX) 25 MG tablet, , Disp: , Rfl:     levocetirizine (XYZAL) 5 MG tablet, Take 1 tablet (5 mg total) by mouth daily as needed (cold and sinus)., Disp: 30 tablet, Rfl: 11    meclizine (ANTIVERT) 25 mg tablet, Take 1 tablet (25 mg total) by mouth every 8 (eight) hours as needed for Dizziness. (Patient not taking: Reported on 12/27/2022), Disp: 40  "tablet, Rfl: 1    metoprolol succinate (TOPROL-XL) 50 MG 24 hr tablet, TAKE 1 TABLET BY MOUTH EVERY DAY, Disp: 90 tablet, Rfl: 2    ALLERGIES:   Review of patient's allergies indicates:   Allergen Reactions    Iodine Rash    Iodine and iodide containing products Itching and Rash    Shellfish containing products Itching and Rash        PHYSICAL EXAMINATION:  Ht 5' 2" (1.575 m)   Wt 77 kg (169 lb 12.1 oz)   BMI 31.05 kg/m²   Vitals signs and nursing note have been reviewed.    General: In no acute distress, well developed, well nourished, no diaphoresis  Eyes: EOM full and smooth, no eye redness or discharge  HEENT: normocephalic and atraumatic, neck supple, trachea midline, no nasal discharge  Cardiovascular: no LE edema  Lungs: respirations non-labored, no conversational dyspnea   Neuro: AAOx3, CN2-12 grossly intact  Skin: No rashes, warm and dry  Psychiatric: cooperative, pleasant, mood and affect appropriate for age    Bilateral Knee:   Gait: wnl    Inspection/Palpation:   -Rubor   -Calor  -Effusion   -Patella ballotable   -Patellar apprehension  -Retinacular tenderness   -Patellar crepitus   Patellar tilt grossly normal     TTP at:  +Medial Joint line   -MCL   -LCL   -Popliteal region   -Quad tendon   -Patella  -Pat tendon  -Pat border  -Med condyle   -Lat condyle   -Pes   -Prox fibula   -Tib tub  -Gerdy's tubercle  -Distal Hamstring tendons  -Proximal Hamstrings/Ischial tuberosity  -ITB    ROM:   Ext: 10°   Flex:120°   Popliteal Angle: 45°   +Discomfort w/ full flex   -Bounce-home discomfort     Ligamentous:   -Ant drawer   -Post drawer   -Lachman's   Good endpoints & no pain w/ valgus & varus stress    Meniscal:  -Rebeca's   -Michael   -Thessaly   -Pain w/ squat     Other:  -Patellar apprehension  -Patellar grind  -Ocasio's   -J sign  -Justin's  Abductors     IMAGIN. Knee X-ray ordered due to bilateral knee pain. 4 views taken today.   2. X-ray images were reviewed personally by me and then " directly with patient.  3. FINDINGS: Right knee:  No fracture.  Reconfirmed severe narrowing of medial compartment and mild genu varum.  Preserved lateral and patellofemoral compartments. Stable periarticular spurring about tibiofemoral and patellofemoral articulations.  No suprapatellar bursal effusion or prepatellar soft tissue swelling.  Left knee: No fracture.  Reconfirmed severe narrowing of medial compartment and mild genu varum.  Preserved lateral and patellofemoral compartments.  Stable periarticular spurring about tibiofemoral patellofemoral compartments.  No suprapatellar bursal effusion or prepatellar soft tissue swelling.    4. IMPRESSION:  Kellgren Jasen grade 4 osteoarthritic changes.  No acute osseous abnormalities appreciated.    ASSESSMENT:      ICD-10-CM ICD-9-CM   1. Bilateral chronic knee pain  M25.561 719.46    M25.562 338.29    G89.29    2. Primary osteoarthritis of both knees  M17.0 715.16         PLAN:    Patient with Kellgren-Jasen Grade 2 or higher osteoarthritic changes to the knee(s) (please see previous note with description of xray images).  Prior to hyaluronic injections, patient had functional limitations with decreased range of motion and persistent pain.  Patient has tried aerobic and resistance training and weight reduction without improvement of symptoms.  Patient failed to improve with the use of NSAIDs and Acetaminophen over the last 12 months.  Patient did not receive more than 8 weeks of relief from corticosteroid injection.      Future planning includes - HA B/l Knees    All questions were answered to the best of my ability and all concerns were addressed at this time.    Follow up in 2 week(s) for above, or sooner if needed.      This note is dictated using the M*Modal Fluency Direct word recognition program. There are word recognition mistakes that are occasionally missed on review.

## 2023-02-07 DIAGNOSIS — Z00.00 ENCOUNTER FOR MEDICARE ANNUAL WELLNESS EXAM: ICD-10-CM

## 2023-02-09 DIAGNOSIS — Z00.00 ENCOUNTER FOR MEDICARE ANNUAL WELLNESS EXAM: ICD-10-CM

## 2023-02-13 NOTE — PROGRESS NOTES
CC: bilateral knee pain    73 y.o. Female presents today for her 2nd Orthovisc injection of her bilateral knee.     Attempted treatments:  Pain score:  History of trauma/injury:  Affecting ADLs:     REVIEW OF SYSTEMS:   Constitution: Patient denies fever or chills.  Eyes: Patient denies eye pain or vision changes.  HEENT: Patient denies ear pain, sore throat, or nasal discharge.  CVS: Patient denies chest pain.  Lungs: Patient denies shortness of breath or cough.  Skin: Patient denies skin rash or itching.    Musculoskeletal: Patient denies recent falls. See HPI.  Psych: Patient denies any current anxiety or nervousness.    PAST MEDICAL HISTORY:   Past Medical History:   Diagnosis Date    Acute hypoxemic respiratory failure 3/31/2020    ALLERGIC RHINITIS     Cataract     COVID-19 virus infection 03/2020    Degenerative disc disease, cervical 9/13/2018    GERD (gastroesophageal reflux disease)     Hyperlipidemia     Hypertension     Migraine headache     Multifocal pneumonia 3/31/2020    Nuclear sclerosis 4/30/2014    Sleep disorder     Thyroid disease     nodular goiter    TIA (transient ischemic attack)        MEDICATIONS:     Current Outpatient Medications:     albuterol (PROVENTIL/VENTOLIN HFA) 90 mcg/actuation inhaler, Inhale 2 puffs into the lungs every 6 (six) hours as needed for Wheezing. Rescue, Disp: 54 g, Rfl: 3    amoxicillin-clavulanate 875-125mg (AUGMENTIN) 875-125 mg per tablet, Take 1 tablet by mouth every 12 (twelve) hours. (Patient not taking: Reported on 1/23/2023), Disp: 20 tablet, Rfl: 0    ascorbic acid, vitamin C, (VITAMIN C) 500 MG tablet, Take 1 tablet (500 mg total) by mouth 2 (two) times daily., Disp: , Rfl:     atorvastatin (LIPITOR) 40 MG tablet, TAKE 1 TABLET BY MOUTH EVERY DAY IN THE EVENING, Disp: 90 tablet, Rfl: 3    calcium carbonate (TUMS) 200 mg calcium (500 mg) chewable tablet, Take 2 tablets (1,000 mg total) by mouth 3 (three) times daily as needed., Disp: , Rfl:     celecoxib  (CELEBREX) 200 MG capsule, Take 1 capsule (200 mg total) by mouth 2 (two) times daily. For joint pain., Disp: 60 capsule, Rfl: 2    clotrimazole-betamethasone 1-0.05% (LOTRISONE) cream, Apply topically 2 (two) times daily., Disp: 45 g, Rfl: 0    cyanocobalamin (VITAMIN B-12) 250 MCG tablet, Take 250 mcg by mouth once daily., Disp: , Rfl:     diclofenac sodium (VOLTAREN) 1 % Gel, Apply 2 g topically 2 (two) times daily as needed., Disp: 1 Tube, Rfl: 1    ergocalciferol, vitamin D2, (VITAMIN D ORAL), Take 2,000 Int'l Units by mouth once daily., Disp: , Rfl:     esomeprazole (NEXIUM) 40 MG capsule, Take 1 capsule (40 mg total) by mouth before breakfast., Disp: 90 capsule, Rfl: 3    folic acid/multivit-min/lutein (CENTRUM SILVER ORAL), Take 1 tablet by mouth once daily., Disp: , Rfl:     hydrOXYzine HCL (ATARAX) 25 MG tablet, , Disp: , Rfl:     levocetirizine (XYZAL) 5 MG tablet, Take 1 tablet (5 mg total) by mouth daily as needed (cold and sinus)., Disp: 30 tablet, Rfl: 11    meclizine (ANTIVERT) 25 mg tablet, Take 1 tablet (25 mg total) by mouth every 8 (eight) hours as needed for Dizziness. (Patient not taking: Reported on 12/27/2022), Disp: 40 tablet, Rfl: 1    metoprolol succinate (TOPROL-XL) 50 MG 24 hr tablet, TAKE 1 TABLET BY MOUTH EVERY DAY, Disp: 90 tablet, Rfl: 2    ALLERGIES:   Review of patient's allergies indicates:   Allergen Reactions    Iodine Rash    Iodine and iodide containing products Itching and Rash    Shellfish containing products Itching and Rash        PHYSICAL EXAMINATION:  There were no vitals taken for this visit.  There are no signs of infection at the injection site, including no rubor, calor, or skin lesions.  Gen: NAD.  Psych: Affect & judgment nl.  Neuro: Grossly CNI. WILKINSON.  HEENT: -Trach dev. -Eye d/c. -Rhinorrhea.  CV: Color nl. -E/C/C. WWPx4.  Pulm: -Dyspnea. -Cough.  Lymph: -Edema.  Int: -Rash/lesion noted. Skin is warm and dry    ASSESSMENT:    No diagnosis  found.      PLAN:  Ultrasound-guided injection of the bilateral knee with Orthovisc performed at visit today.    Future planning includes - Continue exercise program    Risks and benefits were discussed with patient prior to receiving injection.  Depending on injection type, risks include the possibility of infection, pain, disruptions in blood pressure and blood sugar, and cosmetic deformity at site of injection.    All questions were answered to the best of my ability and all concerns were addressed at this time.    Follow up in *** week(s) for above, or sooner if needed.      This note is dictated using the M*Modal Fluency Direct word recognition program. There are word recognition mistakes that are occasionally missed on review.

## 2023-02-16 ENCOUNTER — OFFICE VISIT (OUTPATIENT)
Dept: SPORTS MEDICINE | Facility: CLINIC | Age: 73
End: 2023-02-16
Payer: MEDICARE

## 2023-02-16 VITALS
HEIGHT: 62 IN | BODY MASS INDEX: 30.44 KG/M2 | SYSTOLIC BLOOD PRESSURE: 130 MMHG | DIASTOLIC BLOOD PRESSURE: 72 MMHG | HEART RATE: 65 BPM | WEIGHT: 165.38 LBS

## 2023-02-16 DIAGNOSIS — M17.0 PRIMARY OSTEOARTHRITIS OF BOTH KNEES: Primary | ICD-10-CM

## 2023-02-16 PROCEDURE — 1101F PT FALLS ASSESS-DOCD LE1/YR: CPT | Mod: HCNC,CPTII,S$GLB, | Performed by: STUDENT IN AN ORGANIZED HEALTH CARE EDUCATION/TRAINING PROGRAM

## 2023-02-16 PROCEDURE — 99499 NO LOS: ICD-10-PCS | Mod: HCNC,S$GLB,, | Performed by: STUDENT IN AN ORGANIZED HEALTH CARE EDUCATION/TRAINING PROGRAM

## 2023-02-16 PROCEDURE — 1159F PR MEDICATION LIST DOCUMENTED IN MEDICAL RECORD: ICD-10-PCS | Mod: HCNC,CPTII,S$GLB, | Performed by: STUDENT IN AN ORGANIZED HEALTH CARE EDUCATION/TRAINING PROGRAM

## 2023-02-16 PROCEDURE — 20611 LARGE JOINT ASPIRATION/INJECTION: BILATERAL KNEE: ICD-10-PCS | Mod: 50,HCNC,S$GLB, | Performed by: STUDENT IN AN ORGANIZED HEALTH CARE EDUCATION/TRAINING PROGRAM

## 2023-02-16 PROCEDURE — 1125F AMNT PAIN NOTED PAIN PRSNT: CPT | Mod: HCNC,CPTII,S$GLB, | Performed by: STUDENT IN AN ORGANIZED HEALTH CARE EDUCATION/TRAINING PROGRAM

## 2023-02-16 PROCEDURE — 76882 US LMTD JT/FCL EVL NVASC XTR: CPT | Mod: HCNC,59,S$GLB, | Performed by: STUDENT IN AN ORGANIZED HEALTH CARE EDUCATION/TRAINING PROGRAM

## 2023-02-16 PROCEDURE — 3075F PR MOST RECENT SYSTOLIC BLOOD PRESS GE 130-139MM HG: ICD-10-PCS | Mod: HCNC,CPTII,S$GLB, | Performed by: STUDENT IN AN ORGANIZED HEALTH CARE EDUCATION/TRAINING PROGRAM

## 2023-02-16 PROCEDURE — 3075F SYST BP GE 130 - 139MM HG: CPT | Mod: HCNC,CPTII,S$GLB, | Performed by: STUDENT IN AN ORGANIZED HEALTH CARE EDUCATION/TRAINING PROGRAM

## 2023-02-16 PROCEDURE — 76882 PR  US,EXTREMITY,NONVASCULAR,REAL-TIME IMAGE,LIMITED: ICD-10-PCS | Mod: HCNC,59,S$GLB, | Performed by: STUDENT IN AN ORGANIZED HEALTH CARE EDUCATION/TRAINING PROGRAM

## 2023-02-16 PROCEDURE — 1160F RVW MEDS BY RX/DR IN RCRD: CPT | Mod: HCNC,CPTII,S$GLB, | Performed by: STUDENT IN AN ORGANIZED HEALTH CARE EDUCATION/TRAINING PROGRAM

## 2023-02-16 PROCEDURE — 1125F PR PAIN SEVERITY QUANTIFIED, PAIN PRESENT: ICD-10-PCS | Mod: HCNC,CPTII,S$GLB, | Performed by: STUDENT IN AN ORGANIZED HEALTH CARE EDUCATION/TRAINING PROGRAM

## 2023-02-16 PROCEDURE — 1160F PR REVIEW ALL MEDS BY PRESCRIBER/CLIN PHARMACIST DOCUMENTED: ICD-10-PCS | Mod: HCNC,CPTII,S$GLB, | Performed by: STUDENT IN AN ORGANIZED HEALTH CARE EDUCATION/TRAINING PROGRAM

## 2023-02-16 PROCEDURE — 3078F DIAST BP <80 MM HG: CPT | Mod: HCNC,CPTII,S$GLB, | Performed by: STUDENT IN AN ORGANIZED HEALTH CARE EDUCATION/TRAINING PROGRAM

## 2023-02-16 PROCEDURE — 99999 PR PBB SHADOW E&M-EST. PATIENT-LVL IV: ICD-10-PCS | Mod: PBBFAC,HCNC,, | Performed by: STUDENT IN AN ORGANIZED HEALTH CARE EDUCATION/TRAINING PROGRAM

## 2023-02-16 PROCEDURE — 1159F MED LIST DOCD IN RCRD: CPT | Mod: HCNC,CPTII,S$GLB, | Performed by: STUDENT IN AN ORGANIZED HEALTH CARE EDUCATION/TRAINING PROGRAM

## 2023-02-16 PROCEDURE — 99999 PR PBB SHADOW E&M-EST. PATIENT-LVL IV: CPT | Mod: PBBFAC,HCNC,, | Performed by: STUDENT IN AN ORGANIZED HEALTH CARE EDUCATION/TRAINING PROGRAM

## 2023-02-16 PROCEDURE — 3008F PR BODY MASS INDEX (BMI) DOCUMENTED: ICD-10-PCS | Mod: HCNC,CPTII,S$GLB, | Performed by: STUDENT IN AN ORGANIZED HEALTH CARE EDUCATION/TRAINING PROGRAM

## 2023-02-16 PROCEDURE — 3008F BODY MASS INDEX DOCD: CPT | Mod: HCNC,CPTII,S$GLB, | Performed by: STUDENT IN AN ORGANIZED HEALTH CARE EDUCATION/TRAINING PROGRAM

## 2023-02-16 PROCEDURE — 3288F FALL RISK ASSESSMENT DOCD: CPT | Mod: HCNC,CPTII,S$GLB, | Performed by: STUDENT IN AN ORGANIZED HEALTH CARE EDUCATION/TRAINING PROGRAM

## 2023-02-16 PROCEDURE — 20611 DRAIN/INJ JOINT/BURSA W/US: CPT | Mod: 50,HCNC,S$GLB, | Performed by: STUDENT IN AN ORGANIZED HEALTH CARE EDUCATION/TRAINING PROGRAM

## 2023-02-16 PROCEDURE — 3288F PR FALLS RISK ASSESSMENT DOCUMENTED: ICD-10-PCS | Mod: HCNC,CPTII,S$GLB, | Performed by: STUDENT IN AN ORGANIZED HEALTH CARE EDUCATION/TRAINING PROGRAM

## 2023-02-16 PROCEDURE — 1101F PR PT FALLS ASSESS DOC 0-1 FALLS W/OUT INJ PAST YR: ICD-10-PCS | Mod: HCNC,CPTII,S$GLB, | Performed by: STUDENT IN AN ORGANIZED HEALTH CARE EDUCATION/TRAINING PROGRAM

## 2023-02-16 PROCEDURE — 99499 UNLISTED E&M SERVICE: CPT | Mod: HCNC,S$GLB,, | Performed by: STUDENT IN AN ORGANIZED HEALTH CARE EDUCATION/TRAINING PROGRAM

## 2023-02-16 PROCEDURE — 3078F PR MOST RECENT DIASTOLIC BLOOD PRESSURE < 80 MM HG: ICD-10-PCS | Mod: HCNC,CPTII,S$GLB, | Performed by: STUDENT IN AN ORGANIZED HEALTH CARE EDUCATION/TRAINING PROGRAM

## 2023-02-16 NOTE — PROGRESS NOTES
CC: bilateral knee pain    73 y.o. Female presents today for her 1st Orthovisc injection of her bilateral knee.     Attempted treatments: Celebrex   Pain score: 6/10  History of trauma/injury: Denies  Affecting ADLs: Yes     REVIEW OF SYSTEMS:   Constitution: Patient denies fever or chills.  Eyes: Patient denies eye pain or vision changes.  HEENT: Patient denies ear pain, sore throat, or nasal discharge.  CVS: Patient denies chest pain.  Lungs: Patient denies shortness of breath or cough.  Skin: Patient denies skin rash or itching.    Musculoskeletal: Patient denies recent falls. See HPI.  Psych: Patient denies any current anxiety or nervousness.    PAST MEDICAL HISTORY:   Past Medical History:   Diagnosis Date    Acute hypoxemic respiratory failure 3/31/2020    ALLERGIC RHINITIS     Cataract     COVID-19 virus infection 03/2020    Degenerative disc disease, cervical 9/13/2018    GERD (gastroesophageal reflux disease)     Hyperlipidemia     Hypertension     Migraine headache     Multifocal pneumonia 3/31/2020    Nuclear sclerosis 4/30/2014    Sleep disorder     Thyroid disease     nodular goiter    TIA (transient ischemic attack)        MEDICATIONS:     Current Outpatient Medications:     albuterol (PROVENTIL/VENTOLIN HFA) 90 mcg/actuation inhaler, Inhale 2 puffs into the lungs every 6 (six) hours as needed for Wheezing. Rescue, Disp: 54 g, Rfl: 3    ascorbic acid, vitamin C, (VITAMIN C) 500 MG tablet, Take 1 tablet (500 mg total) by mouth 2 (two) times daily., Disp: , Rfl:     atorvastatin (LIPITOR) 40 MG tablet, TAKE 1 TABLET BY MOUTH EVERY DAY IN THE EVENING, Disp: 90 tablet, Rfl: 3    celecoxib (CELEBREX) 200 MG capsule, Take 1 capsule (200 mg total) by mouth 2 (two) times daily. For joint pain., Disp: 60 capsule, Rfl: 2    clotrimazole-betamethasone 1-0.05% (LOTRISONE) cream, Apply topically 2 (two) times daily., Disp: 45 g, Rfl: 0    cyanocobalamin (VITAMIN B-12) 250 MCG tablet, Take 250 mcg by mouth once  "daily., Disp: , Rfl:     diclofenac sodium (VOLTAREN) 1 % Gel, Apply 2 g topically 2 (two) times daily as needed., Disp: 1 Tube, Rfl: 1    ergocalciferol, vitamin D2, (VITAMIN D ORAL), Take 2,000 Int'l Units by mouth once daily., Disp: , Rfl:     esomeprazole (NEXIUM) 40 MG capsule, Take 1 capsule (40 mg total) by mouth before breakfast., Disp: 90 capsule, Rfl: 3    folic acid/multivit-min/lutein (CENTRUM SILVER ORAL), Take 1 tablet by mouth once daily., Disp: , Rfl:     hydrOXYzine HCL (ATARAX) 25 MG tablet, , Disp: , Rfl:     metoprolol succinate (TOPROL-XL) 50 MG 24 hr tablet, TAKE 1 TABLET BY MOUTH EVERY DAY, Disp: 90 tablet, Rfl: 2    amoxicillin-clavulanate 875-125mg (AUGMENTIN) 875-125 mg per tablet, Take 1 tablet by mouth every 12 (twelve) hours. (Patient not taking: Reported on 1/23/2023), Disp: 20 tablet, Rfl: 0    calcium carbonate (TUMS) 200 mg calcium (500 mg) chewable tablet, Take 2 tablets (1,000 mg total) by mouth 3 (three) times daily as needed., Disp: , Rfl:     levocetirizine (XYZAL) 5 MG tablet, Take 1 tablet (5 mg total) by mouth daily as needed (cold and sinus)., Disp: 30 tablet, Rfl: 11    meclizine (ANTIVERT) 25 mg tablet, Take 1 tablet (25 mg total) by mouth every 8 (eight) hours as needed for Dizziness. (Patient not taking: Reported on 12/27/2022), Disp: 40 tablet, Rfl: 1    ALLERGIES:   Review of patient's allergies indicates:   Allergen Reactions    Iodine Rash    Iodine and iodide containing products Itching and Rash    Shellfish containing products Itching and Rash        PHYSICAL EXAMINATION:  /72   Pulse 65   Ht 5' 2" (1.575 m)   Wt 75 kg (165 lb 6.4 oz)   BMI 30.25 kg/m²   There are no signs of infection at the injection site, including no rubor, calor, or skin lesions.  Gen: NAD.  Psych: Affect & judgment nl.  Neuro: Grossly CNI. WILKINSON.  HEENT: -Trach dev. -Eye d/c. -Rhinorrhea.  CV: Color nl. -E/C/C. WWPx4.  Pulm: -Dyspnea. -Cough.  Lymph: -Edema.  Int: -Rash/lesion noted. " Skin is warm and dry    Diagnostic Extremity - [facility]-Sports Ultrasound was recommended due to bilateral knee(s) evaluation.    FOCUSED: examination.     TECHNIQUE: Real time ultrasound examination of the bilateral knee was performed with SonoSite Edge 2, 9-L MHz linear probe.     FINDINGS: The images are of adequate diagnostic quality with identification of all echogenic structures made except for the vascular structures unless otherwise noted. There is no sonographic evidence of periosteal abnormalities, soft tissue edema, musculotendinous or nerve irregularities. The rest of the sonographic examination was unremarkable.    Ultrasound images were directly reviewed with the patient and then a treatment plan was discussed.     Images were stored in the patients medical record.     IMPRESSION: No effusions either knee      ASSESSMENT:      ICD-10-CM ICD-9-CM   1. Primary osteoarthritis of both knees  M17.0 715.16         PLAN:  Ultrasound-guided injection of the bilateral knee with Orthovisc performed at visit today.    Future planning includes - Continue exercise program    Risks and benefits were discussed with patient prior to receiving injection.  Depending on injection type, risks include the possibility of infection, pain, disruptions in blood pressure and blood sugar, and cosmetic deformity at site of injection.    All questions were answered to the best of my ability and all concerns were addressed at this time.    Follow up in 1 week(s) for above, or sooner if needed.      This note is dictated using the M*Modal Fluency Direct word recognition program. There are word recognition mistakes that are occasionally missed on review.

## 2023-02-16 NOTE — PROCEDURES
Large Joint Aspiration/Injection: bilateral knee    Date/Time: 2/16/2023 10:30 AM  Performed by: Chary Harley MD  Authorized by: Chary Harley MD     Consent Done?:  Yes (Verbal)  Indications:  Arthritis and pain  Site marked: the procedure site was marked    Timeout: prior to procedure the correct patient, procedure, and site was verified    Prep: patient was prepped and draped in usual sterile fashion      Local anesthesia used?: Yes    Anesthesia:  Local infiltration  Local anesthetic:  Co-phenylcaine spray    Details:  Needle Size:  22 G  Ultrasonic Guidance for needle placement?: Yes (Ultrasound guidance used to avoid neurovascular injury and/or to improve accuracy given body habitus.)    Images are saved and documented.  Approach: Superolateral.  Location:  Knee  Laterality:  Bilateral  Site:  Bilateral knee  Medications (Right):  30 mg sodium hyaluronate (orthovisc) 30 mg/2 mL  Medications (Right) comment:  2mL Ropivacaine 0.2%    Medications (Left):  30 mg sodium hyaluronate (orthovisc) 30 mg/2 mL  Medications (Left) comment:  2mL Ropivacaine 0.2%    Patient tolerance:  Patient tolerated the procedure well with no immediate complications     TECHNIQUE: Real time ultrasound examination of the bilateral knees was performed with SonCareers360te Edge 2, 9-L MHz linear probe(s). Ultrasound guidance was used for needle localization. Images were saved and stored for documentation. Dynamic visualization of the needle was continuous throughout the procedures and maintained in good position.

## 2023-03-02 ENCOUNTER — OFFICE VISIT (OUTPATIENT)
Dept: SPORTS MEDICINE | Facility: CLINIC | Age: 73
End: 2023-03-02
Payer: MEDICARE

## 2023-03-02 VITALS
DIASTOLIC BLOOD PRESSURE: 69 MMHG | BODY MASS INDEX: 30.36 KG/M2 | HEIGHT: 62 IN | SYSTOLIC BLOOD PRESSURE: 121 MMHG | WEIGHT: 165 LBS | HEART RATE: 68 BPM

## 2023-03-02 DIAGNOSIS — M17.0 PRIMARY OSTEOARTHRITIS OF BOTH KNEES: Primary | ICD-10-CM

## 2023-03-02 PROCEDURE — 1159F MED LIST DOCD IN RCRD: CPT | Mod: HCNC,CPTII,S$GLB, | Performed by: STUDENT IN AN ORGANIZED HEALTH CARE EDUCATION/TRAINING PROGRAM

## 2023-03-02 PROCEDURE — 3078F PR MOST RECENT DIASTOLIC BLOOD PRESSURE < 80 MM HG: ICD-10-PCS | Mod: HCNC,CPTII,S$GLB, | Performed by: STUDENT IN AN ORGANIZED HEALTH CARE EDUCATION/TRAINING PROGRAM

## 2023-03-02 PROCEDURE — 1101F PR PT FALLS ASSESS DOC 0-1 FALLS W/OUT INJ PAST YR: ICD-10-PCS | Mod: HCNC,CPTII,S$GLB, | Performed by: STUDENT IN AN ORGANIZED HEALTH CARE EDUCATION/TRAINING PROGRAM

## 2023-03-02 PROCEDURE — 99999 PR PBB SHADOW E&M-EST. PATIENT-LVL IV: CPT | Mod: PBBFAC,HCNC,, | Performed by: STUDENT IN AN ORGANIZED HEALTH CARE EDUCATION/TRAINING PROGRAM

## 2023-03-02 PROCEDURE — 1125F PR PAIN SEVERITY QUANTIFIED, PAIN PRESENT: ICD-10-PCS | Mod: HCNC,CPTII,S$GLB, | Performed by: STUDENT IN AN ORGANIZED HEALTH CARE EDUCATION/TRAINING PROGRAM

## 2023-03-02 PROCEDURE — 1160F PR REVIEW ALL MEDS BY PRESCRIBER/CLIN PHARMACIST DOCUMENTED: ICD-10-PCS | Mod: HCNC,CPTII,S$GLB, | Performed by: STUDENT IN AN ORGANIZED HEALTH CARE EDUCATION/TRAINING PROGRAM

## 2023-03-02 PROCEDURE — 1159F PR MEDICATION LIST DOCUMENTED IN MEDICAL RECORD: ICD-10-PCS | Mod: HCNC,CPTII,S$GLB, | Performed by: STUDENT IN AN ORGANIZED HEALTH CARE EDUCATION/TRAINING PROGRAM

## 2023-03-02 PROCEDURE — 99499 UNLISTED E&M SERVICE: CPT | Mod: HCNC,S$GLB,, | Performed by: STUDENT IN AN ORGANIZED HEALTH CARE EDUCATION/TRAINING PROGRAM

## 2023-03-02 PROCEDURE — 1125F AMNT PAIN NOTED PAIN PRSNT: CPT | Mod: HCNC,CPTII,S$GLB, | Performed by: STUDENT IN AN ORGANIZED HEALTH CARE EDUCATION/TRAINING PROGRAM

## 2023-03-02 PROCEDURE — 3078F DIAST BP <80 MM HG: CPT | Mod: HCNC,CPTII,S$GLB, | Performed by: STUDENT IN AN ORGANIZED HEALTH CARE EDUCATION/TRAINING PROGRAM

## 2023-03-02 PROCEDURE — 3074F PR MOST RECENT SYSTOLIC BLOOD PRESSURE < 130 MM HG: ICD-10-PCS | Mod: HCNC,CPTII,S$GLB, | Performed by: STUDENT IN AN ORGANIZED HEALTH CARE EDUCATION/TRAINING PROGRAM

## 2023-03-02 PROCEDURE — 99999 PR PBB SHADOW E&M-EST. PATIENT-LVL IV: ICD-10-PCS | Mod: PBBFAC,HCNC,, | Performed by: STUDENT IN AN ORGANIZED HEALTH CARE EDUCATION/TRAINING PROGRAM

## 2023-03-02 PROCEDURE — 3074F SYST BP LT 130 MM HG: CPT | Mod: HCNC,CPTII,S$GLB, | Performed by: STUDENT IN AN ORGANIZED HEALTH CARE EDUCATION/TRAINING PROGRAM

## 2023-03-02 PROCEDURE — 3008F BODY MASS INDEX DOCD: CPT | Mod: HCNC,CPTII,S$GLB, | Performed by: STUDENT IN AN ORGANIZED HEALTH CARE EDUCATION/TRAINING PROGRAM

## 2023-03-02 PROCEDURE — 3008F PR BODY MASS INDEX (BMI) DOCUMENTED: ICD-10-PCS | Mod: HCNC,CPTII,S$GLB, | Performed by: STUDENT IN AN ORGANIZED HEALTH CARE EDUCATION/TRAINING PROGRAM

## 2023-03-02 PROCEDURE — 1160F RVW MEDS BY RX/DR IN RCRD: CPT | Mod: HCNC,CPTII,S$GLB, | Performed by: STUDENT IN AN ORGANIZED HEALTH CARE EDUCATION/TRAINING PROGRAM

## 2023-03-02 PROCEDURE — 20611 DRAIN/INJ JOINT/BURSA W/US: CPT | Mod: 50,HCNC,S$GLB, | Performed by: STUDENT IN AN ORGANIZED HEALTH CARE EDUCATION/TRAINING PROGRAM

## 2023-03-02 PROCEDURE — 3288F PR FALLS RISK ASSESSMENT DOCUMENTED: ICD-10-PCS | Mod: HCNC,CPTII,S$GLB, | Performed by: STUDENT IN AN ORGANIZED HEALTH CARE EDUCATION/TRAINING PROGRAM

## 2023-03-02 PROCEDURE — 3288F FALL RISK ASSESSMENT DOCD: CPT | Mod: HCNC,CPTII,S$GLB, | Performed by: STUDENT IN AN ORGANIZED HEALTH CARE EDUCATION/TRAINING PROGRAM

## 2023-03-02 PROCEDURE — 99499 NO LOS: ICD-10-PCS | Mod: HCNC,S$GLB,, | Performed by: STUDENT IN AN ORGANIZED HEALTH CARE EDUCATION/TRAINING PROGRAM

## 2023-03-02 PROCEDURE — 20611 LARGE JOINT ASPIRATION/INJECTION: BILATERAL KNEE: ICD-10-PCS | Mod: 50,HCNC,S$GLB, | Performed by: STUDENT IN AN ORGANIZED HEALTH CARE EDUCATION/TRAINING PROGRAM

## 2023-03-02 PROCEDURE — 1101F PT FALLS ASSESS-DOCD LE1/YR: CPT | Mod: HCNC,CPTII,S$GLB, | Performed by: STUDENT IN AN ORGANIZED HEALTH CARE EDUCATION/TRAINING PROGRAM

## 2023-03-02 NOTE — PROCEDURES
Large Joint Aspiration/Injection: bilateral knee    Date/Time: 3/2/2023 2:15 PM  Performed by: Chary Harley MD  Authorized by: Chary Harley MD     Consent Done?:  Yes (Verbal)  Indications:  Arthritis and pain  Site marked: the procedure site was marked    Timeout: prior to procedure the correct patient, procedure, and site was verified    Prep: patient was prepped and draped in usual sterile fashion      Local anesthesia used?: Yes    Anesthesia:  Local infiltration  Local anesthetic:  Co-phenylcaine spray    Details:  Needle Size:  22 G  Ultrasonic Guidance for needle placement?: Yes (Ultrasound guidance used to avoid neurovascular injury and/or to improve accuracy given body habitus.)    Images are saved and documented.  Approach: Superolateral.  Location:  Knee  Laterality:  Bilateral  Site:  Bilateral knee  Medications (Right):  30 mg sodium hyaluronate (orthovisc) 30 mg/2 mL  Medications (Right) comment:  2mL Ropivacaine 0.2%    Medications (Left):  30 mg sodium hyaluronate (orthovisc) 30 mg/2 mL  Medications (Left) comment:  2mL Ropivacaine 0.2%    Patient tolerance:  Patient tolerated the procedure well with no immediate complications     TECHNIQUE: Real time ultrasound examination of the bilateral knees was performed with SonFamily Pette Edge 2, 9-L MHz linear probe(s). Ultrasound guidance was used for needle localization. Images were saved and stored for documentation. Dynamic visualization of the needle was continuous throughout the procedures and maintained in good position.

## 2023-03-02 NOTE — PROGRESS NOTES
CC: bilateral knee pain    73 y.o. Female presents today for her 2nd Orthovisc injection of her bilateral knee.     Attempted treatments: none since last visit  Pain score: 4-5 / 10  History of trauma/injury: none since last visit  Affecting ADLs: yes     REVIEW OF SYSTEMS:   Constitution: Patient denies fever or chills.  Eyes: Patient denies eye pain or vision changes.  HEENT: Patient denies ear pain, sore throat, or nasal discharge.  CVS: Patient denies chest pain.  Lungs: Patient denies shortness of breath or cough.  Skin: Patient denies skin rash or itching.    Musculoskeletal: Patient denies recent falls. See HPI.  Psych: Patient reports anxiety.     PAST MEDICAL HISTORY:   Past Medical History:   Diagnosis Date    Acute hypoxemic respiratory failure 3/31/2020    ALLERGIC RHINITIS     Cataract     COVID-19 virus infection 03/2020    Degenerative disc disease, cervical 9/13/2018    GERD (gastroesophageal reflux disease)     Hyperlipidemia     Hypertension     Migraine headache     Multifocal pneumonia 3/31/2020    Nuclear sclerosis 4/30/2014    Sleep disorder     Thyroid disease     nodular goiter    TIA (transient ischemic attack)        MEDICATIONS:     Current Outpatient Medications:     albuterol (PROVENTIL/VENTOLIN HFA) 90 mcg/actuation inhaler, Inhale 2 puffs into the lungs every 6 (six) hours as needed for Wheezing. Rescue, Disp: 54 g, Rfl: 3    ascorbic acid, vitamin C, (VITAMIN C) 500 MG tablet, Take 1 tablet (500 mg total) by mouth 2 (two) times daily., Disp: , Rfl:     atorvastatin (LIPITOR) 40 MG tablet, TAKE 1 TABLET BY MOUTH EVERY DAY IN THE EVENING, Disp: 90 tablet, Rfl: 3    celecoxib (CELEBREX) 200 MG capsule, Take 1 capsule (200 mg total) by mouth 2 (two) times daily. For joint pain., Disp: 60 capsule, Rfl: 2    clotrimazole-betamethasone 1-0.05% (LOTRISONE) cream, Apply topically 2 (two) times daily., Disp: 45 g, Rfl: 0    cyanocobalamin (VITAMIN B-12) 250 MCG tablet, Take 250 mcg by mouth  "once daily., Disp: , Rfl:     diclofenac sodium (VOLTAREN) 1 % Gel, Apply 2 g topically 2 (two) times daily as needed., Disp: 1 Tube, Rfl: 1    ergocalciferol, vitamin D2, (VITAMIN D ORAL), Take 2,000 Int'l Units by mouth once daily., Disp: , Rfl:     esomeprazole (NEXIUM) 40 MG capsule, Take 1 capsule (40 mg total) by mouth before breakfast., Disp: 90 capsule, Rfl: 3    folic acid/multivit-min/lutein (CENTRUM SILVER ORAL), Take 1 tablet by mouth once daily., Disp: , Rfl:     hydrOXYzine HCL (ATARAX) 25 MG tablet, , Disp: , Rfl:     metoprolol succinate (TOPROL-XL) 50 MG 24 hr tablet, TAKE 1 TABLET BY MOUTH EVERY DAY, Disp: 90 tablet, Rfl: 2    amoxicillin-clavulanate 875-125mg (AUGMENTIN) 875-125 mg per tablet, Take 1 tablet by mouth every 12 (twelve) hours. (Patient not taking: Reported on 1/23/2023), Disp: 20 tablet, Rfl: 0    calcium carbonate (TUMS) 200 mg calcium (500 mg) chewable tablet, Take 2 tablets (1,000 mg total) by mouth 3 (three) times daily as needed., Disp: , Rfl:     levocetirizine (XYZAL) 5 MG tablet, Take 1 tablet (5 mg total) by mouth daily as needed (cold and sinus)., Disp: 30 tablet, Rfl: 11    meclizine (ANTIVERT) 25 mg tablet, Take 1 tablet (25 mg total) by mouth every 8 (eight) hours as needed for Dizziness. (Patient not taking: Reported on 12/27/2022), Disp: 40 tablet, Rfl: 1    ALLERGIES:   Review of patient's allergies indicates:   Allergen Reactions    Iodine Rash    Iodine and iodide containing products Itching and Rash    Shellfish containing products Itching and Rash        PHYSICAL EXAMINATION:  /69   Pulse 68   Ht 5' 2" (1.575 m)   Wt 74.8 kg (165 lb)   BMI 30.18 kg/m²   There are no signs of infection at the injection site, including no rubor, calor, or skin lesions.  Gen: NAD.  Psych: Affect & judgment nl.  Neuro: Grossly CNI. WILKINSON.  HEENT: -Trach dev. -Eye d/c. -Rhinorrhea.  CV: Color nl. -E/C/C. WWPx4.  Pulm: -Dyspnea. -Cough.  Lymph: -Edema.  Int: -Rash/lesion noted. " Skin is warm and dry    ASSESSMENT:      ICD-10-CM ICD-9-CM   1. Primary osteoarthritis of both knees  M17.0 715.16         PLAN:  Ultrasound-guided injection of the bilateral knee with Orthovisc performed at visit today.    Future planning includes - Continue exercise program    Risks and benefits were discussed with patient prior to receiving injection.  Depending on injection type, risks include the possibility of infection, pain, disruptions in blood pressure and blood sugar, and cosmetic deformity at site of injection.    All questions were answered to the best of my ability and all concerns were addressed at this time.    Follow up in 1 week(s) for above, or sooner if needed.      This note is dictated using the M*Modal Fluency Direct word recognition program. There are word recognition mistakes that are occasionally missed on review.

## 2023-03-08 ENCOUNTER — TELEPHONE (OUTPATIENT)
Dept: INTERNAL MEDICINE | Facility: CLINIC | Age: 73
End: 2023-03-08
Payer: MEDICARE

## 2023-03-08 NOTE — TELEPHONE ENCOUNTER
----- Message from Debi Humphrey sent at 3/8/2023  4:20 PM CST -----  Contact: self [phone]  Per pt need antibiotics or z-scott for sore throat and cough and yellow mucus.    [id number] - [place] [address] [place] [address]  Phone: [phone] Fax: [phone]      Please call and advise

## 2023-03-09 ENCOUNTER — TELEPHONE (OUTPATIENT)
Dept: SPORTS MEDICINE | Facility: CLINIC | Age: 73
End: 2023-03-09
Payer: MEDICARE

## 2023-03-09 ENCOUNTER — PATIENT MESSAGE (OUTPATIENT)
Dept: SPORTS MEDICINE | Facility: CLINIC | Age: 73
End: 2023-03-09
Payer: MEDICARE

## 2023-03-09 NOTE — TELEPHONE ENCOUNTER
I finally reached her for more information.     She called at 4:30 yesterday asking for z-scott for sore throat and cough and yellow mucus.     She started feeling congested 2-3 days ago.   Started  Tuesday mucinex for sinus drip and seems to be helping.   Having headache too , last nite took excedrin and helped.   Last nite did home COVID test and was POSITIVE    Stuffy head and nose, phlegm is clear,  fatigued.  says Coughing has improved.  Using hot tea. No appetite. Stomach was bothering her.   Has chills and feels warm but hasn't recorded any temp on thermometer.    She is willing to use paxlovid if you feels she should take.    I told her if you ordered tonite she would have to stop the atorvastatin while on it.  She understood.    Please advise.  Pharmacy [facility].  Thanks lillian

## 2023-03-09 NOTE — TELEPHONE ENCOUNTER
----- Message from Kelly Munoz sent at 3/9/2023  9:10 AM CST -----  Regarding: COVID  Contact: Self  Pt stated she have tested positive for COVID and need to reschedule her gel injection      Contact info   [phone] (home)

## 2023-03-09 NOTE — TELEPHONE ENCOUNTER
Returned call to patient.  No answer and voicemail is full.  Sent patient a portal message to reschedule her appointment with Dr. Harley

## 2023-03-09 NOTE — TELEPHONE ENCOUNTER
Paxlovid will be ordered for treatment of the patient's COVID-19 infection.  She should discontinue atorvastatin as recommended while she is on the Paxlovid for the next 5 days.

## 2023-03-16 ENCOUNTER — OFFICE VISIT (OUTPATIENT)
Dept: SPORTS MEDICINE | Facility: CLINIC | Age: 73
End: 2023-03-16
Payer: MEDICARE

## 2023-03-16 VITALS
BODY MASS INDEX: 30 KG/M2 | DIASTOLIC BLOOD PRESSURE: 75 MMHG | HEIGHT: 62 IN | HEART RATE: 67 BPM | SYSTOLIC BLOOD PRESSURE: 127 MMHG | WEIGHT: 163 LBS

## 2023-03-16 DIAGNOSIS — M17.0 PRIMARY OSTEOARTHRITIS OF BOTH KNEES: Primary | ICD-10-CM

## 2023-03-16 PROCEDURE — 1159F PR MEDICATION LIST DOCUMENTED IN MEDICAL RECORD: ICD-10-PCS | Mod: HCNC,CPTII,S$GLB, | Performed by: STUDENT IN AN ORGANIZED HEALTH CARE EDUCATION/TRAINING PROGRAM

## 2023-03-16 PROCEDURE — 20611 DRAIN/INJ JOINT/BURSA W/US: CPT | Mod: 50,HCNC,S$GLB, | Performed by: STUDENT IN AN ORGANIZED HEALTH CARE EDUCATION/TRAINING PROGRAM

## 2023-03-16 PROCEDURE — 1101F PT FALLS ASSESS-DOCD LE1/YR: CPT | Mod: HCNC,CPTII,S$GLB, | Performed by: STUDENT IN AN ORGANIZED HEALTH CARE EDUCATION/TRAINING PROGRAM

## 2023-03-16 PROCEDURE — 3074F PR MOST RECENT SYSTOLIC BLOOD PRESSURE < 130 MM HG: ICD-10-PCS | Mod: HCNC,CPTII,S$GLB, | Performed by: STUDENT IN AN ORGANIZED HEALTH CARE EDUCATION/TRAINING PROGRAM

## 2023-03-16 PROCEDURE — 20611 LARGE JOINT ASPIRATION/INJECTION: BILATERAL KNEE: ICD-10-PCS | Mod: 50,HCNC,S$GLB, | Performed by: STUDENT IN AN ORGANIZED HEALTH CARE EDUCATION/TRAINING PROGRAM

## 2023-03-16 PROCEDURE — 1160F PR REVIEW ALL MEDS BY PRESCRIBER/CLIN PHARMACIST DOCUMENTED: ICD-10-PCS | Mod: HCNC,CPTII,S$GLB, | Performed by: STUDENT IN AN ORGANIZED HEALTH CARE EDUCATION/TRAINING PROGRAM

## 2023-03-16 PROCEDURE — 1126F PR PAIN SEVERITY QUANTIFIED, NO PAIN PRESENT: ICD-10-PCS | Mod: HCNC,CPTII,S$GLB, | Performed by: STUDENT IN AN ORGANIZED HEALTH CARE EDUCATION/TRAINING PROGRAM

## 2023-03-16 PROCEDURE — 1101F PR PT FALLS ASSESS DOC 0-1 FALLS W/OUT INJ PAST YR: ICD-10-PCS | Mod: HCNC,CPTII,S$GLB, | Performed by: STUDENT IN AN ORGANIZED HEALTH CARE EDUCATION/TRAINING PROGRAM

## 2023-03-16 PROCEDURE — 99499 UNLISTED E&M SERVICE: CPT | Mod: HCNC,S$GLB,, | Performed by: STUDENT IN AN ORGANIZED HEALTH CARE EDUCATION/TRAINING PROGRAM

## 2023-03-16 PROCEDURE — 3074F SYST BP LT 130 MM HG: CPT | Mod: HCNC,CPTII,S$GLB, | Performed by: STUDENT IN AN ORGANIZED HEALTH CARE EDUCATION/TRAINING PROGRAM

## 2023-03-16 PROCEDURE — 3008F PR BODY MASS INDEX (BMI) DOCUMENTED: ICD-10-PCS | Mod: HCNC,CPTII,S$GLB, | Performed by: STUDENT IN AN ORGANIZED HEALTH CARE EDUCATION/TRAINING PROGRAM

## 2023-03-16 PROCEDURE — 1160F RVW MEDS BY RX/DR IN RCRD: CPT | Mod: HCNC,CPTII,S$GLB, | Performed by: STUDENT IN AN ORGANIZED HEALTH CARE EDUCATION/TRAINING PROGRAM

## 2023-03-16 PROCEDURE — 3288F FALL RISK ASSESSMENT DOCD: CPT | Mod: HCNC,CPTII,S$GLB, | Performed by: STUDENT IN AN ORGANIZED HEALTH CARE EDUCATION/TRAINING PROGRAM

## 2023-03-16 PROCEDURE — 1159F MED LIST DOCD IN RCRD: CPT | Mod: HCNC,CPTII,S$GLB, | Performed by: STUDENT IN AN ORGANIZED HEALTH CARE EDUCATION/TRAINING PROGRAM

## 2023-03-16 PROCEDURE — 99499 NO LOS: ICD-10-PCS | Mod: HCNC,S$GLB,, | Performed by: STUDENT IN AN ORGANIZED HEALTH CARE EDUCATION/TRAINING PROGRAM

## 2023-03-16 PROCEDURE — 99999 PR PBB SHADOW E&M-EST. PATIENT-LVL IV: ICD-10-PCS | Mod: PBBFAC,HCNC,, | Performed by: STUDENT IN AN ORGANIZED HEALTH CARE EDUCATION/TRAINING PROGRAM

## 2023-03-16 PROCEDURE — 99999 PR PBB SHADOW E&M-EST. PATIENT-LVL IV: CPT | Mod: PBBFAC,HCNC,, | Performed by: STUDENT IN AN ORGANIZED HEALTH CARE EDUCATION/TRAINING PROGRAM

## 2023-03-16 PROCEDURE — 3078F PR MOST RECENT DIASTOLIC BLOOD PRESSURE < 80 MM HG: ICD-10-PCS | Mod: HCNC,CPTII,S$GLB, | Performed by: STUDENT IN AN ORGANIZED HEALTH CARE EDUCATION/TRAINING PROGRAM

## 2023-03-16 PROCEDURE — 3288F PR FALLS RISK ASSESSMENT DOCUMENTED: ICD-10-PCS | Mod: HCNC,CPTII,S$GLB, | Performed by: STUDENT IN AN ORGANIZED HEALTH CARE EDUCATION/TRAINING PROGRAM

## 2023-03-16 PROCEDURE — 3078F DIAST BP <80 MM HG: CPT | Mod: HCNC,CPTII,S$GLB, | Performed by: STUDENT IN AN ORGANIZED HEALTH CARE EDUCATION/TRAINING PROGRAM

## 2023-03-16 PROCEDURE — 1126F AMNT PAIN NOTED NONE PRSNT: CPT | Mod: HCNC,CPTII,S$GLB, | Performed by: STUDENT IN AN ORGANIZED HEALTH CARE EDUCATION/TRAINING PROGRAM

## 2023-03-16 PROCEDURE — 3008F BODY MASS INDEX DOCD: CPT | Mod: HCNC,CPTII,S$GLB, | Performed by: STUDENT IN AN ORGANIZED HEALTH CARE EDUCATION/TRAINING PROGRAM

## 2023-03-16 NOTE — PROGRESS NOTES
CC: bilateral knee pain    73 y.o. Female presents today for her 3rd Orthovisc injection of her bilateral knee.     Attempted treatments: none since last visit  Pain score: 2/10  History of trauma/injury: none since last visit  Affecting ADLs: yes, but improving     REVIEW OF SYSTEMS:   Constitution: Patient denies fever or chills.  Eyes: Patient denies eye pain or vision changes.  HEENT: Patient denies ear pain, sore throat, or nasal discharge.  CVS: Patient denies chest pain.  Lungs: Patient denies shortness of breath or cough.  Skin: Patient denies skin rash or itching.    Musculoskeletal: Patient denies recent falls. See HPI.  Psych: Patient denies any current anxiety or nervousness.    PAST MEDICAL HISTORY:   Past Medical History:   Diagnosis Date    Acute hypoxemic respiratory failure 3/31/2020    ALLERGIC RHINITIS     Cataract     COVID-19 virus infection 03/2020    Degenerative disc disease, cervical 9/13/2018    GERD (gastroesophageal reflux disease)     Hyperlipidemia     Hypertension     Migraine headache     Multifocal pneumonia 3/31/2020    Nuclear sclerosis 4/30/2014    Sleep disorder     Thyroid disease     nodular goiter    TIA (transient ischemic attack)        MEDICATIONS:     Current Outpatient Medications:     albuterol (PROVENTIL/VENTOLIN HFA) 90 mcg/actuation inhaler, Inhale 2 puffs into the lungs every 6 (six) hours as needed for Wheezing. Rescue, Disp: 54 g, Rfl: 3    amoxicillin-clavulanate 875-125mg (AUGMENTIN) 875-125 mg per tablet, Take 1 tablet by mouth every 12 (twelve) hours. (Patient not taking: Reported on 1/23/2023), Disp: 20 tablet, Rfl: 0    ascorbic acid, vitamin C, (VITAMIN C) 500 MG tablet, Take 1 tablet (500 mg total) by mouth 2 (two) times daily., Disp: , Rfl:     atorvastatin (LIPITOR) 40 MG tablet, TAKE 1 TABLET BY MOUTH EVERY DAY IN THE EVENING, Disp: 90 tablet, Rfl: 3    calcium carbonate (TUMS) 200 mg calcium (500 mg) chewable tablet, Take 2 tablets (1,000 mg total) by  "mouth 3 (three) times daily as needed., Disp: , Rfl:     celecoxib (CELEBREX) 200 MG capsule, Take 1 capsule (200 mg total) by mouth 2 (two) times daily. For joint pain., Disp: 60 capsule, Rfl: 2    clotrimazole-betamethasone 1-0.05% (LOTRISONE) cream, Apply topically 2 (two) times daily., Disp: 45 g, Rfl: 0    cyanocobalamin (VITAMIN B-12) 250 MCG tablet, Take 250 mcg by mouth once daily., Disp: , Rfl:     diclofenac sodium (VOLTAREN) 1 % Gel, Apply 2 g topically 2 (two) times daily as needed., Disp: 1 Tube, Rfl: 1    ergocalciferol, vitamin D2, (VITAMIN D ORAL), Take 2,000 Int'l Units by mouth once daily., Disp: , Rfl:     esomeprazole (NEXIUM) 40 MG capsule, Take 1 capsule (40 mg total) by mouth before breakfast., Disp: 90 capsule, Rfl: 3    folic acid/multivit-min/lutein (CENTRUM SILVER ORAL), Take 1 tablet by mouth once daily., Disp: , Rfl:     hydrOXYzine HCL (ATARAX) 25 MG tablet, , Disp: , Rfl:     levocetirizine (XYZAL) 5 MG tablet, Take 1 tablet (5 mg total) by mouth daily as needed (cold and sinus)., Disp: 30 tablet, Rfl: 11    meclizine (ANTIVERT) 25 mg tablet, Take 1 tablet (25 mg total) by mouth every 8 (eight) hours as needed for Dizziness. (Patient not taking: Reported on 12/27/2022), Disp: 40 tablet, Rfl: 1    metoprolol succinate (TOPROL-XL) 50 MG 24 hr tablet, TAKE 1 TABLET BY MOUTH EVERY DAY, Disp: 90 tablet, Rfl: 2    ALLERGIES:   Review of patient's allergies indicates:   Allergen Reactions    Iodine Rash    Iodine and iodide containing products Itching and Rash    Shellfish containing products Itching and Rash        PHYSICAL EXAMINATION:  /75   Pulse 67   Ht 5' 2" (1.575 m)   Wt 73.9 kg (163 lb)   BMI 29.81 kg/m²   There are no signs of infection at the injection site, including no rubor, calor, or skin lesions.  Gen: NAD.  Psych: Affect & judgment nl.  Neuro: Grossly CNI. WILKINSON.  HEENT: -Trach dev. -Eye d/c. -Rhinorrhea.  CV: Color nl. -E/C/C. WWPx4.  Pulm: -Dyspnea. -Cough.  Lymph: " -Edema.  Int: -Rash/lesion noted. Skin is warm and dry    ASSESSMENT:      ICD-10-CM ICD-9-CM   1. Primary osteoarthritis of both knees  M17.0 715.16         PLAN:  Ultrasound-guided injection of the bilateral knee with Orthovisc performed at visit today.    Future planning includes - Continue exercise program    Risks and benefits were discussed with patient prior to receiving injection.  Depending on injection type, risks include the possibility of infection, pain, disruptions in blood pressure and blood sugar, and cosmetic deformity at site of injection.    All questions were answered to the best of my ability and all concerns were addressed at this time.    Follow up in 6 week(s) for above, or sooner if needed.      This note is dictated using the M*Modal Fluency Direct word recognition program. There are word recognition mistakes that are occasionally missed on review.

## 2023-03-16 NOTE — PROCEDURES
Large Joint Aspiration/Injection: bilateral knee    Date/Time: 3/16/2023 9:15 AM  Performed by: Chayr Harley MD  Authorized by: Chary Harley MD     Consent Done?:  Yes (Verbal)  Indications:  Arthritis and pain  Site marked: the procedure site was marked    Timeout: prior to procedure the correct patient, procedure, and site was verified    Prep: patient was prepped and draped in usual sterile fashion      Local anesthesia used?: Yes    Anesthesia:  Local infiltration  Local anesthetic:  Co-phenylcaine spray    Details:  Needle Size:  22 G  Ultrasonic Guidance for needle placement?: Yes (Ultrasound guidance used to avoid neurovascular injury and/or to improve accuracy given body habitus.)    Images are saved and documented.  Approach: Superolateral.  Location:  Knee  Laterality:  Bilateral  Site:  Bilateral knee  Medications (Right):  30 mg sodium hyaluronate (orthovisc) 30 mg/2 mL  Medications (Right) comment:  2mL Ropivacaine 0.2%    Medications (Left):  30 mg sodium hyaluronate (orthovisc) 30 mg/2 mL  Medications (Left) comment:  2mL Ropivacaine 0.2%    Patient tolerance:  Patient tolerated the procedure well with no immediate complications     TECHNIQUE: Real time ultrasound examination of the bilateral knees was performed with [employer] Edge 2, 9-L MHz linear probe(s). Ultrasound guidance was used for needle localization. Images were saved and stored for documentation. Dynamic visualization of the needle was continuous throughout the procedures and maintained in good position.

## 2023-04-27 ENCOUNTER — OFFICE VISIT (OUTPATIENT)
Dept: SPORTS MEDICINE | Facility: CLINIC | Age: 73
End: 2023-04-27
Payer: MEDICARE

## 2023-04-27 VITALS
BODY MASS INDEX: 30.18 KG/M2 | DIASTOLIC BLOOD PRESSURE: 64 MMHG | WEIGHT: 164 LBS | HEART RATE: 57 BPM | SYSTOLIC BLOOD PRESSURE: 103 MMHG | HEIGHT: 62 IN

## 2023-04-27 DIAGNOSIS — M17.0 PRIMARY OSTEOARTHRITIS OF BOTH KNEES: Primary | ICD-10-CM

## 2023-04-27 DIAGNOSIS — G89.29 BILATERAL CHRONIC KNEE PAIN: ICD-10-CM

## 2023-04-27 DIAGNOSIS — M25.562 BILATERAL CHRONIC KNEE PAIN: ICD-10-CM

## 2023-04-27 DIAGNOSIS — M25.561 BILATERAL CHRONIC KNEE PAIN: ICD-10-CM

## 2023-04-27 PROCEDURE — 3074F SYST BP LT 130 MM HG: CPT | Mod: HCNC,CPTII,S$GLB, | Performed by: STUDENT IN AN ORGANIZED HEALTH CARE EDUCATION/TRAINING PROGRAM

## 2023-04-27 PROCEDURE — 1125F PR PAIN SEVERITY QUANTIFIED, PAIN PRESENT: ICD-10-PCS | Mod: HCNC,CPTII,S$GLB, | Performed by: STUDENT IN AN ORGANIZED HEALTH CARE EDUCATION/TRAINING PROGRAM

## 2023-04-27 PROCEDURE — 1160F PR REVIEW ALL MEDS BY PRESCRIBER/CLIN PHARMACIST DOCUMENTED: ICD-10-PCS | Mod: HCNC,CPTII,S$GLB, | Performed by: STUDENT IN AN ORGANIZED HEALTH CARE EDUCATION/TRAINING PROGRAM

## 2023-04-27 PROCEDURE — 1159F MED LIST DOCD IN RCRD: CPT | Mod: HCNC,CPTII,S$GLB, | Performed by: STUDENT IN AN ORGANIZED HEALTH CARE EDUCATION/TRAINING PROGRAM

## 2023-04-27 PROCEDURE — 99999 PR PBB SHADOW E&M-EST. PATIENT-LVL III: CPT | Mod: PBBFAC,HCNC,, | Performed by: STUDENT IN AN ORGANIZED HEALTH CARE EDUCATION/TRAINING PROGRAM

## 2023-04-27 PROCEDURE — 1159F PR MEDICATION LIST DOCUMENTED IN MEDICAL RECORD: ICD-10-PCS | Mod: HCNC,CPTII,S$GLB, | Performed by: STUDENT IN AN ORGANIZED HEALTH CARE EDUCATION/TRAINING PROGRAM

## 2023-04-27 PROCEDURE — 1160F RVW MEDS BY RX/DR IN RCRD: CPT | Mod: HCNC,CPTII,S$GLB, | Performed by: STUDENT IN AN ORGANIZED HEALTH CARE EDUCATION/TRAINING PROGRAM

## 2023-04-27 PROCEDURE — 3074F PR MOST RECENT SYSTOLIC BLOOD PRESSURE < 130 MM HG: ICD-10-PCS | Mod: HCNC,CPTII,S$GLB, | Performed by: STUDENT IN AN ORGANIZED HEALTH CARE EDUCATION/TRAINING PROGRAM

## 2023-04-27 PROCEDURE — 3078F PR MOST RECENT DIASTOLIC BLOOD PRESSURE < 80 MM HG: ICD-10-PCS | Mod: HCNC,CPTII,S$GLB, | Performed by: STUDENT IN AN ORGANIZED HEALTH CARE EDUCATION/TRAINING PROGRAM

## 2023-04-27 PROCEDURE — 99214 OFFICE O/P EST MOD 30 MIN: CPT | Mod: HCNC,S$GLB,, | Performed by: STUDENT IN AN ORGANIZED HEALTH CARE EDUCATION/TRAINING PROGRAM

## 2023-04-27 PROCEDURE — 99999 PR PBB SHADOW E&M-EST. PATIENT-LVL III: ICD-10-PCS | Mod: PBBFAC,HCNC,, | Performed by: STUDENT IN AN ORGANIZED HEALTH CARE EDUCATION/TRAINING PROGRAM

## 2023-04-27 PROCEDURE — 3078F DIAST BP <80 MM HG: CPT | Mod: HCNC,CPTII,S$GLB, | Performed by: STUDENT IN AN ORGANIZED HEALTH CARE EDUCATION/TRAINING PROGRAM

## 2023-04-27 PROCEDURE — 99214 PR OFFICE/OUTPT VISIT, EST, LEVL IV, 30-39 MIN: ICD-10-PCS | Mod: HCNC,S$GLB,, | Performed by: STUDENT IN AN ORGANIZED HEALTH CARE EDUCATION/TRAINING PROGRAM

## 2023-04-27 PROCEDURE — 1125F AMNT PAIN NOTED PAIN PRSNT: CPT | Mod: HCNC,CPTII,S$GLB, | Performed by: STUDENT IN AN ORGANIZED HEALTH CARE EDUCATION/TRAINING PROGRAM

## 2023-04-27 PROCEDURE — 3008F BODY MASS INDEX DOCD: CPT | Mod: HCNC,CPTII,S$GLB, | Performed by: STUDENT IN AN ORGANIZED HEALTH CARE EDUCATION/TRAINING PROGRAM

## 2023-04-27 PROCEDURE — 3008F PR BODY MASS INDEX (BMI) DOCUMENTED: ICD-10-PCS | Mod: HCNC,CPTII,S$GLB, | Performed by: STUDENT IN AN ORGANIZED HEALTH CARE EDUCATION/TRAINING PROGRAM

## 2023-04-27 RX ORDER — CELECOXIB 200 MG/1
200 CAPSULE ORAL 2 TIMES DAILY
Qty: 180 CAPSULE | Refills: 0 | Status: SHIPPED | OUTPATIENT
Start: 2023-04-27 | End: 2024-02-10 | Stop reason: SDUPTHER

## 2023-04-27 NOTE — PROGRESS NOTES
CC: bilateral knee pain    73 y.o. Female presents today for follow up evaluation of her bilateral knee pain following Orthovisc series. Pt reports minimal relief from injections. Pt reports pain is 2-3 / 10 today. Pt reports intermittent popping in both knees. Pt denies numbness/tingling.     Attempted treatments: Orthovisc  Pain score: 2-3 / 10  History of trauma/injury: none since last visit  Affecting ADLs: yes     REVIEW OF SYSTEMS:   Constitution: Patient denies fever or chills.  Eyes: Patient denies eye pain or vision changes.  HEENT: Patient denies ear pain, sore throat, or nasal discharge.  CVS: Patient denies chest pain.  Lungs: Patient denies shortness of breath or cough.  Skin: Patient denies skin rash or itching.    Musculoskeletal: Patient denies recent falls. See HPI.  Psych: Patient denies any current anxiety or nervousness.    PAST MEDICAL HISTORY:   Past Medical History:   Diagnosis Date    Acute hypoxemic respiratory failure 3/31/2020    ALLERGIC RHINITIS     Cataract     COVID-19 virus infection 03/2020    Degenerative disc disease, cervical 9/13/2018    GERD (gastroesophageal reflux disease)     Hyperlipidemia     Hypertension     Migraine headache     Multifocal pneumonia 3/31/2020    Nuclear sclerosis 4/30/2014    Sleep disorder     Thyroid disease     nodular goiter    TIA (transient ischemic attack)        MEDICATIONS:     Current Outpatient Medications:     albuterol (PROVENTIL/VENTOLIN HFA) 90 mcg/actuation inhaler, Inhale 2 puffs into the lungs every 6 (six) hours as needed for Wheezing. Rescue, Disp: 54 g, Rfl: 3    ascorbic acid, vitamin C, (VITAMIN C) 500 MG tablet, Take 1 tablet (500 mg total) by mouth 2 (two) times daily., Disp: , Rfl:     clotrimazole-betamethasone 1-0.05% (LOTRISONE) cream, Apply topically 2 (two) times daily., Disp: 45 g, Rfl: 0    cyanocobalamin (VITAMIN B-12) 250 MCG tablet, Take 250 mcg by mouth once daily., Disp: , Rfl:     diclofenac sodium (VOLTAREN) 1 %  "Gel, Apply 2 g topically 2 (two) times daily as needed., Disp: 1 Tube, Rfl: 1    ergocalciferol, vitamin D2, (VITAMIN D ORAL), Take 2,000 Int'l Units by mouth once daily., Disp: , Rfl:     esomeprazole (NEXIUM) 40 MG capsule, Take 1 capsule (40 mg total) by mouth before breakfast., Disp: 90 capsule, Rfl: 3    folic acid/multivit-min/lutein (CENTRUM SILVER ORAL), Take 1 tablet by mouth once daily., Disp: , Rfl:     metoprolol succinate (TOPROL-XL) 50 MG 24 hr tablet, TAKE 1 TABLET BY MOUTH EVERY DAY, Disp: 90 tablet, Rfl: 2    amoxicillin-clavulanate 875-125mg (AUGMENTIN) 875-125 mg per tablet, Take 1 tablet by mouth every 12 (twelve) hours. (Patient not taking: Reported on 1/23/2023), Disp: 20 tablet, Rfl: 0    atorvastatin (LIPITOR) 40 MG tablet, TAKE 1 TABLET BY MOUTH EVERY DAY IN THE EVENING (Patient not taking: Reported on 4/27/2023), Disp: 90 tablet, Rfl: 3    calcium carbonate (TUMS) 200 mg calcium (500 mg) chewable tablet, Take 2 tablets (1,000 mg total) by mouth 3 (three) times daily as needed., Disp: , Rfl:     celecoxib (CELEBREX) 200 MG capsule, Take 1 capsule (200 mg total) by mouth 2 (two) times daily. For joint pain., Disp: 180 capsule, Rfl: 0    hydrOXYzine HCL (ATARAX) 25 MG tablet, , Disp: , Rfl:     levocetirizine (XYZAL) 5 MG tablet, Take 1 tablet (5 mg total) by mouth daily as needed (cold and sinus)., Disp: 30 tablet, Rfl: 11    meclizine (ANTIVERT) 25 mg tablet, Take 1 tablet (25 mg total) by mouth every 8 (eight) hours as needed for Dizziness. (Patient not taking: Reported on 12/27/2022), Disp: 40 tablet, Rfl: 1    ALLERGIES:   Review of patient's allergies indicates:   Allergen Reactions    Iodine Rash    Iodine and iodide containing products Itching and Rash    Shellfish containing products Itching and Rash        PHYSICAL EXAMINATION:  /64   Pulse (!) 57   Ht 5' 2" (1.575 m)   Wt 74.4 kg (164 lb)   BMI 30.00 kg/m²   Vitals signs and nursing note have been reviewed.    General: In " no acute distress, well developed, well nourished, no diaphoresis  Eyes: EOM full and smooth, no eye redness or discharge  HENT: normocephalic and atraumatic, neck supple, trachea midline, no nasal discharge  Cardiovascular: no LE edema  Lungs: respirations non-labored, no conversational dyspnea   Neuro: AAOx3, CN2-12 grossly intact  Skin: No rashes, warm and dry  Psychiatric: cooperative, pleasant, mood and affect appropriate for age    Bilateral Knee:   Gait: wnl    Inspection/Palpation:   -Rubor   -Calor  -Effusion   -Patella ballotable   -Patellar apprehension  -Retinacular tenderness   -Patellar crepitus   Patellar tilt grossly normal     TTP at:  +Joint line   -MCL   -LCL   -Popliteal region   -Quad tendon   -Patella  -Pat tendon  -Pat border  -Med condyle   -Lat condyle   -Pes   -Prox fibula   -Tib tub  -Gerdy's tubercle  -Distal Hamstring tendons  -Proximal Hamstrings/Ischial tuberosity  -ITB    ROM:   Ext: 0°   Flex:135°   Popliteal Angle: 45°   -Discomfort w/ full flex   -Bounce-home discomfort     Ligamentous:   -Ant drawer   -Post drawer   -Lachman's   Good endpoints & no pain w/ valgus & varus stress    Meniscal:  -Rebeca's   -Michael   -Pain w/ squat     Other:  -Patellar apprehension  -Patellar grind  -Ocasio's   -J sign  -Justin's  Abductors 5/5    ASSESSMENT:      ICD-10-CM ICD-9-CM   1. Primary osteoarthritis of both knees  M17.0 715.16   2. Bilateral chronic knee pain  M25.561 719.46    M25.562 338.29    G89.29          PLAN:  Patient with minimal improvement with triamcinolone and hyaluronic acid injections and continues to have persistent knee pain.  Given this information, patient was counseled extensively on further treatment options for knee pain.  These options include the following in order from least invasive to definitive treatment with TKA:   - Try a different form of corticosteroid such as methylprednisolone or dexamethasone   - Zilretta, long acting triamcinolone   - Cryoanalgesia  with Iovera   - Genicular nerve RFA with Pain Management   - Platelet Rich Plasma   - Referral for surgical consultation    Future planning includes - patient will relief with Celebrex, it was explained to patient that we would not like for her to take Celebrex long periods of time.  We will allow her to take Celebrex for the next 3 months while she works on exercise and weight loss.  Should she not improve or should she want to try sooner, we will move for with bilateral Zilretta injections.  Patient will also be fitted for bilateral medial  knee braces.    All questions were answered to the best of my ability and all concerns were addressed at this time.    Follow up in 3 months for above.    Time spent on the encounter includes face to face time and non-face to face time preparing to see the patient (eg, review of test), obtaining and/or reviewing separately obtained history, documenting clinical information in the electronic or other health record, independently interpreting results (not separately reported) and communicating results to the patient/family/caregiver, or care coordination (not separately reported). I reviewed Primary care, and other specialty's notes to better coordinate patient's care. I also counseled patient  on common and most usual side effect of prescribed medications,the purppose of any new tests that may have been ordered, and possible next steps in management.  Patient voiced understanding.     EST MINUTES X   90331 5    66424 10    39932 15    81615 25 X   [id number] 40    NEW     15277 10    76692 20    06240 30    82068 45    81690 60    PHONE      5-10    86702 11-20    01952 21-30          This note is dictated using the M*Modal Fluency Direct word recognition program. There are word recognition mistakes that are occasionally missed on review.

## 2023-05-08 ENCOUNTER — OFFICE VISIT (OUTPATIENT)
Dept: INTERNAL MEDICINE | Facility: CLINIC | Age: 73
End: 2023-05-08
Payer: MEDICARE

## 2023-05-08 VITALS
DIASTOLIC BLOOD PRESSURE: 76 MMHG | HEIGHT: 62 IN | SYSTOLIC BLOOD PRESSURE: 120 MMHG | OXYGEN SATURATION: 97 % | BODY MASS INDEX: 30.67 KG/M2 | WEIGHT: 166.69 LBS | TEMPERATURE: 97 F | RESPIRATION RATE: 16 BRPM | HEART RATE: 66 BPM

## 2023-05-08 DIAGNOSIS — M25.561 CHRONIC PAIN OF BOTH KNEES: ICD-10-CM

## 2023-05-08 DIAGNOSIS — K21.9 GASTROESOPHAGEAL REFLUX DISEASE, UNSPECIFIED WHETHER ESOPHAGITIS PRESENT: ICD-10-CM

## 2023-05-08 DIAGNOSIS — I10 ESSENTIAL HYPERTENSION: Primary | Chronic | ICD-10-CM

## 2023-05-08 DIAGNOSIS — M25.562 CHRONIC PAIN OF BOTH KNEES: ICD-10-CM

## 2023-05-08 DIAGNOSIS — I77.1 TORTUOUS AORTA: ICD-10-CM

## 2023-05-08 DIAGNOSIS — M17.0 PRIMARY OSTEOARTHRITIS OF BOTH KNEES: ICD-10-CM

## 2023-05-08 DIAGNOSIS — G89.29 CHRONIC PAIN OF BOTH KNEES: ICD-10-CM

## 2023-05-08 DIAGNOSIS — G47.00 INSOMNIA, UNSPECIFIED TYPE: ICD-10-CM

## 2023-05-08 PROCEDURE — 3078F DIAST BP <80 MM HG: CPT | Mod: CPTII,S$GLB,, | Performed by: INTERNAL MEDICINE

## 2023-05-08 PROCEDURE — 99999 PR PBB SHADOW E&M-EST. PATIENT-LVL V: CPT | Mod: PBBFAC,,, | Performed by: INTERNAL MEDICINE

## 2023-05-08 PROCEDURE — 3288F FALL RISK ASSESSMENT DOCD: CPT | Mod: CPTII,S$GLB,, | Performed by: INTERNAL MEDICINE

## 2023-05-08 PROCEDURE — 1101F PT FALLS ASSESS-DOCD LE1/YR: CPT | Mod: CPTII,S$GLB,, | Performed by: INTERNAL MEDICINE

## 2023-05-08 PROCEDURE — 99214 PR OFFICE/OUTPT VISIT, EST, LEVL IV, 30-39 MIN: ICD-10-PCS | Mod: S$GLB,,, | Performed by: INTERNAL MEDICINE

## 2023-05-08 PROCEDURE — 1160F PR REVIEW ALL MEDS BY PRESCRIBER/CLIN PHARMACIST DOCUMENTED: ICD-10-PCS | Mod: CPTII,S$GLB,, | Performed by: INTERNAL MEDICINE

## 2023-05-08 PROCEDURE — 99999 PR PBB SHADOW E&M-EST. PATIENT-LVL V: ICD-10-PCS | Mod: PBBFAC,,, | Performed by: INTERNAL MEDICINE

## 2023-05-08 PROCEDURE — 3008F PR BODY MASS INDEX (BMI) DOCUMENTED: ICD-10-PCS | Mod: CPTII,S$GLB,, | Performed by: INTERNAL MEDICINE

## 2023-05-08 PROCEDURE — 3078F PR MOST RECENT DIASTOLIC BLOOD PRESSURE < 80 MM HG: ICD-10-PCS | Mod: CPTII,S$GLB,, | Performed by: INTERNAL MEDICINE

## 2023-05-08 PROCEDURE — 1159F PR MEDICATION LIST DOCUMENTED IN MEDICAL RECORD: ICD-10-PCS | Mod: CPTII,S$GLB,, | Performed by: INTERNAL MEDICINE

## 2023-05-08 PROCEDURE — 3288F PR FALLS RISK ASSESSMENT DOCUMENTED: ICD-10-PCS | Mod: CPTII,S$GLB,, | Performed by: INTERNAL MEDICINE

## 2023-05-08 PROCEDURE — 3074F SYST BP LT 130 MM HG: CPT | Mod: CPTII,S$GLB,, | Performed by: INTERNAL MEDICINE

## 2023-05-08 PROCEDURE — 3074F PR MOST RECENT SYSTOLIC BLOOD PRESSURE < 130 MM HG: ICD-10-PCS | Mod: CPTII,S$GLB,, | Performed by: INTERNAL MEDICINE

## 2023-05-08 PROCEDURE — 99214 OFFICE O/P EST MOD 30 MIN: CPT | Mod: S$GLB,,, | Performed by: INTERNAL MEDICINE

## 2023-05-08 PROCEDURE — 1159F MED LIST DOCD IN RCRD: CPT | Mod: CPTII,S$GLB,, | Performed by: INTERNAL MEDICINE

## 2023-05-08 PROCEDURE — 3008F BODY MASS INDEX DOCD: CPT | Mod: CPTII,S$GLB,, | Performed by: INTERNAL MEDICINE

## 2023-05-08 PROCEDURE — 1126F PR PAIN SEVERITY QUANTIFIED, NO PAIN PRESENT: ICD-10-PCS | Mod: CPTII,S$GLB,, | Performed by: INTERNAL MEDICINE

## 2023-05-08 PROCEDURE — 1126F AMNT PAIN NOTED NONE PRSNT: CPT | Mod: CPTII,S$GLB,, | Performed by: INTERNAL MEDICINE

## 2023-05-08 PROCEDURE — 1160F RVW MEDS BY RX/DR IN RCRD: CPT | Mod: CPTII,S$GLB,, | Performed by: INTERNAL MEDICINE

## 2023-05-08 PROCEDURE — 1101F PR PT FALLS ASSESS DOC 0-1 FALLS W/OUT INJ PAST YR: ICD-10-PCS | Mod: CPTII,S$GLB,, | Performed by: INTERNAL MEDICINE

## 2023-05-08 RX ORDER — ZOLPIDEM TARTRATE 5 MG/1
5 TABLET ORAL NIGHTLY PRN
Qty: 30 TABLET | Refills: 2 | Status: SHIPPED | OUTPATIENT
Start: 2023-05-08 | End: 2023-11-13 | Stop reason: SDUPTHER

## 2023-05-08 NOTE — PROGRESS NOTES
Subjective:       Patient ID: Jojo Burrows is a 73 y.o. female.    Chief Complaint: Follow-up    HPI  The patient presents for follow-up of medical conditions which include hypertension, osteoarthritis, GERD, and insomnia.  The patient is tolerating her blood pressure medication well without side effects.  She does not monitor blood pressures at home.  She has osteoarthritis of the knees.  Symptoms are alleviated with use of Celebrex.  She has noted fair results with gel injections in the joints.  She states she denies to try knee braces for additional support.    Acid reflux symptoms have been controlled for the most part.  She is using Nexium as needed.    She has difficulty falling asleep and staying asleep.  Symptoms are still noted even with use of trazodone at bedtime.  She has used melatonin.    Review of Systems   Constitutional:  Negative for activity change, appetite change, fatigue and unexpected weight change.   Eyes:  Negative for visual disturbance.   Respiratory:  Negative for cough and shortness of breath.    Cardiovascular:  Negative for chest pain, palpitations and leg swelling.   Gastrointestinal:  Positive for reflux. Negative for abdominal pain, blood in stool, constipation and diarrhea.   Genitourinary:  Negative for dysuria and hematuria.   Musculoskeletal:  Positive for arthralgias. Negative for neck pain and neck stiffness.   Integumentary:  Negative for rash.   Neurological:  Positive for numbness (Intermittent numbness in the left hand is noted.). Negative for dizziness, syncope and headaches.   Psychiatric/Behavioral:  Positive for sleep disturbance.           Physical Exam  Vitals and nursing note reviewed.   Constitutional:       General: She is not in acute distress.     Appearance: Normal appearance. She is well-developed.      Comments: The patient has lost 3 lb since 12/27/2022.   HENT:      Head: Normocephalic and atraumatic.   Eyes:      General: No scleral icterus.      Extraocular Movements: Extraocular movements intact.      Conjunctiva/sclera: Conjunctivae normal.   Neck:      Thyroid: No thyromegaly.      Vascular: No JVD.   Cardiovascular:      Rate and Rhythm: Normal rate and regular rhythm.      Pulses: Normal pulses.      Heart sounds: Normal heart sounds. No murmur heard.    No friction rub. No gallop.   Pulmonary:      Effort: Pulmonary effort is normal. No respiratory distress.      Breath sounds: Normal breath sounds. No wheezing or rales.   Abdominal:      General: Bowel sounds are normal.      Palpations: Abdomen is soft. There is no mass.      Tenderness: There is no abdominal tenderness.   Musculoskeletal:         General: Tenderness present.      Cervical back: Normal range of motion and neck supple.      Right lower leg: No edema.      Left lower leg: No edema.      Comments: Knees:  Decreased flexion is noted bilaterally.  Hypertrophic joint changes are present particularly on the right side.  Fusion is noted.   Lymphadenopathy:      Cervical: No cervical adenopathy.   Skin:     General: Skin is warm and dry.      Findings: No rash.   Neurological:      Mental Status: She is alert and oriented to person, place, and time.   Psychiatric:         Mood and Affect: Mood normal.         Behavior: Behavior normal.         No visits with results within 3 Month(s) from this visit.   Latest known visit with results is:   Lab Visit on 12/22/2022   Component Date Value Ref Range Status    Sodium 12/22/2022 139  136 - 145 mmol/L Final    Potassium 12/22/2022 3.9  3.5 - 5.1 mmol/L Final    Chloride 12/22/2022 104  95 - 110 mmol/L Final    CO2 12/22/2022 26  23 - 29 mmol/L Final    Glucose 12/22/2022 87  70 - 110 mg/dL Final    BUN 12/22/2022 13  8 - 23 mg/dL Final    Creatinine 12/22/2022 0.8  0.5 - 1.4 mg/dL Final    Calcium 12/22/2022 9.1  8.7 - 10.5 mg/dL Final    Total Protein 12/22/2022 7.2  6.0 - 8.4 g/dL Final    Albumin 12/22/2022 3.9  3.5 - 5.2 g/dL Final    Total  Bilirubin 12/22/2022 0.5  0.1 - 1.0 mg/dL Final    Comment: For infants and newborns, interpretation of results should be based  on gestational age, weight and in agreement with clinical  observations.    Premature Infant recommended reference ranges:  Up to 24 hours.............<8.0 mg/dL  Up to 48 hours............<12.0 mg/dL  3-5 days..................<15.0 mg/dL  6-29 days.................<15.0 mg/dL      Alkaline Phosphatase 12/22/2022 96  55 - 135 U/L Final    AST 12/22/2022 18  10 - 40 U/L Final    ALT 12/22/2022 17  10 - 44 U/L Final    Anion Gap 12/22/2022 9  8 - 16 mmol/L Final    eGFR 12/22/2022 >60.0  >60 mL/min/1.73 m^2 Final    Cholesterol 12/22/2022 138  120 - 199 mg/dL Final    Comment: The National Cholesterol Education Program (NCEP) has set the  following guidelines (reference ranges) for Cholesterol:  Optimal.....................<200 mg/dL  Borderline High.............200-239 mg/dL  High........................> or = 240 mg/dL      Triglycerides 12/22/2022 30  30 - 150 mg/dL Final    Comment: The National Cholesterol Education Program (NCEP) has set the  following guidelines (reference values) for triglycerides:  Normal......................<150 mg/dL  Borderline High.............150-199 mg/dL  High........................200-499 mg/dL      HDL 12/22/2022 65  40 - 75 mg/dL Final    Comment: The National Cholesterol Education Program (NCEP) has set the  following guidelines (reference values) for HDL Cholesterol:  Low...............<40 mg/dL  Optimal...........>60 mg/dL      LDL Cholesterol 12/22/2022 67.0  63.0 - 159.0 mg/dL Final    Comment: The National Cholesterol Education Program (NCEP) has set the  following guidelines (reference values) for LDL Cholesterol:  Optimal.......................<130 mg/dL  Borderline High...............130-159 mg/dL  High..........................160-189 mg/dL  Very High.....................>190 mg/dL      HDL/Cholesterol Ratio 12/22/2022 47.1  20.0 - 50.0 %  Final    Total Cholesterol/HDL Ratio 12/22/2022 2.1  2.0 - 5.0 Final    Non-HDL Cholesterol 12/22/2022 73  mg/dL Final    Comment: Risk category and Non-HDL cholesterol goals:  Coronary heart disease (CHD)or equivalent (10-year risk of CHD >20%):  Non-HDL cholesterol goal     <130 mg/dL  Two or more CHD risk factors and 10-year risk of CHD <= 20%:  Non-HDL cholesterol goal     <160 mg/dL  0 to 1 CHD risk factor:  Non-HDL cholesterol goal     <190 mg/dL      WBC 12/22/2022 7.08  3.90 - 12.70 K/uL Final    RBC 12/22/2022 4.26  4.00 - 5.40 M/uL Final    Hemoglobin 12/22/2022 12.7  12.0 - 16.0 g/dL Final    Hematocrit 12/22/2022 38.8  37.0 - 48.5 % Final    MCV 12/22/2022 91  82 - 98 fL Final    MCH 12/22/2022 29.8  27.0 - 31.0 pg Final    MCHC 12/22/2022 32.7  32.0 - 36.0 g/dL Final    RDW 12/22/2022 14.0  11.5 - 14.5 % Final    Platelets 12/22/2022 172  150 - 450 K/uL Final    MPV 12/22/2022 12.2  9.2 - 12.9 fL Final    Immature Granulocytes 12/22/2022 0.3  0.0 - 0.5 % Final    Gran # (ANC) 12/22/2022 3.8  1.8 - 7.7 K/uL Final    Immature Grans (Abs) 12/22/2022 0.02  0.00 - 0.04 K/uL Final    Comment: Mild elevation in immature granulocytes is non specific and   can be seen in a variety of conditions including stress response,   acute inflammation, trauma and pregnancy. Correlation with other   laboratory and clinical findings is essential.      Lymph # 12/22/2022 2.3  1.0 - 4.8 K/uL Final    Mono # 12/22/2022 0.8  0.3 - 1.0 K/uL Final    Eos # 12/22/2022 0.1  0.0 - 0.5 K/uL Final    Baso # 12/22/2022 0.01  0.00 - 0.20 K/uL Final    nRBC 12/22/2022 0  0 /100 WBC Final    Gran % 12/22/2022 53.4  38.0 - 73.0 % Final    Lymph % 12/22/2022 32.6  18.0 - 48.0 % Final    Mono % 12/22/2022 11.6  4.0 - 15.0 % Final    Eosinophil % 12/22/2022 2.0  0.0 - 8.0 % Final    Basophil % 12/22/2022 0.1  0.0 - 1.9 % Final    Differential Method 12/22/2022 Automated   Final       Assessment & Plan:      Jojo was seen today for follow-up.   We discussed the shingles vaccine and COVID-19 booster vaccine.  The patient will decide.    The patient will follow-up with orthopedics regarding her knee osteoarthritis treatment.  She has been encouraged to increase her exercise level as tolerated and to lose weight.    Low-dose Ambien will be prescribed for insomnia.    Diagnoses and all orders for this visit:    Essential hypertension    Gastroesophageal reflux disease, unspecified whether esophagitis present    Chronic pain of both knees    Primary osteoarthritis of both knees         No follow-ups on file.     Joo Munoz MD

## 2023-05-11 ENCOUNTER — TELEPHONE (OUTPATIENT)
Dept: FAMILY MEDICINE | Facility: CLINIC | Age: 73
End: 2023-05-11
Payer: MEDICARE

## 2023-05-11 ENCOUNTER — PATIENT MESSAGE (OUTPATIENT)
Dept: FAMILY MEDICINE | Facility: CLINIC | Age: 73
End: 2023-05-11
Payer: MEDICARE

## 2023-06-12 ENCOUNTER — IMMUNIZATION (OUTPATIENT)
Dept: PHARMACY | Facility: CLINIC | Age: 73
End: 2023-06-12
Payer: MEDICARE

## 2023-06-12 DIAGNOSIS — Z23 NEED FOR VACCINATION: Primary | ICD-10-CM

## 2023-06-15 ENCOUNTER — PES CALL (OUTPATIENT)
Dept: ADMINISTRATIVE | Facility: CLINIC | Age: 73
End: 2023-06-15
Payer: MEDICARE

## 2023-07-05 ENCOUNTER — PES CALL (OUTPATIENT)
Dept: ADMINISTRATIVE | Facility: CLINIC | Age: 73
End: 2023-07-05
Payer: MEDICARE

## 2023-07-05 ENCOUNTER — OFFICE VISIT (OUTPATIENT)
Dept: OTOLARYNGOLOGY | Facility: CLINIC | Age: 73
End: 2023-07-05
Payer: MEDICARE

## 2023-07-05 ENCOUNTER — CLINICAL SUPPORT (OUTPATIENT)
Dept: OTOLARYNGOLOGY | Facility: CLINIC | Age: 73
End: 2023-07-05
Payer: MEDICARE

## 2023-07-05 VITALS
DIASTOLIC BLOOD PRESSURE: 68 MMHG | WEIGHT: 165.38 LBS | SYSTOLIC BLOOD PRESSURE: 127 MMHG | HEART RATE: 59 BPM | BODY MASS INDEX: 30.24 KG/M2

## 2023-07-05 DIAGNOSIS — H69.93 DYSFUNCTION OF BOTH EUSTACHIAN TUBES: Primary | ICD-10-CM

## 2023-07-05 DIAGNOSIS — H90.41 SENSORINEURAL HEARING LOSS (SNHL) OF RIGHT EAR WITH UNRESTRICTED HEARING OF LEFT EAR: ICD-10-CM

## 2023-07-05 DIAGNOSIS — H93.12 TINNITUS, LEFT EAR: Primary | ICD-10-CM

## 2023-07-05 DIAGNOSIS — R29.898 TMJ CLICK: ICD-10-CM

## 2023-07-05 DIAGNOSIS — J30.89 NON-SEASONAL ALLERGIC RHINITIS, UNSPECIFIED TRIGGER: ICD-10-CM

## 2023-07-05 PROCEDURE — 3078F PR MOST RECENT DIASTOLIC BLOOD PRESSURE < 80 MM HG: ICD-10-PCS | Mod: CPTII,S$GLB,, | Performed by: NURSE PRACTITIONER

## 2023-07-05 PROCEDURE — 92552 PR PURE TONE AUDIOMETRY, AIR: ICD-10-PCS | Mod: S$GLB,,, | Performed by: PHYSICIAN ASSISTANT

## 2023-07-05 PROCEDURE — 99214 PR OFFICE/OUTPT VISIT, EST, LEVL IV, 30-39 MIN: ICD-10-PCS | Mod: S$GLB,,, | Performed by: NURSE PRACTITIONER

## 2023-07-05 PROCEDURE — 1126F PR PAIN SEVERITY QUANTIFIED, NO PAIN PRESENT: ICD-10-PCS | Mod: CPTII,S$GLB,, | Performed by: NURSE PRACTITIONER

## 2023-07-05 PROCEDURE — 1126F AMNT PAIN NOTED NONE PRSNT: CPT | Mod: CPTII,S$GLB,, | Performed by: NURSE PRACTITIONER

## 2023-07-05 PROCEDURE — 3008F BODY MASS INDEX DOCD: CPT | Mod: CPTII,S$GLB,, | Performed by: NURSE PRACTITIONER

## 2023-07-05 PROCEDURE — 99999 PR PBB SHADOW E&M-EST. PATIENT-LVL V: CPT | Mod: PBBFAC,,, | Performed by: NURSE PRACTITIONER

## 2023-07-05 PROCEDURE — 1160F RVW MEDS BY RX/DR IN RCRD: CPT | Mod: CPTII,S$GLB,, | Performed by: NURSE PRACTITIONER

## 2023-07-05 PROCEDURE — 92556 PR SPEECH AUDIOMETRY, COMPLETE: ICD-10-PCS | Mod: S$GLB,,, | Performed by: PHYSICIAN ASSISTANT

## 2023-07-05 PROCEDURE — 99999 PR PBB SHADOW E&M-EST. PATIENT-LVL I: ICD-10-PCS | Mod: PBBFAC,,, | Performed by: PHYSICIAN ASSISTANT

## 2023-07-05 PROCEDURE — 3288F PR FALLS RISK ASSESSMENT DOCUMENTED: ICD-10-PCS | Mod: CPTII,S$GLB,, | Performed by: NURSE PRACTITIONER

## 2023-07-05 PROCEDURE — 1101F PT FALLS ASSESS-DOCD LE1/YR: CPT | Mod: CPTII,S$GLB,, | Performed by: NURSE PRACTITIONER

## 2023-07-05 PROCEDURE — 1159F PR MEDICATION LIST DOCUMENTED IN MEDICAL RECORD: ICD-10-PCS | Mod: CPTII,S$GLB,, | Performed by: NURSE PRACTITIONER

## 2023-07-05 PROCEDURE — 99999 PR PBB SHADOW E&M-EST. PATIENT-LVL V: ICD-10-PCS | Mod: PBBFAC,,, | Performed by: NURSE PRACTITIONER

## 2023-07-05 PROCEDURE — 99999 PR PBB SHADOW E&M-EST. PATIENT-LVL I: CPT | Mod: PBBFAC,,, | Performed by: PHYSICIAN ASSISTANT

## 2023-07-05 PROCEDURE — 3008F PR BODY MASS INDEX (BMI) DOCUMENTED: ICD-10-PCS | Mod: CPTII,S$GLB,, | Performed by: NURSE PRACTITIONER

## 2023-07-05 PROCEDURE — 3288F FALL RISK ASSESSMENT DOCD: CPT | Mod: CPTII,S$GLB,, | Performed by: NURSE PRACTITIONER

## 2023-07-05 PROCEDURE — 3078F DIAST BP <80 MM HG: CPT | Mod: CPTII,S$GLB,, | Performed by: NURSE PRACTITIONER

## 2023-07-05 PROCEDURE — 92567 TYMPANOMETRY: CPT | Mod: S$GLB,,, | Performed by: PHYSICIAN ASSISTANT

## 2023-07-05 PROCEDURE — 92567 PR TYMPA2METRY: ICD-10-PCS | Mod: S$GLB,,, | Performed by: PHYSICIAN ASSISTANT

## 2023-07-05 PROCEDURE — 1101F PR PT FALLS ASSESS DOC 0-1 FALLS W/OUT INJ PAST YR: ICD-10-PCS | Mod: CPTII,S$GLB,, | Performed by: NURSE PRACTITIONER

## 2023-07-05 PROCEDURE — 3074F PR MOST RECENT SYSTOLIC BLOOD PRESSURE < 130 MM HG: ICD-10-PCS | Mod: CPTII,S$GLB,, | Performed by: NURSE PRACTITIONER

## 2023-07-05 PROCEDURE — 99214 OFFICE O/P EST MOD 30 MIN: CPT | Mod: S$GLB,,, | Performed by: NURSE PRACTITIONER

## 2023-07-05 PROCEDURE — 92556 SPEECH AUDIOMETRY COMPLETE: CPT | Mod: S$GLB,,, | Performed by: PHYSICIAN ASSISTANT

## 2023-07-05 PROCEDURE — 1159F MED LIST DOCD IN RCRD: CPT | Mod: CPTII,S$GLB,, | Performed by: NURSE PRACTITIONER

## 2023-07-05 PROCEDURE — 3074F SYST BP LT 130 MM HG: CPT | Mod: CPTII,S$GLB,, | Performed by: NURSE PRACTITIONER

## 2023-07-05 PROCEDURE — 92552 PURE TONE AUDIOMETRY AIR: CPT | Mod: S$GLB,,, | Performed by: PHYSICIAN ASSISTANT

## 2023-07-05 PROCEDURE — 1160F PR REVIEW ALL MEDS BY PRESCRIBER/CLIN PHARMACIST DOCUMENTED: ICD-10-PCS | Mod: CPTII,S$GLB,, | Performed by: NURSE PRACTITIONER

## 2023-07-05 RX ORDER — AZELASTINE 1 MG/ML
1 SPRAY, METERED NASAL 2 TIMES DAILY
Qty: 30 ML | Refills: 3 | Status: SHIPPED | OUTPATIENT
Start: 2023-07-05 | End: 2024-07-04

## 2023-07-05 RX ORDER — FLUTICASONE PROPIONATE 50 MCG
2 SPRAY, SUSPENSION (ML) NASAL DAILY
Qty: 9.9 ML | Refills: 11 | Status: SHIPPED | OUTPATIENT
Start: 2023-07-05

## 2023-07-05 NOTE — PROGRESS NOTES
Jojo Burrows, a 73 y.o. female, was seen today in the clinic for an audiologic evaluation.  Patients main complaint was tinnitus and ear fullness, mainly in the left ear.      Audiogram results revealed a mild high frequency hearing loss in the right ear and normal hearing sensitivity in the left ear.  Speech reception thresholds were noted at 5 dB in the right ear and 5 dB in the left ear.  Speech discrimination scores were 92% in the right ear and 96% in the left ear.  Tympanometry revealed Type As in the right ear and Type A in the left ear.     Recommendations:  Otologic evaluation  Annual audiogram  Hearing protection when in noise

## 2023-07-05 NOTE — PROGRESS NOTES
Chief Complaint   Patient presents with    Ear Fullness     Left ear umcomfortable    Tinnitus     Left ear occasionally    .     HPI: Patient is a very pleasant 73 y.o. female who is self-referred for evaluation of tinnitus and ear fullness sensation. She does not complain of dizziness.  She reports tinnitus that has been gradually progressing over the last several month(s).  She  notes that it is primarily left. She  states that the tinnitus does not interfere with communication, concentration, sleep, and enjoyment of life. She also admits to hearing loss that has been gradually progressing over the last several months.  She has noted any difference in hearing between the ears, with the right ear being the better hearing ear. She  has not had any recent issues with ear pain or ear drainage.  She has a family history of hearing loss (younger sister), and has not had any previous otologic surgery.  She denies any history of significant loud noise exposure. She denies issues with dizziness. She reports of chronic allergies and is taking Zyrtec PRN    Past Medical History:   Diagnosis Date    Acute hypoxemic respiratory failure 3/31/2020    ALLERGIC RHINITIS     Cataract     COVID-19 virus infection 03/2020    Degenerative disc disease, cervical 9/13/2018    GERD (gastroesophageal reflux disease)     Hyperlipidemia     Hypertension     Migraine headache     Multifocal pneumonia 3/31/2020    Nuclear sclerosis 4/30/2014    Sleep disorder     Thyroid disease     nodular goiter    TIA (transient ischemic attack)      Social History     Socioeconomic History    Marital status:    Tobacco Use    Smoking status: Never    Smokeless tobacco: Never   Substance and Sexual Activity    Alcohol use: No    Drug use: No     Past Surgical History:   Procedure Laterality Date    BREAST BIOPSY      BREAST CYST ASPIRATION      BREAST CYST EXCISION      COLONOSCOPY N/A 4/29/2016    Procedure: COLONOSCOPY;  Surgeon: Sergio MADRID  MD Rancho;  [facility]  Service: Endoscopy;  Laterality: N/A;    COLONOSCOPY N/A 6/28/2021    Procedure: COLONOSCOPY;  Surgeon: Sergio Vickers MD;  [facility]  Service: Endoscopy;  Laterality: N/A;  COVID + 3/2020 and fully vaccinated -   pt requesting Dr. vickers/ instructions emailed-     HYSTERECTOMY       Family History   Problem Relation Age of Onset    Diabetes Mother     Hypertension Mother     Breast cancer Mother     Asthma Father     Glaucoma Father     Heart disease Maternal Grandmother     No Known Problems Son     No Known Problems Daughter     Breast cancer Sister     Ovarian cancer Sister     Amblyopia Neg Hx     Blindness Neg Hx     Cancer Neg Hx     Cataracts Neg Hx     Macular degeneration Neg Hx     Retinal detachment Neg Hx     Strabismus Neg Hx     Stroke Neg Hx     Thyroid disease Neg Hx            Review of Systems  General: negative for chills, fever or weight loss  Psychological: negative for mood changes or depression  Ophthalmic: negative for blurry vision, photophobia or eye pain  ENT: see HPI  Respiratory: no cough, shortness of breath, or wheezing  Cardiovascular: no chest pain or dyspnea on exertion  Gastrointestinal: no abdominal pain, change in bowel habits, or black/ bloody stools  Musculoskeletal: negative for gait disturbance or muscular weakness  Neurological: no syncope or seizures; no ataxia  Dermatological: negative for puritis,  rash and jaundice  Hematologic/lymphatic: no easy bruising, no new lumps or bumps      Physical Exam:    Vitals:    07/05/23 1118   BP: 127/68   Pulse: (!) 59       Constitutional: Well appearing / communicating without difficutly.  NAD.  Eyes: EOM I Bilaterally  Head/Face: Normocephalic.  Negative paranasal sinus pressure/tenderness.  Salivary glands WNL.  House Brackmann I Bilaterally. +left TMJ click    Right Ear: Auricle normal appearance. External Auditory Canal within normal limits no lesions or  masses,TM w/o masses/lesions/perforations. TM mobility noted.   Left Ear: Auricle normal appearance. External Auditory Canal within normal limits no lesions or masses,TM w/o masses/lesions/perforations. TM mobility noted.  Nose: No gross nasal septal deviation. Inferior Turbinates 3+ bilaterally. No septal perforation. No masses/lesions. External nasal skin appears normal without masses/lesions.  Oral Cavity: Gingiva/lips within normal limits.  Dentition/gingiva healthy appearing. Mucus membranes moist. Floor of mouth soft, no masses palpated. Oral Tongue mobile. Hard Palate appears normal.    Oropharynx: Base of tongue appears normal. No masses/lesions noted. Tonsillar fossa/pharyngeal wall without lesions. Posterior oropharynx WNL.  Soft palate without masses. Midline uvula.   Neck/Lymphatic: No LAD I-VI bilaterally.  No thyromegaly.  No masses noted on exam.    Mirror laryngoscopy/nasopharyngoscopy: Active gag reflex.  Unable to perform.    Neuro/Psychiatric: AOx3.  Normal mood and affect.   Cardiovascular: Normal carotid pulses bilaterally, no increasing jugular venous distention noted at cervical region bilaterally.    Respiratory: Normal respiratory effort, no stridor, no retractions noted.    Diagnostic Studies:  Audiogram reviewed personally by myself and in detail with the patient today.   Audiogram results revealed a mild high frequency hearing loss in the right ear and normal hearing sensitivity in the left ear.  Speech reception thresholds were noted at 5 dB in the right ear and 5 dB in the left ear.  Speech discrimination scores were 92% in the right ear and 96% in the left ear.  Tympanometry revealed Type As in the right ear and Type A in the left ear.           Assessment:    ICD-10-CM ICD-9-CM    1. Dysfunction of both eustachian tubes  H69.83 381.81       2. TMJ click  R29.898 524.64       3. Non-seasonal allergic rhinitis, unspecified trigger  J30.89 477.8       4. Sensorineural hearing loss (SNHL)  of right ear with unrestricted hearing of left ear - mild  H90.41 389.15         The primary encounter diagnosis was Dysfunction of both eustachian tubes. Diagnoses of TMJ click, Non-seasonal allergic rhinitis, unspecified trigger, and Sensorineural hearing loss (SNHL) of right ear with unrestricted hearing of left ear - mild were also pertinent to this visit.      Plan:  No orders of the defined types were placed in this encounter.    -start on Flonase 2 sprays in each nostril daily  -start on Azelastine 1 spray in each nostril bid  -continue on Zyrtec  -f/u 6 weeks or sooner as needed    We had a long discussion regarding the anatomy and function of the eustachian tube.  We discussed that the eustachian tube acts as a pump to keep the appropriate amount of pressure behind the ear drum.  I gave the patient a prescription for a nasal steroid spray to be used on a daily basis, and we discussed that it will take 2-3 weeks of daily use to achieve maximal effectiveness.  We also discussed otologic valsalva daily for gently depressurizing the middle ear.      We reviewed her audiogram together in detail.  We also discussed that tinnitus is most often caused by a hearing loss, and that as the hair cells are damaged, either genetic or as a result of loud noise exposure, they then cause tinnitus.  Tinnitus tends to be louder in times of stress and fatigue, and may decrease with time.  Hearing aids are helpful if patient is a good hearing aid candidate. Sound machines may also be an effective masking technique if needed at night. I will give the patient a comprehensive guide on managing tinnitus today with reference materials to further learn about tinnitus and coping mechanisms.     We had a long discussion regarding the underlying pathology of temporomandibular joint dysfunction (TMD) as the cause of ear pain.  We further discussed conservative measures to treat TMD including avoiding gum and other foods that require lots  of chewing, warm compresses, and scheduled antinflammatories (such as Motrin, Ibuprofen, or Aleve).  The patient should also wear a  (purchased OTC or see dentist for custom), which prevents additional pressure on the TM joint.  Stress can also exacerbate TMJ symptoms.    If the pain persists, the patient will then schedule an appointment with a dentist for further evaluation.        Franchesca Morfin, NP

## 2023-07-05 NOTE — PATIENT INSTRUCTIONS
We had a long discussion regarding the underlying pathology of temporomandibular joint dysfunction (TMD) as the cause of ear pain.  We further discussed conservative measures to treat TMD including avoiding gum and other foods that require lots of chewing, warm compresses, and scheduled antinflammatories (such as Motrin, Ibuprofen, or Aleve).  The patient should also wear a  (purchased OTC or see dentist for custom), which prevents additional pressure on the TM joint.  Stress can also exacerbate TMJ symptoms.    If the pain persists, the patient will then schedule an appointment with a dentist for further evaluation.       Eustachian Tube Dysfunction     For the eustachian tube dysfunction,  you were given a prescription for a nasal steroid spray to be used on a daily basis, and we discussed that it will take 2-3 weeks of daily use to achieve maximal effectiveness.  We also discussed otologic valsalva daily for gently depressurizing the middle ear.   A handout with additional information was given to you for your reference.           How do you use a Nasal Corticosteroid Spray?    Make sure you understand your dosing instructions. Spray only the number of prescribed sprays in each nostril. Read the package instructions before using your spray the first time.    Most corticosteroid sprays suggest the following steps:    Wash your hands well.    Gently blow your nose to clear the passageway.    Shake the container several times.    Tilt your head slightly downward.  Use the opposite hand from the nostril you will be spraying to hold the spray bottle.    Block one nostril with your finger.  Insert the nasal applicator into the other nostril.    Aim the spray toward the outer wall of the nostril.  Inhale slowly through the nose and press the .    Breathe out and repeat to apply the prescribed number of sprays.  Repeat these steps for the other nostril.     Avoid sneezing or blowing your nose  right after spraying.

## 2023-08-15 ENCOUNTER — OFFICE VISIT (OUTPATIENT)
Dept: INTERNAL MEDICINE | Facility: CLINIC | Age: 73
End: 2023-08-15
Payer: MEDICARE

## 2023-08-15 VITALS
SYSTOLIC BLOOD PRESSURE: 128 MMHG | RESPIRATION RATE: 14 BRPM | HEIGHT: 62 IN | BODY MASS INDEX: 29.9 KG/M2 | HEART RATE: 75 BPM | DIASTOLIC BLOOD PRESSURE: 72 MMHG | WEIGHT: 162.5 LBS

## 2023-08-15 DIAGNOSIS — F43.21 GRIEVING: ICD-10-CM

## 2023-08-15 DIAGNOSIS — I71.43 INFRARENAL ABDOMINAL AORTIC ANEURYSM (AAA) WITHOUT RUPTURE: ICD-10-CM

## 2023-08-15 DIAGNOSIS — E78.49 OTHER HYPERLIPIDEMIA: ICD-10-CM

## 2023-08-15 DIAGNOSIS — I77.1 TORTUOUS AORTA: ICD-10-CM

## 2023-08-15 DIAGNOSIS — E55.9 VITAMIN D INSUFFICIENCY: ICD-10-CM

## 2023-08-15 DIAGNOSIS — M85.80 OSTEOPENIA, UNSPECIFIED LOCATION: ICD-10-CM

## 2023-08-15 DIAGNOSIS — M50.30 DEGENERATIVE DISC DISEASE, CERVICAL: Chronic | ICD-10-CM

## 2023-08-15 DIAGNOSIS — K21.9 GASTROESOPHAGEAL REFLUX DISEASE, UNSPECIFIED WHETHER ESOPHAGITIS PRESENT: ICD-10-CM

## 2023-08-15 DIAGNOSIS — Z12.31 ENCOUNTER FOR SCREENING MAMMOGRAM FOR MALIGNANT NEOPLASM OF BREAST: ICD-10-CM

## 2023-08-15 DIAGNOSIS — E66.3 OVERWEIGHT (BMI 25.0-29.9): ICD-10-CM

## 2023-08-15 DIAGNOSIS — Z00.00 ENCOUNTER FOR PREVENTIVE HEALTH EXAMINATION: Primary | ICD-10-CM

## 2023-08-15 DIAGNOSIS — I72.3 ANEURYSM OF COMMON ILIAC ARTERY: ICD-10-CM

## 2023-08-15 DIAGNOSIS — Z78.0 MENOPAUSE: ICD-10-CM

## 2023-08-15 DIAGNOSIS — E04.2 MULTIPLE THYROID NODULES: ICD-10-CM

## 2023-08-15 DIAGNOSIS — I10 ESSENTIAL HYPERTENSION: Chronic | ICD-10-CM

## 2023-08-15 DIAGNOSIS — E55.9 VITAMIN D DEFICIENCY: ICD-10-CM

## 2023-08-15 PROBLEM — M54.12 CERVICAL RADICULOPATHY: Status: ACTIVE | Noted: 2018-09-13

## 2023-08-15 PROCEDURE — 3078F DIAST BP <80 MM HG: CPT | Mod: HCNC,CPTII,S$GLB, | Performed by: NURSE PRACTITIONER

## 2023-08-15 PROCEDURE — 1159F PR MEDICATION LIST DOCUMENTED IN MEDICAL RECORD: ICD-10-PCS | Mod: HCNC,CPTII,S$GLB, | Performed by: NURSE PRACTITIONER

## 2023-08-15 PROCEDURE — 1101F PT FALLS ASSESS-DOCD LE1/YR: CPT | Mod: HCNC,CPTII,S$GLB, | Performed by: NURSE PRACTITIONER

## 2023-08-15 PROCEDURE — 1126F PR PAIN SEVERITY QUANTIFIED, NO PAIN PRESENT: ICD-10-PCS | Mod: HCNC,CPTII,S$GLB, | Performed by: NURSE PRACTITIONER

## 2023-08-15 PROCEDURE — 1101F PR PT FALLS ASSESS DOC 0-1 FALLS W/OUT INJ PAST YR: ICD-10-PCS | Mod: HCNC,CPTII,S$GLB, | Performed by: NURSE PRACTITIONER

## 2023-08-15 PROCEDURE — 3288F PR FALLS RISK ASSESSMENT DOCUMENTED: ICD-10-PCS | Mod: HCNC,CPTII,S$GLB, | Performed by: NURSE PRACTITIONER

## 2023-08-15 PROCEDURE — 1170F FXNL STATUS ASSESSED: CPT | Mod: HCNC,CPTII,S$GLB, | Performed by: NURSE PRACTITIONER

## 2023-08-15 PROCEDURE — 3078F PR MOST RECENT DIASTOLIC BLOOD PRESSURE < 80 MM HG: ICD-10-PCS | Mod: HCNC,CPTII,S$GLB, | Performed by: NURSE PRACTITIONER

## 2023-08-15 PROCEDURE — 3074F SYST BP LT 130 MM HG: CPT | Mod: HCNC,CPTII,S$GLB, | Performed by: NURSE PRACTITIONER

## 2023-08-15 PROCEDURE — G0439 PPPS, SUBSEQ VISIT: HCPCS | Mod: HCNC,S$GLB,, | Performed by: NURSE PRACTITIONER

## 2023-08-15 PROCEDURE — G0439 PR MEDICARE ANNUAL WELLNESS SUBSEQUENT VISIT: ICD-10-PCS | Mod: HCNC,S$GLB,, | Performed by: NURSE PRACTITIONER

## 2023-08-15 PROCEDURE — 1126F AMNT PAIN NOTED NONE PRSNT: CPT | Mod: HCNC,CPTII,S$GLB, | Performed by: NURSE PRACTITIONER

## 2023-08-15 PROCEDURE — 1160F PR REVIEW ALL MEDS BY PRESCRIBER/CLIN PHARMACIST DOCUMENTED: ICD-10-PCS | Mod: HCNC,CPTII,S$GLB, | Performed by: NURSE PRACTITIONER

## 2023-08-15 PROCEDURE — 1159F MED LIST DOCD IN RCRD: CPT | Mod: HCNC,CPTII,S$GLB, | Performed by: NURSE PRACTITIONER

## 2023-08-15 PROCEDURE — 3074F PR MOST RECENT SYSTOLIC BLOOD PRESSURE < 130 MM HG: ICD-10-PCS | Mod: HCNC,CPTII,S$GLB, | Performed by: NURSE PRACTITIONER

## 2023-08-15 PROCEDURE — 99999 PR PBB SHADOW E&M-EST. PATIENT-LVL V: CPT | Mod: PBBFAC,HCNC,, | Performed by: NURSE PRACTITIONER

## 2023-08-15 PROCEDURE — 1160F RVW MEDS BY RX/DR IN RCRD: CPT | Mod: HCNC,CPTII,S$GLB, | Performed by: NURSE PRACTITIONER

## 2023-08-15 PROCEDURE — 3008F BODY MASS INDEX DOCD: CPT | Mod: HCNC,CPTII,S$GLB, | Performed by: NURSE PRACTITIONER

## 2023-08-15 PROCEDURE — 99999 PR PBB SHADOW E&M-EST. PATIENT-LVL V: ICD-10-PCS | Mod: PBBFAC,HCNC,, | Performed by: NURSE PRACTITIONER

## 2023-08-15 PROCEDURE — 3288F FALL RISK ASSESSMENT DOCD: CPT | Mod: HCNC,CPTII,S$GLB, | Performed by: NURSE PRACTITIONER

## 2023-08-15 PROCEDURE — 3008F PR BODY MASS INDEX (BMI) DOCUMENTED: ICD-10-PCS | Mod: HCNC,CPTII,S$GLB, | Performed by: NURSE PRACTITIONER

## 2023-08-15 PROCEDURE — 1170F PR FUNCTIONAL STATUS ASSESSED: ICD-10-PCS | Mod: HCNC,CPTII,S$GLB, | Performed by: NURSE PRACTITIONER

## 2023-08-15 NOTE — PATIENT INSTRUCTIONS
Counseling and Referral of Other Preventative  (Italic type indicates deductible and co-insurance are waived)    Patient Name: Jojo Burrows  Today's Date: 8/15/2023    Health Maintenance         Date Due Completion Date    DEXA Scan Ordered   4/30/2019    Mammogram Get after 08/19/2023- ordered   8/19/2022    Shingles Vaccine (1 of 2) 08/29/2023 cdc handout   ---    COVID-19 Vaccine (4 - Moderna risk series) 10/30/2023 (Originally 8/7/2023) 6/12/2023    Influenza Vaccine (1) 09/01/2023 10/19/2022    Lipid Panel Scheduled   12/22/2022    Colorectal Cancer Screening 06/28/2028 6/28/2021        TETANUS VACCINE    Gradual weight loss, mediterranean diet    Referral to endocrinology- check thyroid- abn thyroid u/s follow up    Vascular medicine- evaluate abnormal aorta findings-referral to vascular medicine       10/16/2029 10/16/2019          Orders Placed This Encounter   Procedures    Mammo Digital Screening Bilat w/ Escobar    DXA Bone Density Axial Skeleton 1 or more sites    Vitamin D    Ambulatory referral/consult to Endocrinology     The following information is provided to all patients.  This information is to help you find resources for any of the problems found today that may be affecting your health:                Living healthy guide: www.[facility].[place].gov      Understanding Diabetes: www.diabetes.org      Eating healthy: www.cdc.gov/healthyweight      [facility] home safety checklist: www.cdc.gov/steadi/patient.html      Agency on Aging: www.goea.[place].[facility]      Alcoholics anonymous (AA): www.aa.org      Physical Activity: www.soraya.nih.gov/je2vpde      Tobacco use: www.quitwithusla.org

## 2023-08-15 NOTE — PROGRESS NOTES
"Jojo Burrows presented for a  Medicare AWV and comprehensive Health Risk Assessment today. The following components were reviewed and updated:    Medical history  Family History  Social history  Allergies and Current Medications  Health Risk Assessment  Health Maintenance  Care Team     ** See Completed Assessments for Annual Wellness Visit within the encounter summary.**       The following assessments were completed:  Living Situation  CAGE  Depression Screening  Timed Get Up and Go  Whisper Test  Cognitive Function Screening  Nutrition Screening  ADL Screening  PAQ Screening      Vitals:    08/15/23 1355   BP: 128/72   BP Location: Left arm   Patient Position: Sitting   Pulse: 75   Resp: 14   Weight: 73.7 kg (162 lb 7.7 oz)   Height: 5' 2" (1.575 m)     Body mass index is 29.72 kg/m².  Physical Exam  Constitutional:       Comments: Younger in appearance than age   HENT:      Right Ear: There is no impacted cerumen.      Left Ear: There is no impacted cerumen.   Eyes:      General: No scleral icterus.  Cardiovascular:      Rate and Rhythm: Normal rate and regular rhythm.   Pulmonary:      Effort: Pulmonary effort is normal.      Breath sounds: Normal breath sounds.   Abdominal:      Palpations: Abdomen is soft.      Comments: obese   Musculoskeletal:         General: No swelling. Normal range of motion.   Skin:     General: Skin is warm and dry.   Neurological:      Mental Status: She is alert and oriented to person, place, and time.   Psychiatric:         Mood and Affect: Mood normal.         Behavior: Behavior normal.         Thought Content: Thought content normal.         Judgment: Judgment normal.            Medication review & Opioid screening perform during rooming section. No prescribed opioid medications noted.  Review for substance use disorder screening performed during rooming section. No substance abuse noted on screening.      Diagnoses and health risks identified today and associated " recommendations/orders:    1. Vitamin D insufficiency  Stable on OTC RX & followed by PCP  - Vitamin D; Future  - DXA Bone Density Axial Skeleton 1 or more sites; Future    2. Essential hypertension  Stable on regimen & followed by PCP    3. Degenerative disc disease, cervical  Stable on regimen & followed by PCP      4. Gastroesophageal reflux disease, unspecified whether esophagitis present  Stable on regimen & followed by PCP    5. Tortuous aorta  Stable on regimen & followed by PCP    6. Multiple thyroid nodules  Stable on regimen & followed by PCP  - Ambulatory referral/consult to Endocrinology; Future    7. Other hyperlipidemia  Stable on regimen & followed by PCP    8. Overweight (BMI 25.0-29.9)  Chronic improving-. Followed by PCP.   Centers for Disease Control and Prevention (CDC)  weight recommendations for current BMI & ideal BMI range discussed with patient.  Recommended  gradual weight loss,  mediterranean diet ,  structured regular exercise r every day.       9. Encounter for screening mammogram for malignant neoplasm of breast  - Mammo Digital Screening Bilat w/ [facility]obar; Future    10. Osteopenia, unspecified location  - DXA Bone Density Axial Skeleton 1 or more sites; Future    11. Menopause  - DXA Bone Density Axial Skeleton 1 or more sites; Future    12. Encounter for preventive health examination  Here for Health Risk Assessment/Annual Wellness Visit.  Health maintenance reviewed and updated. Follow up in one year.        13. Infrarenal abdominal aortic aneurysm (AAA) without rupture  Stable & followed by PCP  - Ambulatory referral/consult to Vascular Medicine; Future    14. Aneurysm of common iliac artery  Stable & followed by PCP  - Ambulatory referral/consult to Vascular Medicine; Future    15. Grieving  Stable  & followed by PCP        Provided Jojo with a 5-10 year written screening schedule and personal prevention plan. Recommendations were developed using the USPSTF age appropriate  recommendations. Education, counseling, and referrals were provided as needed. After Visit Summary printed and given to patient which includes a list of additional screenings\tests needed.HRA in 1 yr.    Zohreh Hoffman NP I offered to discuss advanced care planning, including how to pick a person who would make decisions for you if you were unable to make them for yourself, called a health care power of , and what kind of decisions you might make such as use of life sustaining treatments such as ventilators and tube feeding when faced with a life limiting illness recorded on a living will that they will need to know. (How you want to be cared for as you near the end of your natural life)     X Patient is interested in learning more about how to make advanced directives.  I provided them paperwork and offered to discuss this with them.

## 2023-08-21 ENCOUNTER — OFFICE VISIT (OUTPATIENT)
Dept: ENDOCRINOLOGY | Facility: CLINIC | Age: 73
End: 2023-08-21
Payer: MEDICARE

## 2023-08-21 VITALS
SYSTOLIC BLOOD PRESSURE: 110 MMHG | DIASTOLIC BLOOD PRESSURE: 73 MMHG | WEIGHT: 161.81 LBS | OXYGEN SATURATION: 99 % | HEART RATE: 69 BPM | BODY MASS INDEX: 29.6 KG/M2

## 2023-08-21 DIAGNOSIS — E55.9 VITAMIN D INSUFFICIENCY: ICD-10-CM

## 2023-08-21 DIAGNOSIS — E04.2 MULTIPLE THYROID NODULES: Primary | ICD-10-CM

## 2023-08-21 PROCEDURE — 3288F PR FALLS RISK ASSESSMENT DOCUMENTED: ICD-10-PCS | Mod: CPTII,S$GLB,, | Performed by: INTERNAL MEDICINE

## 2023-08-21 PROCEDURE — 1101F PT FALLS ASSESS-DOCD LE1/YR: CPT | Mod: CPTII,S$GLB,, | Performed by: INTERNAL MEDICINE

## 2023-08-21 PROCEDURE — 3008F PR BODY MASS INDEX (BMI) DOCUMENTED: ICD-10-PCS | Mod: CPTII,S$GLB,, | Performed by: INTERNAL MEDICINE

## 2023-08-21 PROCEDURE — 99204 OFFICE O/P NEW MOD 45 MIN: CPT | Mod: S$GLB,,, | Performed by: INTERNAL MEDICINE

## 2023-08-21 PROCEDURE — 3078F PR MOST RECENT DIASTOLIC BLOOD PRESSURE < 80 MM HG: ICD-10-PCS | Mod: CPTII,S$GLB,, | Performed by: INTERNAL MEDICINE

## 2023-08-21 PROCEDURE — 1126F PR PAIN SEVERITY QUANTIFIED, NO PAIN PRESENT: ICD-10-PCS | Mod: CPTII,S$GLB,, | Performed by: INTERNAL MEDICINE

## 2023-08-21 PROCEDURE — 3008F BODY MASS INDEX DOCD: CPT | Mod: CPTII,S$GLB,, | Performed by: INTERNAL MEDICINE

## 2023-08-21 PROCEDURE — 3074F SYST BP LT 130 MM HG: CPT | Mod: CPTII,S$GLB,, | Performed by: INTERNAL MEDICINE

## 2023-08-21 PROCEDURE — 99999 PR PBB SHADOW E&M-EST. PATIENT-LVL IV: CPT | Mod: PBBFAC,,, | Performed by: INTERNAL MEDICINE

## 2023-08-21 PROCEDURE — 1159F PR MEDICATION LIST DOCUMENTED IN MEDICAL RECORD: ICD-10-PCS | Mod: CPTII,S$GLB,, | Performed by: INTERNAL MEDICINE

## 2023-08-21 PROCEDURE — 1160F PR REVIEW ALL MEDS BY PRESCRIBER/CLIN PHARMACIST DOCUMENTED: ICD-10-PCS | Mod: CPTII,S$GLB,, | Performed by: INTERNAL MEDICINE

## 2023-08-21 PROCEDURE — 99999 PR PBB SHADOW E&M-EST. PATIENT-LVL IV: ICD-10-PCS | Mod: PBBFAC,,, | Performed by: INTERNAL MEDICINE

## 2023-08-21 PROCEDURE — 3074F PR MOST RECENT SYSTOLIC BLOOD PRESSURE < 130 MM HG: ICD-10-PCS | Mod: CPTII,S$GLB,, | Performed by: INTERNAL MEDICINE

## 2023-08-21 PROCEDURE — 3078F DIAST BP <80 MM HG: CPT | Mod: CPTII,S$GLB,, | Performed by: INTERNAL MEDICINE

## 2023-08-21 PROCEDURE — 99204 PR OFFICE/OUTPT VISIT, NEW, LEVL IV, 45-59 MIN: ICD-10-PCS | Mod: S$GLB,,, | Performed by: INTERNAL MEDICINE

## 2023-08-21 PROCEDURE — 1160F RVW MEDS BY RX/DR IN RCRD: CPT | Mod: CPTII,S$GLB,, | Performed by: INTERNAL MEDICINE

## 2023-08-21 PROCEDURE — 3288F FALL RISK ASSESSMENT DOCD: CPT | Mod: CPTII,S$GLB,, | Performed by: INTERNAL MEDICINE

## 2023-08-21 PROCEDURE — 1126F AMNT PAIN NOTED NONE PRSNT: CPT | Mod: CPTII,S$GLB,, | Performed by: INTERNAL MEDICINE

## 2023-08-21 PROCEDURE — 1159F MED LIST DOCD IN RCRD: CPT | Mod: CPTII,S$GLB,, | Performed by: INTERNAL MEDICINE

## 2023-08-21 PROCEDURE — 1101F PR PT FALLS ASSESS DOC 0-1 FALLS W/OUT INJ PAST YR: ICD-10-PCS | Mod: CPTII,S$GLB,, | Performed by: INTERNAL MEDICINE

## 2023-08-21 NOTE — ASSESSMENT & PLAN NOTE
US thyroid report for review since 2010.  Last US thyroid on 07/07/2022 showed a 1.1 cm (previously 1.6 cm) nodule in the right upper pole, TI-RADS 3 and 0.6 cm (previously 1.2 cm) nodule in the left upper pole, TI-RADS 3.  Two additional subcentimeter thyroid nodule in the right and the left nodule. None of which meet the criteria for FNA or follow-up. No cervical lymphadenopathy.   Patient denies any compressive neck symptoms.  Patient reports occasional palpitations and anxiety.  She reports personal stressors.  Prior thyroid function test has been normal.    Discussed repeating ultrasound thyroid in 1 year.  Check thyroid function test.

## 2023-08-21 NOTE — ASSESSMENT & PLAN NOTE
Patient is currently on vitamin-D 2000 IU daily.  She has repeat vitamin-D ordered for this week.    Lab Results   Component Value Date    [id number] 24 (L) 06/24/2022

## 2023-08-21 NOTE — PROGRESS NOTES
Subjective:      Patient ID: Jojo Burrows is referred by Zohreh Hoffman NP     Chief Complaint:  Multiple thyroid nodules    History of Present Illness    Jojo Burrows is a 73 y.o. female who presents for evaluation of Multiple thyroid nodules.    Ultrasound from 10/5/2010 showed 3 subcentimeter nodules in the right and 1 in the left, similar to prior exam from 08/03/2009. Four benign-appearing lymph nodes in the neck.  She has had ultrasound done in 2011, 2013 , 2014, 2017 which showed subcentimeter thyroid nodules.    Ultrasound thyroid in 04/09/2019 showed a 1.6 by 0.8 x 1.1 cm on the right upper pole, TI-RADS 3 and 1.2 x 0.4 x 0.7 cm left mid pole thyroid nodule, TI-RADS 3 and subcentimeter 0.3 cm nodule in the right upper pole.  No cervical lymphadenopathy.    Ultrasound thyroid on 07/07/2022 showed a 1.1 cm (previously 1.6 cm) nodule in the right upper pole, TI-RADS 3 and 0.6 cm (previously 1.2 cm) nodule in the left upper pole, TI-RADS 3.  Two additional subcentimeter thyroid nodule in the right and the left nodule. None of which meet the criteria for FNA or follow-up. No cervical lymphadenopathy.    Family history of thyroid cancer: Denies. Maternal aunt had thyroidectomy, denies thyroid cancer.  Cousin has hypothyroidism.   Head and neck radiation: None    Thyroid symptoms:  Reports occasional palpitations and anxiety.   She has had multiple medical issues in the family and personal stressors. Patient denies any dysphagia, dyspnea or anterior neck discomfort.       Lab Results   Component Value Date    TSH 1.322 06/24/2022    TSH 1.549 08/24/2021    FREET4 0.87 06/24/2022       Vitamin D insufficiency     Currently taking Vitamin D 2000 IU daily.     Lab Results   Component Value Date    [id number] 24 (L) 06/24/2022       ROS:   As above    Objective:     /73 (BP Location: Right arm, Patient Position: Sitting, BP Method: Medium (Automatic))   Pulse 69   Wt 73.4 kg (161 lb 13.1 oz)   SpO2  99%   BMI 29.60 kg/m²     Body mass index is 29.6 kg/m².    Physical Exam  Constitutional:       General: She is not in acute distress.     Appearance: She is not ill-appearing.   HENT:      Head: Normocephalic.   Neck:      Thyroid: No thyromegaly.   Cardiovascular:      Rate and Rhythm: Normal rate and regular rhythm.   Pulmonary:      Effort: Pulmonary effort is normal.   Abdominal:      General: Abdomen is flat.      Palpations: Abdomen is soft.   Musculoskeletal:      Cervical back: Neck supple.   Skin:     General: Skin is warm and dry.   Neurological:      Mental Status: She is alert and oriented to person, place, and time.           Lab Review:   Lab Results   Component Value Date    HGBA1C 5.5 06/24/2022     Lab Results   Component Value Date    CHOL 138 12/22/2022    HDL 65 12/22/2022    LDLCALC 67.0 12/22/2022    TRIG 30 12/22/2022    CHOLHDL 47.1 12/22/2022     Lab Results   Component Value Date     12/22/2022    K 3.9 12/22/2022     12/22/2022    CO2 26 12/22/2022    GLU 87 12/22/2022    BUN 13 12/22/2022    CREATININE 0.8 12/22/2022    CALCIUM 9.1 12/22/2022    PROT 7.2 12/22/2022    ALBUMIN 3.9 12/22/2022    BILITOT 0.5 12/22/2022    ALKPHOS 96 12/22/2022    AST 18 12/22/2022    ALT 17 12/22/2022    ANIONGAP 9 12/22/2022    EGFRNORACEVR >60.0 12/22/2022    TSH 1.322 06/24/2022          Vit D, 25-Hydroxy   Date Value Ref Range Status   06/24/2022 24 (L) 30 - 96 ng/mL Final     Comment:     Vitamin D deficiency.........<10 ng/mL                              Vitamin D insufficiency......10-29 ng/mL       Vitamin D sufficiency........> or equal to 30 ng/mL  Vitamin D toxicity............>100 ng/mL         Assessment and Plan     Problem List Items Addressed This Visit          Endocrine    Multiple thyroid nodules - Primary       US thyroid report for review since 2010.  Last US thyroid on 07/07/2022 showed a 1.1 cm (previously 1.6 cm) nodule in the right upper pole, TI-RADS 3 and 0.6 cm  (previously 1.2 cm) nodule in the left upper pole, TI-RADS 3.  Two additional subcentimeter thyroid nodule in the right and the left nodule. None of which meet the criteria for FNA or follow-up. No cervical lymphadenopathy.   Patient denies any compressive neck symptoms.  Patient reports occasional palpitations and anxiety.  She reports personal stressors.  Prior thyroid function test has been normal.    Discussed repeating ultrasound thyroid in 1 year.  Check thyroid function test.              Relevant Orders    TSH    Vitamin D insufficiency       Patient is currently on vitamin-D 2000 IU daily.  She has repeat vitamin-D ordered for this week.    Lab Results   Component Value Date    [id number] 24 (L) 06/24/2022                    Yun MAYNARD Rai, MD

## 2023-08-24 ENCOUNTER — HOSPITAL ENCOUNTER (OUTPATIENT)
Dept: RADIOLOGY | Facility: HOSPITAL | Age: 73
Discharge: HOME | End: 2023-08-24
Attending: NURSE PRACTITIONER
Payer: MEDICARE

## 2023-08-24 DIAGNOSIS — Z12.31 ENCOUNTER FOR SCREENING MAMMOGRAM FOR MALIGNANT NEOPLASM OF BREAST: ICD-10-CM

## 2023-08-24 PROCEDURE — 77067 SCR MAMMO BI INCL CAD: CPT | Mod: TC,HCNC,PO

## 2023-08-24 PROCEDURE — 77067 SCR MAMMO BI INCL CAD: CPT | Mod: 26,HCNC,, | Performed by: RADIOLOGY

## 2023-08-24 PROCEDURE — 77063 BREAST TOMOSYNTHESIS BI: CPT | Mod: 26,HCNC,, | Performed by: RADIOLOGY

## 2023-08-24 PROCEDURE — 77063 MAMMO DIGITAL SCREENING BILAT WITH TOMO: ICD-10-PCS | Mod: 26,HCNC,, | Performed by: RADIOLOGY

## 2023-08-24 PROCEDURE — 77067 MAMMO DIGITAL SCREENING BILAT WITH TOMO: ICD-10-PCS | Mod: 26,HCNC,, | Performed by: RADIOLOGY

## 2023-08-29 DIAGNOSIS — E04.2 MULTIPLE THYROID NODULES: Primary | ICD-10-CM

## 2023-08-29 DIAGNOSIS — E55.9 VITAMIN D INSUFFICIENCY: ICD-10-CM

## 2023-08-29 RX ORDER — ERGOCALCIFEROL 1.25 MG/1
50000 CAPSULE ORAL
Qty: 12 CAPSULE | Refills: 0 | Status: SHIPPED | OUTPATIENT
Start: 2023-08-29 | End: 2023-12-13

## 2023-08-30 NOTE — TELEPHONE ENCOUNTER
----- Message from Yelena Ferrell sent at 4/22/2020  2:34 PM CDT -----  Contact: Lucille(daughter)[phone]  Pt daughter is calling back regarding information from the facility her mother is at. Pt daughter would like to see if Dr Munoz's nurse would call the facility. Pt daughter states she has been unable to speak with anyone at the facility. Pt daughter states the number at the facility is  and the person is Dayne().  Please call and advise     Sotyktu Pregnancy And Lactation Text: There is insufficient data to evaluate whether or not Sotyktu is safe to use during pregnancy.   It is not known if Sotyktu passes into breast milk and whether or not it is safe to use when breastfeeding.

## 2023-09-07 ENCOUNTER — OFFICE VISIT (OUTPATIENT)
Dept: CARDIOLOGY | Facility: CLINIC | Age: 73
End: 2023-09-07
Payer: MEDICARE

## 2023-09-07 VITALS
BODY MASS INDEX: 29.7 KG/M2 | HEIGHT: 62 IN | DIASTOLIC BLOOD PRESSURE: 77 MMHG | HEART RATE: 74 BPM | WEIGHT: 161.38 LBS | SYSTOLIC BLOOD PRESSURE: 113 MMHG

## 2023-09-07 DIAGNOSIS — I71.43 INFRARENAL ABDOMINAL AORTIC ANEURYSM (AAA) WITHOUT RUPTURE: Primary | ICD-10-CM

## 2023-09-07 DIAGNOSIS — I10 ESSENTIAL HYPERTENSION: ICD-10-CM

## 2023-09-07 DIAGNOSIS — E78.49 OTHER HYPERLIPIDEMIA: ICD-10-CM

## 2023-09-07 DIAGNOSIS — I77.1 TORTUOUS AORTA: ICD-10-CM

## 2023-09-07 DIAGNOSIS — I72.3 ANEURYSM OF COMMON ILIAC ARTERY: ICD-10-CM

## 2023-09-07 PROCEDURE — 1159F PR MEDICATION LIST DOCUMENTED IN MEDICAL RECORD: ICD-10-PCS | Mod: HCNC,CPTII,S$GLB, | Performed by: INTERNAL MEDICINE

## 2023-09-07 PROCEDURE — 3078F DIAST BP <80 MM HG: CPT | Mod: HCNC,CPTII,S$GLB, | Performed by: INTERNAL MEDICINE

## 2023-09-07 PROCEDURE — 99205 OFFICE O/P NEW HI 60 MIN: CPT | Mod: HCNC,S$GLB,, | Performed by: INTERNAL MEDICINE

## 2023-09-07 PROCEDURE — 3008F PR BODY MASS INDEX (BMI) DOCUMENTED: ICD-10-PCS | Mod: HCNC,CPTII,S$GLB, | Performed by: INTERNAL MEDICINE

## 2023-09-07 PROCEDURE — 99999 PR PBB SHADOW E&M-EST. PATIENT-LVL V: ICD-10-PCS | Mod: PBBFAC,HCNC,, | Performed by: INTERNAL MEDICINE

## 2023-09-07 PROCEDURE — 99205 PR OFFICE/OUTPT VISIT, NEW, LEVL V, 60-74 MIN: ICD-10-PCS | Mod: HCNC,S$GLB,, | Performed by: INTERNAL MEDICINE

## 2023-09-07 PROCEDURE — 3288F PR FALLS RISK ASSESSMENT DOCUMENTED: ICD-10-PCS | Mod: HCNC,CPTII,S$GLB, | Performed by: INTERNAL MEDICINE

## 2023-09-07 PROCEDURE — 3074F PR MOST RECENT SYSTOLIC BLOOD PRESSURE < 130 MM HG: ICD-10-PCS | Mod: HCNC,CPTII,S$GLB, | Performed by: INTERNAL MEDICINE

## 2023-09-07 PROCEDURE — 3074F SYST BP LT 130 MM HG: CPT | Mod: HCNC,CPTII,S$GLB, | Performed by: INTERNAL MEDICINE

## 2023-09-07 PROCEDURE — 1126F PR PAIN SEVERITY QUANTIFIED, NO PAIN PRESENT: ICD-10-PCS | Mod: HCNC,CPTII,S$GLB, | Performed by: INTERNAL MEDICINE

## 2023-09-07 PROCEDURE — 1101F PR PT FALLS ASSESS DOC 0-1 FALLS W/OUT INJ PAST YR: ICD-10-PCS | Mod: HCNC,CPTII,S$GLB, | Performed by: INTERNAL MEDICINE

## 2023-09-07 PROCEDURE — 3078F PR MOST RECENT DIASTOLIC BLOOD PRESSURE < 80 MM HG: ICD-10-PCS | Mod: HCNC,CPTII,S$GLB, | Performed by: INTERNAL MEDICINE

## 2023-09-07 PROCEDURE — 1159F MED LIST DOCD IN RCRD: CPT | Mod: HCNC,CPTII,S$GLB, | Performed by: INTERNAL MEDICINE

## 2023-09-07 PROCEDURE — 3288F FALL RISK ASSESSMENT DOCD: CPT | Mod: HCNC,CPTII,S$GLB, | Performed by: INTERNAL MEDICINE

## 2023-09-07 PROCEDURE — 99999 PR PBB SHADOW E&M-EST. PATIENT-LVL V: CPT | Mod: PBBFAC,HCNC,, | Performed by: INTERNAL MEDICINE

## 2023-09-07 PROCEDURE — 3008F BODY MASS INDEX DOCD: CPT | Mod: HCNC,CPTII,S$GLB, | Performed by: INTERNAL MEDICINE

## 2023-09-07 PROCEDURE — 1126F AMNT PAIN NOTED NONE PRSNT: CPT | Mod: HCNC,CPTII,S$GLB, | Performed by: INTERNAL MEDICINE

## 2023-09-07 PROCEDURE — 1101F PT FALLS ASSESS-DOCD LE1/YR: CPT | Mod: HCNC,CPTII,S$GLB, | Performed by: INTERNAL MEDICINE

## 2023-09-07 NOTE — PROGRESS NOTES
[facility]      Chief Complaint   Patient presents with    Infrarenal abdominal aortic aneurysm (AAA) without rupture    Aneurysm of common iliac artery       Patient ID: Jojo Burrows is a 73 y.o. female with HTN, HLD, obesity, TIA, who presents for initial appointment.  Pertinent history/events are as follows:     -Pt kindly referred by Zohreh Hoffman NP for evaluation of infrarenal abdominal aortic aneurysm without rupture/aneurysm of common iliac artery.    HPI:  Mrs. Burrows reports no significant abdominal pain and no claudication.  CT Abd/Pelvis without contrast on 1/9/2023 revealed aneurysmal dilation of the distal infrarenal abdominal aorta measuring 3.3 x 3.1 cm, previously 3.1 cm on abdominal ultrasound from 01/05/2023.  Stable aneurysmal dilation of the right common iliac artery 2.2 cm, previously 2.4 cm on abdominal ultrasound from 01/05/2023.  Normal diameter of the left common iliac artery measuring 1.4 cm.    Past Medical History:   Diagnosis Date    Acute hypoxemic respiratory failure 3/31/2020    ALLERGIC RHINITIS     Cataract     COVID-19 virus infection 03/2020    Degenerative disc disease, cervical 9/13/2018    GERD (gastroesophageal reflux disease)     Hyperlipidemia     Hypertension     Migraine headache     Multifocal pneumonia 3/31/2020    Nuclear sclerosis 4/30/2014    Sleep disorder     Thyroid disease     nodular goiter    TIA (transient ischemic attack)      Past Surgical History:   Procedure Laterality Date    BREAST BIOPSY      BREAST CYST ASPIRATION      BREAST CYST EXCISION      COLONOSCOPY N/A 4/29/2016    Procedure: COLONOSCOPY;  Surgeon: Sergio Vickers MD;  Location: [address]);  Service: Endoscopy;  Laterality: N/A;    COLONOSCOPY N/A 6/28/2021    Procedure: COLONOSCOPY;  Surgeon: Sergio Vickers MD;  Location: [address]);  Service: Endoscopy;  Laterality: N/A;  COVID + 3/2020 and fully vaccinated -   pt requesting Dr. vickers/  instructions emailed-     HYSTERECTOMY       Social History     Socioeconomic History    Marital status:    Tobacco Use    Smoking status: Never    Smokeless tobacco: Never   Substance and Sexual Activity    Alcohol use: No    Drug use: No    Sexual activity: Not Currently     Partners: Male     Social Determinants of Health     Financial Resource Strain: Medium Risk (8/15/2023)    Overall Financial Resource Strain (CARDIA)     Difficulty of Paying Living Expenses: Somewhat hard   Food Insecurity: No Food Insecurity (8/15/2023)    Hunger Vital Sign     Worried About Running Out of Food in the Last Year: Never true     Ran Out of Food in the Last Year: Never true   Transportation Needs: No Transportation Needs (8/15/2023)    PRAPARE - Transportation     Lack of Transportation (Medical): No     Lack of Transportation (Non-Medical): No   Stress: Stress Concern Present (8/15/2023)    [employer] [employer] - Occupational Stress Questionnaire     Feeling of Stress : Very much   Social Connections: Unknown (8/15/2023)    Social Connection and Isolation Panel [NHANES]     Marital Status:    Housing Stability: Unknown (8/15/2023)    Housing Stability Vital Sign     Unable to Pay for Housing in the Last Year: No     Unstable Housing in the Last Year: No     Family History   Problem Relation Age of Onset    Diabetes Mother     Hypertension Mother     Breast cancer Mother     Asthma Father     Glaucoma Father     Breast cancer Sister     Ovarian cancer Sister     Stroke Daughter     Lymphoma Daughter     No Known Problems Son     Heart disease Maternal Grandmother     Esophageal cancer Other     Amblyopia Neg Hx     Blindness Neg Hx     Cancer Neg Hx     Cataracts Neg Hx     Macular degeneration Neg Hx     Retinal detachment Neg Hx     Strabismus Neg Hx     Thyroid disease Neg Hx        Review of patient's allergies indicates:   Allergen Reactions    Iodine Rash    Iodine and iodide containing  products Itching and Rash    Shellfish containing products Itching and Rash       Medication List with Changes/Refills   Current Medications    ALBUTEROL (PROVENTIL/VENTOLIN HFA) 90 MCG/ACTUATION INHALER    Inhale 2 puffs into the lungs every 6 (six) hours as needed for Wheezing. Rescue    ASCORBIC ACID, VITAMIN C, (VITAMIN C) 500 MG TABLET    Take 1 tablet (500 mg total) by mouth 2 (two) times daily.    ATORVASTATIN (LIPITOR) 40 MG TABLET    TAKE 1 TABLET BY MOUTH EVERY DAY IN THE EVENING    AZELASTINE (ASTELIN) 137 MCG (0.1 %) NASAL SPRAY    1 spray (137 mcg total) by Nasal route 2 (two) times daily.    CALCIUM CARBONATE (TUMS) 200 MG CALCIUM (500 MG) CHEWABLE TABLET    Take 2 tablets (1,000 mg total) by mouth 3 (three) times daily as needed.    CELECOXIB (CELEBREX) 200 MG CAPSULE    Take 1 capsule (200 mg total) by mouth 2 (two) times daily. For joint pain.    CLOTRIMAZOLE-BETAMETHASONE 1-0.05% (LOTRISONE) CREAM    Apply topically 2 (two) times daily.    CYANOCOBALAMIN (VITAMIN B-12) 250 MCG TABLET    Take 250 mcg by mouth once daily.    DICLOFENAC SODIUM (VOLTAREN) 1 % GEL    Apply 2 g topically 2 (two) times daily as needed.    ERGOCALCIFEROL (ERGOCALCIFEROL) 50,000 UNIT CAP    Take 1 capsule (50,000 Units total) by mouth every 7 days. for 12 doses    ESOMEPRAZOLE (NEXIUM) 40 MG CAPSULE    Take 1 capsule (40 mg total) by mouth before breakfast.    FLUTICASONE PROPIONATE (FLONASE) 50 MCG/ACTUATION NASAL SPRAY    2 sprays (100 mcg total) by Each Nostril route once daily.    FOLIC ACID/MULTIVIT-MIN/LUTEIN (CENTRUM SILVER ORAL)    Take 1 tablet by mouth once daily.    LEVOCETIRIZINE (XYZAL) 5 MG TABLET    Take 1 tablet (5 mg total) by mouth daily as needed (cold and sinus).    METOPROLOL SUCCINATE (TOPROL-XL) 50 MG 24 HR TABLET    TAKE 1 TABLET BY MOUTH EVERY DAY    ZOLPIDEM (AMBIEN) 5 MG TAB    Take 1 tablet (5 mg total) by mouth nightly as needed (insomnia).   Discontinued Medications    AMOXICILLIN-CLAVULANATE  "875-125MG (AUGMENTIN) 875-125 MG PER TABLET    Take 1 tablet by mouth every 12 (twelve) hours.    ERGOCALCIFEROL, VITAMIN D2, (VITAMIN D ORAL)    Take 2,000 Int'l Units by mouth once daily.    HYDROXYZINE HCL (ATARAX) 25 MG TABLET        MECLIZINE (ANTIVERT) 25 MG TABLET    Take 1 tablet (25 mg total) by mouth every 8 (eight) hours as needed for Dizziness.       Review of Systems  Constitution: Denies chills, fever, and sweats.  HENT: Denies headaches or blurry vision.  Cardiovascular: Denies chest pain or irregular heart beat.  Respiratory: Denies cough or shortness of breath.  Gastrointestinal: Denies abdominal pain, nausea, or vomiting.  Musculoskeletal: Denies muscle cramps.  Neurological: Denies dizziness or focal weakness.  Psychiatric/Behavioral: Normal mental status.  Hematologic/Lymphatic: Denies bleeding problem or easy bruising/bleeding.  Skin: Denies rash or suspicious lesions    Physical Examination  /77   Pulse 74   Ht 5' 2" (1.575 m)   Wt 73.2 kg (161 lb 6 oz)   BMI 29.52 kg/m²     Constitutional: No acute distress, conversant  HEENT: Sclera anicteric, Pupils equal, round and reactive to light, extraocular motions intact, Oropharynx clear  Neck: No JVD, no carotid bruits  Cardiovascular: regular rate and rhythm, no murmur, rubs or gallops, normal S1/S2  Pulmonary: Clear to auscultation bilaterally  Abdominal: Abdomen soft, nontender, nondistended, positive bowel sounds  Extremities: No lower extremity edema,   Pulses:  Carotid pulses are 2+ on the right side, and 2+ on the left side.  Radial pulses are 2+ on the right side, and 2+ on the left side.   Femoral pulses are 2+ on the right side, and 2+ on the left side.  Popliteal pulses are 2+ on the right side, and 2+ on the left side.   Dorsalis pedis pulses are 2+ on the right side, and 2+ on the left side.   Posterior tibial pulses are 2+ on the right side, and 2+ on the left side.    Skin: No ecchymosis, erythema, or ulcers  Psych: Alert " and oriented x 3, appropriate affect  Neuro: CNII-XII intact, no focal deficits    Labs:  Most Recent Data  CBC:   Lab Results   Component Value Date    WBC 7.08 12/22/2022    HGB 12.7 12/22/2022    HCT 38.8 12/22/2022     12/22/2022    MCV 91 12/22/2022    RDW 14.0 12/22/2022     BMP:   Lab Results   Component Value Date     12/22/2022    K 3.9 12/22/2022     12/22/2022    CO2 26 12/22/2022    BUN 13 12/22/2022    CREATININE 0.8 12/22/2022    GLU 87 12/22/2022    CALCIUM 9.1 12/22/2022    MG 1.8 03/19/2021    PHOS 3.1 04/01/2020     LFTS;   Lab Results   Component Value Date    PROT 7.2 12/22/2022    ALBUMIN 3.9 12/22/2022    BILITOT 0.5 12/22/2022    AST 18 12/22/2022    ALKPHOS 96 12/22/2022    ALT 17 12/22/2022     COAGS:   Lab Results   Component Value Date    INR 1.0 07/12/2011     FLP:   Lab Results   Component Value Date    CHOL 138 12/22/2022    HDL 65 12/22/2022    LDLCALC 67.0 12/22/2022    TRIG 30 12/22/2022    CHOLHDL 47.1 12/22/2022     CARDIAC:   Lab Results   Component Value Date    TROPONINI 0.008 03/31/2020    BNP <10 03/31/2020       Imaging:    CT Abd/Pelvis without contrast 1/9/2023:  Relatively stable aneurysmal dilation of the distal infrarenal abdominal aorta measuring 3.3 x 3.1 cm, previously 3.1 cm on abdominal ultrasound from 01/05/2023.  Stable aneurysmal dilation of the right common iliac artery 2.2 cm, previously 2.4 cm on abdominal ultrasound from 01/05/2023.  Normal diameter of the left common iliac artery measuring 1.4 cm.    Echo 9/25/2020:  The left ventricle is normal in size with normal systolic function. The estimated ejection fraction is 65%.  Indeterminate diastolic function.  Normal right ventricular systolic function.  Mild tricuspid regurgitation.  The estimated PA systolic pressure is 36 mmHg.  Normal central venous pressure (3 mmHg).     Assessment/Plan:  Jojo T Creecy is a 73 y.o. female with HTN, HLD, obesity, TIA, who presents for initial  appointment.     Infrarenal abdominal aortic aneurysm (AAA) without rupture/Right common iliac aneurysm- CT Abd/Pelvis without contrast on 1/9/2023 revealed aneurysmal dilation of the distal infrarenal abdominal aorta measuring 3.3 x 3.1 cm, previously 3.1 cm on abdominal ultrasound from 01/05/2023.  Stable aneurysmal dilation of the right common iliac artery 2.2 cm, previously 2.4 cm on abdominal ultrasound from 01/05/2023.  Start ASA 81 mg daily.  Continue atorvastatin 40 mg daily.  Repeat CT Abd/Pelvis without contrast in 1 year.    2. Obesity- Encourage diet, exercise, and weight loss.    3. HTN- Controlled   Continue metoprolol succinate 50 mg daily.    -     Ambulatory referral/consult to Vascular Medicine    4. HLD- Start ASA 81 mg daily.  Continue atorvastatin 40 mg daily.    Follow up in 1 year     Total duration of face to face visit time 60 minutes.  Total time spent counseling greater than fifty percent of total visit time.  Counseling included discussion regarding imaging findings, diagnosis, possibilities, treatment options, risks and benefits.  The patient had many questions regarding the options and long-term effects.    Chandrakant Root MD, PhD  Interventional Cardiology

## 2023-09-07 NOTE — PATIENT INSTRUCTIONS
Assessment/Plan:  Jojo Burrows is a 73 y.o. female with HTN, HLD, obesity, TIA, who presents for initial appointment.     Infrarenal abdominal aortic aneurysm (AAA) without rupture/Right common iliac aneurysm- CT Abd/Pelvis without contrast on 1/9/2023 revealed aneurysmal dilation of the distal infrarenal abdominal aorta measuring 3.3 x 3.1 cm, previously 3.1 cm on abdominal ultrasound from 01/05/2023.  Stable aneurysmal dilation of the right common iliac artery 2.2 cm, previously 2.4 cm on abdominal ultrasound from 01/05/2023.  Start ASA 81 mg daily.  Continue atorvastatin 40 mg daily.  Repeat CT Abd/Pelvis without contrast in 1 year.    2. Obesity- Encourage diet, exercise, and weight loss.    3. HTN- Controlled   Continue metoprolol succinate 50 mg daily.    -     Ambulatory referral/consult to Vascular Medicine    4. HLD- Start ASA 81 mg daily.  Continue atorvastatin 40 mg daily.    Follow up in 1 year

## 2023-09-25 RX ORDER — METOPROLOL SUCCINATE 50 MG/1
TABLET, EXTENDED RELEASE ORAL
Qty: 90 TABLET | Refills: 2 | Status: SHIPPED | OUTPATIENT
Start: 2023-09-25

## 2023-09-25 NOTE — TELEPHONE ENCOUNTER
Provider Staff:  Action required for this patient     Please see care gap opportunities below in Care Due Message.    Thanks!  [facility]     Appointments      Date Provider   Last Visit   5/8/2023 Joo Munoz MD   Next Visit   11/13/2023 Joo Munoz MD     Refill Decision Note   Jojo Burrows  is requesting a refill authorization.  Brief Assessment and Rationale for Refill:  Approve     Medication Therapy Plan:         Comments:     Note composed:9:11 AM 09/25/2023

## 2023-11-02 RX ORDER — ATORVASTATIN CALCIUM 40 MG/1
40 TABLET, FILM COATED ORAL NIGHTLY
Qty: 90 TABLET | Refills: 0 | Status: SHIPPED | OUTPATIENT
Start: 2023-11-02 | End: 2023-12-12

## 2023-11-02 NOTE — TELEPHONE ENCOUNTER
Refill Decision Note   Jojo Markos  is requesting a refill authorization.  Brief Assessment and Rationale for Refill:  Approve     Medication Therapy Plan:         Comments:     Note composed:3:11 PM 11/02/2023

## 2023-11-02 NOTE — TELEPHONE ENCOUNTER
No care due was identified.  [facility] Embedded Care Due Messages. Reference number: [id number].   11/02/2023 2:43:10 PM CDT

## 2023-11-06 ENCOUNTER — LAB VISIT (OUTPATIENT)
Dept: LAB | Facility: HOSPITAL | Age: 73
End: 2023-11-06
Attending: INTERNAL MEDICINE
Payer: MEDICARE

## 2023-11-06 DIAGNOSIS — I71.43 INFRARENAL ABDOMINAL AORTIC ANEURYSM (AAA) WITHOUT RUPTURE: ICD-10-CM

## 2023-11-06 DIAGNOSIS — K21.9 GASTROESOPHAGEAL REFLUX DISEASE, UNSPECIFIED WHETHER ESOPHAGITIS PRESENT: ICD-10-CM

## 2023-11-06 DIAGNOSIS — I10 ESSENTIAL HYPERTENSION: Chronic | ICD-10-CM

## 2023-11-06 LAB
ALBUMIN SERPL BCP-MCNC: 3.8 G/DL (ref 3.5–5.2)
ALP SERPL-CCNC: 89 U/L (ref 55–135)
ALT SERPL W/O P-5'-P-CCNC: 32 U/L (ref 10–44)
ANION GAP SERPL CALC-SCNC: 10 MMOL/L (ref 8–16)
AST SERPL-CCNC: 28 U/L (ref 10–40)
BASOPHILS # BLD AUTO: 0.03 K/UL (ref 0–0.2)
BASOPHILS NFR BLD: 0.5 % (ref 0–1.9)
BILIRUB SERPL-MCNC: 0.7 MG/DL (ref 0.1–1)
BUN SERPL-MCNC: 11 MG/DL (ref 8–23)
CALCIUM SERPL-MCNC: 9.6 MG/DL (ref 8.7–10.5)
CHLORIDE SERPL-SCNC: 106 MMOL/L (ref 95–110)
CHOLEST SERPL-MCNC: 173 MG/DL (ref 120–199)
CHOLEST/HDLC SERPL: 2.5 {RATIO} (ref 2–5)
CO2 SERPL-SCNC: 25 MMOL/L (ref 23–29)
CREAT SERPL-MCNC: 0.8 MG/DL (ref 0.5–1.4)
CREAT SERPL-MCNC: 0.8 MG/DL (ref 0.5–1.4)
DIFFERENTIAL METHOD: NORMAL
EOSINOPHIL # BLD AUTO: 0 K/UL (ref 0–0.5)
EOSINOPHIL NFR BLD: 0.6 % (ref 0–8)
ERYTHROCYTE [DISTWIDTH] IN BLOOD BY AUTOMATED COUNT: 13.7 % (ref 11.5–14.5)
EST. GFR  (NO RACE VARIABLE): >60 ML/MIN/1.73 M^2
EST. GFR  (NO RACE VARIABLE): >60 ML/MIN/1.73 M^2
GLUCOSE SERPL-MCNC: 87 MG/DL (ref 70–110)
HCT VFR BLD AUTO: 38.9 % (ref 37–48.5)
HDLC SERPL-MCNC: 68 MG/DL (ref 40–75)
HDLC SERPL: 39.3 % (ref 20–50)
HGB BLD-MCNC: 12.8 G/DL (ref 12–16)
IMM GRANULOCYTES # BLD AUTO: 0.01 K/UL (ref 0–0.04)
IMM GRANULOCYTES NFR BLD AUTO: 0.2 % (ref 0–0.5)
LDLC SERPL CALC-MCNC: 98 MG/DL (ref 63–159)
LYMPHOCYTES # BLD AUTO: 2.5 K/UL (ref 1–4.8)
LYMPHOCYTES NFR BLD: 37.7 % (ref 18–48)
MCH RBC QN AUTO: 29.8 PG (ref 27–31)
MCHC RBC AUTO-ENTMCNC: 32.9 G/DL (ref 32–36)
MCV RBC AUTO: 91 FL (ref 82–98)
MONOCYTES # BLD AUTO: 0.7 K/UL (ref 0.3–1)
MONOCYTES NFR BLD: 10.7 % (ref 4–15)
NEUTROPHILS # BLD AUTO: 3.3 K/UL (ref 1.8–7.7)
NEUTROPHILS NFR BLD: 50.3 % (ref 38–73)
NONHDLC SERPL-MCNC: 105 MG/DL
NRBC BLD-RTO: 0 /100 WBC
PLATELET # BLD AUTO: 161 K/UL (ref 150–450)
PMV BLD AUTO: 12 FL (ref 9.2–12.9)
POTASSIUM SERPL-SCNC: 3.8 MMOL/L (ref 3.5–5.1)
PROT SERPL-MCNC: 7.2 G/DL (ref 6–8.4)
RBC # BLD AUTO: 4.29 M/UL (ref 4–5.4)
SODIUM SERPL-SCNC: 141 MMOL/L (ref 136–145)
TRIGL SERPL-MCNC: 35 MG/DL (ref 30–150)
TSH SERPL DL<=0.005 MIU/L-ACNC: 1.59 UIU/ML (ref 0.4–4)
WBC # BLD AUTO: 6.53 K/UL (ref 3.9–12.7)

## 2023-11-06 PROCEDURE — 80061 LIPID PANEL: CPT | Mod: HCNC | Performed by: INTERNAL MEDICINE

## 2023-11-06 PROCEDURE — 84443 ASSAY THYROID STIM HORMONE: CPT | Mod: HCNC | Performed by: INTERNAL MEDICINE

## 2023-11-06 PROCEDURE — 36415 COLL VENOUS BLD VENIPUNCTURE: CPT | Mod: HCNC,PO | Performed by: INTERNAL MEDICINE

## 2023-11-06 PROCEDURE — 85025 COMPLETE CBC W/AUTO DIFF WBC: CPT | Mod: HCNC | Performed by: INTERNAL MEDICINE

## 2023-11-06 PROCEDURE — 80053 COMPREHEN METABOLIC PANEL: CPT | Mod: HCNC | Performed by: INTERNAL MEDICINE

## 2023-11-13 ENCOUNTER — OFFICE VISIT (OUTPATIENT)
Dept: INTERNAL MEDICINE | Facility: CLINIC | Age: 73
End: 2023-11-13
Payer: MEDICARE

## 2023-11-13 VITALS
HEART RATE: 64 BPM | TEMPERATURE: 98 F | BODY MASS INDEX: 30.02 KG/M2 | DIASTOLIC BLOOD PRESSURE: 70 MMHG | SYSTOLIC BLOOD PRESSURE: 120 MMHG | WEIGHT: 163.13 LBS | HEIGHT: 62 IN

## 2023-11-13 DIAGNOSIS — E55.9 VITAMIN D DEFICIENCY: ICD-10-CM

## 2023-11-13 DIAGNOSIS — I10 ESSENTIAL HYPERTENSION: Primary | ICD-10-CM

## 2023-11-13 DIAGNOSIS — M17.0 PRIMARY OSTEOARTHRITIS OF BOTH KNEES: ICD-10-CM

## 2023-11-13 DIAGNOSIS — I71.43 INFRARENAL ABDOMINAL AORTIC ANEURYSM (AAA) WITHOUT RUPTURE: ICD-10-CM

## 2023-11-13 DIAGNOSIS — E78.49 OTHER HYPERLIPIDEMIA: ICD-10-CM

## 2023-11-13 DIAGNOSIS — E04.2 MULTIPLE THYROID NODULES: ICD-10-CM

## 2023-11-13 DIAGNOSIS — G47.00 INSOMNIA, UNSPECIFIED TYPE: ICD-10-CM

## 2023-11-13 DIAGNOSIS — F41.9 ANXIETY: ICD-10-CM

## 2023-11-13 PROCEDURE — 99215 PR OFFICE/OUTPT VISIT, EST, LEVL V, 40-54 MIN: ICD-10-PCS | Mod: HCNC,S$GLB,, | Performed by: INTERNAL MEDICINE

## 2023-11-13 PROCEDURE — 99215 OFFICE O/P EST HI 40 MIN: CPT | Mod: HCNC,S$GLB,, | Performed by: INTERNAL MEDICINE

## 2023-11-13 PROCEDURE — 3008F PR BODY MASS INDEX (BMI) DOCUMENTED: ICD-10-PCS | Mod: HCNC,CPTII,S$GLB, | Performed by: INTERNAL MEDICINE

## 2023-11-13 PROCEDURE — 3078F PR MOST RECENT DIASTOLIC BLOOD PRESSURE < 80 MM HG: ICD-10-PCS | Mod: HCNC,CPTII,S$GLB, | Performed by: INTERNAL MEDICINE

## 2023-11-13 PROCEDURE — 99999 PR PBB SHADOW E&M-EST. PATIENT-LVL IV: CPT | Mod: PBBFAC,HCNC,, | Performed by: INTERNAL MEDICINE

## 2023-11-13 PROCEDURE — 1159F MED LIST DOCD IN RCRD: CPT | Mod: HCNC,CPTII,S$GLB, | Performed by: INTERNAL MEDICINE

## 2023-11-13 PROCEDURE — 1159F PR MEDICATION LIST DOCUMENTED IN MEDICAL RECORD: ICD-10-PCS | Mod: HCNC,CPTII,S$GLB, | Performed by: INTERNAL MEDICINE

## 2023-11-13 PROCEDURE — 1126F AMNT PAIN NOTED NONE PRSNT: CPT | Mod: HCNC,CPTII,S$GLB, | Performed by: INTERNAL MEDICINE

## 2023-11-13 PROCEDURE — 1101F PR PT FALLS ASSESS DOC 0-1 FALLS W/OUT INJ PAST YR: ICD-10-PCS | Mod: HCNC,CPTII,S$GLB, | Performed by: INTERNAL MEDICINE

## 2023-11-13 PROCEDURE — 3288F FALL RISK ASSESSMENT DOCD: CPT | Mod: HCNC,CPTII,S$GLB, | Performed by: INTERNAL MEDICINE

## 2023-11-13 PROCEDURE — 1126F PR PAIN SEVERITY QUANTIFIED, NO PAIN PRESENT: ICD-10-PCS | Mod: HCNC,CPTII,S$GLB, | Performed by: INTERNAL MEDICINE

## 2023-11-13 PROCEDURE — 3288F PR FALLS RISK ASSESSMENT DOCUMENTED: ICD-10-PCS | Mod: HCNC,CPTII,S$GLB, | Performed by: INTERNAL MEDICINE

## 2023-11-13 PROCEDURE — 99999 PR PBB SHADOW E&M-EST. PATIENT-LVL IV: ICD-10-PCS | Mod: PBBFAC,HCNC,, | Performed by: INTERNAL MEDICINE

## 2023-11-13 PROCEDURE — 3008F BODY MASS INDEX DOCD: CPT | Mod: HCNC,CPTII,S$GLB, | Performed by: INTERNAL MEDICINE

## 2023-11-13 PROCEDURE — 3078F DIAST BP <80 MM HG: CPT | Mod: HCNC,CPTII,S$GLB, | Performed by: INTERNAL MEDICINE

## 2023-11-13 PROCEDURE — 3074F SYST BP LT 130 MM HG: CPT | Mod: HCNC,CPTII,S$GLB, | Performed by: INTERNAL MEDICINE

## 2023-11-13 PROCEDURE — 3074F PR MOST RECENT SYSTOLIC BLOOD PRESSURE < 130 MM HG: ICD-10-PCS | Mod: HCNC,CPTII,S$GLB, | Performed by: INTERNAL MEDICINE

## 2023-11-13 PROCEDURE — 1101F PT FALLS ASSESS-DOCD LE1/YR: CPT | Mod: HCNC,CPTII,S$GLB, | Performed by: INTERNAL MEDICINE

## 2023-11-13 RX ORDER — ZOLPIDEM TARTRATE 5 MG/1
5 TABLET ORAL NIGHTLY PRN
Qty: 30 TABLET | Refills: 2 | Status: SHIPPED | OUTPATIENT
Start: 2023-11-13 | End: 2024-02-12 | Stop reason: SDUPTHER

## 2023-11-13 RX ORDER — SERTRALINE HYDROCHLORIDE 50 MG/1
50 TABLET, FILM COATED ORAL DAILY
Qty: 30 TABLET | Refills: 11 | Status: SHIPPED | OUTPATIENT
Start: 2023-11-13 | End: 2024-11-12

## 2023-11-13 RX ORDER — ASPIRIN 81 MG/1
81 TABLET ORAL DAILY
COMMUNITY

## 2023-11-13 NOTE — PROGRESS NOTES
Subjective:       Patient ID: Jojo Burrows is a 73 y.o. female.    Chief Complaint: Hypertension (6 mos w/labs, got flu shot)    HPI  The patient presents for follow-up of medical conditions which include hypertension, hyperlipidemia, osteoarthritis of the knees, insomnia, vitamin-D deficiency.  The patient also has been experiencing continuing anxiety symptoms.  She states that she feels overwhelmed at times.  She has difficulty sleeping at night.  She did not use the sleep medication prescribed for however back in January.  She describes frequent feelings of anxiety.  We discussed use of medication to control the symptoms.  The patient would prefer not to use a medicine that is habit-forming.  The patient reports her father is in hospice care.  She recently started talking to a grief counselor.    She has osteoarthritis of the knees with the right knee being more symptomatic.  She has had corticosteroid and gel injections without obtaining any significant relief of her pain symptoms.  The patient is exercising more at a gym.  She has been using the treadmill, stationary bike.  She anticipates starting pool exercises in January 2024.  She also stays active performing housework and yd work.    She is on high dose vitamin-D for treatment of vitamin-D deficiency.  She has been evaluated by endocrinology for her nodular thyroid.  A follow-up thyroid scan in 1 year was recommended.    She has been evaluated by vascular Medicine for an infrarenal abdominal aortic aneurysm.  The vessel has been stable on recent testing.  She was advised to continue atorvastatin 40 mg daily and also to add low-dose aspirin.  Exercise and weight loss were also recommended.    Review of Systems   Constitutional:  Positive for activity change. Negative for appetite change, fatigue and unexpected weight change.   Eyes:  Negative for visual disturbance.   Respiratory:  Negative for cough and shortness of breath.    Cardiovascular:  Negative  for chest pain, palpitations and leg swelling.   Gastrointestinal:  Negative for abdominal pain, blood in stool, constipation and diarrhea.   Genitourinary:  Negative for dysuria and hematuria.   Musculoskeletal:  Positive for arthralgias, back pain and joint swelling. Negative for neck pain and neck stiffness.   Integumentary:  Negative for rash.   Neurological:  Negative for dizziness, syncope and headaches.   Psychiatric/Behavioral:  Positive for sleep disturbance. Negative for hallucinations and suicidal ideas. The patient is nervous/anxious.             Physical Exam  Vitals and nursing note reviewed.   Constitutional:       General: She is not in acute distress.     Appearance: Normal appearance. She is well-developed.      Comments: The patient has lost 3 lb since 05/08/2023.   HENT:      Head: Normocephalic and atraumatic.   Eyes:      General: No scleral icterus.     Extraocular Movements: Extraocular movements intact.      Conjunctiva/sclera: Conjunctivae normal.   Neck:      Thyroid: No thyromegaly.      Vascular: No JVD.   Cardiovascular:      Rate and Rhythm: Normal rate and regular rhythm.      Heart sounds: Normal heart sounds. No murmur heard.     No friction rub. No gallop.   Pulmonary:      Effort: Pulmonary effort is normal. No respiratory distress.      Breath sounds: Normal breath sounds. No wheezing or rales.   Abdominal:      General: Bowel sounds are normal.      Palpations: Abdomen is soft. There is no mass.      Tenderness: There is no abdominal tenderness.   Musculoskeletal:         General: No tenderness. Normal range of motion.      Cervical back: Normal range of motion and neck supple.      Right lower leg: No edema.      Left lower leg: No edema.   Lymphadenopathy:      Cervical: No cervical adenopathy.   Skin:     General: Skin is warm and dry.      Findings: No rash.   Neurological:      Mental Status: She is alert and oriented to person, place, and time.   Psychiatric:         Mood  and Affect: Mood normal.         Behavior: Behavior normal.      Comments: The patient is alert and responsive.  Her thought content is normal.  Her memory appears to be intact.  Her affect is appropriate to mood.  There is no suicidal or homicidal ideation.  No hallucinosis or delusions are evident.  Judgment and insight appear to be intact.           Lab Visit on 11/06/2023   Component Date Value Ref Range Status    Sodium 11/06/2023 141  136 - 145 mmol/L Final    Potassium 11/06/2023 3.8  3.5 - 5.1 mmol/L Final    Chloride 11/06/2023 106  95 - 110 mmol/L Final    CO2 11/06/2023 25  23 - 29 mmol/L Final    Glucose 11/06/2023 87  70 - 110 mg/dL Final    BUN 11/06/2023 11  8 - 23 mg/dL Final    Creatinine 11/06/2023 0.8  0.5 - 1.4 mg/dL Final    Calcium 11/06/2023 9.6  8.7 - 10.5 mg/dL Final    Total Protein 11/06/2023 7.2  6.0 - 8.4 g/dL Final    Albumin 11/06/2023 3.8  3.5 - 5.2 g/dL Final    Total Bilirubin 11/06/2023 0.7  0.1 - 1.0 mg/dL Final    Comment: For infants and newborns, interpretation of results should be based  on gestational age, weight and in agreement with clinical  observations.    Premature Infant recommended reference ranges:  Up to 24 hours.............<8.0 mg/dL  Up to 48 hours............<12.0 mg/dL  3-5 days..................<15.0 mg/dL  6-29 days.................<15.0 mg/dL      Alkaline Phosphatase 11/06/2023 89  55 - 135 U/L Final    AST 11/06/2023 28  10 - 40 U/L Final    ALT 11/06/2023 32  10 - 44 U/L Final    eGFR 11/06/2023 >60.0  >60 mL/min/1.73 m^2 Final    Anion Gap 11/06/2023 10  8 - 16 mmol/L Final    Cholesterol 11/06/2023 173  120 - 199 mg/dL Final    Comment: The National Cholesterol Education Program (NCEP) has set the  following guidelines (reference ranges) for Cholesterol:  Optimal.....................<200 mg/dL  Borderline High.............200-239 mg/dL  High........................> or = 240 mg/dL      Triglycerides 11/06/2023 35  30 - 150 mg/dL Final    Comment: The  National Cholesterol Education Program (NCEP) has set the  following guidelines (reference values) for triglycerides:  Normal......................<150 mg/dL  Borderline High.............150-199 mg/dL  High........................200-499 mg/dL      HDL 11/06/2023 68  40 - 75 mg/dL Final    Comment: The National Cholesterol Education Program (NCEP) has set the  following guidelines (reference values) for HDL Cholesterol:  Low...............<40 mg/dL  Optimal...........>60 mg/dL      LDL Cholesterol 11/06/2023 98.0  63.0 - 159.0 mg/dL Final    Comment: The National Cholesterol Education Program (NCEP) has set the  following guidelines (reference values) for LDL Cholesterol:  Optimal.......................<130 mg/dL  Borderline High...............130-159 mg/dL  High..........................160-189 mg/dL  Very High.....................>190 mg/dL      HDL/Cholesterol Ratio 11/06/2023 39.3  20.0 - 50.0 % Final    Total Cholesterol/HDL Ratio 11/06/2023 2.5  2.0 - 5.0 Final    Non-HDL Cholesterol 11/06/2023 105  mg/dL Final    Comment: Risk category and Non-HDL cholesterol goals:  Coronary heart disease (CHD)or equivalent (10-year risk of CHD >20%):  Non-HDL cholesterol goal     <130 mg/dL  Two or more CHD risk factors and 10-year risk of CHD <= 20%:  Non-HDL cholesterol goal     <160 mg/dL  0 to 1 CHD risk factor:  Non-HDL cholesterol goal     <190 mg/dL      WBC 11/06/2023 6.53  3.90 - 12.70 K/uL Final    RBC 11/06/2023 4.29  4.00 - 5.40 M/uL Final    Hemoglobin 11/06/2023 12.8  12.0 - 16.0 g/dL Final    Hematocrit 11/06/2023 38.9  37.0 - 48.5 % Final    MCV 11/06/2023 91  82 - 98 fL Final    MCH 11/06/2023 29.8  27.0 - 31.0 pg Final    MCHC 11/06/2023 32.9  32.0 - 36.0 g/dL Final    RDW 11/06/2023 13.7  11.5 - 14.5 % Final    Platelets 11/06/2023 161  150 - 450 K/uL Final    MPV 11/06/2023 12.0  9.2 - 12.9 fL Final    Immature Granulocytes 11/06/2023 0.2  0.0 - 0.5 % Final    Gran # (ANC) 11/06/2023 3.3  1.8 - 7.7 K/uL  Final    Immature Grans (Abs) 11/06/2023 0.01  0.00 - 0.04 K/uL Final    Comment: Mild elevation in immature granulocytes is non specific and   can be seen in a variety of conditions including stress response,   acute inflammation, trauma and pregnancy. Correlation with other   laboratory and clinical findings is essential.      Lymph # 11/06/2023 2.5  1.0 - 4.8 K/uL Final    Mono # 11/06/2023 0.7  0.3 - 1.0 K/uL Final    Eos # 11/06/2023 0.0  0.0 - 0.5 K/uL Final    Baso # 11/06/2023 0.03  0.00 - 0.20 K/uL Final    nRBC 11/06/2023 0  0 /100 WBC Final    Gran % 11/06/2023 50.3  38.0 - 73.0 % Final    Lymph % 11/06/2023 37.7  18.0 - 48.0 % Final    Mono % 11/06/2023 10.7  4.0 - 15.0 % Final    Eosinophil % 11/06/2023 0.6  0.0 - 8.0 % Final    Basophil % 11/06/2023 0.5  0.0 - 1.9 % Final    Differential Method 11/06/2023 Automated   Final    TSH 11/06/2023 1.592  0.400 - 4.000 uIU/mL Final    Creatinine 11/06/2023 0.8  0.5 - 1.4 mg/dL Final    eGFR 11/06/2023 >60.0  >60 mL/min/1.73 m^2 Final   Lab Visit on 08/24/2023   Component Date Value Ref Range Status    Vit D, 25-Hydroxy 08/24/2023 24 (L)  30 - 96 ng/mL Final    Comment: Vitamin D deficiency.........<10 ng/mL                              Vitamin D insufficiency......10-29 ng/mL       Vitamin D sufficiency........> or equal to 30 ng/mL  Vitamin D toxicity............>100 ng/mL      TSH 08/24/2023 1.241  0.400 - 4.000 uIU/mL Final       Assessment & Plan:      Jojo was seen today for hypertension.  Current antihypertensive therapy will be continued.    A vitamin-D level will be obtained in 2 weeks since the patient will be completing 3 months of high-dose vitamin-D.    Sertraline 50 mg daily will be ordered.  The patient was advised to start the medication by taking a half tablet daily.  She will notify us if she is experiencing any significant side effects.  A prescription order for Ambien will be given to use as needed for insomnia.    Diagnoses and all orders  for this visit:    Essential hypertension    Primary osteoarthritis of both knees    Infrarenal abdominal aortic aneurysm (AAA) without rupture    Other hyperlipidemia    Insomnia, unspecified type    Multiple thyroid nodules    Anxiety    Vitamin D deficiency  -     Vitamin D; Future    Other orders  -     sertraline (ZOLOFT) 50 MG tablet; Take 1 tablet (50 mg total) by mouth once daily. For anxiety control.  -     zolpidem (AMBIEN) 5 MG Tab; Take 1 tablet (5 mg total) by mouth nightly as needed (insomnia).     Total visit time spent with the patient was 45 minutes.    Follow up in about 3 months (around 2/13/2024).     Joo Munoz MD

## 2023-11-20 PROBLEM — Z00.00 ENCOUNTER FOR PREVENTIVE HEALTH EXAMINATION: Chronic | Status: RESOLVED | Noted: 2023-08-15 | Resolved: 2023-11-20

## 2023-11-28 ENCOUNTER — LAB VISIT (OUTPATIENT)
Dept: LAB | Facility: HOSPITAL | Age: 73
End: 2023-11-28
Attending: INTERNAL MEDICINE
Payer: MEDICARE

## 2023-11-28 DIAGNOSIS — E55.9 VITAMIN D DEFICIENCY: ICD-10-CM

## 2023-11-28 LAB — 25(OH)D3+25(OH)D2 SERPL-MCNC: 26 NG/ML (ref 30–96)

## 2023-11-28 PROCEDURE — 82306 VITAMIN D 25 HYDROXY: CPT | Mod: HCNC | Performed by: INTERNAL MEDICINE

## 2023-11-28 PROCEDURE — 36415 COLL VENOUS BLD VENIPUNCTURE: CPT | Performed by: INTERNAL MEDICINE

## 2023-12-12 DIAGNOSIS — E55.9 VITAMIN D INSUFFICIENCY: ICD-10-CM

## 2023-12-12 RX ORDER — ATORVASTATIN CALCIUM 40 MG/1
40 TABLET, FILM COATED ORAL NIGHTLY
Qty: 90 TABLET | Refills: 3 | Status: SHIPPED | OUTPATIENT
Start: 2023-12-12

## 2023-12-13 ENCOUNTER — TELEPHONE (OUTPATIENT)
Dept: ENDOCRINOLOGY | Facility: CLINIC | Age: 73
End: 2023-12-13
Payer: MEDICARE

## 2023-12-13 DIAGNOSIS — E55.9 VITAMIN D INSUFFICIENCY: Primary | ICD-10-CM

## 2023-12-13 RX ORDER — ERGOCALCIFEROL 1.25 MG/1
50000 CAPSULE ORAL
Qty: 12 CAPSULE | Refills: 1 | Status: SHIPPED | OUTPATIENT
Start: 2023-12-13 | End: 2024-05-23

## 2023-12-13 NOTE — TELEPHONE ENCOUNTER
No care due was identified.  [facility] Embedded Care Due Messages. Reference number: [id number].   12/12/2023 6:24:14 PM CST

## 2023-12-13 NOTE — TELEPHONE ENCOUNTER
Refill Decision Note   Jojo Markos  is requesting a refill authorization.  Brief Assessment and Rationale for Refill:  Approve     Medication Therapy Plan:         Comments:     Note composed:9:43 PM 12/12/2023

## 2023-12-13 NOTE — PROGRESS NOTES
Vitamin-D is still low at 26 on 11/28/2023. Will continue the vitamin-D 77567 IU weekly.   Repeat vitamin-D in 6 months.

## 2023-12-13 NOTE — TELEPHONE ENCOUNTER
Called patient twice on 12/13 no answer left message to call back.    ----- Message from Yun MAYNARD Rai, MD sent at 12/13/2023  1:16 PM CST -----    Please have patient continue vitamin-D 79992 units once a week. Prescription has been sent to her pharmacy.  Please schedule vitamin-D lab in 6 months.

## 2024-02-01 ENCOUNTER — HOSPITAL ENCOUNTER (OUTPATIENT)
Dept: RADIOLOGY | Facility: HOSPITAL | Age: 74
Discharge: HOME | End: 2024-02-01
Attending: INTERNAL MEDICINE
Payer: MEDICARE

## 2024-02-01 DIAGNOSIS — E04.2 MULTIPLE THYROID NODULES: ICD-10-CM

## 2024-02-01 PROCEDURE — 76536 US EXAM OF HEAD AND NECK: CPT | Mod: 26,,, | Performed by: RADIOLOGY

## 2024-02-01 PROCEDURE — 76536 US EXAM OF HEAD AND NECK: CPT | Mod: TC

## 2024-02-10 ENCOUNTER — HOSPITAL ENCOUNTER (EMERGENCY)
Facility: HOSPITAL | Age: 74
Discharge: HOME | End: 2024-02-10
Attending: EMERGENCY MEDICINE
Payer: MEDICARE

## 2024-02-10 VITALS
DIASTOLIC BLOOD PRESSURE: 62 MMHG | OXYGEN SATURATION: 98 % | BODY MASS INDEX: 29.26 KG/M2 | RESPIRATION RATE: 17 BRPM | HEART RATE: 54 BPM | SYSTOLIC BLOOD PRESSURE: 133 MMHG | WEIGHT: 160 LBS | TEMPERATURE: 98 F

## 2024-02-10 DIAGNOSIS — R07.9 CHEST PAIN: ICD-10-CM

## 2024-02-10 LAB
ALBUMIN SERPL BCP-MCNC: 3.9 G/DL (ref 3.5–5.2)
ALP SERPL-CCNC: 94 U/L (ref 55–135)
ALT SERPL W/O P-5'-P-CCNC: 35 U/L (ref 10–44)
ANION GAP SERPL CALC-SCNC: 11 MMOL/L (ref 8–16)
AST SERPL-CCNC: 28 U/L (ref 10–40)
BASOPHILS # BLD AUTO: 0.02 K/UL (ref 0–0.2)
BASOPHILS NFR BLD: 0.2 % (ref 0–1.9)
BILIRUB SERPL-MCNC: 0.6 MG/DL (ref 0.1–1)
BNP SERPL-MCNC: 25 PG/ML (ref 0–99)
BUN SERPL-MCNC: 14 MG/DL (ref 8–23)
CALCIUM SERPL-MCNC: 9.7 MG/DL (ref 8.7–10.5)
CHLORIDE SERPL-SCNC: 104 MMOL/L (ref 95–110)
CO2 SERPL-SCNC: 22 MMOL/L (ref 23–29)
CREAT SERPL-MCNC: 0.7 MG/DL (ref 0.5–1.4)
DIFFERENTIAL METHOD BLD: NORMAL
EOSINOPHIL # BLD AUTO: 0 K/UL (ref 0–0.5)
EOSINOPHIL NFR BLD: 0.4 % (ref 0–8)
ERYTHROCYTE [DISTWIDTH] IN BLOOD BY AUTOMATED COUNT: 13.5 % (ref 11.5–14.5)
EST. GFR  (NO RACE VARIABLE): >60 ML/MIN/1.73 M^2
GLUCOSE SERPL-MCNC: 95 MG/DL (ref 70–110)
HCT VFR BLD AUTO: 37.1 % (ref 37–48.5)
HGB BLD-MCNC: 12.8 G/DL (ref 12–16)
IMM GRANULOCYTES # BLD AUTO: 0.02 K/UL (ref 0–0.04)
IMM GRANULOCYTES NFR BLD AUTO: 0.2 % (ref 0–0.5)
LIPASE SERPL-CCNC: 14 U/L (ref 4–60)
LYMPHOCYTES # BLD AUTO: 2.4 K/UL (ref 1–4.8)
LYMPHOCYTES NFR BLD: 25 % (ref 18–48)
MCH RBC QN AUTO: 30.4 PG (ref 27–31)
MCHC RBC AUTO-ENTMCNC: 34.5 G/DL (ref 32–36)
MCV RBC AUTO: 88 FL (ref 82–98)
MONOCYTES # BLD AUTO: 0.9 K/UL (ref 0.3–1)
MONOCYTES NFR BLD: 9 % (ref 4–15)
NEUTROPHILS # BLD AUTO: 6.2 K/UL (ref 1.8–7.7)
NEUTROPHILS NFR BLD: 65.2 % (ref 38–73)
NRBC BLD-RTO: 0 /100 WBC
PLATELET # BLD AUTO: 160 K/UL (ref 150–450)
PMV BLD AUTO: 11.5 FL (ref 9.2–12.9)
POTASSIUM SERPL-SCNC: 3.8 MMOL/L (ref 3.5–5.1)
PROT SERPL-MCNC: 7.1 G/DL (ref 6–8.4)
RBC # BLD AUTO: 4.21 M/UL (ref 4–5.4)
SODIUM SERPL-SCNC: 137 MMOL/L (ref 136–145)
TROPONIN I SERPL DL<=0.01 NG/ML-MCNC: <0.006 NG/ML (ref 0–0.03)
TROPONIN I SERPL DL<=0.01 NG/ML-MCNC: <0.006 NG/ML (ref 0–0.03)
WBC # BLD AUTO: 9.53 K/UL (ref 3.9–12.7)

## 2024-02-10 PROCEDURE — 85025 COMPLETE CBC W/AUTO DIFF WBC: CPT | Mod: HCNC | Performed by: PHYSICIAN ASSISTANT

## 2024-02-10 PROCEDURE — 93005 ELECTROCARDIOGRAM TRACING: CPT | Mod: HCNC

## 2024-02-10 PROCEDURE — 83690 ASSAY OF LIPASE: CPT | Mod: HCNC | Performed by: PHYSICIAN ASSISTANT

## 2024-02-10 PROCEDURE — 84484 ASSAY OF TROPONIN QUANT: CPT | Mod: 91,HCNC | Performed by: PHYSICIAN ASSISTANT

## 2024-02-10 PROCEDURE — 99285 EMERGENCY DEPT VISIT HI MDM: CPT | Mod: 25,HCNC

## 2024-02-10 PROCEDURE — 83880 ASSAY OF NATRIURETIC PEPTIDE: CPT | Mod: HCNC | Performed by: PHYSICIAN ASSISTANT

## 2024-02-10 PROCEDURE — 80053 COMPREHEN METABOLIC PANEL: CPT | Mod: HCNC | Performed by: PHYSICIAN ASSISTANT

## 2024-02-10 PROCEDURE — 93010 ELECTROCARDIOGRAM REPORT: CPT | Mod: HCNC,,, | Performed by: INTERNAL MEDICINE

## 2024-02-10 RX ORDER — INFLUENZA A VIRUS A/VICTORIA/4897/2022 IVR-238 (H1N1) ANTIGEN (FORMALDEHYDE INACTIVATED), INFLUENZA A VIRUS A/DARWIN/6/2021 IVR-227 (H3N2) ANTIGEN (FORMALDEHYDE INACTIVATED), INFLUENZA B VIRUS B/AUSTRIA/1359417/2021 BVR-26 ANTIGEN (FORMALDEHYDE INACTIVATED), INFLUENZA B VIRUS B/PHUKET/3073/2013 BVR-1B ANTIGEN (FORMALDEHYDE INACTIVATED) 15; 15; 15; 15 UG/.5ML; UG/.5ML; UG/.5ML; UG/.5ML
INJECTION, SUSPENSION INTRAMUSCULAR
COMMUNITY
Start: 2023-11-01 | End: 2024-02-20

## 2024-02-10 RX ORDER — CALCIUM CARBONATE 200(500)MG
2 TABLET,CHEWABLE ORAL
COMMUNITY

## 2024-02-10 RX ORDER — CELECOXIB 200 MG/1
200 CAPSULE ORAL
COMMUNITY

## 2024-02-10 RX ORDER — CETIRIZINE HYDROCHLORIDE 10 MG/1
10 TABLET ORAL DAILY PRN
COMMUNITY

## 2024-02-10 NOTE — DISCHARGE INSTRUCTIONS

## 2024-02-10 NOTE — ED PROVIDER NOTES
Encounter Date: 2/10/2024       History     Chief Complaint   Patient presents with    Chest Pain     C/o chest pain with palpitations intermittently for a couple of weeks. For the last 2 days episodes and intensity have been increasing      74-year-old female, PMH HTN, HLD, presents to ED with concern of intermittent chest pain and palpitations that began nearly 2 weeks ago.  Chest pain described as left-sided, dull, nonradiating.  Last episode of chest pain this afternoon just prior to reporting to ED.  Chest pain has since resolved. She does relate her symptoms to stressors at home. No recent travel or surgery.  Denying leg swelling.  No other acute complaints at this time.    The history is provided by the patient.     Review of patient's allergies indicates:   Allergen Reactions    Iodine Rash    Iodine and iodide containing products Itching and Rash    Shellfish containing products Itching and Rash     Past Medical History:   Diagnosis Date    Acute hypoxemic respiratory failure 3/31/2020    ALLERGIC RHINITIS     Cataract     COVID-19 virus infection 03/2020    Degenerative disc disease, cervical 9/13/2018    GERD (gastroesophageal reflux disease)     Hyperlipidemia     Hypertension     Migraine headache     Multifocal pneumonia 3/31/2020    Nuclear sclerosis 4/30/2014    Sleep disorder     Thyroid disease     nodular goiter    TIA (transient ischemic attack)      Past Surgical History:   Procedure Laterality Date    BREAST BIOPSY      BREAST CYST ASPIRATION      BREAST CYST EXCISION      COLONOSCOPY N/A 4/29/2016    Procedure: COLONOSCOPY;  Surgeon: Sergio Vickers MD;  Location: [address]);  Service: Endoscopy;  Laterality: N/A;    COLONOSCOPY N/A 6/28/2021    Procedure: COLONOSCOPY;  Surgeon: Sergio Vickers MD;  Location: [facility] ([address] [place] [address]);  Service: Endoscopy;  Laterality: N/A;  COVID + 3/2020 and fully vaccinated -   pt requesting Dr. vickers/ instructions emailed-     HYSTERECTOMY        Family History   Problem Relation Age of Onset    Diabetes Mother     Hypertension Mother     Breast cancer Mother     Asthma Father     Glaucoma Father     Breast cancer Sister     Ovarian cancer Sister     Stroke Daughter     Lymphoma Daughter     No Known Problems Son     Heart disease Maternal Grandmother     Esophageal cancer Other     Amblyopia Neg Hx     Blindness Neg Hx     Cancer Neg Hx     Cataracts Neg Hx     Macular degeneration Neg Hx     Retinal detachment Neg Hx     Strabismus Neg Hx     Thyroid disease Neg Hx      Social History     Tobacco Use    Smoking status: Never    Smokeless tobacco: Never   Substance Use Topics    Alcohol use: No    Drug use: No     Review of Systems   Constitutional:  Negative for chills and fever.   HENT:  Negative for congestion and sore throat.    Respiratory:  Positive for shortness of breath. Negative for cough.    Cardiovascular:  Positive for chest pain and palpitations. Negative for leg swelling.   Gastrointestinal:  Negative for abdominal pain, diarrhea, nausea and vomiting.   Musculoskeletal:  Negative for back pain, neck pain and neck stiffness.   Neurological:  Negative for headaches.       Physical Exam     Initial Vitals [02/10/24 1313]   BP Pulse Resp Temp SpO2   133/64 87 20 97.8 °F (36.6 °C) 96 %      MAP       --         Physical Exam    Nursing note and vitals reviewed.  Constitutional: She appears well-developed and well-nourished. She is cooperative. She does not have a sickly appearance. She does not appear ill. No distress.   HENT:   Head: Normocephalic and atraumatic.   Eyes: EOM are normal.   Neck: Neck supple.   Normal range of motion.  Cardiovascular:  Regular rhythm and normal heart sounds.   Bradycardia present.         Pulmonary/Chest: Effort normal and breath sounds normal.   Musculoskeletal:         General: No tenderness.      Cervical back: Normal range of motion and neck supple.      Comments: No significant lower extremity edema      Neurological: She is alert and oriented to person, place, and time. She is not disoriented. GCS eye subscore is 4. GCS verbal subscore is 5. GCS motor subscore is 6.   Skin: Skin is warm and dry.   Psychiatric: She has a normal mood and affect. Her speech is normal and behavior is normal. Thought content normal.         ED Course   Procedures  Labs Reviewed   COMPREHENSIVE METABOLIC PANEL - Abnormal; Notable for the following components:       Result Value    CO2 22 (*)     All other components within normal limits   CBC W/ AUTO DIFFERENTIAL   TROPONIN I   B-TYPE NATRIURETIC PEPTIDE   LIPASE   TROPONIN I          Imaging Results              X-Ray Chest AP Portable (Final result)  Result time 02/10/24 13:43:31      Final result by Matilda Tang MD (02/10/24 13:43:31)                   Impression:      No acute abnormality or detrimental change since December 14, 2021.      Electronically signed by: Matilda Tang MD  Date:    02/10/2024  Time:    13:43               Narrative:    EXAMINATION:  XR CHEST AP PORTABLE    CLINICAL HISTORY:  Chest Pain;    TECHNIQUE:  Single frontal view of the chest was performed.    COMPARISON:  December 14, 2021    FINDINGS:  The lungs are free of lobar consolidation and alveolar edema, with normal appearance of pulmonary vasculature and no large pleural effusion or pneumothorax.    The cardiac silhouette is normal in size. The hilar and mediastinal contours are unremarkable.    Visualized osseous structures are stable with degenerative changes of the spine.                                       Medications - No data to display  Medical Decision Making  Patient presents with concern of intermittent palpitations, shortness breath with activities and left-sided chest pain that began nearly 2 weeks ago.  She states she is concern that symptoms are becoming more frequent.  Afebrile on arrival.  Patient is mildly anxious appearing but otherwise in no apparent distress.    DDx:   Including but not limited to anxiety, stress, musculoskeletal, pleurisy, costochondritis, ACS, CAD, STEMI, NSTEMI, arrhythmia    Amount and/or Complexity of Data Reviewed  Labs: ordered.  Radiology: ordered.  ECG/medicine tests: ordered.     Details: EKG, sinus ileana, rate 58, no ST elevation. No stemi. EKG reviewed by Dr. Cannon               ED Course as of 02/10/24 1707   Sat Feb 10, 2024   1703 CBC auto differential  WNL.  No elevation in WBC. [KS]   1703 Comprehensive metabolic panel(!)  Unremarkable.  Creatinine WNL. [KS]   1703 BNP  WNL [KS]   1703 Lipase  WNL [KS]   1703 Troponin I #2  Trending troponin x2 WNL [KS]   1703 X-Ray Chest AP Portable  No acute cardiopulmonary findings [KS]   1704 Patient continues to rest comfortably in ED. no complaints of chest pain at this time.  After lengthy discussion, patient does admit that her symptoms may be due to stressors at home.  She was encouraged have close follow-up with PCP/cardiology.  ED return precautions were discussed at length.  Patient states her understanding and agrees with plan. [KS]   1706 Patient discussed with and seen by attending, Dr. Cannon, who agrees with ED course and dispo [KS]      ED Course User Index  [KS] Andrey Ramirez PA-C                           Clinical Impression:  Final diagnoses:  [R07.9] Chest pain          ED Disposition Condition    Discharge Stable          ED Prescriptions    None       Follow-up Information       Follow up With Specialties Details Why Contact Info    Joo Munoz MD Internal Medicine   [address] [facility] [address]  [phone]               Andrey Ramirez PA-C  02/10/24 1707

## 2024-02-10 NOTE — PHARMACY MED REC
"  [facility]           Pharmacy  Admission Medication History     The home medication history was taken by Varsha Brewster.      Medication history obtained from Medications listed below were obtained from: Patient/family    Based on information gathered for medication list, you may go to "Admission" then "Reconcile Home Medications" tabs to review and/or act upon those items.     The home medication list has been updated by the Pharmacy department.   Please read ALL comments highlighted in yellow.   Please address this information as you see fit.    Feel free to contact us if you have any questions or require assistance.    The medications listed below were removed from the home medication list.  Please reorder if appropriate:    Patient reports NOT TAKING the following medication(s):  Lotrisone cream  Voltaren gel  Xyzal 5mg      No current facility-administered medications on file prior to encounter.     Current Outpatient Medications on File Prior to Encounter   Medication Sig Dispense Refill    albuterol (PROVENTIL/VENTOLIN HFA) 90 mcg/actuation inhaler Inhale 2 puffs into the lungs every 6 (six) hours as needed for Wheezing. Rescue 54 g 3    ascorbic acid, vitamin C, (VITAMIN C) 500 MG tablet Take 1 tablet (500 mg total) by mouth 2 (two) times daily. (Patient taking differently: Take 500 mg by mouth once daily.)      aspirin (ECOTRIN) 81 MG EC tablet Take 81 mg by mouth once daily.      atorvastatin (LIPITOR) 40 MG tablet TAKE 1 TABLET BY MOUTH EVERY DAY IN THE EVENING 90 tablet 3    azelastine (ASTELIN) 137 mcg (0.1 %) nasal spray 1 spray (137 mcg total) by Nasal route 2 (two) times daily. (Patient taking differently: 1 spray by Nasal route as needed.) 30 mL 3    calcium carbonate (TUMS) 200 mg calcium (500 mg) chewable tablet Take 2 tablets by mouth 3 (three) times daily.      celecoxib (CELEBREX) 200 MG capsule Take 200 mg by mouth 2 (two) times daily.      cetirizine (ZYRTEC) 10 MG " tablet Take 10 mg by mouth daily as needed for Allergies.      cyanocobalamin (VITAMIN B-12) 250 MCG tablet Take 250 mcg by mouth once daily.      ergocalciferol (ERGOCALCIFEROL) 50,000 unit Cap TAKE 1 CAPSULE (50,000 UNITS TOTAL) BY MOUTH EVERY 7 DAYS. FOR 12 DOSES (Patient taking differently: Take 50,000 Units by mouth every Sunday.) 12 capsule 1    esomeprazole (NEXIUM) 40 MG capsule Take 1 capsule (40 mg total) by mouth before breakfast. (Patient taking differently: Take 40 mg by mouth as needed.) 90 capsule 3    fluticasone propionate (FLONASE) 50 mcg/actuation nasal spray 2 sprays (100 mcg total) by Each Nostril route once daily. (Patient taking differently: 2 sprays by Each Nostril route as needed.) 9.9 mL 11    folic acid/multivit-min/lutein (CENTRUM SILVER ORAL) Take 1 tablet by mouth once daily.      metoprolol succinate (TOPROL-XL) 50 MG 24 hr tablet TAKE 1 TABLET BY MOUTH EVERY DAY (Patient taking differently: Take 50 mg by mouth once daily.) 90 tablet 2    sertraline (ZOLOFT) 50 MG tablet Take 1 tablet (50 mg total) by mouth once daily. For anxiety control. (Patient taking differently: Take 25 mg by mouth once daily. (1/2 tablet )For anxiety control.) 30 tablet 11    TURMERIC ORAL Take by mouth.      zolpidem (AMBIEN) 5 MG Tab Take 1 tablet (5 mg total) by mouth nightly as needed (insomnia). 30 tablet 2    FLUAD QUAD 2023-24,65Y UP,,PF, 60 mcg (15 mcg x 4)/0.5 mL Syrg          Please address this information as you see fit.  Feel free to contact us if you have any questions or require assistance.    Varsha Brewster  [phone]                .

## 2024-02-12 NOTE — TELEPHONE ENCOUNTER
No care due was identified.  Health [facility] Embedded Care Due Messages. Reference number: [id number].   2/12/2024 7:08:34 AM CST

## 2024-02-14 LAB
OHS QRS DURATION: 86 MS
OHS QTC CALCULATION: 404 MS

## 2024-02-14 RX ORDER — ZOLPIDEM TARTRATE 5 MG/1
5 TABLET ORAL NIGHTLY PRN
Qty: 30 TABLET | Refills: 2 | Status: SHIPPED | OUTPATIENT
Start: 2024-02-14 | End: 2024-04-23 | Stop reason: SDUPTHER

## 2024-02-19 ENCOUNTER — OFFICE VISIT (OUTPATIENT)
Dept: INTERNAL MEDICINE | Facility: CLINIC | Age: 74
End: 2024-02-19
Payer: MEDICARE

## 2024-02-19 VITALS
WEIGHT: 158 LBS | RESPIRATION RATE: 16 BRPM | HEART RATE: 68 BPM | TEMPERATURE: 97 F | SYSTOLIC BLOOD PRESSURE: 118 MMHG | BODY MASS INDEX: 29.08 KG/M2 | DIASTOLIC BLOOD PRESSURE: 80 MMHG | HEIGHT: 62 IN

## 2024-02-19 DIAGNOSIS — F41.9 ANXIETY: ICD-10-CM

## 2024-02-19 DIAGNOSIS — I10 ESSENTIAL HYPERTENSION: ICD-10-CM

## 2024-02-19 DIAGNOSIS — R06.02 SOB (SHORTNESS OF BREATH) ON EXERTION: ICD-10-CM

## 2024-02-19 DIAGNOSIS — R07.9 CHEST PAIN, UNSPECIFIED TYPE: ICD-10-CM

## 2024-02-19 DIAGNOSIS — R00.2 PALPITATIONS: Primary | ICD-10-CM

## 2024-02-19 PROCEDURE — 3288F FALL RISK ASSESSMENT DOCD: CPT | Mod: HCNC,CPTII,S$GLB, | Performed by: INTERNAL MEDICINE

## 2024-02-19 PROCEDURE — 1101F PT FALLS ASSESS-DOCD LE1/YR: CPT | Mod: HCNC,CPTII,S$GLB, | Performed by: INTERNAL MEDICINE

## 2024-02-19 PROCEDURE — 3079F DIAST BP 80-89 MM HG: CPT | Mod: HCNC,CPTII,S$GLB, | Performed by: INTERNAL MEDICINE

## 2024-02-19 PROCEDURE — 3074F SYST BP LT 130 MM HG: CPT | Mod: HCNC,CPTII,S$GLB, | Performed by: INTERNAL MEDICINE

## 2024-02-19 PROCEDURE — 1159F MED LIST DOCD IN RCRD: CPT | Mod: HCNC,CPTII,S$GLB, | Performed by: INTERNAL MEDICINE

## 2024-02-19 PROCEDURE — 1126F AMNT PAIN NOTED NONE PRSNT: CPT | Mod: HCNC,CPTII,S$GLB, | Performed by: INTERNAL MEDICINE

## 2024-02-19 PROCEDURE — 99999 PR PBB SHADOW E&M-EST. PATIENT-LVL V: CPT | Mod: PBBFAC,HCNC,, | Performed by: INTERNAL MEDICINE

## 2024-02-19 PROCEDURE — 1160F RVW MEDS BY RX/DR IN RCRD: CPT | Mod: HCNC,CPTII,S$GLB, | Performed by: INTERNAL MEDICINE

## 2024-02-19 PROCEDURE — 99214 OFFICE O/P EST MOD 30 MIN: CPT | Mod: HCNC,S$GLB,, | Performed by: INTERNAL MEDICINE

## 2024-02-20 NOTE — PROGRESS NOTES
Subjective:       Patient ID: Jojo Burrows is a 74 y.o. female.    Chief Complaint: Hypertension (3 mos fol up. , not taking all meds as should. ) and Chest Pain (Er fol up.  Also had shortn of breath. See er work up. Can lay down sometimes because heart beat is fast and affects her breathing.  Had this am. Not every day.)    HPI  The patient presents for follow-up recent medical complaints of palpitations, chest pain, and shortness of breath.  The patient admits that she has been under a lot of family-related stress lately.  She is status post ER visit on 02/10/2024 for complaints of palpitations and chest discomfort.  Symptoms have been present for the past 2 weeks.  ER evaluation was negative for evidence of acute myocardial infarction.  She was advised to have follow-up evaluation with her PCP and cardiologist.  The patient reports her blood pressure readings have been fairly good at home.  Her blood pressure diary was reviewed.  Recent blood pressure readings have ranged from 123/63 to 139/79.  She experiences early awakenings at night.  She is averaging 4-5 hours of sleep with Ambien 5 mg.  She states she also experiences palpitations and shortness of breath during the night.  No ankle edema is noted.    Active medical conditions include hypertension, hyperlipidemia, osteoarthritis of the knees, insomnia, and vitamin-D deficiency.    Review of Systems   Constitutional:  Negative for activity change, appetite change, fatigue and unexpected weight change.   Eyes:  Negative for visual disturbance.   Respiratory:  Positive for shortness of breath. Negative for cough.    Cardiovascular:  Positive for chest pain and palpitations. Negative for leg swelling and claudication.   Gastrointestinal:  Negative for abdominal pain, blood in stool, constipation, diarrhea and reflux.   Genitourinary:  Negative for dysuria and hematuria.   Musculoskeletal:  Negative for arthralgias, neck pain and neck stiffness.    Integumentary:  Negative for rash.   Neurological:  Negative for dizziness, syncope and headaches.   Psychiatric/Behavioral:  Negative for sleep disturbance.             Physical Exam  Vitals and nursing note reviewed.   Constitutional:       General: She is not in acute distress.     Appearance: Normal appearance. She is well-developed.   HENT:      Head: Normocephalic and atraumatic.   Eyes:      General: No scleral icterus.     Extraocular Movements: Extraocular movements intact.      Conjunctiva/sclera: Conjunctivae normal.   Neck:      Thyroid: No thyromegaly.      Vascular: No JVD.   Cardiovascular:      Rate and Rhythm: Normal rate and regular rhythm.      Heart sounds: Normal heart sounds. No murmur heard.     No friction rub. No gallop.   Pulmonary:      Effort: Pulmonary effort is normal. No respiratory distress.      Breath sounds: Normal breath sounds. No wheezing or rales.   Abdominal:      General: Bowel sounds are normal.      Palpations: Abdomen is soft. There is no mass.      Tenderness: There is no abdominal tenderness.   Musculoskeletal:         General: No tenderness. Normal range of motion.      Cervical back: Normal range of motion and neck supple.      Right lower leg: No edema.      Left lower leg: No edema.   Lymphadenopathy:      Cervical: No cervical adenopathy.   Skin:     General: Skin is warm and dry.      Findings: No rash.   Neurological:      Mental Status: She is alert and oriented to person, place, and time.   Psychiatric:         Mood and Affect: Mood normal.         Behavior: Behavior normal.           Admission on 02/10/2024, Discharged on 02/10/2024   Component Date Value Ref Range Status    QRS Duration 02/10/2024 86  ms Final    [facility] QTC Calculation 02/10/2024 404  ms Final    WBC 02/10/2024 9.53  3.90 - 12.70 K/uL Final    RBC 02/10/2024 4.21  4.00 - 5.40 M/uL Final    Hemoglobin 02/10/2024 12.8  12.0 - 16.0 g/dL Final    Hematocrit 02/10/2024 37.1  37.0 - 48.5 % Final     MCV 02/10/2024 88  82 - 98 fL Final    MCH 02/10/2024 30.4  27.0 - 31.0 pg Final    MCHC 02/10/2024 34.5  32.0 - 36.0 g/dL Final    RDW 02/10/2024 13.5  11.5 - 14.5 % Final    Platelets 02/10/2024 160  150 - 450 K/uL Final    MPV 02/10/2024 11.5  9.2 - 12.9 fL Final    Immature Granulocytes 02/10/2024 0.2  0.0 - 0.5 % Final    Gran # (ANC) 02/10/2024 6.2  1.8 - 7.7 K/uL Final    Immature Grans (Abs) 02/10/2024 0.02  0.00 - 0.04 K/uL Final    Comment: Mild elevation in immature granulocytes is non specific and   can be seen in a variety of conditions including stress response,   acute inflammation, trauma and pregnancy. Correlation with other   laboratory and clinical findings is essential.      Lymph # 02/10/2024 2.4  1.0 - 4.8 K/uL Final    Mono # 02/10/2024 0.9  0.3 - 1.0 K/uL Final    Eos # 02/10/2024 0.0  0.0 - 0.5 K/uL Final    Baso # 02/10/2024 0.02  0.00 - 0.20 K/uL Final    nRBC 02/10/2024 0  0 /100 WBC Final    Gran % 02/10/2024 65.2  38.0 - 73.0 % Final    Lymph % 02/10/2024 25.0  18.0 - 48.0 % Final    Mono % 02/10/2024 9.0  4.0 - 15.0 % Final    Eosinophil % 02/10/2024 0.4  0.0 - 8.0 % Final    Basophil % 02/10/2024 0.2  0.0 - 1.9 % Final    Differential Method 02/10/2024 Automated   Final    Sodium 02/10/2024 137  136 - 145 mmol/L Final    Potassium 02/10/2024 3.8  3.5 - 5.1 mmol/L Final    Chloride 02/10/2024 104  95 - 110 mmol/L Final    CO2 02/10/2024 22 (L)  23 - 29 mmol/L Final    Glucose 02/10/2024 95  70 - 110 mg/dL Final    BUN 02/10/2024 14  8 - 23 mg/dL Final    Creatinine 02/10/2024 0.7  0.5 - 1.4 mg/dL Final    Calcium 02/10/2024 9.7  8.7 - 10.5 mg/dL Final    Total Protein 02/10/2024 7.1  6.0 - 8.4 g/dL Final    Albumin 02/10/2024 3.9  3.5 - 5.2 g/dL Final    Total Bilirubin 02/10/2024 0.6  0.1 - 1.0 mg/dL Final    Comment: For infants and newborns, interpretation of results should be based  on gestational age, weight and in agreement with clinical  observations.    Premature Infant  recommended reference ranges:  Up to 24 hours.............<8.0 mg/dL  Up to 48 hours............<12.0 mg/dL  3-5 days..................<15.0 mg/dL  6-29 days.................<15.0 mg/dL      Alkaline Phosphatase 02/10/2024 94  55 - 135 U/L Final    AST 02/10/2024 28  10 - 40 U/L Final    ALT 02/10/2024 35  10 - 44 U/L Final    eGFR 02/10/2024 >60  >60 mL/min/1.73 m^2 Final    Anion Gap 02/10/2024 11  8 - 16 mmol/L Final    Troponin I 02/10/2024 <0.006  0.000 - 0.026 ng/mL Final    Comment: The reference interval for Troponin I represents the 99th percentile   cutoff   for our facility and is consistent with 3rd generation assay   performance.      BNP 02/10/2024 25  0 - 99 pg/mL Final    Values of less than 100 pg/ml are consistent with non-CHF populations.    Lipase 02/10/2024 14  4 - 60 U/L Final    Troponin I 02/10/2024 <0.006  0.000 - 0.026 ng/mL Final    Comment: The reference interval for Troponin I represents the 99th percentile   cutoff   for our facility and is consistent with 3rd generation assay   performance.     Lab Visit on 11/28/2023   Component Date Value Ref Range Status    Vit D, 25-Hydroxy 11/28/2023 26 (L)  30 - 96 ng/mL Final    Comment: Vitamin D deficiency.........<10 ng/mL                              Vitamin D insufficiency......10-29 ng/mL       Vitamin D sufficiency........> or equal to 30 ng/mL  Vitamin D toxicity............>100 ng/mL         Assessment & Plan:      Jojo was seen today for hypertension and chest pain.  24 hour Holter monitor will be obtained.  A stress echocardiogram will be obtained.  Consultation with Cardiology will be requested.    The patient was advised to use Zoloft daily management of her chronic anxiety.  She admits that she has only been using the medication intermittently.  She has not experienced any side effects.    Diagnoses and all orders for this visit:    Palpitations  -     Holter monitor - 24 hour; Future    Chest pain, unspecified type  -     Stress  Echo Which stress agent will be used? Treadmill Exercise; Color Flow Doppler? No; Future  -     Ambulatory referral/consult to Cardiology; Future    SOB (shortness of breath) on exertion  -     Stress Echo Which stress agent will be used? Treadmill Exercise; Color Flow Doppler? No; Future  -     Ambulatory referral/consult to Cardiology; Future    Essential hypertension    Anxiety         No follow-ups on file.     Joo Munoz MD

## 2024-02-27 ENCOUNTER — HOSPITAL ENCOUNTER (OUTPATIENT)
Dept: CARDIOLOGY | Facility: HOSPITAL | Age: 74
Discharge: HOME | End: 2024-02-27
Attending: INTERNAL MEDICINE
Payer: MEDICARE

## 2024-02-27 VITALS
WEIGHT: 158 LBS | BODY MASS INDEX: 29.08 KG/M2 | HEIGHT: 62 IN | DIASTOLIC BLOOD PRESSURE: 89 MMHG | HEART RATE: 61 BPM | SYSTOLIC BLOOD PRESSURE: 113 MMHG

## 2024-02-27 DIAGNOSIS — R06.02 SOB (SHORTNESS OF BREATH) ON EXERTION: ICD-10-CM

## 2024-02-27 DIAGNOSIS — R00.2 PALPITATIONS: ICD-10-CM

## 2024-02-27 DIAGNOSIS — R07.9 CHEST PAIN, UNSPECIFIED TYPE: ICD-10-CM

## 2024-02-27 LAB
ASCENDING AORTA: 2.81 CM
BSA FOR ECHO PROCEDURE: 1.77 M2
CV ECHO LV RWT: 0.3 CM
CV STRESS BASE HR: 60 BPM
DIASTOLIC BLOOD PRESSURE: 89 MMHG
DOP CALC LVOT AREA: 3 CM2
DOP CALC LVOT DIAMETER: 1.96 CM
DOP CALC LVOT PEAK VEL: 0.75 M/S
DOP CALC LVOT STROKE VOLUME: 59.83 CM3
DOP CALCLVOT PEAK VEL VTI: 19.84 CM
E WAVE DECELERATION TIME: 172.98 MSEC
E/A RATIO: 0.76
E/E' RATIO: 8.33 M/S
ECHO LV POSTERIOR WALL: 0.68 CM (ref 0.6–1.1)
EJECTION FRACTION: 60 %
FRACTIONAL SHORTENING: 34 % (ref 28–44)
INTERVENTRICULAR SEPTUM: 0.69 CM (ref 0.6–1.1)
IVRT: 131.3 MSEC
LA MAJOR: 5.08 CM
LA MINOR: 5.07 CM
LA WIDTH: 3.75 CM
LEFT ATRIUM SIZE: 3.77 CM
LEFT ATRIUM VOLUME INDEX MOD: 27.5 ML/M2
LEFT ATRIUM VOLUME INDEX: 35.3 ML/M2
LEFT ATRIUM VOLUME MOD: 47.51 CM3
LEFT ATRIUM VOLUME: 60.99 CM3
LEFT INTERNAL DIMENSION IN SYSTOLE: 2.97 CM (ref 2.1–4)
LEFT VENTRICLE DIASTOLIC VOLUME INDEX: 53.36 ML/M2
LEFT VENTRICLE DIASTOLIC VOLUME: 92.31 ML
LEFT VENTRICLE MASS INDEX: 54 G/M2
LEFT VENTRICLE SYSTOLIC VOLUME INDEX: 19.7 ML/M2
LEFT VENTRICLE SYSTOLIC VOLUME: 34.04 ML
LEFT VENTRICULAR INTERNAL DIMENSION IN DIASTOLE: 4.5 CM (ref 3.5–6)
LEFT VENTRICULAR MASS: 93.07 G
LV LATERAL E/E' RATIO: 6.82 M/S
LV SEPTAL E/E' RATIO: 10.71 M/S
MV PEAK A VEL: 0.99 M/S
MV PEAK E VEL: 0.75 M/S
MV STENOSIS PRESSURE HALF TIME: 50.17 MS
MV VALVE AREA P 1/2 METHOD: 4.39 CM2
OHS CV CPX 1 MINUTE RECOVERY HEART RATE: 83 BPM
OHS CV CPX 85 PERCENT MAX PREDICTED HEART RATE MALE: 123
OHS CV CPX ESTIMATED METS: 4
OHS CV CPX MAX PREDICTED HEART RATE: 145
OHS CV CPX PATIENT IS FEMALE: 1
OHS CV CPX PATIENT IS MALE: 0
OHS CV CPX PEAK DIASTOLIC BLOOD PRESSURE: 50 MMHG
OHS CV CPX PEAK HEAR RATE: 129 BPM
OHS CV CPX PEAK RATE PRESSURE PRODUCT: NORMAL
OHS CV CPX PEAK SYSTOLIC BLOOD PRESSURE: 202 MMHG
OHS CV CPX PERCENT MAX PREDICTED HEART RATE ACHIEVED: 92
OHS CV CPX RATE PRESSURE PRODUCT PRESENTING: 6780
PISA TR MAX VEL: 2.89 M/S
RA MAJOR: 3.58 CM
RA PRESSURE ESTIMATED: 3 MMHG
RA WIDTH: 2.82 CM
RIGHT VENTRICULAR END-DIASTOLIC DIMENSION: 2.78 CM
RV TB RVSP: 6 MMHG
SINUS: 2.88 CM
STJ: 2.42 CM
STRESS ECHO POST EXERCISE DUR MIN: 4 MINUTES
STRESS ECHO POST EXERCISE DUR SEC: 37 SECONDS
SYSTOLIC BLOOD PRESSURE: 113 MMHG
TDI LATERAL: 0.11 M/S
TDI SEPTAL: 0.07 M/S
TDI: 0.09 M/S
TR MAX PG: 33 MMHG
TRICUSPID ANNULAR PLANE SYSTOLIC EXCURSION: 1.6 CM
TV REST PULMONARY ARTERY PRESSURE: 36 MMHG
Z-SCORE OF LEFT VENTRICULAR DIMENSION IN END DIASTOLE: -0.63
Z-SCORE OF LEFT VENTRICULAR DIMENSION IN END SYSTOLE: 0.01

## 2024-02-27 PROCEDURE — 93226 XTRNL ECG REC<48 HR SCAN A/R: CPT | Mod: HCNC,PO

## 2024-02-27 PROCEDURE — 93351 STRESS TTE COMPLETE: CPT | Mod: 26,HCNC,, | Performed by: INTERNAL MEDICINE

## 2024-02-27 PROCEDURE — 93351 STRESS TTE COMPLETE: CPT | Mod: HCNC,PO

## 2024-02-27 PROCEDURE — 93227 XTRNL ECG REC<48 HR R&I: CPT | Mod: HCNC,,, | Performed by: STUDENT IN AN ORGANIZED HEALTH CARE EDUCATION/TRAINING PROGRAM

## 2024-02-29 LAB
OHS CV EVENT MONITOR DAY: 0
OHS CV HOLTER LENGTH DECIMAL HOURS: 23.98
OHS CV HOLTER LENGTH HOURS: 23
OHS CV HOLTER LENGTH MINUTES: 59
OHS CV HOLTER SINUS AVERAGE HR: 69
OHS CV HOLTER SINUS MAX HR: 122
OHS CV HOLTER SINUS MIN HR: 52

## 2024-03-26 ENCOUNTER — TELEPHONE (OUTPATIENT)
Dept: INTERNAL MEDICINE | Facility: CLINIC | Age: 74
End: 2024-03-26
Payer: MEDICARE

## 2024-03-26 NOTE — TELEPHONE ENCOUNTER
Contacted pt to inform that The stress echocardiogram was negative for evidence of major coronary blockages.   pt vu

## 2024-03-26 NOTE — TELEPHONE ENCOUNTER
----- Message from Joo Munoz MD sent at 3/25/2024  6:31 PM CDT -----  The stress echocardiogram was negative for evidence of major coronary blockages.

## 2024-03-28 ENCOUNTER — OFFICE VISIT (OUTPATIENT)
Dept: CARDIOLOGY | Facility: CLINIC | Age: 74
End: 2024-03-28
Payer: MEDICARE

## 2024-03-28 VITALS
SYSTOLIC BLOOD PRESSURE: 131 MMHG | HEIGHT: 62 IN | DIASTOLIC BLOOD PRESSURE: 60 MMHG | WEIGHT: 158.5 LBS | BODY MASS INDEX: 29.17 KG/M2 | OXYGEN SATURATION: 98 % | HEART RATE: 62 BPM

## 2024-03-28 DIAGNOSIS — R06.02 SOB (SHORTNESS OF BREATH) ON EXERTION: ICD-10-CM

## 2024-03-28 DIAGNOSIS — R07.9 CHEST PAIN, UNSPECIFIED TYPE: ICD-10-CM

## 2024-03-28 DIAGNOSIS — I71.43 INFRARENAL ABDOMINAL AORTIC ANEURYSM (AAA) WITHOUT RUPTURE: ICD-10-CM

## 2024-03-28 PROCEDURE — 1101F PT FALLS ASSESS-DOCD LE1/YR: CPT | Mod: HCNC,CPTII,S$GLB, | Performed by: INTERNAL MEDICINE

## 2024-03-28 PROCEDURE — 99999 PR PBB SHADOW E&M-EST. PATIENT-LVL V: CPT | Mod: PBBFAC,HCNC,, | Performed by: INTERNAL MEDICINE

## 2024-03-28 PROCEDURE — 3078F DIAST BP <80 MM HG: CPT | Mod: HCNC,CPTII,S$GLB, | Performed by: INTERNAL MEDICINE

## 2024-03-28 PROCEDURE — 1160F RVW MEDS BY RX/DR IN RCRD: CPT | Mod: HCNC,CPTII,S$GLB, | Performed by: INTERNAL MEDICINE

## 2024-03-28 PROCEDURE — 1159F MED LIST DOCD IN RCRD: CPT | Mod: HCNC,CPTII,S$GLB, | Performed by: INTERNAL MEDICINE

## 2024-03-28 PROCEDURE — 3288F FALL RISK ASSESSMENT DOCD: CPT | Mod: HCNC,CPTII,S$GLB, | Performed by: INTERNAL MEDICINE

## 2024-03-28 PROCEDURE — 3075F SYST BP GE 130 - 139MM HG: CPT | Mod: HCNC,CPTII,S$GLB, | Performed by: INTERNAL MEDICINE

## 2024-03-28 PROCEDURE — 1126F AMNT PAIN NOTED NONE PRSNT: CPT | Mod: HCNC,CPTII,S$GLB, | Performed by: INTERNAL MEDICINE

## 2024-03-28 PROCEDURE — 99203 OFFICE O/P NEW LOW 30 MIN: CPT | Mod: HCNC,S$GLB,, | Performed by: INTERNAL MEDICINE

## 2024-03-28 NOTE — PROGRESS NOTES
Subjective:     Chief Complaint:  Jojo Burrows is a 74 y.o. female referred by Joo Munoz MD for evaluation of Chest Pain and Shortness of Breath.    Problem List and HPI:   AAA    She reports palpitations mostly in the morning. She reports chest discomfort as a sticking pain and at other times as a pressure. The pressure sensation occurs at rest or with exertion. Short of breath when she climbs stairs. Also feels fatigued and short of breath. An exercise stress echo performed 24 revealed normal LV size and function but even though she achieved her target heart rate, she walked for <5 min on an intermediate ramp protocol (4 estimated METs).    Multiple family members  recently and have had health problems.   Small AAA measuring 3.3 cm followed by Dr. Root.  States that the atorvastatin makes her feel bad and therefore stopped it       Review of patient's allergies indicates:   Allergen Reactions    Vantin [cefpodoxime] Itching     Severe itching, night sweats.  Improved after stopped it.      Iodine Rash    Iodine and iodide containing products Itching and Rash    Shellfish containing products Itching and Rash        Current Outpatient Medications   Medication Sig    albuterol (PROVENTIL/VENTOLIN HFA) 90 mcg/actuation inhaler Inhale 2 puffs into the lungs every 6 (six) hours as needed for Wheezing. Rescue    ascorbic acid, vitamin C, (VITAMIN C) 500 MG tablet Take 1 tablet (500 mg total) by mouth 2 (two) times daily. (Patient taking differently: Take 500 mg by mouth once daily.)    aspirin (ECOTRIN) 81 MG EC tablet Take 81 mg by mouth once daily.    atorvastatin (LIPITOR) 40 MG tablet TAKE 1 TABLET BY MOUTH EVERY DAY IN THE EVENING    azelastine (ASTELIN) 137 mcg (0.1 %) nasal spray 1 spray (137 mcg total) by Nasal route 2 (two) times daily. (Patient taking differently: 1 spray by Nasal route as needed.)    calcium carbonate (TUMS) 200 mg calcium (500 mg) chewable tablet Take 2 tablets by  mouth as needed.    celecoxib (CELEBREX) 200 MG capsule Take 200 mg by mouth as needed.    cetirizine (ZYRTEC) 10 MG tablet Take 10 mg by mouth daily as needed for Allergies.    cyanocobalamin (VITAMIN B-12) 250 MCG tablet Take 250 mcg by mouth once daily.    ergocalciferol (ERGOCALCIFEROL) 50,000 unit Cap TAKE 1 CAPSULE (50,000 UNITS TOTAL) BY MOUTH EVERY 7 DAYS. FOR 12 DOSES (Patient taking differently: Take 50,000 Units by mouth every Sunday.)    esomeprazole (NEXIUM) 40 MG capsule Take 1 capsule (40 mg total) by mouth before breakfast. (Patient taking differently: Take 40 mg by mouth as needed.)    fluticasone propionate (FLONASE) 50 mcg/actuation nasal spray 2 sprays (100 mcg total) by Each Nostril route once daily. (Patient taking differently: 2 sprays by Each Nostril route as needed.)    folic acid/multivit-min/lutein (CENTRUM SILVER ORAL) Take 1 tablet by mouth once daily.    metoprolol succinate (TOPROL-XL) 50 MG 24 hr tablet TAKE 1 TABLET BY MOUTH EVERY DAY (Patient taking differently: Take 50 mg by mouth once daily.)    sertraline (ZOLOFT) 50 MG tablet Take 1 tablet (50 mg total) by mouth once daily. For anxiety control. (Patient taking differently: Take 25 mg by mouth once daily. (1/2 tablet )For anxiety control.)    TURMERIC ORAL Take 1 capsule by mouth once daily.    zolpidem (AMBIEN) 5 MG Tab Take 1 tablet (5 mg total) by mouth nightly as needed (insomnia).         Past Medical and Surgical history:  Jojo Burrows  has a past medical history of Acute hypoxemic respiratory failure (3/31/2020), ALLERGIC RHINITIS, Cataract, COVID-19 virus infection (03/2020), Degenerative disc disease, cervical (9/13/2018), GERD (gastroesophageal reflux disease), Hyperlipidemia, Hypertension, Migraine headache, Multifocal pneumonia (3/31/2020), Nuclear sclerosis (4/30/2014), Sleep disorder, Thyroid disease, and TIA (transient ischemic attack).   Past Surgical History:   Procedure Laterality Date    BREAST BIOPSY       "BREAST CYST ASPIRATION      BREAST CYST EXCISION      COLONOSCOPY N/A 4/29/2016    Procedure: COLONOSCOPY;  Surgeon: Sergio Vickers MD;  Location: [facility] (4TH FLR);  Service: Endoscopy;  Laterality: N/A;    COLONOSCOPY N/A 6/28/2021    Procedure: COLONOSCOPY;  Surgeon: Sergio Vickers MD;  Location: [facility] (4TH FLR);  Service: Endoscopy;  Laterality: N/A;  COVID + 3/2020 and fully vaccinated -   pt requesting Dr. vickers/ instructions emailed-     HYSTERECTOMY         Social history:  Jojo Burrows  reports that she has never smoked. She has never used smokeless tobacco. She reports that she does not drink alcohol and does not use drugs.    Family history:  Josués family history includes Asthma in her father; Breast cancer in her mother and sister; Diabetes in her mother; Esophageal cancer in an other family member; Glaucoma in her father; Heart disease in her maternal grandmother; Hypertension in her mother; Lymphoma in her daughter; No Known Problems in her son; Ovarian cancer in her sister; Stroke in her daughter.      Objective:   /60   Pulse 62   Ht 5' 2" (1.575 m)   Wt 71.9 kg (158 lb 8.2 oz)   SpO2 98%   BMI 28.99 kg/m²    Physical Exam  Constitutional:       Appearance: Normal appearance. She is well-developed.   Neck:      Vascular: No JVD.   Cardiovascular:      Rate and Rhythm: Normal rate and regular rhythm.      Pulses:           Radial pulses are 2+ on the right side and 2+ on the left side.        Posterior tibial pulses are 1+ on the right side and 1+ on the left side.      Heart sounds: S1 normal and S2 normal. No murmur heard.  Pulmonary:      Breath sounds: No decreased breath sounds, wheezing or rales.   Chest:      Chest wall: There is no dullness to percussion.   Abdominal:      Palpations: Abdomen is soft. There is no hepatomegaly or splenomegaly.      Tenderness: There is no abdominal tenderness.   Musculoskeletal:      Right lower leg: No edema.      Left lower " leg: No edema.   Skin:     General: Skin is warm.      Findings: No bruising.      Nails: There is no clubbing.   Neurological:      Mental Status: She is alert and oriented to person, place, and time.   Psychiatric:         Speech: Speech normal.         Behavior: Behavior normal.         Labs  Lipids:  Recent Labs   Lab 11/06/23  0953   LDL Cholesterol 98.0   HDL 68   Cholesterol 173      Renal:  Recent Labs   Lab 02/10/24  1322   Creatinine 0.7   Potassium 3.8   CO2 22 L   BUN 14     Liver:  Recent Labs   Lab 02/10/24  1322   AST 28   ALT 35     Cbc:    Lab Results   Component Value Date    WBC 9.53 02/10/2024    HGB 12.8 02/10/2024    HCT 37.1 02/10/2024    MCV 88 02/10/2024     02/10/2024     Stress echo 2/27/24:      Stress Protocol: The patient exercised for 4 minutes 37 seconds on a medium ramp protocol, corresponding to a functional capacity of 4 METS, achieving a peak heart rate of 129 bpm, which is 92 % of the age predicted maximum heart rate. Their exercise capacity was mildly impaired. The patient reported no symptoms during the stress test. The test was stopped because the patient experienced fatigue.    ECG Conclusion: The ECG portion of the study is negative for ischemia.    Left Ventricle: The left ventricle is normal in size. Normal wall thickness. There is normal systolic function. Ejection fraction by visual approximation is 60%. There is indeterminate diastolic function.    Right Ventricle: Normal right ventricular cavity size. Wall thickness is normal. Right ventricle wall motion  is normal. Systolic function is normal.    Left Atrium: Left atrium is mildly dilated.    Mitral Valve: There is mild regurgitation.    Pulmonary Artery: The estimated pulmonary artery systolic pressure is 36 mmHg.    Post-stress Impression: The study is negative with no echocardiographic evidence of stress induced ischemia.    No results found for this or any previous visit.        Assessment and Plan:        ICD-10-CM ICD-9-CM   1. Chest pain, unspecified type  R07.9 786.50   2. SOB (shortness of breath) on exertion  R06.02 786.05   3. Infrarenal abdominal aortic aneurysm (AAA) without rupture  I71.43 441.4        A recent exercise stress echo was inadequate. Pharmacologic stress test to r/o obstructive CAD.  FUP w [facility] for AAA.    Orders entered during this encounter:    Chest pain, unspecified type  -     Ambulatory referral/consult to Cardiology  -     Nuclear Stress - Cardiology Interpreted; Future    SOB (shortness of breath) on exertion  -     Ambulatory referral/consult to Cardiology    Infrarenal abdominal aortic aneurysm (AAA) without rupture         No follow-ups on file.

## 2024-04-03 ENCOUNTER — TELEPHONE (OUTPATIENT)
Dept: INTERNAL MEDICINE | Facility: CLINIC | Age: 74
End: 2024-04-03
Payer: MEDICARE

## 2024-04-03 NOTE — TELEPHONE ENCOUNTER
----- Message from Joo Munoz MD sent at 4/3/2024  1:56 AM CDT -----  Holter monitor results were negative for any significant heart rhythm disturbance.

## 2024-04-04 ENCOUNTER — TELEPHONE (OUTPATIENT)
Dept: INTERNAL MEDICINE | Facility: CLINIC | Age: 74
End: 2024-04-04
Payer: MEDICARE

## 2024-04-04 NOTE — TELEPHONE ENCOUNTER
----- Message from Pilar Graff sent at 4/4/2024 10:21 AM CDT -----  Contact: [phone] Patient  Pt is calling in regards to speaking to Dr mendiola about the stress test she is taking on 04/11/2024. Pt has some questions. Please call and advise. Thank you

## 2024-04-04 NOTE — TELEPHONE ENCOUNTER
I spoke to pt, she had some concerns about how upcoming stress test. She is allergic to iodine.  She was advised to contact Dr. Wong.  She verbalized understanding

## 2024-04-09 ENCOUNTER — TELEPHONE (OUTPATIENT)
Dept: CARDIOLOGY | Facility: HOSPITAL | Age: 74
End: 2024-04-09
Payer: MEDICARE

## 2024-04-11 ENCOUNTER — HOSPITAL ENCOUNTER (OUTPATIENT)
Dept: CARDIOLOGY | Facility: HOSPITAL | Age: 74
Discharge: HOME | End: 2024-04-11
Attending: INTERNAL MEDICINE
Payer: MEDICARE

## 2024-04-11 VITALS
HEIGHT: 62 IN | SYSTOLIC BLOOD PRESSURE: 131 MMHG | DIASTOLIC BLOOD PRESSURE: 74 MMHG | RESPIRATION RATE: 16 BRPM | WEIGHT: 158 LBS | BODY MASS INDEX: 29.08 KG/M2 | HEART RATE: 69 BPM

## 2024-04-11 DIAGNOSIS — R07.9 CHEST PAIN, UNSPECIFIED TYPE: ICD-10-CM

## 2024-04-11 LAB
CV STRESS BASE HR: 57 BPM
DIASTOLIC BLOOD PRESSURE: 74 MMHG
OHS CV CPX 1 MINUTE RECOVERY HEART RATE: 93 BPM
OHS CV CPX 85 PERCENT MAX PREDICTED HEART RATE MALE: 123
OHS CV CPX MAX PREDICTED HEART RATE: 145
OHS CV CPX PATIENT IS FEMALE: 1
OHS CV CPX PATIENT IS MALE: 0
OHS CV CPX PEAK DIASTOLIC BLOOD PRESSURE: 81 MMHG
OHS CV CPX PEAK HEAR RATE: 71 BPM
OHS CV CPX PEAK RATE PRESSURE PRODUCT: 8804
OHS CV CPX PEAK SYSTOLIC BLOOD PRESSURE: 124 MMHG
OHS CV CPX PERCENT MAX PREDICTED HEART RATE ACHIEVED: 51
OHS CV CPX RATE PRESSURE PRODUCT PRESENTING: 7581
SYSTOLIC BLOOD PRESSURE: 133 MMHG

## 2024-04-11 PROCEDURE — 93016 CV STRESS TEST SUPVJ ONLY: CPT | Mod: HCNC,,, | Performed by: INTERNAL MEDICINE

## 2024-04-11 PROCEDURE — 78452 HT MUSCLE IMAGE SPECT MULT: CPT | Mod: 26,HCNC,, | Performed by: INTERNAL MEDICINE

## 2024-04-11 PROCEDURE — 78452 HT MUSCLE IMAGE SPECT MULT: CPT | Mod: HCNC

## 2024-04-11 PROCEDURE — 93018 CV STRESS TEST I&R ONLY: CPT | Mod: HCNC,,, | Performed by: INTERNAL MEDICINE

## 2024-04-11 PROCEDURE — A9502 TC99M TETROFOSMIN: HCPCS | Mod: HCNC | Performed by: INTERNAL MEDICINE

## 2024-04-11 PROCEDURE — 63600175 PHARM REV CODE 636 W HCPCS: Mod: HCNC | Performed by: INTERNAL MEDICINE

## 2024-04-11 RX ORDER — AMINOPHYLLINE 25 MG/ML
75 INJECTION, SOLUTION INTRAVENOUS
Status: COMPLETED | OUTPATIENT
Start: 2024-04-11 | End: 2024-04-11

## 2024-04-11 RX ORDER — REGADENOSON 0.08 MG/ML
0.4 INJECTION, SOLUTION INTRAVENOUS
Status: COMPLETED | OUTPATIENT
Start: 2024-04-11 | End: 2024-04-11

## 2024-04-11 RX ADMIN — TETROFOSMIN 10.4 MILLICURIE: 1.38 INJECTION, POWDER, LYOPHILIZED, FOR SOLUTION INTRAVENOUS at 09:04

## 2024-04-11 RX ADMIN — REGADENOSON 0.4 MG: 0.08 INJECTION, SOLUTION INTRAVENOUS at 10:04

## 2024-04-11 RX ADMIN — AMINOPHYLLINE 75 MG: 25 INJECTION, SOLUTION INTRAVENOUS at 10:04

## 2024-04-11 RX ADMIN — TETROFOSMIN 30.4 MILLICURIE: 1.38 INJECTION, POWDER, LYOPHILIZED, FOR SOLUTION INTRAVENOUS at 10:04

## 2024-04-15 ENCOUNTER — OFFICE VISIT (OUTPATIENT)
Dept: INTERNAL MEDICINE | Facility: CLINIC | Age: 74
End: 2024-04-15
Payer: MEDICARE

## 2024-04-15 ENCOUNTER — HOSPITAL ENCOUNTER (OUTPATIENT)
Dept: RADIOLOGY | Facility: HOSPITAL | Age: 74
Discharge: HOME | End: 2024-04-15
Attending: STUDENT IN AN ORGANIZED HEALTH CARE EDUCATION/TRAINING PROGRAM
Payer: MEDICARE

## 2024-04-15 VITALS
WEIGHT: 160.5 LBS | BODY MASS INDEX: 29.53 KG/M2 | SYSTOLIC BLOOD PRESSURE: 120 MMHG | OXYGEN SATURATION: 97 % | HEART RATE: 68 BPM | DIASTOLIC BLOOD PRESSURE: 70 MMHG | HEIGHT: 62 IN

## 2024-04-15 DIAGNOSIS — J98.8 BACTERIAL RESPIRATORY INFECTION: Primary | ICD-10-CM

## 2024-04-15 DIAGNOSIS — R05.1 ACUTE COUGH: ICD-10-CM

## 2024-04-15 DIAGNOSIS — B96.89 BACTERIAL RESPIRATORY INFECTION: Primary | ICD-10-CM

## 2024-04-15 PROCEDURE — 1126F AMNT PAIN NOTED NONE PRSNT: CPT | Mod: HCNC,CPTII,S$GLB, | Performed by: STUDENT IN AN ORGANIZED HEALTH CARE EDUCATION/TRAINING PROGRAM

## 2024-04-15 PROCEDURE — 3074F SYST BP LT 130 MM HG: CPT | Mod: HCNC,CPTII,S$GLB, | Performed by: STUDENT IN AN ORGANIZED HEALTH CARE EDUCATION/TRAINING PROGRAM

## 2024-04-15 PROCEDURE — 1101F PT FALLS ASSESS-DOCD LE1/YR: CPT | Mod: HCNC,CPTII,S$GLB, | Performed by: STUDENT IN AN ORGANIZED HEALTH CARE EDUCATION/TRAINING PROGRAM

## 2024-04-15 PROCEDURE — 71046 X-RAY EXAM CHEST 2 VIEWS: CPT | Mod: TC,HCNC

## 2024-04-15 PROCEDURE — 3078F DIAST BP <80 MM HG: CPT | Mod: HCNC,CPTII,S$GLB, | Performed by: STUDENT IN AN ORGANIZED HEALTH CARE EDUCATION/TRAINING PROGRAM

## 2024-04-15 PROCEDURE — 71046 X-RAY EXAM CHEST 2 VIEWS: CPT | Mod: 26,HCNC,, | Performed by: RADIOLOGY

## 2024-04-15 PROCEDURE — 99213 OFFICE O/P EST LOW 20 MIN: CPT | Mod: HCNC,S$GLB,, | Performed by: STUDENT IN AN ORGANIZED HEALTH CARE EDUCATION/TRAINING PROGRAM

## 2024-04-15 PROCEDURE — 1160F RVW MEDS BY RX/DR IN RCRD: CPT | Mod: HCNC,CPTII,S$GLB, | Performed by: STUDENT IN AN ORGANIZED HEALTH CARE EDUCATION/TRAINING PROGRAM

## 2024-04-15 PROCEDURE — 1159F MED LIST DOCD IN RCRD: CPT | Mod: HCNC,CPTII,S$GLB, | Performed by: STUDENT IN AN ORGANIZED HEALTH CARE EDUCATION/TRAINING PROGRAM

## 2024-04-15 PROCEDURE — 3288F FALL RISK ASSESSMENT DOCD: CPT | Mod: HCNC,CPTII,S$GLB, | Performed by: STUDENT IN AN ORGANIZED HEALTH CARE EDUCATION/TRAINING PROGRAM

## 2024-04-15 PROCEDURE — 99999 PR PBB SHADOW E&M-EST. PATIENT-LVL V: CPT | Mod: PBBFAC,HCNC,, | Performed by: STUDENT IN AN ORGANIZED HEALTH CARE EDUCATION/TRAINING PROGRAM

## 2024-04-15 RX ORDER — PREDNISONE 20 MG/1
40 TABLET ORAL DAILY
Qty: 10 TABLET | Refills: 0 | Status: SHIPPED | OUTPATIENT
Start: 2024-04-15 | End: 2024-04-20

## 2024-04-15 RX ORDER — CEFPODOXIME PROXETIL 100 MG/1
200 TABLET, FILM COATED ORAL 2 TIMES DAILY
Qty: 28 TABLET | Refills: 0 | Status: SHIPPED | OUTPATIENT
Start: 2024-04-15 | End: 2024-04-22

## 2024-04-15 NOTE — PROGRESS NOTES
[facility] VISIT      CHIEF COMPLAINT:   Chief Complaint   Patient presents with    Sinus Problem     Pt states she's been feeling bad for about 3 weeks. She say she has an sinus drip and sometimes she catches herself wheezing. She states sometimes she feel pressure in her face.       HISTORY OF PRESENT ILLNESS: Jojo Burrows is a 74 y.o. female who presents here today for evaluation of sinus congestion, rhinorrhea, post-nasal drip, facial pressure/pain, cough, wheezing. Detailed ROS as below. Symptoms have been present for about 3 weeks. Symptoms initially improved but now have been stable in this timeframe. Patient reports sick contacts - grandson was sick recently. She denies any specific COVID or influenza exposures. Patient denies underlying respiratory or lung disease. Patient reports history of seasonal allergies. She does have an albuterol inhaler (unsure why this was prescribed)  and she takes it about twice daily with recent illness. Patient has tried Zyrtec, Robitussin, Mucinex with moderate relief. Patient is up to date on COVID, influenza, and pneumonia vaccines.      REVIEW OF SYSTEMS:    Review of Systems   Constitutional:  Positive for chills, diaphoresis (night sweats) and malaise/fatigue. Negative for fever.   HENT:  Positive for congestion, sinus pain and sore throat (resolved). Negative for ear pain.    Respiratory:  Positive for cough, sputum production, shortness of breath and wheezing. Negative for hemoptysis.    Cardiovascular:  Negative for chest pain.   Gastrointestinal:  Negative for abdominal pain, constipation, diarrhea, heartburn, nausea and vomiting.   Genitourinary:  Negative for dysuria, frequency and urgency.   Musculoskeletal:  Negative for joint pain and myalgias.   Skin:  Negative for rash.   Neurological:  Positive for headaches. Negative for dizziness and weakness.         MEDICAL HISTORY:    Past Medical History:   Diagnosis Date    Acute hypoxemic respiratory  failure 3/31/2020    ALLERGIC RHINITIS     Cataract     COVID-19 virus infection 03/2020    Degenerative disc disease, cervical 9/13/2018    GERD (gastroesophageal reflux disease)     Hyperlipidemia     Hypertension     Migraine headache     Multifocal pneumonia 3/31/2020    Nuclear sclerosis 4/30/2014    Sleep disorder     Thyroid disease     nodular goiter    TIA (transient ischemic attack)        MEDICATIONS:    Current Outpatient Medications on File Prior to Visit   Medication Sig Dispense Refill    albuterol (PROVENTIL/VENTOLIN HFA) 90 mcg/actuation inhaler Inhale 2 puffs into the lungs every 6 (six) hours as needed for Wheezing. Rescue 54 g 3    ascorbic acid, vitamin C, (VITAMIN C) 500 MG tablet Take 1 tablet (500 mg total) by mouth 2 (two) times daily. (Patient taking differently: Take 500 mg by mouth once daily.)      aspirin (ECOTRIN) 81 MG EC tablet Take 81 mg by mouth once daily.      atorvastatin (LIPITOR) 40 MG tablet TAKE 1 TABLET BY MOUTH EVERY DAY IN THE EVENING 90 tablet 3    azelastine (ASTELIN) 137 mcg (0.1 %) nasal spray 1 spray (137 mcg total) by Nasal route 2 (two) times daily. (Patient taking differently: 1 spray by Nasal route as needed.) 30 mL 3    calcium carbonate (TUMS) 200 mg calcium (500 mg) chewable tablet Take 2 tablets by mouth as needed.      celecoxib (CELEBREX) 200 MG capsule Take 200 mg by mouth as needed.      cetirizine (ZYRTEC) 10 MG tablet Take 10 mg by mouth daily as needed for Allergies.      cyanocobalamin (VITAMIN B-12) 250 MCG tablet Take 250 mcg by mouth once daily.      ergocalciferol (ERGOCALCIFEROL) 50,000 unit Cap TAKE 1 CAPSULE (50,000 UNITS TOTAL) BY MOUTH EVERY 7 DAYS. FOR 12 DOSES (Patient taking differently: Take 50,000 Units by mouth every Sunday.) 12 capsule 1    esomeprazole (NEXIUM) 40 MG capsule Take 1 capsule (40 mg total) by mouth before breakfast. (Patient taking differently: Take 40 mg by mouth as needed.) 90 capsule 3    fluticasone propionate  "(FLONASE) 50 mcg/actuation nasal spray 2 sprays (100 mcg total) by Each Nostril route once daily. (Patient taking differently: 2 sprays by Each Nostril route as needed.) 9.9 mL 11    folic acid/multivit-min/lutein (CENTRUM SILVER ORAL) Take 1 tablet by mouth once daily.      metoprolol succinate (TOPROL-XL) 50 MG 24 hr tablet TAKE 1 TABLET BY MOUTH EVERY DAY (Patient taking differently: Take 50 mg by mouth once daily.) 90 tablet 2    sertraline (ZOLOFT) 50 MG tablet Take 1 tablet (50 mg total) by mouth once daily. For anxiety control. (Patient taking differently: Take 25 mg by mouth once daily. (1/2 tablet )For anxiety control.) 30 tablet 11    TURMERIC ORAL Take 1 capsule by mouth once daily.      zolpidem (AMBIEN) 5 MG Tab Take 1 tablet (5 mg total) by mouth nightly as needed (insomnia). 30 tablet 2     No current facility-administered medications on file prior to visit.       PHYSICAL EXAM:    /70 (BP Location: Left arm, Patient Position: Sitting)   Pulse 68   Ht 5' 2" (1.575 m)   Wt 72.8 kg (160 lb 7.9 oz)   SpO2 97%   BMI 29.35 kg/m²     Physical Exam  Vitals and nursing note reviewed.   Constitutional:       General: She is not in acute distress.     Appearance: She is ill-appearing. She is not toxic-appearing or diaphoretic.   HENT:      Head: Normocephalic and atraumatic.      Right Ear: Tympanic membrane, ear canal and external ear normal.      Left Ear: Tympanic membrane, ear canal and external ear normal.      Nose: Congestion present. No rhinorrhea.      Mouth/Throat:      Mouth: Mucous membranes are moist.      Pharynx: Oropharynx is clear. Posterior oropharyngeal erythema present. No oropharyngeal exudate.   Eyes:      Extraocular Movements: Extraocular movements intact.      Conjunctiva/sclera: Conjunctivae normal.      Pupils: Pupils are equal, round, and reactive to light.   Cardiovascular:      Rate and Rhythm: Normal rate and regular rhythm.      Heart sounds: Normal heart sounds. No " murmur heard.  Pulmonary:      Effort: Pulmonary effort is normal. No respiratory distress.      Breath sounds: Normal breath sounds. No stridor. No wheezing, rhonchi or rales.   Musculoskeletal:         General: No deformity. Normal range of motion.      Cervical back: No tenderness.   Lymphadenopathy:      Cervical: No cervical adenopathy.   Skin:     Findings: No lesion or rash.   Neurological:      General: No focal deficit present.      Mental Status: She is alert.      Motor: No weakness.      Gait: Gait normal.   Psychiatric:         Mood and Affect: Mood normal.         Behavior: Behavior normal.         Thought Content: Thought content normal.         Judgment: Judgment normal.               ASSESSMENT & PLAN:    Jojo was seen today for sinus problem.    Diagnoses and all orders for this visit:    Bacterial respiratory infection  Acute cough  Patient presents with sinus congestion, rhinorrhea, chest congestion, cough, wheezing for 3 weeks, initially improving but now stable. History notable for respiratory failure 2/2 COVID on supplemental O2 back in 2020.  On exam, vital signs are stable. Patient is slightly ill appearing but in no acute distress. Congestion and rhinorrhea noted. Normal respiratory rate and effort. Lungs are clear to ausculation without wheezing, rales, or rhonchi. Minimal posterior oropharyngeal erythema but no exudates or edema. No cervical lymphadenopathy.   Influenza and COVID testing not indicated given outside of window for treatment/isolation.   CXR ordered to rule out pneumonia.  Antibiotics prescribed given chronicity of symptoms.  Steroid course prescribed given wheezing and respiratory hx.  Continue Mucinex OTC, Zyrtec, Albuterol inhaler previously prescribed.  Return/ER precautions discussed.  -     X-Ray Chest PA And Lateral; Future  -     predniSONE (DELTASONE) 20 MG tablet; Take 2 tablets (40 mg total) by mouth once daily. for 5 days  -     cefpodoxime (VANTIN) 100 MG  tablet; Take 2 tablets (200 mg total) by mouth 2 (two) times daily. for 7 days            Reny Lowe MD  [facility]

## 2024-04-15 NOTE — PATIENT INSTRUCTIONS
Chest x-ray has been ordered - complete today.   Antibiotics prescribed - complete entire course. Taking your antibiotic with a meal or snack can help prevent side effects like stomach upset - you may also trying taking an over-the-counter probiotic supplement. I will notify you if antibiotics need to be changed.   Steroid course for 5 days.  Go to the ER for severe or worsening symptoms including shortness of breath, difficulty breathing, high fever, confusion.

## 2024-04-23 NOTE — TELEPHONE ENCOUNTER
No care due was identified.  [employer] Embedded Care Due Messages. Reference number: [id number].   4/23/2024 10:18:55 AM CDT

## 2024-04-24 ENCOUNTER — NURSE TRIAGE (OUTPATIENT)
Dept: ADMINISTRATIVE | Facility: CLINIC | Age: 74
End: 2024-04-24
Payer: MEDICARE

## 2024-04-24 RX ORDER — ZOLPIDEM TARTRATE 5 MG/1
5 TABLET ORAL NIGHTLY PRN
Qty: 30 TABLET | Refills: 2 | Status: SHIPPED | OUTPATIENT
Start: 2024-04-24 | End: 2024-10-23

## 2024-04-24 NOTE — TELEPHONE ENCOUNTER
Saw Md and was prescribed prednisone and abx. She has been experiencing dizziness with sneezing and coughing at the same time its not present now, headaches, left arm veins are red and raised. Itching all over. Dry mouth. Symptoms been present 3-4 days. Care advice recommend pt see Md today or tomorrow. Appt given.   Reason for Disposition   Patient wants to be seen    Additional Information   Negative: Life-threatening reaction (anaphylaxis) in the past to similar substance (e.g., food, insect bite/sting, chemical, etc.) and < 2 hours since exposure   Negative: Difficulty breathing or wheezing   Negative: Difficulty swallowing or slurred speech and sudden onset   Negative: Sounds like a life-threatening emergency to the triager   Negative: Patient sounds very sick or weak to the triager   Negative: MODERATE-SEVERE widespread itching (i.e., interferes with sleep, normal activities or school) and not improved after 24 hours of itching Care Advice   Negative: MODERATE-SEVERE widespread itching (i.e., interferes with sleep, normal activities or school) AND pregnant    Protocols used: Itching - Widespread-A-[place]

## 2024-06-06 ENCOUNTER — TELEPHONE (OUTPATIENT)
Dept: INTERNAL MEDICINE | Facility: CLINIC | Age: 74
End: 2024-06-06
Payer: MEDICARE

## 2024-06-06 RX ORDER — ZOLPIDEM TARTRATE 5 MG/1
5 TABLET ORAL NIGHTLY PRN
Qty: 30 TABLET | Refills: 2 | Status: CANCELLED | OUTPATIENT
Start: 2024-06-06 | End: 2024-12-05

## 2024-06-06 NOTE — TELEPHONE ENCOUNTER
----- Message from Melony Amador MA sent at 6/6/2024  1:06 PM CDT -----  Contact: Jojo Casey     ----- Message -----  From: Albina Carreno  Sent: 6/6/2024  11:56 AM CDT  To: Alexander FLOYD Staff    1MEDICALADVICE     Patient is calling for Medical Advice regarding: cough & congestion     How long has patient had these symptoms: about 2 days     Pharm[facility] phone[facility] [address] [facility] [place]     Would like response via [employer]:  call back     Comments: She has a cough & chest congestion  She wants to see if she can get a cough medicine & antibiotic called to the pharmacy

## 2024-06-06 NOTE — TELEPHONE ENCOUNTER
Last saw dr mendiola 2/19/24.  She saw primary care  4/15 at [facility] location for sinus /cough/ wheez 3weeks.  Vantin and prednisone were given.    I returned her call.   She says she doesn't remember the April appt.  She sent her son out to get more  mucinex     Off and on been coughing.  Post nasal drip and cough & wheez in last 2 days  worse.     Tried the albuterol last nite for first time..   has flonase & astelin nasal sprays left-     No green phlegm.  Clear phlegm.    I told her to do the albuterol twice daily.  Mucinex twice daily.  Flonase daily and push fluids.  For a week.    If not improving or get worse or fever, call back for an appt    She expressed understanding.

## 2024-06-07 DIAGNOSIS — E55.9 VITAMIN D INSUFFICIENCY: ICD-10-CM

## 2024-06-07 RX ORDER — FLUTICASONE PROPIONATE 50 MCG
2 SPRAY, SUSPENSION (ML) NASAL DAILY
Qty: 9.9 ML | Refills: 11 | Status: SHIPPED | OUTPATIENT
Start: 2024-06-07

## 2024-06-07 NOTE — TELEPHONE ENCOUNTER
No care due was identified.  [facility] Embedded Care Due Messages. Reference number: [id number].   6/06/2024 10:03:06 PM CDT

## 2024-06-07 NOTE — TELEPHONE ENCOUNTER
Discharge Diagnosis  Left Total Knee Replacement    Issues Requiring Follow-Up  Home care services to start within 48 hours. Outpatient PT to start per surgeon instructions.    Test Results Pending At Discharge  Pending Labs       No current pending labs.            Hospital Course  Patient underwent surgery for Left Total Knee Replacement without complications. Patient was then takent to the PACU in stabe condition. Patient was then transferred to the or.  Pain was appropriately controlled and weaned to oral medications. Diet was advanced as tolerated. PT/OT was consulted to begin on post operative day 0 and recommended Home for patient on discharge. Patient had uneventful hospital course. Patient is to follow-up in 6 weeks at scheduled post-op visit.      Face to Face following surgical procedure on 12/31/23. The patient was awake and alert. VSS, afebrile. Sensation intact bilaterally, sural/saph/sp/tibal n. Motor intact flexion/extension/DF/PF/EHL/FHL bilaterally. Palpable symmetric DP/PT pulse bilaterally.    Pertinent Physical Exam At Time of Discharge  Physical Exam  Vitals and nursing note reviewed. Exam conducted with a chaperone present.   Constitutional:       Appearance: Normal appearance.   HENT:      Head: Normocephalic and atraumatic.   Eyes:      Extraocular Movements: Extraocular movements intact.   Cardiovascular:      Rate and Rhythm: Normal rate and regular rhythm.      Pulses: Normal pulses.      Heart sounds: Normal heart sounds.   Pulmonary:      Effort: Pulmonary effort is normal.   Abdominal:      Palpations: Abdomen is soft.   Musculoskeletal:      GENERAL: A/Ox3, NAD. Appears healthy, well nourished  CARDIAC: regular rate  LUNGS: Breathing non-labored    MUSCULOSKELETAL:  Laterality: left  knee  - Incision: dressing in place with no saturation  - Palpation: appropriate post operative TTP along surgical incision  - Can actively straight leg raise  - compartments soft, negative  LOV 11-13-23   4-24-24    I spoke to pt and notified her there should be a refill on her Rx Ambien. She was advised to call her pharmacy     [facility]    NEUROVASCULAR:  - Neurovascular Status: sensation intact to light touch distally  - Capillary refill brisk at extremities, Bilateral dorsalis pedis pulse 2+     Skin:     General: Skin is warm and dry.      Capillary Refill: Capillary refill takes less than 2 seconds.   Neurological:      General: No focal deficit present.      Mental Status: Patient is alert and oriented to person, place, and time. Mental status is at baseline.   Psychiatric:         Mood and Affect: Mood normal.         Thought Content: Thought content normal.         Judgment: Judgment normal.           Home Medications  Medication List      Your medication list        START taking these medications        Instructions Last Dose Given Next Dose Due   aspirin 81 mg chewable tablet      Chew and swallow 1 tablet (81 mg) by mouth 2 times a day to prevent blood clots       celecoxib 200 mg capsule  Commonly known as: CeleBREX      Take 1 capsule (200 mg) by mouth 2 times a day. For pain, inflammation, and swelling       cyclobenzaprine 5 mg tablet  Commonly known as: Flexeril      Take 1 tablet (5 mg) by mouth 3 times a day as needed for muscle spasms for up to 10 days.       doxycycline 100 mg capsule  Commonly known as: Monodox      Take 1 capsule (100 mg) by mouth 2 times a day for 7 days to prevent infection       ondansetron ODT 4 mg disintegrating tablet  Commonly known as: Zofran-ODT      Take 1 tablet (4 mg) by mouth every 8 hours if needed for nausea or vomiting.       pantoprazole 40 mg EC tablet  Commonly known as: ProtoNix      Take 1 tablet (40 mg) by mouth once daily as needed for heartburn. Do not crush, chew, or split.       senna 8.6 mg tablet  Generic drug: sennosides      Take 1 tablet (8.6 mg) by mouth once daily to prevent constipation       traMADol 50 mg tablet  Commonly known as: Ultram      Take 1 tablet (50 mg) by mouth every 6 hours if needed for severe pain (7 - 10) for up to 7 days.              CHANGE how you  take these medications        Instructions Last Dose Given Next Dose Due   acetaminophen 325 mg tablet  Commonly known as: Tylenol  What changed:   medication strength  how much to take  when to take this      Take 3 tablets (975 mg) by mouth every 8 hours if needed for mild pain (1 - 3).              CONTINUE taking these medications        Instructions Last Dose Given Next Dose Due   albuterol 2.5 mg /3 mL (0.083 %) nebulizer solution           albuterol 90 mcg/actuation inhaler           atorvastatin 20 mg tablet  Commonly known as: Lipitor      Take 1 tablet (20 mg) by mouth once daily.       bimatoprost 0.03 % ophthalmic solution  Commonly known as: Latisse      use as directed       escitalopram 10 mg tablet  Commonly known as: Lexapro      Take 1 tablet (10 mg) by mouth once daily.       lisinopril 10 mg tablet      Take 1 tablet (10 mg) by mouth once daily. as directed       metFORMIN  mg 24 hr tablet  Commonly known as: Glucophage-XR      Take 2 tablets (1,000 mg) by mouth once daily in the evening. Take with meals.       multivitamin tablet                  STOP taking these medications      meloxicam 15 mg tablet  Commonly known as: Mobic                  Where to Get Your Medications        These medications were sent to [facility]  [address] [address] [address]      Hours: 8 AM to 6 PM Mon-Fri, 9 AM to 1 PM Saturday Phone: [phone]   acetaminophen 325 mg tablet  aspirin 81 mg chewable tablet  celecoxib 200 mg capsule  cyclobenzaprine 5 mg tablet  doxycycline 100 mg capsule  ondansetron ODT 4 mg disintegrating tablet  pantoprazole 40 mg EC tablet  senna 8.6 mg tablet  traMADol 50 mg tablet             Outpatient Follow-Up  OFFICE LOCATIONS    Location 1: [facility]  [address] [place] [address], [address]  Office Number: [phone]    Location 2: [address]  [facility], [address]  Office Number: [phone]    Location 3:   [id number] [place] [address]  Office Number: [phone]       Please read discharge instructions provided by your surgeon before calling with questions as this will delay care.    Medication refills-Oxycodone and Tramadol will be refilled every 7 days per state law. Please request refills through [employer] preferably/[phone]. All medication requests may take up to 72 hours to refill and refills after Friday 1pm will be refilled on the next business day.

## 2024-06-12 RX ORDER — ERGOCALCIFEROL 1.25 MG/1
50000 CAPSULE ORAL
Qty: 12 CAPSULE | Refills: 0 | Status: SHIPPED | OUTPATIENT
Start: 2024-06-12

## 2024-06-24 NOTE — PROGRESS NOTES
"Jojo Burrows presented for a  Medicare AWV and comprehensive Health Risk Assessment today. The following components were reviewed and updated:    · Medical history  · Family History  · Social history  · Allergies and Current Medications  · Health Risk Assessment  · Health Maintenance  · Care Team     ** See Completed Assessments for Annual Wellness Visit within the encounter summary.**       The following assessments were completed:  · Living Situation  · CAGE  · Depression Screening  · Timed Get Up and Go  · Whisper Test  · Cognitive Function Screening  · Nutrition Screening  · ADL Screening  · PAQ Screening    Vitals:    07/19/17 1229   BP: (!) 148/80   Pulse: 75   Resp: 15   Temp: 98.6 °F (37 °C)   TempSrc: Oral   Weight: 73.9 kg (163 lb)   Height: 5' 1" (1.549 m)     Body mass index is 30.8 kg/m².  Physical Exam   Constitutional: She is oriented to person, place, and time. She appears well-developed and well-nourished.   HENT:   Head: Normocephalic and atraumatic.   Cardiovascular: Normal rate, regular rhythm and normal heart sounds.    No murmur heard.  Pulmonary/Chest: Effort normal and breath sounds normal.   Abdominal: Soft. Bowel sounds are normal. There is no tenderness.   Musculoskeletal: She exhibits no edema.   Neurological: She is alert and oriented to person, place, and time.   Skin: Skin is warm and dry.   Psychiatric: She has a normal mood and affect. Her behavior is normal. Judgment and thought content normal.   Nursing note and vitals reviewed.        Diagnoses and health risks identified today and associated recommendations/orders:    1. Multiple thyroid nodules  Stable- followed by PCP- last thyroid U/S 2/9/17    2. Lumbar facet arthropathy  Stable- followed by PCP    3. Gastroesophageal reflux disease, esophagitis presence not specified  Stable-followed by PCP    4. Essential hypertension  Stable-followed by PCP    5. Dry eye  Stable-followed by opthalmology    6. Tortuous aorta  Stable-followed " Continued Stay Note   [place]     Patient Name: Tawny Shin  MRN: [id number]  Today's Date: 6/24/2024    Admit Date: 6/12/2024    Plan: [facility]   Discharge Plan       Row Name 06/24/24 1501       Plan    Plan Comments Events noted. Pt is scheduled for a procedure tomorrow (CHOLECYSTECTOMY LAPAROSCOPIC WITH DAVINCI ROBOT). Will continue to follow.                   Discharge Codes    No documentation.                 Expected Discharge Date and Time       Expected Discharge Date Expected Discharge Time    Jun 20, 2024               OSIEL Pizano     by PCP    7. Obesity (BMI 30.0-34.9)  CHronic with no recent weight loss.Reviewed current BMI & ideal BMI range. Encouraged weight loss,  diet and exercise. Followed by PCP.    8. Nuclear sclerosis, unspecified laterality  Stable-followed by opthalmology    9. Vitamin D deficiency  Stable-followed by PCP    10. Breast cyst, left  Stable-followed by PCP    11. Encounter for preventive health examination  Assessments completed  Preventative health recommendations reviewed       12. Screening for eye condition    - Ambulatory Referral to Ophthalmology    13. Chronic low back pain with sciatica, sciatica laterality unspecified, unspecified back pain laterality  Stable-followed by PCP      Provided Jojo with a 5-10 year written screening schedule and personal prevention plan. Recommendations were developed using the USPSTF age appropriate recommendations. Education, counseling, and referrals were provided as needed. After Visit Summary printed and given to patient which includes a list of additional screenings\tests needed. PPSV23 CDC handout given. SHe will schedule eye & ear wax removal appt. Recommended 2000iu Vit d a day x 3 months then 1000iu a day; and consume 4 servings of calcium foods a day , weight bearing exercise QDay- for osteopenia.    Return in about 1 year (around 7/19/2018) for HRA.    Zohreh Hoffman NP

## 2024-06-26 ENCOUNTER — TELEPHONE (OUTPATIENT)
Dept: INTERNAL MEDICINE | Facility: CLINIC | Age: 74
End: 2024-06-26
Payer: MEDICARE

## 2024-06-26 NOTE — TELEPHONE ENCOUNTER
Lov 2/19/24.  Saw dr ceron  at primary care [facility]. 4/15/24 for bacterial infection    She needs to see someone for eval.  Dr mendiola is not comfortable treating this over the phone.  Dr mendiola is booked.    I said to make an appt with one of our other  Providers that shows an opening.

## 2024-06-26 NOTE — TELEPHONE ENCOUNTER
----- Message from Melony Amador MA sent at 6/26/2024 10:15 AM CDT -----  Contact: [phone]    ----- Message -----  From: Grazyna Limon  Sent: 6/26/2024  10:11 AM CDT  To: Alexander FLOYD Staff    1MEDICALADVICE     Patient is calling for Medical Advice regarding: chest congestion and cough     How long has patient had these symptoms: a couple of weeks     Pharmacy name and phone#:   [id number] [place] - [address]  [address]  [place] [address]  Phone: [phone] Fax: [phone]        Patient wants a call back or thru [facility]: callback     Comments: pt is aware that a reply can take up to 48 hrs. Pt is still having chest congestion and has called previously. Please call today if possible.           Thank you

## 2024-06-27 ENCOUNTER — OFFICE VISIT (OUTPATIENT)
Dept: URGENT CARE | Facility: CLINIC | Age: 74
End: 2024-06-27
Payer: MEDICARE

## 2024-06-27 VITALS
WEIGHT: 160 LBS | HEART RATE: 58 BPM | OXYGEN SATURATION: 97 % | HEIGHT: 62 IN | BODY MASS INDEX: 29.44 KG/M2 | TEMPERATURE: 98 F | DIASTOLIC BLOOD PRESSURE: 78 MMHG | SYSTOLIC BLOOD PRESSURE: 132 MMHG | RESPIRATION RATE: 17 BRPM

## 2024-06-27 DIAGNOSIS — R09.82 ALLERGIC RHINITIS WITH POSTNASAL DRIP: ICD-10-CM

## 2024-06-27 DIAGNOSIS — J30.9 ALLERGIC RHINITIS WITH POSTNASAL DRIP: ICD-10-CM

## 2024-06-27 DIAGNOSIS — J41.1 PURULENT BRONCHITIS: Primary | ICD-10-CM

## 2024-06-27 RX ORDER — BENZONATATE 200 MG/1
200 CAPSULE ORAL 3 TIMES DAILY PRN
Qty: 30 CAPSULE | Refills: 0 | Status: SHIPPED | OUTPATIENT
Start: 2024-06-27 | End: 2024-07-07

## 2024-06-27 RX ORDER — AZITHROMYCIN 250 MG/1
TABLET, FILM COATED ORAL
Qty: 6 TABLET | Refills: 0 | Status: SHIPPED | OUTPATIENT
Start: 2024-06-27

## 2024-06-27 RX ORDER — MONTELUKAST SODIUM 10 MG/1
10 TABLET ORAL NIGHTLY
Qty: 30 TABLET | Refills: 0 | Status: SHIPPED | OUTPATIENT
Start: 2024-06-27 | End: 2024-07-27

## 2024-06-27 NOTE — PROGRESS NOTES
"Subjective:      Patient ID: Jojo Burrows is a 74 y.o. female.    Vitals:  height is 5' 2.01" (1.575 m) and weight is 72.6 kg (160 lb). Her oral temperature is 97.9 °F (36.6 °C). Her blood pressure is 132/78 and her pulse is 58 (abnormal). Her respiration is 17 and oxygen saturation is 97%.     Chief Complaint: Cough    This is a 74 y.o female who presents today with chief complaint of chest congestion, and productive cough.  This has been going on for almost 3 months.  Patient initially seen 04/15/2024, negative chest x-ray, treated with vantin and prednisone.  She had to stop taking Vantin [facility] through because of reaction.  She felt better after this but symptoms still lingered and never fully went away and rebounded.  Patient has persistent cough.  Also has rhinorrhea, postnasal drip.  Has noticed some wheezing intermittently, takes albuterol twice a day.  No fever, CP, dyspnea.  Has tried using Zyrtec, Flonase, Mucinex, Robitussin.  Sputum production varies from purulent, yellow, clear.  No history of lung disease.  She has had dust and has been having work done at her house continuously.  Has not had a chronic cough in the past or respiratory symptoms last this long per pt.  Nonsmoker.       Cough  The current episode started more than 1 month ago. The cough is Productive of sputum. Associated symptoms include postnasal drip and wheezing. Pertinent negatives include no chest pain, chills, ear congestion, ear pain, eye redness, fever, headaches, heartburn, hemoptysis, myalgias, nasal congestion, rash, rhinorrhea, sore throat, shortness of breath, sweats or weight loss. She has tried OTC cough suppressant for the symptoms. The treatment provided no relief. Her past medical history is significant for environmental allergies. There is no history of asthma, bronchiectasis, bronchitis, COPD, emphysema or pneumonia.       Constitution: Negative for chills, sweating, fatigue and fever.   HENT:  Positive for " congestion and postnasal drip. Negative for ear pain, ear discharge, foreign body in ear and sore throat.    Neck: Negative for neck pain and neck stiffness.   Cardiovascular:  Negative for chest pain, leg swelling and palpitations.   Eyes:  Negative for eye itching, eye pain and eye redness.   Respiratory:  Positive for cough, sputum production and wheezing. Negative for bloody sputum and shortness of breath.    Gastrointestinal:  Negative for abdominal pain, nausea, vomiting, diarrhea and heartburn.   Genitourinary:  Negative for dysuria, frequency, urgency, flank pain and hematuria.   Musculoskeletal:  Negative for pain, joint swelling and muscle ache.   Skin:  Negative for color change and rash.   Allergic/Immunologic: Positive for environmental allergies and seasonal allergies.   Neurological:  Negative for dizziness, headaches, disorientation, altered mental status, numbness and tingling.   Psychiatric/Behavioral:  Negative for altered mental status and disorientation.       Objective:     Physical Exam   Constitutional: She is oriented to person, place, and time. She appears well-developed. She is cooperative.  Non-toxic appearance. She does not appear ill. No distress.   HENT:   Head: Normocephalic and atraumatic.   Ears:   Right Ear: Hearing, tympanic membrane, external ear and ear canal normal.   Left Ear: Hearing, tympanic membrane, external ear and ear canal normal.   Nose: Mucosal edema and rhinorrhea present. No nasal deformity. No epistaxis. Right sinus exhibits no maxillary sinus tenderness and no frontal sinus tenderness. Left sinus exhibits no maxillary sinus tenderness and no frontal sinus tenderness.   Mouth/Throat: Uvula is midline, oropharynx is clear and moist and mucous membranes are normal. No trismus in the jaw. Normal dentition. No uvula swelling. Cobblestoning present. No oropharyngeal exudate, posterior oropharyngeal edema, posterior oropharyngeal erythema or tonsillar abscesses. No  tonsillar exudate.   Eyes: Conjunctivae and lids are normal. No scleral icterus.   Neck: Trachea normal and phonation normal. Neck supple. No edema present. No erythema present. No neck rigidity present.   Cardiovascular: Normal rate, regular rhythm, normal heart sounds and normal pulses.   Pulmonary/Chest: Effort normal and breath sounds normal. No accessory muscle usage or stridor. No tachypnea and no bradypnea. No respiratory distress. She has no decreased breath sounds. She has no wheezes. She has no rhonchi. She has no rales.   Lungs are currently CTAB.         Comments: Lungs are currently CTAB.    Abdominal: Normal appearance.   Musculoskeletal: Normal range of motion.         General: No deformity. Normal range of motion.      Right lower leg: No edema.      Left lower leg: No edema.   Lymphadenopathy:     She has no cervical adenopathy.   Neurological: She is alert and oriented to person, place, and time. She exhibits normal muscle tone. Coordination normal.   Skin: Skin is warm, dry, intact, not diaphoretic and not pale.   Psychiatric: Her speech is normal and behavior is normal. Judgment and thought content normal.   Nursing note and vitals reviewed.      Assessment:     1. Purulent bronchitis    2. Allergic rhinitis with postnasal drip        Plan:       Purulent bronchitis  -     azithromycin (Z-CARRIE) 250 MG tablet; Take 2 tablets by mouth on day 1; Take 1 tablet by mouth on days 2-5  Dispense: 6 tablet; Refill: 0    Allergic rhinitis with postnasal drip  -     montelukast (SINGULAIR) 10 mg tablet; Take 1 tablet (10 mg total) by mouth every evening.  Dispense: 30 tablet; Refill: 0  -     benzonatate (TESSALON) 200 MG capsule; Take 1 capsule (200 mg total) by mouth 3 (three) times daily as needed for Cough.  Dispense: 30 capsule; Refill: 0      Patient did not complete prior antibiotics.  Due to duration of productive cough with wheezing, bronchitis symptoms, greater than 6 weeks will cover with  azithromycin.  I reviewed prior notes, prior chest x-ray.  Discussed ruminates to target allergic symptoms as well.  Counseled patient on medications and answered questions regarding treatment plan.  Recommended follow up with PCP, may need pulmonology follow-up if cough persists.  Patient expresses understanding, agrees with plan.  Discussed ddx, home care, tx options, and given follow up precautions.  I have reviewed the patient's chart to view previous visits, labs, and imaging to assess PMH and look for any trends or previous treatments.

## 2024-06-27 NOTE — PATIENT INSTRUCTIONS
- Rest.    - Drink plenty of fluids.  - Viral upper respiratory infections typically run their course in 10-14 days.     - Tylenol (acetaminophen) or Ibuprofen as directed as needed for fever/pain. Avoid tylenol if you have a history of liver disease. Do not take ibuprofen if you have a history of GI bleeding, kidney disease, gastric surgery, or if you take blood thinners.     - You can take over-the-counter claritin, zyrtec, allegra, or xyzal as directed. These are antihistamines that can help with runny nose, nasal congestion, sneezing, and helps to dry up post-nasal drip, which usually causes sore throat and cough.      - You can use Flonase (fluticasone) nasal spray as directed for sinus congestion and postnasal drip. This is a steroid nasal spray that works locally over time to decrease the inflammation in your nose/sinuses and help with allergic symptoms. This is not an quick- relief spray like afrin, but it works well if used daily.  Discontinue if you develop nose bleed  - use OTC nasal saline prior to Flonase.  - you can use OTC nasal saline such as Ocean Spray Nasal Saline 1-3 puffs each nostril every 2-3 hours then blow out onto tissue. This is to irrigate the nasal passage way to clear the sinus openings. Use until sinus problem resolved.    - Astelin nasal spray for runny nose and postnasal drip    - you can take plain OTC Mucinex (guaifenesin) 1200 mg twice a day to help loosen mucous.     -warm salt water gargles can help with sore throat    - warm tea with honey can help with cough. Honey is a natural cough suppressant.    - Take the tessalon (benzonatate) as needed as prescribed for cough       - You have been given an antibiotic to treat your condition today.    - Please complete the antibiotic as directed on the bottle.   - If you are female and on oral birth control pills, use additional methods to prevent pregnancy while on antibiotics and for one cycle after.   - you can take otc probiotic to  limit upset stomach      - Follow up with your PCP or specialty clinic as directed in the next 1-2 weeks if not improved or as needed.  You can call [phone] to schedule an appointment with the appropriate provider.      - Go to the ER if you develop new or worsening symptoms.     - You must understand that you have received an Urgent Care treatment only and that you may be released before all of your medical problems are known or treated.   - You, the patient, will arrange for follow up care as instructed.   - If your condition worsens or fails to improve we recommend that you receive another evaluation at the ER immediately or contact your PCP to discuss your concerns or return here.

## 2024-08-15 ENCOUNTER — LAB VISIT (OUTPATIENT)
Dept: LAB | Facility: HOSPITAL | Age: 74
End: 2024-08-15
Payer: MEDICARE

## 2024-08-15 ENCOUNTER — TELEPHONE (OUTPATIENT)
Dept: ENDOCRINOLOGY | Facility: CLINIC | Age: 74
End: 2024-08-15
Payer: MEDICARE

## 2024-08-15 DIAGNOSIS — R00.2 PALPITATIONS: ICD-10-CM

## 2024-08-15 DIAGNOSIS — E55.9 VITAMIN D INSUFFICIENCY: ICD-10-CM

## 2024-08-15 DIAGNOSIS — I10 ESSENTIAL HYPERTENSION: ICD-10-CM

## 2024-08-15 DIAGNOSIS — R07.9 CHEST PAIN, UNSPECIFIED TYPE: ICD-10-CM

## 2024-08-15 LAB
25(OH)D3+25(OH)D2 SERPL-MCNC: 39 NG/ML (ref 30–96)
ALBUMIN SERPL BCP-MCNC: 4 G/DL (ref 3.5–5.2)
ALP SERPL-CCNC: 90 U/L (ref 55–135)
ALT SERPL W/O P-5'-P-CCNC: 22 U/L (ref 10–44)
ANION GAP SERPL CALC-SCNC: 9 MMOL/L (ref 8–16)
AST SERPL-CCNC: 22 U/L (ref 10–40)
BASOPHILS # BLD AUTO: 0.02 K/UL (ref 0–0.2)
BASOPHILS NFR BLD: 0.3 % (ref 0–1.9)
BILIRUB SERPL-MCNC: 0.8 MG/DL (ref 0.1–1)
BUN SERPL-MCNC: 10 MG/DL (ref 8–23)
CALCIUM SERPL-MCNC: 9.7 MG/DL (ref 8.7–10.5)
CHLORIDE SERPL-SCNC: 106 MMOL/L (ref 95–110)
CHOLEST SERPL-MCNC: 133 MG/DL (ref 120–199)
CHOLEST/HDLC SERPL: 2 {RATIO} (ref 2–5)
CO2 SERPL-SCNC: 25 MMOL/L (ref 23–29)
CREAT SERPL-MCNC: 0.8 MG/DL (ref 0.5–1.4)
DIFFERENTIAL METHOD BLD: NORMAL
EOSINOPHIL # BLD AUTO: 0 K/UL (ref 0–0.5)
EOSINOPHIL NFR BLD: 0.4 % (ref 0–8)
ERYTHROCYTE [DISTWIDTH] IN BLOOD BY AUTOMATED COUNT: 13.5 % (ref 11.5–14.5)
EST. GFR  (NO RACE VARIABLE): >60 ML/MIN/1.73 M^2
GLUCOSE SERPL-MCNC: 95 MG/DL (ref 70–110)
HCT VFR BLD AUTO: 38.6 % (ref 37–48.5)
HDLC SERPL-MCNC: 65 MG/DL (ref 40–75)
HDLC SERPL: 48.9 % (ref 20–50)
HGB BLD-MCNC: 13 G/DL (ref 12–16)
IMM GRANULOCYTES # BLD AUTO: 0.01 K/UL (ref 0–0.04)
IMM GRANULOCYTES NFR BLD AUTO: 0.1 % (ref 0–0.5)
LDLC SERPL CALC-MCNC: 62 MG/DL (ref 63–159)
LYMPHOCYTES # BLD AUTO: 2.7 K/UL (ref 1–4.8)
LYMPHOCYTES NFR BLD: 38.2 % (ref 18–48)
MCH RBC QN AUTO: 30.2 PG (ref 27–31)
MCHC RBC AUTO-ENTMCNC: 33.7 G/DL (ref 32–36)
MCV RBC AUTO: 90 FL (ref 82–98)
MONOCYTES # BLD AUTO: 0.8 K/UL (ref 0.3–1)
MONOCYTES NFR BLD: 10.8 % (ref 4–15)
NEUTROPHILS # BLD AUTO: 3.6 K/UL (ref 1.8–7.7)
NEUTROPHILS NFR BLD: 50.2 % (ref 38–73)
NONHDLC SERPL-MCNC: 68 MG/DL
NRBC BLD-RTO: 0 /100 WBC
PLATELET # BLD AUTO: 157 K/UL (ref 150–450)
PMV BLD AUTO: 12.1 FL (ref 9.2–12.9)
POTASSIUM SERPL-SCNC: 3.8 MMOL/L (ref 3.5–5.1)
PROT SERPL-MCNC: 7.3 G/DL (ref 6–8.4)
RBC # BLD AUTO: 4.3 M/UL (ref 4–5.4)
SODIUM SERPL-SCNC: 140 MMOL/L (ref 136–145)
TRIGL SERPL-MCNC: 30 MG/DL (ref 30–150)
WBC # BLD AUTO: 7.14 K/UL (ref 3.9–12.7)

## 2024-08-15 PROCEDURE — 80053 COMPREHEN METABOLIC PANEL: CPT | Mod: HCNC | Performed by: INTERNAL MEDICINE

## 2024-08-15 PROCEDURE — 36415 COLL VENOUS BLD VENIPUNCTURE: CPT | Mod: HCNC,PO | Performed by: INTERNAL MEDICINE

## 2024-08-15 PROCEDURE — 82306 VITAMIN D 25 HYDROXY: CPT | Mod: HCNC | Performed by: INTERNAL MEDICINE

## 2024-08-15 PROCEDURE — 80061 LIPID PANEL: CPT | Mod: HCNC | Performed by: INTERNAL MEDICINE

## 2024-08-15 PROCEDURE — 85025 COMPLETE CBC W/AUTO DIFF WBC: CPT | Mod: HCNC | Performed by: INTERNAL MEDICINE

## 2024-08-15 NOTE — TELEPHONE ENCOUNTER
Called and spoke to patient, results and provider instructions given to patient. Patient verbalized understanding.    ----- Message from Yun MAYNARD Rai, MD sent at 8/15/2024  1:12 PM CDT -----  Vitamin-D is normal. She can take vitamin-D supplement 1000 units daily to maintain it in the normal range.  I can see her back in the clinic in 1 year for follow-up.

## 2024-08-22 ENCOUNTER — OFFICE VISIT (OUTPATIENT)
Dept: INTERNAL MEDICINE | Facility: CLINIC | Age: 74
End: 2024-08-22
Payer: MEDICARE

## 2024-08-22 VITALS
HEIGHT: 62 IN | SYSTOLIC BLOOD PRESSURE: 108 MMHG | WEIGHT: 156.5 LBS | TEMPERATURE: 99 F | HEART RATE: 68 BPM | RESPIRATION RATE: 16 BRPM | BODY MASS INDEX: 28.8 KG/M2 | DIASTOLIC BLOOD PRESSURE: 75 MMHG

## 2024-08-22 DIAGNOSIS — R10.10 PAIN OF UPPER ABDOMEN: ICD-10-CM

## 2024-08-22 DIAGNOSIS — M17.0 PRIMARY OSTEOARTHRITIS OF BOTH KNEES: ICD-10-CM

## 2024-08-22 DIAGNOSIS — I72.3 ANEURYSM OF COMMON ILIAC ARTERY: ICD-10-CM

## 2024-08-22 DIAGNOSIS — E78.49 OTHER HYPERLIPIDEMIA: ICD-10-CM

## 2024-08-22 DIAGNOSIS — I71.43 INFRARENAL ABDOMINAL AORTIC ANEURYSM (AAA) WITHOUT RUPTURE: ICD-10-CM

## 2024-08-22 DIAGNOSIS — I10 ESSENTIAL HYPERTENSION: Primary | ICD-10-CM

## 2024-08-22 PROCEDURE — 99214 OFFICE O/P EST MOD 30 MIN: CPT | Mod: HCNC,S$GLB,, | Performed by: INTERNAL MEDICINE

## 2024-08-22 PROCEDURE — 1101F PT FALLS ASSESS-DOCD LE1/YR: CPT | Mod: HCNC,CPTII,S$GLB, | Performed by: INTERNAL MEDICINE

## 2024-08-22 PROCEDURE — 1159F MED LIST DOCD IN RCRD: CPT | Mod: HCNC,CPTII,S$GLB, | Performed by: INTERNAL MEDICINE

## 2024-08-22 PROCEDURE — 3074F SYST BP LT 130 MM HG: CPT | Mod: HCNC,CPTII,S$GLB, | Performed by: INTERNAL MEDICINE

## 2024-08-22 PROCEDURE — 1160F RVW MEDS BY RX/DR IN RCRD: CPT | Mod: HCNC,CPTII,S$GLB, | Performed by: INTERNAL MEDICINE

## 2024-08-22 PROCEDURE — G2211 COMPLEX E/M VISIT ADD ON: HCPCS | Mod: HCNC,S$GLB,, | Performed by: INTERNAL MEDICINE

## 2024-08-22 PROCEDURE — 3078F DIAST BP <80 MM HG: CPT | Mod: HCNC,CPTII,S$GLB, | Performed by: INTERNAL MEDICINE

## 2024-08-22 PROCEDURE — 1126F AMNT PAIN NOTED NONE PRSNT: CPT | Mod: HCNC,CPTII,S$GLB, | Performed by: INTERNAL MEDICINE

## 2024-08-22 PROCEDURE — 99999 PR PBB SHADOW E&M-EST. PATIENT-LVL V: CPT | Mod: PBBFAC,HCNC,, | Performed by: INTERNAL MEDICINE

## 2024-08-22 PROCEDURE — 3288F FALL RISK ASSESSMENT DOCD: CPT | Mod: HCNC,CPTII,S$GLB, | Performed by: INTERNAL MEDICINE

## 2024-08-22 RX ORDER — VIT C/E/ZN/COPPR/LUTEIN/ZEAXAN 250MG-90MG
1000 CAPSULE ORAL DAILY
COMMUNITY

## 2024-08-22 NOTE — PROGRESS NOTES
Subjective:       Patient ID: Jojo Burrows is a 74 y.o. female.    Chief Complaint: Hypertension (5-6 mos wlabs. )    HPI  The patient presents for follow-up of medical conditions which include hypertension, hyperlipidemia, right knee pain.  The patient also has noted intermittent right upper abdominal pain.  No reflux symptoms have been noted recently.  Pain is unrelated to food ingestion.  She is using Nexium intermittently for reflux.    The patient has hypertension.  She has been monitoring blood pressures.  Readings have ranged from 103-119 systolic.  Diastolic readings have ranged from 54-60.  The patient is using metoprolol.  He is using atorvastatin for management of hyperlipidemia.  He is not experiencing any muscle pain or muscle weakness.    The patient has noted intermittent right knee swelling.  She has osteoarthritis of the knees.  She is still walking for exercise.  She goes to the gym 1- 2 days a week.    Review of Systems   Constitutional:  Negative for activity change, appetite change, fatigue and unexpected weight change.   Eyes:  Negative for visual disturbance.   Respiratory:  Negative for cough and shortness of breath.    Cardiovascular:  Negative for chest pain, palpitations and leg swelling.   Gastrointestinal:  Positive for abdominal pain. Negative for blood in stool, constipation and diarrhea.   Genitourinary:  Negative for dysuria and hematuria.   Musculoskeletal:  Positive for arthralgias. Negative for neck pain and neck stiffness.   Integumentary:  Negative for rash.   Neurological:  Negative for dizziness, syncope and headaches.   Psychiatric/Behavioral:  Negative for sleep disturbance.             Physical Exam  Vitals and nursing note reviewed.   Constitutional:       General: She is not in acute distress.     Appearance: Normal appearance. She is well-developed.      Comments: The patient has lost 2 lb since 02/19/2024.   HENT:      Head: Normocephalic and atraumatic.   Eyes:       General: No scleral icterus.     Extraocular Movements: Extraocular movements intact.      Conjunctiva/sclera: Conjunctivae normal.   Neck:      Thyroid: No thyromegaly.      Vascular: No JVD.   Cardiovascular:      Rate and Rhythm: Normal rate and regular rhythm.      Heart sounds: Normal heart sounds. No murmur heard.     No friction rub. No gallop.   Pulmonary:      Effort: Pulmonary effort is normal. No respiratory distress.      Breath sounds: Normal breath sounds. No wheezing or rales.   Abdominal:      General: Bowel sounds are normal.      Palpations: Abdomen is soft. There is no mass.      Tenderness: There is no abdominal tenderness.   Musculoskeletal:         General: Swelling and tenderness present. Normal range of motion.      Cervical back: Normal range of motion and neck supple.      Right lower leg: No edema.      Left lower leg: No edema.      Comments: Right knee:  An effusion is present.  Tenderness is present along the medial joint on palpation.  Range of motion is intact.    Left knee: No local tenderness or swelling is noted.    Back:  Negative straight leg raising test bilaterally.  No vertebral percussion tenderness.   Lymphadenopathy:      Cervical: No cervical adenopathy.   Skin:     General: Skin is warm and dry.      Findings: No rash.   Neurological:      Mental Status: She is alert and oriented to person, place, and time.   Psychiatric:         Mood and Affect: Mood normal.         Behavior: Behavior normal.           Lab Visit on 08/15/2024   Component Date Value Ref Range Status    Vit D, 25-Hydroxy 08/15/2024 39  30 - 96 ng/mL Final    Comment: Vitamin D deficiency.........<10 ng/mL                              Vitamin D insufficiency......10-29 ng/mL       Vitamin D sufficiency........> or equal to 30 ng/mL  Vitamin D toxicity............>100 ng/mL      Sodium 08/15/2024 140  136 - 145 mmol/L Final    Potassium 08/15/2024 3.8  3.5 - 5.1 mmol/L Final    Chloride 08/15/2024 106  95 -  110 mmol/L Final    CO2 08/15/2024 25  23 - 29 mmol/L Final    Glucose 08/15/2024 95  70 - 110 mg/dL Final    BUN 08/15/2024 10  8 - 23 mg/dL Final    Creatinine 08/15/2024 0.8  0.5 - 1.4 mg/dL Final    Calcium 08/15/2024 9.7  8.7 - 10.5 mg/dL Final    Total Protein 08/15/2024 7.3  6.0 - 8.4 g/dL Final    Albumin 08/15/2024 4.0  3.5 - 5.2 g/dL Final    Total Bilirubin 08/15/2024 0.8  0.1 - 1.0 mg/dL Final    Comment: For infants and newborns, interpretation of results should be based  on gestational age, weight and in agreement with clinical  observations.    Premature Infant recommended reference ranges:  Up to 24 hours.............<8.0 mg/dL  Up to 48 hours............<12.0 mg/dL  3-5 days..................<15.0 mg/dL  6-29 days.................<15.0 mg/dL      Alkaline Phosphatase 08/15/2024 90  55 - 135 U/L Final    AST 08/15/2024 22  10 - 40 U/L Final    ALT 08/15/2024 22  10 - 44 U/L Final    eGFR 08/15/2024 >60.0  >60 mL/min/1.73 m^2 Final    Anion Gap 08/15/2024 9  8 - 16 mmol/L Final    Cholesterol 08/15/2024 133  120 - 199 mg/dL Final    Comment: The National Cholesterol Education Program (NCEP) has set the  following guidelines (reference ranges) for Cholesterol:  Optimal.....................<200 mg/dL  Borderline High.............200-239 mg/dL  High........................> or = 240 mg/dL      Triglycerides 08/15/2024 30  30 - 150 mg/dL Final    Comment: The National Cholesterol Education Program (NCEP) has set the  following guidelines (reference values) for triglycerides:  Normal......................<150 mg/dL  Borderline High.............150-199 mg/dL  High........................200-499 mg/dL      HDL 08/15/2024 65  40 - 75 mg/dL Final    Comment: The National Cholesterol Education Program (NCEP) has set the  following guidelines (reference values) for HDL Cholesterol:  Low...............<40 mg/dL  Optimal...........>60 mg/dL      LDL Cholesterol 08/15/2024 62.0 (L)  63.0 - 159.0 mg/dL Final     Comment: The National Cholesterol Education Program (NCEP) has set the  following guidelines (reference values) for LDL Cholesterol:  Optimal.......................<130 mg/dL  Borderline High...............130-159 mg/dL  High..........................160-189 mg/dL  Very High.....................>190 mg/dL      HDL/Cholesterol Ratio 08/15/2024 48.9  20.0 - 50.0 % Final    Total Cholesterol/HDL Ratio 08/15/2024 2.0  2.0 - 5.0 Final    Non-HDL Cholesterol 08/15/2024 68  mg/dL Final    Comment: Risk category and Non-HDL cholesterol goals:  Coronary heart disease (CHD)or equivalent (10-year risk of CHD >20%):  Non-HDL cholesterol goal     <130 mg/dL  Two or more CHD risk factors and 10-year risk of CHD <= 20%:  Non-HDL cholesterol goal     <160 mg/dL  0 to 1 CHD risk factor:  Non-HDL cholesterol goal     <190 mg/dL      WBC 08/15/2024 7.14  3.90 - 12.70 K/uL Final    RBC 08/15/2024 4.30  4.00 - 5.40 M/uL Final    Hemoglobin 08/15/2024 13.0  12.0 - 16.0 g/dL Final    Hematocrit 08/15/2024 38.6  37.0 - 48.5 % Final    MCV 08/15/2024 90  82 - 98 fL Final    MCH 08/15/2024 30.2  27.0 - 31.0 pg Final    MCHC 08/15/2024 33.7  32.0 - 36.0 g/dL Final    RDW 08/15/2024 13.5  11.5 - 14.5 % Final    Platelets 08/15/2024 157  150 - 450 K/uL Final    MPV 08/15/2024 12.1  9.2 - 12.9 fL Final    Immature Granulocytes 08/15/2024 0.1  0.0 - 0.5 % Final    Gran # (ANC) 08/15/2024 3.6  1.8 - 7.7 K/uL Final    Immature Grans (Abs) 08/15/2024 0.01  0.00 - 0.04 K/uL Final    Comment: Mild elevation in immature granulocytes is non specific and   can be seen in a variety of conditions including stress response,   acute inflammation, trauma and pregnancy. Correlation with other   laboratory and clinical findings is essential.      Lymph # 08/15/2024 2.7  1.0 - 4.8 K/uL Final    Mono # 08/15/2024 0.8  0.3 - 1.0 K/uL Final    Eos # 08/15/2024 0.0  0.0 - 0.5 K/uL Final    Baso # 08/15/2024 0.02  0.00 - 0.20 K/uL Final    nRBC 08/15/2024 0  0 /100  WBC Final    Gran % 08/15/2024 50.2  38.0 - 73.0 % Final    Lymph % 08/15/2024 38.2  18.0 - 48.0 % Final    Mono % 08/15/2024 10.8  4.0 - 15.0 % Final    Eosinophil % 08/15/2024 0.4  0.0 - 8.0 % Final    Basophil % 08/15/2024 0.3  0.0 - 1.9 % Final    Differential Method 08/15/2024 Automated   Final       Assessment & Plan:      Jojo was seen today for hypertension.  Abdominal ultrasound will be obtained for assessment of right upper abdominal pain.    Routine medications will be continued for management of hypertension and hyperlipidemia.Nexium can be continued for reflux control.    Orthopedic consultation will be obtained regarding symptomatic right knee arthritis.    We discussed the recommended RSV vaccine which is available through the patient's pharmacy.    Diagnoses and all orders for this visit:    Essential hypertension  -     Comprehensive Metabolic Panel; Future  -     CBC Auto Differential; Future  -     Lipid Panel; Future    Pain of upper abdomen  -     US Abdomen Complete; Future    Infrarenal abdominal aortic aneurysm (AAA) without rupture    Aneurysm of common iliac artery  -     US Abdomen Complete; Future  -     Comprehensive Metabolic Panel; Future  -     CBC Auto Differential; Future  -     Lipid Panel; Future    Primary osteoarthritis of both knees  -     Ambulatory referral/consult to Orthopedics; Future    Other hyperlipidemia         Follow up in about 6 months (around 2/22/2025).     Joo Munoz MD

## 2024-08-28 ENCOUNTER — HOSPITAL ENCOUNTER (OUTPATIENT)
Dept: RADIOLOGY | Facility: HOSPITAL | Age: 74
Discharge: HOME | End: 2024-08-28
Attending: INTERNAL MEDICINE
Payer: MEDICARE

## 2024-08-28 DIAGNOSIS — I72.3 ANEURYSM OF COMMON ILIAC ARTERY: ICD-10-CM

## 2024-08-28 DIAGNOSIS — R10.10 PAIN OF UPPER ABDOMEN: ICD-10-CM

## 2024-08-28 PROCEDURE — 76700 US EXAM ABDOM COMPLETE: CPT | Mod: 26,,, | Performed by: INTERNAL MEDICINE

## 2024-08-28 PROCEDURE — 76700 US EXAM ABDOM COMPLETE: CPT | Mod: TC

## 2024-08-29 ENCOUNTER — TELEPHONE (OUTPATIENT)
Dept: ORTHOPEDICS | Facility: CLINIC | Age: 74
End: 2024-08-29
Payer: MEDICARE

## 2024-08-29 DIAGNOSIS — M25.561 CHRONIC PAIN OF BOTH KNEES: Primary | ICD-10-CM

## 2024-08-29 DIAGNOSIS — G89.29 CHRONIC PAIN OF BOTH KNEES: Primary | ICD-10-CM

## 2024-08-29 DIAGNOSIS — M25.562 CHRONIC PAIN OF BOTH KNEES: Primary | ICD-10-CM

## 2024-08-29 NOTE — TELEPHONE ENCOUNTER
Called pt and scheduled her 10/23/24 at 9:45 with GC. Pt has bilateral knee pain. Scheduled x-rays at [facility] 10/16 at 11:00 AM.   ----- Message from Mari Carreno sent at 8/29/2024  2:26 PM CDT -----  Regarding: Ambulatory referral/consult to Orthopedics  8/29/2024       Hello,       I received a call from KEITH CHAPARRO [[id number]] regarding scheduling a Orthopedics Referral with Dr. Lerner. Ms. Chaparro was referred to Dr. Lerner. No appointments are available. Please assist with scheduling. She can be reached at [phone] or [phone].       Thank you,   Mari SUBRAMANIAN   Access Navigator

## 2024-09-04 RX ORDER — METOPROLOL SUCCINATE 50 MG/1
TABLET, EXTENDED RELEASE ORAL
Qty: 90 TABLET | Refills: 3 | Status: SHIPPED | OUTPATIENT
Start: 2024-09-04

## 2024-09-04 NOTE — TELEPHONE ENCOUNTER
No care due was identified.  Health [facility] Embedded Care Due Messages. Reference number: [id number].   9/04/2024 12:04:35 AM CDT

## 2024-09-04 NOTE — TELEPHONE ENCOUNTER
Refill Decision Note   Jojo Markos  is requesting a refill authorization.  Brief Assessment and Rationale for Refill:  Approve     Medication Therapy Plan:         Comments:     Note composed:1:32 PM 09/04/2024

## 2024-09-13 ENCOUNTER — HOSPITAL ENCOUNTER (OUTPATIENT)
Dept: RADIOLOGY | Facility: HOSPITAL | Age: 74
Discharge: HOME | End: 2024-09-13
Attending: INTERNAL MEDICINE
Payer: MEDICARE

## 2024-09-13 VITALS — WEIGHT: 156 LBS | BODY MASS INDEX: 28.71 KG/M2 | HEIGHT: 62 IN

## 2024-09-13 DIAGNOSIS — Z12.31 ENCOUNTER FOR SCREENING MAMMOGRAM FOR BREAST CANCER: ICD-10-CM

## 2024-09-13 PROCEDURE — 77063 BREAST TOMOSYNTHESIS BI: CPT | Mod: TC,HCNC

## 2024-09-13 PROCEDURE — 77063 BREAST TOMOSYNTHESIS BI: CPT | Mod: 26,HCNC,, | Performed by: RADIOLOGY

## 2024-09-13 PROCEDURE — 77067 SCR MAMMO BI INCL CAD: CPT | Mod: TC,HCNC

## 2024-09-13 PROCEDURE — 77067 SCR MAMMO BI INCL CAD: CPT | Mod: 26,HCNC,, | Performed by: RADIOLOGY

## 2024-09-17 ENCOUNTER — TELEPHONE (OUTPATIENT)
Dept: RADIOLOGY | Facility: HOSPITAL | Age: 74
End: 2024-09-17
Payer: MEDICARE

## 2024-09-17 NOTE — TELEPHONE ENCOUNTER
Spoke with patient and explained mammogram findings.Patient expressed understanding of results. Patient scheduled abnormal mammogram follow up appointment at [facility] on 9/23/2024.

## 2024-09-17 NOTE — TELEPHONE ENCOUNTER
Spoke with patient and explained mammogram findings.Patient expressed understanding of results. Patient scheduled abnormal mammogram follow up appointment at [facility] on 9/23/2024.    From:  Tobi Yoon LPN

## 2024-09-19 ENCOUNTER — TELEPHONE (OUTPATIENT)
Dept: INTERNAL MEDICINE | Facility: CLINIC | Age: 74
End: 2024-09-19
Payer: MEDICARE

## 2024-09-19 NOTE — TELEPHONE ENCOUNTER
----- Message from Melony Amador MA sent at 9/19/2024  2:35 PM CDT -----  Contact: Pt  [phone]    ----- Message -----  From: Lenore Wells  Sent: 9/19/2024   2:30 PM CDT  To: Alexander Burgess

## 2024-09-19 NOTE — TELEPHONE ENCOUNTER
No clue what patient wanted.     Lov 8/22/24.  I got her voicemail.  Left ms to call me tomorrow  If still needs something. Apologized phone staff  Didn't tell me what she  needed.  I said I see sleeping rx is pending dr mendiola's approval  That pharmacy sent.

## 2024-09-19 NOTE — TELEPHONE ENCOUNTER
No care due was identified.  [facility] Embedded Care Due Messages. Reference number: [id number].   9/19/2024 2:17:57 PM CDT

## 2024-09-20 RX ORDER — ZOLPIDEM TARTRATE 5 MG/1
TABLET ORAL
Qty: 30 TABLET | Refills: 2 | Status: SHIPPED | OUTPATIENT
Start: 2024-09-20

## 2024-09-24 ENCOUNTER — TELEPHONE (OUTPATIENT)
Dept: RADIOLOGY | Facility: HOSPITAL | Age: 74
End: 2024-09-24
Payer: MEDICARE

## 2024-09-24 NOTE — TELEPHONE ENCOUNTER
----- Message from Natalia Angeles sent at 9/24/2024  3:32 PM CDT -----  Regarding: Appt requested  Contact: [phone]  Hi, pt called to reschedule her appt from Monday. Pt says it was an Ultrasound, but I only saw the Mammogram. Pls call the pt at  [phone].    Thank you.

## 2024-10-01 ENCOUNTER — HOSPITAL ENCOUNTER (OUTPATIENT)
Dept: RADIOLOGY | Facility: HOSPITAL | Age: 74
Discharge: HOME | End: 2024-10-01
Attending: INTERNAL MEDICINE
Payer: MEDICARE

## 2024-10-01 DIAGNOSIS — R92.8 ABNORMAL FINDING ON BREAST IMAGING: ICD-10-CM

## 2024-10-01 PROCEDURE — 77061 BREAST TOMOSYNTHESIS UNI: CPT | Mod: TC,HCNC,LT

## 2024-10-01 PROCEDURE — 77065 DX MAMMO INCL CAD UNI: CPT | Mod: TC,HCNC,LT

## 2024-10-01 PROCEDURE — 77061 BREAST TOMOSYNTHESIS UNI: CPT | Mod: 26,HCNC,LT, | Performed by: RADIOLOGY

## 2024-10-01 PROCEDURE — 77065 DX MAMMO INCL CAD UNI: CPT | Mod: 26,HCNC,LT, | Performed by: RADIOLOGY

## 2024-10-16 ENCOUNTER — HOSPITAL ENCOUNTER (OUTPATIENT)
Dept: RADIOLOGY | Facility: HOSPITAL | Age: 74
Discharge: HOME | End: 2024-10-16
Attending: ORTHOPAEDIC SURGERY
Payer: MEDICARE

## 2024-10-16 DIAGNOSIS — M25.562 CHRONIC PAIN OF BOTH KNEES: ICD-10-CM

## 2024-10-16 DIAGNOSIS — M25.561 CHRONIC PAIN OF BOTH KNEES: ICD-10-CM

## 2024-10-16 DIAGNOSIS — G89.29 CHRONIC PAIN OF BOTH KNEES: ICD-10-CM

## 2024-10-16 PROCEDURE — 73562 X-RAY EXAM OF KNEE 3: CPT | Mod: 26,50,, | Performed by: RADIOLOGY

## 2024-10-16 PROCEDURE — 73562 X-RAY EXAM OF KNEE 3: CPT | Mod: TC,50

## 2024-10-16 NOTE — TELEPHONE ENCOUNTER
No care due was identified.  Health [facility] Embedded Care Due Messages. Reference number: [id number].   10/16/2024 5:08:08 PM CDT

## 2024-10-17 RX ORDER — ZOLPIDEM TARTRATE 5 MG/1
TABLET ORAL
Qty: 30 TABLET | Refills: 2 | OUTPATIENT
Start: 2024-10-17

## 2024-10-23 ENCOUNTER — OFFICE VISIT (OUTPATIENT)
Dept: ORTHOPEDICS | Facility: CLINIC | Age: 74
End: 2024-10-23
Payer: MEDICARE

## 2024-10-23 VITALS — HEIGHT: 60 IN | BODY MASS INDEX: 30.88 KG/M2 | WEIGHT: 157.31 LBS

## 2024-10-23 DIAGNOSIS — M17.0 PRIMARY OSTEOARTHRITIS OF BOTH KNEES: ICD-10-CM

## 2024-10-23 PROCEDURE — 3288F FALL RISK ASSESSMENT DOCD: CPT | Mod: CPTII,S$GLB,, | Performed by: ORTHOPAEDIC SURGERY

## 2024-10-23 PROCEDURE — 1126F AMNT PAIN NOTED NONE PRSNT: CPT | Mod: CPTII,S$GLB,, | Performed by: ORTHOPAEDIC SURGERY

## 2024-10-23 PROCEDURE — 1101F PT FALLS ASSESS-DOCD LE1/YR: CPT | Mod: CPTII,S$GLB,, | Performed by: ORTHOPAEDIC SURGERY

## 2024-10-23 PROCEDURE — 99204 OFFICE O/P NEW MOD 45 MIN: CPT | Mod: S$GLB,,, | Performed by: ORTHOPAEDIC SURGERY

## 2024-10-23 PROCEDURE — 1159F MED LIST DOCD IN RCRD: CPT | Mod: CPTII,S$GLB,, | Performed by: ORTHOPAEDIC SURGERY

## 2024-10-23 PROCEDURE — 99999 PR PBB SHADOW E&M-EST. PATIENT-LVL III: CPT | Mod: PBBFAC,,, | Performed by: ORTHOPAEDIC SURGERY

## 2024-10-23 NOTE — PROGRESS NOTES
Subjective:      Patient ID: Jojo Burrows is a 74 y.o. female.    Chief Complaint: Pain of the Left Knee and Pain of the Right Knee      History of Present Illness        Jojo Burrows is a 74 y.o. female here with a many year history of bilateral knee pain R>L. The patient is a retiree prior . There was a history of trauma left knee injury after a fall many years ago, left knee meniscus repair 2014 Sonya Canseco. No major injury to the right knee. The pain in the right knee is severe. The pain is located in the medial aspect of the knee. There is not radiation to the leg.  The pain is described as sharp. The patient has not had prior surgery on the right knee. It is aggravated by standing and walking.  It is alleviated by rest. There is not numbness or tingling of the lower extremity.  There is occasional left sided back pain.  She  has tried medications or injections CSI and gel last injection in March 2024 . They have helped briefly.  She does have difficulty getting in or out of a car, getting dressed, or going up or down stairs.  The patient does not use an assistive device.      Past Medical History:   Diagnosis Date    Acute hypoxemic respiratory failure 3/31/2020    ALLERGIC RHINITIS     Cataract     COVID-19 virus infection 03/2020    Degenerative disc disease, cervical 9/13/2018    GERD (gastroesophageal reflux disease)     Hyperlipidemia     Hypertension     Migraine headache     Multifocal pneumonia 3/31/2020    Nuclear sclerosis 4/30/2014    Sleep disorder     Thyroid disease     nodular goiter    TIA (transient ischemic attack)      Past Surgical History:   Procedure Laterality Date    BREAST BIOPSY      BREAST CYST ASPIRATION      BREAST CYST EXCISION      COLONOSCOPY N/A 4/29/2016    Procedure: COLONOSCOPY;  Surgeon: Sergio Vickers MD;  Location: [address];  Service: Endoscopy;  Laterality: N/A;    COLONOSCOPY N/A 6/28/2021    Procedure: COLONOSCOPY;  Surgeon:  Sergio Vickers MD;  Location: [address]);  Service: Endoscopy;  Laterality: N/A;  COVID + 3/2020 and fully vaccinated -   pt requesting Dr. vickers/ instructions emailed-     HYSTERECTOMY       Family History   Problem Relation Name Age of Onset    Diabetes Mother      Hypertension Mother      Breast cancer Mother      Asthma Father      Glaucoma Father      Breast cancer Sister 3     Ovarian cancer Sister 3     Stroke Daughter 2     Lymphoma Daughter 1     No Known Problems Son 1     Heart disease Maternal Grandmother      Esophageal cancer Other cousin     Amblyopia Neg Hx      Blindness Neg Hx      Cancer Neg Hx      Cataracts Neg Hx      Macular degeneration Neg Hx      Retinal detachment Neg Hx      Strabismus Neg Hx      Thyroid disease Neg Hx       Social History     Socioeconomic History    Marital status:    Tobacco Use    Smoking status: Never    Smokeless tobacco: Never   Substance and Sexual Activity    Alcohol use: No    Drug use: No    Sexual activity: Not Currently     Partners: Male     Social Drivers of Health     Financial Resource Strain: Low Risk  (8/22/2024)    Overall Financial Resource Strain (CARDIA)     Difficulty of Paying Living Expenses: Not very hard   Food Insecurity: No Food Insecurity (8/22/2024)    Hunger Vital Sign     Worried About Running Out of Food in the Last Year: Never true     Ran Out of Food in the Last Year: Never true   Transportation Needs: No Transportation Needs (8/15/2023)    PRAPARE - Transportation     Lack of Transportation (Medical): No     Lack of Transportation (Non-Medical): No   Physical Activity: Insufficiently Active (8/22/2024)    Exercise Vital Sign     Days of Exercise per Week: 3 days     Minutes of Exercise per Session: 20 min   Stress: Stress Concern Present (8/22/2024)    [employer] - Occupational Stress Questionnaire     Feeling of Stress : Rather much   Housing Stability: Unknown (8/15/2023)     Housing Stability Vital Sign     Unable to Pay for Housing in the Last Year: No     Unstable Housing in the Last Year: No     Current Outpatient Medications on File Prior to Visit   Medication Sig Dispense Refill    albuterol (PROVENTIL/VENTOLIN HFA) 90 mcg/actuation inhaler Inhale 2 puffs into the lungs every 6 (six) hours as needed for Wheezing. Rescue 54 g 3    ascorbic acid, vitamin C, (VITAMIN C) 500 MG tablet Take 1 tablet (500 mg total) by mouth 2 (two) times daily.      aspirin (ECOTRIN) 81 MG EC tablet Take 81 mg by mouth once daily.      atorvastatin (LIPITOR) 40 MG tablet TAKE 1 TABLET BY MOUTH EVERY DAY IN THE EVENING 90 tablet 3    calcium carbonate (TUMS) 200 mg calcium (500 mg) chewable tablet Take 2 tablets by mouth as needed.      celecoxib (CELEBREX) 200 MG capsule Take 200 mg by mouth as needed.      cetirizine (ZYRTEC) 10 MG tablet Take 10 mg by mouth daily as needed for Allergies.      cholecalciferol, vitamin D3, (VITAMIN D3) 25 mcg (1,000 unit) capsule Take 1,000 Units by mouth once daily.      cyanocobalamin (VITAMIN B-12) 250 MCG tablet Take 250 mcg by mouth once daily.      esomeprazole (NEXIUM) 40 MG capsule Take 1 capsule (40 mg total) by mouth before breakfast. (Patient taking differently: Take 40 mg by mouth as needed.) 90 capsule 3    fluticasone propionate (FLONASE) 50 mcg/actuation nasal spray 2 sprays (100 mcg total) by Each Nostril route once daily. 9.9 mL 11    folic acid/multivit-min/lutein (CENTRUM SILVER ORAL) Take 1 tablet by mouth once daily.      metoprolol succinate (TOPROL-XL) 50 MG 24 hr tablet TAKE 1 TABLET BY MOUTH EVERY DAY 90 tablet 3    sertraline (ZOLOFT) 50 MG tablet Take 1 tablet (50 mg total) by mouth once daily. For anxiety control. (Patient taking differently: Take 25 mg by mouth once daily. (1/2 tablet )For anxiety control.) 30 tablet 11    TURMERIC ORAL Take 1 capsule by mouth once daily.      zolpidem (AMBIEN) 5 MG Tab TAKE 1 TABLET BY MOUTH NIGHTLY AS  "NEEDED (INSOMNIA). 30 tablet 2    azelastine (ASTELIN) 137 mcg (0.1 %) nasal spray 1 spray (137 mcg total) by Nasal route 2 (two) times daily. (Patient taking differently: 1 spray by Nasal route as needed.) 30 mL 3     No current facility-administered medications on file prior to visit.     Review of patient's allergies indicates:   Allergen Reactions    Vantin [cefpodoxime] Itching     Severe itching, night sweats.  Improved after stopped it.  4/24    Iodine Rash    Iodine and iodide containing products Itching and Rash    Shellfish containing products Itching and Rash       Review of Systems   Constitutional: Negative for chills, fever and night sweats.   HENT:  Negative for hearing loss.    Eyes:  Negative for blurred vision and double vision.   Cardiovascular:  Negative for chest pain, claudication and leg swelling.   Respiratory:  Negative for shortness of breath.    Endocrine: Negative for polydipsia, polyphagia and polyuria.   Hematologic/Lymphatic: Negative for adenopathy and bleeding problem. Does not bruise/bleed easily.   Skin:  Negative for poor wound healing.   Gastrointestinal:  Negative for diarrhea and heartburn.   Genitourinary:  Negative for bladder incontinence.   Neurological:  Negative for focal weakness, headaches, numbness, paresthesias and sensory change.   Psychiatric/Behavioral:  The patient is not nervous/anxious.    Allergic/Immunologic: Negative for persistent infections.         Objective:      Body mass index is 30.68 kg/m².  Vitals:    10/23/24 1000   Weight: 71.3 kg (157 lb 4.8 oz)   Height: 5' 0.04" (1.525 m)         General    Constitutional: She is oriented to person, place, and time. She appears well-developed and well-nourished.   HENT:   Head: Normocephalic and atraumatic.   Eyes: EOM are normal.   Cardiovascular:  Normal rate.            Pulmonary/Chest: Effort normal.   Neurological: She is alert and oriented to person, place, and time.   Psychiatric: She has a normal mood " and affect. Her behavior is normal.     General Musculoskeletal Exam   Gait: normal       Right Knee Exam     Inspection   Erythema: absent  Scars: absent  Swelling: present  Effusion: present  Deformity: present (varus)  Bruising: absent    Tenderness   The patient is tender to palpation of the medial joint line.    Range of Motion   Extension:  0   Flexion:  110     Tests   Ligament Examination   Lachman: normal (-1 to 2mm)   MCL - Valgus: normal (0 to 2mm)  LCL - Varus: normal  Patella   Passive Patellar Tilt: neutral    Other   Popliteal (Baker's) Cyst: present  Sensation: normal    Left Knee Exam     Inspection   Erythema: absent  Scars: absent  Swelling: present  Effusion: present  Deformity: present (varus)  Bruising: absent    Tenderness   The patient tender to palpation of the medial joint line.    Range of Motion   Extension:  0   Flexion:  110     Tests   Stability   Lachman: normal (-1 to 2mm)   MCL - Valgus: normal (0 to 2mm)  LCL - Varus: normal (0 to 2mm)  Patella   Passive Patellar Tilt: neutral    Other   Popliteal (Baker's) Cyst: present  Sensation: normal    Muscle Strength   Right Lower Extremity   Hip Abduction: 5/5   Quadriceps:  5/5   Hamstrin/5   Left Lower Extremity   Hip Abduction: 5/5   Quadriceps:  5/5   Hamstrin/5     Vascular Exam     Right Pulses  Dorsalis Pedis:      2+          Left Pulses  Dorsalis Pedis:      2+          Edema  Right Lower Leg: absent  Left Lower Leg: absent                  Results     Radiographs taken several days ago and reviewed by me demonstrate severe arthritic change of the bilateral KNEE(s).There  is not bone destruction.  There is not a fracture. The medial compartment is most involved.  There is a varus deformity.  The changes are tricompartmental.  Kellgren Jasen 4        Assessment:       Encounter Diagnosis   Name Primary?    Primary osteoarthritis of both knees           Plan:       Jojo was seen today for pain and pain.    Diagnoses and  all orders for this visit:    Primary osteoarthritis of both knees  -     Ambulatory referral/consult to Orthopedics        Treatment options were discussed. The surgical process of robotically assisted right knee replacement was discussed in detail with the patient including a detailed discussion of the procedure itself (including visual model, x-ray review, and literature review). We discussed options including implant type and why I believe the implant selected is a good match for the patient's needs. The patient expressed understanding and agrees to proceed with the planned surgeryThe typical perioperative and post-operative course was discussed and perioperative risks were discussed to the patient's satisfaction.  Risks and complications discussed included but were not limited to the risks of anesthetic complications, infection, bleeding, wound healing complications, fracture through the pin sights, stiffness, aseptic loosening, instability, limb length inequality, neurologic dysfunction including numbness and weakness, additional surgery,  DVT, pulmonary embolism, perioperative medical risks (cardiac, pulmonary, renal, neurologic), and death and the patient elects to proceed.  The patient should get medically cleared and attend the joint seminar.      ASA for DVT prophylaxis    Same day

## 2024-10-28 ENCOUNTER — PATIENT MESSAGE (OUTPATIENT)
Dept: BEHAVIORAL HEALTH | Facility: CLINIC | Age: 74
End: 2024-10-28
Payer: MEDICARE

## 2024-10-28 ENCOUNTER — OFFICE VISIT (OUTPATIENT)
Dept: INTERNAL MEDICINE | Facility: CLINIC | Age: 74
End: 2024-10-28
Payer: MEDICARE

## 2024-10-28 VITALS
HEART RATE: 60 BPM | OXYGEN SATURATION: 98 % | DIASTOLIC BLOOD PRESSURE: 76 MMHG | SYSTOLIC BLOOD PRESSURE: 120 MMHG | HEIGHT: 60 IN | WEIGHT: 154.31 LBS | BODY MASS INDEX: 30.29 KG/M2

## 2024-10-28 DIAGNOSIS — E55.9 VITAMIN D INSUFFICIENCY: ICD-10-CM

## 2024-10-28 DIAGNOSIS — Z00.00 ENCOUNTER FOR PREVENTIVE HEALTH EXAMINATION: ICD-10-CM

## 2024-10-28 DIAGNOSIS — I10 ESSENTIAL HYPERTENSION: ICD-10-CM

## 2024-10-28 DIAGNOSIS — Z86.16 HISTORY OF 2019 NOVEL CORONAVIRUS DISEASE (COVID-19): ICD-10-CM

## 2024-10-28 DIAGNOSIS — H25.10 NUCLEAR SCLEROSIS, UNSPECIFIED LATERALITY: ICD-10-CM

## 2024-10-28 DIAGNOSIS — K21.9 GASTROESOPHAGEAL REFLUX DISEASE, UNSPECIFIED WHETHER ESOPHAGITIS PRESENT: ICD-10-CM

## 2024-10-28 DIAGNOSIS — I72.3 ANEURYSM OF COMMON ILIAC ARTERY: ICD-10-CM

## 2024-10-28 DIAGNOSIS — M50.30 DEGENERATIVE DISC DISEASE, CERVICAL: ICD-10-CM

## 2024-10-28 DIAGNOSIS — M47.816 LUMBAR FACET ARTHROPATHY: ICD-10-CM

## 2024-10-28 DIAGNOSIS — E78.49 OTHER HYPERLIPIDEMIA: ICD-10-CM

## 2024-10-28 DIAGNOSIS — E66.3 OVERWEIGHT (BMI 25.0-29.9): ICD-10-CM

## 2024-10-28 DIAGNOSIS — I77.1 TORTUOUS AORTA: ICD-10-CM

## 2024-10-28 DIAGNOSIS — M85.80 OSTEOPENIA, UNSPECIFIED LOCATION: ICD-10-CM

## 2024-10-28 DIAGNOSIS — F41.9 ANXIETY: Primary | ICD-10-CM

## 2024-10-28 DIAGNOSIS — F43.21 GRIEVING: ICD-10-CM

## 2024-10-28 DIAGNOSIS — N60.09 CYST OF BREAST, UNSPECIFIED LATERALITY: ICD-10-CM

## 2024-10-28 DIAGNOSIS — M54.12 CERVICAL RADICULOPATHY: ICD-10-CM

## 2024-10-28 DIAGNOSIS — E04.2 MULTIPLE THYROID NODULES: ICD-10-CM

## 2024-10-28 DIAGNOSIS — M17.0 PRIMARY OSTEOARTHRITIS OF BOTH KNEES: ICD-10-CM

## 2024-10-28 DIAGNOSIS — I71.43 INFRARENAL ABDOMINAL AORTIC ANEURYSM (AAA) WITHOUT RUPTURE: ICD-10-CM

## 2024-10-28 DIAGNOSIS — D12.6 COLON ADENOMA: ICD-10-CM

## 2024-10-28 PROCEDURE — 99999 PR PBB SHADOW E&M-EST. PATIENT-LVL V: CPT | Mod: PBBFAC,,,

## 2024-10-31 ENCOUNTER — TELEPHONE (OUTPATIENT)
Dept: BEHAVIORAL HEALTH | Facility: CLINIC | Age: 74
End: 2024-10-31
Payer: MEDICARE

## 2024-11-04 ENCOUNTER — OFFICE VISIT (OUTPATIENT)
Dept: INTERNAL MEDICINE | Facility: CLINIC | Age: 74
End: 2024-11-04
Payer: MEDICARE

## 2024-11-04 VITALS
HEIGHT: 60 IN | DIASTOLIC BLOOD PRESSURE: 82 MMHG | WEIGHT: 158.5 LBS | SYSTOLIC BLOOD PRESSURE: 118 MMHG | OXYGEN SATURATION: 98 % | BODY MASS INDEX: 31.12 KG/M2 | HEART RATE: 61 BPM

## 2024-11-04 DIAGNOSIS — R00.2 PALPITATIONS: ICD-10-CM

## 2024-11-04 DIAGNOSIS — F41.9 ANXIETY: Primary | ICD-10-CM

## 2024-11-04 DIAGNOSIS — F43.21 GRIEF: ICD-10-CM

## 2024-11-04 DIAGNOSIS — G47.00 INSOMNIA, UNSPECIFIED TYPE: ICD-10-CM

## 2024-11-04 PROCEDURE — 99214 OFFICE O/P EST MOD 30 MIN: CPT | Mod: S$GLB,,, | Performed by: PHYSICIAN ASSISTANT

## 2024-11-04 PROCEDURE — 3288F FALL RISK ASSESSMENT DOCD: CPT | Mod: CPTII,S$GLB,, | Performed by: PHYSICIAN ASSISTANT

## 2024-11-04 PROCEDURE — 3074F SYST BP LT 130 MM HG: CPT | Mod: CPTII,S$GLB,, | Performed by: PHYSICIAN ASSISTANT

## 2024-11-04 PROCEDURE — 1126F AMNT PAIN NOTED NONE PRSNT: CPT | Mod: CPTII,S$GLB,, | Performed by: PHYSICIAN ASSISTANT

## 2024-11-04 PROCEDURE — 3079F DIAST BP 80-89 MM HG: CPT | Mod: CPTII,S$GLB,, | Performed by: PHYSICIAN ASSISTANT

## 2024-11-04 PROCEDURE — 1160F RVW MEDS BY RX/DR IN RCRD: CPT | Mod: CPTII,S$GLB,, | Performed by: PHYSICIAN ASSISTANT

## 2024-11-04 PROCEDURE — 1159F MED LIST DOCD IN RCRD: CPT | Mod: CPTII,S$GLB,, | Performed by: PHYSICIAN ASSISTANT

## 2024-11-04 PROCEDURE — 1101F PT FALLS ASSESS-DOCD LE1/YR: CPT | Mod: CPTII,S$GLB,, | Performed by: PHYSICIAN ASSISTANT

## 2024-11-04 PROCEDURE — 99999 PR PBB SHADOW E&M-EST. PATIENT-LVL IV: CPT | Mod: PBBFAC,,, | Performed by: PHYSICIAN ASSISTANT

## 2024-11-04 NOTE — PROGRESS NOTES
Subjective     Patient ID: Jojo Burrows is a 74 y.o. female.    Chief Complaint: Follow-up    HPI    Established pt of Joo Munoz MD (new to me)      Had [employer] Wellness last week with my colleague  Mentioned concerns of palpitations attribute to increase anxiety as of late  She was referred to therapy, first appt next week.   Recalls several deaths of family members back to back over the past few years,  of 50yrs passed away in 2020. Has noticed feeling more overwhelmed, for example with tasks her  previously took care of. Feels palpitations during these anxious/overwhelming moments.   She has underwent cardiology evaluate with stress test and Holter earlier this year which were unremarkable  She is taking metoprolol, Ambien and Zoloft which help  Notes she recently started to be more consistent with daily half/tablet sertraline for the past 2-3 weeks.       Past Medical History:   Diagnosis Date    Acute hypoxemic respiratory failure 3/31/2020    ALLERGIC RHINITIS     Cataract     COVID-19 virus infection 03/2020    Degenerative disc disease, cervical 9/13/2018    GERD (gastroesophageal reflux disease)     Hyperlipidemia     Hypertension     Migraine headache     Multifocal pneumonia 3/31/2020    Nuclear sclerosis 4/30/2014    Sleep disorder     Thyroid disease     nodular goiter    TIA (transient ischemic attack)      Social History     Tobacco Use    Smoking status: Never    Smokeless tobacco: Never   Substance Use Topics    Alcohol use: No    Drug use: No     Review of patient's allergies indicates:   Allergen Reactions    Vantin [cefpodoxime] Itching     Severe itching, night sweats.  Improved after stopped it.  4/24    Iodine Rash    Iodine and iodide containing products Itching and Rash    Shellfish containing products Itching and Rash         Review of Systems   Constitutional:  Negative for chills, diaphoresis and fever.   Respiratory:  Negative for cough and shortness of breath.     Cardiovascular:  Positive for palpitations. Negative for chest pain.   Gastrointestinal:  Negative for abdominal pain, nausea and vomiting.   Integumentary:  Negative for rash.   Psychiatric/Behavioral:  Positive for sleep disturbance. Negative for suicidal ideas. The patient is nervous/anxious.           Objective  /82 (BP Location: Left arm, Patient Position: Sitting)   Pulse 61   Ht 5' (1.524 m)   Wt 71.9 kg (158 lb 8.2 oz)   SpO2 98%   BMI 30.96 kg/m²       Physical Exam  Constitutional:       General: She is not in acute distress.     Appearance: She is not ill-appearing.   Cardiovascular:      Rate and Rhythm: Normal rate and regular rhythm.   Pulmonary:      Effort: Pulmonary effort is normal. No respiratory distress.   Musculoskeletal:      Right lower leg: No edema.      Left lower leg: No edema.   Skin:     Findings: No rash.   Neurological:      Mental Status: She is alert.   Psychiatric:         Attention and Perception: Attention normal.         Mood and Affect: Mood is anxious.         Speech: Speech normal.         Behavior: Behavior normal. Behavior is cooperative.         Thought Content: Thought content does not include homicidal or suicidal ideation.            Assessment and Plan     1. Anxiety    2. Grief    3. Palpitations    4. Insomnia, unspecified type      Long discussion with patient  Recommend to continue current meds, discussed importance of daily consistent use of sertraline  Keep upcoming therapy appt  Reassurance provided about prior exams/test results  This is a 40 minute visit, over 50% of time spent devoted minutes of total time spent on the encounter, which includes face to face time and non-face to face time preparing to see the patient (eg, review of tests), Obtaining and/or reviewing separately obtained history, Documenting clinical information in the electronic or other health record, Independently interpreting results (not separately reported) and communicating  results to the patient/family/caregiver, or Care coordination (not separately reported).    Renetta Jefferson PA-C

## 2024-11-13 ENCOUNTER — CLINICAL SUPPORT (OUTPATIENT)
Dept: BEHAVIORAL HEALTH | Facility: CLINIC | Age: 74
End: 2024-11-13

## 2024-11-13 ENCOUNTER — OFFICE VISIT (OUTPATIENT)
Dept: URGENT CARE | Facility: CLINIC | Age: 74
End: 2024-11-13
Payer: MEDICARE

## 2024-11-13 ENCOUNTER — PATIENT MESSAGE (OUTPATIENT)
Dept: BEHAVIORAL HEALTH | Facility: CLINIC | Age: 74
End: 2024-11-13
Payer: MEDICARE

## 2024-11-13 VITALS
HEART RATE: 68 BPM | WEIGHT: 158 LBS | DIASTOLIC BLOOD PRESSURE: 77 MMHG | BODY MASS INDEX: 31.02 KG/M2 | OXYGEN SATURATION: 98 % | SYSTOLIC BLOOD PRESSURE: 118 MMHG | RESPIRATION RATE: 19 BRPM | TEMPERATURE: 98 F | HEIGHT: 60 IN

## 2024-11-13 DIAGNOSIS — F33.1 MDD (MAJOR DEPRESSIVE DISORDER), RECURRENT EPISODE, MODERATE: Primary | ICD-10-CM

## 2024-11-13 DIAGNOSIS — F41.9 ANXIETY: ICD-10-CM

## 2024-11-13 DIAGNOSIS — H00.014 HORDEOLUM EXTERNUM OF LEFT UPPER EYELID: Primary | ICD-10-CM

## 2024-11-13 DIAGNOSIS — F43.21 GRIEVING: ICD-10-CM

## 2024-11-13 PROCEDURE — 99213 OFFICE O/P EST LOW 20 MIN: CPT | Mod: S$GLB,,, | Performed by: FAMILY MEDICINE

## 2024-11-13 RX ORDER — ERYTHROMYCIN 5 MG/G
OINTMENT OPHTHALMIC NIGHTLY
Qty: 1 G | Refills: 1 | Status: SHIPPED | OUTPATIENT
Start: 2024-11-13

## 2024-11-13 RX ORDER — DOXYCYCLINE 100 MG/1
100 CAPSULE ORAL 2 TIMES DAILY
Qty: 14 CAPSULE | Refills: 0 | Status: SHIPPED | OUTPATIENT
Start: 2024-11-13 | End: 2024-11-20

## 2024-11-13 NOTE — PROGRESS NOTES
Subjective:      Patient ID: Jojo Burrows is a 74 y.o. female.    Vitals:  height is 5' (1.524 m) and weight is 71.7 kg (158 lb). Her oral temperature is 98 °F (36.7 °C). Her blood pressure is 118/77 and her pulse is 68. Her respiration is 19 and oxygen saturation is 98%.     Chief Complaint: Stye    .This is a 74 y.o. female who presents today with a chief complaint of  stye on the left upper eye lid onset 6 days ago. Pt states the eye lid was very red and swollen but has turned into a stye. Pt did warm compresses and didn't think it was effective. Pt did consult a pharmacist and the referred her to see about getting an antibiotic . Pt denies blurred vision and lost of vision.     Eye Pain   The left eye is affected. This is a new problem. The current episode started in the past 7 days. The problem occurs constantly. The problem has been gradually worsening. There was no injury mechanism. The pain is at a severity of 3/10. The pain is mild. There is No known exposure to pink eye. She Does not wear contacts. Pertinent negatives include no blurred vision, eye discharge, double vision, eye redness, fever, foreign body sensation, itching, nausea, photophobia, recent URI or vomiting. She has tried nothing for the symptoms.       Constitution: Negative for fever.   Eyes:  Positive for eye pain. Negative for eye discharge, eye itching, eye redness, photophobia, double vision and blurred vision.   Gastrointestinal:  Negative for nausea and vomiting.      Objective:     Physical Exam   Constitutional: No distress. normal  Eyes: Lids are everted and swept, no foreign bodies found. Left eye exhibits hordeolum (upper. firm nodular appearing, tender with surrounding erythema and edema noted). Right conjunctiva is not injected. Left conjunctiva is not injected. Extraocular movement intact vision grossly intact gaze aligned appropriately   Cardiovascular: Normal rate, regular rhythm, normal heart sounds and normal pulses.    Pulmonary/Chest: Effort normal and breath sounds normal.   Abdominal: Normal appearance.   Neurological: She is alert.   Nursing note and vitals reviewed.      Assessment:     1. Hordeolum externum of left upper eyelid        Plan:       Hordeolum externum of left upper eyelid  -     erythromycin (ROMYCIN) ophthalmic ointment; Place into the left eye every evening.  Dispense: 1 g; Refill: 1  -     doxycycline (VIBRAMYCIN) 100 MG Cap; Take 1 capsule (100 mg total) by mouth 2 (two) times daily. for 7 days  Dispense: 14 capsule; Refill: 0    Stressed warm compresses. To see optometry if persists or worsens

## 2024-11-13 NOTE — PROGRESS NOTES
Primary Care Behavioral Health Integration: Initial  Date:  2024  Referral Source:  Joo Munoz MD  Length of Appointment: 30  The patient location is:  Home  The patient phone number is: [phone]  Visit type: Virtual visit with synchronous audio and video    Each patient to whom he or she provides medical services by telemedicine is:  (1) informed of the relationship between the physician and patient and the respective role of any other health care provider with respect to management of the patient; and (2) notified that he or she may decline to receive medical services by telemedicine and may withdraw from such care at any time.    Chief Complaint/Reason for Encounter:  Anxiety, Depression, and Grief    History of Present Illness: Jojo Burrows, a 74 y.o. female referred by Joo Munoz MD.  Patient was seen, examined and chart was reviewed. Met with patient.     Current Session: Patient reported losing a lot of people in her life. , mother and father have .   4 years ago with Covid. It was unexpected. They were  50 years. Pt also had Covid and was also in the hospital. Daughter had a stroke 2 years ago and one daughter had cancer 5 years ago. Pt is oldest of 10 children and had taken care of parents. Oldest brother has Alzheimer's and pt has been taking care of him. Pt's nephew  in his sleep. 2 cousins  and one of them  on the same day as pt's 's death. Pt has 4 children. Pt describes not feeling like herself. Has anxiety and takes 1/2 pill of Zoloft. Doesn't like to be on medication. Describes sleep as bad. Trouble falling and staying asleep unless she takes Ambien. Describes panic attacks when worrying or having to deal with anything. Heart racing and heaviness in chest. Does do deep breathing exercises. Pt stays in bed longer than she used to. Doesn't feel like doing the things she used to enjoy. Had been feeling depressed for 6 months.      Past Medical History:   Diagnosis Date    Acute hypoxemic respiratory failure 3/31/2020    ALLERGIC RHINITIS     Cataract     COVID-19 virus infection 03/2020    Degenerative disc disease, cervical 9/13/2018    GERD (gastroesophageal reflux disease)     Hyperlipidemia     Hypertension     Migraine headache     Multifocal pneumonia 3/31/2020    Nuclear sclerosis 4/30/2014    Sleep disorder     Thyroid disease     nodular goiter    TIA (transient ischemic attack)          Current Outpatient Medications:     albuterol (PROVENTIL/VENTOLIN HFA) 90 mcg/actuation inhaler, Inhale 2 puffs into the lungs every 6 (six) hours as needed for Wheezing. Rescue, Disp: 54 g, Rfl: 3    ascorbic acid, vitamin C, (VITAMIN C) 500 MG tablet, Take 1 tablet (500 mg total) by mouth 2 (two) times daily., Disp: , Rfl:     aspirin (ECOTRIN) 81 MG EC tablet, Take 81 mg by mouth once daily., Disp: , Rfl:     atorvastatin (LIPITOR) 40 MG tablet, TAKE 1 TABLET BY MOUTH EVERY DAY IN THE EVENING, Disp: 90 tablet, Rfl: 3    azelastine (ASTELIN) 137 mcg (0.1 %) nasal spray, 1 spray (137 mcg total) by Nasal route 2 (two) times daily. (Patient taking differently: 1 spray by Nasal route as needed.), Disp: 30 mL, Rfl: 3    calcium carbonate (TUMS) 200 mg calcium (500 mg) chewable tablet, Take 2 tablets by mouth as needed., Disp: , Rfl:     celecoxib (CELEBREX) 200 MG capsule, Take 200 mg by mouth as needed., Disp: , Rfl:     cetirizine (ZYRTEC) 10 MG tablet, Take 10 mg by mouth daily as needed for Allergies., Disp: , Rfl:     cholecalciferol, vitamin D3, (VITAMIN D3) 25 mcg (1,000 unit) capsule, Take 1,000 Units by mouth once daily., Disp: , Rfl:     cyanocobalamin (VITAMIN B-12) 250 MCG tablet, Take 250 mcg by mouth once daily., Disp: , Rfl:     esomeprazole (NEXIUM) 40 MG capsule, Take 1 capsule (40 mg total) by mouth before breakfast., Disp: 90 capsule, Rfl: 3    fluticasone propionate (FLONASE) 50 mcg/actuation nasal spray, 2 sprays (100 mcg total)  by Each Nostril route once daily., Disp: 9.9 mL, Rfl: 11    folic acid/multivit-min/lutein (CENTRUM SILVER ORAL), Take 1 tablet by mouth once daily., Disp: , Rfl:     metoprolol succinate (TOPROL-XL) 50 MG 24 hr tablet, TAKE 1 TABLET BY MOUTH EVERY DAY, Disp: 90 tablet, Rfl: 3    sertraline (ZOLOFT) 50 MG tablet, Take 1 tablet (50 mg total) by mouth once daily. For anxiety control., Disp: 30 tablet, Rfl: 11    TURMERIC ORAL, Take 1 capsule by mouth once daily., Disp: , Rfl:     zolpidem (AMBIEN) 5 MG Tab, TAKE 1 TABLET BY MOUTH NIGHTLY AS NEEDED (INSOMNIA)., Disp: 30 tablet, Rfl: 2    Current symptoms:  Depression Symptoms: anhedonia, insomnia, hopelessness, and fatigue.  Anxiety Symptoms: excessive worrying and restlessness.  Sleep Difficulties: frequent night time awakening, difficulty falling asleep, and non-restful sleep.  Manic Symptoms:  denies.  Psychosis: denies .    Risk assessment:  Patient reports no suicidal ideation  Patient reports no homicidal ideation  Patient reports no self-injurious behavior  Patient reports no violent behavior    Patient advised to call [phone] or present the the nearest ED if they experience suicidal or homicidal ideation, plan or intent.      Psychiatric History:  Diagnosis:    Current Psychiatric Medication: Yes - Zoloft     Medication Trial History:  Medication Trials: No    Outpatient Treatment: Yes - Hospice counselor   Inpatient Treatment: No   Suicide Attempts: No   Access to Firearms: No   History of Trauma: Yes - Deaths in family   Family Psychiatric History:      Current and Past Substance Use:  Alcohol: Patient denies alcohol use.    Drugs: Denied.   Nicotine: denied   Caffeine:  denied     Mental Status Exam  General Appearance:  appears stated age, neatly dressed, well groomed   Speech: normal tone, normal rate, normal pitch, normal volume      Level of Cooperation: cooperative      Thought Processes: linear, logical, goal-directed   Mood: depressed, sad       Thought Content: {relevant and appropriate   Affect: congruent and appropriate   Orientation: Oriented x4   Memory/Attention/Concentration: No gross cognitive deficits made evident during conversation   Judgment & Insight: poor   Language  intact          No data to display                    10/31/2024     2:51 PM   GAD7   1. Feeling nervous, anxious, or on edge? 1   2. Not being able to stop or control worrying? 2   3. Worrying too much about different things? 2   4. Trouble relaxing? 1   5. Being so restless that it is hard to sit still? 0   6. Becoming easily annoyed or irritable? 1   7. Feeling afraid as if something awful might happen? 1   8. If you checked off any problems, how difficult have these problems made it for you to do your work, take care of things at home, or get along with other people? 1   HARJIT-7 Score 8       Impression: Initial appointment focused on gathering history, identifying treatment goals and developing a treatment plan.      Diagnosis:  1. MDD (major depressive disorder), recurrent episode, moderate        2. Anxiety  Ambulatory referral/consult to Primary Care Behavioral Health (Non-Opioids)      3. Grieving  Ambulatory referral/consult to Primary Care Behavioral Health (Non-Opioids)          Treatment Goals and Plan:   Anxiety: reducing negative automatic thoughts, reducing physical symptoms of anxiety, and reducing time spent worrying (<30 minutes/day)  Depression: reducing fatigue and reducing negative automatic thoughts  Grief    Future treatment will utilize CBT and Solution-focused Therapy.      Return to Clinic: No follow-ups on file.

## 2024-11-22 DIAGNOSIS — M17.11 PRIMARY OSTEOARTHRITIS OF RIGHT KNEE: Primary | ICD-10-CM

## 2024-11-26 ENCOUNTER — PATIENT OUTREACH (OUTPATIENT)
Dept: PSYCHIATRY | Facility: CLINIC | Age: 74
End: 2024-11-26
Payer: MEDICARE

## 2024-12-12 ENCOUNTER — TELEPHONE (OUTPATIENT)
Dept: PREADMISSION TESTING | Facility: HOSPITAL | Age: 74
End: 2024-12-12
Payer: MEDICARE

## 2024-12-12 DIAGNOSIS — Z01.818 PRE-OP TESTING: ICD-10-CM

## 2024-12-12 DIAGNOSIS — M79.609 PAIN IN EXTREMITY, UNSPECIFIED EXTREMITY: Primary | ICD-10-CM

## 2024-12-12 NOTE — TELEPHONE ENCOUNTER
----- Message from CARLOS Nance sent at 12/12/2024 12:22 PM CST -----  (1/14) Please schedule EKG and Labs (CBC, CMP, PT/INR).  Thanks,  Lee Ann

## 2024-12-12 NOTE — ANESTHESIA PAT ROS NOTE
12/12/2024  Jojo Burrows is a 74 y.o., female.      Pre-op Assessment          Review of Systems           Anesthesia Assessment: Preoperative EQUATION    Planned Procedure: Procedure(s) (LRB):  ARTHROPLASTY, KNEE, TOTAL, SAKSHI COMPUTER-ASSISTED NAVIGATION: RIGHT: SAME DAY (Right)  Requested Anesthesia Type:Regional  Surgeon: Graham Chow MD  Service: Orthopedics  Known or anticipated Date of Surgery:1/14/2025    Surgeon notes: reviewed    Electronic QUestionnaire Assessment completed via nurse interview with patient.        Triage considerations:     The patient has no apparent active cardiac condition (No unstable coronary Syndrome such as severe unstable angina or recent [<1 month] myocardial infarction, decompensated CHF, severe valvular   disease or significant arrhythmia)    Previous anesthesia records:GETA, Spinal Anesthesia , and No problems  3/18/25 EGD     Last PCP note: within 3 months , within [facility]   Subspecialty notes: Cardiology: General, Ortho    Other important co-morbidities: HLD, HTN, and Small AAA, TIA, Anxiety      Tests already available:  Available tests,  within [facility] .   2/1/25 CBC, CMP  8/28/24 Abd US  4/11/24 Nuclear Stress   2/27/24 Stress Echo   1/9/23 CT Abd Pelvis  4/19/22 Thoracic XR             Instructions given. (See in Nurse's note)    Optimization:  Anesthesia Preop Clinic Assessment  Indicated POC     Medical Opinion Indicated       Sub-specialist consult indicated:   [facility] NP         Plan:    Testing:  CMP, EKG, Hematology Profile, and PT/INR   Pre-anesthesia  visit       Visit focus: possible regional anesthesia and/or nerve block      Consultation:Pre Op Center NP for medical and anesthesia optimization       Patient  has previously scheduled Medical Appointment: 5/7    Navigation: Tests Scheduled.              Consults scheduled.              Results will be tracked by Preop Clinic.     5/22/25 Chandrakant Root MD PhD  Lee Ann Man, RN; ALEYDA WILLIAM Staff  Cc: Graham Chow MD; ALEYDA BELLO Staff; Edwina, Alfred W., NP  Caller: Unspecified (Today, 11:15 AM)  CTA shows no flow-limiting peripheral arterial disease.  Okay to proceed with surgery.      5/7/25 Patient is optimized     Optimization pending clearance from Dr. Hernandez regarding h/o Infrarenal abdominal aortic aneurysm (AAA) without rupture & Aneurysm of common iliac artery  Patient was followed by Dr. Hernandez 2023 and was supposed to have a Repeat CT ABD pelvis in 1 year (2024)- Consult placed to Dr. Hernandez for evaluation and f/u testing as indicated.  TY, RL  EKG  ----- Message from Chandrakant Root MD PhD sent at 5/22/2025 12:41 PM CDT -----  CTA shows no flow-limiting peripheral arterial disease.  Okay to proceed with surgery.Thank you,Dr. Root     I spent a total of 46 minutes on the day of the visit.This includes face to face time and non-face to face time preparing to see the patient (eg, review of tests), obtaining and/or reviewing separately obtained history, documenting clinical information in the electronic or other health record, independently interpreting results and communicating results to the patient/family/caregiver, or care coordinator.     Alfred Bland NP  Perioperative Medicine  [facility]   Pager [phone]

## 2024-12-15 DIAGNOSIS — G47.00 INSOMNIA, UNSPECIFIED TYPE: Primary | ICD-10-CM

## 2024-12-15 NOTE — TELEPHONE ENCOUNTER
No care due was identified.  [facility] Embedded Care Due Messages. Reference number: [id number].   12/15/2024 1:56:52 PM CST

## 2024-12-15 NOTE — TELEPHONE ENCOUNTER
No care due was identified.  [facility] Embedded Care Due Messages. Reference number: [id number].   12/15/2024 2:00:28 PM CST

## 2024-12-16 ENCOUNTER — CLINICAL SUPPORT (OUTPATIENT)
Dept: REHABILITATION | Facility: HOSPITAL | Age: 74
End: 2024-12-16
Attending: ORTHOPAEDIC SURGERY
Payer: MEDICARE

## 2024-12-16 DIAGNOSIS — G89.29 CHRONIC PAIN OF RIGHT KNEE: Primary | ICD-10-CM

## 2024-12-16 DIAGNOSIS — M25.561 CHRONIC PAIN OF RIGHT KNEE: Primary | ICD-10-CM

## 2024-12-16 PROCEDURE — 97530 THERAPEUTIC ACTIVITIES: CPT

## 2024-12-16 PROCEDURE — 97112 NEUROMUSCULAR REEDUCATION: CPT

## 2024-12-16 PROCEDURE — 97162 PT EVAL MOD COMPLEX 30 MIN: CPT

## 2024-12-16 RX ORDER — SERTRALINE HYDROCHLORIDE 50 MG/1
50 TABLET, FILM COATED ORAL DAILY
Qty: 90 TABLET | Refills: 2 | Status: SHIPPED | OUTPATIENT
Start: 2024-12-16

## 2024-12-16 RX ORDER — ZOLPIDEM TARTRATE 5 MG/1
5 TABLET ORAL NIGHTLY PRN
Qty: 30 TABLET | Refills: 3 | Status: SHIPPED | OUTPATIENT
Start: 2024-12-16

## 2024-12-16 NOTE — TELEPHONE ENCOUNTER
Refill Decision Note   Jojo Markos  is requesting a refill authorization.  Brief Assessment and Rationale for Refill:  Approve     Medication Therapy Plan:         Comments:     Note composed:5:49 PM 12/16/2024

## 2024-12-16 NOTE — PLAN OF CARE
[facility]   Physical Therapy Initial Evaluation and Discharge Summary     Name: Jojo Burrows  Clinic Number: [id number]    Therapy Diagnosis:   Encounter Diagnosis   Name Primary?    Chronic pain of right knee Yes        Physician: Graham Chow MD    Physician Orders: PT Eval and Treat   Medical Diagnosis from Referral: Primary osteoarthritis of right knee [M17.11]   Evaluation Date: 12/16/2024  Authorization Period Expiration: 11/22/2025  Plan of Care Expiration: 1/13/2025  Progress Note Due: 10th visit   Date of Surgery: 1/15/2025 (right TKA)   Visit # / Visits authorized: 1/ 1   FOTO: 1/ 3    Precautions: HTN      Time In: 11:15am  Time Out: 12:00pm  Total Billable Time: 45 minutes    Subjective     Date of onset: a few years ago    History of current condition - Jojo reports: long history of R knee pain. She is also reporting L knee pain since slipping and falling onto L knee which resulted in L meniscus tear. L knee meniscus tear was address arthroscopically (7-8 years ago) but is still occasionally painful.     Falls: none recently     Imaging: See imaging section.     Prior Therapy: Denies  Social History: Pt lives alone, 14 steps inside her home. Her bedroom is on 2nd floor.   Occupation: retired  Prior Level of Function: Able to perform ADLs with pain   Current Level of Function: difficulty sleeping, prolonged walking, standing    Pain:  Current 0/10, worst 8/10, best 0/10   Location: right knee    Description: Throbbing and Sharp  Aggravating Factors: Standing and Walking  Easing Factors: pain medication and ice    Patients goals: to prepare for her TKA and know what exercises to do      Medical History:   Past Medical History:   Diagnosis Date    Acute hypoxemic respiratory failure 3/31/2020    ALLERGIC RHINITIS     Cataract     COVID-19 virus infection 03/2020    Degenerative disc disease, cervical 9/13/2018    GERD (gastroesophageal reflux disease)     Hyperlipidemia      Hypertension     Migraine headache     Multifocal pneumonia 3/31/2020    Nuclear sclerosis 4/30/2014    Sleep disorder     Thyroid disease     nodular goiter    TIA (transient ischemic attack)        Surgical History:   Jojo Burrows  has a past surgical history that includes Hysterectomy; Colonoscopy (N/A, 4/29/2016); Breast biopsy; Breast cyst excision; Breast cyst aspiration; and Colonoscopy (N/A, 6/28/2021).    Medications:   Jojo has a current medication list which includes the following prescription(s): albuterol, ascorbic acid (vitamin c), aspirin, atorvastatin, azelastine, calcium carbonate, celecoxib, cetirizine, cholecalciferol (vitamin d3), cyanocobalamin, erythromycin, esomeprazole, fluticasone propionate, multivit-min/folic acid/lutein, metoprolol succinate, sertraline, turmeric, and zolpidem.    Allergies:   Review of patient's allergies indicates:   Allergen Reactions    Vantin [cefpodoxime] Itching     Severe itching, night sweats.  Improved after stopped it.  4/24    Iodine Rash    Iodine and iodide containing products Itching and Rash    Shellfish containing products Itching and Rash        Objective      Observation: Pt ambulates with antalgic gait pattern; moderate edema present in R knee      Postural examination: rounded shoulder and Forward Head    Palpation: TTP in MPFL, MCL, posterior knee       Strength: manual muscle test grades below      Lower Extremity Strength  Right LE   Left LE     Hip Flexion: 4/5 Hip Flexion: 4/5   Hip Abduction: /5  Hip Abduction /5   Knee Extension: 4-/5 Knee Extension: 4/5   Knee Flexion: 4/5 Knee Flexion: 4+/5      Range of Motion:  R knee Active(Passive) L knee Active (Passive)   Flexion 125 (125)   120 (120)    Extension   0 (0)       Balance Assessment:       Evaluation   Single Limb Stance R LE 10 seconds  (<10 sec = HIGH FALL RISK)   Single Limb Stance L LE 2 seconds  (<10 sec = HIGH FALL RISK)         Endurance Assessment:       Evaluation   Timed Up  and Go 13 seconds   30 sec STS  9 reps             Table: Population Norms for TUG    Age  Average TUG    60 - 69 years  8.1 seconds    70 - 79 years  9.2 seconds    80 - 99 years  11.3 seconds           Treatment     Total Treatment time (time-based codes) separate from Evaluation: 25 minutes     Jojo received the treatments listed below:      neuromuscular re-education activities to improve: Coordination and quad activation for 10 minutes. The following activities were included:  - Seated LAQs 2 x 10, hold 5 seconds  - Seated heel slides with strap, hold 5 seconds, 2 x 10   - Quad sets, 2 x 10, hold 5 seconds     therapeutic activities to improve functional performance for 15  minutes, including:  - education on TKA procedure, expectations, edema management, icing schedule, quad function, healing timeline       Patient Education and Home Exercises     Education provided:   - education on TKA procedure, expectations, edema management, icing schedule, quad function, healing timeline     Written Home Exercises Provided: Yes. Exercises were reviewed and Jojo was able to demonstrate them prior to the end of the session.  Jojo demonstrated good  understanding of the education provided. See EMR under Patient Instructions for exercises provided during therapy sessions.    Assessment     Jojo is a 74 y.o. female referred to outpatient Physical Therapy with a medical diagnosis of Primary osteoarthritis of right knee [M17.11] . Patient presents to initial evaluation for prehabiltation in anticipation of R total knee arthroplasty scheduled for 1/15/2025. Patient presents with chronic R knee pain, reduced strength and poor motor control of R quadriceps, and reduced overall lower extremity strength. Patient is being discharged to [facility] to independently address quadriceps motor control and lower extremity strength deficits so that she may return to full independence with all household and personal ADL's, and improve overall  functional mobility prior to surgery.     Patient prognosis is Good.   Patient will benefit from skilled outpatient Physical Therapy to address the deficits stated above and in the chart below, provide patient /family education, and to maximize patientt's level of independence.     Plan of care discussed with patient: Yes  Patient's spiritual, cultural and educational needs considered and patient is agreeable to the plan of care and goals as stated below:       Medical Necessity is demonstrated by the following  History  Co-morbidities and personal factors that may impact the plan of care [] LOW: no personal factors / co-morbidities  [x] MODERATE: 1-2 personal factors / co-morbidities  [] HIGH: 3+ personal factors / co-morbidities    Moderate / High Support Documentation:   Co-morbidities affecting plan of care: HTN    Personal Factors:   age     Examination  Body Structures and Functions, activity limitations and participation restrictions that may impact the plan of care [] LOW: addressing 1-2 elements  [x] MODERATE: 3+ elements  [] HIGH: 4+ elements (please support below)    Moderate / High Support Documentation: decreased ROM, weakness, difficulty walking, difficulty standing     Clinical Presentation [] LOW: stable  [x] MODERATE: Evolving  [] HIGH: Unstable     Decision Making/ Complexity Score: moderate       Goals:  Long Term Goals (2 Weeks):   1. Pt will be independent with [facility] to supplement physical therapy treatment in improving functional status. (Met)  2. Pt will demonstrate improved impaired lower extremity strength by 1/2 grade to promote functional mobility. (Met)  3. Patient will require <2 verbal and tactile cue to engage quadricep without compensation to demonstrate improved quadriceps control and awareness. (Met)     Plan     Plan of care Certification: 12/16/2024 to 12/18/2024.    Pt is being discharged to [facility] at this time.     Len Munoz, PT, DPT         Physician's Signature:  _________________________________________ Date: ________________   - - -

## 2024-12-17 RX ORDER — ATORVASTATIN CALCIUM 40 MG/1
40 TABLET, FILM COATED ORAL NIGHTLY
Qty: 90 TABLET | Refills: 2 | Status: SHIPPED | OUTPATIENT
Start: 2024-12-17

## 2024-12-17 NOTE — TELEPHONE ENCOUNTER
Jojo Burrows  is requesting a refill authorization.  Brief Assessment and Rationale for Refill:  Approve     Medication Therapy Plan:         Comments:     Note composed:8:52 AM 12/17/2024

## 2024-12-17 NOTE — PATIENT INSTRUCTIONS
Heel Prop    Sit in a chair with the heel of the involved leg propped on another chair. Allow gravity to stretch your knee towards a more straightened position.   *Can also assist by placing your hand just above the knee and gently pressing down toward the floor.   Hold 2-5 minutes. Perform 1 sessions every to every other hour you are awake.              Strengthening: Quadriceps Set    Tighten muscles on top of thighs by pushing knees down towards the surface below..  Hold 5 seconds. Repeat 10 times each leg per set. Perform 1 sessions every to every other hour you are awake.        Extension, Long Arc Quads       Straighten the affected leg fully. Lower, and repeat  Repeat 10 times each leg per set. Do 3 sets per session. Do 2 sessions per day.               Copyright © 3217-4781 [employer]  Copyright © [employer]. All rights reserved.

## 2024-12-17 NOTE — TELEPHONE ENCOUNTER
No care due was identified.  [facility] Embedded Care Due Messages. Reference number: [id number].   12/16/2024 7:36:58 PM CST

## 2024-12-30 ENCOUNTER — TELEPHONE (OUTPATIENT)
Dept: ORTHOPEDICS | Facility: CLINIC | Age: 74
End: 2024-12-30
Payer: MEDICARE

## 2024-12-30 NOTE — TELEPHONE ENCOUNTER
Called pt and cxl'd all appts associated with her surgery for 1/14/25 and we will see her back in May to discuss surgery and she has a new surgery date of 6/3/25.  Pt verbalized understanding and has no further questions.    ----- Message from Isamar sent at 12/30/2024  8:28 AM CST -----  Regarding: PT'S DAUGHTER HAD TO HAVE EMERGENCY SURGERY DUE TO HURTING LEG  Contact: PT  Pt will not be able have help after surgery.. Pt would like to move surgery to maybe close to the summer..     SURGERY CANCELLATION  Confirmed contact info below:  Contact Name: Jojo Burrows  Phone Number: [phone]

## 2025-02-04 DIAGNOSIS — G47.00 INSOMNIA, UNSPECIFIED TYPE: ICD-10-CM

## 2025-02-04 RX ORDER — ZOLPIDEM TARTRATE 5 MG/1
5 TABLET ORAL NIGHTLY PRN
Qty: 30 TABLET | Refills: 3 | Status: CANCELLED | OUTPATIENT
Start: 2025-02-04

## 2025-02-04 NOTE — TELEPHONE ENCOUNTER
No care due was identified.  [facility] Embedded Care Due Messages. Reference number: [id number].   2/04/2025 5:21:25 PM CST

## 2025-02-17 ENCOUNTER — OFFICE VISIT (OUTPATIENT)
Dept: OPTOMETRY | Facility: CLINIC | Age: 75
End: 2025-02-17
Payer: MEDICARE

## 2025-02-17 DIAGNOSIS — H52.4 PRESBYOPIA: ICD-10-CM

## 2025-02-17 DIAGNOSIS — H25.13 NUCLEAR SCLEROSIS, BILATERAL: ICD-10-CM

## 2025-02-17 DIAGNOSIS — H00.014 HORDEOLUM EXTERNUM LEFT UPPER EYELID: Primary | ICD-10-CM

## 2025-02-17 DIAGNOSIS — Z13.5 GLAUCOMA SCREENING: ICD-10-CM

## 2025-02-17 PROCEDURE — 1159F MED LIST DOCD IN RCRD: CPT | Mod: CPTII,S$GLB,, | Performed by: OPTOMETRIST

## 2025-02-17 PROCEDURE — 3288F FALL RISK ASSESSMENT DOCD: CPT | Mod: CPTII,S$GLB,, | Performed by: OPTOMETRIST

## 2025-02-17 PROCEDURE — 1126F AMNT PAIN NOTED NONE PRSNT: CPT | Mod: CPTII,S$GLB,, | Performed by: OPTOMETRIST

## 2025-02-17 PROCEDURE — 1101F PT FALLS ASSESS-DOCD LE1/YR: CPT | Mod: CPTII,S$GLB,, | Performed by: OPTOMETRIST

## 2025-02-17 PROCEDURE — 1160F RVW MEDS BY RX/DR IN RCRD: CPT | Mod: CPTII,S$GLB,, | Performed by: OPTOMETRIST

## 2025-02-17 RX ORDER — AMOXICILLIN AND CLAVULANATE POTASSIUM 500; 125 MG/1; MG/1
1 TABLET, FILM COATED ORAL 2 TIMES DAILY
Qty: 20 TABLET | Refills: 0 | Status: SHIPPED | OUTPATIENT
Start: 2025-02-17 | End: 2025-02-27

## 2025-02-17 NOTE — PROGRESS NOTES
HPI    76 Y/o female is here for routine eye exam with C/o pt had a stye on upper   OS eye lid about 2 months ago, pt said she was treated by the ER, and was   giving and ointment and oral medication, pt say's she discontinued the   oral medication it  made her itchy nauseous. Pt say's head on bump went   away but about two weeks ago it started back up again.  Pt says while   doing [facility] testing vision went from blurry to clear often.  Pt denies pain and discomfort   No f/f    Eye med: Erythromycin OS PRN  Last edited by Salo Siu MA on 2/17/2025 10:16 AM.            Assessment /Plan     For exam results, see Encounter Report.    Hordeolum externum left upper eyelid  -     amoxicillin-clavulanate 500-125mg (AUGMENTIN) 500-125 mg Tab; Take 1 tablet (500 mg total) by mouth 2 (two) times daily. for 10 days  Dispense: 20 tablet; Refill: 0    Nuclear sclerosis, bilateral    Glaucoma screening    Presbyopia      Cat OU--pt has problems at night, but wishes to wait on surgery.  Happy w otc readers  Ext jose QUANG    PLAN:    1) Hot compresses, 5 minutes at a time, QID, along with AUGMENTIN BID PO X 7-10 days (pt reports has taken AUGMENTIN in past w no problems, despite allergy warning (she took DOXY in past but had stomach problems))  2) Discussed chalazion formation  3) Pt will call if not resolved in one week, and will schedule excision.  Otherwise rtc 1 yr

## 2025-02-18 ENCOUNTER — LAB VISIT (OUTPATIENT)
Dept: LAB | Facility: HOSPITAL | Age: 75
End: 2025-02-18
Payer: MEDICARE

## 2025-02-18 DIAGNOSIS — I72.3 ANEURYSM OF COMMON ILIAC ARTERY: ICD-10-CM

## 2025-02-18 DIAGNOSIS — I10 ESSENTIAL HYPERTENSION: ICD-10-CM

## 2025-02-18 LAB
ALBUMIN SERPL BCP-MCNC: 3.9 G/DL (ref 3.5–5.2)
ALP SERPL-CCNC: 90 U/L (ref 40–150)
ALT SERPL W/O P-5'-P-CCNC: 32 U/L (ref 10–44)
ANION GAP SERPL CALC-SCNC: 9 MMOL/L (ref 8–16)
AST SERPL-CCNC: 43 U/L (ref 10–40)
BASOPHILS # BLD AUTO: 0.02 K/UL (ref 0–0.2)
BASOPHILS NFR BLD: 0.3 % (ref 0–1.9)
BILIRUB SERPL-MCNC: 0.5 MG/DL (ref 0.1–1)
BUN SERPL-MCNC: 15 MG/DL (ref 8–23)
CALCIUM SERPL-MCNC: 9.6 MG/DL (ref 8.7–10.5)
CHLORIDE SERPL-SCNC: 104 MMOL/L (ref 95–110)
CHOLEST SERPL-MCNC: 145 MG/DL (ref 120–199)
CHOLEST/HDLC SERPL: 2.2 {RATIO} (ref 2–5)
CO2 SERPL-SCNC: 25 MMOL/L (ref 23–29)
CREAT SERPL-MCNC: 0.8 MG/DL (ref 0.5–1.4)
DIFFERENTIAL METHOD BLD: ABNORMAL
EOSINOPHIL # BLD AUTO: 0.1 K/UL (ref 0–0.5)
EOSINOPHIL NFR BLD: 0.9 % (ref 0–8)
ERYTHROCYTE [DISTWIDTH] IN BLOOD BY AUTOMATED COUNT: 13.6 % (ref 11.5–14.5)
EST. GFR  (NO RACE VARIABLE): >60 ML/MIN/1.73 M^2
GLUCOSE SERPL-MCNC: 86 MG/DL (ref 70–110)
HCT VFR BLD AUTO: 39.5 % (ref 37–48.5)
HDLC SERPL-MCNC: 67 MG/DL (ref 40–75)
HDLC SERPL: 46.2 % (ref 20–50)
HGB BLD-MCNC: 12.9 G/DL (ref 12–16)
IMM GRANULOCYTES # BLD AUTO: 0.02 K/UL (ref 0–0.04)
IMM GRANULOCYTES NFR BLD AUTO: 0.3 % (ref 0–0.5)
LDLC SERPL CALC-MCNC: 71.4 MG/DL (ref 63–159)
LYMPHOCYTES # BLD AUTO: 2.5 K/UL (ref 1–4.8)
LYMPHOCYTES NFR BLD: 39.7 % (ref 18–48)
MCH RBC QN AUTO: 29.9 PG (ref 27–31)
MCHC RBC AUTO-ENTMCNC: 32.7 G/DL (ref 32–36)
MCV RBC AUTO: 91 FL (ref 82–98)
MONOCYTES # BLD AUTO: 0.7 K/UL (ref 0.3–1)
MONOCYTES NFR BLD: 11.7 % (ref 4–15)
NEUTROPHILS # BLD AUTO: 3 K/UL (ref 1.8–7.7)
NEUTROPHILS NFR BLD: 47.1 % (ref 38–73)
NONHDLC SERPL-MCNC: 78 MG/DL
NRBC BLD-RTO: 0 /100 WBC
PLATELET # BLD AUTO: 149 K/UL (ref 150–450)
PMV BLD AUTO: 12.3 FL (ref 9.2–12.9)
POTASSIUM SERPL-SCNC: 3.9 MMOL/L (ref 3.5–5.1)
PROT SERPL-MCNC: 7.1 G/DL (ref 6–8.4)
RBC # BLD AUTO: 4.32 M/UL (ref 4–5.4)
SODIUM SERPL-SCNC: 138 MMOL/L (ref 136–145)
TRIGL SERPL-MCNC: 33 MG/DL (ref 30–150)
WBC # BLD AUTO: 6.33 K/UL (ref 3.9–12.7)

## 2025-02-18 PROCEDURE — 85025 COMPLETE CBC W/AUTO DIFF WBC: CPT | Mod: HCNC | Performed by: INTERNAL MEDICINE

## 2025-02-18 PROCEDURE — 36415 COLL VENOUS BLD VENIPUNCTURE: CPT | Mod: HCNC,PO | Performed by: INTERNAL MEDICINE

## 2025-02-18 PROCEDURE — 80053 COMPREHEN METABOLIC PANEL: CPT | Mod: HCNC | Performed by: INTERNAL MEDICINE

## 2025-02-18 PROCEDURE — 80061 LIPID PANEL: CPT | Mod: HCNC | Performed by: INTERNAL MEDICINE

## 2025-02-21 DIAGNOSIS — Z00.00 ENCOUNTER FOR MEDICARE ANNUAL WELLNESS EXAM: ICD-10-CM

## 2025-02-25 ENCOUNTER — OFFICE VISIT (OUTPATIENT)
Dept: INTERNAL MEDICINE | Facility: CLINIC | Age: 75
End: 2025-02-25
Payer: MEDICARE

## 2025-02-25 VITALS
HEART RATE: 62 BPM | BODY MASS INDEX: 30.73 KG/M2 | DIASTOLIC BLOOD PRESSURE: 68 MMHG | SYSTOLIC BLOOD PRESSURE: 122 MMHG | TEMPERATURE: 98 F | RESPIRATION RATE: 12 BRPM | OXYGEN SATURATION: 97 % | HEIGHT: 60 IN | WEIGHT: 156.5 LBS

## 2025-02-25 DIAGNOSIS — M85.80 OSTEOPENIA, UNSPECIFIED LOCATION: ICD-10-CM

## 2025-02-25 DIAGNOSIS — I10 ESSENTIAL HYPERTENSION: Primary | ICD-10-CM

## 2025-02-25 DIAGNOSIS — M17.0 PRIMARY OSTEOARTHRITIS OF BOTH KNEES: ICD-10-CM

## 2025-02-25 DIAGNOSIS — R10.10 PAIN OF UPPER ABDOMEN: ICD-10-CM

## 2025-02-25 DIAGNOSIS — E78.49 OTHER HYPERLIPIDEMIA: ICD-10-CM

## 2025-02-25 PROCEDURE — 3078F DIAST BP <80 MM HG: CPT | Mod: HCNC,CPTII,S$GLB, | Performed by: INTERNAL MEDICINE

## 2025-02-25 PROCEDURE — 1126F AMNT PAIN NOTED NONE PRSNT: CPT | Mod: HCNC,CPTII,S$GLB, | Performed by: INTERNAL MEDICINE

## 2025-02-25 PROCEDURE — G2211 COMPLEX E/M VISIT ADD ON: HCPCS | Mod: HCNC,S$GLB,, | Performed by: INTERNAL MEDICINE

## 2025-02-25 PROCEDURE — 99214 OFFICE O/P EST MOD 30 MIN: CPT | Mod: HCNC,S$GLB,, | Performed by: INTERNAL MEDICINE

## 2025-02-25 PROCEDURE — 99999 PR PBB SHADOW E&M-EST. PATIENT-LVL V: CPT | Mod: PBBFAC,HCNC,, | Performed by: INTERNAL MEDICINE

## 2025-02-25 PROCEDURE — 3074F SYST BP LT 130 MM HG: CPT | Mod: HCNC,CPTII,S$GLB, | Performed by: INTERNAL MEDICINE

## 2025-02-25 RX ORDER — ROSUVASTATIN CALCIUM 20 MG/1
20 TABLET, COATED ORAL DAILY
Qty: 90 TABLET | Refills: 3 | Status: SHIPPED | OUTPATIENT
Start: 2025-02-25 | End: 2026-02-25

## 2025-02-25 NOTE — PROGRESS NOTES
Subjective:       Patient ID: Jojo Burrows is a 75 y.o. female.    Chief Complaint: Follow-up    History of Present Illness    Jojo presents today for follow up of multiple concerns including abdominal pain and medication management.  Active medical conditions include hypertension, osteoarthritis of the right knee, abdominal aortic aneurysm, GERD, hyperlipidemia.    GASTROINTESTINAL:  She reports chronic pain in upper abdomen, unilateral in location. Pain is aggravated by acidic foods such as tomatoes, spicy foods, and fried foods. She currently takes Tums for symptom management with partial relief at night. Previously used Nexium with good results for pain and gas symptoms. She denies difficulty swallowing or food getting stuck.    CARDIOVASCULAR:  Current measurements of the abdominal aortic aneurysm are 2.4 x 3.3 cm, increased from 2.8 x 3.1 cm in 2023.    SLEEP:  She takes Zolpidem for sleep but reports difficulty falling asleep without medication, often staying awake all night when not taking it. She takes the medication between 11:30 PM to midnight, acknowledging this may be too late. She describes an irregular sleep schedule, sleeping late unless she has commitments, and reports daytime fatigue.    MEDICATION SIDE EFFECTS:  She reports weakness with Atorvastatin, mood changes and rapid heart rate when missing Metoprolol doses, and night sweats since starting medications.    UPCOMING SURGERY:  She has knee replacement surgery scheduled for Luly 3, 2025 with Dr. Chow and expresses concern about post-operative recovery period and need for assistance during initial weeks following surgery.      ROS:  Constitutional: +fatigue, +night sweats  ENT: -difficulty swallowing  Gastrointestinal: +abdominal pain  Neurological: +weakness              Physical Exam  Vitals and nursing note reviewed.   Constitutional:       General: She is not in acute distress.     Appearance: Normal appearance. She is well-developed.    HENT:      Head: Normocephalic and atraumatic.   Eyes:      General: No scleral icterus.     Extraocular Movements: Extraocular movements intact.      Conjunctiva/sclera: Conjunctivae normal.   Neck:      Thyroid: No thyromegaly.      Vascular: No JVD.   Cardiovascular:      Rate and Rhythm: Normal rate and regular rhythm.      Heart sounds: Normal heart sounds. No murmur heard.     No friction rub. No gallop.   Pulmonary:      Effort: Pulmonary effort is normal. No respiratory distress.      Breath sounds: Normal breath sounds. No wheezing or rales.   Abdominal:      General: Bowel sounds are normal.      Palpations: Abdomen is soft. There is no mass.      Tenderness: There is no abdominal tenderness.   Musculoskeletal:         General: Tenderness present.      Cervical back: Normal range of motion and neck supple.      Right lower leg: No edema.      Left lower leg: No edema.      Comments: Right knee:  Hypertrophic joint changes are present.  Tenderness is noted with range-of-motion testing.  Decreased flexion noted.   Lymphadenopathy:      Cervical: No cervical adenopathy.   Skin:     General: Skin is warm and dry.      Findings: No rash.   Neurological:      Mental Status: She is alert and oriented to person, place, and time.   Psychiatric:         Mood and Affect: Mood normal.         Behavior: Behavior normal.           Lab Visit on 02/18/2025   Component Date Value Ref Range Status    Sodium 02/18/2025 138  136 - 145 mmol/L Final    Potassium 02/18/2025 3.9  3.5 - 5.1 mmol/L Final    Chloride 02/18/2025 104  95 - 110 mmol/L Final    CO2 02/18/2025 25  23 - 29 mmol/L Final    Glucose 02/18/2025 86  70 - 110 mg/dL Final    BUN 02/18/2025 15  8 - 23 mg/dL Final    Creatinine 02/18/2025 0.8  0.5 - 1.4 mg/dL Final    Calcium 02/18/2025 9.6  8.7 - 10.5 mg/dL Final    Total Protein 02/18/2025 7.1  6.0 - 8.4 g/dL Final    Albumin 02/18/2025 3.9  3.5 - 5.2 g/dL Final    Total Bilirubin 02/18/2025 0.5  0.1 - 1.0  mg/dL Final    Comment: For infants and newborns, interpretation of results should be based  on gestational age, weight and in agreement with clinical  observations.    Premature Infant recommended reference ranges:  Up to 24 hours.............<8.0 mg/dL  Up to 48 hours............<12.0 mg/dL  3-5 days..................<15.0 mg/dL  6-29 days.................<15.0 mg/dL      Alkaline Phosphatase 02/18/2025 90  40 - 150 U/L Final    AST 02/18/2025 43 (H)  10 - 40 U/L Final    ALT 02/18/2025 32  10 - 44 U/L Final    eGFR 02/18/2025 >60.0  >60 mL/min/1.73 m^2 Final    Anion Gap 02/18/2025 9  8 - 16 mmol/L Final    WBC 02/18/2025 6.33  3.90 - 12.70 K/uL Final    RBC 02/18/2025 4.32  4.00 - 5.40 M/uL Final    Hemoglobin 02/18/2025 12.9  12.0 - 16.0 g/dL Final    Hematocrit 02/18/2025 39.5  37.0 - 48.5 % Final    MCV 02/18/2025 91  82 - 98 fL Final    MCH 02/18/2025 29.9  27.0 - 31.0 pg Final    MCHC 02/18/2025 32.7  32.0 - 36.0 g/dL Final    RDW 02/18/2025 13.6  11.5 - 14.5 % Final    Platelets 02/18/2025 149 (L)  150 - 450 K/uL Final    MPV 02/18/2025 12.3  9.2 - 12.9 fL Final    Immature Granulocytes 02/18/2025 0.3  0.0 - 0.5 % Final    Gran # (ANC) 02/18/2025 3.0  1.8 - 7.7 K/uL Final    Immature Grans (Abs) 02/18/2025 0.02  0.00 - 0.04 K/uL Final    Comment: Mild elevation in immature granulocytes is non specific and   can be seen in a variety of conditions including stress response,   acute inflammation, trauma and pregnancy. Correlation with other   laboratory and clinical findings is essential.      Lymph # 02/18/2025 2.5  1.0 - 4.8 K/uL Final    Mono # 02/18/2025 0.7  0.3 - 1.0 K/uL Final    Eos # 02/18/2025 0.1  0.0 - 0.5 K/uL Final    Baso # 02/18/2025 0.02  0.00 - 0.20 K/uL Final    nRBC 02/18/2025 0  0 /100 WBC Final    Gran % 02/18/2025 47.1  38.0 - 73.0 % Final    Lymph % 02/18/2025 39.7  18.0 - 48.0 % Final    Mono % 02/18/2025 11.7  4.0 - 15.0 % Final    Eosinophil % 02/18/2025 0.9  0.0 - 8.0 % Final     Basophil % 02/18/2025 0.3  0.0 - 1.9 % Final    Differential Method 02/18/2025 Automated   Final    Cholesterol 02/18/2025 145  120 - 199 mg/dL Final    Comment: The National Cholesterol Education Program (NCEP) has set the  following guidelines (reference ranges) for Cholesterol:  Optimal.....................<200 mg/dL  Borderline High.............200-239 mg/dL  High........................> or = 240 mg/dL      Triglycerides 02/18/2025 33  30 - 150 mg/dL Final    Comment: The National Cholesterol Education Program (NCEP) has set the  following guidelines (reference values) for triglycerides:  Normal......................<150 mg/dL  Borderline High.............150-199 mg/dL  High........................200-499 mg/dL      HDL 02/18/2025 67  40 - 75 mg/dL Final    Comment: The National Cholesterol Education Program (NCEP) has set the  following guidelines (reference values) for HDL Cholesterol:  Low...............<40 mg/dL  Optimal...........>60 mg/dL      LDL Cholesterol 02/18/2025 71.4  63.0 - 159.0 mg/dL Final    Comment: The National Cholesterol Education Program (NCEP) has set the  following guidelines (reference values) for LDL Cholesterol:  Optimal.......................<130 mg/dL  Borderline High...............130-159 mg/dL  High..........................160-189 mg/dL  Very High.....................>190 mg/dL      HDL/Cholesterol Ratio 02/18/2025 46.2  20.0 - 50.0 % Final    Total Cholesterol/HDL Ratio 02/18/2025 2.2  2.0 - 5.0 Final    Non-HDL Cholesterol 02/18/2025 78  mg/dL Final    Comment: Risk category and Non-HDL cholesterol goals:  Coronary heart disease (CHD)or equivalent (10-year risk of CHD >20%):  Non-HDL cholesterol goal     <130 mg/dL  Two or more CHD risk factors and 10-year risk of CHD <= 20%:  Non-HDL cholesterol goal     <160 mg/dL  0 to 1 CHD risk factor:  Non-HDL cholesterol goal     <190 mg/dL         Assessment & Plan:     Assessment & Plan     Ordered upper endoscopy to investigate  recurring upper abdominal pain and evaluate for potential ulcers or gastritis   Switched atorvastatin to rosuvastatin (Crestor) to address reported side effects while maintaining cholesterol management   Continued metoprolol for blood pressure control and heart rate regulation, as benefits outweigh risks   Recommend resuming regular use of esomeprazole (Nexium) for acid reflux symptoms until endoscopy results are available   Maintained current aspirin regimen as previously recommended by interventional cardiologist    PULMONARY HYPERTENSION:   Continued metoprolol 50 mg extended-release daily for blood pressure and heart rate control.   Clarified that metoprolol helps reduce stress on the heart rather than overworking it.   Discussed importance of controlling blood pressure and cholesterol to prevent long-term health problems.    KNEE SURGERY PREPARATION:   Jojo to continue regular exercise in preparation for upcoming knee surgery.    SLEEP MANAGEMENT:   Jojo to maintain consistent sleep schedule to improve overall health and medication effectiveness.    ANEURYSM EDUCATION:   Explained that aneurysms are anatomical changes in arteries that develop over time and cannot be prevented through lifestyle modifications.    ACID REFLUX:   Resumed esomeprazole (Nexium) every morning for acid reflux symptoms.   Upper endoscopy ordered to evaluate stomach and esophagus.    FOLLOW UP:   Follow up when endoscopy department calls to schedule upper endoscopy procedure.   Contact the office for fasting instructions prior to the procedure.         Follow up in about 4 months (around 6/25/2025).     Joo Munoz MD

## 2025-03-10 ENCOUNTER — TELEPHONE (OUTPATIENT)
Dept: ENDOSCOPY | Facility: HOSPITAL | Age: 75
End: 2025-03-10

## 2025-03-10 ENCOUNTER — CLINICAL SUPPORT (OUTPATIENT)
Dept: ENDOSCOPY | Facility: HOSPITAL | Age: 75
End: 2025-03-10
Attending: INTERNAL MEDICINE
Payer: MEDICARE

## 2025-03-10 DIAGNOSIS — K21.9 GASTROESOPHAGEAL REFLUX DISEASE, UNSPECIFIED WHETHER ESOPHAGITIS PRESENT: ICD-10-CM

## 2025-03-10 DIAGNOSIS — R10.10 PAIN OF UPPER ABDOMEN: ICD-10-CM

## 2025-03-10 NOTE — TELEPHONE ENCOUNTER
Referral for procedure from PAT appointment      Spoke to patient to schedule procedure(s) Upper Endoscopy (EGD)       Physician to perform procedure(s) Dr. JASMYN Vickers  Date of Procedure (s) 03/18/25  Arrival Time 10:30 AM  Time of Procedure(s) 11:30 AM   Location of Procedure(s) [address]  Type of Rx Prep sent to patient: N/A  Instructions provided to patient via [facility]    Patient was informed on the following information and verbalized understanding. Screening questionnaire reviewed with patient and complete. If procedure requires anesthesia, a responsible adult needs to be present to accompany the patient home, patient cannot drive after receiving anesthesia. Appointment details are tentative, especially check-in time. Patient will receive a prep-op call 7 days prior to confirm check-in time for procedure. If applicable the patient should contact their pharmacy to verify Rx for procedure prep is ready for pick-up. Patient was advised to call the scheduling department at [phone] if pharmacy states no Rx is available. Patient was advised to call the endoscopy scheduling department if any questions or concerns arise.       Endoscopy Scheduling Department

## 2025-03-18 ENCOUNTER — HOSPITAL ENCOUNTER (OUTPATIENT)
Facility: HOSPITAL | Age: 75
Discharge: HOME | End: 2025-03-18
Attending: INTERNAL MEDICINE | Admitting: INTERNAL MEDICINE
Payer: MEDICARE

## 2025-03-18 ENCOUNTER — ANESTHESIA EVENT (OUTPATIENT)
Dept: ENDOSCOPY | Facility: HOSPITAL | Age: 75
End: 2025-03-18
Payer: MEDICARE

## 2025-03-18 ENCOUNTER — DOCUMENTATION ONLY (OUTPATIENT)
Dept: ENDOSCOPY | Facility: HOSPITAL | Age: 75
End: 2025-03-18

## 2025-03-18 ENCOUNTER — PATIENT MESSAGE (OUTPATIENT)
Dept: GASTROENTEROLOGY | Facility: CLINIC | Age: 75
End: 2025-03-18

## 2025-03-18 ENCOUNTER — ANESTHESIA (OUTPATIENT)
Dept: ENDOSCOPY | Facility: HOSPITAL | Age: 75
End: 2025-03-18
Payer: MEDICARE

## 2025-03-18 ENCOUNTER — TELEPHONE (OUTPATIENT)
Dept: ENDOSCOPY | Facility: HOSPITAL | Age: 75
End: 2025-03-18
Payer: MEDICARE

## 2025-03-18 VITALS
RESPIRATION RATE: 16 BRPM | HEART RATE: 65 BPM | BODY MASS INDEX: 31.08 KG/M2 | WEIGHT: 158.31 LBS | SYSTOLIC BLOOD PRESSURE: 113 MMHG | DIASTOLIC BLOOD PRESSURE: 62 MMHG | OXYGEN SATURATION: 96 % | HEIGHT: 60 IN | TEMPERATURE: 98 F

## 2025-03-18 DIAGNOSIS — K22.10 EROSIVE ESOPHAGITIS: ICD-10-CM

## 2025-03-18 DIAGNOSIS — K21.9 GASTROESOPHAGEAL REFLUX DISEASE, UNSPECIFIED WHETHER ESOPHAGITIS PRESENT: Primary | ICD-10-CM

## 2025-03-18 PROCEDURE — 37000008 HC ANESTHESIA 1ST 15 MINUTES: Mod: HCNC | Performed by: INTERNAL MEDICINE

## 2025-03-18 PROCEDURE — 63600175 PHARM REV CODE 636 W HCPCS: Mod: HCNC | Performed by: NURSE ANESTHETIST, CERTIFIED REGISTERED

## 2025-03-18 PROCEDURE — 88305 TISSUE EXAM BY PATHOLOGIST: CPT | Mod: 59,HCNC | Performed by: PATHOLOGY

## 2025-03-18 PROCEDURE — 37000009 HC ANESTHESIA EA ADD 15 MINS: Mod: HCNC | Performed by: INTERNAL MEDICINE

## 2025-03-18 PROCEDURE — 43239 EGD BIOPSY SINGLE/MULTIPLE: CPT | Mod: HCNC | Performed by: INTERNAL MEDICINE

## 2025-03-18 PROCEDURE — 27201012 HC FORCEPS, HOT/COLD, DISP: Mod: HCNC | Performed by: INTERNAL MEDICINE

## 2025-03-18 PROCEDURE — 88305 TISSUE EXAM BY PATHOLOGIST: CPT | Mod: 26,HCNC,, | Performed by: PATHOLOGY

## 2025-03-18 PROCEDURE — 43239 EGD BIOPSY SINGLE/MULTIPLE: CPT | Mod: HCNC,,, | Performed by: INTERNAL MEDICINE

## 2025-03-18 RX ORDER — SODIUM CHLORIDE 9 MG/ML
INJECTION, SOLUTION INTRAVENOUS CONTINUOUS
Status: DISCONTINUED | OUTPATIENT
Start: 2025-03-18 | End: 2025-03-18 | Stop reason: HOSPADM

## 2025-03-18 RX ORDER — LIDOCAINE HYDROCHLORIDE 20 MG/ML
INJECTION INTRAVENOUS
Status: DISCONTINUED | OUTPATIENT
Start: 2025-03-18 | End: 2025-03-18

## 2025-03-18 RX ORDER — PANTOPRAZOLE SODIUM 40 MG/1
40 TABLET, DELAYED RELEASE ORAL
Qty: 90 TABLET | Refills: 3 | Status: SHIPPED | OUTPATIENT
Start: 2025-03-18 | End: 2026-03-18

## 2025-03-18 RX ORDER — PROPOFOL 10 MG/ML
VIAL (ML) INTRAVENOUS
Status: DISCONTINUED | OUTPATIENT
Start: 2025-03-18 | End: 2025-03-18

## 2025-03-18 RX ADMIN — PROPOFOL 70 MG: 10 INJECTION, EMULSION INTRAVENOUS at 11:03

## 2025-03-18 RX ADMIN — PROPOFOL 30 MG: 10 INJECTION, EMULSION INTRAVENOUS at 11:03

## 2025-03-18 RX ADMIN — LIDOCAINE HYDROCHLORIDE 75 MG: 20 INJECTION INTRAVENOUS at 11:03

## 2025-03-18 NOTE — H&P
[facility] [address] [facility]  History & Physical    Subjective:      Chief Complaint/Reason for Admission:     Direct access EGD for Diagnoses  Pain of upper abdomen [R10.10]  Gastroesophageal reflux disease, unspecified whether esophagitis present [K21.9]     Jojo Burrows is a 75 y.o. female.    Past Medical History:   Diagnosis Date    Acute hypoxemic respiratory failure 3/31/2020    ALLERGIC RHINITIS     Cataract     COVID-19 virus infection 03/2020    Degenerative disc disease, cervical 9/13/2018    GERD (gastroesophageal reflux disease)     Hyperlipidemia     Hypertension     Migraine headache     Multifocal pneumonia 3/31/2020    Nuclear sclerosis 4/30/2014    Sleep disorder     Thyroid disease     nodular goiter    TIA (transient ischemic attack)      Past Surgical History:   Procedure Laterality Date    BREAST BIOPSY      BREAST CYST ASPIRATION      BREAST CYST EXCISION      COLONOSCOPY N/A 4/29/2016    Procedure: COLONOSCOPY;  Surgeon: Sergio Vickers MD;  Location: [facility] ([address] [place] [address]);  Service: Endoscopy;  Laterality: N/A;    COLONOSCOPY N/A 6/28/2021    Procedure: COLONOSCOPY;  Surgeon: Sergio Vickers MD;  Location: [facility] ([address] [place] [address]);  Service: Endoscopy;  Laterality: N/A;  COVID + 3/2020 and fully vaccinated -   pt requesting Dr. vickers/ instructions emailed-     HYSTERECTOMY       Social History[1]    No medications prior to admission.     Review of patient's allergies indicates:   Allergen Reactions    Vantin [cefpodoxime] Itching     Severe itching, night sweats.  Improved after stopped it.  4/24    Iodine Rash    Iodine and iodide containing products Itching and Rash    Shellfish containing products Itching and Rash        Review of Systems   Constitutional:  Negative for fever.       Objective:      Vital Signs (Most Recent)  Temp: 97.7 °F (36.5 °C) (03/18/25 1140)  Pulse: 65 (03/18/25 1217)  Resp: 16 (03/18/25 1217)  BP: 113/62 (03/18/25 1217)  SpO2: 96 % (03/18/25  1217)    Vital Signs Range (Last 24H):  Temp:  [97.7 °F (36.5 °C)-98.1 °F (36.7 °C)]   Pulse:  [65-79]   Resp:  [16]   BP: (106-130)/(56-62)   SpO2:  [94 %-98 %]     Physical Exam  Cardiovascular:      Rate and Rhythm: Normal rate.   Pulmonary:      Effort: Pulmonary effort is normal.   Neurological:      Mental Status: She is alert and oriented to person, place, and time.             Assessment:      Direct access EGD for Diagnoses  Pain of upper abdomen [R10.10]  Gastroesophageal reflux disease, unspecified whether esophagitis present [K21.9]       Plan:    Direct access EGD for Diagnoses  Pain of upper abdomen [R10.10]  Gastroesophageal reflux disease, unspecified whether esophagitis present [K21.9]            [1]   Social History  Tobacco Use    Smoking status: Never    Smokeless tobacco: Never   Substance Use Topics    Alcohol use: No    Drug use: No

## 2025-03-18 NOTE — ANESTHESIA PREPROCEDURE EVALUATION
03/18/2025  Jojo Burrows is a 75 y.o., female.  Past Medical History:   Diagnosis Date    Acute hypoxemic respiratory failure 3/31/2020    ALLERGIC RHINITIS     Cataract     COVID-19 virus infection 03/2020    Degenerative disc disease, cervical 9/13/2018    GERD (gastroesophageal reflux disease)     Hyperlipidemia     Hypertension     Migraine headache     Multifocal pneumonia 3/31/2020    Nuclear sclerosis 4/30/2014    Sleep disorder     Thyroid disease     nodular goiter    TIA (transient ischemic attack)      Past Surgical History:   Procedure Laterality Date    BREAST BIOPSY      BREAST CYST ASPIRATION      BREAST CYST EXCISION      COLONOSCOPY N/A 4/29/2016    Procedure: COLONOSCOPY;  Surgeon: Sergio Vickers MD;  Location: [facility] ([address] [place] [address]);  Service: Endoscopy;  Laterality: N/A;    COLONOSCOPY N/A 6/28/2021    Procedure: COLONOSCOPY;  Surgeon: Sergio Vickers MD;  Location: [facility] ([address] [place] [address]);  Service: Endoscopy;  Laterality: N/A;  COVID + 3/2020 and fully vaccinated -   pt requesting Dr. vickers/ instructions emailed-     HYSTERECTOMY         Pre-op Assessment    I have reviewed the Patient Summary Reports.     I have reviewed the Nursing Notes.    I have reviewed the Medications.     Review of Systems  Anesthesia Hx:   History of prior surgery of interest to airway management or planning:             Cardiovascular:     Hypertension                                    Hypertension         Pulmonary:  Pneumonia                  Pulmonary Infection:  Pneumonia.     Hepatic/GI:     GERD         Gerd          Musculoskeletal:  Arthritis        Arthritis          Neurological:  TIA,  Neuromuscular Disease,  Headaches      Dx of Headaches   Arthritis               TIA - Transient Ischemic Attack             Neuromuscular Disease          Anesthesia Plan  Type of Anesthesia, risks & benefits  discussed:    Anesthesia Type: Gen Natural Airway  Intra-op Monitoring Plan: Standard ASA Monitors  Induction:  IV  Informed Consent: Informed consent signed with the Patient and all parties understand the risks and agree with anesthesia plan.  All questions answered.   ASA Score: 2    Ready For Surgery From Anesthesia Perspective.     .

## 2025-03-18 NOTE — [employer] PATIENT INSTRUCTIONS
Discharge Summary/Instructions after an Endoscopic Procedure  Patient Name: Jojo Burrows  Patient MRN: [id number]  Patient YOB: 1950 Tuesday, March 18, 2025  Sergio Vickers MD  Dear patient,  As a result of recent federal legislation (The Federal Cures Act), you may   receive lab or pathology results from your procedure in your [facility]   account before your physician is able to contact you. Your physician or   their representative will relay the results to you with their   recommendations at their soonest availability.  Thank you,  RESTRICTIONS:  During your procedure today, you received medications for sedation.  These   medications may affect your judgment, balance and coordination.  Therefore,   for 24 hours, you have the following restrictions:   - DO NOT drive a car, operate machinery, make legal/financial decisions,   sign important papers or drink alcohol.    ACTIVITY:  Today: no heavy lifting, straining or running due to procedural   sedation/anesthesia.  The following day: return to full activity including work.  DIET:  Eat and drink normally unless instructed otherwise.     TREATMENT FOR COMMON SIDE EFFECTS:  - Mild abdominal pain, nausea, belching, bloating or excessive gas:  rest,   eat lightly and use a heating pad.  - Sore Throat: treat with throat lozenges and/or gargle with warm salt   water.  - Because air was used during the procedure, expelling large amounts of air   from your rectum or belching is normal.  - If a bowel prep was taken, you may not have a bowel movement for 1-3 days.    This is normal.  SYMPTOMS TO WATCH FOR AND REPORT TO YOUR PHYSICIAN:  1. Abdominal pain or bloating, other than gas cramps.  2. Chest pain.  3. Back pain.  4. Signs of infection such as: chills or fever occurring within 24 hours   after the procedure.  5. Rectal bleeding, which would show as bright red, maroon, or black stools.   (A tablespoon of blood from the rectum is not serious, especially if    hemorrhoids are present.)  6. Vomiting.  7. Weakness or dizziness.  GO DIRECTLY TO THE NEAREST EMERGENCY ROOM IF YOU HAVE ANY OF THE FOLLOWING:      Difficulty breathing              Chills and/or fever over 101 F   Persistent vomiting and/or vomiting blood   Severe abdominal pain   Severe chest pain   Black, tarry stools   Bleeding- more than one tablespoon   Any other symptom or condition that you feel may need urgent attention  Your doctor recommends these additional instructions:  If any biopsies were taken, your doctors clinic will contact you in 1 to 2   weeks with any results.  - Discharge patient to home.   - Follow an antireflux regimen.   - Await pathology results.   - Telephone endoscopist for pathology results in 3 weeks.   - Use Protonix 40 mg once daily (or any other full strength proton pump   inhibitor) - best taken 45 minutes before your first protein containing   meal.   - Return to GI clinic at the next available appointment.   - Repeat upper endoscopy in 8 weeks to check healing. Referral placed.  - Return to referring physician.   - The findings and recommendations were discussed with the patient.  For questions, problems or results please call your physician - Sergio Vickers MD at Work:  [phone].  [facility], EMERGENCY ROOM PHONE NUMBER: [phone]  IF A COMPLICATION OR EMERGENCY SITUATION ARISES AND YOU ARE UNABLE TO REACH   YOUR PHYSICIAN - GO DIRECTLY TO THE EMERGENCY ROOM.  Sergio Vickers MD  3/18/2025 11:34:24 AM  This report has been verified and signed electronically.  Dear patient,  As a result of recent federal legislation (The Federal Cures Act), you may   receive lab or pathology results from your procedure in your [facility]   account before your physician is able to contact you. Your physician or   their representative will relay the results to you with their   recommendations at their soonest availability.  Thank you,  [employer]ATION

## 2025-03-18 NOTE — PROGRESS NOTES
Referral placed to endoscopy schedulers.    Procedure: EGD    Diagnosis: Erosive Esophagitis    Procedure Timin weeks    Location: Any Site    Additional Scheduling Information: No scheduling concerns    Prep Specifications:Standard prep    Is the patient taking a GLP-1 Agonist:no but please ask.    Have you attached a patient to this message: yes

## 2025-03-18 NOTE — TRANSFER OF CARE
Anesthesia Transfer of Care Note    Patient: Jojo Burrows    Procedure(s) Performed: Procedure(s) (LRB):  EGD (ESOPHAGOGASTRODUODENOSCOPY) (N/A)    Patient location: PACU    Anesthesia Type: general    Transport from OR: Transported from OR on room air with adequate spontaneous ventilation    Post pain: adequate analgesia    Post assessment: no apparent anesthetic complications and tolerated procedure well    Post vital signs: stable    Level of consciousness: awake, alert and oriented    Nausea/Vomiting: no nausea/vomiting    Complications: none    Transfer of care protocol was followed      Last vitals: Visit Vitals  BP (!) 106/56 (BP Location: Left arm, Patient Position: Lying)   Pulse 79   Temp 36.5 °C (97.7 °F) (Temporal)   Resp 16   Ht 5' (1.524 m)   Wt 71.8 kg (158 lb 4.6 oz)   SpO2 (!) 94%   Breastfeeding No   BMI 30.91 kg/m²

## 2025-03-18 NOTE — ANESTHESIA POSTPROCEDURE EVALUATION
Anesthesia Post Evaluation    Patient: Jojo Burrows    Procedure(s) Performed: Procedure(s) (LRB):  EGD (ESOPHAGOGASTRODUODENOSCOPY) (N/A)    Final Anesthesia Type: general      Patient location during evaluation: GI PACU  Patient participation: Yes- Able to Participate  Level of consciousness: awake  Post-procedure vital signs: reviewed and stable  Pain management: adequate  Airway patency: patent    PONV status at discharge: No PONV  Anesthetic complications: no      Cardiovascular status: blood pressure returned to baseline  Respiratory status: unassisted, spontaneous ventilation and room air                Vitals Value Taken Time   /62 03/18/25 12:17   Temp 36.5 °C (97.7 °F) 03/18/25 11:40   Pulse 65 03/18/25 12:17   Resp 16 03/18/25 12:17   SpO2 96 % 03/18/25 12:17         No case tracking events are documented in the log.      Pain/Wagner Score: Wagner Score: 10 (3/18/2025 11:54 AM)

## 2025-03-19 LAB
FINAL PATHOLOGIC DIAGNOSIS: NORMAL
GROSS: NORMAL
Lab: NORMAL

## 2025-04-06 ENCOUNTER — RESULTS FOLLOW-UP (OUTPATIENT)
Dept: GASTROENTEROLOGY | Facility: CLINIC | Age: 75
End: 2025-04-06

## 2025-04-06 ENCOUNTER — PATIENT MESSAGE (OUTPATIENT)
Dept: GASTROENTEROLOGY | Facility: CLINIC | Age: 75
End: 2025-04-06
Payer: MEDICARE

## 2025-04-21 ENCOUNTER — TELEPHONE (OUTPATIENT)
Dept: ORTHOPEDICS | Facility: CLINIC | Age: 75
End: 2025-04-21
Payer: MEDICARE

## 2025-04-21 ENCOUNTER — TELEPHONE (OUTPATIENT)
Dept: ENDOSCOPY | Facility: HOSPITAL | Age: 75
End: 2025-04-21
Payer: MEDICARE

## 2025-04-21 VITALS — HEIGHT: 60 IN | WEIGHT: 158.31 LBS | BODY MASS INDEX: 31.08 KG/M2

## 2025-04-21 NOTE — TELEPHONE ENCOUNTER
Called pt and LVM asking her to call back or reach out via portal.    ----- Message from Tomasa sent at 4/21/2025  3:57 PM CDT -----  Regarding: Consult/Advisory  Contact: pt @ [phone]  Consult/Advisory Name Of Caller: Jojo Burrows Contact Preference: [phone] Nature of call: pt is calling to get surgery date. Asking for a call back

## 2025-04-21 NOTE — TELEPHONE ENCOUNTER
----- Message from Tatum sent at 3/19/2025 12:45 PM CDT -----    ----- Message -----  From: Sergio Vickers MD  Sent: 3/18/2025  11:35 AM CDT  To: [facility]    Procedure: EGDDiagnosis: Erosive EsophagitisProcedure Timin weeksLocation: Any SiteAdditional Scheduling Information: No scheduling concernsPrep Specifications:Standard prepIs the patient taking a GLP-1 Agonist:no but please ask.Have you attached a patient to this message: yes

## 2025-04-21 NOTE — TELEPHONE ENCOUNTER
Called pt and explained that from what I can see her surgery is scheduled for 5/27. Pt explained she thought it was 6/3 pt said she will call back to confirm that 5/27 works.     ----- Message from Mini sent at 4/21/2025  4:14 PM CDT -----  Regarding: Appt  Contact: pt [phone]  Pt is requesting call back to discuss plan of care, pt would like to confirm date of surgery, pt has to schedule a repeat colonoscopy, please call pt @ [phone]

## 2025-04-21 NOTE — TELEPHONE ENCOUNTER
Contacted the patient to schedule an endoscopy procedure(s) Upper Endoscopy (EGD) . The patient did not answer the call and left a voice message requesting a call back.

## 2025-04-21 NOTE — TELEPHONE ENCOUNTER
Pt called to schedule egd follow up in 8 wks. Pt scheduled for knee surgery 5/27/25.pt to call dr schmidt regarding knee surgery date and call back to schedule.

## 2025-04-23 ENCOUNTER — TELEPHONE (OUTPATIENT)
Dept: ORTHOPEDICS | Facility: CLINIC | Age: 75
End: 2025-04-23
Payer: MEDICARE

## 2025-04-23 NOTE — TELEPHONE ENCOUNTER
Called pt and confirmed that I have her down for 5/27/25 surgery date and some pe-op appts to follow as well. Pt verbalized understanding and has no further questions.     ----- Message from Isamar sent at 4/23/2025  2:53 PM CDT -----  Regarding: PT IS STATING THAT 5/27 IS A GOOD DATE FOR SURGERY  Contact: PT  Confirmed contact info below:Contact Name: Jojo PandabenoitPage Number: [phone]

## 2025-04-25 ENCOUNTER — TELEPHONE (OUTPATIENT)
Dept: ENDOSCOPY | Facility: HOSPITAL | Age: 75
End: 2025-04-25
Payer: MEDICARE

## 2025-04-25 VITALS — WEIGHT: 155 LBS | BODY MASS INDEX: 30.43 KG/M2 | HEIGHT: 60 IN

## 2025-04-25 DIAGNOSIS — R10.10 PAIN OF UPPER ABDOMEN: ICD-10-CM

## 2025-04-25 DIAGNOSIS — K21.9 GASTROESOPHAGEAL REFLUX DISEASE, UNSPECIFIED WHETHER ESOPHAGITIS PRESENT: Primary | ICD-10-CM

## 2025-04-25 NOTE — TELEPHONE ENCOUNTER
----- Message -----   From: Shanika Sanchez MA   Sent: 4/24/2025   4:00 PM CDT   To: [facility] Endoscopy Schedulers       ----- Message -----   From: Rodrigo Harmon   Sent: 4/24/2025   3:55 PM CDT   To: [facility] Staff     .Type:  Needs Medical Advice     Who Called: pt     Would the patient rather a call back or a response via [facility]? Call back   Best Call Back Number: [phone]   Additional Information:     Pt stated she would like a call back to schedule her endoscopy

## 2025-04-25 NOTE — TELEPHONE ENCOUNTER
Referral for procedure from [facility]      Spoke to patient to schedule procedure(s) Upper Endoscopy (EGD)       Physician to perform procedure(s) Dr. JASMYN Vickers  Date of Procedure (s) 6/9/25  Arrival Time 9:30 AM  Time of Procedure(s) 10:30 AM   Location of Procedure(s) [facility] 2nd Floor  Type of Rx Prep sent to patient: N/A  Instructions provided to patient via [facility]    Patient was informed on the following information and verbalized understanding. Screening questionnaire reviewed with patient and complete. If procedure requires anesthesia, a responsible adult needs to be present to accompany the patient home, patient cannot drive after receiving anesthesia. Appointment details are tentative, especially check-in time. Patient will receive a prep-op call 7 days prior to confirm check-in time for procedure. If applicable the patient should contact their pharmacy to verify Rx for procedure prep is ready for pick-up. Patient was advised to call the scheduling department at [phone] if pharmacy states no Rx is available. Patient was advised to call the endoscopy scheduling department if any questions or concerns arise.       Endoscopy Scheduling Department

## 2025-05-06 DIAGNOSIS — M17.11 PRIMARY OSTEOARTHRITIS OF RIGHT KNEE: Primary | ICD-10-CM

## 2025-05-07 ENCOUNTER — OFFICE VISIT (OUTPATIENT)
Dept: ORTHOPEDICS | Facility: CLINIC | Age: 75
End: 2025-05-07
Payer: MEDICARE

## 2025-05-07 ENCOUNTER — HOSPITAL ENCOUNTER (OUTPATIENT)
Dept: RADIOLOGY | Facility: HOSPITAL | Age: 75
Discharge: HOME | End: 2025-05-07
Attending: ORTHOPAEDIC SURGERY
Payer: MEDICARE

## 2025-05-07 ENCOUNTER — OFFICE VISIT (OUTPATIENT)
Dept: INTERNAL MEDICINE | Facility: CLINIC | Age: 75
End: 2025-05-07
Payer: MEDICARE

## 2025-05-07 VITALS
WEIGHT: 155.75 LBS | OXYGEN SATURATION: 96 % | DIASTOLIC BLOOD PRESSURE: 58 MMHG | HEIGHT: 62 IN | HEART RATE: 67 BPM | BODY MASS INDEX: 28.69 KG/M2 | HEIGHT: 60 IN | BODY MASS INDEX: 30.58 KG/M2 | TEMPERATURE: 99 F | WEIGHT: 155.88 LBS | SYSTOLIC BLOOD PRESSURE: 119 MMHG

## 2025-05-07 DIAGNOSIS — M17.11 PRIMARY OSTEOARTHRITIS OF RIGHT KNEE: ICD-10-CM

## 2025-05-07 DIAGNOSIS — K21.9 GASTROESOPHAGEAL REFLUX DISEASE, UNSPECIFIED WHETHER ESOPHAGITIS PRESENT: Primary | ICD-10-CM

## 2025-05-07 DIAGNOSIS — I10 ESSENTIAL HYPERTENSION: ICD-10-CM

## 2025-05-07 DIAGNOSIS — I72.3 ANEURYSM OF COMMON ILIAC ARTERY: ICD-10-CM

## 2025-05-07 DIAGNOSIS — M17.11 PRIMARY OSTEOARTHRITIS OF RIGHT KNEE: Primary | ICD-10-CM

## 2025-05-07 DIAGNOSIS — Z96.651 S/P TOTAL KNEE REPLACEMENT, RIGHT: Primary | ICD-10-CM

## 2025-05-07 DIAGNOSIS — E78.49 OTHER HYPERLIPIDEMIA: ICD-10-CM

## 2025-05-07 DIAGNOSIS — I71.43 INFRARENAL ABDOMINAL AORTIC ANEURYSM (AAA) WITHOUT RUPTURE: ICD-10-CM

## 2025-05-07 PROCEDURE — 99999 PR PBB SHADOW E&M-EST. PATIENT-LVL III: CPT | Mod: PBBFAC,HCNC,, | Performed by: NURSE PRACTITIONER

## 2025-05-07 PROCEDURE — 99999 PR PBB SHADOW E&M-EST. PATIENT-LVL III: CPT | Mod: PBBFAC,HCNC,, | Performed by: ORTHOPAEDIC SURGERY

## 2025-05-07 PROCEDURE — 73560 X-RAY EXAM OF KNEE 1 OR 2: CPT | Mod: TC,HCNC,LT

## 2025-05-07 PROCEDURE — 99999 PR PBB SHADOW E&M-EST. PATIENT-LVL V: CPT | Mod: PBBFAC,HCNC,, | Performed by: NURSE PRACTITIONER

## 2025-05-07 RX ORDER — AMOXICILLIN 250 MG
1 CAPSULE ORAL 2 TIMES DAILY
OUTPATIENT
Start: 2025-05-07

## 2025-05-07 RX ORDER — LIDOCAINE HYDROCHLORIDE 10 MG/ML
1 INJECTION, SOLUTION EPIDURAL; INFILTRATION; INTRACAUDAL; PERINEURAL
OUTPATIENT
Start: 2025-05-07

## 2025-05-07 RX ORDER — MUPIROCIN 20 MG/G
1 OINTMENT TOPICAL
OUTPATIENT
Start: 2025-05-07

## 2025-05-07 RX ORDER — PREGABALIN 75 MG/1
75 CAPSULE ORAL NIGHTLY
OUTPATIENT
Start: 2025-05-07

## 2025-05-07 RX ORDER — OXYCODONE HYDROCHLORIDE 5 MG/1
10 TABLET ORAL
Refills: 0 | OUTPATIENT
Start: 2025-05-07

## 2025-05-07 RX ORDER — ASPIRIN 81 MG/1
81 TABLET ORAL 2 TIMES DAILY
OUTPATIENT
Start: 2025-05-07

## 2025-05-07 RX ORDER — FAMOTIDINE 20 MG/1
20 TABLET, FILM COATED ORAL 2 TIMES DAILY
OUTPATIENT
Start: 2025-05-07

## 2025-05-07 RX ORDER — OXYCODONE HYDROCHLORIDE 5 MG/1
5 TABLET ORAL
Refills: 0 | OUTPATIENT
Start: 2025-05-07

## 2025-05-07 RX ORDER — NALOXONE HCL 0.4 MG/ML
0.02 VIAL (ML) INJECTION
OUTPATIENT
Start: 2025-05-07

## 2025-05-07 RX ORDER — PROCHLORPERAZINE EDISYLATE 5 MG/ML
5 INJECTION INTRAMUSCULAR; INTRAVENOUS EVERY 6 HOURS PRN
OUTPATIENT
Start: 2025-05-07

## 2025-05-07 RX ORDER — ONDANSETRON HYDROCHLORIDE 2 MG/ML
4 INJECTION, SOLUTION INTRAVENOUS EVERY 8 HOURS PRN
OUTPATIENT
Start: 2025-05-07

## 2025-05-07 RX ORDER — SODIUM CHLORIDE 0.9 % (FLUSH) 0.9 %
10 SYRINGE (ML) INJECTION
OUTPATIENT
Start: 2025-05-07

## 2025-05-07 RX ORDER — SODIUM CHLORIDE 9 MG/ML
INJECTION, SOLUTION INTRAVENOUS
OUTPATIENT
Start: 2025-05-07

## 2025-05-07 RX ORDER — SODIUM CHLORIDE 9 MG/ML
INJECTION, SOLUTION INTRAVENOUS CONTINUOUS
OUTPATIENT
Start: 2025-05-07 | End: 2025-05-08

## 2025-05-07 RX ORDER — BISACODYL 10 MG/1
10 SUPPOSITORY RECTAL EVERY 12 HOURS PRN
OUTPATIENT
Start: 2025-05-07

## 2025-05-07 RX ORDER — PREGABALIN 75 MG/1
75 CAPSULE ORAL
OUTPATIENT
Start: 2025-05-07

## 2025-05-07 RX ORDER — ACETAMINOPHEN 500 MG
1000 TABLET ORAL
OUTPATIENT
Start: 2025-05-07

## 2025-05-07 RX ORDER — METHOCARBAMOL 750 MG/1
750 TABLET, FILM COATED ORAL 3 TIMES DAILY
OUTPATIENT
Start: 2025-05-07

## 2025-05-07 RX ORDER — POLYETHYLENE GLYCOL 3350 17 G/17G
17 POWDER, FOR SOLUTION ORAL DAILY
OUTPATIENT
Start: 2025-05-07

## 2025-05-07 RX ORDER — MIDAZOLAM HYDROCHLORIDE 1 MG/ML
4 INJECTION, SOLUTION INTRAMUSCULAR; INTRAVENOUS
OUTPATIENT
Start: 2025-05-07 | End: 2025-05-08

## 2025-05-07 RX ORDER — ACETAMINOPHEN 500 MG
1000 TABLET ORAL EVERY 6 HOURS
OUTPATIENT
Start: 2025-05-07

## 2025-05-07 RX ORDER — CELECOXIB 200 MG/1
400 CAPSULE ORAL ONCE
OUTPATIENT
Start: 2025-05-07 | End: 2025-05-07

## 2025-05-07 RX ORDER — FENTANYL CITRATE 50 UG/ML
100 INJECTION, SOLUTION INTRAMUSCULAR; INTRAVENOUS
Refills: 0 | OUTPATIENT
Start: 2025-05-07 | End: 2025-05-08

## 2025-05-07 NOTE — ASSESSMENT & PLAN NOTE
Current /58  today.    Taking: Metoprolol    Lifestyle changes to reduce systolic BP:  exercise 30 minutes per day,  5 days per week or 150 minutes weekly; sodium reduction and avoidance of high salt foods such as processed meats, frozen meals and  fast foods.   Keeping a healthy weight/BMI can help with better BP control    BP acceptable for surgery. I recommend monitoring BP during perioperative period as uncontrolled pain can elevate blood pressure.

## 2025-05-07 NOTE — PROGRESS NOTES
Subjective:      Patient ID: Jojo Burrows is a 75 y.o. female.    Chief Complaint: Pain of the Right Knee    HPI  Jojo Burrows has right knee pain. The ain has worsened a bit and has impacted her ADL, such as stairs, standing, getting dressed and other activities.  She has to sit on occasion.    Review of Systems   Constitutional: Negative for chills, fever and night sweats.   HENT:  Negative for hearing loss.    Eyes:  Negative for blurred vision and double vision.   Cardiovascular:  Negative for chest pain, claudication and leg swelling.   Respiratory:  Negative for shortness of breath.    Endocrine: Negative for polydipsia, polyphagia and polyuria.   Hematologic/Lymphatic: Negative for adenopathy and bleeding problem. Does not bruise/bleed easily.   Skin:  Negative for poor wound healing.   Gastrointestinal:  Negative for diarrhea and heartburn.   Genitourinary:  Negative for bladder incontinence.   Neurological:  Negative for focal weakness, headaches, numbness, paresthesias and sensory change.   Psychiatric/Behavioral:  The patient is not nervous/anxious.    Allergic/Immunologic: Negative for persistent infections.         Objective:      Body mass index is 30.42 kg/m².  Vitals:    05/07/25 1101   Weight: 70.7 kg (155 lb 12.1 oz)   Height: 5' (1.524 m)           General    Constitutional: She is oriented to person, place, and time. She appears well-developed and well-nourished.   HENT:   Head: Normocephalic and atraumatic.   Eyes: EOM are normal.   Cardiovascular:  Normal rate.            Pulmonary/Chest: Effort normal.   Neurological: She is alert and oriented to person, place, and time.   Psychiatric: She has a normal mood and affect. Her behavior is normal.     General Musculoskeletal Exam   Gait: abnormal and antalgic       Right Knee Exam     Inspection   Swelling: present  Effusion: present  Deformity: present (varus)    Tenderness   The patient is tender to palpation of the medial joint line and  patella.    Crepitus   The patient has crepitus of the patella.    Range of Motion   Extension:  5   Flexion:  120     Tests   Ligament Examination   Lachman: normal (-1 to 2mm)   MCL - Valgus: normal (0 to 2mm)  LCL - Varus: normal    Other   Popliteal (Baker's) Cyst: present    Muscle Strength   Right Lower Extremity   Hip Abduction: 5/5   Quadriceps:  5/5   Hamstrin/5     Vascular Exam       Edema  Right Lower Leg: absent    Radiographs obtained today and reviewed by me demonstrate severe Kellgren Jasen grade 4 arthritic change of the right knee.  There is loss of articular space mainly medially but the changes tricompartmental.  There is osteophyte formation subchondral cysts and subchondral sclerosis.  No significant progression since last x-ray.          Assessment:       Encounter Diagnosis   Name Primary?    Primary osteoarthritis of right knee Yes          Plan:       Jojo was seen today for pain.    Diagnoses and all orders for this visit:    Primary osteoarthritis of right knee        Treatment options were discussed. The surgical process of robotically assisted right knee replacement was discussed in detail with the patient including a detailed discussion of the procedure itself (including visual model, x-ray review, and literature review). We discussed options including implant type and why I believe the implant selected is a good match for the patient's needs. The patient expressed understanding and agrees to proceed with the planned surgeryThe typical perioperative and post-operative course was discussed and perioperative risks were discussed to the patient's satisfaction.  Risks and complications discussed included but were not limited to the risks of anesthetic complications, infection, bleeding, wound healing complications, fracture through the pin sights, stiffness, aseptic loosening, instability, limb length inequality, neurologic dysfunction including numbness and weakness, additional  surgery,  DVT, pulmonary embolism, perioperative medical risks (cardiac, pulmonary, renal, neurologic), and death and the patient elects to proceed.  The patient should get medically cleared and attend the joint seminar.      ASA for DVT prophylaxis    Same day

## 2025-05-07 NOTE — Clinical Note
Optimization pending clearance from Dr. Hernandez regarding h/o Infrarenal abdominal aortic aneurysm (AAA) without rupture & Aneurysm of common iliac artery  Patient was followed by Dr. Hernandez 2023 and was supposed to have a Repeat CT ABD pelvis in 1 year (2024)- Consult placed to Dr. Hernandez for evaluation and f/u testing as indicated.  TY, RL

## 2025-05-07 NOTE — ASSESSMENT & PLAN NOTE
"Patient followed by Dr. Hernandez- Repeat CT ABD pelvis overdue- Consult placed to Dr. Hernandez for evaluation and f/u testing as indicated    "Infrarenal abdominal aortic aneurysm (AAA) without rupture/Right common iliac aneurysm- CT Abd/Pelvis without contrast on 1/9/2023 revealed aneurysmal dilation of the distal infrarenal abdominal aorta measuring 3.3 x 3.1 cm, previously 3.1 cm on abdominal ultrasound from 01/05/2023.  Stable aneurysmal dilation of the right common iliac artery 2.2 cm, previously 2.4 cm on abdominal ultrasound from 01/05/2023.  Start ASA 81 mg daily.  Continue atorvastatin 40 mg daily.  Repeat CT Abd/Pelvis without contrast in 1 year"  "

## 2025-05-07 NOTE — HPI
This is a 75 y.o. female  who presents today for a preoperative evaluation in preparation for right knee replacement  Surgery is indicated for osteoarthritis     .   Patient is new to me.    The history has been obtained by speaking with the patient and reviewing the electronic medical record and/or outside health information. Significant health conditions for the perioperative period are discussed below in assessment and plan.     Patient reports current health status to be Good.  Denies any new symptoms before surgery.

## 2025-05-07 NOTE — PROGRESS NOTES
Jojo Burrows is a 75 y.o. year old here today for pre surgery optimization visit  in preparation for a Right total knee arthroplasty to be performed by Dr. Chow on 5/27/2025.  she was last seen and treated in the clinic on 5/7/2025. she will be medically optimized by the pre op center. There has been no significant change in medical status since last visit. No fever, chills, malaise, or unexplained weight change.      Allergies, Medications, past medical and surgical history reviewed.    Focused examination performed.    Patient saw surgeon in clinic today. All questions answered. Patient encouraged to call with questions. Contact information given.     Pre, heriberto, and post operative procedures and expectations discussed. Goals of successful surgery reviewed and include manageable pain levels, surgical site free of infection, medication management, and ambulation with PT/OT assistance. Healthy weight management discussed with patient and caregiver who were receptive to eduction of healthy diet and activity. No other necessary lifestyle changes identified. Educated patient about signs and symptoms of infection, medication management, anticoagulation therapy, risk of tobacco and alcohol use, and self-care to promote healing. Surgical guide given for future reference. Hibiclens given to patient with instructions. All questions were answered.     Jojo Burrows verbalized an understanding to the education and goals. Patient has displayed readiness to engage in care and is ready to proceed with surgery.  Patient reports her children are teacher and able and ready to provide assistance at home after discharge during their summer break.    Surgical and blood consents signed.    DME will be arranged. The mobility limitation cannot be sufficiently resolved by the use of a cane. Patient's functional mobility deficit can be sufficiently resolved with the use of a (Rolling Walker or Walker). Patient's mobility limitation  "significantly impairs their ability to participate in one of more activities of daily living. The use of a (Rolling Walker or Walker) will significantly improve the patient's ability to participate in MRADLS and the patient will use it on regular basis in the home."     Jojo Burrows will contact us if there are any questions, concerns, or changes in medical status prior to surgery.       Patient has discussed discharge planning with surgeon. Patient will be discharged to home following surgery.   patient will be scheduled with [facility]. PT will be done at the [place] location.    30 minutes of time was spent on patient education, review of records, templating, H&P, , appointment scheduling and optimizing patient for surgery.      "

## 2025-05-07 NOTE — OUTPATIENT SUBJECTIVE & OBJECTIVE
Outpatient Subjective & Objective      Chief Complaint: Preoperative evaulation, perioperative medical management, and complication reduction plan.     Functional Capacity:  Able to climb a flight of stairs without CP SOB or Syncope.  Able to meet 4 METs      Anesthesia issues: None    Difficulty mouth opening: none    Steroid use in the last 12 months: none     Dental Issues: permanent right bottom bridge    Family anesthesia difficulty: None       Family Hx of Thrombosis daughter had h/o blood clot    Past Medical History:   Diagnosis Date    Acute hypoxemic respiratory failure 03/31/2020    ALLERGIC RHINITIS     Cataract     COVID-19 virus infection 03/2020    Degenerative disc disease, cervical 09/13/2018    GERD (gastroesophageal reflux disease)     Hyperlipidemia     Hypertension     Migraine headache     Multifocal pneumonia 03/31/2020    Nuclear sclerosis 04/30/2014    Overweight (BMI 25.0-29.9) 01/27/2015    Sleep disorder     Thyroid disease     nodular goiter    TIA (transient ischemic attack)      Past Surgical History:   Procedure Laterality Date    BREAST BIOPSY      BREAST CYST ASPIRATION      BREAST CYST EXCISION      COLONOSCOPY N/A 4/29/2016    Procedure: COLONOSCOPY;  Surgeon: Sergio Vickers MD;  Location: [facility] ([address] [place] [address]);  Service: Endoscopy;  Laterality: N/A;    COLONOSCOPY N/A 6/28/2021    Procedure: COLONOSCOPY;  Surgeon: Sergio Vickers MD;  Location: [facility] (4TH FLR);  Service: Endoscopy;  Laterality: N/A;  COVID + 3/2020 and fully vaccinated -   pt requesting Dr. vickers/ instructions emailed-     ESOPHAGOGASTRODUODENOSCOPY N/A 3/18/2025    Procedure: EGD (ESOPHAGOGASTRODUODENOSCOPY);  Surgeon: Sergio Vickers MD;  Location: [facility] ([facility]  Service: Endoscopy;  Laterality: N/A;  Ref by Dr JOSS Munoz, portal - PC  3/12 precall complete. kw    HYSTERECTOMY         Review of Systems   Constitutional:  Negative for chills, fatigue and fever.   HENT:  Negative for trouble  "swallowing and voice change.    Eyes:  Negative for photophobia and visual disturbance.        No acute visual changes   Respiratory:  Negative for apnea, cough, chest tightness, shortness of breath and wheezing.         STOP bang  Score 3 tired during day, HTN, over 50    High risk SANIYA   Cardiovascular:  Negative for chest pain, palpitations and leg swelling.   Gastrointestinal:  Negative for abdominal distention, abdominal pain, blood in stool, constipation, diarrhea, nausea and vomiting.        NO FLD, hepatitis, cirrhosis  No BRB or black tarry stool     Genitourinary:  Negative for difficulty urinating, dysuria, flank pain, frequency, hematuria and urgency.   Musculoskeletal:  Positive for arthralgias, gait problem and joint swelling. Negative for back pain, myalgias, neck pain and neck stiffness.   Neurological:  Positive for headaches. Negative for dizziness, tremors, seizures, syncope, weakness, light-headedness and numbness.   Psychiatric/Behavioral:  Negative for suicidal ideas.       VITALS  Visit Vitals  BP (!) 119/58 (BP Location: Right arm, Patient Position: Sitting)   Pulse 67   Temp 98.6 °F (37 °C) (Oral)   Ht 5' 2" (1.575 m)   Wt 70.7 kg (155 lb 13.8 oz)   SpO2 96%   BMI 28.51 kg/m²      Physical Exam  Constitutional:       General: She is not in acute distress.     Appearance: Normal appearance. She is well-developed. She is not diaphoretic.   HENT:      Head: Normocephalic.      Right Ear: Hearing normal.      Left Ear: Hearing normal.      Nose: Nose normal.      Mouth/Throat:      Lips: Pink.      Mouth: Mucous membranes are moist.      Pharynx: No oropharyngeal exudate.   Eyes:      General: Lids are normal.         Right eye: No discharge.         Left eye: No discharge.      Conjunctiva/sclera: Conjunctivae normal.      Pupils: Pupils are equal, round, and reactive to light.   Neck:      Thyroid: No thyromegaly.      Vascular: No carotid bruit or JVD.      Trachea: Trachea and phonation " normal. No tracheal deviation.   Cardiovascular:      Rate and Rhythm: Normal rate and regular rhythm.      Pulses: Normal pulses.           Carotid pulses are 2+ on the right side and 2+ on the left side.       Radial pulses are 2+ on the right side and 2+ on the left side.        Posterior tibial pulses are 2+ on the right side and 2+ on the left side.      Heart sounds: Normal heart sounds. No murmur heard.     No friction rub. No gallop.   Pulmonary:      Effort: Pulmonary effort is normal. No respiratory distress.      Breath sounds: Normal breath sounds. No stridor. No wheezing or rales.      Comments: Clear Anterior and Posterior BBS  Abdominal:      General: Abdomen is protuberant. Bowel sounds are normal. There is no distension.      Palpations: Abdomen is soft.      Tenderness: There is no abdominal tenderness. There is no guarding.   Musculoskeletal:         General: No tenderness or deformity. Normal range of motion.      Cervical back: Normal range of motion and neck supple. No rigidity.      Right lower leg: No edema.      Left lower leg: No edema.   Lymphadenopathy:      Head:      Right side of head: No submental, submandibular, tonsillar, preauricular, posterior auricular or occipital adenopathy.      Left side of head: No submental, submandibular, tonsillar, preauricular, posterior auricular or occipital adenopathy.      Cervical: No cervical adenopathy.      Right cervical: No superficial cervical adenopathy.     Left cervical: No superficial cervical adenopathy.   Skin:     General: Skin is warm and dry.      Capillary Refill: Capillary refill takes 2 to 3 seconds.      Coloration: Skin is not pale.      Findings: No erythema or rash.   Neurological:      Mental Status: She is alert and oriented to person, place, and time. Mental status is at baseline.      GCS: GCS eye subscore is 4. GCS verbal subscore is 5. GCS motor subscore is 6.      Motor: No abnormal muscle tone.      Coordination:  Coordination normal.   Psychiatric:         Attention and Perception: Attention and perception normal.         Mood and Affect: Mood and affect normal.         Speech: Speech normal.         Behavior: Behavior normal. Behavior is cooperative.         Thought Content: Thought content normal.         Cognition and Memory: Cognition and memory normal.         Judgment: Judgment normal.          Significant Labs:  Lab Results   Component Value Date    WBC 6.33 02/18/2025    HGB 12.9 02/18/2025    HCT 39.5 02/18/2025     (L) 02/18/2025    CHOL 145 02/18/2025    TRIG 33 02/18/2025    HDL 67 02/18/2025    ALT 32 02/18/2025    AST 43 (H) 02/18/2025     02/18/2025    K 3.9 02/18/2025     02/18/2025    CREATININE 0.8 02/18/2025    BUN 15 02/18/2025    CO2 25 02/18/2025    TSH 1.592 11/06/2023    INR 1.0 07/12/2011    HGBA1C 5.5 06/24/2022       Diagnostic Studies: No relevant studies.    EKG:   Results for orders placed or performed during the hospital encounter of 02/10/24   EKG 12-lead    Collection Time: 02/10/24  1:13 PM   Result Value Ref Range    QRS Duration 86 ms    [facility] QTC Calculation 404 ms    Narrative    Test Reason : R07.9,    Vent. Rate : 058 BPM     Atrial Rate : 058 BPM     P-R Int : 152 ms          QRS Dur : 086 ms      QT Int : 412 ms       P-R-T Axes : 044 -08 033 degrees     QTc Int : 404 ms    Sinus bradycardia  Minimal voltage criteria for LVH, may be normal variant  Nonspecific T wave abnormality  Abnormal ECG  When compared with ECG of 31-MAR-2020 20:12,  No significant change was found  Confirmed by Graham Cha MD ([id number]) on 2/14/2024 12:09:59 PM    Referred By: [email or web address]   SELF           Confirmed By:Graham Cha MD       2D ECHO:  TTE:  Results for orders placed or performed in visit on 09/25/20   Echo Color Flow Doppler? Yes   Result Value Ref Range    BSA 1.82 m2    TDI SEPTAL 0.06 m/s    LV LATERAL E/E' RATIO 7.44 m/s    LV SEPTAL E/E' RATIO 11.17 m/s    LA WIDTH 3.76  cm    TDI LATERAL 0.09 m/s    LVIDd 4.02 3.5 - 6.0 cm    IVS 0.77 0.6 - 1.1 cm    PW 0.61 0.6 - 1.1 cm    LVIDs 2.48 2.1 - 4.0 cm    FS 38 28 - 44 %    LA Vol 48.08 cm3    Sinus 3.02 cm    STJ 2.72 cm    Ascending aorta 2.93 cm    LV mass 77.56 g    LA size 3.26 cm    RVDD 2.45 cm    TAPSE 1.71 cm    Left Ventricle Relative Wall Thickness 0.30 cm    AV mean gradient 3 mmHg    AV valve area 2.17 cm2    AV Velocity Ratio 0.69     AV index (prosthetic) 0.73     MV valve area p 1/2 method 3.61 cm2    E/A ratio 0.67     Mean e' 0.08 m/s    E wave deceleration time 210.14 msec    IVRT 97.05 msec    Pulm vein S/D ratio 1.44     LVOT diameter 1.95 cm    LVOT area 3.0 cm2    LVOT peak cuate 0.81 m/s    LVOT peak VTI 21.44 cm    Ao peak cuate 1.18 m/s    Ao VTI 29.53 cm    LVOT stroke volume 64.00 cm3    AV peak gradient 6 mmHg    E/E' ratio 8.93 m/s    MV Peak E Cuate 0.67 m/s    TR Max Cuate 2.88 m/s    MV stenosis pressure 1/2 time 60.94 ms    MV Peak A Cuate 1.00 m/s    PV Peak S Cuate 0.62 m/s    PV Peak D Cuate 0.43 m/s    LV Systolic Volume 21.79 mL    LV Systolic Volume Index 12.3 mL/m2    LV Diastolic Volume 70.65 mL    LV Diastolic Volume Index 39.90 mL/m2    SHAHNAZ 27.2 mL/m2    LV Mass Index 44 g/m2    RA Major Axis 4.14 cm    Left Atrium Minor Axis 4.60 cm    Left Atrium Major Axis 4.63 cm    Triscuspid Valve Regurgitation Peak Gradient 33 mmHg    RA Width 2.74 cm    Right Atrial Pressure (from IVC) 3 mmHg    TV resting pulmonary artery pressure 36 mmHg    Narrative    · The left ventricle is normal in size with normal systolic function. The   estimated ejection fraction is 65%.  · Indeterminate diastolic function.  · Normal right ventricular systolic function.  · Mild tricuspid regurgitation.  · The estimated PA systolic pressure is 36 mmHg.  · Normal central venous pressure (3 mmHg).          LOU:  No results found for this or any previous visit.     Imaging     Active Cardiac Conditions: None      Revised Cardiac Risk Index    High -Risk Surgery  Intraperitoneal; Intrathoracic; suprainguinal vascular Yes- + 1 No- 0   History of Ischemic Heart Disease   (Hx of MI/positive exercise test/current chest pain due to ischemia/use of nitrate therapy/EKG with pathological Q waves) Yes- + 1 No- 0   History of CHF  (Pulmonary edema/bilateral rales or S3 gallop/PND/CXR showing pulmonary vascular redistribution) Yes- + 1 No- 0   History of CVA   (Prior stroke or TIA) Yes- + 1 No- 0   Pre-operative treatment with insulin Yes- + 1 No- 0   Pre-operative creatinine > 2mg/dl Yes- + 1 No- 0   Total:    Risk Status:  Estimated risk of cardiac complications after non-cardiac surgery using the Revised Cardiac Risk Index for Preoperative risk is 3.9 %      ARISCAT (Canet) risk index: Low: 1.6% risk of post-op pulmonary complications.    American Society of Anesthesiologists Physical Status classification (ASA): 3           Outpatient Subjective & Objective

## 2025-05-07 NOTE — PROGRESS NOTES
[facility]  Progress Note    Patient Name: Jojo Burrows  MRN: [id number]  Date of Evaluation- 05/07/2025  PCP- Joo Munoz MD    Future cases for Jojo Burrows [[id number]]       Case ID Status Date Time [place] Procedure Provider Location    [id number] [facility] 5/27/2025 12:42  ARTHROPLASTY, KNEE, TOTAL, SAKSHI COMPUTER-ASSISTED NAVIGATION: RIGHT: SAME DAY Graham Chow MD [[id number]] [facility] OR    [id number] [facility] 6/9/2025 10:30 AM 30 EGD (ESOPHAGOGASTRODUODENOSCOPY) Sergio Vickers MD [[id number]] [facility] ENDOSCOPY            HPI:  This is a 75 y.o. female  who presents today for a preoperative evaluation in preparation for right knee replacement  Surgery is indicated for osteoarthritis     .   Patient is new to me.    The history has been obtained by speaking with the patient and reviewing the electronic medical record and/or outside health information. Significant health conditions for the perioperative period are discussed below in assessment and plan.     Patient reports current health status to be Good.  Denies any new symptoms before surgery.       Subjective/ Objective:     Chief Complaint: Preoperative evaulation, perioperative medical management, and complication reduction plan.     Functional Capacity:  Able to climb a flight of stairs without CP SOB or Syncope.  Able to meet 4 METs      Anesthesia issues: None    Difficulty mouth opening: none    Steroid use in the last 12 months: none     Dental Issues: permanent right bottom bridge    Family anesthesia difficulty: None       Family Hx of Thrombosis daughter had h/o blood clot    Past Medical History:   Diagnosis Date    Acute hypoxemic respiratory failure 03/31/2020    ALLERGIC RHINITIS     Cataract     COVID-19 virus infection 03/2020    Degenerative disc disease, cervical 09/13/2018    GERD (gastroesophageal reflux disease)     Hyperlipidemia     Hypertension     Migraine headache     Multifocal pneumonia 03/31/2020    Nuclear sclerosis 04/30/2014     Overweight (BMI 25.0-29.9) 01/27/2015    Sleep disorder     Thyroid disease     nodular goiter    TIA (transient ischemic attack)      Past Surgical History:   Procedure Laterality Date    BREAST BIOPSY      BREAST CYST ASPIRATION      BREAST CYST EXCISION      COLONOSCOPY N/A 4/29/2016    Procedure: COLONOSCOPY;  Surgeon: Sergio Vickers MD;  Location: [facility] (4TH FLR);  Service: Endoscopy;  Laterality: N/A;    COLONOSCOPY N/A 6/28/2021    Procedure: COLONOSCOPY;  Surgeon: Sergio Vickers MD;  Location: [facility] (4TH FLR);  Service: Endoscopy;  Laterality: N/A;  COVID + 3/2020 and fully vaccinated -   pt requesting Dr. vickers/ instructions emailed-     ESOPHAGOGASTRODUODENOSCOPY N/A 3/18/2025    Procedure: EGD (ESOPHAGOGASTRODUODENOSCOPY);  Surgeon: Sergio Vickers MD;  Location: [facility] (4TH FLR);  Service: Endoscopy;  Laterality: N/A;  Ref by Dr JOSS Munoz, portal - PC  3/12 precall complete. kw    HYSTERECTOMY         Review of Systems   Constitutional:  Negative for chills, fatigue and fever.   HENT:  Negative for trouble swallowing and voice change.    Eyes:  Negative for photophobia and visual disturbance.        No acute visual changes   Respiratory:  Negative for apnea, cough, chest tightness, shortness of breath and wheezing.         STOP bang  Score 3 tired during day, HTN, over 50    High risk SANIYA   Cardiovascular:  Negative for chest pain, palpitations and leg swelling.   Gastrointestinal:  Negative for abdominal distention, abdominal pain, blood in stool, constipation, diarrhea, nausea and vomiting.        NO FLD, hepatitis, cirrhosis  No BRB or black tarry stool     Genitourinary:  Negative for difficulty urinating, dysuria, flank pain, frequency, hematuria and urgency.   Musculoskeletal:  Positive for arthralgias, gait problem and joint swelling. Negative for back pain, myalgias, neck pain and neck stiffness.   Neurological:  Positive for headaches. Negative for dizziness,  "tremors, seizures, syncope, weakness, light-headedness and numbness.   Psychiatric/Behavioral:  Negative for suicidal ideas.       VITALS  Visit Vitals  BP (!) 119/58 (BP Location: Right arm, Patient Position: Sitting)   Pulse 67   Temp 98.6 °F (37 °C) (Oral)   Ht 5' 2" (1.575 m)   Wt 70.7 kg (155 lb 13.8 oz)   SpO2 96%   BMI 28.51 kg/m²      Physical Exam  Constitutional:       General: She is not in acute distress.     Appearance: Normal appearance. She is well-developed. She is not diaphoretic.   HENT:      Head: Normocephalic.      Right Ear: Hearing normal.      Left Ear: Hearing normal.      Nose: Nose normal.      Mouth/Throat:      Lips: Pink.      Mouth: Mucous membranes are moist.      Pharynx: No oropharyngeal exudate.   Eyes:      General: Lids are normal.         Right eye: No discharge.         Left eye: No discharge.      Conjunctiva/sclera: Conjunctivae normal.      Pupils: Pupils are equal, round, and reactive to light.   Neck:      Thyroid: No thyromegaly.      Vascular: No carotid bruit or JVD.      Trachea: Trachea and phonation normal. No tracheal deviation.   Cardiovascular:      Rate and Rhythm: Normal rate and regular rhythm.      Pulses: Normal pulses.           Carotid pulses are 2+ on the right side and 2+ on the left side.       Radial pulses are 2+ on the right side and 2+ on the left side.        Posterior tibial pulses are 2+ on the right side and 2+ on the left side.      Heart sounds: Normal heart sounds. No murmur heard.     No friction rub. No gallop.   Pulmonary:      Effort: Pulmonary effort is normal. No respiratory distress.      Breath sounds: Normal breath sounds. No stridor. No wheezing or rales.      Comments: Clear Anterior and Posterior BBS  Abdominal:      General: Abdomen is protuberant. Bowel sounds are normal. There is no distension.      Palpations: Abdomen is soft.      Tenderness: There is no abdominal tenderness. There is no guarding.   Musculoskeletal:         " General: No tenderness or deformity. Normal range of motion.      Cervical back: Normal range of motion and neck supple. No rigidity.      Right lower leg: No edema.      Left lower leg: No edema.   Lymphadenopathy:      Head:      Right side of head: No submental, submandibular, tonsillar, preauricular, posterior auricular or occipital adenopathy.      Left side of head: No submental, submandibular, tonsillar, preauricular, posterior auricular or occipital adenopathy.      Cervical: No cervical adenopathy.      Right cervical: No superficial cervical adenopathy.     Left cervical: No superficial cervical adenopathy.   Skin:     General: Skin is warm and dry.      Capillary Refill: Capillary refill takes 2 to 3 seconds.      Coloration: Skin is not pale.      Findings: No erythema or rash.   Neurological:      Mental Status: She is alert and oriented to person, place, and time. Mental status is at baseline.      GCS: GCS eye subscore is 4. GCS verbal subscore is 5. GCS motor subscore is 6.      Motor: No abnormal muscle tone.      Coordination: Coordination normal.   Psychiatric:         Attention and Perception: Attention and perception normal.         Mood and Affect: Mood and affect normal.         Speech: Speech normal.         Behavior: Behavior normal. Behavior is cooperative.         Thought Content: Thought content normal.         Cognition and Memory: Cognition and memory normal.         Judgment: Judgment normal.          Significant Labs:  Lab Results   Component Value Date    WBC 6.33 02/18/2025    HGB 12.9 02/18/2025    HCT 39.5 02/18/2025     (L) 02/18/2025    CHOL 145 02/18/2025    TRIG 33 02/18/2025    HDL 67 02/18/2025    ALT 32 02/18/2025    AST 43 (H) 02/18/2025     02/18/2025    K 3.9 02/18/2025     02/18/2025    CREATININE 0.8 02/18/2025    BUN 15 02/18/2025    CO2 25 02/18/2025    TSH 1.592 11/06/2023    INR 1.0 07/12/2011    HGBA1C 5.5 06/24/2022       Diagnostic Studies: No  relevant studies.    EKG:   Results for orders placed or performed during the hospital encounter of 02/10/24   EKG 12-lead    Collection Time: 02/10/24  1:13 PM   Result Value Ref Range    QRS Duration 86 ms    OHS QTC Calculation 404 ms    Narrative    Test Reason : R07.9,    Vent. Rate : 058 BPM     Atrial Rate : 058 BPM     P-R Int : 152 ms          QRS Dur : 086 ms      QT Int : 412 ms       P-R-T Axes : 044 -08 033 degrees     QTc Int : 404 ms    Sinus bradycardia  Minimal voltage criteria for LVH, may be normal variant  Nonspecific T wave abnormality  Abnormal ECG  When compared with ECG of 31-MAR-2020 20:12,  No significant change was found  Confirmed by Graham Cha MD ([id number]) on 2/14/2024 12:09:59 PM    Referred By: [email or web address]   SELF           Confirmed By:Graham Cha MD       2D ECHO:  TTE:  Results for orders placed or performed in visit on 09/25/20   Echo Color Flow Doppler? Yes   Result Value Ref Range    BSA 1.82 m2    TDI SEPTAL 0.06 m/s    LV LATERAL E/E' RATIO 7.44 m/s    LV SEPTAL E/E' RATIO 11.17 m/s    LA WIDTH 3.76 cm    TDI LATERAL 0.09 m/s    LVIDd 4.02 3.5 - 6.0 cm    IVS 0.77 0.6 - 1.1 cm    PW 0.61 0.6 - 1.1 cm    LVIDs 2.48 2.1 - 4.0 cm    FS 38 28 - 44 %    LA Vol 48.08 cm3    Sinus 3.02 cm    STJ 2.72 cm    Ascending aorta 2.93 cm    LV mass 77.56 g    LA size 3.26 cm    RVDD 2.45 cm    TAPSE 1.71 cm    Left Ventricle Relative Wall Thickness 0.30 cm    AV mean gradient 3 mmHg    AV valve area 2.17 cm2    AV Velocity Ratio 0.69     AV index (prosthetic) 0.73     MV valve area p 1/2 method 3.61 cm2    E/A ratio 0.67     Mean e' 0.08 m/s    E wave deceleration time 210.14 msec    IVRT 97.05 msec    Pulm vein S/D ratio 1.44     LVOT diameter 1.95 cm    LVOT area 3.0 cm2    LVOT peak cuate 0.81 m/s    LVOT peak VTI 21.44 cm    Ao peak cuate 1.18 m/s    Ao VTI 29.53 cm    LVOT stroke volume 64.00 cm3    AV peak gradient 6 mmHg    E/E' ratio 8.93 m/s    MV Peak E Cuate 0.67 m/s    TR Max Cuate  2.88 m/s    MV stenosis pressure 1/2 time 60.94 ms    MV Peak A Cuate 1.00 m/s    PV Peak S Cuate 0.62 m/s    PV Peak D Cuate 0.43 m/s    LV Systolic Volume 21.79 mL    LV Systolic Volume Index 12.3 mL/m2    LV Diastolic Volume 70.65 mL    LV Diastolic Volume Index 39.90 mL/m2    SHAHNAZ 27.2 mL/m2    LV Mass Index 44 g/m2    RA Major Axis 4.14 cm    Left Atrium Minor Axis 4.60 cm    Left Atrium Major Axis 4.63 cm    Triscuspid Valve Regurgitation Peak Gradient 33 mmHg    RA Width 2.74 cm    Right Atrial Pressure (from IVC) 3 mmHg    TV resting pulmonary artery pressure 36 mmHg    Narrative    · The left ventricle is normal in size with normal systolic function. The   estimated ejection fraction is 65%.  · Indeterminate diastolic function.  · Normal right ventricular systolic function.  · Mild tricuspid regurgitation.  · The estimated PA systolic pressure is 36 mmHg.  · Normal central venous pressure (3 mmHg).          LOU:  No results found for this or any previous visit.     Imaging     Active Cardiac Conditions: None      Revised Cardiac Risk Index   High -Risk Surgery  Intraperitoneal; Intrathoracic; suprainguinal vascular Yes- + 1 No- 0   History of Ischemic Heart Disease   (Hx of MI/positive exercise test/current chest pain due to ischemia/use of nitrate therapy/EKG with pathological Q waves) Yes- + 1 No- 0   History of CHF  (Pulmonary edema/bilateral rales or S3 gallop/PND/CXR showing pulmonary vascular redistribution) Yes- + 1 No- 0   History of CVA   (Prior stroke or TIA) Yes- + 1 No- 0   Pre-operative treatment with insulin Yes- + 1 No- 0   Pre-operative creatinine > 2mg/dl Yes- + 1 No- 0   Total:    Risk Status:  Estimated risk of cardiac complications after non-cardiac surgery using the Revised Cardiac Risk Index for Preoperative risk is 3.9 %      ARISCAT (Canet) risk index: Low: 1.6% risk of post-op pulmonary complications.    American Society of Anesthesiologists Physical Status classification (ASA): 3                Preoperative cardiac risk assessment-  The patient does not have any active cardiac conditions . Revised cardiac risk index predictors- ---.Functional capacity is more than 4 Mets. She will be undergoing a Orthopedic procedure that carries a Moderate Risk risk     The estimated risk of the rate of adverse cardiac outcomes  3.9    No further cardiac work up is indicated prior to proceeding with the surgery     Orders Placed This Encounter    CBC Auto Differential    Comprehensive Metabolic Panel    Protime-INR    TSH    Ambulatory referral/consult to Cardiology       American Society of Anesthesiologists Physical status classification ( ASA ) class: 3  Postoperative pulmonary complication risk assessment: 1.6     ARISCAT ( Canet) risk index- risk class -  Low, if duration of surgery is under 3 hours, intermediate, if duration of surgery is over 3 hours          Assessment/Plan:     GERD (gastroesophageal reflux disease)  Currently being treated with Protonix  Encouraged to elevate HOB and avoid laying down for 2-3 hours after meals.   Weight loss encouraged as well as dietary changes such as reduction or avoidance of fatty foods, caffeine, spicy foods, and chocolate.    Smoking cessation was recommended as well as reduction of alcohol consumption.    Essential hypertension  Current /58  today.    Taking: Metoprolol    Lifestyle changes to reduce systolic BP:  exercise 30 minutes per day,  5 days per week or 150 minutes weekly; sodium reduction and avoidance of high salt foods such as processed meats, frozen meals and  fast foods.   Keeping a healthy weight/BMI can help with better BP control    BP acceptable for surgery. I recommend monitoring BP during perioperative period as uncontrolled pain can elevate blood pressure.         Other hyperlipidemia  Continue Crestor    Infrarenal abdominal aortic aneurysm (AAA) without rupture  Impression:     No acute abnormality.     Distal abdominal aortic  "aneurysm measuring 3.3 cm, increased in size from 3.1 cm on 01/05/2023.     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     Electronically signed by resident: Ken العلي  Date:                                            08/28/2024  Time:                                           13:56Aorta: Aneurysmal dilatation of the distal aorta measuring 2.4 x 3.3 cm, previously 2.8 x 3.1 cm on 01/05/2023. Ectasia of the iliac arteries bilaterally measuring 1.7 cm on the right and 1.9 cm on the left.    Aneurysm of common iliac artery  Patient followed by Dr. Hernandez- Repeat CT ABD pelvis overdue- Consult placed to Dr. Hernandez for evaluation and f/u testing as indicated    "Infrarenal abdominal aortic aneurysm (AAA) without rupture/Right common iliac aneurysm- CT Abd/Pelvis without contrast on 1/9/2023 revealed aneurysmal dilation of the distal infrarenal abdominal aorta measuring 3.3 x 3.1 cm, previously 3.1 cm on abdominal ultrasound from 01/05/2023.  Stable aneurysmal dilation of the right common iliac artery 2.2 cm, previously 2.4 cm on abdominal ultrasound from 01/05/2023.  Start ASA 81 mg daily.  Continue atorvastatin 40 mg daily.  Repeat CT Abd/Pelvis without contrast in 1 year"      Preventive perioperative care    Thromboembolic prophylaxis:  Her risk factors for thrombosis include surgical procedure, age, and reduced mobility.I suggest  thromboembolic prophylaxis ( mechanical/pharmacological, weighing the risk benefits of pharmacological agent use considering heriberto procedural bleeding )  during the perioperative period.I suggested being active in the post operative period.      Postoperative pulmonary complication prophylaxis-Risk factors for post operative pulmonary complications include age over 65 years and ASA class >2- I suggest incentive spirometry use, early ambulation, and pain control so as to avoid diaphragmatic splinting  Brush teeth twice per day, oral rinses, sleep with the " head of the bed up 30 degrees     Renal complication prophylaxis-Risk factors for renal complications include age and hypertension . I suggest keeping her well hydrated and avoidance/ minimizing the use of  NSAID's,ENCINAS 2 Inhibitors ,IV contrast if possible in the perioperative period.I suggested drinking 2 litre's of water a day      Surgical site Infection Prophylaxis-I  suggest appropriate antibiotic for Prophylaxis against Surgical site infections Shower with Hibiclens in the night before surgery and the morning of surgery        In view of Spine procedure the patient  is at risk of postoperative urinary retention.  I suggest avoidance / minimizing the of  Benzodiazepines,Anticholinergic medication,antihistamines ( Benadryl) , if possible in the perioperative period. I suggest using the minimum possible use of opioids for the minimum period of time in the perioperative period. Benadryl avoidance suggested      This visit was focused on Preoperative evaluation, Perioperative Medical management, complication reduction plans. I suggest that the patient follows up with primary care or relevant sub specialists for ongoing health care.    I appreciate the opportunity to be involved in this patients care. Please feel free to contact me if there were any questions about this consultation.    Patient is pending optimization    Optimization pending clearance from Dr. Hernandez regarding h/o Infrarenal abdominal aortic aneurysm (AAA) without rupture & Aneurysm of common iliac artery    Patient was followed by Dr. Hernandez 2023 and was supposed to have a Repeat CT ABD pelvis in 1 year (2024)- Consult placed to Dr. Hernandez for evaluation and f/u testing as indicated.  TY, RL  EKG  I spent a total of 46 minutes on the day of the visit.This includes face to face time and non-face to face time preparing to see the patient (eg, review of tests), obtaining and/or reviewing separately obtained history, documenting clinical information  in the electronic or other health record, independently interpreting results and communicating results to the patient/family/caregiver, or care coordinator.      Alfred Bland NP  Perioperative Medicine  [facility]   Pager [phone]

## 2025-05-07 NOTE — H&P (VIEW-ONLY)
CC:  Right knee pain    Jojo Burrows is a 75 y.o. female with history of Right knee pain. Pain is worse with activity and weight bearing.  Patient has experienced interference of activities of daily living due to decreased range of motion and an increase in joint pain and swelling.  Patient has failed non-operative treatment including NSAIDs, corticosteroid injections, viscosupplement injections, and activity modification.  Jojo Burrows currently ambulates independently.     Relevant medical conditions of significance in perioperative period:  HTN- on medication managed by pcp  HLD- on medication managed by pcp  GERD- on medication managed by pcp  Depression- on medication managed by pcp      Past Medical History:   Diagnosis Date    Acute hypoxemic respiratory failure 03/31/2020    ALLERGIC RHINITIS     Cataract     COVID-19 virus infection 03/2020    Degenerative disc disease, cervical 09/13/2018    GERD (gastroesophageal reflux disease)     Hyperlipidemia     Hypertension     Migraine headache     Multifocal pneumonia 03/31/2020    Nuclear sclerosis 04/30/2014    Overweight (BMI 25.0-29.9) 01/27/2015    Sleep disorder     Thyroid disease     nodular goiter    TIA (transient ischemic attack)        Past Surgical History:   Procedure Laterality Date    BREAST BIOPSY      BREAST CYST ASPIRATION      BREAST CYST EXCISION      COLONOSCOPY N/A 4/29/2016    Procedure: COLONOSCOPY;  Surgeon: Sergio Vickers MD;  [facility]  Service: Endoscopy;  Laterality: N/A;    COLONOSCOPY N/A 6/28/2021    Procedure: COLONOSCOPY;  Surgeon: Sergio Vickers MD;  Location: [facility] (4TH FLR);  Service: Endoscopy;  Laterality: N/A;  COVID + 3/2020 and fully vaccinated -   pt requesting Dr. vickers/ instructions emailed-     ESOPHAGOGASTRODUODENOSCOPY N/A 3/18/2025    Procedure: EGD (ESOPHAGOGASTRODUODENOSCOPY);  Surgeon: Sergio Vickers MD;  [facility]  Service: Endoscopy;   Laterality: N/A;  Ref by Dr JOSS Munoz, portal - PC  3/12 precall complete. kw    HYSTERECTOMY         Family History   Problem Relation Name Age of Onset    Diabetes Mother      Hypertension Mother      Breast cancer Mother      Asthma Father      Glaucoma Father      Breast cancer Sister 3     Ovarian cancer Sister 3     Stroke Daughter 2     Lymphoma Daughter 1     No Known Problems Son 1     Heart disease Maternal Grandmother      Esophageal cancer Other cousin     Amblyopia Neg Hx      Blindness Neg Hx      Cancer Neg Hx      Cataracts Neg Hx      Macular degeneration Neg Hx      Retinal detachment Neg Hx      Strabismus Neg Hx      Thyroid disease Neg Hx         Review of patient's allergies indicates:   Allergen Reactions    Vantin [cefpodoxime] Itching     Severe itching, night sweats.  Improved after stopped it.  4/24    Iodine Rash    Iodine and iodide containing products Itching and Rash    Shellfish containing products Itching and Rash       Current Medications[1]    Review of Systems:  Constitutional: no fever or chills  Eyes: no visual changes  ENT: no nasal congestion or sore throat  Respiratory: no cough or shortness of breath  Cardiovascular: no chest pain or palpitations  Gastrointestinal: no nausea or vomiting, tolerating diet  Genitourinary: no hematuria or dysuria  Integument/Breast: no rash or pruritis  Hematologic/Lymphatic: no easy bruising or lymphadenopathy  Musculoskeletal: positive for knee pain  Neurological: no seizures or tremors  Behavioral/Psych: no auditory or visual hallucinations  Endocrine: no heat or cold intolerance    PE:  There were no vitals taken for this visit.  General: Pleasant, cooperative, NAD   Gait: antalgic  HEENT: NCAT, sclera nonicteric   Lungs: Respirations clear bilaterally; equal and unlabored.   CV: S1S2; 2+ bilateral upper and lower extremity pulses.   Skin: Intact throughout with no rashes, erythema, or lesions  Extremities: No LE edema,  no erythema or warmth  of the skin in either lower extremity.    Right knee exam:  Knee Range of Motion:5-120, pain with passive range of motion  Effusion:none  Condition of skin:intact  Location of tenderness:Medial joint line   Strength:normal  Stability: stable to testing    Hip Examination: painless PROM of hip     Radiographs: Radiographs reveal advanced degenerative changes including subchondral cyst formation, subchondral sclerosis, osteophyte formation, joint space narrowing.     Knee Alignment: normal    Diagnosis: Primary osteoarthritis Right knee    Plan: Right total knee arthroplasty    Due to the serious nature of total joint infection and the high prevalence of community acquired MRSA, vancomycin will be used perioperatively.                 [1]   Current Outpatient Medications:     albuterol (PROVENTIL/VENTOLIN HFA) 90 mcg/actuation inhaler, Inhale 2 puffs into the lungs every 6 (six) hours as needed for Wheezing. Rescue, Disp: 54 g, Rfl: 3    ascorbic acid, vitamin C, (VITAMIN C) 500 MG tablet, Take 1 tablet (500 mg total) by mouth 2 (two) times daily., Disp: , Rfl:     aspirin (ECOTRIN) 81 MG EC tablet, Take 81 mg by mouth once daily., Disp: , Rfl:     azelastine (ASTELIN) 137 mcg (0.1 %) nasal spray, 1 spray (137 mcg total) by Nasal route 2 (two) times daily. (Patient taking differently: 1 spray by Nasal route as needed.), Disp: 30 mL, Rfl: 3    calcium carbonate (TUMS) 200 mg calcium (500 mg) chewable tablet, Take 2 tablets by mouth as needed., Disp: , Rfl:     celecoxib (CELEBREX) 200 MG capsule, Take 200 mg by mouth as needed., Disp: , Rfl:     cetirizine (ZYRTEC) 10 MG tablet, Take 10 mg by mouth daily as needed for Allergies., Disp: , Rfl:     cholecalciferol, vitamin D3, (VITAMIN D3) 25 mcg (1,000 unit) capsule, Take 1,000 Units by mouth once daily., Disp: , Rfl:     cyanocobalamin (VITAMIN B-12) 250 MCG tablet, Take 250 mcg by mouth once daily., Disp: , Rfl:     fluticasone propionate (FLONASE) 50 mcg/actuation  nasal spray, 2 sprays (100 mcg total) by Each Nostril route once daily., Disp: 9.9 mL, Rfl: 11    folic acid/multivit-min/lutein (CENTRUM SILVER ORAL), Take 1 tablet by mouth once daily., Disp: , Rfl:     metoprolol succinate (TOPROL-XL) 50 MG 24 hr tablet, TAKE 1 TABLET BY MOUTH EVERY DAY, Disp: 90 tablet, Rfl: 3    pantoprazole (PROTONIX) 40 MG tablet, Take 1 tablet (40 mg total) by mouth before breakfast. Best taken 45-60 minutes before your first protein meal of the day - Breakfast., Disp: 90 tablet, Rfl: 3    rosuvastatin (CRESTOR) 20 MG tablet, Take 1 tablet (20 mg total) by mouth once daily. For cholesterol control., Disp: 90 tablet, Rfl: 3    sertraline (ZOLOFT) 50 MG tablet, Take 1 tablet (50 mg total) by mouth once daily. For anxiety control., Disp: 90 tablet, Rfl: 2    TURMERIC ORAL, Take 1 capsule by mouth once daily., Disp: , Rfl:     zolpidem (AMBIEN) 5 MG Tab, Take 1 tablet (5 mg total) by mouth nightly as needed (insomnia)., Disp: 30 tablet, Rfl: 3

## 2025-05-07 NOTE — DISCHARGE INSTRUCTIONS
.Your surgery has been scheduled for:____________5/27/25______________________________    You [facility] report to:  ____[facility], located on the [place] side of the first floor of the           [facility] ([phone])  ____[facility], located on the [facility] side of the            Second floor of the [facility] ([phone])  ____3rd Floor [facility] located on the [facility] side of the [facility] [phone]  __X__[facility]: located at [address] [place] [address]. [address].     Please Note   Tell your doctor if you take Aspirin, products containing Aspirin, herbal medications  or blood thinners, such as Coumadin, Ticlid, or Plavix.  (Consult your provider regarding holding or stopping before surgery).  Arrange for someone to drive you home following surgery.  You will not be allowed to leave the surgical facility alone or drive yourself home following sedation and anesthesia.    Before Surgery  Stop taking all herbal medications, vitamins, and supplements 7 days prior to surgery  No Motrin/Advil (Ibuprofen) 7 days before surgery  No Aleve (Naproxen) 7 days before surgery   No Goody's/BC Powder 7 days before surgery  Refrain from drinking alcoholic beverages for 24 hours before and after surgery  Stop or limit smoking at least 24 hours prior to surgery  You may take Tylenol for pain    Night before Surgery  Do not eat or drink after midnight  Take a shower or bath (shower is recommended).  Bathe with Hibiclens soap or an antibacterial soap from the neck down.  If not supplied by your surgeon, hibiclens soap will need to be purchased over the counter in pharmacy.  Rinse soap off thoroughly.  Shampoo your hair with your regular shampoo    The Day of Surgery  Take another bath or shower with hibiclens or any antibacterial soap, to reduce the chance of infection.  Take heart  and blood pressure medications with a small sip of water, as advised by the perioperative team.  Do not take fluid pills  You may brush your teeth and rinse your mouth, but do not swallow any additional water.   Do not apply perfumes, powder, body lotions or deodorant on the day of surgery.  Nail polish should be removed.  Do not wear makeup or moisturizer  Wear comfortable clothes, such as a button front shirt and loose fitting pants.  Leave all jewelry, including body piercings, and valuables at home.    Bring any devices you will need after surgery such as crutches or canes.  If you have sleep apnea, please bring your CPAP machine  In the event that your physical condition changes including the onset of a cold or respiratory illness, or if you have to delay or cancel your surgery, please notify your surgeon.

## 2025-05-07 NOTE — H&P
CC:  Right knee pain    Jojo Burrows is a 75 y.o. female with history of Right knee pain. Pain is worse with activity and weight bearing.  Patient has experienced interference of activities of daily living due to decreased range of motion and an increase in joint pain and swelling.  Patient has failed non-operative treatment including NSAIDs, corticosteroid injections, viscosupplement injections, and activity modification.  Jojo Burrows currently ambulates independently.     Relevant medical conditions of significance in perioperative period:  HTN- on medication managed by pcp  HLD- on medication managed by pcp  GERD- on medication managed by pcp  Depression- on medication managed by pcp      Past Medical History:   Diagnosis Date    Acute hypoxemic respiratory failure 03/31/2020    ALLERGIC RHINITIS     Cataract     COVID-19 virus infection 03/2020    Degenerative disc disease, cervical 09/13/2018    GERD (gastroesophageal reflux disease)     Hyperlipidemia     Hypertension     Migraine headache     Multifocal pneumonia 03/31/2020    Nuclear sclerosis 04/30/2014    Overweight (BMI 25.0-29.9) 01/27/2015    Sleep disorder     Thyroid disease     nodular goiter    TIA (transient ischemic attack)        Past Surgical History:   Procedure Laterality Date    BREAST BIOPSY      BREAST CYST ASPIRATION      BREAST CYST EXCISION      COLONOSCOPY N/A 4/29/2016    Procedure: COLONOSCOPY;  Surgeon: Sergio Vickers MD;  [facility]  Service: Endoscopy;  Laterality: N/A;    COLONOSCOPY N/A 6/28/2021    Procedure: COLONOSCOPY;  Surgeon: Sergio Vickers MD;  Location: [facility] (4TH FLR);  Service: Endoscopy;  Laterality: N/A;  COVID + 3/2020 and fully vaccinated -   pt requesting Dr. vickers/ instructions emailed-     ESOPHAGOGASTRODUODENOSCOPY N/A 3/18/2025    Procedure: EGD (ESOPHAGOGASTRODUODENOSCOPY);  Surgeon: Sergio Vickers MD;  [facility]  Service: Endoscopy;   Laterality: N/A;  Ref by Dr JOSS Munoz, portal - PC  3/12 precall complete. kw    HYSTERECTOMY         Family History   Problem Relation Name Age of Onset    Diabetes Mother      Hypertension Mother      Breast cancer Mother      Asthma Father      Glaucoma Father      Breast cancer Sister 3     Ovarian cancer Sister 3     Stroke Daughter 2     Lymphoma Daughter 1     No Known Problems Son 1     Heart disease Maternal Grandmother      Esophageal cancer Other cousin     Amblyopia Neg Hx      Blindness Neg Hx      Cancer Neg Hx      Cataracts Neg Hx      Macular degeneration Neg Hx      Retinal detachment Neg Hx      Strabismus Neg Hx      Thyroid disease Neg Hx         Review of patient's allergies indicates:   Allergen Reactions    Vantin [cefpodoxime] Itching     Severe itching, night sweats.  Improved after stopped it.  4/24    Iodine Rash    Iodine and iodide containing products Itching and Rash    Shellfish containing products Itching and Rash       Current Medications[1]    Review of Systems:  Constitutional: no fever or chills  Eyes: no visual changes  ENT: no nasal congestion or sore throat  Respiratory: no cough or shortness of breath  Cardiovascular: no chest pain or palpitations  Gastrointestinal: no nausea or vomiting, tolerating diet  Genitourinary: no hematuria or dysuria  Integument/Breast: no rash or pruritis  Hematologic/Lymphatic: no easy bruising or lymphadenopathy  Musculoskeletal: positive for knee pain  Neurological: no seizures or tremors  Behavioral/Psych: no auditory or visual hallucinations  Endocrine: no heat or cold intolerance    PE:  There were no vitals taken for this visit.  General: Pleasant, cooperative, NAD   Gait: antalgic  HEENT: NCAT, sclera nonicteric   Lungs: Respirations clear bilaterally; equal and unlabored.   CV: S1S2; 2+ bilateral upper and lower extremity pulses.   Skin: Intact throughout with no rashes, erythema, or lesions  Extremities: No LE edema,  no erythema or warmth  of the skin in either lower extremity.    Right knee exam:  Knee Range of Motion:5-120, pain with passive range of motion  Effusion:none  Condition of skin:intact  Location of tenderness:Medial joint line   Strength:normal  Stability: stable to testing    Hip Examination: painless PROM of hip     Radiographs: Radiographs reveal advanced degenerative changes including subchondral cyst formation, subchondral sclerosis, osteophyte formation, joint space narrowing.     Knee Alignment: normal    Diagnosis: Primary osteoarthritis Right knee    Plan: Right total knee arthroplasty    Due to the serious nature of total joint infection and the high prevalence of community acquired MRSA, vancomycin will be used perioperatively.                 [1]   Current Outpatient Medications:     albuterol (PROVENTIL/VENTOLIN HFA) 90 mcg/actuation inhaler, Inhale 2 puffs into the lungs every 6 (six) hours as needed for Wheezing. Rescue, Disp: 54 g, Rfl: 3    ascorbic acid, vitamin C, (VITAMIN C) 500 MG tablet, Take 1 tablet (500 mg total) by mouth 2 (two) times daily., Disp: , Rfl:     aspirin (ECOTRIN) 81 MG EC tablet, Take 81 mg by mouth once daily., Disp: , Rfl:     azelastine (ASTELIN) 137 mcg (0.1 %) nasal spray, 1 spray (137 mcg total) by Nasal route 2 (two) times daily. (Patient taking differently: 1 spray by Nasal route as needed.), Disp: 30 mL, Rfl: 3    calcium carbonate (TUMS) 200 mg calcium (500 mg) chewable tablet, Take 2 tablets by mouth as needed., Disp: , Rfl:     celecoxib (CELEBREX) 200 MG capsule, Take 200 mg by mouth as needed., Disp: , Rfl:     cetirizine (ZYRTEC) 10 MG tablet, Take 10 mg by mouth daily as needed for Allergies., Disp: , Rfl:     cholecalciferol, vitamin D3, (VITAMIN D3) 25 mcg (1,000 unit) capsule, Take 1,000 Units by mouth once daily., Disp: , Rfl:     cyanocobalamin (VITAMIN B-12) 250 MCG tablet, Take 250 mcg by mouth once daily., Disp: , Rfl:     fluticasone propionate (FLONASE) 50 mcg/actuation  nasal spray, 2 sprays (100 mcg total) by Each Nostril route once daily., Disp: 9.9 mL, Rfl: 11    folic acid/multivit-min/lutein (CENTRUM SILVER ORAL), Take 1 tablet by mouth once daily., Disp: , Rfl:     metoprolol succinate (TOPROL-XL) 50 MG 24 hr tablet, TAKE 1 TABLET BY MOUTH EVERY DAY, Disp: 90 tablet, Rfl: 3    pantoprazole (PROTONIX) 40 MG tablet, Take 1 tablet (40 mg total) by mouth before breakfast. Best taken 45-60 minutes before your first protein meal of the day - Breakfast., Disp: 90 tablet, Rfl: 3    rosuvastatin (CRESTOR) 20 MG tablet, Take 1 tablet (20 mg total) by mouth once daily. For cholesterol control., Disp: 90 tablet, Rfl: 3    sertraline (ZOLOFT) 50 MG tablet, Take 1 tablet (50 mg total) by mouth once daily. For anxiety control., Disp: 90 tablet, Rfl: 2    TURMERIC ORAL, Take 1 capsule by mouth once daily., Disp: , Rfl:     zolpidem (AMBIEN) 5 MG Tab, Take 1 tablet (5 mg total) by mouth nightly as needed (insomnia)., Disp: 30 tablet, Rfl: 3

## 2025-05-07 NOTE — ASSESSMENT & PLAN NOTE
Impression:     No acute abnormality.     Distal abdominal aortic aneurysm measuring 3.3 cm, increased in size from 3.1 cm on 01/05/2023.     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     Electronically signed by resident: Ken العلي  Date:                                            08/28/2024  Time:                                           13:56Aorta: Aneurysmal dilatation of the distal aorta measuring 2.4 x 3.3 cm, previously 2.8 x 3.1 cm on 01/05/2023. Ectasia of the iliac arteries bilaterally measuring 1.7 cm on the right and 1.9 cm on the left.

## 2025-05-09 ENCOUNTER — TELEPHONE (OUTPATIENT)
Dept: ENDOSCOPY | Facility: HOSPITAL | Age: 75
End: 2025-05-09
Payer: MEDICARE

## 2025-05-09 ENCOUNTER — TELEPHONE (OUTPATIENT)
Dept: ORTHOPEDICS | Facility: CLINIC | Age: 75
End: 2025-05-09
Payer: MEDICARE

## 2025-05-09 ENCOUNTER — PATIENT MESSAGE (OUTPATIENT)
Dept: ENDOSCOPY | Facility: HOSPITAL | Age: 75
End: 2025-05-09
Payer: MEDICARE

## 2025-05-09 NOTE — TELEPHONE ENCOUNTER
Patient is scheduled for a Upper Endoscopy (EGD) on 25 with Dr. JASMYN Vickers  Referral for procedure from [facility]    Note  ----- Message from [place] sent at 3/19/2025 12:45 PM CDT -----     ----- Message -----  From: Sergio Vickers MD  Sent: 3/18/2025  11:35 AM CDT  To: [facility]     Procedure: EGDDiagnosis: Erosive EsophagitisProcedure Timin weeksLocation: Any SiteAdditional Scheduling Information: No scheduling concernsPrep Specifications:Standard prepIs the patient taking a GLP-1 Agonist:no but please ask.Have you attached a patient to this message: yes

## 2025-05-09 NOTE — TELEPHONE ENCOUNTER
Called pt and scheduled her for her CT scan to be on 5/12/25 following her cards appt. Pt verbalized understanding and has no further questions.

## 2025-05-12 ENCOUNTER — OFFICE VISIT (OUTPATIENT)
Dept: CARDIOLOGY | Facility: CLINIC | Age: 75
End: 2025-05-12
Payer: MEDICARE

## 2025-05-12 ENCOUNTER — HOSPITAL ENCOUNTER (OUTPATIENT)
Dept: RADIOLOGY | Facility: HOSPITAL | Age: 75
Discharge: HOME | End: 2025-05-12
Attending: ORTHOPAEDIC SURGERY
Payer: MEDICARE

## 2025-05-12 VITALS
SYSTOLIC BLOOD PRESSURE: 110 MMHG | HEART RATE: 68 BPM | DIASTOLIC BLOOD PRESSURE: 76 MMHG | WEIGHT: 158.06 LBS | HEIGHT: 61 IN | BODY MASS INDEX: 29.84 KG/M2

## 2025-05-12 DIAGNOSIS — M17.11 PRIMARY OSTEOARTHRITIS OF RIGHT KNEE: ICD-10-CM

## 2025-05-12 DIAGNOSIS — I71.43 INFRARENAL ABDOMINAL AORTIC ANEURYSM (AAA) WITHOUT RUPTURE: ICD-10-CM

## 2025-05-12 DIAGNOSIS — Z01.810 PREOPERATIVE CARDIOVASCULAR EXAMINATION: Primary | ICD-10-CM

## 2025-05-12 DIAGNOSIS — E78.49 OTHER HYPERLIPIDEMIA: ICD-10-CM

## 2025-05-12 DIAGNOSIS — I27.20 PULMONARY HYPERTENSION: ICD-10-CM

## 2025-05-12 DIAGNOSIS — I72.3 ANEURYSM OF COMMON ILIAC ARTERY: ICD-10-CM

## 2025-05-12 DIAGNOSIS — I10 ESSENTIAL HYPERTENSION: ICD-10-CM

## 2025-05-12 DIAGNOSIS — I77.1 TORTUOUS AORTA: ICD-10-CM

## 2025-05-12 PROCEDURE — 1159F MED LIST DOCD IN RCRD: CPT | Mod: CPTII,HCNC,S$GLB, | Performed by: INTERNAL MEDICINE

## 2025-05-12 PROCEDURE — 1101F PT FALLS ASSESS-DOCD LE1/YR: CPT | Mod: CPTII,HCNC,S$GLB, | Performed by: INTERNAL MEDICINE

## 2025-05-12 PROCEDURE — 1126F AMNT PAIN NOTED NONE PRSNT: CPT | Mod: CPTII,HCNC,S$GLB, | Performed by: INTERNAL MEDICINE

## 2025-05-12 PROCEDURE — 3074F SYST BP LT 130 MM HG: CPT | Mod: CPTII,HCNC,S$GLB, | Performed by: INTERNAL MEDICINE

## 2025-05-12 PROCEDURE — 3078F DIAST BP <80 MM HG: CPT | Mod: CPTII,HCNC,S$GLB, | Performed by: INTERNAL MEDICINE

## 2025-05-12 PROCEDURE — 73700 CT LOWER EXTREMITY W/O DYE: CPT | Mod: 26,HCNC,RT, | Performed by: RADIOLOGY

## 2025-05-12 PROCEDURE — 99215 OFFICE O/P EST HI 40 MIN: CPT | Mod: HCNC,S$GLB,, | Performed by: INTERNAL MEDICINE

## 2025-05-12 PROCEDURE — 3288F FALL RISK ASSESSMENT DOCD: CPT | Mod: CPTII,HCNC,S$GLB, | Performed by: INTERNAL MEDICINE

## 2025-05-12 PROCEDURE — 73700 CT LOWER EXTREMITY W/O DYE: CPT | Mod: TC,HCNC,RT

## 2025-05-12 PROCEDURE — 99999 PR PBB SHADOW E&M-EST. PATIENT-LVL IV: CPT | Mod: PBBFAC,HCNC,, | Performed by: INTERNAL MEDICINE

## 2025-05-12 RX ORDER — DIPHENHYDRAMINE HCL 50 MG
CAPSULE ORAL
Qty: 1 CAPSULE | Refills: 0 | Status: SHIPPED | OUTPATIENT
Start: 2025-05-12

## 2025-05-12 RX ORDER — PREDNISONE 50 MG/1
TABLET ORAL
Qty: 3 TABLET | Refills: 0 | Status: SHIPPED | OUTPATIENT
Start: 2025-05-12

## 2025-05-12 NOTE — PATIENT INSTRUCTIONS
Assessment/Plan:  Jojo Burrows is a 73 y.o. female with HTN, HLD, obesity, TIA, who presents for a scheduled appointment.     Infrarenal abdominal aortic aneurysm (AAA) without rupture/Right common iliac aneurysm- CT Abd/Pelvis without contrast on 1/9/2023 revealed aneurysmal dilation of the distal infrarenal abdominal aorta measuring 3.3 x 3.1 cm, previously 3.1 cm on abdominal ultrasound from 01/05/2023.  Stable aneurysmal dilation of the right common iliac artery 2.2 cm, previously 2.4 cm on abdominal ultrasound from 01/05/2023.  Continue ASA 81 mg daily and rosuvastatin 20 mg daily. Check CTA Abd/Pelvis with runoff for surveillance.     2. Obesity- Encourage diet, exercise, and weight loss.    3. HTN- Controlled   Continue metoprolol succinate 50 mg daily.      4. HLD- Continue ASA 81 mg daily and rosuvastatin 20 mg daily.     5. Preoperative Cardiac Risk Stratification Prior to Planned Right Total Knee Replacement Surgery- Mrs. Burrows has no chest pain no heart failure symptoms and no documented arrhythmia. She can perform greater than 4 METS and is limited only by right knee pain.  Nuclear Stress Test on 4/11/2024 revealed normal myocardial perfusion with no evidence of myocardial ischemia or infarction.  When compared to the previous study from 2/16/2017, there are no significant changes. She is at low risk for a perioperative major adverse cardiac event during this intermediate risk procedure. Revised Cardiac Risk Index of 3.9%. Given history of right iliac artery aneurysm, check CTA abd/pelvis with runoff to complete preoperative cardiac risk stratification.     Will call pt with results of CTA abd/pelvis  Otherwise, follow up in 3 months

## 2025-05-12 NOTE — PROGRESS NOTES
[facility]      Chief Complaint   Patient presents with    infrarenal abdominal aortic aneurysm       Patient ID: Jojo Burrows is a 73 y.o. female with HTN, HLD, obesity, TIA, who presents for a scheduled appointment.  Pertinent history/events are as follows:     -Pt kindly referred by Zohreh Hoffman NP for evaluation of infrarenal abdominal aortic aneurysm without rupture/aneurysm of common iliac artery.    -At our initial clinic visit on 9/7/2023, Mrs. Burrows reported no significant abdominal pain and no claudication.  CT Abd/Pelvis without contrast on 1/9/2023 revealed aneurysmal dilation of the distal infrarenal abdominal aorta measuring 3.3 x 3.1 cm, previously 3.1 cm on abdominal ultrasound from 01/05/2023.  Stable aneurysmal dilation of the right common iliac artery 2.2 cm, previously 2.4 cm on abdominal ultrasound from 01/05/2023.  Normal diameter of the left common iliac artery measuring 1.4 cm.  Plan:  Infrarenal abdominal aortic aneurysm (AAA) without rupture/Right common iliac aneurysm- CT Abd/Pelvis without contrast on 1/9/2023 revealed aneurysmal dilation of the distal infrarenal abdominal aorta measuring 3.3 x 3.1 cm, previously 3.1 cm on abdominal ultrasound from 01/05/2023.  Stable aneurysmal dilation of the right common iliac artery 2.2 cm, previously 2.4 cm on abdominal ultrasound from 01/05/2023.  Start ASA 81 mg daily.  Continue atorvastatin 40 mg daily.  Repeat CT Abd/Pelvis without contrast in 1 year.  Obesity- Encourage diet, exercise, and weight loss.  HTN- Controlled   Continue metoprolol succinate 50 mg daily.    HLD- Start ASA 81 mg daily.  Continue atorvastatin 40 mg daily.    HPI:  Mrs. Burrows has no chest pain no heart failure symptoms and no documented arrhythmia. She can perform greater than 4 METS and is limited only by right knee pain.  Nuclear Stress Test on 4/11/2024 revealed normal myocardial perfusion with no evidence of myocardial ischemia or infarction.   When compared to the previous study from 2/16/2017, there are no significant changes.     Past Medical History:   Diagnosis Date    Acute hypoxemic respiratory failure 03/31/2020    ALLERGIC RHINITIS     Cataract     COVID-19 virus infection 03/2020    Degenerative disc disease, cervical 09/13/2018    GERD (gastroesophageal reflux disease)     Hyperlipidemia     Hypertension     Migraine headache     Multifocal pneumonia 03/31/2020    Nuclear sclerosis 04/30/2014    Overweight (BMI 25.0-29.9) 01/27/2015    Sleep disorder     Thyroid disease     nodular goiter    TIA (transient ischemic attack)      Past Surgical History:   Procedure Laterality Date    BREAST BIOPSY      BREAST CYST ASPIRATION      BREAST CYST EXCISION      COLONOSCOPY N/A 4/29/2016    Procedure: COLONOSCOPY;  Surgeon: Sergio Vickers MD;  Location: [facility] ENDO (4TH FLR);  Service: Endoscopy;  Laterality: N/A;    COLONOSCOPY N/A 6/28/2021    Procedure: COLONOSCOPY;  Surgeon: Sergio Vickers MD;  Location: [facility] ENDO (4TH FLR);  Service: Endoscopy;  Laterality: N/A;  COVID + 3/2020 and fully vaccinated -   pt requesting Dr. vickers/ instructions emailed-     ESOPHAGOGASTRODUODENOSCOPY N/A 3/18/2025    Procedure: EGD (ESOPHAGOGASTRODUODENOSCOPY);  Surgeon: Sergio Vickers MD;  Location: [facility] (4TH FLR);  Service: Endoscopy;  Laterality: N/A;  Ref by Dr JOSS Munoz, [place] [facility]  3/12 precall complete. kw    HYSTERECTOMY       Social History     Socioeconomic History    Marital status:     Number of children: 4   Tobacco Use    Smoking status: Never    Smokeless tobacco: Never   Substance and Sexual Activity    Alcohol use: No    Drug use: No    Sexual activity: Not Currently     Partners: Male     Social Drivers of Health     Financial Resource Strain: Low Risk  (10/28/2024)    Overall Financial Resource Strain (CARDIA)     Difficulty of Paying Living Expenses: Not very hard   Food Insecurity: No Food Insecurity (10/28/2024)    Hunger  Vital Sign     Worried About Running Out of Food in the Last Year: Never true     Ran Out of Food in the Last Year: Never true   Transportation Needs: No Transportation Needs (10/28/2024)    PRAPARE - Transportation     Lack of Transportation (Medical): No     Lack of Transportation (Non-Medical): No   Physical Activity: Insufficiently Active (10/28/2024)    Exercise Vital Sign     Days of Exercise per Week: 3 days     Minutes of Exercise per Session: 20 min   Stress: Stress Concern Present (10/28/2024)    [employer] - Occupational Stress Questionnaire     Feeling of Stress : Rather much   Housing Stability: Unknown (8/15/2023)    Housing Stability Vital Sign     Unable to Pay for Housing in the Last Year: No     Unstable Housing in the Last Year: No     Family History   Problem Relation Name Age of Onset    Diabetes Mother      Hypertension Mother      Breast cancer Mother      Asthma Father      Glaucoma Father      Breast cancer Sister 3     Ovarian cancer Sister 3     Stroke Daughter 2     Lymphoma Daughter 1     No Known Problems Son 1     Heart disease Maternal Grandmother      Esophageal cancer Other cousin     Amblyopia Neg Hx      Blindness Neg Hx      Cancer Neg Hx      Cataracts Neg Hx      Macular degeneration Neg Hx      Retinal detachment Neg Hx      Strabismus Neg Hx      Thyroid disease Neg Hx         Review of patient's allergies indicates:   Allergen Reactions    Vantin [cefpodoxime] Itching     Severe itching, night sweats.  Improved after stopped it.  4/24    Iodine Rash    Iodine and iodide containing products Itching and Rash    Shellfish containing products Itching and Rash       Medication List with Changes/Refills   Current Medications    ALBUTEROL (PROVENTIL/VENTOLIN HFA) 90 MCG/ACTUATION INHALER    Inhale 2 puffs into the lungs every 6 (six) hours as needed for Wheezing. Rescue    ASCORBIC ACID, VITAMIN C, (VITAMIN C) 500 MG TABLET    Take 1 tablet (500 mg  total) by mouth 2 (two) times daily.    ASPIRIN (ECOTRIN) 81 MG EC TABLET    Take 81 mg by mouth once daily.    AZELASTINE (ASTELIN) 137 MCG (0.1 %) NASAL SPRAY    1 spray (137 mcg total) by Nasal route 2 (two) times daily.    CALCIUM CARBONATE (TUMS) 200 MG CALCIUM (500 MG) CHEWABLE TABLET    Take 2 tablets by mouth as needed.    CELECOXIB (CELEBREX) 200 MG CAPSULE    Take 200 mg by mouth as needed.    CETIRIZINE (ZYRTEC) 10 MG TABLET    Take 10 mg by mouth daily as needed for Allergies.    CHOLECALCIFEROL, VITAMIN D3, (VITAMIN D3) 25 MCG (1,000 UNIT) CAPSULE    Take 1,000 Units by mouth once daily.    CYANOCOBALAMIN (VITAMIN B-12) 250 MCG TABLET    Take 250 mcg by mouth once daily.    FLUTICASONE PROPIONATE (FLONASE) 50 MCG/ACTUATION NASAL SPRAY    2 sprays (100 mcg total) by Each Nostril route once daily.    FOLIC ACID/MULTIVIT-MIN/LUTEIN (CENTRUM SILVER ORAL)    Take 1 tablet by mouth once daily.    METOPROLOL SUCCINATE (TOPROL-XL) 50 MG 24 HR TABLET    TAKE 1 TABLET BY MOUTH EVERY DAY    PANTOPRAZOLE (PROTONIX) 40 MG TABLET    Take 1 tablet (40 mg total) by mouth before breakfast. Best taken 45-60 minutes before your first protein meal of the day - Breakfast.    ROSUVASTATIN (CRESTOR) 20 MG TABLET    Take 1 tablet (20 mg total) by mouth once daily. For cholesterol control.    SERTRALINE (ZOLOFT) 50 MG TABLET    Take 1 tablet (50 mg total) by mouth once daily. For anxiety control.    TURMERIC ORAL    Take 1 capsule by mouth once daily.    ZOLPIDEM (AMBIEN) 5 MG TAB    Take 1 tablet (5 mg total) by mouth nightly as needed (insomnia).       Review of Systems  Constitution: Denies chills, fever, and sweats.  HENT: Denies headaches or blurry vision.  Cardiovascular: Denies chest pain or irregular heart beat.  Respiratory: Denies cough or shortness of breath.  Gastrointestinal: Denies abdominal pain, nausea, or vomiting.  Musculoskeletal: Denies muscle cramps.  Neurological: Denies dizziness or focal  "weakness.  Psychiatric/Behavioral: Normal mental status.  Hematologic/Lymphatic: Denies bleeding problem or easy bruising/bleeding.  Skin: Denies rash or suspicious lesions    Physical Examination  /76   Pulse 68   Ht 5' 1" (1.549 m)   Wt 71.7 kg (158 lb 1.1 oz)   BMI 29.87 kg/m²     Constitutional: No acute distress, conversant  HEENT: Sclera anicteric, Pupils equal, round and reactive to light, extraocular motions intact, Oropharynx clear  Neck: No JVD, no carotid bruits  Cardiovascular: regular rate and rhythm, no murmur, rubs or gallops, normal S1/S2  Pulmonary: Clear to auscultation bilaterally  Abdominal: Abdomen soft, nontender, nondistended, positive bowel sounds  Extremities: No lower extremity edema,   Pulses:  Carotid pulses are 2+ on the right side, and 2+ on the left side.  Radial pulses are 2+ on the right side, and 2+ on the left side.   Femoral pulses are 2+ on the right side, and 2+ on the left side.  Popliteal pulses are 2+ on the right side, and 2+ on the left side.   Dorsalis pedis pulses are 2+ on the right side, and 2+ on the left side.   Posterior tibial pulses are 2+ on the right side, and 2+ on the left side.    Skin: No ecchymosis, erythema, or ulcers  Psych: Alert and oriented x 3, appropriate affect  Neuro: CNII-XII intact, no focal deficits    Labs:  Most Recent Data  CBC:   Lab Results   Component Value Date    WBC 6.24 05/07/2025    HGB 13.5 05/07/2025    HCT 41.4 05/07/2025     05/07/2025    MCV 89 05/07/2025    RDW 13.4 05/07/2025     BMP:   Lab Results   Component Value Date     05/07/2025    K 3.7 05/07/2025     05/07/2025    CO2 26 05/07/2025    BUN 12 05/07/2025    CREATININE 0.8 05/07/2025    GLU 79 05/07/2025    CALCIUM 9.6 05/07/2025    MG 1.8 03/19/2021    PHOS 3.1 04/01/2020     LFTS;   Lab Results   Component Value Date    PROT 7.7 05/07/2025    ALBUMIN 4.0 05/07/2025    BILITOT 0.6 05/07/2025    AST 20 05/07/2025    ALKPHOS 90 05/07/2025    ALT " 14 05/07/2025     COAGS:   Lab Results   Component Value Date    INR 1.0 05/07/2025    PROTIME 11.4 05/07/2025     FLP:   Lab Results   Component Value Date    CHOL 145 02/18/2025    HDL 67 02/18/2025    LDLCALC 71.4 02/18/2025    TRIG 33 02/18/2025    CHOLHDL 46.2 02/18/2025     CARDIAC:   Lab Results   Component Value Date    TROPONINI <0.006 02/10/2024    BNP 25 02/10/2024       Imaging:    Nuclear Stress Test 4/11/2024:    Normal myocardial perfusion scan. There is no evidence of myocardial ischemia or infarction.    The visually estimated ejection fraction is normal at rest and normal during stress.    There is normal wall motion at rest and post stress.    LV cavity size is normal at rest and normal at stress.    The ECG portion of the study is negative for ischemia.    The patient reported no chest pain during the stress test.    There were no arrhythmias during stress.    When compared to the previous study from 2/16/2017, there are no significant changes.    CT Abd/Pelvis without contrast 1/9/2023:  Relatively stable aneurysmal dilation of the distal infrarenal abdominal aorta measuring 3.3 x 3.1 cm, previously 3.1 cm on abdominal ultrasound from 01/05/2023.  Stable aneurysmal dilation of the right common iliac artery 2.2 cm, previously 2.4 cm on abdominal ultrasound from 01/05/2023.  Normal diameter of the left common iliac artery measuring 1.4 cm.    Echo 9/25/2020:  The left ventricle is normal in size with normal systolic function. The estimated ejection fraction is 65%.  Indeterminate diastolic function.  Normal right ventricular systolic function.  Mild tricuspid regurgitation.  The estimated PA systolic pressure is 36 mmHg.  Normal central venous pressure (3 mmHg).     Assessment/Plan:  Jojo Burrows is a 73 y.o. female with HTN, HLD, obesity, TIA, who presents for a scheduled appointment.     Infrarenal abdominal aortic aneurysm (AAA) without rupture/Right common iliac aneurysm- CT Abd/Pelvis  without contrast on 1/9/2023 revealed aneurysmal dilation of the distal infrarenal abdominal aorta measuring 3.3 x 3.1 cm, previously 3.1 cm on abdominal ultrasound from 01/05/2023.  Stable aneurysmal dilation of the right common iliac artery 2.2 cm, previously 2.4 cm on abdominal ultrasound from 01/05/2023.  Continue ASA 81 mg daily and rosuvastatin 20 mg daily. Check CTA Abd/Pelvis with runoff for surveillance.     2. Obesity- Encourage diet, exercise, and weight loss.    3. HTN- Controlled   Continue metoprolol succinate 50 mg daily.      4. HLD- Continue ASA 81 mg daily and rosuvastatin 20 mg daily.     5. Preoperative Cardiac Risk Stratification Prior to Planned Right Total Knee Replacement Surgery- Mrs. Burrows has no chest pain no heart failure symptoms and no documented arrhythmia. She can perform greater than 4 METS and is limited only by right knee pain.  Nuclear Stress Test on 4/11/2024 revealed normal myocardial perfusion with no evidence of myocardial ischemia or infarction.  When compared to the previous study from 2/16/2017, there are no significant changes. She is at low risk for a perioperative major adverse cardiac event during this intermediate risk procedure. Revised Cardiac Risk Index of 3.9%. Given history of right iliac artery aneurysm, check CTA abd/pelvis with runoff to complete preoperative cardiac risk stratification.     Will call pt with results of CTA abd/pelvis  Otherwise, follow up in 3 months     Total duration of face to face visit time 30 minutes.  Total time spent counseling greater than fifty percent of total visit time.  Counseling included discussion regarding imaging findings, diagnosis, possibilities, treatment options, risks and benefits.  The patient had many questions regarding the options and long-term effects.    Chandrakant Root MD, PhD  Interventional Cardiology

## 2025-05-15 ENCOUNTER — TELEPHONE (OUTPATIENT)
Dept: ORTHOPEDICS | Facility: CLINIC | Age: 75
End: 2025-05-15
Payer: MEDICARE

## 2025-05-16 ENCOUNTER — ANESTHESIA (OUTPATIENT)
Dept: ENDOSCOPY | Facility: HOSPITAL | Age: 75
End: 2025-05-16
Payer: MEDICARE

## 2025-05-16 ENCOUNTER — ANESTHESIA EVENT (OUTPATIENT)
Dept: ENDOSCOPY | Facility: HOSPITAL | Age: 75
End: 2025-05-16
Payer: MEDICARE

## 2025-05-16 ENCOUNTER — HOSPITAL ENCOUNTER (OUTPATIENT)
Facility: HOSPITAL | Age: 75
Discharge: HOME | End: 2025-05-16
Attending: INTERNAL MEDICINE | Admitting: INTERNAL MEDICINE
Payer: MEDICARE

## 2025-05-16 ENCOUNTER — TELEPHONE (OUTPATIENT)
Dept: GASTROENTEROLOGY | Facility: CLINIC | Age: 75
End: 2025-05-16
Payer: MEDICARE

## 2025-05-16 VITALS
WEIGHT: 154 LBS | HEIGHT: 61 IN | OXYGEN SATURATION: 98 % | HEART RATE: 63 BPM | SYSTOLIC BLOOD PRESSURE: 131 MMHG | TEMPERATURE: 98 F | RESPIRATION RATE: 20 BRPM | DIASTOLIC BLOOD PRESSURE: 60 MMHG | BODY MASS INDEX: 29.07 KG/M2

## 2025-05-16 DIAGNOSIS — K21.9 GASTROESOPHAGEAL REFLUX DISEASE, UNSPECIFIED WHETHER ESOPHAGITIS PRESENT: ICD-10-CM

## 2025-05-16 DIAGNOSIS — K21.9 GERD (GASTROESOPHAGEAL REFLUX DISEASE): ICD-10-CM

## 2025-05-16 DIAGNOSIS — R10.10 PAIN OF UPPER ABDOMEN: ICD-10-CM

## 2025-05-16 PROCEDURE — 63600175 PHARM REV CODE 636 W HCPCS: Mod: HCNC | Performed by: NURSE ANESTHETIST, CERTIFIED REGISTERED

## 2025-05-16 PROCEDURE — 94640 AIRWAY INHALATION TREATMENT: CPT | Mod: HCNC

## 2025-05-16 PROCEDURE — 43239 EGD BIOPSY SINGLE/MULTIPLE: CPT | Mod: HCNC,,, | Performed by: INTERNAL MEDICINE

## 2025-05-16 PROCEDURE — 25000003 PHARM REV CODE 250: Mod: HCNC | Performed by: NURSE ANESTHETIST, CERTIFIED REGISTERED

## 2025-05-16 PROCEDURE — 88305 TISSUE EXAM BY PATHOLOGIST: CPT | Mod: TC,HCNC | Performed by: INTERNAL MEDICINE

## 2025-05-16 PROCEDURE — 27201012 HC FORCEPS, HOT/COLD, DISP: Mod: HCNC | Performed by: INTERNAL MEDICINE

## 2025-05-16 PROCEDURE — 25000242 PHARM REV CODE 250 ALT 637 W/ HCPCS: Mod: HCNC | Performed by: ANESTHESIOLOGY

## 2025-05-16 PROCEDURE — 37000009 HC ANESTHESIA EA ADD 15 MINS: Mod: HCNC | Performed by: INTERNAL MEDICINE

## 2025-05-16 PROCEDURE — 43239 EGD BIOPSY SINGLE/MULTIPLE: CPT | Mod: HCNC | Performed by: INTERNAL MEDICINE

## 2025-05-16 PROCEDURE — 37000008 HC ANESTHESIA 1ST 15 MINUTES: Mod: HCNC | Performed by: INTERNAL MEDICINE

## 2025-05-16 RX ORDER — GLUCAGON 1 MG
1 KIT INJECTION
Status: DISCONTINUED | OUTPATIENT
Start: 2025-05-16 | End: 2025-05-16 | Stop reason: HOSPADM

## 2025-05-16 RX ORDER — PROPOFOL 10 MG/ML
VIAL (ML) INTRAVENOUS CONTINUOUS PRN
Status: DISCONTINUED | OUTPATIENT
Start: 2025-05-16 | End: 2025-05-16

## 2025-05-16 RX ORDER — ALBUTEROL SULFATE 2.5 MG/.5ML
2.5 SOLUTION RESPIRATORY (INHALATION) ONCE
Status: COMPLETED | OUTPATIENT
Start: 2025-05-16 | End: 2025-05-16

## 2025-05-16 RX ORDER — ONDANSETRON HYDROCHLORIDE 2 MG/ML
4 INJECTION, SOLUTION INTRAVENOUS DAILY PRN
Status: DISCONTINUED | OUTPATIENT
Start: 2025-05-16 | End: 2025-05-16 | Stop reason: HOSPADM

## 2025-05-16 RX ORDER — LIDOCAINE HYDROCHLORIDE 20 MG/ML
INJECTION INTRAVENOUS
Status: DISCONTINUED | OUTPATIENT
Start: 2025-05-16 | End: 2025-05-16

## 2025-05-16 RX ORDER — SODIUM CHLORIDE 9 MG/ML
INJECTION, SOLUTION INTRAVENOUS CONTINUOUS
Status: DISCONTINUED | OUTPATIENT
Start: 2025-05-16 | End: 2025-05-16 | Stop reason: HOSPADM

## 2025-05-16 RX ORDER — SODIUM CHLORIDE 0.9 % (FLUSH) 0.9 %
10 SYRINGE (ML) INJECTION
Status: DISCONTINUED | OUTPATIENT
Start: 2025-05-16 | End: 2025-05-16 | Stop reason: HOSPADM

## 2025-05-16 RX ADMIN — LIDOCAINE HYDROCHLORIDE 20 MG: 20 INJECTION INTRAVENOUS at 11:05

## 2025-05-16 RX ADMIN — PROPOFOL 150 MCG/KG/MIN: 10 INJECTION, EMULSION INTRAVENOUS at 11:05

## 2025-05-16 RX ADMIN — ALBUTEROL SULFATE 2.5 MG: 2.5 SOLUTION RESPIRATORY (INHALATION) at 12:05

## 2025-05-16 RX ADMIN — SODIUM CHLORIDE: 0.9 INJECTION, SOLUTION INTRAVENOUS at 11:05

## 2025-05-16 NOTE — ANESTHESIA PREPROCEDURE EVALUATION
05/16/2025  Jojo Burrows is a 75 y.o., female Pre-operative evaluation for Procedure(s) (LRB):  EGD (ESOPHAGOGASTRODUODENOSCOPY) (N/A)    Jojo Burrows is a 75 y.o. female     Problem List[1]    Review of patient's allergies indicates:   Allergen Reactions    Vantin [cefpodoxime] Itching     Severe itching, night sweats.  Improved after stopped it.  4/24    Iodine Rash    Iodine and iodide containing products Itching and Rash    Shellfish containing products Itching and Rash       Medications Ordered Prior to Encounter[2]    Past Surgical History:   Procedure Laterality Date    BREAST BIOPSY      BREAST CYST ASPIRATION      BREAST CYST EXCISION      COLONOSCOPY N/A 4/29/2016    Procedure: COLONOSCOPY;  Surgeon: Sergio Vickers MD;  Location: [facility] (4TH FLR);  Service: Endoscopy;  Laterality: N/A;    COLONOSCOPY N/A 6/28/2021    Procedure: COLONOSCOPY;  Surgeon: Sergio Vickers MD;  Location: [facility] (4TH FLR);  Service: Endoscopy;  Laterality: N/A;  COVID + 3/2020 and fully vaccinated -   pt requesting Dr. vickers/ instructions emailed-     ESOPHAGOGASTRODUODENOSCOPY N/A 3/18/2025    Procedure: EGD (ESOPHAGOGASTRODUODENOSCOPY);  Surgeon: Sergio Vickers MD;  Location: [facility] (4TH FLR);  Service: Endoscopy;  Laterality: N/A;  Ref by Dr JOSS Munoz, [facility]  3/12 precall complete. kw    HYSTERECTOMY         2D Echo:  Results for orders placed or performed in visit on 09/12/13   2D echo with color flow doppler    Collection Time: 09/12/13 12:00 AM   Result Value Ref Range    EF + QEF 60     Diastolic Dysfunction No          Pre-op Assessment    I have reviewed the Patient Summary Reports.     I have reviewed the Nursing Notes. I have reviewed the NPO Status.   I have reviewed the Medications.     Review of Systems  Anesthesia Hx:  No problems with previous Anesthesia   History of prior  surgery of interest to airway management or planning:          Denies Family Hx of Anesthesia complications.    Denies Personal Hx of Anesthesia complications.                    Social:  No Alcohol Use, Non-Smoker       Hematology/Oncology:  Hematology Normal   Oncology Normal                                   EENT/Dental:  EENT/Dental Normal           Cardiovascular:  Exercise tolerance: good   Hypertension                                          Pulmonary:  Pneumonia                      Renal/:  Renal/ Normal                 Hepatic/GI:     GERD                Musculoskeletal:  Arthritis               Neurological:  TIA,    Headaches                                 Endocrine:  Endocrine Normal            Psych:  Psychiatric Normal                    Physical Exam  General: Well nourished, Cooperative, Alert and Oriented    Airway:  Mallampati: II   Mouth Opening: Normal  TM Distance: Normal  Tongue: Normal  Neck ROM: Normal ROM    Dental:  Intact    Chest/Lungs:  Clear to auscultation, Normal Respiratory Rate    Heart:  Rate: Normal  Rhythm: Regular Rhythm        Anesthesia Plan  Type of Anesthesia, risks & benefits discussed:    Anesthesia Type: Gen Natural Airway  Intra-op Monitoring Plan: Standard ASA Monitors  Post Op Pain Control Plan: multimodal analgesia and IV/PO Opioids PRN  Induction:  IV  Airway Plan: Direct  Informed Consent: Informed consent signed with the Patient and all parties understand the risks and agree with anesthesia plan.  All questions answered. Patient consented to blood products? No  ASA Score: 3  Day of Surgery Review of History & Physical: H&P Update referred to the surgeon/provider.    Ready For Surgery From Anesthesia Perspective.     .           [1]   Patient Active Problem List  Diagnosis    Essential hypertension    GERD (gastroesophageal reflux disease)    Other hyperlipidemia    Nuclear sclerosis    Lumbar facet arthropathy    Tortuous aorta    Multiple thyroid nodules     Vitamin D insufficiency    Breast cyst    Degenerative disc disease, cervical    Cervical radiculopathy    Primary osteoarthritis of both knees    Right knee pain    Colon adenoma    History of 2019 novel coronavirus disease (COVID-19)    march 2021    Grieving    Infrarenal abdominal aortic aneurysm (AAA) without rupture    Aneurysm of common iliac artery    Osteopenia    Pulmonary hypertension    Preoperative cardiovascular examination   [2]   No current facility-administered medications on file prior to encounter.     Current Outpatient Medications on File Prior to Encounter   Medication Sig Dispense Refill    albuterol (PROVENTIL/VENTOLIN HFA) 90 mcg/actuation inhaler Inhale 2 puffs into the lungs every 6 (six) hours as needed for Wheezing. Rescue 54 g 3    ascorbic acid, vitamin C, (VITAMIN C) 500 MG tablet Take 1 tablet (500 mg total) by mouth 2 (two) times daily. (Patient taking differently: Take 500 mg by mouth once daily.)      aspirin (ECOTRIN) 81 MG EC tablet Take 81 mg by mouth once daily.      azelastine (ASTELIN) 137 mcg (0.1 %) nasal spray 1 spray (137 mcg total) by Nasal route 2 (two) times daily. (Patient taking differently: 1 spray by Nasal route as needed.) 30 mL 3    calcium carbonate (TUMS) 200 mg calcium (500 mg) chewable tablet Take 2 tablets by mouth as needed.      celecoxib (CELEBREX) 200 MG capsule Take 200 mg by mouth as needed.      cetirizine (ZYRTEC) 10 MG tablet Take 10 mg by mouth daily as needed for Allergies.      cholecalciferol, vitamin D3, (VITAMIN D3) 25 mcg (1,000 unit) capsule Take 1,000 Units by mouth once daily.      cyanocobalamin (VITAMIN B-12) 250 MCG tablet Take 250 mcg by mouth once daily.      fluticasone propionate (FLONASE) 50 mcg/actuation nasal spray 2 sprays (100 mcg total) by Each Nostril route once daily. (Patient taking differently: 2 sprays by Each Nostril route as needed.) 9.9 mL 11    folic acid/multivit-min/lutein (CENTRUM SILVER ORAL) Take 1 tablet by  mouth once daily.      metoprolol succinate (TOPROL-XL) 50 MG 24 hr tablet TAKE 1 TABLET BY MOUTH EVERY DAY 90 tablet 3    pantoprazole (PROTONIX) 40 MG tablet Take 1 tablet (40 mg total) by mouth before breakfast. Best taken 45-60 minutes before your first protein meal of the day - Breakfast. 90 tablet 3    rosuvastatin (CRESTOR) 20 MG tablet Take 1 tablet (20 mg total) by mouth once daily. For cholesterol control. 90 tablet 3    sertraline (ZOLOFT) 50 MG tablet Take 1 tablet (50 mg total) by mouth once daily. For anxiety control. 90 tablet 2    TURMERIC ORAL Take 1 capsule by mouth once daily.      zolpidem (AMBIEN) 5 MG Tab Take 1 tablet (5 mg total) by mouth nightly as needed (insomnia). 30 tablet 3

## 2025-05-16 NOTE — TRANSFER OF CARE
"Anesthesia Transfer of Care Note    Patient: Jojo Burrows    Procedure(s) Performed: Procedure(s) (LRB):  EGD (ESOPHAGOGASTRODUODENOSCOPY) (N/A)    Patient location: PACU    Anesthesia Type: general    Transport from OR: Transported from OR on room air with adequate spontaneous ventilation    Post pain: adequate analgesia    Post assessment: no apparent anesthetic complications    Post vital signs: stable    Level of consciousness: sedated    Nausea/Vomiting: no nausea/vomiting    Complications: none    Transfer of care protocol was followed    Last vitals: Visit Vitals  BP (!) 118/55 (BP Location: Left arm, Patient Position: Lying)   Pulse 68   Temp 36.9 °C (98.4 °F) (Temporal)   Resp 16   Ht 5' 1" (1.549 m)   Wt 69.9 kg (154 lb)   Breastfeeding No   BMI 29.10 kg/m²     "

## 2025-05-16 NOTE — TELEPHONE ENCOUNTER
Copied from CRM #[id number]. Topic: General Inquiry - Patient Advice  >> May 16, 2025  8:06 AM Najma wrote:  Pt is requesting a  call from someone in the office and is asking for a return call back soon. Thanks.     Reason for call:discuss time for surgery on       Patient's DX:     Patient requesting call back or [place] [phone]

## 2025-05-16 NOTE — PLAN OF CARE
Patient states they are ready to be discharged. Instructions given to patient and family. Both verbalize understanding. Patient tolerating po liquids with no difficulty. Patient states pain is at a tolerable level for them. Anesthesia consent and surgical consent in chart upon patient's discharge from [facility].

## 2025-05-16 NOTE — H&P
[facility] - Endoscopy  History & Physical    Subjective:      Chief Complaint/Reason for Admission:     EGD for follow up erosive esophagitis    Jojo Burrows is a 75 y.o. female.    Past Medical History:   Diagnosis Date    Acute hypoxemic respiratory failure 03/31/2020    ALLERGIC RHINITIS     Cataract     COVID-19 virus infection 03/2020    Degenerative disc disease, cervical 09/13/2018    GERD (gastroesophageal reflux disease)     Hyperlipidemia     Hypertension     Migraine headache     Multifocal pneumonia 03/31/2020    Nuclear sclerosis 04/30/2014    Overweight (BMI 25.0-29.9) 01/27/2015    Sleep disorder     Thyroid disease     nodular goiter    TIA (transient ischemic attack)      Past Surgical History:   Procedure Laterality Date    BREAST BIOPSY      BREAST CYST ASPIRATION      BREAST CYST EXCISION      COLONOSCOPY N/A 4/29/2016    Procedure: COLONOSCOPY;  Surgeon: Sergio Momin MD;  Location: [facility] (4TH FLR);  Service: Endoscopy;  Laterality: N/A;    COLONOSCOPY N/A 6/28/2021    Procedure: COLONOSCOPY;  Surgeon: Sergio Momin MD;  Location: [facility] (4TH FLR);  Service: Endoscopy;  Laterality: N/A;  COVID + 3/2020 and fully vaccinated -   pt requesting Dr. momni/ instructions emailed-     ESOPHAGOGASTRODUODENOSCOPY N/A 3/18/2025    Procedure: EGD (ESOPHAGOGASTRODUODENOSCOPY);  Surgeon: Sergio Momin MD;  Location: [facility] (4TH FLR);  Service: Endoscopy;  Laterality: N/A;  Ref by Dr JOSS Munoz, [place] [facility]  3/12 precall complete. kw    HYSTERECTOMY       Social History[1]    PTA Medications   Medication Sig    metoprolol succinate (TOPROL-XL) 50 MG 24 hr tablet TAKE 1 TABLET BY MOUTH EVERY DAY    pantoprazole (PROTONIX) 40 MG tablet Take 1 tablet (40 mg total) by mouth before breakfast. Best taken 45-60 minutes before your first protein meal of the day - Breakfast.    rosuvastatin (CRESTOR) 20 MG tablet Take 1 tablet (20 mg total) by mouth once daily. For cholesterol control.     albuterol (PROVENTIL/VENTOLIN HFA) 90 mcg/actuation inhaler Inhale 2 puffs into the lungs every 6 (six) hours as needed for Wheezing. Rescue    ascorbic acid, vitamin C, (VITAMIN C) 500 MG tablet Take 1 tablet (500 mg total) by mouth 2 (two) times daily. (Patient taking differently: Take 500 mg by mouth once daily.)    aspirin (ECOTRIN) 81 MG EC tablet Take 81 mg by mouth once daily.    azelastine (ASTELIN) 137 mcg (0.1 %) nasal spray 1 spray (137 mcg total) by Nasal route 2 (two) times daily. (Patient taking differently: 1 spray by Nasal route as needed.)    calcium carbonate (TUMS) 200 mg calcium (500 mg) chewable tablet Take 2 tablets by mouth as needed.    celecoxib (CELEBREX) 200 MG capsule Take 200 mg by mouth as needed.    cetirizine (ZYRTEC) 10 MG tablet Take 10 mg by mouth daily as needed for Allergies.    cholecalciferol, vitamin D3, (VITAMIN D3) 25 mcg (1,000 unit) capsule Take 1,000 Units by mouth once daily.    cyanocobalamin (VITAMIN B-12) 250 MCG tablet Take 250 mcg by mouth once daily.    diphenhydrAMINE (BENADRYL) 50 MG capsule Take 50mg by mouth 1 hour before contrast administration.    fluticasone propionate (FLONASE) 50 mcg/actuation nasal spray 2 sprays (100 mcg total) by Each Nostril route once daily. (Patient taking differently: 2 sprays by Each Nostril route as needed.)    folic acid/multivit-min/lutein (CENTRUM SILVER ORAL) Take 1 tablet by mouth once daily.    predniSONE (DELTASONE) 50 MG Tab Take 50mg by mouth at 13 hours, 7 hours, and 1 hour before contrast administration.    sertraline (ZOLOFT) 50 MG tablet Take 1 tablet (50 mg total) by mouth once daily. For anxiety control.    TURMERIC ORAL Take 1 capsule by mouth once daily.    zolpidem (AMBIEN) 5 MG Tab Take 1 tablet (5 mg total) by mouth nightly as needed (insomnia).     Review of patient's allergies indicates:   Allergen Reactions    Vantin [cefpodoxime] Itching     Severe itching, night sweats.  Improved after stopped it.  4/24     Iodine Rash    Iodine and iodide containing products Itching and Rash    Shellfish containing products Itching and Rash        Review of Systems   Constitutional:  Positive for fever.       Objective:      Vital Signs (Most Recent)  Temp: 98.4 °F (36.9 °C) (05/16/25 1026)  Pulse: 68 (05/16/25 1026)  Resp: 16 (05/16/25 1026)  BP: (!) 118/55 (05/16/25 1026)    Vital Signs Range (Last 24H):  Temp:  [98.4 °F (36.9 °C)]   Pulse:  [68]   Resp:  [16]   BP: (118)/(55)     Physical Exam  Cardiovascular:      Rate and Rhythm: Normal rate.   Pulmonary:      Effort: Pulmonary effort is normal.   Neurological:      Mental Status: She is alert and oriented to person, place, and time.             Assessment:        EGD for follow up erosive esophagitis      Plan:        EGD for follow up erosive esophagitis         [1]   Social History  Tobacco Use    Smoking status: Never    Smokeless tobacco: Never   Substance Use Topics    Alcohol use: No    Drug use: No

## 2025-05-16 NOTE — PROVATION PATIENT INSTRUCTIONS
Discharge Summary/Instructions after an Endoscopic Procedure  Patient Name: Jojo Burrows  Patient MRN: [id number]  Patient YOB: 1950     Friday, May 16, 2025  Sergio Vickers MD  Dear patient,  As a result of recent federal legislation (The Federal Cures Act), you may   receive lab or pathology results from your procedure in your [facility]   account before your physician is able to contact you. Your physician or   their representative will relay the results to you with their   recommendations at their soonest availability.  Thank you,  RESTRICTIONS:  During your procedure today, you received medications for sedation.  These   medications may affect your judgment, balance and coordination.  Therefore,   for 24 hours, you have the following restrictions:   - DO NOT drive a car, operate machinery, make legal/financial decisions,   sign important papers or drink alcohol.    ACTIVITY:  Today: no heavy lifting, straining or running due to procedural   sedation/anesthesia.  The following day: return to full activity including work.  DIET:  Eat and drink normally unless instructed otherwise.     TREATMENT FOR COMMON SIDE EFFECTS:  - Mild abdominal pain, nausea, belching, bloating or excessive gas:  rest,   eat lightly and use a heating pad.  - Sore Throat: treat with throat lozenges and/or gargle with warm salt   water.  - Because air was used during the procedure, expelling large amounts of air   from your rectum or belching is normal.  - If a bowel prep was taken, you may not have a bowel movement for 1-3 days.    This is normal.  SYMPTOMS TO WATCH FOR AND REPORT TO YOUR PHYSICIAN:  1. Abdominal pain or bloating, other than gas cramps.  2. Chest pain.  3. Back pain.  4. Signs of infection such as: chills or fever occurring within 24 hours   after the procedure.  5. Rectal bleeding, which would show as bright red, maroon, or black stools.   (A tablespoon of blood from the rectum is not serious, especially if    hemorrhoids are present.)  6. Vomiting.  7. Weakness or dizziness.  GO DIRECTLY TO THE NEAREST EMERGENCY ROOM IF YOU HAVE ANY OF THE FOLLOWING:      Difficulty breathing              Chills and/or fever over 101 F   Persistent vomiting and/or vomiting blood   Severe abdominal pain   Severe chest pain   Black, tarry stools   Bleeding- more than one tablespoon   Any other symptom or condition that you feel may need urgent attention  Your doctor recommends these additional instructions:  If any biopsies were taken, your doctors clinic will contact you in 1 to 2   weeks with any results.  - Discharge patient to home.   - Follow an antireflux regimen indefinitely.   - Use Protonix 40 mg once daily (or any other full strength proton pump   inhibitor) - best taken 45 minutes before your first protein containing   meal.   - Await pathology results.   - Repeat upper endoscopy in 3 years for surveillance based on pathology   results.   - Return to GI clinic at the next available appointment.   - Return to primary care physician.   - The findings and recommendations were discussed with the patient.  For questions, problems or results please call your physician - Sergio Vickers MD at Work:  [phone].  [facility], EMERGENCY ROOM PHONE NUMBER: [phone]  IF A COMPLICATION OR EMERGENCY SITUATION ARISES AND YOU ARE UNABLE TO REACH   YOUR PHYSICIAN - GO DIRECTLY TO THE EMERGENCY ROOM.  Sergio Vickers MD  5/16/2025 11:36:00 AM  This report has been verified and signed electronically.  Dear patient,  As a result of recent federal legislation (The Federal Cures Act), you may   receive lab or pathology results from your procedure in your [facility]   account before your physician is able to contact you. Your physician or   their representative will relay the results to you with their   recommendations at their soonest availability.  Thank you,  PROVATION

## 2025-05-17 NOTE — ANESTHESIA POSTPROCEDURE EVALUATION
Anesthesia Post Evaluation    Patient: Jojo Burrows    Procedure(s) Performed: Procedure(s) (LRB):  EGD (ESOPHAGOGASTRODUODENOSCOPY) (N/A)    Final Anesthesia Type: general      Patient location during evaluation: PACU  Patient participation: Yes- Able to Participate  Level of consciousness: awake and alert and oriented  Post-procedure vital signs: reviewed and stable  Pain management: adequate  Airway patency: patent    PONV status at discharge: No PONV  Anesthetic complications: no      Cardiovascular status: blood pressure returned to baseline and hemodynamically stable  Respiratory status: unassisted, spontaneous ventilation and room air  Hydration status: euvolemic  Follow-up not needed.              Vitals Value Taken Time   /60 05/16/25 12:47   Temp 36.8 °C (98.3 °F) 05/16/25 12:45   Pulse 62 05/16/25 12:49   Resp 17 05/16/25 12:49   SpO2 97 % 05/16/25 12:49   Vitals shown include unfiled device data.      No case tracking events are documented in the log.      Pain/Wagner Score: Wagner Score: 10 (5/16/2025 11:45 AM)

## 2025-05-19 ENCOUNTER — HOSPITAL ENCOUNTER (OUTPATIENT)
Dept: RADIOLOGY | Facility: HOSPITAL | Age: 75
Discharge: HOME | End: 2025-05-19
Attending: INTERNAL MEDICINE
Payer: MEDICARE

## 2025-05-19 DIAGNOSIS — I72.3 ANEURYSM OF COMMON ILIAC ARTERY: ICD-10-CM

## 2025-05-19 LAB
ESTROGEN SERPL-MCNC: NORMAL PG/ML
INSULIN SERPL-ACNC: NORMAL U[IU]/ML
LAB AP CLINICAL INFORMATION: NORMAL
LAB AP GROSS DESCRIPTION: NORMAL
LAB AP PERFORMING LOCATION(S): NORMAL
LAB AP REPORT FOOTNOTES: NORMAL

## 2025-05-19 RX ORDER — DIPHENHYDRAMINE HCL 50 MG
CAPSULE ORAL
Qty: 1 CAPSULE | Refills: 0 | Status: SHIPPED | OUTPATIENT
Start: 2025-05-19

## 2025-05-19 RX ORDER — PREDNISONE 50 MG/1
TABLET ORAL
Qty: 3 TABLET | Refills: 0 | Status: SHIPPED | OUTPATIENT
Start: 2025-05-19

## 2025-05-21 ENCOUNTER — HOSPITAL ENCOUNTER (OUTPATIENT)
Dept: RADIOLOGY | Facility: HOSPITAL | Age: 75
Discharge: HOME | End: 2025-05-21
Attending: INTERNAL MEDICINE
Payer: MEDICARE

## 2025-05-21 PROCEDURE — 25500020 PHARM REV CODE 255: Performed by: INTERNAL MEDICINE

## 2025-05-21 PROCEDURE — 75635 CT ANGIO ABDOMINAL ARTERIES: CPT | Mod: 26,,, | Performed by: RADIOLOGY

## 2025-05-21 PROCEDURE — 75635 CT ANGIO ABDOMINAL ARTERIES: CPT | Mod: TC

## 2025-05-21 RX ADMIN — IOHEXOL 120 ML: 350 INJECTION, SOLUTION INTRAVENOUS at 09:05

## 2025-05-22 ENCOUNTER — TELEPHONE (OUTPATIENT)
Dept: PREADMISSION TESTING | Facility: HOSPITAL | Age: 75
End: 2025-05-22
Payer: MEDICARE

## 2025-05-22 NOTE — TELEPHONE ENCOUNTER
----- Message from Chandrakant Root MD PhD sent at 5/22/2025 12:41 PM CDT -----  CTA shows no flow-limiting peripheral arterial disease.  Okay to proceed with surgery.Thank you,Dr. Root  ----- Message -----  From: eLe Ann Man RN  Sent: 5/22/2025  11:17 AM CDT  To: Graham Chow MD; Alfred Bland NP; #    Good Morning Dr. Hernandez,The CTA abd/pelvis with runoff you ordered has been done for this ptn.  Please let us know if she is good to go for TKA next week (5/27).Thanks,Lee Ann Man RN                                                                              [facility]

## 2025-05-22 NOTE — TELEPHONE ENCOUNTER
----- Message from CARLOS Nance sent at 5/22/2025 11:15 AM CDT -----  Good Morning Dr. Hernandez,The CTA abd/pelvis with runoff you ordered has been done for this ptn.  Please let us know if she is good to go for TKA next week (5/27).Thanks,Lee Ann Man RN                                                                              [facility]

## 2025-05-26 ENCOUNTER — TELEPHONE (OUTPATIENT)
Dept: ORTHOPEDICS | Facility: CLINIC | Age: 75
End: 2025-05-26
Payer: MEDICARE

## 2025-05-26 ENCOUNTER — ANESTHESIA EVENT (OUTPATIENT)
Dept: SURGERY | Facility: HOSPITAL | Age: 75
End: 2025-05-26
Payer: MEDICARE

## 2025-05-26 DIAGNOSIS — Z96.651 S/P TOTAL KNEE REPLACEMENT, RIGHT: Primary | ICD-10-CM

## 2025-05-26 RX ORDER — OXYCODONE HYDROCHLORIDE 5 MG/1
TABLET ORAL
Qty: 50 TABLET | Refills: 0 | Status: SHIPPED | OUTPATIENT
Start: 2025-05-26

## 2025-05-26 RX ORDER — METHOCARBAMOL 750 MG/1
750 TABLET, FILM COATED ORAL 4 TIMES DAILY PRN
Qty: 40 TABLET | Refills: 0 | Status: SHIPPED | OUTPATIENT
Start: 2025-05-26 | End: 2025-06-05

## 2025-05-26 RX ORDER — ASPIRIN 81 MG/1
81 TABLET ORAL 2 TIMES DAILY
Qty: 60 TABLET | Refills: 0 | Status: SHIPPED | OUTPATIENT
Start: 2025-05-26 | End: 2025-06-25

## 2025-05-26 RX ORDER — POLYETHYLENE GLYCOL 3350 17 G/17G
17 POWDER, FOR SOLUTION ORAL DAILY
Qty: 119 G | Refills: 0 | Status: SHIPPED | OUTPATIENT
Start: 2025-05-26

## 2025-05-26 RX ORDER — CELECOXIB 200 MG/1
200 CAPSULE ORAL DAILY
Qty: 30 CAPSULE | Refills: 0 | Status: SHIPPED | OUTPATIENT
Start: 2025-05-26

## 2025-05-26 RX ORDER — DEXTROMETHORPHAN HYDROBROMIDE, GUAIFENESIN 5; 100 MG/5ML; MG/5ML
650 LIQUID ORAL EVERY 8 HOURS
Qty: 120 TABLET | Refills: 0 | Status: SHIPPED | OUTPATIENT
Start: 2025-05-26

## 2025-05-26 NOTE — TELEPHONE ENCOUNTER
I called the patient today regarding surgery on 5/27/2025 with Dr. Graham Chow. I informed the patient that her surgery will be at  [facility] ([facility] [place] [address]). I informed the patient they must arrive at 9:00am and their surgery will start at 11:00am.     Per the [facility] COVID-19 Pre-Procedural Testing Policy (administered 10/26/2022), patients do not need tested for COVID-19 regardless of vaccination status.    I reminded the patient that they cannot eat or drink after midnight, the night before surgery.      I reminded the patient to be careful of their skin over the next few days to make sure they do not get any cuts, scratches or scrapes.    The patient that they have a walker, bedside commode and shower chair from a previous surgery and do not need another order for them.    The patient verbalized understanding and has no further questions.

## 2025-05-27 ENCOUNTER — ANESTHESIA (OUTPATIENT)
Dept: SURGERY | Facility: HOSPITAL | Age: 75
End: 2025-05-27
Payer: MEDICARE

## 2025-05-27 ENCOUNTER — HOSPITAL ENCOUNTER (OUTPATIENT)
Facility: HOSPITAL | Age: 75
Discharge: HOME | End: 2025-05-27
Attending: ORTHOPAEDIC SURGERY | Admitting: ORTHOPAEDIC SURGERY
Payer: MEDICARE

## 2025-05-27 ENCOUNTER — TELEPHONE (OUTPATIENT)
Dept: ORTHOPEDICS | Facility: CLINIC | Age: 75
End: 2025-05-27
Payer: MEDICARE

## 2025-05-27 VITALS
HEART RATE: 60 BPM | WEIGHT: 154 LBS | SYSTOLIC BLOOD PRESSURE: 124 MMHG | HEIGHT: 61 IN | DIASTOLIC BLOOD PRESSURE: 72 MMHG | TEMPERATURE: 98 F | BODY MASS INDEX: 29.07 KG/M2 | RESPIRATION RATE: 20 BRPM | OXYGEN SATURATION: 98 %

## 2025-05-27 DIAGNOSIS — M17.11 PRIMARY OSTEOARTHRITIS OF RIGHT KNEE: ICD-10-CM

## 2025-05-27 PROCEDURE — 94761 N-INVAS EAR/PLS OXIMETRY MLT: CPT

## 2025-05-27 PROCEDURE — 99499 UNLISTED E&M SERVICE: CPT | Mod: ,,, | Performed by: ORTHOPAEDIC SURGERY

## 2025-05-27 PROCEDURE — 99900035 HC TECH TIME PER 15 MIN (STAT)

## 2025-05-27 PROCEDURE — 25000003 PHARM REV CODE 250: Performed by: NURSE PRACTITIONER

## 2025-05-27 RX ORDER — OXYCODONE HYDROCHLORIDE 5 MG/1
5 TABLET ORAL
Refills: 0 | OUTPATIENT
Start: 2025-05-27

## 2025-05-27 RX ORDER — MAGNESIUM GLUCONATE 27.5 (500)
TABLET ORAL ONCE
COMMUNITY

## 2025-05-27 RX ORDER — ONDANSETRON HYDROCHLORIDE 2 MG/ML
4 INJECTION, SOLUTION INTRAVENOUS DAILY PRN
OUTPATIENT
Start: 2025-05-27

## 2025-05-27 RX ORDER — PREGABALIN 75 MG/1
75 CAPSULE ORAL
Status: DISCONTINUED | OUTPATIENT
Start: 2025-05-27 | End: 2025-05-27 | Stop reason: HOSPADM

## 2025-05-27 RX ORDER — SODIUM CHLORIDE 9 MG/ML
INJECTION, SOLUTION INTRAVENOUS
Status: DISCONTINUED | OUTPATIENT
Start: 2025-05-27 | End: 2025-05-27 | Stop reason: HOSPADM

## 2025-05-27 RX ORDER — ACETAMINOPHEN 500 MG
1000 TABLET ORAL
Status: DISCONTINUED | OUTPATIENT
Start: 2025-05-27 | End: 2025-05-27 | Stop reason: HOSPADM

## 2025-05-27 RX ORDER — MIDAZOLAM HYDROCHLORIDE 1 MG/ML
4 INJECTION, SOLUTION INTRAMUSCULAR; INTRAVENOUS
Status: DISCONTINUED | OUTPATIENT
Start: 2025-05-27 | End: 2025-05-27 | Stop reason: HOSPADM

## 2025-05-27 RX ORDER — SODIUM CHLORIDE 0.9 % (FLUSH) 0.9 %
10 SYRINGE (ML) INJECTION
OUTPATIENT
Start: 2025-05-27

## 2025-05-27 RX ORDER — FENTANYL CITRATE 50 UG/ML
25 INJECTION, SOLUTION INTRAMUSCULAR; INTRAVENOUS EVERY 5 MIN PRN
Refills: 0 | OUTPATIENT
Start: 2025-05-27

## 2025-05-27 RX ORDER — LIDOCAINE HYDROCHLORIDE 10 MG/ML
1 INJECTION, SOLUTION EPIDURAL; INFILTRATION; INTRACAUDAL; PERINEURAL
Status: DISCONTINUED | OUTPATIENT
Start: 2025-05-27 | End: 2025-05-27 | Stop reason: HOSPADM

## 2025-05-27 RX ORDER — CELECOXIB 200 MG/1
400 CAPSULE ORAL ONCE
Status: DISCONTINUED | OUTPATIENT
Start: 2025-05-27 | End: 2025-05-27 | Stop reason: HOSPADM

## 2025-05-27 RX ORDER — MUPIROCIN 20 MG/G
1 OINTMENT TOPICAL
Status: COMPLETED | OUTPATIENT
Start: 2025-05-27 | End: 2025-05-27

## 2025-05-27 RX ORDER — ROPIVACAINE HYDROCHLORIDE 2 MG/ML
INJECTION, SOLUTION EPIDURAL; INFILTRATION; PERINEURAL CONTINUOUS
Status: DISCONTINUED | OUTPATIENT
Start: 2025-05-27 | End: 2025-05-27 | Stop reason: HOSPADM

## 2025-05-27 RX ORDER — GLUCAGON 1 MG
1 KIT INJECTION
OUTPATIENT
Start: 2025-05-27

## 2025-05-27 RX ORDER — CEFAZOLIN 2 G/1
2 INJECTION, POWDER, FOR SOLUTION INTRAMUSCULAR; INTRAVENOUS
Status: DISCONTINUED | OUTPATIENT
Start: 2025-05-27 | End: 2025-05-27 | Stop reason: HOSPADM

## 2025-05-27 RX ORDER — PENICILLIN V POTASSIUM 250 MG/1
250 TABLET, FILM COATED ORAL 4 TIMES DAILY
COMMUNITY

## 2025-05-27 RX ORDER — FENTANYL CITRATE 50 UG/ML
100 INJECTION, SOLUTION INTRAMUSCULAR; INTRAVENOUS
Status: DISCONTINUED | OUTPATIENT
Start: 2025-05-27 | End: 2025-05-27 | Stop reason: HOSPADM

## 2025-05-27 RX ADMIN — MUPIROCIN 1 G: 20 OINTMENT TOPICAL at 10:05

## 2025-05-27 NOTE — INTERVAL H&P NOTE
Jojo Burrows was interviewed, examined and the H and P reviewed.  There have been significant changes in her H and P.  She had an EGD last week.  She also had an infected tooth pulled 7 days ago.  Stitches are still in.  She did not complete her antibiotics.  I do no think proceeding is a good idea due to possibility of incompletely treated dental infection. Case cancelled. Will reschedule

## 2025-05-27 NOTE — TELEPHONE ENCOUNTER
Called pt and discussed that we will move her surgery to 6/17/25. Stated I would move her post-op appointments as well as get her an appointment with Chary on to update her blood consents.  I stated I would re-add her surgery case and it will go through auth again.  Pt verbalized understanding and has no further questions.       ----- Message from Lucien Rivas sent at 5/27/2025  1:41 PM CDT -----  Regarding: FW: Appt  Contact: pt [phone]    ----- Message -----  From: Mini Prince  Sent: 5/27/2025   1:28 PM CDT  To: Claudine BELLO Staff  Subject: Appt                                             Pt requesting call back to discuss plan of care for surgery, pt was scheduled for 5/27, cancelled due to dental issue, please call pt @[phone]

## 2025-05-27 NOTE — PLAN OF CARE
Patient surgery was cancelled due to having a tooth pulled seven days ago. Patient has not completed her antibiotics from this tooth extraction. Stitches are still in her gums. Patient was informed to take her antibiotics from the dentist until completed. Patient's daughter Becca came to the back to be there with her mom while Dr. Chow's office personnel comes back to talk with her about rescheduling. Becca is aware of why surgery is cancelled and expressed thanks that they want to do what is best for her mom with her knee surgery.

## 2025-05-27 NOTE — ANESTHESIA PREPROCEDURE EVALUATION
05/27/2025  [facility]  Anesthesia Pre-Operative Evaluation     Patient Name: Jojo Burrows  YOB: 1950  MRN: [id number]  [facility]: [id number]       Admit Date: 5/27/2025   Admit Team: Networked reference to record PCT   Hospital Day: 1  Date of Procedure: 5/27/2025  Anesthesia: Regional Procedure: Procedure(s) (LRB):  ARTHROPLASTY, KNEE, TOTAL, SAKSHI COMPUTER-ASSISTED NAVIGATION: RIGHT: SAME DAY (Right)  Pre-Operative Diagnosis: Primary osteoarthritis of right knee [M17.11]  Proceduralist:Surgeons and Role:     * Graham Chow MD - Primary  Code Status: Prior   Advanced Directive: <no information>  Isolation Precautions: No active isolations  Capacity: Full capacity     SUBJECTIVE:   Jojo Burrows is a 75 y.o. female who  has a past medical history of Acute hypoxemic respiratory failure (03/31/2020), ALLERGIC RHINITIS, Cataract, COVID-19 virus infection (03/2020), Degenerative disc disease, cervical (09/13/2018), GERD (gastroesophageal reflux disease), Hyperlipidemia, Hypertension, Migraine headache, Multifocal pneumonia (03/31/2020), Nuclear sclerosis (04/30/2014), Overweight (BMI 25.0-29.9) (01/27/2015), Sleep disorder, Thyroid disease, and TIA (transient ischemic attack).  No notes on file     ropivacaine 0.2% Perineural Pump infusion 500 ML   Perineural Continuous         Hospital LOS: 0 days  ICU LOS: Patient does not have an ICU stay during this admission.    she has a current medication list which includes the following long-term medication(s): aspirin, calcium carbonate, metoprolol succinate, pantoprazole, rosuvastatin, sertraline, zolpidem, albuterol, aspirin, azelastine, celecoxib, and celecoxib.     ALLERGIES:     Review of patient's allergies indicates:   Allergen Reactions    Vantin [cefpodoxime] Itching     Severe itching, night sweats.  Improved after stopped it.   4/24    Iodine Rash    Iodine and iodide containing products Itching and Rash    Shellfish containing products Itching and Rash     LDA:   AIRWAY:         [unfilled]     Lines/Drains/Airways       None                  Anesthesia Evaluation      Airway   Mallampati: II  TM distance: Normal  Neck ROM: Normal ROM  Dental      Pulmonary    (+) pneumonia  Cardiovascular   (+) hypertension    Neuro/Psych    (+) neuromuscular disease, TIA, headaches    GI/Hepatic/Renal    (+) GERD    Endo/Other    (+) arthritis  Abdominal                    MEDICATIONS:     Current Outpatient Medications on File Prior to Encounter   Medication Sig Dispense Refill Last Dose/Taking    ascorbic acid, vitamin C, (VITAMIN C) 500 MG tablet Take 1 tablet (500 mg total) by mouth 2 (two) times daily. (Patient taking differently: Take 500 mg by mouth once daily.)   Past Week    aspirin (ECOTRIN) 81 MG EC tablet Take 81 mg by mouth once daily.   5/26/2025 Bedtime    calcium carbonate (TUMS) 200 mg calcium (500 mg) chewable tablet Take 2 tablets by mouth as needed.   Past Week    cetirizine (ZYRTEC) 10 MG tablet Take 10 mg by mouth daily as needed for Allergies.   Past Month    cholecalciferol, vitamin D3, (VITAMIN D3) 25 mcg (1,000 unit) capsule Take 1,000 Units by mouth once daily.   Past Week    cyanocobalamin (VITAMIN B-12) 250 MCG tablet Take 250 mcg by mouth once daily.   Past Week    folic acid/multivit-min/lutein (CENTRUM SILVER ORAL) Take 1 tablet by mouth once daily.   Past Week    magnesium gluconate 27.5 mg magne- sium (500 mg) Tab Take by mouth once.   Past Week    metoprolol succinate (TOPROL-XL) 50 MG 24 hr tablet TAKE 1 TABLET BY MOUTH EVERY DAY 90 tablet 3 5/26/2025    penicillin v potassium (VEETID) 250 MG tablet Take 250 mg by mouth 4 (four) times daily.   Past Week    TURMERIC ORAL Take 1 capsule by mouth once daily.   Past Week    albuterol (PROVENTIL/VENTOLIN HFA) 90 mcg/actuation inhaler Inhale 2 puffs into the lungs every 6  (six) hours as needed for Wheezing. Rescue 54 g 3     azelastine (ASTELIN) 137 mcg (0.1 %) nasal spray 1 spray (137 mcg total) by Nasal route 2 (two) times daily. (Patient taking differently: 1 spray by Nasal route as needed.) 30 mL 3     celecoxib (CELEBREX) 200 MG capsule Take 200 mg by mouth as needed.       fluticasone propionate (FLONASE) 50 mcg/actuation nasal spray 2 sprays (100 mcg total) by Each Nostril route once daily. (Patient taking differently: 2 sprays by Each Nostril route as needed.) 9.9 mL 11       Inpatient Medications:  Antibiotics (From admission, onward)      Start     Stop Route Frequency Ordered    05/27/25 0924  mupirocin 2 % ointment 1 g         -- Nasl On Call Procedure 05/27/25 0924 05/27/25 0924  ceFAZolin 2 g  (MEDICATIONS - ANTIBIOTICS NO PENICILLIN ALLERGY TOTAL KNEE PATHWAY PRE-OP)         -- IV On Call Procedure 05/27/25 0924 05/27/25 0924  vancomycin (VANCOCIN) 1,000 mg in 0.9% NaCl 250 mL IVPB (admixture device)  (MEDICATIONS - ANTIBIOTICS (VANCOMYCIN AND CLINDAMYCIN) FOR PCN ALLERGY OR KNOWN COLONIZATION WITH MRSA TOTAL KNEE PATHWAY PRE-OP)         -- IV On Call Procedure 05/27/25 0924          VTE Risk Mitigation (From admission, onward)           Ordered     Place sequential compression device  Until discontinued         05/27/25 0924                   celecoxib  400 mg Oral Once       Current Medications[1]       History:   There are no hospital problems to display for this patient.    Surgical History:    has a past surgical history that includes Hysterectomy; Colonoscopy (N/A, 4/29/2016); Breast biopsy; Breast cyst excision; Breast cyst aspiration; Colonoscopy (N/A, 6/28/2021); Esophagogastroduodenoscopy (N/A, 3/18/2025); and Esophagogastroduodenoscopy (N/A, 5/16/2025).   Social History:    reports that she is not currently sexually active and has had partner(s) who are male.  reports that she has never smoked. She has never used smokeless tobacco. She reports that she  "does not drink alcohol and does not use drugs.    Vitals:    05/27/25 1003   BP: 124/72   BP Location: Left arm   Patient Position: Sitting   Pulse: 65   Resp: 16   Temp: 36.7 °C (98.1 °F)   TempSrc: Temporal   SpO2: 98%   Weight: 69.9 kg (154 lb)   Height: 5' 1" (1.549 m)     Vital Signs Range (Last 24H):  Temp:  [36.7 °C (98.1 °F)]   Pulse:  [65]   Resp:  [16]   BP: (124)/(72)   SpO2:  [98 %]     Body mass index is 29.1 kg/m².  Wt Readings from Last 4 Encounters:   05/27/25 69.9 kg (154 lb)   05/16/25 69.9 kg (154 lb)   05/12/25 71.7 kg (158 lb 1.1 oz)   05/07/25 70.7 kg (155 lb 13.8 oz)        Intake/Output - Last 3 Shifts       None          Lab Results   Component Value Date    WBC 6.24 05/07/2025    HGB 13.5 05/07/2025    HCT 41.4 05/07/2025     05/07/2025     05/07/2025    K 3.7 05/07/2025     05/07/2025    CREATININE 0.8 05/07/2025    BUN 12 05/07/2025    CO2 26 05/07/2025    GLU 79 05/07/2025    CALCIUM 9.6 05/07/2025    MG 1.8 03/19/2021    PHOS 3.1 04/01/2020    ALKPHOS 90 05/07/2025    ALT 14 05/07/2025    AST 20 05/07/2025    ALBUMIN 4.0 05/07/2025    INR 1.0 05/07/2025    PROTIME 11.4 05/07/2025    APTT 25.7 07/12/2011    HGBA1C 5.5 06/24/2022     (H) 03/31/2020    CPKMB 3.3 07/12/2011    TROPONINI <0.006 02/10/2024    MB 0.7 07/12/2011    BNP 25 02/10/2024     No results found for this or any previous visit (from the past 12 hours).  No results for input(s): "WBC", "HGB", "HCT", "PLT", "NA", "K", "CREATININE", "GLU", "INR" in the last 168 hours.  No LMP recorded. Patient has had a hysterectomy.    EKG:   Results for orders placed or performed during the hospital encounter of 02/10/24   EKG 12-lead    Collection Time: 02/10/24  1:13 PM   Result Value Ref Range    QRS Duration 86 ms    [facility] QTC Calculation 404 ms    Narrative    Test Reason : R07.9,    Vent. Rate : 058 BPM     Atrial Rate : 058 BPM     P-R Int : 152 ms          QRS Dur : 086 ms      QT Int : 412 ms       P-R-T " Axes : 044 -08 033 degrees     QTc Int : 404 ms    Sinus bradycardia  Minimal voltage criteria for LVH, may be normal variant  Nonspecific T wave abnormality  Abnormal ECG  When compared with ECG of 31-MAR-2020 20:12,  No significant change was found  Confirmed by Graham Cha MD ([id number]) on 2/14/2024 12:09:59 PM    Referred By: [email or web address]   SELF           Confirmed By:Graham Cha MD     TTE:  Results for orders placed or performed in visit on 09/25/20   Echo Color Flow Doppler? Yes   Result Value Ref Range    BSA 1.82 m2    TDI SEPTAL 0.06 m/s    LV LATERAL E/E' RATIO 7.44 m/s    LV SEPTAL E/E' RATIO 11.17 m/s    LA WIDTH 3.76 cm    TDI LATERAL 0.09 m/s    LVIDd 4.02 3.5 - 6.0 cm    IVS 0.77 0.6 - 1.1 cm    PW 0.61 0.6 - 1.1 cm    LVIDs 2.48 2.1 - 4.0 cm    FS 38 28 - 44 %    LA Vol 48.08 cm3    Sinus 3.02 cm    STJ 2.72 cm    Ascending aorta 2.93 cm    LV mass 77.56 g    LA size 3.26 cm    RVDD 2.45 cm    TAPSE 1.71 cm    Left Ventricle Relative Wall Thickness 0.30 cm    AV mean gradient 3 mmHg    AV valve area 2.17 cm2    AV Velocity Ratio 0.69     AV index (prosthetic) 0.73     MV valve area p 1/2 method 3.61 cm2    E/A ratio 0.67     Mean e' 0.08 m/s    E wave deceleration time 210.14 msec    IVRT 97.05 msec    Pulm vein S/D ratio 1.44     LVOT diameter 1.95 cm    LVOT area 3.0 cm2    LVOT peak cuate 0.81 m/s    LVOT peak VTI 21.44 cm    Ao peak cuate 1.18 m/s    Ao VTI 29.53 cm    LVOT stroke volume 64.00 cm3    AV peak gradient 6 mmHg    E/E' ratio 8.93 m/s    MV Peak E Cuate 0.67 m/s    TR Max Cuate 2.88 m/s    MV stenosis pressure 1/2 time 60.94 ms    MV Peak A Cuate 1.00 m/s    PV Peak S Cuate 0.62 m/s    PV Peak D Cuate 0.43 m/s    LV Systolic Volume 21.79 mL    LV Systolic Volume Index 12.3 mL/m2    LV Diastolic Volume 70.65 mL    LV Diastolic Volume Index 39.90 mL/m2    SHAHNAZ 27.2 mL/m2    LV Mass Index 44 g/m2    RA Major Axis 4.14 cm    Left Atrium Minor Axis 4.60 cm    Left Atrium Major Axis 4.63 cm     "Triscuspid Valve Regurgitation Peak Gradient 33 mmHg    RA Width 2.74 cm    Right Atrial Pressure (from IVC) 3 mmHg    TV resting pulmonary artery pressure 36 mmHg    Narrative    · The left ventricle is normal in size with normal systolic function. The   estimated ejection fraction is 65%.  · Indeterminate diastolic function.  · Normal right ventricular systolic function.  · Mild tricuspid regurgitation.  · The estimated PA systolic pressure is 36 mmHg.  · Normal central venous pressure (3 mmHg).        Results for orders placed or performed in visit on 09/12/13   2D echo with color flow doppler    Collection Time: 09/12/13 12:00 AM   Result Value Ref Range    EF + QEF 60     Diastolic Dysfunction No      LOU:  No results found for this or any previous visit.  Stress Test:  Results for orders placed during the hospital encounter of 04/11/24    Nuclear Stress - Cardiology Interpreted    Interpretation Summary    Normal myocardial perfusion scan. There is no evidence of myocardial ischemia or infarction.    The visually estimated ejection fraction is normal at rest and normal during stress.    There is normal wall motion at rest and post stress.    LV cavity size is normal at rest and normal at stress.    The ECG portion of the study is negative for ischemia.    The patient reported no chest pain during the stress test.    There were no arrhythmias during stress.    When compared to the previous study from 2/16/2017, there are no significant changes.     LHC:  No results found for this or any previous visit.     PFT:  No results found for: "FEV1", "FVC", "KTK3XUB", "TLC", "DLCO"         Pre-op Assessment    I have reviewed the Patient Summary Reports.     I have reviewed the Nursing Notes. I have reviewed the NPO Status.   I have reviewed the Medications.     Review of Systems  Anesthesia Hx:               Denies Personal Hx of Anesthesia complications.                    Cardiovascular:     Hypertension              "                             Pulmonary:  Pneumonia                      Hepatic/GI:     GERD                Musculoskeletal:  Arthritis               Neurological:  TIA,  Neuromuscular Disease,  Headaches                                     Physical Exam  General: Alert, Cooperative and Oriented    Airway:  Mallampati: II   Mouth Opening: Normal  TM Distance: Normal  Tongue: Normal  Neck ROM: Normal ROM        Anesthesia Plan  Type of Anesthesia, risks & benefits discussed:    Anesthesia Type: Gen ETT, Spinal  Intra-op Monitoring Plan: Standard ASA Monitors  Post Op Pain Control Plan: multimodal analgesia  Induction:  IV  Airway Plan: Direct  Informed Consent: Informed consent signed with the Patient and all parties understand the risks and agree with anesthesia plan.  All questions answered.   ASA Score: 3  Day of Surgery Review of History & Physical: H&P Update referred to the surgeon/provider.    Ready For Surgery From Anesthesia Perspective.     .           [1]   Current Facility-Administered Medications   Medication Dose Route Frequency Provider Last Rate Last Admin    0.9% NaCl infusion   Intravenous On Call Procedure Chary Dobbins NP        acetaminophen tablet 1,000 mg  1,000 mg Oral On Call Procedure Chary Dobbins NP        ceFAZolin 2 g  2 g Intravenous On Call Procedure Chary Dobbins NP        celecoxib capsule 400 mg  400 mg Oral Once Chary Dobbins NP        fentaNYL 50 mcg/mL injection 100 mcg  100 mcg Intravenous PRN Chary Dobbins NP        LIDOcaine (PF) 10 mg/ml (1%) injection 10 mg  1 mL Intradermal On Call Procedure Chary Dobbins NP        midazolam injection 4 mg  4 mg Intravenous PRN Chary Dobbins NP        mupirocin 2 % ointment 1 g  1 g Nasal On Call Procedure Chary Dobbins NP        pregabalin capsule 75 mg  75 mg Oral On Call Procedure Chary Dobbins NP        ropivacaine 0.2% Perineural Pump infusion 500 ML   Perineural Continuous Woody Henson,  BINU        tranexamic acid (CYKLOKAPRON) 1,000 mg in 0.9% NaCl 100 mL IVPB (MB+)  1,000 mg Intravenous On Call Procedure Chary Dobbins NP        tranexamic acid (CYKLOKAPRON) 1,000 mg in 0.9% NaCl 100 mL IVPB (MB+)  1,000 mg Intravenous On Call Procedure Chary Dobbins NP        vancomycin (VANCOCIN) 1,000 mg in 0.9% NaCl 250 mL IVPB (admixture device)  1,000 mg Intravenous On Call Procedure Chary Dobbins, NP

## 2025-05-28 DIAGNOSIS — M17.11 PRIMARY OSTEOARTHRITIS OF RIGHT KNEE: Primary | ICD-10-CM

## 2025-06-09 ENCOUNTER — OFFICE VISIT (OUTPATIENT)
Dept: ORTHOPEDICS | Facility: CLINIC | Age: 75
End: 2025-06-09
Payer: MEDICARE

## 2025-06-09 DIAGNOSIS — M17.11 PRIMARY OSTEOARTHRITIS OF RIGHT KNEE: Primary | ICD-10-CM

## 2025-06-09 PROCEDURE — 1159F MED LIST DOCD IN RCRD: CPT | Mod: CPTII,S$GLB,, | Performed by: NURSE PRACTITIONER

## 2025-06-09 PROCEDURE — 1101F PT FALLS ASSESS-DOCD LE1/YR: CPT | Mod: CPTII,S$GLB,, | Performed by: NURSE PRACTITIONER

## 2025-06-09 PROCEDURE — 3288F FALL RISK ASSESSMENT DOCD: CPT | Mod: CPTII,S$GLB,, | Performed by: NURSE PRACTITIONER

## 2025-06-09 PROCEDURE — 99999 PR PBB SHADOW E&M-EST. PATIENT-LVL IV: CPT | Mod: PBBFAC,,, | Performed by: NURSE PRACTITIONER

## 2025-06-09 PROCEDURE — 1160F RVW MEDS BY RX/DR IN RCRD: CPT | Mod: CPTII,S$GLB,, | Performed by: NURSE PRACTITIONER

## 2025-06-09 PROCEDURE — 99214 OFFICE O/P EST MOD 30 MIN: CPT | Mod: S$GLB,,, | Performed by: NURSE PRACTITIONER

## 2025-06-09 PROCEDURE — 1126F AMNT PAIN NOTED NONE PRSNT: CPT | Mod: CPTII,S$GLB,, | Performed by: NURSE PRACTITIONER

## 2025-06-13 ENCOUNTER — TELEPHONE (OUTPATIENT)
Dept: ORTHOPEDICS | Facility: CLINIC | Age: 75
End: 2025-06-13
Payer: MEDICARE

## 2025-06-13 RX ORDER — BISACODYL 10 MG/1
10 SUPPOSITORY RECTAL EVERY 12 HOURS PRN
OUTPATIENT
Start: 2025-06-13

## 2025-06-13 RX ORDER — MIDAZOLAM HYDROCHLORIDE 1 MG/ML
4 INJECTION, SOLUTION INTRAMUSCULAR; INTRAVENOUS
OUTPATIENT
Start: 2025-06-13 | End: 2025-06-14

## 2025-06-13 RX ORDER — ONDANSETRON HYDROCHLORIDE 2 MG/ML
4 INJECTION, SOLUTION INTRAVENOUS EVERY 8 HOURS PRN
OUTPATIENT
Start: 2025-06-13

## 2025-06-13 RX ORDER — AMOXICILLIN 250 MG
1 CAPSULE ORAL 2 TIMES DAILY
OUTPATIENT
Start: 2025-06-13

## 2025-06-13 RX ORDER — OXYCODONE HYDROCHLORIDE 5 MG/1
10 TABLET ORAL
Refills: 0 | OUTPATIENT
Start: 2025-06-13

## 2025-06-13 RX ORDER — PROCHLORPERAZINE EDISYLATE 5 MG/ML
5 INJECTION INTRAMUSCULAR; INTRAVENOUS EVERY 6 HOURS PRN
OUTPATIENT
Start: 2025-06-13

## 2025-06-13 RX ORDER — PREGABALIN 75 MG/1
75 CAPSULE ORAL
OUTPATIENT
Start: 2025-06-13

## 2025-06-13 RX ORDER — METHOCARBAMOL 750 MG/1
750 TABLET, FILM COATED ORAL 3 TIMES DAILY
OUTPATIENT
Start: 2025-06-13

## 2025-06-13 RX ORDER — LIDOCAINE HYDROCHLORIDE 10 MG/ML
1 INJECTION, SOLUTION EPIDURAL; INFILTRATION; INTRACAUDAL; PERINEURAL
OUTPATIENT
Start: 2025-06-13

## 2025-06-13 RX ORDER — SODIUM CHLORIDE 9 MG/ML
INJECTION, SOLUTION INTRAVENOUS
OUTPATIENT
Start: 2025-06-13

## 2025-06-13 RX ORDER — NALOXONE HCL 0.4 MG/ML
0.02 VIAL (ML) INJECTION
OUTPATIENT
Start: 2025-06-13

## 2025-06-13 RX ORDER — SODIUM CHLORIDE 9 MG/ML
INJECTION, SOLUTION INTRAVENOUS CONTINUOUS
OUTPATIENT
Start: 2025-06-13 | End: 2025-06-14

## 2025-06-13 RX ORDER — FENTANYL CITRATE 50 UG/ML
100 INJECTION, SOLUTION INTRAMUSCULAR; INTRAVENOUS
Refills: 0 | OUTPATIENT
Start: 2025-06-13 | End: 2025-06-14

## 2025-06-13 RX ORDER — PREGABALIN 75 MG/1
75 CAPSULE ORAL NIGHTLY
OUTPATIENT
Start: 2025-06-13

## 2025-06-13 RX ORDER — SODIUM CHLORIDE 0.9 % (FLUSH) 0.9 %
10 SYRINGE (ML) INJECTION
OUTPATIENT
Start: 2025-06-13

## 2025-06-13 RX ORDER — FAMOTIDINE 20 MG/1
20 TABLET, FILM COATED ORAL 2 TIMES DAILY
OUTPATIENT
Start: 2025-06-13

## 2025-06-13 RX ORDER — ACETAMINOPHEN 500 MG
1000 TABLET ORAL EVERY 6 HOURS
OUTPATIENT
Start: 2025-06-13

## 2025-06-13 RX ORDER — MUPIROCIN 20 MG/G
1 OINTMENT TOPICAL
OUTPATIENT
Start: 2025-06-13

## 2025-06-13 RX ORDER — ACETAMINOPHEN 500 MG
1000 TABLET ORAL
OUTPATIENT
Start: 2025-06-13

## 2025-06-13 RX ORDER — ASPIRIN 81 MG/1
81 TABLET ORAL 2 TIMES DAILY
OUTPATIENT
Start: 2025-06-13

## 2025-06-13 RX ORDER — CELECOXIB 200 MG/1
400 CAPSULE ORAL ONCE
OUTPATIENT
Start: 2025-06-13 | End: 2025-06-13

## 2025-06-13 RX ORDER — OXYCODONE HYDROCHLORIDE 5 MG/1
5 TABLET ORAL
Refills: 0 | OUTPATIENT
Start: 2025-06-13

## 2025-06-13 RX ORDER — POLYETHYLENE GLYCOL 3350 17 G/17G
17 POWDER, FOR SOLUTION ORAL DAILY
OUTPATIENT
Start: 2025-06-13

## 2025-06-13 NOTE — PROGRESS NOTES
Jojo Burrows is a 75 y.o. year old here today for pre surgery optimization visit  in preparation for a Right total knee arthroplasty to be performed by Dr. Chow on 6/17/2025.  she was last seen and treated in the clinic on 5/7/2025. she will be medically optimized by the pre op center. There has been no significant change in medical status since last visit. No fever, chills, malaise, or unexplained weight change.      Allergies, Medications, past medical and surgical history reviewed.    Focused examination performed.    Patient did not request to see surgeon in clinic today. All questions answered. Patient encouraged to call with questions. Contact information given.     Pre, heriberto, and post operative procedures and expectations discussed. Goals of successful surgery reviewed and include manageable pain levels, surgical site free of infection, medication management, and ambulation with PT/OT assistance. Healthy weight management discussed with patient and caregiver who were receptive to eduction of healthy diet and activity. No other necessary lifestyle changes identified. Educated patient about signs and symptoms of infection, medication management, anticoagulation therapy, risk of tobacco and alcohol use, and self-care to promote healing. Surgical guide given for future reference. Hibiclens given to patient with instructions. All questions were answered.     Jojo Burrows verbalized an understanding to the education and goals. Patient has displayed readiness to engage in care and is ready to proceed with surgery.  Patient reports her daughter is able and ready to provide assistance at home after discharge.    Surgical and blood consents signed.    DME will be arranged. The mobility limitation cannot be sufficiently resolved by the use of a cane. Patient's functional mobility deficit can be sufficiently resolved with the use of a (Rolling Walker or Walker). Patient's mobility limitation significantly impairs  "their ability to participate in one of more activities of daily living. The use of a (Rolling Walker or Walker) will significantly improve the patient's ability to participate in MRADLS and the patient will use it on regular basis in the home."     Jojo Burrows will contact us if there are any questions, concerns, or changes in medical status prior to surgery.       Joint class: completed    Patient has discussed discharge planning with surgeon. Patient will be discharged to home following surgery.   patient will be scheduled with [facility]. PT will be done at the [place] location. She's scheduled 6/20    30 minutes of time was spent on patient education, review of records, templating, H&P, , appointment scheduling and optimizing patient for surgery.      "

## 2025-06-13 NOTE — TELEPHONE ENCOUNTER
I called the patient today regarding surgery on 6/17/2025 with Dr. Chow. I informed the patient that her surgery will be at  [facility] ([facility] [place] [address]). I informed the patient they must arrive at 8:45am and their surgery will start at 10:45am.     I reminded the patient that they cannot eat or drink after midnight, the night before surgery.      I reminded the patient to be careful of their skin over the next few days to make sure they do not get any cuts, scratches or scrapes.    The patient verbalized understanding and has no further questions.

## 2025-06-13 NOTE — H&P
CC:  Right knee pain    Jojo Burrows is a 75 y.o. female with history of Right knee pain. Pain is worse with activity and weight bearing.  Patient has experienced interference of activities of daily living due to decreased range of motion and an increase in joint pain and swelling.  Patient has failed non-operative treatment including NSAIDs, corticosteroid injections, viscosupplement injections, and activity modification.  Jojo Burrows currently ambulates independently.     Relevant medical conditions of significance in perioperative period:  HTN- on medication managed by pcp  HLD- on medication managed by pcp  Cervical spine djd with radiculopathy- on medication managed by pcp    Past Medical History:   Diagnosis Date    Acute hypoxemic respiratory failure 03/31/2020    ALLERGIC RHINITIS     Cataract     COVID-19 virus infection 03/2020    Degenerative disc disease, cervical 09/13/2018    GERD (gastroesophageal reflux disease)     Hyperlipidemia     Hypertension     Migraine headache     Multifocal pneumonia 03/31/2020    Nuclear sclerosis 04/30/2014    Overweight (BMI 25.0-29.9) 01/27/2015    Sleep disorder     Thyroid disease     nodular goiter    TIA (transient ischemic attack)        Past Surgical History:   Procedure Laterality Date    BREAST BIOPSY      BREAST CYST ASPIRATION      BREAST CYST EXCISION      COLONOSCOPY N/A 4/29/2016    Procedure: COLONOSCOPY;  Surgeon: Sergio Vickers MD;  [facility]  Service: Endoscopy;  Laterality: N/A;    COLONOSCOPY N/A 6/28/2021    Procedure: COLONOSCOPY;  Surgeon: Sergio Vickers MD;  Location: [facility] (4TH FLR);  Service: Endoscopy;  Laterality: N/A;  COVID + 3/2020 and fully vaccinated -   pt requesting Dr. vickers/ instructions emailed-     ESOPHAGOGASTRODUODENOSCOPY N/A 3/18/2025    Procedure: EGD (ESOPHAGOGASTRODUODENOSCOPY);  Surgeon: Sergio Vickers MD;  Location: [facility] (4TH FLR);  Service: Endoscopy;  Laterality: N/A;  Ref  by Dr JOSS Munoz, portal - PC  3/12 precall complete. kw    ESOPHAGOGASTRODUODENOSCOPY N/A 5/16/2025    Procedure: EGD (ESOPHAGOGASTRODUODENOSCOPY);  Surgeon: Didier Vickers MD;  Location: [facility] ([address] [place] [address]);  Service: Endoscopy;  Laterality: N/A;  4/25 ref by didier vickers, harjeet-gg  5/9-r/[id number] floor availability-  5/12-unable to lvm-jw  5/13 precall complete/mleone    HYSTERECTOMY         Family History   Problem Relation Name Age of Onset    Diabetes Mother      Hypertension Mother      Breast cancer Mother      Asthma Father      Glaucoma Father      Breast cancer Sister 3     Ovarian cancer Sister 3     Stroke Daughter 2     Lymphoma Daughter 1     No Known Problems Son 1     Heart disease Maternal Grandmother      Esophageal cancer Other cousin     Amblyopia Neg Hx      Blindness Neg Hx      Cancer Neg Hx      Cataracts Neg Hx      Macular degeneration Neg Hx      Retinal detachment Neg Hx      Strabismus Neg Hx      Thyroid disease Neg Hx         Review of patient's allergies indicates:   Allergen Reactions    Vantin [cefpodoxime] Itching     Severe itching, night sweats.  Improved after stopped it.  4/24    Iodine Rash    Iodine and iodide containing products Itching and Rash    Shellfish containing products Itching and Rash       Current Medications[1]    Review of Systems:  Constitutional: no fever or chills  Eyes: no visual changes  ENT: no nasal congestion or sore throat  Respiratory: no cough or shortness of breath  Cardiovascular: no chest pain or palpitations  Gastrointestinal: no nausea or vomiting, tolerating diet  Genitourinary: no hematuria or dysuria  Integument/Breast: no rash or pruritis  Hematologic/Lymphatic: no easy bruising or lymphadenopathy  Musculoskeletal: positive for knee pain  Neurological: no seizures or tremors  Behavioral/Psych: no auditory or visual hallucinations  Endocrine: no heat or cold intolerance    PE:  There were no vitals taken for this visit.  General:  Pleasant, cooperative, NAD   Gait: antalgic  HEENT: NCAT, sclera nonicteric   Lungs: Respirations clear bilaterally; equal and unlabored.   CV: S1S2; 2+ bilateral upper and lower extremity pulses.   Skin: Intact throughout with no rashes, erythema, or lesions  Extremities: No LE edema,  no erythema or warmth of the skin in either lower extremity.    Right knee exam:  Knee Range of Motion:5-120, pain with passive range of motion  Effusion:none  Condition of skin:intact  Location of tenderness:Medial joint line   Strength:normal  Stability: stable to testing    Hip Examination: painless PROM of hip     Radiographs: Radiographs reveal advanced degenerative changes including subchondral cyst formation, subchondral sclerosis, osteophyte formation, joint space narrowing.     Knee Alignment: normal    Diagnosis: Primary osteoarthritis Right knee    Plan: Right total knee arthroplasty    Due to the serious nature of total joint infection and the high prevalence of community acquired MRSA, vancomycin will be used perioperatively.                 [1]   Current Outpatient Medications:     acetaminophen (TYLENOL) 650 MG TbSR, Take 1 tablet (650 mg total) by mouth every 8 (eight) hours., Disp: 120 tablet, Rfl: 0    albuterol (PROVENTIL/VENTOLIN HFA) 90 mcg/actuation inhaler, Inhale 2 puffs into the lungs every 6 (six) hours as needed for Wheezing. Rescue, Disp: 54 g, Rfl: 3    ascorbic acid, vitamin C, (VITAMIN C) 500 MG tablet, Take 1 tablet (500 mg total) by mouth 2 (two) times daily., Disp: , Rfl:     aspirin (ECOTRIN) 81 MG EC tablet, Take 81 mg by mouth once daily., Disp: , Rfl:     aspirin (ECOTRIN) 81 MG EC tablet, Take 1 tablet (81 mg total) by mouth 2 (two) times a day., Disp: 60 tablet, Rfl: 0    calcium carbonate (TUMS) 200 mg calcium (500 mg) chewable tablet, Take 2 tablets by mouth as needed., Disp: , Rfl:     celecoxib (CELEBREX) 200 MG capsule, Take 1 capsule (200 mg total) by mouth once daily., Disp: 30 capsule,  Rfl: 0    cetirizine (ZYRTEC) 10 MG tablet, Take 10 mg by mouth daily as needed for Allergies., Disp: , Rfl:     cholecalciferol, vitamin D3, (VITAMIN D3) 25 mcg (1,000 unit) capsule, Take 1,000 Units by mouth once daily., Disp: , Rfl:     cyanocobalamin (VITAMIN B-12) 250 MCG tablet, Take 250 mcg by mouth once daily., Disp: , Rfl:     fluticasone propionate (FLONASE) 50 mcg/actuation nasal spray, 2 sprays (100 mcg total) by Each Nostril route once daily. (Patient taking differently: 2 sprays by Each Nostril route as needed.), Disp: 9.9 mL, Rfl: 11    folic acid/multivit-min/lutein (CENTRUM SILVER ORAL), Take 1 tablet by mouth once daily., Disp: , Rfl:     magnesium gluconate 27.5 mg magne- sium (500 mg) Tab, Take by mouth once., Disp: , Rfl:     metoprolol succinate (TOPROL-XL) 50 MG 24 hr tablet, TAKE 1 TABLET BY MOUTH EVERY DAY, Disp: 90 tablet, Rfl: 3    oxyCODONE (ROXICODONE) 5 MG immediate release tablet, Take 1-2 tablets by mouth every 4-6 hours as needed for pain, Disp: 50 tablet, Rfl: 0    pantoprazole (PROTONIX) 40 MG tablet, Take 1 tablet (40 mg total) by mouth before breakfast. Best taken 45-60 minutes before your first protein meal of the day - Breakfast., Disp: 90 tablet, Rfl: 3    penicillin v potassium (VEETID) 250 MG tablet, Take 250 mg by mouth 4 (four) times daily., Disp: , Rfl:     polyethylene glycol (GLYCOLAX) 17 gram/dose powder, Take 17 g by mouth once daily., Disp: 119 g, Rfl: 0    rosuvastatin (CRESTOR) 20 MG tablet, Take 1 tablet (20 mg total) by mouth once daily. For cholesterol control., Disp: 90 tablet, Rfl: 3    sertraline (ZOLOFT) 50 MG tablet, Take 1 tablet (50 mg total) by mouth once daily. For anxiety control., Disp: 90 tablet, Rfl: 2    TURMERIC ORAL, Take 1 capsule by mouth once daily., Disp: , Rfl:     zolpidem (AMBIEN) 5 MG Tab, Take 1 tablet (5 mg total) by mouth nightly as needed (insomnia)., Disp: 30 tablet, Rfl: 3    azelastine (ASTELIN) 137 mcg (0.1 %) nasal spray, 1 spray  (137 mcg total) by Nasal route 2 (two) times daily. (Patient taking differently: 1 spray by Nasal route as needed.), Disp: 30 mL, Rfl: 3    celecoxib (CELEBREX) 200 MG capsule, Take 200 mg by mouth as needed., Disp: , Rfl:     diphenhydrAMINE (BENADRYL) 50 MG capsule, Take 50mg by mouth 1 hour before contrast administration., Disp: 1 capsule, Rfl: 0    predniSONE (DELTASONE) 50 MG Tab, Take 50mg by mouth at 13 hours, 7 hours, and 1 hour before contrast administration., Disp: 3 tablet, Rfl: 0

## 2025-06-13 NOTE — H&P (VIEW-ONLY)
CC:  Right knee pain    Jojo Burrows is a 75 y.o. female with history of Right knee pain. Pain is worse with activity and weight bearing.  Patient has experienced interference of activities of daily living due to decreased range of motion and an increase in joint pain and swelling.  Patient has failed non-operative treatment including NSAIDs, corticosteroid injections, viscosupplement injections, and activity modification.  Jojo Burrows currently ambulates independently.     Relevant medical conditions of significance in perioperative period:  HTN- on medication managed by pcp  HLD- on medication managed by pcp  Cervical spine djd with radiculopathy- on medication managed by pcp    Past Medical History:   Diagnosis Date    Acute hypoxemic respiratory failure 03/31/2020    ALLERGIC RHINITIS     Cataract     COVID-19 virus infection 03/2020    Degenerative disc disease, cervical 09/13/2018    GERD (gastroesophageal reflux disease)     Hyperlipidemia     Hypertension     Migraine headache     Multifocal pneumonia 03/31/2020    Nuclear sclerosis 04/30/2014    Overweight (BMI 25.0-29.9) 01/27/2015    Sleep disorder     Thyroid disease     nodular goiter    TIA (transient ischemic attack)        Past Surgical History:   Procedure Laterality Date    BREAST BIOPSY      BREAST CYST ASPIRATION      BREAST CYST EXCISION      COLONOSCOPY N/A 4/29/2016    Procedure: COLONOSCOPY;  Surgeon: Sergio Vickers MD;  [facility]  Service: Endoscopy;  Laterality: N/A;    COLONOSCOPY N/A 6/28/2021    Procedure: COLONOSCOPY;  Surgeon: Sergio Vickers MD;  Location: [facility] (4TH FLR);  Service: Endoscopy;  Laterality: N/A;  COVID + 3/2020 and fully vaccinated -   pt requesting Dr. vickers/ instructions emailed-     ESOPHAGOGASTRODUODENOSCOPY N/A 3/18/2025    Procedure: EGD (ESOPHAGOGASTRODUODENOSCOPY);  Surgeon: Sergio Vickers MD;  Location: [facility] (4TH FLR);  Service: Endoscopy;  Laterality: N/A;  Ref  by Dr JOSS Munoz, portal - PC  3/12 precall complete. kw    ESOPHAGOGASTRODUODENOSCOPY N/A 5/16/2025    Procedure: EGD (ESOPHAGOGASTRODUODENOSCOPY);  Surgeon: Didier Vickers MD;  Location: [facility] ([address] [place] [address]);  Service: Endoscopy;  Laterality: N/A;  4/25 ref by didier vickers, harjeet-gg  5/9-[id number] floor availability-  5/12-unable to lvm-jw  5/13 precall complete/mleone    HYSTERECTOMY         Family History   Problem Relation Name Age of Onset    Diabetes Mother      Hypertension Mother      Breast cancer Mother      Asthma Father      Glaucoma Father      Breast cancer Sister 3     Ovarian cancer Sister 3     Stroke Daughter 2     Lymphoma Daughter 1     No Known Problems Son 1     Heart disease Maternal Grandmother      Esophageal cancer Other cousin     Amblyopia Neg Hx      Blindness Neg Hx      Cancer Neg Hx      Cataracts Neg Hx      Macular degeneration Neg Hx      Retinal detachment Neg Hx      Strabismus Neg Hx      Thyroid disease Neg Hx         Review of patient's allergies indicates:   Allergen Reactions    Vantin [cefpodoxime] Itching     Severe itching, night sweats.  Improved after stopped it.  4/24    Iodine Rash    Iodine and iodide containing products Itching and Rash    Shellfish containing products Itching and Rash       Current Medications[1]    Review of Systems:  Constitutional: no fever or chills  Eyes: no visual changes  ENT: no nasal congestion or sore throat  Respiratory: no cough or shortness of breath  Cardiovascular: no chest pain or palpitations  Gastrointestinal: no nausea or vomiting, tolerating diet  Genitourinary: no hematuria or dysuria  Integument/Breast: no rash or pruritis  Hematologic/Lymphatic: no easy bruising or lymphadenopathy  Musculoskeletal: positive for knee pain  Neurological: no seizures or tremors  Behavioral/Psych: no auditory or visual hallucinations  Endocrine: no heat or cold intolerance    PE:  There were no vitals taken for this visit.  General:  Pleasant, cooperative, NAD   Gait: antalgic  HEENT: NCAT, sclera nonicteric   Lungs: Respirations clear bilaterally; equal and unlabored.   CV: S1S2; 2+ bilateral upper and lower extremity pulses.   Skin: Intact throughout with no rashes, erythema, or lesions  Extremities: No LE edema,  no erythema or warmth of the skin in either lower extremity.    Right knee exam:  Knee Range of Motion:5-120, pain with passive range of motion  Effusion:none  Condition of skin:intact  Location of tenderness:Medial joint line   Strength:normal  Stability: stable to testing    Hip Examination: painless PROM of hip     Radiographs: Radiographs reveal advanced degenerative changes including subchondral cyst formation, subchondral sclerosis, osteophyte formation, joint space narrowing.     Knee Alignment: normal    Diagnosis: Primary osteoarthritis Right knee    Plan: Right total knee arthroplasty    Due to the serious nature of total joint infection and the high prevalence of community acquired MRSA, vancomycin will be used perioperatively.                 [1]   Current Outpatient Medications:     acetaminophen (TYLENOL) 650 MG TbSR, Take 1 tablet (650 mg total) by mouth every 8 (eight) hours., Disp: 120 tablet, Rfl: 0    albuterol (PROVENTIL/VENTOLIN HFA) 90 mcg/actuation inhaler, Inhale 2 puffs into the lungs every 6 (six) hours as needed for Wheezing. Rescue, Disp: 54 g, Rfl: 3    ascorbic acid, vitamin C, (VITAMIN C) 500 MG tablet, Take 1 tablet (500 mg total) by mouth 2 (two) times daily., Disp: , Rfl:     aspirin (ECOTRIN) 81 MG EC tablet, Take 81 mg by mouth once daily., Disp: , Rfl:     aspirin (ECOTRIN) 81 MG EC tablet, Take 1 tablet (81 mg total) by mouth 2 (two) times a day., Disp: 60 tablet, Rfl: 0    calcium carbonate (TUMS) 200 mg calcium (500 mg) chewable tablet, Take 2 tablets by mouth as needed., Disp: , Rfl:     celecoxib (CELEBREX) 200 MG capsule, Take 1 capsule (200 mg total) by mouth once daily., Disp: 30 capsule,  Rfl: 0    cetirizine (ZYRTEC) 10 MG tablet, Take 10 mg by mouth daily as needed for Allergies., Disp: , Rfl:     cholecalciferol, vitamin D3, (VITAMIN D3) 25 mcg (1,000 unit) capsule, Take 1,000 Units by mouth once daily., Disp: , Rfl:     cyanocobalamin (VITAMIN B-12) 250 MCG tablet, Take 250 mcg by mouth once daily., Disp: , Rfl:     fluticasone propionate (FLONASE) 50 mcg/actuation nasal spray, 2 sprays (100 mcg total) by Each Nostril route once daily. (Patient taking differently: 2 sprays by Each Nostril route as needed.), Disp: 9.9 mL, Rfl: 11    folic acid/multivit-min/lutein (CENTRUM SILVER ORAL), Take 1 tablet by mouth once daily., Disp: , Rfl:     magnesium gluconate 27.5 mg magne- sium (500 mg) Tab, Take by mouth once., Disp: , Rfl:     metoprolol succinate (TOPROL-XL) 50 MG 24 hr tablet, TAKE 1 TABLET BY MOUTH EVERY DAY, Disp: 90 tablet, Rfl: 3    oxyCODONE (ROXICODONE) 5 MG immediate release tablet, Take 1-2 tablets by mouth every 4-6 hours as needed for pain, Disp: 50 tablet, Rfl: 0    pantoprazole (PROTONIX) 40 MG tablet, Take 1 tablet (40 mg total) by mouth before breakfast. Best taken 45-60 minutes before your first protein meal of the day - Breakfast., Disp: 90 tablet, Rfl: 3    penicillin v potassium (VEETID) 250 MG tablet, Take 250 mg by mouth 4 (four) times daily., Disp: , Rfl:     polyethylene glycol (GLYCOLAX) 17 gram/dose powder, Take 17 g by mouth once daily., Disp: 119 g, Rfl: 0    rosuvastatin (CRESTOR) 20 MG tablet, Take 1 tablet (20 mg total) by mouth once daily. For cholesterol control., Disp: 90 tablet, Rfl: 3    sertraline (ZOLOFT) 50 MG tablet, Take 1 tablet (50 mg total) by mouth once daily. For anxiety control., Disp: 90 tablet, Rfl: 2    TURMERIC ORAL, Take 1 capsule by mouth once daily., Disp: , Rfl:     zolpidem (AMBIEN) 5 MG Tab, Take 1 tablet (5 mg total) by mouth nightly as needed (insomnia)., Disp: 30 tablet, Rfl: 3    azelastine (ASTELIN) 137 mcg (0.1 %) nasal spray, 1 spray  (137 mcg total) by Nasal route 2 (two) times daily. (Patient taking differently: 1 spray by Nasal route as needed.), Disp: 30 mL, Rfl: 3    celecoxib (CELEBREX) 200 MG capsule, Take 200 mg by mouth as needed., Disp: , Rfl:     diphenhydrAMINE (BENADRYL) 50 MG capsule, Take 50mg by mouth 1 hour before contrast administration., Disp: 1 capsule, Rfl: 0    predniSONE (DELTASONE) 50 MG Tab, Take 50mg by mouth at 13 hours, 7 hours, and 1 hour before contrast administration., Disp: 3 tablet, Rfl: 0

## 2025-06-14 ENCOUNTER — PATIENT MESSAGE (OUTPATIENT)
Dept: GASTROENTEROLOGY | Facility: CLINIC | Age: 75
End: 2025-06-14
Payer: MEDICARE

## 2025-06-14 ENCOUNTER — RESULTS FOLLOW-UP (OUTPATIENT)
Dept: GASTROENTEROLOGY | Facility: CLINIC | Age: 75
End: 2025-06-14

## 2025-06-16 ENCOUNTER — ANESTHESIA EVENT (OUTPATIENT)
Dept: SURGERY | Facility: HOSPITAL | Age: 75
End: 2025-06-16
Payer: MEDICARE

## 2025-06-16 DIAGNOSIS — K21.9 GASTROESOPHAGEAL REFLUX DISEASE, UNSPECIFIED WHETHER ESOPHAGITIS PRESENT: ICD-10-CM

## 2025-06-16 DIAGNOSIS — Z96.651 S/P TOTAL KNEE REPLACEMENT, RIGHT: ICD-10-CM

## 2025-06-16 DIAGNOSIS — K22.10 EROSIVE ESOPHAGITIS: ICD-10-CM

## 2025-06-16 RX ORDER — PANTOPRAZOLE SODIUM 40 MG/1
40 TABLET, DELAYED RELEASE ORAL
Qty: 90 TABLET | Refills: 3 | Status: SHIPPED | OUTPATIENT
Start: 2025-06-16 | End: 2026-06-16

## 2025-06-16 RX ORDER — POLYETHYLENE GLYCOL 3350 17 G/17G
17 POWDER, FOR SOLUTION ORAL DAILY
Qty: 119 G | Refills: 0 | Status: SHIPPED | OUTPATIENT
Start: 2025-06-16

## 2025-06-16 RX ORDER — CELECOXIB 200 MG/1
200 CAPSULE ORAL DAILY
Qty: 30 CAPSULE | Refills: 0 | Status: SHIPPED | OUTPATIENT
Start: 2025-06-16

## 2025-06-16 RX ORDER — METHOCARBAMOL 750 MG/1
750 TABLET, FILM COATED ORAL 4 TIMES DAILY PRN
Qty: 40 TABLET | Refills: 0 | Status: SHIPPED | OUTPATIENT
Start: 2025-06-16 | End: 2025-06-28

## 2025-06-16 RX ORDER — ASPIRIN 81 MG/1
81 TABLET ORAL 2 TIMES DAILY
Qty: 60 TABLET | Refills: 0 | Status: SHIPPED | OUTPATIENT
Start: 2025-06-16 | End: 2025-07-18

## 2025-06-16 RX ORDER — DEXTROMETHORPHAN HYDROBROMIDE, GUAIFENESIN 5; 100 MG/5ML; MG/5ML
650 LIQUID ORAL EVERY 8 HOURS
Qty: 120 TABLET | Refills: 0 | Status: SHIPPED | OUTPATIENT
Start: 2025-06-16

## 2025-06-16 RX ORDER — OXYCODONE HYDROCHLORIDE 5 MG/1
TABLET ORAL
Qty: 50 TABLET | Refills: 0 | Status: SHIPPED | OUTPATIENT
Start: 2025-06-16

## 2025-06-17 ENCOUNTER — HOSPITAL ENCOUNTER (OUTPATIENT)
Facility: HOSPITAL | Age: 75
Discharge: HOME | End: 2025-06-18
Attending: ORTHOPAEDIC SURGERY | Admitting: ORTHOPAEDIC SURGERY
Payer: MEDICARE

## 2025-06-17 ENCOUNTER — ANESTHESIA (OUTPATIENT)
Dept: SURGERY | Facility: HOSPITAL | Age: 75
End: 2025-06-17
Payer: MEDICARE

## 2025-06-17 DIAGNOSIS — M17.11 PRIMARY OSTEOARTHRITIS OF RIGHT KNEE: ICD-10-CM

## 2025-06-17 PROCEDURE — 71000039 HC RECOVERY, EACH ADD'L HOUR: Performed by: ORTHOPAEDIC SURGERY

## 2025-06-17 PROCEDURE — C1776 JOINT DEVICE (IMPLANTABLE): HCPCS | Performed by: ORTHOPAEDIC SURGERY

## 2025-06-17 PROCEDURE — 25000003 PHARM REV CODE 250: Performed by: NURSE PRACTITIONER

## 2025-06-17 PROCEDURE — 27447 TOTAL KNEE ARTHROPLASTY: CPT | Mod: RT,,, | Performed by: ORTHOPAEDIC SURGERY

## 2025-06-17 PROCEDURE — 63600175 PHARM REV CODE 636 W HCPCS: Performed by: NURSE PRACTITIONER

## 2025-06-17 PROCEDURE — 36000713 HC OR TIME LEV V EA ADD 15 MIN: Performed by: ORTHOPAEDIC SURGERY

## 2025-06-17 PROCEDURE — 25000003 PHARM REV CODE 250: Performed by: ANESTHESIOLOGY

## 2025-06-17 PROCEDURE — 0055T BONE SRGRY CMPTR CT/MRI IMAG: CPT | Mod: ,,, | Performed by: ORTHOPAEDIC SURGERY

## 2025-06-17 PROCEDURE — C1713 ANCHOR/SCREW BN/BN,TIS/BN: HCPCS | Performed by: ORTHOPAEDIC SURGERY

## 2025-06-17 PROCEDURE — 37000008 HC ANESTHESIA 1ST 15 MINUTES: Performed by: ORTHOPAEDIC SURGERY

## 2025-06-17 PROCEDURE — 25000003 PHARM REV CODE 250: Performed by: NURSE ANESTHETIST, CERTIFIED REGISTERED

## 2025-06-17 PROCEDURE — 63600175 PHARM REV CODE 636 W HCPCS: Performed by: STUDENT IN AN ORGANIZED HEALTH CARE EDUCATION/TRAINING PROGRAM

## 2025-06-17 PROCEDURE — 99900035 HC TECH TIME PER 15 MIN (STAT)

## 2025-06-17 PROCEDURE — 64448 NJX AA&/STRD FEM NRV NFS IMG: CPT | Performed by: STUDENT IN AN ORGANIZED HEALTH CARE EDUCATION/TRAINING PROGRAM

## 2025-06-17 PROCEDURE — 63600175 PHARM REV CODE 636 W HCPCS: Performed by: NURSE ANESTHETIST, CERTIFIED REGISTERED

## 2025-06-17 PROCEDURE — 36000712 HC OR TIME LEV V 1ST 15 MIN: Performed by: ORTHOPAEDIC SURGERY

## 2025-06-17 PROCEDURE — 63600175 PHARM REV CODE 636 W HCPCS: Performed by: PHYSICIAN ASSISTANT

## 2025-06-17 PROCEDURE — 71000033 HC RECOVERY, INTIAL HOUR: Performed by: ORTHOPAEDIC SURGERY

## 2025-06-17 PROCEDURE — 37000009 HC ANESTHESIA EA ADD 15 MINS: Performed by: ORTHOPAEDIC SURGERY

## 2025-06-17 PROCEDURE — 27201423 OPTIME MED/SURG SUP & DEVICES STERILE SUPPLY: Performed by: ORTHOPAEDIC SURGERY

## 2025-06-17 PROCEDURE — 94761 N-INVAS EAR/PLS OXIMETRY MLT: CPT

## 2025-06-17 PROCEDURE — 25000003 PHARM REV CODE 250: Performed by: PHYSICIAN ASSISTANT

## 2025-06-17 PROCEDURE — 25000003 PHARM REV CODE 250: Performed by: STUDENT IN AN ORGANIZED HEALTH CARE EDUCATION/TRAINING PROGRAM

## 2025-06-17 DEVICE — PRIMARY TIBIAL BASEPLATE
Type: IMPLANTABLE DEVICE | Site: KNEE | Status: FUNCTIONAL
Brand: TRIATHLON

## 2025-06-17 DEVICE — PATELLA
Type: IMPLANTABLE DEVICE | Site: KNEE | Status: FUNCTIONAL
Brand: TRIATHLON

## 2025-06-17 DEVICE — FULL DOSE BONE CEMENT, 10 PACK CATALOG NUMBER IS 6191-1-010
Type: IMPLANTABLE DEVICE | Site: KNEE | Status: FUNCTIONAL
Brand: SIMPLEX

## 2025-06-17 DEVICE — TIBIAL BEARING INSERT - CS
Type: IMPLANTABLE DEVICE | Site: KNEE | Status: FUNCTIONAL
Brand: TRIATHLON

## 2025-06-17 DEVICE — CRUCIATE RETAINING FEMORAL
Type: IMPLANTABLE DEVICE | Site: KNEE | Status: FUNCTIONAL
Brand: TRIATHLON

## 2025-06-17 RX ORDER — SODIUM CHLORIDE 9 MG/ML
INJECTION, SOLUTION INTRAVENOUS CONTINUOUS
Status: DISCONTINUED | OUTPATIENT
Start: 2025-06-17 | End: 2025-06-17

## 2025-06-17 RX ORDER — SERTRALINE HYDROCHLORIDE 50 MG/1
50 TABLET, FILM COATED ORAL DAILY
Status: DISCONTINUED | OUTPATIENT
Start: 2025-06-18 | End: 2025-06-18 | Stop reason: HOSPADM

## 2025-06-17 RX ORDER — NALOXONE HCL 0.4 MG/ML
0.02 VIAL (ML) INJECTION
Status: DISCONTINUED | OUTPATIENT
Start: 2025-06-17 | End: 2025-06-18 | Stop reason: HOSPADM

## 2025-06-17 RX ORDER — ELECTROLYTES/DEXTROSE
400 SOLUTION, ORAL ORAL
Status: DISCONTINUED | OUTPATIENT
Start: 2025-06-17 | End: 2025-06-18 | Stop reason: HOSPADM

## 2025-06-17 RX ORDER — LIDOCAINE HYDROCHLORIDE 10 MG/ML
1 INJECTION, SOLUTION EPIDURAL; INFILTRATION; INTRACAUDAL; PERINEURAL
Status: DISCONTINUED | OUTPATIENT
Start: 2025-06-17 | End: 2025-06-17

## 2025-06-17 RX ORDER — ROPIVACAINE HYDROCHLORIDE 2 MG/ML
INJECTION, SOLUTION EPIDURAL; INFILTRATION; PERINEURAL CONTINUOUS
Status: DISCONTINUED | OUTPATIENT
Start: 2025-06-17 | End: 2025-06-18 | Stop reason: HOSPADM

## 2025-06-17 RX ORDER — METOPROLOL SUCCINATE 50 MG/1
50 TABLET, EXTENDED RELEASE ORAL DAILY
Status: DISCONTINUED | OUTPATIENT
Start: 2025-06-18 | End: 2025-06-18 | Stop reason: HOSPADM

## 2025-06-17 RX ORDER — SODIUM CHLORIDE 0.9 % (FLUSH) 0.9 %
10 SYRINGE (ML) INJECTION
Status: DISCONTINUED | OUTPATIENT
Start: 2025-06-17 | End: 2025-06-17

## 2025-06-17 RX ORDER — ATORVASTATIN CALCIUM 40 MG/1
80 TABLET, FILM COATED ORAL DAILY
Status: DISCONTINUED | OUTPATIENT
Start: 2025-06-18 | End: 2025-06-18 | Stop reason: HOSPADM

## 2025-06-17 RX ORDER — AMOXICILLIN 250 MG
1 CAPSULE ORAL 2 TIMES DAILY
Status: DISCONTINUED | OUTPATIENT
Start: 2025-06-17 | End: 2025-06-18 | Stop reason: HOSPADM

## 2025-06-17 RX ORDER — LIDOCAINE HYDROCHLORIDE 20 MG/ML
INJECTION INTRAVENOUS
Status: DISCONTINUED | OUTPATIENT
Start: 2025-06-17 | End: 2025-06-17

## 2025-06-17 RX ORDER — DEXAMETHASONE SODIUM PHOSPHATE 4 MG/ML
INJECTION, SOLUTION INTRA-ARTICULAR; INTRALESIONAL; INTRAMUSCULAR; INTRAVENOUS; SOFT TISSUE
Status: DISCONTINUED | OUTPATIENT
Start: 2025-06-17 | End: 2025-06-17

## 2025-06-17 RX ORDER — CEFAZOLIN 2 G/1
2 INJECTION, POWDER, FOR SOLUTION INTRAMUSCULAR; INTRAVENOUS
Status: COMPLETED | OUTPATIENT
Start: 2025-06-17 | End: 2025-06-18

## 2025-06-17 RX ORDER — ACETAMINOPHEN 500 MG
1000 TABLET ORAL
Status: COMPLETED | OUTPATIENT
Start: 2025-06-17 | End: 2025-06-17

## 2025-06-17 RX ORDER — PREGABALIN 75 MG/1
75 CAPSULE ORAL NIGHTLY
Status: DISCONTINUED | OUTPATIENT
Start: 2025-06-17 | End: 2025-06-18 | Stop reason: HOSPADM

## 2025-06-17 RX ORDER — OXYCODONE HYDROCHLORIDE 5 MG/1
5 TABLET ORAL
Status: DISCONTINUED | OUTPATIENT
Start: 2025-06-17 | End: 2025-06-18 | Stop reason: HOSPADM

## 2025-06-17 RX ORDER — PROPOFOL 10 MG/ML
VIAL (ML) INTRAVENOUS
Status: DISCONTINUED | OUTPATIENT
Start: 2025-06-17 | End: 2025-06-17

## 2025-06-17 RX ORDER — PREGABALIN 75 MG/1
75 CAPSULE ORAL
Status: COMPLETED | OUTPATIENT
Start: 2025-06-17 | End: 2025-06-17

## 2025-06-17 RX ORDER — CETIRIZINE HYDROCHLORIDE 10 MG/1
10 TABLET ORAL DAILY PRN
Status: DISCONTINUED | OUTPATIENT
Start: 2025-06-17 | End: 2025-06-18 | Stop reason: HOSPADM

## 2025-06-17 RX ORDER — CEFAZOLIN 2 G/1
2 INJECTION, POWDER, FOR SOLUTION INTRAMUSCULAR; INTRAVENOUS
Status: COMPLETED | OUTPATIENT
Start: 2025-06-17 | End: 2025-06-17

## 2025-06-17 RX ORDER — POVIDONE-IODINE 5 %
SOLUTION, NON-ORAL OPHTHALMIC (EYE)
Status: DISCONTINUED | OUTPATIENT
Start: 2025-06-17 | End: 2025-06-17 | Stop reason: HOSPADM

## 2025-06-17 RX ORDER — FENTANYL CITRATE 50 UG/ML
25 INJECTION, SOLUTION INTRAMUSCULAR; INTRAVENOUS EVERY 5 MIN PRN
Status: DISCONTINUED | OUTPATIENT
Start: 2025-06-17 | End: 2025-06-17

## 2025-06-17 RX ORDER — HALOPERIDOL LACTATE 5 MG/ML
0.5 INJECTION, SOLUTION INTRAMUSCULAR EVERY 10 MIN PRN
Status: DISCONTINUED | OUTPATIENT
Start: 2025-06-17 | End: 2025-06-17

## 2025-06-17 RX ORDER — ROPIVACAINE HYDROCHLORIDE 2 MG/ML
INJECTION, SOLUTION EPIDURAL; INFILTRATION; PERINEURAL CONTINUOUS
Status: DISCONTINUED | OUTPATIENT
Start: 2025-06-17 | End: 2025-06-17

## 2025-06-17 RX ORDER — METHOCARBAMOL 750 MG/1
750 TABLET, FILM COATED ORAL 3 TIMES DAILY
Status: DISCONTINUED | OUTPATIENT
Start: 2025-06-17 | End: 2025-06-18 | Stop reason: HOSPADM

## 2025-06-17 RX ORDER — ONDANSETRON HYDROCHLORIDE 2 MG/ML
4 INJECTION, SOLUTION INTRAVENOUS EVERY 8 HOURS PRN
Status: DISCONTINUED | OUTPATIENT
Start: 2025-06-17 | End: 2025-06-18 | Stop reason: HOSPADM

## 2025-06-17 RX ORDER — FAMOTIDINE 20 MG/1
20 TABLET, FILM COATED ORAL 2 TIMES DAILY
Status: DISCONTINUED | OUTPATIENT
Start: 2025-06-17 | End: 2025-06-18 | Stop reason: HOSPADM

## 2025-06-17 RX ORDER — CELECOXIB 200 MG/1
400 CAPSULE ORAL ONCE
Status: COMPLETED | OUTPATIENT
Start: 2025-06-17 | End: 2025-06-17

## 2025-06-17 RX ORDER — ASPIRIN 81 MG/1
81 TABLET ORAL 2 TIMES DAILY
Status: DISCONTINUED | OUTPATIENT
Start: 2025-06-17 | End: 2025-06-18 | Stop reason: HOSPADM

## 2025-06-17 RX ORDER — FENTANYL CITRATE 50 UG/ML
100 INJECTION, SOLUTION INTRAMUSCULAR; INTRAVENOUS
Status: DISCONTINUED | OUTPATIENT
Start: 2025-06-17 | End: 2025-06-17

## 2025-06-17 RX ORDER — MIDAZOLAM HYDROCHLORIDE 1 MG/ML
4 INJECTION, SOLUTION INTRAMUSCULAR; INTRAVENOUS
Status: DISCONTINUED | OUTPATIENT
Start: 2025-06-17 | End: 2025-06-17

## 2025-06-17 RX ORDER — PROPOFOL 10 MG/ML
VIAL (ML) INTRAVENOUS CONTINUOUS PRN
Status: DISCONTINUED | OUTPATIENT
Start: 2025-06-17 | End: 2025-06-17

## 2025-06-17 RX ORDER — OXYCODONE HYDROCHLORIDE 5 MG/1
5 TABLET ORAL
Status: DISCONTINUED | OUTPATIENT
Start: 2025-06-17 | End: 2025-06-17

## 2025-06-17 RX ORDER — SODIUM CHLORIDE 9 MG/ML
INJECTION, SOLUTION INTRAVENOUS
Status: COMPLETED | OUTPATIENT
Start: 2025-06-17 | End: 2025-06-17

## 2025-06-17 RX ORDER — MUPIROCIN 20 MG/G
OINTMENT TOPICAL 2 TIMES DAILY
Status: DISCONTINUED | OUTPATIENT
Start: 2025-06-17 | End: 2025-06-18 | Stop reason: HOSPADM

## 2025-06-17 RX ORDER — OXYCODONE HYDROCHLORIDE 10 MG/1
10 TABLET ORAL
Status: DISCONTINUED | OUTPATIENT
Start: 2025-06-17 | End: 2025-06-18 | Stop reason: HOSPADM

## 2025-06-17 RX ORDER — ONDANSETRON HYDROCHLORIDE 2 MG/ML
INJECTION, SOLUTION INTRAVENOUS
Status: DISCONTINUED | OUTPATIENT
Start: 2025-06-17 | End: 2025-06-17

## 2025-06-17 RX ORDER — BISACODYL 10 MG/1
10 SUPPOSITORY RECTAL EVERY 12 HOURS PRN
Status: DISCONTINUED | OUTPATIENT
Start: 2025-06-17 | End: 2025-06-18 | Stop reason: HOSPADM

## 2025-06-17 RX ORDER — MUPIROCIN 20 MG/G
1 OINTMENT TOPICAL
Status: COMPLETED | OUTPATIENT
Start: 2025-06-17 | End: 2025-06-17

## 2025-06-17 RX ORDER — POLYETHYLENE GLYCOL 3350 17 G/17G
17 POWDER, FOR SOLUTION ORAL DAILY
Status: DISCONTINUED | OUTPATIENT
Start: 2025-06-17 | End: 2025-06-18 | Stop reason: HOSPADM

## 2025-06-17 RX ORDER — KETAMINE HCL IN 0.9 % NACL 50 MG/5 ML
SYRINGE (ML) INTRAVENOUS
Status: DISCONTINUED | OUTPATIENT
Start: 2025-06-17 | End: 2025-06-17

## 2025-06-17 RX ORDER — GLUCAGON 1 MG
1 KIT INJECTION
Status: DISCONTINUED | OUTPATIENT
Start: 2025-06-17 | End: 2025-06-17

## 2025-06-17 RX ORDER — PROCHLORPERAZINE EDISYLATE 5 MG/ML
5 INJECTION INTRAMUSCULAR; INTRAVENOUS EVERY 6 HOURS PRN
Status: DISCONTINUED | OUTPATIENT
Start: 2025-06-17 | End: 2025-06-18 | Stop reason: HOSPADM

## 2025-06-17 RX ORDER — ACETAMINOPHEN 500 MG
1000 TABLET ORAL EVERY 6 HOURS
Status: DISCONTINUED | OUTPATIENT
Start: 2025-06-17 | End: 2025-06-18 | Stop reason: HOSPADM

## 2025-06-17 RX ORDER — ALBUTEROL SULFATE 90 UG/1
2 INHALANT RESPIRATORY (INHALATION) EVERY 6 HOURS PRN
Status: DISCONTINUED | OUTPATIENT
Start: 2025-06-17 | End: 2025-06-18 | Stop reason: HOSPADM

## 2025-06-17 RX ORDER — ROPIVACAINE HYDROCHLORIDE 5 MG/ML
INJECTION, SOLUTION EPIDURAL; INFILTRATION; PERINEURAL
Status: COMPLETED | OUTPATIENT
Start: 2025-06-17 | End: 2025-06-17

## 2025-06-17 RX ORDER — ONDANSETRON HYDROCHLORIDE 2 MG/ML
4 INJECTION, SOLUTION INTRAVENOUS DAILY PRN
Status: DISCONTINUED | OUTPATIENT
Start: 2025-06-17 | End: 2025-06-17

## 2025-06-17 RX ADMIN — PROPOFOL 100 MCG/KG/MIN: 10 INJECTION, EMULSION INTRAVENOUS at 12:06

## 2025-06-17 RX ADMIN — ROPIVACAINE HYDROCHLORIDE 7.5 ML: 5 INJECTION EPIDURAL; INFILTRATION; PERINEURAL at 12:06

## 2025-06-17 RX ADMIN — Medication: at 03:06

## 2025-06-17 RX ADMIN — PROPOFOL 30 MG: 10 INJECTION, EMULSION INTRAVENOUS at 12:06

## 2025-06-17 RX ADMIN — SODIUM CHLORIDE, SODIUM GLUCONATE, SODIUM ACETATE, POTASSIUM CHLORIDE, MAGNESIUM CHLORIDE, SODIUM PHOSPHATE, DIBASIC, AND POTASSIUM PHOSPHATE: .53; .5; .37; .037; .03; .012; .00082 INJECTION, SOLUTION INTRAVENOUS at 02:06

## 2025-06-17 RX ADMIN — LIDOCAINE HYDROCHLORIDE 75 MG: 20 INJECTION INTRAVENOUS at 12:06

## 2025-06-17 RX ADMIN — PREGABALIN 75 MG: 75 CAPSULE ORAL at 09:06

## 2025-06-17 RX ADMIN — CELECOXIB 400 MG: 200 CAPSULE ORAL at 09:06

## 2025-06-17 RX ADMIN — TRANEXAMIC ACID 1000 MG: 100 INJECTION INTRAVENOUS at 12:06

## 2025-06-17 RX ADMIN — Medication 400 ML: at 06:06

## 2025-06-17 RX ADMIN — ACETAMINOPHEN 1000 MG: 500 TABLET ORAL at 09:06

## 2025-06-17 RX ADMIN — SENNOSIDES AND DOCUSATE SODIUM 1 TABLET: 50; 8.6 TABLET ORAL at 09:06

## 2025-06-17 RX ADMIN — METHOCARBAMOL 750 MG: 750 TABLET ORAL at 03:06

## 2025-06-17 RX ADMIN — MUPIROCIN 1 G: 20 OINTMENT TOPICAL at 09:06

## 2025-06-17 RX ADMIN — CEFAZOLIN 2 G: 2 INJECTION, POWDER, FOR SOLUTION INTRAMUSCULAR; INTRAVENOUS at 12:06

## 2025-06-17 RX ADMIN — Medication 400 ML: at 10:06

## 2025-06-17 RX ADMIN — TRANEXAMIC ACID 1000 MG: 100 INJECTION INTRAVENOUS at 02:06

## 2025-06-17 RX ADMIN — METHOCARBAMOL 750 MG: 750 TABLET ORAL at 09:06

## 2025-06-17 RX ADMIN — OXYCODONE HYDROCHLORIDE 5 MG: 5 TABLET ORAL at 03:06

## 2025-06-17 RX ADMIN — ASPIRIN 81 MG: 81 TABLET, COATED ORAL at 09:06

## 2025-06-17 RX ADMIN — OXYCODONE HYDROCHLORIDE 5 MG: 5 TABLET ORAL at 11:06

## 2025-06-17 RX ADMIN — MUPIROCIN: 20 OINTMENT TOPICAL at 11:06

## 2025-06-17 RX ADMIN — FAMOTIDINE 20 MG: 20 TABLET, FILM COATED ORAL at 09:06

## 2025-06-17 RX ADMIN — ONDANSETRON 4 MG: 2 INJECTION INTRAMUSCULAR; INTRAVENOUS at 12:06

## 2025-06-17 RX ADMIN — ACETAMINOPHEN 1000 MG: 500 TABLET ORAL at 11:06

## 2025-06-17 RX ADMIN — SODIUM CHLORIDE: 0.9 INJECTION, SOLUTION INTRAVENOUS at 11:06

## 2025-06-17 RX ADMIN — MIDAZOLAM 1 MG: 1 INJECTION INTRAMUSCULAR; INTRAVENOUS at 12:06

## 2025-06-17 RX ADMIN — VANCOMYCIN HYDROCHLORIDE 1000 MG: 1 INJECTION, POWDER, LYOPHILIZED, FOR SOLUTION INTRAVENOUS at 09:06

## 2025-06-17 RX ADMIN — SODIUM CHLORIDE, SODIUM GLUCONATE, SODIUM ACETATE, POTASSIUM CHLORIDE, MAGNESIUM CHLORIDE, SODIUM PHOSPHATE, DIBASIC, AND POTASSIUM PHOSPHATE: .53; .5; .37; .037; .03; .012; .00082 INJECTION, SOLUTION INTRAVENOUS at 01:06

## 2025-06-17 RX ADMIN — DEXAMETHASONE SODIUM PHOSPHATE 8 MG: 4 INJECTION, SOLUTION INTRAMUSCULAR; INTRAVENOUS at 12:06

## 2025-06-17 RX ADMIN — CEFAZOLIN 2 G: 2 INJECTION, POWDER, FOR SOLUTION INTRAMUSCULAR; INTRAVENOUS at 09:06

## 2025-06-17 RX ADMIN — MEPIVACAINE HYDROCHLORIDE 3 ML: 15 INJECTION, SOLUTION EPIDURAL; INFILTRATION at 12:06

## 2025-06-17 RX ADMIN — FENTANYL CITRATE 50 MCG: 50 INJECTION INTRAMUSCULAR; INTRAVENOUS at 12:06

## 2025-06-17 RX ADMIN — ACETAMINOPHEN 1000 MG: 500 TABLET ORAL at 06:06

## 2025-06-17 RX ADMIN — Medication 20 MG: at 12:06

## 2025-06-17 RX ADMIN — SODIUM CHLORIDE: 9 INJECTION, SOLUTION INTRAVENOUS at 09:06

## 2025-06-17 NOTE — NURSING
Patient arrived to unit AAOx3, In hospital bed from PACU. VSS and IV saline locked. Dressing to right knee, CDI. CADD pump infusing at continuous rate of 0.5 mL and AB 4 mL. Dressing CDI. See assessment. Patient oriented to room. Bed in lowest position, side rails up x2, call light within reach, patient instructed to call for assistance, verbalized understanding. Will continue to monitor.     Nurses Note -- 4 Eyes      6/17/2025   5:11 PM      Skin assessed during: Admit      [x] No Altered Skin Integrity Present    []Prevention Measures Documented      [] Yes- Altered Skin Integrity Present or Discovered   [] LDA Added if Not in Epic (Describe Wound)   [] New Altered Skin Integrity was Present on Admit and Documented in LDA   [] Wound Image Taken    Wound Care Consulted? No    Attending Nurse:  CARLOS Borden    Second RN/Staff Member:  CARLOS Steele

## 2025-06-17 NOTE — PLAN OF CARE
Pre-op complete. Waiting on site sophy, H&P update, and anesthesia consent. Family at bedside. Bed locked in lowest position with call bell within reach.

## 2025-06-17 NOTE — INTERVAL H&P NOTE
Jojo Burrows was interviewed, examined and the H and P reviewed.  There has been no interval change in her History and Physical.

## 2025-06-17 NOTE — TRANSFER OF CARE
"Anesthesia Transfer of Care Note    Patient: Jojo Burrows    Procedure(s) Performed: Procedure(s) (LRB):  ARTHROPLASTY, KNEE, TOTAL, USING COMPUTER-ASSISTED NAVIGATION: RIGHT: SAME DAY (Right)    Patient location: PACU    Anesthesia Type: general and spinal    Transport from OR: Transported from OR on 6-10 L/min O2 by face mask with adequate spontaneous ventilation    Post pain: adequate analgesia    Post assessment: no apparent anesthetic complications and tolerated procedure well    Post vital signs: stable    Level of consciousness: awake    Nausea/Vomiting: no nausea/vomiting    Complications: none    Transfer of care protocol was followed      Last vitals: Visit Vitals  BP (!) 113/56 (BP Location: Left arm, Patient Position: Lying)   Pulse (!) 53   Temp 36.4 °C (97.5 °F) (Temporal)   Resp 13   Ht 5' 1" (1.549 m)   Wt 69.9 kg (154 lb)   SpO2 97%   Breastfeeding No   BMI 29.10 kg/m²     "

## 2025-06-17 NOTE — PT/OT/SLP PROGRESS
Occupational Therapy      Patient Name:  Jojo Burrows   MRN:  [id number]    OT eval orders received, chart reviewed. Patient not seen today secondary to numbness still present to feet. Will follow-up again in AM.     6/17/2025

## 2025-06-17 NOTE — ASSESSMENT & PLAN NOTE
Jojo Burrows is a 75 y.o. female is s/p R TKA on 6-17-25.    Surgical dressing C/D/I  Pain control: multimodal, Anesthesia Surgical Home following  PT/OT: WBAT RLE  DVT PPx: ASA 81 BID, FCDs at all times when not ambulating  Abx: postop Ancef  Drain: none  Santo: none    Dispo: plan to dc to home today once cleared by PT/OT

## 2025-06-17 NOTE — ANESTHESIA PROCEDURE NOTES
Adductor canal catheter    Patient location during procedure: pre-op   Block not for primary anesthetic.  Reason for block: at surgeon's request and post-op pain management   Post-op Pain Location: right knee   Start time: 6/17/2025 12:26 PM  Timeout: 6/17/2025 12:25 PM   End time: 6/17/2025 12:30 PM    Staffing  Authorizing Provider: Alexandru Lambert MD  Performing Provider: Alexandru Lambert MD    Staffing  Performed by: Alexandru Lambert MD  Authorized by: Alexandru Lambert MD    Preanesthetic Checklist  Completed: patient identified, IV checked, site marked, risks and benefits discussed, surgical consent, monitors and equipment checked, pre-op evaluation and timeout performed  Peripheral Block  Patient position: supine  Prep: ChloraPrep and site prepped and draped  Patient monitoring: heart rate, cardiac monitor, continuous pulse ox, continuous capnometry and frequent blood pressure checks  Block type: adductor canal  Laterality: right  Injection technique: continuous  Needle  Needle type: Tuohy   Needle gauge: 17 G  Needle length: 3.5 in  Needle localization: anatomical landmarks and ultrasound guidance  Catheter type: spring wound  Catheter size: 19 G  Test dose: lidocaine 1.5% with Epi 1-to-200,000 and negative   -ultrasound image captured on disc.  Assessment  Injection assessment: negative aspiration, negative parasthesia and local visualized surrounding nerve  Paresthesia pain: none  Heart rate change: no  Slow fractionated injection: yes  Pain Tolerance: comfortable throughout block and no complaints  Medications:    Medications: ropivacaine (NAROPIN) injection 0.5% - Perineural   7.5 mL - 6/17/2025 12:28:00 PM    Additional Notes  VSS.  [facility] RN monitoring vitals throughout procedure.  Patient tolerated procedure well.

## 2025-06-17 NOTE — HOSPITAL COURSE
On 6-17-25, the patient arrived to the [facility] for proper pre-operative management.  Upon completion of pre-operative preparation, the patient was taken back to the operative theatre. R TKA was performed without complication and the patient was transported to the post anesthesia care unit in stable condition.  After appropriate recovery from the anaesthetic agents used during the surgery, the patient was then transported to the hospital inpatient floor.  The interim of the hospital stay from arrival on the floor up to discharge has been uncomplicated. The patient has tolerated regular diet.  The patient's pain has been controlled using a multimodal approach. Currently, the patient's pain is well controlled on an oral regimen.  The patient has been voiding without difficulty.  The patient began participation in physical therapy after surgery and has progressed throughout the entire hospital stay.  Currently, the patient's progress is sufficient to allow the them to be discharged to home safely.  The patient agrees with this assessment and desires a discharge today.

## 2025-06-17 NOTE — ANESTHESIA PROCEDURE NOTES
Spinal    Diagnosis: right knee  Patient location during procedure: OR  Start time: 6/17/2025 12:48 PM  Timeout: 6/17/2025 12:47 PM  End time: 6/17/2025 12:49 PM    Staffing  Authorizing Provider: Alexandru Lambert MD  Performing Provider: Alexandru Lambert MD    Staffing  Performed by: Alexandru Lambert MD  Authorized by: Alexandru Lmabert MD    Preanesthetic Checklist  Completed: patient identified, IV checked, site marked, risks and benefits discussed, surgical consent, monitors and equipment checked, pre-op evaluation and timeout performed  Spinal Block  Patient position: sitting  Prep: ChloraPrep  Patient monitoring: heart rate, cardiac monitor, continuous pulse ox and frequent blood pressure checks  Approach: midline  Location: L3-4  Injection technique: single shot  CSF Fluid: clear free-flowing CSF  Needle  Needle localization: anatomical landmarks  Assessment  Ease of block: easy  Patient's tolerance of the procedure: comfortable throughout block and no complaints  Medications:    Medications: mepivacaine (CARBOCAINE) injection 15 mg/mL (1.5%) - Other   3 mL - 6/17/2025 12:48:00 PM

## 2025-06-17 NOTE — ANESTHESIA PREPROCEDURE EVALUATION
06/17/2025  [facility]  Anesthesia Pre-Operative Evaluation     Patient Name: Jojo Burrows  YOB: 1950  MRN: [id number]  [facility]: [id number]       Admit Date: 6/17/2025   Admit Team: Networked reference to record PCT   Hospital Day: 1  Date of Procedure: 5/27/2025  Anesthesia: Regional Procedure: Procedure(s) (LRB):  ARTHROPLASTY, KNEE, TOTAL, USING COMPUTER-ASSISTED NAVIGATION: RIGHT: SAME DAY (Right)  Pre-Operative Diagnosis: Primary osteoarthritis of right knee [M17.11]  Proceduralist:Surgeons and Role:     * Graham Chow MD - Primary  Code Status: Prior   Advanced Directive: <no information>  Isolation Precautions: No active isolations  Capacity: Full capacity     SUBJECTIVE:   Jojo Burrows is a 75 y.o. female who  has a past medical history of Acute hypoxemic respiratory failure (03/31/2020), ALLERGIC RHINITIS, Cataract, COVID-19 virus infection (03/2020), Degenerative disc disease, cervical (09/13/2018), GERD (gastroesophageal reflux disease), Hyperlipidemia, Hypertension, Migraine headache, Multifocal pneumonia (03/31/2020), Nuclear sclerosis (04/30/2014), Overweight (BMI 25.0-29.9) (01/27/2015), Sleep disorder, Thyroid disease, and TIA (transient ischemic attack).  No notes on file     ropivacaine 0.2% Perineural Pump infusion 500 ML   Perineural Continuous         Hospital LOS: 0 days  ICU LOS: Patient does not have an ICU stay during this admission.    she has a current medication list which includes the following long-term medication(s): aspirin, calcium carbonate, metoprolol succinate, pantoprazole, rosuvastatin, sertraline, zolpidem, albuterol, aspirin, azelastine, and celecoxib.     ALLERGIES:     Review of patient's allergies indicates:   Allergen Reactions    Vantin [cefpodoxime] Itching     Severe itching, night sweats.  Improved after stopped it.  4/24     Iodine Rash    Iodine and iodide containing products Itching and Rash    Shellfish containing products Itching and Rash     Lines/Drains/Airways       Peripheral Intravenous Line  Duration                  Peripheral IV - Single Lumen 06/17/25 0933 20 G Right;Posterior Hand <1 day                   Anesthesia Evaluation      Airway   Mallampati: II  TM distance: Normal  Neck ROM: Normal ROM  Dental      Pulmonary    (+) pneumonia  Cardiovascular   (+) hypertension    Neuro/Psych    (+) neuromuscular disease, TIA, headaches    GI/Hepatic/Renal    (+) GERD    Endo/Other    (+) arthritis  Abdominal                    MEDICATIONS:     Current Outpatient Medications on File Prior to Encounter   Medication Sig Dispense Refill Last Dose/Taking    ascorbic acid, vitamin C, (VITAMIN C) 500 MG tablet Take 1 tablet (500 mg total) by mouth 2 (two) times daily.   Past Week    aspirin (ECOTRIN) 81 MG EC tablet Take 81 mg by mouth once daily.   6/16/2025    calcium carbonate (TUMS) 200 mg calcium (500 mg) chewable tablet Take 2 tablets by mouth as needed.   Past Month    cetirizine (ZYRTEC) 10 MG tablet Take 10 mg by mouth daily as needed for Allergies.   Past Week    cholecalciferol, vitamin D3, (VITAMIN D3) 25 mcg (1,000 unit) capsule Take 1,000 Units by mouth once daily.   Past Week    cyanocobalamin (VITAMIN B-12) 250 MCG tablet Take 250 mcg by mouth once daily.   Past Week    folic acid/multivit-min/lutein (CENTRUM SILVER ORAL) Take 1 tablet by mouth once daily.   Past Week    magnesium gluconate 27.5 mg magne- sium (500 mg) Tab Take by mouth once.   Past Month    metoprolol succinate (TOPROL-XL) 50 MG 24 hr tablet TAKE 1 TABLET BY MOUTH EVERY DAY 90 tablet 3 6/16/2025 Evening    rosuvastatin (CRESTOR) 20 MG tablet Take 1 tablet (20 mg total) by mouth once daily. For cholesterol control. 90 tablet 3 6/16/2025 Evening    sertraline (ZOLOFT) 50 MG tablet Take 1 tablet (50 mg total) by mouth once daily. For anxiety control. 90  tablet 2 Past Week    TURMERIC ORAL Take 1 capsule by mouth once daily.   Past Month    zolpidem (AMBIEN) 5 MG Tab Take 1 tablet (5 mg total) by mouth nightly as needed (insomnia). 30 tablet 3 Past Week    albuterol (PROVENTIL/VENTOLIN HFA) 90 mcg/actuation inhaler Inhale 2 puffs into the lungs every 6 (six) hours as needed for Wheezing. Rescue 54 g 3 More than a month    azelastine (ASTELIN) 137 mcg (0.1 %) nasal spray 1 spray (137 mcg total) by Nasal route 2 (two) times daily. (Patient taking differently: 1 spray by Nasal route as needed.) 30 mL 3     fluticasone propionate (FLONASE) 50 mcg/actuation nasal spray 2 sprays (100 mcg total) by Each Nostril route once daily. (Patient taking differently: 2 sprays by Each Nostril route as needed.) 9.9 mL 11 More than a month    penicillin v potassium (VEETID) 250 MG tablet Take 250 mg by mouth 4 (four) times daily.         Inpatient Medications:  Antibiotics (From admission, onward)      Start     Stop Route Frequency Ordered    06/17/25 0911  ceFAZolin 2 g  (MEDICATIONS - ANTIBIOTICS NO PENICILLIN ALLERGY TOTAL KNEE PATHWAY PRE-OP)         -- IV On Call Procedure 06/17/25 0911 06/17/25 0911  vancomycin (VANCOCIN) 1,000 mg in 0.9% NaCl 250 mL IVPB (admixture device)  (MEDICATIONS - ANTIBIOTICS (VANCOMYCIN AND CLINDAMYCIN) FOR PCN ALLERGY OR KNOWN COLONIZATION WITH MRSA TOTAL KNEE PATHWAY PRE-OP)         -- IV On Call Procedure 06/17/25 0911          VTE Risk Mitigation (From admission, onward)           Ordered     Place foot compression device  Until discontinued         06/17/25 0911                        Current Medications[1]       History:   There are no hospital problems to display for this patient.    Surgical History:    has a past surgical history that includes Hysterectomy; Colonoscopy (N/A, 4/29/2016); Breast biopsy; Breast cyst excision; Breast cyst aspiration; Colonoscopy (N/A, 6/28/2021); Esophagogastroduodenoscopy (N/A, 3/18/2025); and  "Esophagogastroduodenoscopy (N/A, 5/16/2025).   Social History:    reports that she is not currently sexually active and has had partner(s) who are male.  reports that she has never smoked. She has never used smokeless tobacco. She reports that she does not drink alcohol and does not use drugs.    Vitals:    06/17/25 0931   BP: 130/66   BP Location: Left arm   Patient Position: Lying   Pulse: 61   Resp: 16   Temp: 36.4 °C (97.5 °F)   TempSrc: Temporal   SpO2: 97%   Weight: 69.9 kg (154 lb)   Height: 5' 1" (1.549 m)     Vital Signs Range (Last 24H):  Temp:  [36.4 °C (97.5 °F)]   Pulse:  [61]   Resp:  [16]   BP: (130)/(66)   SpO2:  [97 %]     Body mass index is 29.1 kg/m².  Wt Readings from Last 4 Encounters:   06/17/25 69.9 kg (154 lb)   05/27/25 69.9 kg (154 lb)   05/16/25 69.9 kg (154 lb)   05/12/25 71.7 kg (158 lb 1.1 oz)        Intake/Output - Last 3 Shifts       None          Lab Results   Component Value Date    WBC 6.24 05/07/2025    HGB 13.5 05/07/2025    HCT 41.4 05/07/2025     05/07/2025     05/07/2025    K 3.7 05/07/2025     05/07/2025    CREATININE 0.8 05/07/2025    BUN 12 05/07/2025    CO2 26 05/07/2025    GLU 79 05/07/2025    CALCIUM 9.6 05/07/2025    MG 1.8 03/19/2021    PHOS 3.1 04/01/2020    ALKPHOS 90 05/07/2025    ALT 14 05/07/2025    AST 20 05/07/2025    ALBUMIN 4.0 05/07/2025    INR 1.0 05/07/2025    PROTIME 11.4 05/07/2025    APTT 25.7 07/12/2011    HGBA1C 5.5 06/24/2022     (H) 03/31/2020    CPKMB 3.3 07/12/2011    TROPONINI <0.006 02/10/2024    MB 0.7 07/12/2011    BNP 25 02/10/2024     No results found for this or any previous visit (from the past 12 hours).  No results for input(s): "WBC", "HGB", "HCT", "PLT", "NA", "K", "CREATININE", "GLU", "INR" in the last 168 hours.  No LMP recorded. Patient has had a hysterectomy.    EKG:   Results for orders placed or performed during the hospital encounter of 02/10/24   EKG 12-lead    Collection Time: 02/10/24  1:13 PM "   Result Value Ref Range    QRS Duration 86 ms    [facility] QTC Calculation 404 ms    Narrative    Test Reason : R07.9,    Vent. Rate : 058 BPM     Atrial Rate : 058 BPM     P-R Int : 152 ms          QRS Dur : 086 ms      QT Int : 412 ms       P-R-T Axes : 044 -08 033 degrees     QTc Int : 404 ms    Sinus bradycardia  Minimal voltage criteria for LVH, may be normal variant  Nonspecific T wave abnormality  Abnormal ECG  When compared with ECG of 31-MAR-2020 20:12,  No significant change was found  Confirmed by Graham Cha MD ([id number]) on 2/14/2024 12:09:59 PM    Referred By: [email or web address]   SELF           Confirmed By:Graham Cha MD     TTE:  Results for orders placed or performed in visit on 09/25/20   Echo Color Flow Doppler? Yes   Result Value Ref Range    BSA 1.82 m2    TDI SEPTAL 0.06 m/s    LV LATERAL E/E' RATIO 7.44 m/s    LV SEPTAL E/E' RATIO 11.17 m/s    LA WIDTH 3.76 cm    TDI LATERAL 0.09 m/s    LVIDd 4.02 3.5 - 6.0 cm    IVS 0.77 0.6 - 1.1 cm    PW 0.61 0.6 - 1.1 cm    LVIDs 2.48 2.1 - 4.0 cm    FS 38 28 - 44 %    LA Vol 48.08 cm3    Sinus 3.02 cm    STJ 2.72 cm    Ascending aorta 2.93 cm    LV mass 77.56 g    LA size 3.26 cm    RVDD 2.45 cm    TAPSE 1.71 cm    Left Ventricle Relative Wall Thickness 0.30 cm    AV mean gradient 3 mmHg    AV valve area 2.17 cm2    AV Velocity Ratio 0.69     AV index (prosthetic) 0.73     MV valve area p 1/2 method 3.61 cm2    E/A ratio 0.67     Mean e' 0.08 m/s    E wave deceleration time 210.14 msec    IVRT 97.05 msec    Pulm vein S/D ratio 1.44     LVOT diameter 1.95 cm    LVOT area 3.0 cm2    LVOT peak cuate 0.81 m/s    LVOT peak VTI 21.44 cm    Ao peak cuate 1.18 m/s    Ao VTI 29.53 cm    LVOT stroke volume 64.00 cm3    AV peak gradient 6 mmHg    E/E' ratio 8.93 m/s    MV Peak E Cuate 0.67 m/s    TR Max Cuate 2.88 m/s    MV stenosis pressure 1/2 time 60.94 ms    MV Peak A Cuate 1.00 m/s    PV Peak S Cuate 0.62 m/s    PV Peak D Cuate 0.43 m/s    LV Systolic Volume 21.79 mL    LV  "Systolic Volume Index 12.3 mL/m2    LV Diastolic Volume 70.65 mL    LV Diastolic Volume Index 39.90 mL/m2    SHAHNAZ 27.2 mL/m2    LV Mass Index 44 g/m2    RA Major Axis 4.14 cm    Left Atrium Minor Axis 4.60 cm    Left Atrium Major Axis 4.63 cm    Triscuspid Valve Regurgitation Peak Gradient 33 mmHg    RA Width 2.74 cm    Right Atrial Pressure (from IVC) 3 mmHg    TV resting pulmonary artery pressure 36 mmHg    Narrative    · The left ventricle is normal in size with normal systolic function. The   estimated ejection fraction is 65%.  · Indeterminate diastolic function.  · Normal right ventricular systolic function.  · Mild tricuspid regurgitation.  · The estimated PA systolic pressure is 36 mmHg.  · Normal central venous pressure (3 mmHg).        No results found for this or any previous visit.    LOU:  No results found for this or any previous visit.  Stress Test:  Results for orders placed during the hospital encounter of 04/11/24    Nuclear Stress - Cardiology Interpreted    Interpretation Summary    Normal myocardial perfusion scan. There is no evidence of myocardial ischemia or infarction.    The visually estimated ejection fraction is normal at rest and normal during stress.    There is normal wall motion at rest and post stress.    LV cavity size is normal at rest and normal at stress.    The ECG portion of the study is negative for ischemia.    The patient reported no chest pain during the stress test.    There were no arrhythmias during stress.    When compared to the previous study from 2/16/2017, there are no significant changes.     LHC:  No results found for this or any previous visit.     PFT:  No results found for: "FEV1", "FVC", "JOM0XDP", "TLC", "DLCO"         Pre-op Assessment    I have reviewed the Patient Summary Reports.     I have reviewed the Nursing Notes. I have reviewed the NPO Status.   I have reviewed the Medications.     Review of Systems  Anesthesia Hx:             Denies Family Hx of " Anesthesia complications.    Denies Personal Hx of Anesthesia complications.                    Cardiovascular:     Hypertension                                          Pulmonary:  Pneumonia                      Hepatic/GI:     GERD                Musculoskeletal:  Arthritis               Neurological:  TIA,  Neuromuscular Disease,  Headaches                                     Physical Exam  General: Alert, Cooperative and Oriented    Airway:  Mallampati: II   Mouth Opening: Normal  TM Distance: Normal  Tongue: Normal  Neck ROM: Normal ROM        Anesthesia Plan  Type of Anesthesia, risks & benefits discussed:    Anesthesia Type: Spinal, Gen Natural Airway, Regional  Intra-op Monitoring Plan: Standard ASA Monitors  Post Op Pain Control Plan: multimodal analgesia  Induction:  IV  Airway Plan: Direct  Informed Consent: Informed consent signed with the Patient and all parties understand the risks and agree with anesthesia plan.  All questions answered.   ASA Score: 3  Day of Surgery Review of History & Physical: H&P Update referred to the surgeon/provider.    Ready For Surgery From Anesthesia Perspective.     .             [1]   Current Facility-Administered Medications   Medication Dose Route Frequency Provider Last Rate Last Admin    ceFAZolin 2 g  2 g Intravenous On Call Procedure Chary Dobbins, NP        fentaNYL 50 mcg/mL injection 100 mcg  100 mcg Intravenous PRN Chary Dobbins NP        LIDOcaine (PF) 10 mg/ml (1%) injection 10 mg  1 mL Intradermal On Call Procedure Chary Dobbins NP        midazolam injection 4 mg  4 mg Intravenous PRN Chary Dobbins NP        ropivacaine 0.2% Perineural Pump infusion 500 ML   Perineural Continuous Andi-Lee Ann Pena PA-C        tranexamic acid (CYKLOKAPRON) 1,000 mg in 0.9% NaCl 100 mL IVPB (MB+)  1,000 mg Intravenous On Call Procedure Chary Dobbins NP        tranexamic acid (CYKLOKAPRON) 1,000 mg in 0.9% NaCl 100 mL IVPB (MB+)  1,000 mg  Intravenous On Call Procedure Chary Dobbins NP        vancomycin (VANCOCIN) 1,000 mg in 0.9% NaCl 250 mL IVPB (admixture device)  1,000 mg Intravenous On Call Procedure Chary Dobbins .7 mL/hr at 06/17/25 0940 1,000 mg at 06/17/25 0940

## 2025-06-17 NOTE — PT/OT/SLP PROGRESS
Physical Therapy      Patient Name:  Jojo Burrows   MRN:  [id number]    Patient not seen today secondary to numbness still present to feet. Will follow-up again in AM.

## 2025-06-17 NOTE — OP NOTE
DATE OF PROCEDURE:  6/17/25.     PREOPERATIVE DIAGNOSIS:  Arthritis, right knee.     POSTOPERATIVE DIAGNOSIS:  Arthritis, right knee.     PROCEDURES PERFORMED:  Robotically-assisted right total knee arthroplasty.     SURGEON:  Graham Chow M.D.     ASSISTANT:  JASMYN Boucher MD/ JASMYN Limon RUST-FA     ANESTHESIA:  Regional.     COMPLICATIONS:  None.     COUNTS:  Correct.     DISPOSITION:  Recovery Room, stable.     SPECIMENS:  None.     FINDINGS:  Arthritis.     FLUIDS:  2000 mL.     ESTIMATED BLOOD LOSS:  <50cc     IMPLANTS:  Kristan Triathlon size 1 right  Triathlon cruciate retaining femoral component and a size 2 primary tibial baseplate, 27 mm patella and a size 2, 10 mm   CS tibial insert.     INDICATIONS FOR PROCEDURE:   Jojo Burrows  is a 75-year-old female who is   having symptoms of right knee pain.  Physical examination and imaging studies   were consistent with arthritis.Treatment options were explained and it was decided   to proceed with robotically-assisted right total knee arthroplasty.  She was   aware of reasonable treatment options as well as risks and benefits.       PROCEDURE IN DETAIL:  After appropriate consent was obtained, the patient   brought in the Operating Room, anesthesia was administered.  She received   antibiotic prophylaxis.  Cast padding and tourniquet was applied to the proximal  right thigh.  right lower extremity was then prepped and draped in usual sterile   fashion.  The leg plata was applied.  Timeout was called.  Limb was elevated   and tourniquet was inflated.  The knee was flexed.  An incision was made from   the tibial tubercle just proximal to the superior pole of the patella.  It was   taken down through the skin and retinacular.  A medial parapatellar arthrotomy   was performed followed by a medial release.  Following this ACL was resected, fat pad   was excised.  The patella was everted.  It was measured and found to be 24 mm,  8 mm of bone removed and the 3 peg  holes were drilled for the 27 mm patella.    Following this, 2 stab incisions were made 4 fingerbreadths below the tibial   tubercle on the medial aspect of the tibial crest.  Following this, 2 stab incisions were made over the distal third of the tibia.  Using the short pin guide, the two tibial pins were placed.  The array was applied and   tightened to the clamp. We checked the security of the array and it was fine. Following this, we placed the femoral pins 1 cm superior and anterior to the MCL insertion.  The check point was placed.  The guide clamp and array were secured..  Once this was accomplished, the hip center was obtained, the   medial and lateral malleoli were demarcated.  The femoral check point and tibial   check point were placed and the femur and tibia were then registered.    Acceptable registration was obtained.  Osteophytes were removed. We then captured poses in extension,   And approximately 90 degrees of flexion.  Initial alignment was 9 degrees varus and 10  Degrees flexion .  The  initial gaps were   then obtained. The extension gaps were 12.5 medially and 19.5 laterally.  The flexion gaps were 13.5 medially and 19 laterally.  Component   position was adjusted.  We were very satisfied with the component position and   sizing.  We had a size 1 femur and a 2 tibia.  Once this was accomplished, the   robotic arm was brought in and our cuts were made.  The tibia was cut 1st.  We then cut the anterior femur anterior chamfer and posterior femur.  The saw blade was then switched and we made our distal and posterior chamfer cut.  We were very satisfied all the cuts.  Bone fragments were removed.  Lamina  was used to open up posteriorly and we removed any remaining osteophytes and meniscal remnants. The PCL was intact and   robust.  We placed the tibial trial.  We had good coverage with this.  We used   the medial one-third of the tibial tubercle to guide rotation and the trial was pinned in    place.  A 10 mm insert was placed and then the femoral component was placed.    This was centered on the femur and the knee was brought through a range of   motion.  She was well balanced in flexion with an 18 mm gaps medially and an 18.5  laterally.  In extension, there was an 18 mm gap medially 1.5 mm gap laterally.    Clinically,there was excellent balance and stability.  Final alignment was 4 degrees flexion and 4.5 degrees varus. Trial   patellar button was placed.  Patella tracked well.  Therefore, at this point, we   were satisfied with knee range of motion and stability, component position,   sizing and alignment as well as patella tracking.  It should be noted that she  had full extension and he had at least 130 degrees of flexion and there was no   instability at full extension, midflexion, or deep flexion.  Trial components   were removed.  Arrays and femoral and tibial pins were removed. The bone was then prepared for cementing for pulsatile lavage and   drying. Components were then cemented into   place. Tibia followed by femur.  Cement was applied to the tibial keel as   well.  Excess cement was removed.  The tibial insert was firmly seated.  The knee was inspected.  There was no loose body, foreign body, or soft tissue interposition.  It was   then reduced.  Patella button was cemented in place.  Once the cement was dry,   the knee was irrigated with Betadine solution.  Following this, it was irrigated   with pulsatile lavage.  This was periodically done throughout the closure.  The   incision was then closed.  The arthrotomy was closed with #1 Vicryl.  Once the   arthrotomy was closed, the knee was brought through range of motion and was   stable as previously described and the patella tracked well.  The remainder of   the incision was closed with 0 Vicryl, 2-0 Vicryl, Monocryl, and Dermabond.    The stab incisions were closed with Vicryl and Dermabond.  It should be noted   that all check points  were removed as well as the femoral and tibial pins.    Sterile dressing was applied.  She was transferred from the Operating Room table   to a stretcher, brought to Recovery Room in stable condition.  She tolerated the   procedure well and there were no known complications. A gram of IV tranexamic acid was received prior to incision and at closure.

## 2025-06-17 NOTE — HPI
Jojo Burrows is a 75 y.o. female with history of Right knee pain. Pain is worse with activity and weight bearing.  Patient has experienced interference of activities of daily living due to decreased range of motion and an increase in joint pain and swelling.  Patient has failed non-operative treatment including NSAIDs, corticosteroid injections, viscosupplement injections, and activity modification.  Jojo Burrows currently ambulates independently.      Relevant medical conditions of significance in perioperative period:  HTN- on medication managed by pcp  HLD- on medication managed by pcp  Cervical spine djd with radiculopathy- on medication managed by pcp

## 2025-06-17 NOTE — PLAN OF CARE
Care plan reviewed w/ pt and family at bedside. AVSS on 2L NC. R knee dressing CDI. CADD pump in place. Pain controlled w/ PRN medications. Xrays done. Spinal resolved, pt able to move BLE. Report given to Suha in recovery Suites. Pt transferred to room 314.

## 2025-06-17 NOTE — CARE UPDATE
Orthopedics Post-Op Check    Pt seen and examined at bedside. Pain controlled. VSS.     Vitals:    06/17/25 1545 06/17/25 1600 06/17/25 1615 06/17/25 1630   BP: 126/62 123/66 123/67 126/62   BP Location:       Patient Position:       Pulse: 63 (!) 54 (!) 54 (!) 51   Resp: 20 18 19 20   Temp:  97.7 °F (36.5 °C)     TempSrc:  Temporal     SpO2: (!) 94% (!) 94% (!) 93% (!) 94%   Weight:       Height:          Temp:  [97.5 °F (36.4 °C)-97.7 °F (36.5 °C)]        Physical Exam:  AAOx4  NAD  Reg rate  No increased WOB    RLE:  Dressing c/d/i  SILT T/SP/DP/Michel/Sa  Motor intact T/SP/DP  WWP extremities, DP palpated  FCDs in place and functioning        Assessment/Plan:  - Pending PT and OT leni Boucher MD/MPH  PGY-4  Department of Orthopaedic Surgery  [facility]

## 2025-06-17 NOTE — PLAN OF CARE
Problem: Pain Acute  Goal: Optimal Pain Control and Function  Intervention: Prevent or Manage Pain  Flowsheets (Taken 6/17/2025 1727)  Sleep/Rest Enhancement:   relaxation techniques promoted   family presence promoted   regular sleep/rest pattern promoted   noise level reduced  Sensory Stimulation Regulation:   quiet environment promoted   care clustered  Bowel Elimination Promotion:   adequate fluid intake promoted   ambulation promoted  Medication Review/Management: medications reviewed     Problem: Fall Injury Risk  Goal: Absence of Fall and Fall-Related Injury  6/17/2025 1733 by Ivette Carreon RN  Outcome: Progressing  6/17/2025 1727 by Ivette Carreon RN  Outcome: Progressing  Intervention: Identify and Manage Contributors  Flowsheets (Taken 6/17/2025 1727)  Self-Care Promotion: BADL personal objects within reach  Medication Review/Management: medications reviewed     Problem: Knee Arthroplasty  Goal: Optimal Coping  Outcome: Progressing  Intervention: Support Psychosocial Response to Surgery and Mobility Changes  Flowsheets (Taken 6/17/2025 1733)  Supportive Measures:   active listening utilized   counseling provided   relaxation techniques promoted   verbalization of feelings encouraged     Problem: Knee Arthroplasty  Goal: Absence of Bleeding  Outcome: Progressing     Problem: Adult Inpatient Plan of Care  Goal: Plan of Care Review  6/17/2025 1736 by Ivette Carreon RN  Outcome: Progressing  Flowsheets (Taken 6/17/2025 1736)  Plan of Care Reviewed With:   patient   family  6/17/2025 1733 by Ivette Carreon RN  Outcome: Progressing  6/17/2025 1727 by Ivette Carreon RN  Outcome: Progressing  Flowsheets (Taken 6/17/2025 1727)  Plan of Care Reviewed With:   patient   family  Goal: Patient-Specific Goal (Individualized)  6/17/2025 1736 by Ivette Carreon RN  Outcome: Progressing  6/17/2025 1733 by Ivette Carreon RN  Outcome: Progressing  6/17/2025 1727 by Ivette Carreon RN  Outcome: Progressing  Goal:  Absence of Hospital-Acquired Illness or Injury  6/17/2025 1736 by Ivette Carreon RN  Outcome: Progressing  6/17/2025 1733 by Ivette Carreon RN  Outcome: Progressing  6/17/2025 1727 by Ivette Carreon RN  Outcome: Progressing  Intervention: Identify and Manage Fall Risk  6/17/2025 1736 by Ivette Carreon RN  Flowsheets (Taken 6/17/2025 1736)  Safety Promotion/Fall Prevention:   Fall Risk reviewed with patient/family   high risk medications identified   lighting adjusted   instructed to call staff for mobility   nonskid shoes/socks when out of bed   side rails raised x 2   supervised activity   assistive device/personal item within reach  6/17/2025 1727 by Ivette Carreon RN  Flowsheets (Taken 6/17/2025 1727)  Safety Promotion/Fall Prevention:   assistive device/personal item within reach   Fall Risk reviewed with patient/family   nonskid shoes/socks when out of bed   instructed to call staff for mobility   lighting adjusted   medications reviewed   patient expresses understanding of fall risk and prevention   side rails raised x 2  Goal: Optimal Comfort and Wellbeing  6/17/2025 1736 by Ivette Carreon RN  Outcome: Progressing  6/17/2025 1733 by Ivette Carreon RN  Outcome: Progressing  6/17/2025 1727 by Ivette Carreon RN  Outcome: Progressing  Goal: Readiness for Transition of Care  6/17/2025 1736 by Ivette Carreon RN  Outcome: Progressing  6/17/2025 1733 by Ivette Carreon RN  Outcome: Progressing  6/17/2025 1727 by Ivette Carreon RN  Outcome: Progressing     Problem: Wound  Goal: Optimal Coping  6/17/2025 1736 by Ivette Carreon RN  Outcome: Progressing  6/17/2025 1733 by Ivette Carreon RN  Outcome: Progressing  6/17/2025 1727 by Ivette Carreon RN  Outcome: Progressing  Goal: Optimal Functional Ability  6/17/2025 1736 by Ivette Carreon RN  Outcome: Progressing  6/17/2025 1733 by Ivette Carreon RN  Outcome: Progressing  6/17/2025 1727 by Ivette Carreon RN  Outcome: Progressing  Goal: Absence of Infection  Signs and Symptoms  6/17/2025 1736 by Ivette Carreon RN  Outcome: Progressing  6/17/2025 1733 by Ivette Carreon RN  Outcome: Progressing  6/17/2025 1727 by Ivette Carreon RN  Outcome: Progressing  Intervention: Prevent or Manage Infection  Flowsheets (Taken 6/17/2025 1727)  Fever Reduction/Comfort Measures: ice pack(s) applied  Infection Management: aseptic technique maintained  Goal: Improved Oral Intake  6/17/2025 1736 by Ivette Carreon RN  Outcome: Progressing  6/17/2025 1733 by Ivette Carreon RN  Outcome: Progressing  6/17/2025 1727 by Ivette Carreon RN  Outcome: Progressing  Goal: Optimal Pain Control and Function  6/17/2025 1736 by Ivette Carreon RN  Outcome: Progressing  6/17/2025 1733 by Ivette Carreon RN  Outcome: Progressing  6/17/2025 1727 by Ivette Carreon RN  Outcome: Progressing  Goal: Skin Health and Integrity  6/17/2025 1736 by Ivette Carreon RN  Outcome: Progressing  6/17/2025 1733 by Ivette Carreon RN  Outcome: Progressing  6/17/2025 1727 by Ivette Carreon RN  Outcome: Progressing  Goal: Optimal Wound Healing  6/17/2025 1736 by Ivette Carreon RN  Outcome: Progressing  6/17/2025 1733 by Ivette Carreon RN  Outcome: Progressing  6/17/2025 1727 by Ivette Carreon RN  Outcome: Progressing     Problem: Fall Injury Risk  Goal: Absence of Fall and Fall-Related Injury  6/17/2025 1736 by Ivette Carreon RN  Outcome: Progressing  6/17/2025 1733 by Ivette Carreon RN  Outcome: Progressing  6/17/2025 1727 by Ivette Carreon RN  Outcome: Progressing  Intervention: Identify and Manage Contributors  Flowsheets (Taken 6/17/2025 1727)  Self-Care Promotion: BADL personal objects within reach  Medication Review/Management: medications reviewed     Problem: Comorbidity Management  Goal: Blood Pressure in Desired Range  6/17/2025 1736 by Ivette Carreon RN  Outcome: Progressing  6/17/2025 1733 by Brown, Ivette, RN  Outcome: Progressing     Problem: Knee Arthroplasty  Goal: Optimal Coping  6/17/2025 4046  by Brown, Ivette, RN  Outcome: Progressing  6/17/2025 1733 by Ivette Carreon RN  Outcome: Progressing  Intervention: Support Psychosocial Response to Surgery and Mobility Changes  Flowsheets (Taken 6/17/2025 1733)  Supportive Measures:   active listening utilized   counseling provided   relaxation techniques promoted   verbalization of feelings encouraged  Goal: Absence of Bleeding  6/17/2025 1736 by Ivette Carreon RN  Outcome: Progressing  6/17/2025 1733 by Ivette Carreon RN  Outcome: Progressing  Goal: Effective Bowel Elimination  6/17/2025 1736 by Ivette Carreon RN  Outcome: Progressing  6/17/2025 1733 by Ivette Carreon RN  Outcome: Progressing  Goal: Fluid and Electrolyte Balance  6/17/2025 1736 by Ivette Carreon RN  Outcome: Progressing  6/17/2025 1733 by Ivette Carreon RN  Outcome: Progressing  Goal: Optimal Functional Ability  6/17/2025 1736 by Ivette Carreon RN  Outcome: Progressing  6/17/2025 1733 by Ivette Carreon RN  Outcome: Progressing  Goal: Absence of Infection Signs and Symptoms  6/17/2025 1736 by Ivette Carreon RN  Outcome: Progressing  6/17/2025 1733 by Ivette Carreon RN  Outcome: Progressing  Intervention: Prevent or Manage Infection  Flowsheets (Taken 6/17/2025 1733)  Infection Management: aseptic technique maintained  Goal: Intact Neurovascular Status  6/17/2025 1736 by Ivette Carreon RN  Outcome: Progressing  6/17/2025 1733 by Ivette Carreon RN  Outcome: Progressing  Goal: Anesthesia/Sedation Recovery  6/17/2025 1736 by Ivette Carreon RN  Outcome: Progressing  6/17/2025 1733 by Ivette Carreon RN  Outcome: Progressing  Intervention: Optimize Anesthesia Recovery  Flowsheets (Taken 6/17/2025 1733)  Safety Promotion/Fall Prevention:   assistive device/personal item within reach   nonskid shoes/socks when out of bed   supervised activity   lighting adjusted   medications reviewed   high risk medications identified   Fall Risk reviewed with patient/family   family to remain at  bedside   side rails raised x 2   Supervised toileting - stay within arms reach  Goal: Optimal Pain Control and Function  6/17/2025 1736 by Ivette Carreon RN  Outcome: Progressing  6/17/2025 1733 by Ivette Carreon RN  Outcome: Progressing  Goal: Nausea and Vomiting Relief  6/17/2025 1736 by Ivette Carreon RN  Outcome: Progressing  6/17/2025 1733 by Ivette Carreon RN  Outcome: Progressing  Intervention: Prevent or Manage Nausea and Vomiting  Flowsheets (Taken 6/17/2025 1733)  Nausea/Vomiting Interventions:   stimuli minimized   nausea triggers minimized  Goal: Effective Urinary Elimination  6/17/2025 1736 by Ivette Carreon RN  Outcome: Progressing  6/17/2025 1733 by Ivette Carreon RN  Outcome: Progressing  Goal: Effective Oxygenation and Ventilation  6/17/2025 1736 by Ivette Carreon RN  Outcome: Progressing  6/17/2025 1733 by Ivette Carreon RN  Outcome: Progressing       Plan of care reviewed with patient, verbalized understanding. Medications reviewed and given as ordered. Rounding for safety and patient care per policy. Safety precautions maintained. Call light within reach, bed wheels locked, bed in lowest position, side rails up x2, patient instructed to call for assistance, DME at bedside.

## 2025-06-17 NOTE — SUBJECTIVE & OBJECTIVE
"Principal Problem:Primary osteoarthritis of right knee    Principal Orthopedic Problem: same, sp R TKA 6-17    Interval History: Pt seen and examined at bedside. NAEON, VSS, AF. Pain controlled. Denies fevers, chills, chest pain, SOB, N/V/D. Has been able to void.  Numbness and tingling reported per PA note, but not reported by patient this AM on my exam.   Pending PT and OT eval.       Review of patient's allergies indicates:   Allergen Reactions    Vantin [cefpodoxime] Itching     Severe itching, night sweats.  Improved after stopped it.  4/24    Iodine Rash    Iodine and iodide containing products Itching and Rash    Shellfish containing products Itching and Rash       Current Facility-Administered Medications   Medication    0.9% NaCl infusion    acetaminophen tablet 1,000 mg    albuterol inhaler 2 puff    aspirin EC tablet 81 mg    [START ON 6/18/2025] atorvastatin tablet 80 mg    bisacodyL suppository 10 mg    ceFAZolin 2 g    cetirizine tablet 10 mg    famotidine tablet 20 mg    methocarbamoL tablet 750 mg    [START ON 6/18/2025] metoprolol succinate (TOPROL-XL) 24 hr tablet 50 mg    naloxone 0.4 mg/mL injection 0.02 mg    ondansetron injection 4 mg    oxyCODONE immediate release tablet 5 mg    oxyCODONE immediate release tablet Tab 10 mg    polyethylene glycol packet 17 g    pregabalin capsule 75 mg    prochlorperazine injection Soln 5 mg    senna-docusate 8.6-50 mg per tablet 1 tablet    [START ON 6/18/2025] sertraline tablet 50 mg     Objective:     Vital Signs (Most Recent):  Temp: 97.7 °F (36.5 °C) (06/17/25 1600)  Pulse: (!) 51 (06/17/25 1630)  Resp: 20 (06/17/25 1630)  BP: 126/62 (06/17/25 1630)  SpO2: (!) 94 % (06/17/25 1630) Vital Signs (24h Range):  Temp:  [97.5 °F (36.4 °C)-97.7 °F (36.5 °C)] 97.7 °F (36.5 °C)  Pulse:  [51-71] 51  Resp:  [10-21] 20  SpO2:  [93 %-100 %] 94 %  BP: ()/(48-67) 126/62     Weight: 69.9 kg (154 lb)  Height: 5' 1" (154.9 cm)  Body mass index is 29.1 " kg/m².      Intake/Output Summary (Last 24 hours) at 6/17/2025 1643  Last data filed at 6/17/2025 1630  Gross per 24 hour   Intake 3120 ml   Output 300 ml   Net 2820 ml        Ortho/SPM Exam     AAOx4  NAD  Reg rate  No increased WOB    RLE:  Dressing c/d/i  SILT T/SP/DP/Michel/Sa  Motor intact T/SP/DP  WWP extremities  FCDs in place and functioning    Significant Labs: All pertinent labs within the past 24 hours have been reviewed.    Significant Imaging: I have reviewed all pertinent imaging results/findings.

## 2025-06-18 VITALS
HEART RATE: 58 BPM | HEIGHT: 61 IN | OXYGEN SATURATION: 95 % | RESPIRATION RATE: 16 BRPM | DIASTOLIC BLOOD PRESSURE: 59 MMHG | SYSTOLIC BLOOD PRESSURE: 128 MMHG | WEIGHT: 154 LBS | BODY MASS INDEX: 29.07 KG/M2 | TEMPERATURE: 97 F

## 2025-06-18 PROCEDURE — 97161 PT EVAL LOW COMPLEX 20 MIN: CPT

## 2025-06-18 PROCEDURE — 63600175 PHARM REV CODE 636 W HCPCS: Performed by: NURSE PRACTITIONER

## 2025-06-18 PROCEDURE — 97110 THERAPEUTIC EXERCISES: CPT

## 2025-06-18 PROCEDURE — 97116 GAIT TRAINING THERAPY: CPT

## 2025-06-18 PROCEDURE — 97166 OT EVAL MOD COMPLEX 45 MIN: CPT

## 2025-06-18 PROCEDURE — 94761 N-INVAS EAR/PLS OXIMETRY MLT: CPT

## 2025-06-18 PROCEDURE — 25000003 PHARM REV CODE 250: Performed by: STUDENT IN AN ORGANIZED HEALTH CARE EDUCATION/TRAINING PROGRAM

## 2025-06-18 PROCEDURE — 97535 SELF CARE MNGMENT TRAINING: CPT

## 2025-06-18 PROCEDURE — 99900035 HC TECH TIME PER 15 MIN (STAT)

## 2025-06-18 PROCEDURE — 25000003 PHARM REV CODE 250: Performed by: NURSE PRACTITIONER

## 2025-06-18 PROCEDURE — 25000003 PHARM REV CODE 250

## 2025-06-18 PROCEDURE — 97530 THERAPEUTIC ACTIVITIES: CPT

## 2025-06-18 RX ADMIN — ATORVASTATIN CALCIUM 80 MG: 40 TABLET, FILM COATED ORAL at 09:06

## 2025-06-18 RX ADMIN — ACETAMINOPHEN 1000 MG: 500 TABLET ORAL at 05:06

## 2025-06-18 RX ADMIN — CEFAZOLIN 2 G: 2 INJECTION, POWDER, FOR SOLUTION INTRAMUSCULAR; INTRAVENOUS at 05:06

## 2025-06-18 RX ADMIN — SENNOSIDES AND DOCUSATE SODIUM 1 TABLET: 50; 8.6 TABLET ORAL at 09:06

## 2025-06-18 RX ADMIN — MUPIROCIN: 20 OINTMENT TOPICAL at 09:06

## 2025-06-18 RX ADMIN — POLYETHYLENE GLYCOL 3350 17 G: 17 POWDER, FOR SOLUTION ORAL at 09:06

## 2025-06-18 RX ADMIN — METHOCARBAMOL 750 MG: 750 TABLET ORAL at 09:06

## 2025-06-18 RX ADMIN — ASPIRIN 81 MG: 81 TABLET, COATED ORAL at 09:06

## 2025-06-18 RX ADMIN — Medication 400 ML: at 06:06

## 2025-06-18 RX ADMIN — METOPROLOL SUCCINATE 50 MG: 50 TABLET, EXTENDED RELEASE ORAL at 09:06

## 2025-06-18 RX ADMIN — FAMOTIDINE 20 MG: 20 TABLET, FILM COATED ORAL at 09:06

## 2025-06-18 RX ADMIN — Medication 400 ML: at 02:06

## 2025-06-18 NOTE — ANESTHESIA POSTPROCEDURE EVALUATION
Anesthesia Post Evaluation    Patient: Jojo Burrows    Procedure(s) Performed: Procedure(s) (LRB):  ARTHROPLASTY, KNEE, TOTAL, USING COMPUTER-ASSISTED NAVIGATION: RIGHT: SAME DAY (Right)    Final Anesthesia Type: spinal      Patient location during evaluation: PACU  Patient participation: Yes- Able to Participate  Level of consciousness: awake and alert  Post-procedure vital signs: reviewed and stable  Pain management: adequate  Airway patency: patent  SANIYA mitigation strategies: Multimodal analgesia  PONV status at discharge: No PONV  Anesthetic complications: no      Cardiovascular status: blood pressure returned to baseline and hemodynamically stable  Respiratory status: unassisted  Hydration status: euvolemic  Follow-up not needed.              Vitals Value Taken Time   /58 06/18/25 03:54   Temp 36.3 °C (97.4 °F) 06/18/25 03:54   Pulse 54 06/18/25 07:08   Resp 16 06/18/25 07:08   SpO2 98 % 06/18/25 07:08         Event Time   Out of Recovery 17:03:00         Pain/Wagner Score: Pain Rating Prior to Med Admin: 0 (6/18/2025  5:20 AM)  Pain Rating Post Med Admin: 0 (6/18/2025  6:20 AM)  Wagner Score: 10 (6/17/2025  4:45 PM)           fall

## 2025-06-18 NOTE — DISCHARGE SUMMARY
[facility])  Orthopedics  Discharge Summary      Patient Name: Jojo Burrows  MRN: [id number]  Admission Date: 6/17/2025  Hospital Length of Stay: 0 days  Discharge Date and Time: 6/18/2025 12:20 PM  Attending Physician: Sari att. providers found   Discharging Provider: Barron Boucher MD  Primary Care Provider: Joo Munoz MD    HPI:   Jojo Burrows is a 75 y.o. female with history of Right knee pain. Pain is worse with activity and weight bearing.  Patient has experienced interference of activities of daily living due to decreased range of motion and an increase in joint pain and swelling.  Patient has failed non-operative treatment including NSAIDs, corticosteroid injections, viscosupplement injections, and activity modification.  Jojo Burrows currently ambulates independently.      Relevant medical conditions of significance in perioperative period:  HTN- on medication managed by pcp  HLD- on medication managed by pcp  Cervical spine djd with radiculopathy- on medication managed by pcp    Procedure(s) (LRB):  ARTHROPLASTY, KNEE, TOTAL, USING COMPUTER-ASSISTED NAVIGATION: RIGHT: SAME DAY (Right)      Hospital Course:  On 6-17-25, the patient arrived to the [facility] for proper pre-operative management.  Upon completion of pre-operative preparation, the patient was taken back to the operative theatre. R TKA was performed without complication and the patient was transported to the post anesthesia care unit in stable condition.  After appropriate recovery from the anaesthetic agents used during the surgery, the patient was then transported to the hospital inpatient floor.  The interim of the hospital stay from arrival on the floor up to discharge has been uncomplicated. The patient has tolerated regular diet.  The patient's pain has been controlled using a multimodal approach. Currently, the patient's pain is well controlled on an oral regimen.  The patient has been voiding  without difficulty.  The patient began participation in physical therapy after surgery and has progressed throughout the entire hospital stay.  Currently, the patient's progress is sufficient to allow the them to be discharged to home safely.  The patient agrees with this assessment and desires a discharge today.      Goals of Care Treatment Preferences:  Code Status: Full Code          Significant Diagnostic Studies: Labs: All labs within the past 24 hours have been reviewed    Pending Diagnostic Studies:       None          Final Active Diagnoses:    Diagnosis Date Noted POA    PRINCIPAL PROBLEM:  Primary osteoarthritis of right knee [M17.11] 06/17/2025 Yes      Problems Resolved During this Admission:      Discharged Condition: good    Disposition: Home or Self Care    Follow Up:   Follow-up Information       Graham Chow MD Follow up in 2 week(s).    Specialty: Orthopedic Surgery  Why: For wound re-check  Contact information:  [address]  [facility] [address]  [phone]                           Patient Instructions:      Activity as tolerated     Sponge bath only until clinic visit     Keep surgical extremity elevated when not ambulating     Lifting restrictions   Order Comments: No strenuous exercise or lifting of > 10 lbs     Weight bearing as tolerated     No driving, operating heavy equipment or signing legal documents while taking pain medication     Leave dressing on - Keep it clean, dry, and intact until clinic visit   Order Comments: Do not remove surgical dressing for 2 weeks post-op. This will be done only by MD at initial post-op visit. If dressing is completely saturated, replace with identical dressing - silver-impregnated hydrocolloid dressing.     Do not get dressings wet. Do not shower.     If dressing continues to be saturated or there are signs of infection, please call Ortho Clinic [phone] for further instructions and to make appt to be seen.     On POD1 remove ace  wrap and cotton padding. Leave Aquacel bandage on until 2  week post op visit in clinic     Call MD for:  temperature >100.4     Call MD for:  persistent nausea and vomiting     Call MD for:  severe uncontrolled pain     Call MD for:  difficulty breathing, headache or visual disturbances     Call MD for:  redness, tenderness, or signs of infection (pain, swelling, redness, odor or green/yellow discharge around incision site)     Call MD for:  hives     Call MD for:  persistent dizziness or light-headedness     Call MD for:  extreme fatigue     Medications:  Reconciled Home Medications:      Medication List        CONTINUE taking these medications      acetaminophen 650 MG Tbsr  Commonly known as: TYLENOL  Take 1 tablet (650 mg total) by mouth every 8 (eight) hours.     albuterol 90 mcg/actuation inhaler  Commonly known as: PROVENTIL/VENTOLIN HFA  Inhale 2 puffs into the lungs every 6 (six) hours as needed for Wheezing. Rescue     ascorbic acid (vitamin C) 500 MG tablet  Commonly known as: VITAMIN C  Take 1 tablet (500 mg total) by mouth 2 (two) times daily.     * aspirin 81 MG EC tablet  Commonly known as: ECOTRIN  Take 81 mg by mouth once daily.     * aspirin 81 MG EC tablet  Commonly known as: ECOTRIN  Take 1 tablet (81 mg total) by mouth 2 (two) times a day.     calcium carbonate 200 mg calcium (500 mg) chewable tablet  Commonly known as: TUMS  Take 2 tablets by mouth as needed.     celecoxib 200 MG capsule  Commonly known as: CeleBREX  Take 1 capsule (200 mg total) by mouth once daily.     CENTRUM SILVER ORAL  Take 1 tablet by mouth once daily.     cetirizine 10 MG tablet  Commonly known as: ZYRTEC  Take 10 mg by mouth daily as needed for Allergies.     cyanocobalamin 250 MCG tablet  Commonly known as: VITAMIN B-12  Take 250 mcg by mouth once daily.     fluticasone propionate 50 mcg/actuation nasal spray  Commonly known as: FLONASE  2 sprays (100 mcg total) by Each Nostril route once daily.     magnesium  gluconate 27.5 mg magne- sium (500 mg) Tab  Take by mouth once.     methocarbamoL 750 MG Tab  Commonly known as: ROBAXIN  Take 1 tablet (750 mg total) by mouth 4 (four) times daily as needed (muscle spasms).     metoprolol succinate 50 MG 24 hr tablet  Commonly known as: TOPROL-XL  TAKE 1 TABLET BY MOUTH EVERY DAY     oxyCODONE 5 MG immediate release tablet  Commonly known as: ROXICODONE  Take 1-2 tablets by mouth every 4-6 hours as needed for pain     pantoprazole 40 MG tablet  Commonly known as: PROTONIX  Take 1 tablet (40 mg total) by mouth before breakfast. Best taken 45-60 minutes before your first protein meal of the day - Breakfast.     polyethylene glycol 17 gram/dose powder  Commonly known as: GLYCOLAX  Take 17 g by mouth once daily.     rosuvastatin 20 MG tablet  Commonly known as: CRESTOR  Take 1 tablet (20 mg total) by mouth once daily. For cholesterol control.     sertraline 50 MG tablet  Commonly known as: ZOLOFT  Take 1 tablet (50 mg total) by mouth once daily. For anxiety control.     TURMERIC ORAL  Take 1 capsule by mouth once daily.     VITAMIN D3 25 mcg (1,000 unit) capsule  Generic drug: cholecalciferol (vitamin D3)  Take 1,000 Units by mouth once daily.     zolpidem 5 MG Tab  Commonly known as: AMBIEN  Take 1 tablet (5 mg total) by mouth nightly as needed (insomnia).           * This list has 2 medication(s) that are the same as other medications prescribed for you. Read the directions carefully, and ask your doctor or other care provider to review them with you.                STOP taking these medications      azelastine 137 mcg (0.1 %) nasal spray  Commonly known as: DEMETRIO Boucher MD  Orthopedics  [facility])

## 2025-06-18 NOTE — PROGRESS NOTES
Acute Pain Service and Perioperative Surgical Home Progress Note    HPI  Jojo Burrows is a 75 y.o., female,     Interval history      Surgery:  Procedure(s) (LRB):  ARTHROPLASTY, KNEE, TOTAL, USING COMPUTER-ASSISTED NAVIGATION: RIGHT: SAME DAY (Right)    Post Op Day #: 1    Rested well overnight.  Eating and drinking with no nausea or vomiting.  Using purwick good urine output.  Good strength in lower extremities but feeling numbness/tingling in surgical extremity so she was nervous to try and walk without working with PT first.  Pain well managed on multimodal regimen.    Catheter type: Perineural Adductor Canal    Infusion type: Ropivacaine 0.2%, with a 4cc automatic bolus every 3 hours, combined with a 5 cc patient controlled bolus available [id number]    Problem List:    Active Hospital Problems    Diagnosis  POA    *Primary osteoarthritis of right knee [M17.11]  Yes      Resolved Hospital Problems   No resolved problems to display.       Subjective:       General appearance of alert, oriented, no complaints   Pain with rest: 3    Numbers   Pain with movement: 4    Numbers   Side Effects    1. Pruritis No    2. Nausea No    3. Motor Blockade Yes, 0=Ability to raise lower extremities off bed    4. Sedation Yes, 1=awake and alert    Schedule Medications:    acetaminophen  1,000 mg Oral Q6H    aspirin  81 mg Oral BID    atorvastatin  80 mg Oral Daily    electrolytes-dextrose  400 mL Oral Q4H    famotidine  20 mg Oral BID    methocarbamoL  750 mg Oral TID    metoprolol succinate  50 mg Oral Daily    mupirocin   Nasal BID    polyethylene glycol  17 g Oral Daily    pregabalin  75 mg Oral QHS    senna-docusate  1 tablet Oral BID    sertraline  50 mg Oral Daily        Continuous Infusions:   ropivacaine 0.2% Perineural Pump infusion 500 ML   Perineural Continuous   Restarted at 06/17/25 2405        PRN Medications:    Current Facility-Administered Medications:     albuterol, 2 puff, Inhalation, Q6H PRN    bisacodyL, 10  "mg, Rectal, Q12H PRN    cetirizine, 10 mg, Oral, Daily PRN    naloxone, 0.02 mg, Intravenous, PRN    ondansetron, 4 mg, Intravenous, Q8H PRN    oxyCODONE, 5 mg, Oral, Q3H PRN    oxyCODONE, 10 mg, Oral, Q3H PRN    prochlorperazine, 5 mg, Intravenous, Q6H PRN       Antibiotics:  Antibiotics (From admission, onward)      Start     Stop Route Frequency Ordered    06/17/25 2315  mupirocin 2 % ointment         -- Nasl 2 times daily 06/17/25 2209               Objective:     Catheter site clean, dry, intact          Vital Signs (Most Recent):  Temp: 97.4 °F (36.3 °C) (06/18/25 0354)  Pulse: 61 (06/18/25 0354)  Resp: 16 (06/18/25 0354)  BP: (!) 130/58 (06/18/25 0354)  SpO2: 97 % (06/18/25 0354) Vital Signs Range (Last 24H):  Temp:  [97 °F (36.1 °C)-97.7 °F (36.5 °C)]   Pulse:  [50-71]   Resp:  [10-21]   BP: ()/(48-67)   SpO2:  [93 %-100 %]          I & O (Last 24H):  Intake/Output Summary (Last 24 hours) at 6/18/2025 0536  Last data filed at 6/18/2025 0519  Gross per 24 hour   Intake 3960 ml   Output 2800 ml   Net 1160 ml       Physical Exam:    GA: Alert, comfortable, no acute distress.   Pulmonary: Clear to auscultation. Normal RR.    Cardiac: regular rate and rhythm.      Laboratory: reviewed in chart  CBC: No results for input(s): "WBC", "RBC", "HGB", "HCT", "PLT", "MCV", "MCH", "MCHC" in the last 72 hours.    BMP: No results for input(s): "NA", "K", "CO2", "CL", "BUN", "CREATININE", "GLU", "MG", "PHOS", "CALCIUM" in the last 72 hours.    No results for input(s): "PT", "INR", "PROTIME", "APTT" in the last 72 hours.          Assessment:         Pain control adequate    Plan:     1) Pain: Adductor canal perineural catheter in place and infusing. Dressing in tact.  Multimodal pain regimen ordered which includes acetaminophen, celecoxib, pregabalin, and prn oxycodone.  Will continue to monitor. Plan to discharge with Perineural Pain Pump.   2) HTN and HLD and anxiety/depression: continue home regimen   3) FEN/GI: " Tolerating regular diet.     4) Dispo: Pt working well with PT/OT. Case management and SW following along for setting up home health and physical therapy. Plan to discharge home this am.          Evaluator Lee Ann Posadas PA-C

## 2025-06-18 NOTE — NURSING
Has met unit/department guidelines for discharge from each phase of the post procedure continuum. Patient discharged.  Instructions, placed in dc folder and Prescriptions given.  IV removed, tolerated well, w/ catheter intact, no redness or swelling to area. Dressing to right knee remains CDI. CADD pump infusing and dressing CDI. Patient verbalized understanding instructions.  AAOx3, VSS, NADN, no complaints of pain noted at this time.  Wheelchair to private vehicle in care of daughterKeren.  All personal belongings sent with pt.  Blue Bracelet given applied to pts wrist and instructions given to call # on bracelet w/any surgery related issues.      Infu Block Pump teaching done w/patient & patients daughters.Instructed to remove on day 5, Sunday (6/22/25) at 12 noon or when pump alarms infusion complete. Demonstrated and explained how to remove catheter.Pt and pts daughters verbalized understanding.  All questions answered. Nimbus Pump home care instxn pamphlet provided, home care supplies provided to patient and placed in discharge folder. Encouraged to contact anesthesia using # on brochure/ on sticker on catheter site, with any questions/concerns re: pain, side effects. Instructed to call Infu Block  # for any pump related issues. Informed that pt/fmly will receive follow up calls from [facility].     What Type Of Note Output Would You Prefer (Optional)?: Bullet Format Hpi Title: Evaluation of Skin Lesions How Severe Are Your Spot(S)?: moderate Have Your Spot(S) Been Treated In The Past?: has not been treated

## 2025-06-18 NOTE — PLAN OF CARE
Problem: Adult Inpatient Plan of Care  Goal: Absence of Hospital-Acquired Illness or Injury  Outcome: Met  Goal: Optimal Comfort and Wellbeing  Outcome: Met  Goal: Readiness for Transition of Care  Outcome: Met  Goal: Plan of Care Review  Outcome: Met  Flowsheets (Taken 6/18/2025 1245)  Plan of Care Reviewed With:   patient   family  Goal: Patient-Specific Goal (Individualized)  Outcome: Met  Flowsheets (Taken 6/18/2025 1245)  Individualized Care Needs: manage pain     Problem: Pain Acute  Goal: Optimal Pain Control and Function  Outcome: Met     Problem: Wound  Goal: Optimal Coping  Outcome: Met  Goal: Optimal Functional Ability  Outcome: Met  Goal: Absence of Infection Signs and Symptoms  Outcome: Met  Goal: Improved Oral Intake  Outcome: Met  Goal: Optimal Pain Control and Function  Outcome: Met  Goal: Skin Health and Integrity  Outcome: Met  Goal: Optimal Wound Healing  Outcome: Met     Problem: Comorbidity Management  Goal: Blood Pressure in Desired Range  Outcome: Met  Intervention: Maintain Blood Pressure Management  Flowsheets (Taken 6/18/2025 1245)  Medication Review/Management:   medications reviewed   high-risk medications identified     Problem: Knee Arthroplasty  Goal: Optimal Coping  Outcome: Met  Goal: Effective Bowel Elimination  Outcome: Met  Goal: Fluid and Electrolyte Balance  Outcome: Met  Intervention: Monitor and Manage Fluid and Electrolyte Balance  Flowsheets (Taken 6/18/2025 1245)  Fluid/Electrolyte Management: oral rehydration therapy initiated  Goal: Optimal Functional Ability  Outcome: Met  Goal: Absence of Infection Signs and Symptoms  Outcome: Met  Goal: Intact Neurovascular Status  Outcome: Met  Goal: Anesthesia/Sedation Recovery  Outcome: Met  Goal: Optimal Pain Control and Function  Outcome: Met  Goal: Nausea and Vomiting Relief  Outcome: Met  Intervention: Prevent or Manage Nausea and Vomiting  Flowsheets (Taken 6/18/2025 1245)  Nausea/Vomiting Interventions:   stimuli minimized    nausea triggers minimized  Goal: Effective Urinary Elimination  Outcome: Met  Goal: Effective Oxygenation and Ventilation  Outcome: Met     Problem: Fall Injury Risk  Goal: Absence of Fall and Fall-Related Injury  Outcome: Met  Intervention: Identify and Manage Contributors  Flowsheets (Taken 6/18/2025 1245)  Medication Review/Management:   medications reviewed   high-risk medications identified  Intervention: Promote Injury-Free Environment  Flowsheets (Taken 6/18/2025 1245)  Safety Promotion/Fall Prevention:   assistive device/personal item within reach   Fall Risk reviewed with patient/family   family expresses understanding of fall risk and prevention   high risk medications identified   instructed to call staff for mobility   lighting adjusted   medications reviewed   nonskid shoes/socks when out of bed   patient expresses understanding of fall risk and prevention   side rails raised x 2   Supervised toileting - stay within arms reach       Plan of care reviewed with patient, verbalized understanding. Medications reviewed and given as ordered. Rounding for safety and patient care per policy. Safety precautions maintained. Call light within reach, bed wheels locked, bed in lowest position, side rails up x2, patient instructed to call for assistance, DME at bedside.

## 2025-06-18 NOTE — PT/OT/SLP EVAL
"Physical Therapy Evaluation, Treatment, and Discharge Note    Patient Name:  Jojo Burrows   MRN:  [id number]    Recommendations:     Discharge Recommendations: Low Intensity Therapy  Discharge Equipment Recommendations: none   Barriers to discharge: None    Assessment:     Jojo Burrows is a 75 y.o. female admitted with a medical diagnosis of Primary osteoarthritis of right knee. Patient tolerated PT session well. Patient ambulated 120ft and 20ft with RW and supervision. No LOB or SOB noted. Patient ascended/descended 6" step with RW and contact guard assistance. Patient performed R LE therex x10 reps. Patient ready to discharge home from PT standpoint.     Recent Surgery: Procedure(s) (LRB):  ARTHROPLASTY, KNEE, TOTAL, USING COMPUTER-ASSISTED NAVIGATION: RIGHT: SAME DAY (Right) 1 Day Post-Op    Plan:     During this hospitalization, patient does not require further acute PT services.  Please re-consult if situation changes.      Subjective     Chief Complaint: Right knee pain.   Patient/Family Comments/goals: To get back to being independent.   Pain/Comfort:  Pain Rating 1: 8/10  Location - Side 1: Right  Location 1: knee  Pain Addressed 1: Reposition, Distraction, Cessation of Activity    Patients cultural, spiritual, [employer] conflicts given the current situation:  n/a    Living Environment:  Patient is going to stay at her daughter's house which is a 2SH with 1 step to enter. She will be staying on the 1st floor. Prior to admission, patients level of function was independent for functional mobility. Equipment used at home: bedside commode, walker, rolling, shower chair. Upon discharge, patient will have assistance from daughters.    Objective:     Communicated with RN prior to session. Patient found up in chair with cryotherapy, perineural catheter upon PT entry to room. Daughters present during PT session.     General Precautions: Standard, fall    Orthopedic Precautions:RLE weight bearing as tolerated " "  Braces: N/A  Respiratory Status: Room air    Exams:  Cognitive Exam:  Patient is oriented to Person, Place, Time, and Situation  Gross Motor Coordination:  WFL  RLE ROM: appears WFL at hip and ankle but limited at knee due to pain   RLE Strength: appears WFL but did not formally assess due to pain and recent surgery  LLE ROM: WFL  LLE Strength: WFL    Functional Mobility:  Mat Mobility:     Supine to Sit: supervision  Sit to Supine: supervision  Transfers:     Sit to Stand:  supervision with rolling walker x2 from bedside chair and x1 from mat   Gait: Patient ambulated 120ft and 20ft with Rolling Walker and supervision using 3-point gait. Patient demonstrated decreased ana and decreased step length during gait due to pain, decreased ROM, and decreased strength.  Stairs:  Patient ascended/descended 6" step with RW and contact guard assistance.     Treatment and Education:  Patient educated in and performed R LE exercises x10 reps for ankle pumps, quad set, glute set, SAQ over bolster, heel slides, hip abd/add, SLR, and LAQ.     Patient educated in:  -PT role and POC  -safety with transfers including hand placement  -gait sequencing and RW management  -out of bed activity to maximize recovery including ambulating at home to prevent DVT   -car transfer  -curb training  -HEP for therex at home with handout provided     AM-PAC 6 CLICK MOBILITY  Total Score:23     Patient left up in chair with all lines intact, call button in reach, RN notified, and daughters present.    GOALS:   Multidisciplinary Problems       Physical Therapy Goals       Not on file              Multidisciplinary Problems (Resolved)          Problem: Physical Therapy    Goal Priority Disciplines Outcome Interventions   Physical Therapy Goal   (Resolved)     PT, PT/OT Met                      History:     Past Medical History:   Diagnosis Date    Acute hypoxemic respiratory failure 03/31/2020    ALLERGIC RHINITIS     Cataract     COVID-19 virus " infection 03/2020    Degenerative disc disease, cervical 09/13/2018    GERD (gastroesophageal reflux disease)     Hyperlipidemia     Hypertension     Migraine headache     Multifocal pneumonia 03/31/2020    Nuclear sclerosis 04/30/2014    Overweight (BMI 25.0-29.9) 01/27/2015    Sleep disorder     Thyroid disease     nodular goiter    TIA (transient ischemic attack)        Past Surgical History:   Procedure Laterality Date    BREAST BIOPSY      BREAST CYST ASPIRATION      BREAST CYST EXCISION      COLONOSCOPY N/A 4/29/2016    Procedure: COLONOSCOPY;  Surgeon: Didier Vickers MD;  Location: [facility] ENDO (4TH FLR);  Service: Endoscopy;  Laterality: N/A;    COLONOSCOPY N/A 6/28/2021    Procedure: COLONOSCOPY;  Surgeon: Didier Vickers MD;  Location: [facility] ENDO (4TH FLR);  Service: Endoscopy;  Laterality: N/A;  COVID + 3/2020 and fully vaccinated -   pt requesting Dr. vickers/ instructions emailed-     ESOPHAGOGASTRODUODENOSCOPY N/A 3/18/2025    Procedure: EGD (ESOPHAGOGASTRODUODENOSCOPY);  Surgeon: Didier Vickers MD;  Location: [facility] (4TH FLR);  Service: Endoscopy;  Laterality: N/A;  Ref by Dr JOSS Munoz, [place] - [facility]  3/12 precall complete. kw    ESOPHAGOGASTRODUODENOSCOPY N/A 5/16/2025    Procedure: EGD (ESOPHAGOGASTRODUODENOSCOPY);  Surgeon: Didier Vickers MD;  Location: [facility] (2ND FLR);  Service: Endoscopy;  Laterality: N/A;  4/25 ref by didier vickers, [place]-gg  5/9-r/[id number] floor availability-tb  5/12-unable to lvm-jw  5/13 precall complete/mleone    HYSTERECTOMY         Time Tracking:     PT Received On: 06/18/25  PT Start Time: 1108     PT Stop Time: 1146  PT Total Time (min): 38 min     Billable Minutes: Evaluation 10, Gait Training 16, and Therapeutic Exercise 12    06/18/2025

## 2025-06-18 NOTE — PT/OT/SLP EVAL
Occupational Therapy  Evaluation & Treatment    Name: Jojo Burrows  MRN: [id number]  Admitting Diagnosis: Primary osteoarthritis of right knee  Recent Surgery: Procedure(s) (LRB):  ARTHROPLASTY, KNEE, TOTAL, USING COMPUTER-ASSISTED NAVIGATION: RIGHT: SAME DAY (Right) 1 Day Post-Op    Recommendations:     Discharge Recommendations: Low Intensity Therapy  Discharge Equipment Recommendations:  walker, rolling  Barriers to discharge:  None    Assessment:     Jojo Burrows is a 75 y.o. female with a medical diagnosis of Primary osteoarthritis of right knee.  She presents with the following performance deficits affecting function: weakness, impaired endurance, impaired functional mobility, gait instability, impaired balance, decreased lower extremity function, pain, impaired cardiopulmonary response to activity, decreased ROM, orthopedic precautions.      Pt agreeable to session, motivated to participate, and tolerated well. Pt would benefit from skilled OT services to improve activity tolerance and functional endurance, increase participation in self-care routines, improve functional strength needed for safety with functional transfers and mobility, and facilitate a return to PLOF and least restrictive home environment. Patient currently demonstrates a need for low intensity therapy on a scheduled basis secondary to a decline in functional status due to surgical procedure.    Rehab Prognosis: Good; patient would benefit from acute skilled OT services to address these deficits and reach maximum level of function.       Plan:     Patient to be seen to address the above listed problems.  Plan of Care Reviewed with: patient, daughter    Subjective     Chief Complaint: mild pain in R knee  Patient/Family Comments/goals: Pt stating she will be staying with her daughter until she feels confident enough to be on her own.     Occupational Profile:  Living Environment: Pt lives alone in a 2SH with her main bedroom and bathroom being  on the second floor. Pt has access to a bedroom and full bathroom downstairs (tub/shower with a shower chair). Pt will be staying with her daughter temporarily while she heals up - daughter also lives in a 2SH with a threshold [place] but with the bedroom and bathroom on the first floor (tub/shower combo).  Previous level of function: IND with ADLs  Equipment Used at Home: bedside commode  Assistance upon Discharge: daughter available to assist as needed    Pain/Comfort:  Pain Rating 1: 3/10  Location - Side 1: Right  Location - Orientation 1: generalized  Location 1: knee  Pain Addressed 1: Distraction, Cessation of Activity  Pain Rating Post-Intervention 1: other (see comments) (did not rate but reporting slight increase after mobility and ADL participation)    Patients cultural, spiritual, Anabaptism conflicts given the current situation: no    Objective:     Communicated with: Nursing prior to session.  Patient found HOB elevated with cryotherapy, FCD, perineural catheter upon OT entry to room.    General Precautions: Standard, fall  Orthopedic Precautions: RLE weight bearing as tolerated  Braces: N/A  Respiratory Status: Room air    Occupational Performance:    Bed Mobility:    Patient completed Scooting/Bridging with stand by assistance  Patient completed Supine to Sit with stand by assistance    Functional Mobility/Transfers:  Patient completed Sit <> Stand Transfer with stand by assistance  with  rolling walker   Functional Mobility: Pt able to tolerate ambulating throughout room and bathroom to simulate household/community distances with RW and SBA provided for safety. Gait belt utilized throughout ambulation. No LOB or SOB noted.     Activities of Daily Living:  Grooming: stand by assistance for performing oral care tasks and washing face with warm wash cloth in standing at sink  Upper Body Dressing: stand by assistance for doffing hospital gowns and donning personal bra, undershirt, and blouse in seated in  recliner chair  Lower Body Dressing: stand by assistance for doffing hospital socks in seated, for donning personal underwear and pants in seated and in standing, and for donning personal socks and tennis shoes in seated    Cognitive/Visual Perceptual:  Cognitive/Psychosocial Skills:     -       Oriented to: Person, Place, Time, and Situation   -       Follows Commands/attention:Follows multistep  commands  -       Communication: clear/fluent  -       Memory: No Deficits noted  -       Safety awareness/insight to disability: intact   -       Mood/Affect/Coping skills/emotional control: Appropriate to situation, Cooperative, and Pleasant    Physical Exam:  Sensation:    -       Intact  light/touch BUE  Upper Extremity Range of Motion:     -       Right Upper Extremity: WFL  -       Left Upper Extremity: WFL  Upper Extremity Strength:    -       Right Upper Extremity: WFL  -       Left Upper Extremity: WFL   Strength:    -       Right Upper Extremity: WFL  -       Left Upper Extremity: WFL  Fine Motor Coordination:    -       Intact  Left hand, manipulation of objects and Right hand, manipulation of objects  Gross motor coordination:   WFL    AMPAC 6 Click ADL:  AMPAC Total Score: 24    Treatment & Education:  Provided education on the role of OT, POC, and therapy goals while in the acute care setting.   Provided education on the importance of continued mobilization and participation in OOB activities to increase functional endurance and activity tolerance for increased participation in ADL routines.   Provided education on safe transfer techniques and proper hand placement to promote safety awareness and prevent falls with functional transfers.   All questions/concerns within the scope of OT answered/addressed - pt verbalized understanding.   Instructed pt to call for assistance when wanting to ambulate or participate in OOB activities to promote safety and prevent falls.   White board updated.    Patient left up  in chair with all lines intact, call button in reach, nursing notified, and daughter present    GOALS:   Multidisciplinary Problems       Occupational Therapy Goals       Not on file                    DME Justifications:   Jojo's mobility limitation cannot be sufficiently resolved by the use of a cane. Her functional mobility deficit can be sufficiently resolved with the use of a Rolling Walker. Patient's mobility limitation significantly impairs their ability to participate in one of more activities of daily living.  The use of a RW will significantly improve the patient's ability to participate in MRADLS and the patient will use it on regular basis in the home.    History:     Past Medical History:   Diagnosis Date    Acute hypoxemic respiratory failure 03/31/2020    ALLERGIC RHINITIS     Cataract     COVID-19 virus infection 03/2020    Degenerative disc disease, cervical 09/13/2018    GERD (gastroesophageal reflux disease)     Hyperlipidemia     Hypertension     Migraine headache     Multifocal pneumonia 03/31/2020    Nuclear sclerosis 04/30/2014    Overweight (BMI 25.0-29.9) 01/27/2015    Sleep disorder     Thyroid disease     nodular goiter    TIA (transient ischemic attack)          Past Surgical History:   Procedure Laterality Date    BREAST BIOPSY      BREAST CYST ASPIRATION      BREAST CYST EXCISION      COLONOSCOPY N/A 4/29/2016    Procedure: COLONOSCOPY;  Surgeon: Sergio Vickers MD;  Location: [facility] ([address] [place] [address]);  Service: Endoscopy;  Laterality: N/A;    COLONOSCOPY N/A 6/28/2021    Procedure: COLONOSCOPY;  Surgeon: Sergio Vickers MD;  [facility]  Service: Endoscopy;  Laterality: N/A;  COVID + 3/2020 and fully vaccinated -   pt requesting Dr. vickers/ instructions emailed-     ESOPHAGOGASTRODUODENOSCOPY N/A 3/18/2025    Procedure: EGD (ESOPHAGOGASTRODUODENOSCOPY);  Surgeon: Sergio Vickers MD;  Location: [address]);  Service: Endoscopy;  Laterality: N/A;  Ref  by Dr JOSS uMnoz, [place] - PC  3/12 precall complete. kw    ESOPHAGOGASTRODUODENOSCOPY N/A 5/16/2025    Procedure: EGD (ESOPHAGOGASTRODUODENOSCOPY);  Surgeon: Didier Vickers MD;  [facility]: [facility] ([facility]);  Service: Endoscopy;  Laterality: N/A;  4/25 ref by didier vickers, harjeet-gg  5/9-r/[id number] floor availability-tb  5/12-unable to lvm-jw  5/13 precall complete/mleone    HYSTERECTOMY         Time Tracking:     OT Date of Treatment: 06/18/25  OT Start Time: 0920  OT Stop Time: 1001  OT Total Time (min): 41 min    Billable Minutes:Evaluation 8 minutes  Self Care/Home Management 23 minutes  Therapeutic Activity 10 minutes    6/18/2025

## 2025-06-18 NOTE — PLAN OF CARE
"Patient tolerated PT session well. Patient ambulated 120ft and 20ft with RW and supervision. No LOB or SOB noted. Patient ascended/descended 6" step with RW and contact guard assistance. Patient performed R LE therex x10 reps. Patient ready to discharge home from PT standpoint.      Problem: Physical Therapy  Goal: Physical Therapy Goal  Outcome: Met     "

## 2025-06-18 NOTE — PROGRESS NOTES
[facility])  Orthopedics  Progress Note    Patient Name: Jojo Burrows  MRN: [id number]  Admission Date: 6/17/2025  Hospital Length of Stay: 0 days  Attending Provider: Graham Chow MD  Primary Care Provider: Joo Munoz MD  Follow-up For: Procedure(s) (LRB):  ARTHROPLASTY, KNEE, TOTAL, USING COMPUTER-ASSISTED NAVIGATION: RIGHT: SAME DAY (Right)    Post-Operative Day: 1 Day Post-Op  Subjective:     Principal Problem:Primary osteoarthritis of right knee    Principal Orthopedic Problem: same, sp R TKA 6-17    Interval History: Pt seen and examined at bedside. NAEON, VSS, AF. Pain controlled. Denies fevers, chills, chest pain, SOB, N/V/D. Has been able to void.  Numbness and tingling reported per PA note, but not reported by patient this AM on my exam.   Pending PT and OT eval.       Review of patient's allergies indicates:   Allergen Reactions    Vantin [cefpodoxime] Itching     Severe itching, night sweats.  Improved after stopped it.  4/24    Iodine Rash    Iodine and iodide containing products Itching and Rash    Shellfish containing products Itching and Rash       Current Facility-Administered Medications   Medication    0.9% NaCl infusion    acetaminophen tablet 1,000 mg    albuterol inhaler 2 puff    aspirin EC tablet 81 mg    [START ON 6/18/2025] atorvastatin tablet 80 mg    bisacodyL suppository 10 mg    ceFAZolin 2 g    cetirizine tablet 10 mg    famotidine tablet 20 mg    methocarbamoL tablet 750 mg    [START ON 6/18/2025] metoprolol succinate (TOPROL-XL) 24 hr tablet 50 mg    naloxone 0.4 mg/mL injection 0.02 mg    ondansetron injection 4 mg    oxyCODONE immediate release tablet 5 mg    oxyCODONE immediate release tablet Tab 10 mg    polyethylene glycol packet 17 g    pregabalin capsule 75 mg    prochlorperazine injection Soln 5 mg    senna-docusate 8.6-50 mg per tablet 1 tablet    [START ON 6/18/2025] sertraline tablet 50 mg     Objective:     Vital Signs (Most Recent):  Temp:  "97.7 °F (36.5 °C) (06/17/25 1600)  Pulse: (!) 51 (06/17/25 1630)  Resp: 20 (06/17/25 1630)  BP: 126/62 (06/17/25 1630)  SpO2: (!) 94 % (06/17/25 1630) Vital Signs (24h Range):  Temp:  [97.5 °F (36.4 °C)-97.7 °F (36.5 °C)] 97.7 °F (36.5 °C)  Pulse:  [51-71] 51  Resp:  [10-21] 20  SpO2:  [93 %-100 %] 94 %  BP: ()/(48-67) 126/62     Weight: 69.9 kg (154 lb)  Height: 5' 1" (154.9 cm)  Body mass index is 29.1 kg/m².      Intake/Output Summary (Last 24 hours) at 6/17/2025 1643  Last data filed at 6/17/2025 1630  Gross per 24 hour   Intake 3120 ml   Output 300 ml   Net 2820 ml        Ortho/SPM Exam     AAOx4  NAD  Reg rate  No increased WOB    RLE:  Dressing c/d/i  SILT T/SP/DP/Michel/Sa  Motor intact T/SP/DP  WWP extremities  FCDs in place and functioning    Significant Labs: All pertinent labs within the past 24 hours have been reviewed.    Significant Imaging: I have reviewed all pertinent imaging results/findings.  Assessment/Plan:     * Primary osteoarthritis of right knee  Jojo Burrows is a 75 y.o. female is s/p R TKA on 6-17-25.    Surgical dressing C/D/I  Pain control: multimodal, Anesthesia Surgical Home following  PT/OT: WBAT RLE  DVT PPx: ASA 81 BID, FCDs at all times when not ambulating  Abx: postop Ancef  Drain: none  Santo: none    Dispo: plan to dc to home today once cleared by PT/OT            Barron Boucher MD  Orthopedics  [facility] ([facility])    "

## 2025-06-18 NOTE — PLAN OF CARE
Problem: Adult Inpatient Plan of Care  Goal: Plan of Care Review  Outcome: Met  Flowsheets (Taken 6/18/2025 0536)  Plan of Care Reviewed With: patient  Goal: Patient-Specific Goal (Individualized)  Outcome: Met  Flowsheets (Taken 6/18/2025 0536)  Individualized Care Needs: Pain management  Anxieties, Fears or Concerns: None  Patient/Family-Specific Goals (Include Timeframe): Keep patient/daughter updated on Plan of care     Problem: Pain Acute  Goal: Optimal Pain Control and Function  Outcome: Progressing  Intervention: Develop Pain Management Plan  Flowsheets (Taken 6/18/2025 0536)  Pain Management Interventions:   breathing exercises utilized   care clustered   cold applied   diversional activity provided   medication offered   pain management plan reviewed with patient/caregiver   pain pump in use   quiet environment facilitated   relaxation techniques promoted  Intervention: Prevent or Manage Pain  Flowsheets (Taken 6/18/2025 0536)  Sleep/Rest Enhancement:   awakenings minimized   consistent schedule promoted   noise level reduced   regular sleep/rest pattern promoted   relaxation techniques promoted   room darkened  Sensory Stimulation Regulation:   auditory stimulation minimized   quiet environment promoted   visual stimulation minimized   tactile stimulation minimized   care clustered   lighting decreased  Bowel Elimination Promotion: adequate fluid intake promoted  Medication Review/Management:   medications reviewed   high-risk medications identified     Problem: Fall Injury Risk  Goal: Absence of Fall and Fall-Related Injury  Outcome: Met  Intervention: Identify and Manage Contributors  Flowsheets (Taken 6/18/2025 0536)  Self-Care Promotion:   BADL personal objects within reach   BADL personal routines maintained   adaptive equipment use encouraged  Medication Review/Management:   medications reviewed   high-risk medications identified     Problem: Knee Arthroplasty  Goal: Absence of Bleeding  Outcome:  Met  Intervention: Monitor and Manage Bleeding  Flowsheets (Taken 6/18/2025 7857)  Bleeding Management: dressing monitored

## 2025-06-19 ENCOUNTER — CLINICAL SUPPORT (OUTPATIENT)
Dept: ORTHOPEDICS | Facility: CLINIC | Age: 75
End: 2025-06-19
Payer: MEDICARE

## 2025-06-19 DIAGNOSIS — Z96.651 S/P TOTAL KNEE REPLACEMENT, RIGHT: Primary | ICD-10-CM

## 2025-06-19 NOTE — PROGRESS NOTES
I called the patient today regarding her  surgery with Dr. Chow. The patient had a right TKA on 6/17/25.     Pain Scale: 4 / 10    Any issues with Fever: No.    Any issues with medications (specifically DVT prophylaxis): No.    Pain medication: no;  Celebrex : no  Protonix : no  Resume home meds : Yes    Any issues with:   Bowel movements: Yes: , no bm since surgery, taking stool softener  Passing miles: No.  Urination: No.    Completing at home exercises: Yes    Any concerns regarding their dressing/bandage:  No.    Patient confirmed first OP-PT appointment:  yes on  6/19/25     Any other concerns: No.    Blue Bracelet : yes    The patient was informed that if they have any urgent issues with their bandage, medications or any other health concerns regarding their surgery to call the 24/7 Orthopedic Post-op Hot Line at [phone]. The patient was reminded that if they have any chest pain or shortness of breath to call 911 or go to the ER.    The patient verbalized understanding and does not have any other questions

## 2025-06-20 ENCOUNTER — CLINICAL SUPPORT (OUTPATIENT)
Dept: REHABILITATION | Facility: HOSPITAL | Age: 75
End: 2025-06-20
Attending: NURSE PRACTITIONER
Payer: MEDICARE

## 2025-06-20 DIAGNOSIS — Z96.651 S/P TOTAL KNEE REPLACEMENT, RIGHT: ICD-10-CM

## 2025-06-20 DIAGNOSIS — G89.29 CHRONIC PAIN OF RIGHT KNEE: Primary | ICD-10-CM

## 2025-06-20 DIAGNOSIS — R29.898 WEAKNESS OF RIGHT LOWER EXTREMITY: ICD-10-CM

## 2025-06-20 DIAGNOSIS — Z74.09 IMPAIRED FUNCTIONAL MOBILITY, BALANCE, GAIT, AND ENDURANCE: ICD-10-CM

## 2025-06-20 DIAGNOSIS — M25.661 IMPAIRED RANGE OF MOTION OF RIGHT KNEE: ICD-10-CM

## 2025-06-20 DIAGNOSIS — M25.561 CHRONIC PAIN OF RIGHT KNEE: Primary | ICD-10-CM

## 2025-06-20 PROCEDURE — 97163 PT EVAL HIGH COMPLEX 45 MIN: CPT | Mod: PN

## 2025-06-20 PROCEDURE — 97110 THERAPEUTIC EXERCISES: CPT | Mod: PN

## 2025-06-20 NOTE — PROGRESS NOTES
Outpatient Rehab  Physical Therapy Evaluation    Patient Name: Jojo Burrows  MRN: [id number]  YOB: 1950  Encounter Date: 6/20/2025    Therapy Diagnosis:   Encounter Diagnoses   Name Primary?    S/P total knee replacement, right     Chronic pain of right knee Yes    Impaired range of motion of right knee     Weakness of right lower extremity     Impaired functional mobility, balance, gait, and endurance      Physician: Chary Dobbins NP    Physician Orders: Eval and Treat  Medical Diagnosis: S/P total knee replacement, right  Surgical Diagnosis: Not applicable for this Episode   Surgical Date: Not applicable for this Episode  Days Since Last Surgery: Not applicable for this Episode    Visit # / Visits Authorized:  1 / 1  Insurance Authorization Period: 5/7/2025 to 5/7/2026  Date of Evaluation: 6/20/2025  Plan of Care Certification: 6/20/2025 to 8/15/2025     Time In: 1120   Time Out: 1210  Total Time (in minutes): 50   Total Billable Time (in minutes):  50    Intake Outcome Measure for FOTO Survey  Therapist reviewed FOTO scores for Jojo Burrows on 6/20/2025.   FOTO report - see Media section or FOTO account episode details.   Intake Score: 31%    Precautions: S/P (R) TKA on 6/17/2025       Subjective   History of Present Illness  Jojo is a 75 y.o. female who reports to physical therapy with a chief concern of Right Knee pain.         Diagnostic tests related to this condition: X-ray.   There is a right TKA in place with good alignment and no complication seen.    History of Present Condition/Illness: She had a Right TKA on 6/17/2025. She is currently staying at her daughters house while she recovers. She states that each day she is getting better. She has more pain with bending than with straightening. Prior to surgery she was independent with all ADLs, stair climbing, and shopping activities. At this time she is unable to return home due to her bedroom being on the second story of her home, unable  to ambulate long distances, unable to ambulate without AD, and requires assistance with most ADLs. She is in a higher bed at the moment and is having much trouble getting in and out of the bed. During the night she is using bedside commode. She is having trouble sleeping at night due to the pain in her knee.     Activities of Daily Living  Social history was obtained from Patient and family member     Previously independent with activities of daily living? Yes  Currently independent with activities of daily living? No  Activities currently needing assistance include Bathing, Bed mobility, Dressing - lower body, Functional mobility, Toileting, and Transfers.      Previously independent with instrumental activities of daily living? Yes  Currently independent with instrumental activities of daily living? No  Activities currently needing assistance include: Driving, Community mobility, and Grocery/shopping.      Pain  Patient reports a current pain level of 0/10. Pain at best is reported as 0/10. Pain at worst is reported as 7/10.   Location: Right knee  Clinical Progression (since onset): Stable  Pain Qualities: Aching  Pain-Relieving Factors: Medications - prescription  Pain-Aggravating Factors: Bending, Driving, Exercise, Kneeling, Lifting, Sitting, Squatting, Sleeping, Stair climbing, Standing, Straightening, Walking     Living Arrangements  Living Situation  At the moment, she is living with her daughter where she is staying on the first floor. She has bedside commode and shower chair. She lives alone in [place] in two story home where her bedroom is on the second floor. She plans to return home once she is able to perform ADLs and stair climb independently.     Employment  Employment Status: Retired      Past Medical History/Physical Systems Review:   Jojo Burrows  has a past medical history of Acute hypoxemic respiratory failure, ALLERGIC RHINITIS, Cataract, COVID-19 virus infection, Degenerative disc disease,  cervical, GERD (gastroesophageal reflux disease), Hyperlipidemia, Hypertension, Migraine headache, Multifocal pneumonia, Nuclear sclerosis, Overweight (BMI 25.0-29.9), Sleep disorder, Thyroid disease, and TIA (transient ischemic attack).    Jojo Burrows  has a past surgical history that includes Hysterectomy; Colonoscopy (N/A, 4/29/2016); Breast biopsy; Breast cyst excision; Breast cyst aspiration; Colonoscopy (N/A, 6/28/2021); Esophagogastroduodenoscopy (N/A, 3/18/2025); Esophagogastroduodenoscopy (N/A, 5/16/2025); and arthroplasty, knee, total, using computer-assisted navigation (Right, 6/17/2025).    Jojo has a current medication list which includes the following prescription(s): acetaminophen, albuterol, ascorbic acid (vitamin c), aspirin, aspirin, calcium carbonate, celecoxib, cetirizine, cholecalciferol (vitamin d3), cyanocobalamin, fluticasone propionate, multivit-min/folic acid/lutein, magnesium gluconate, methocarbamol, metoprolol succinate, oxycodone, pantoprazole, polyethylene glycol, rosuvastatin, sertraline, turmeric, and zolpidem.    Review of patient's allergies indicates:   Allergen Reactions    Vantin [cefpodoxime] Itching     Severe itching, night sweats.  Improved after stopped it.  4/24    Iodine Rash    Iodine and iodide containing products Itching and Rash    Shellfish containing products Itching and Rash        Objective       Knee Range of Motion   Right Knee   Active (deg) Passive (deg) Pain   Flexion 60 80 Yes   Extension Plan to assess at next visit, pt became nauseated         Hip Strength - Planes of Motion   Right Strength Right Pain Left Strength Left  Pain   Flexion (L2) 3   4     Extension 3   4     ABduction 3+   4+     ADduction 3+   4+     Internal Rotation 3+   4+     External Rotation 3+   4+       Knee Strength   Right Strength Right Pain Left Strength Left  Pain   Flexion (S2) 3-   4+     Extension (L3) 3   4+          Ankle/Foot Strength - Planes of Motion   Right Strength  "Right Pain Left Strength Left  Pain   Dorsiflexion (L4) 4   5     Plantar Flexion (S1) 4   5     Inversion 4   5     Eversion 4   5        Gait Analysis  Patient ambulates with rolling walker and the following impairments: decreased right stance time, decreased left step length, decreased right knee flexion and extension, decreased ana, step to gait pattern     Treatment:  Therapeutic Exercise  TE 1: Quad Set (towel under knee) 5" hold 10x  TE 2: Quad Set (towel under ankle) 5" hold, 10x  TE 3: Heel slides, 5" hold 20x  TE 4: Supine hip adduction 20x  TE 5: Seated hip abduction, RTB 20x  TE 6: supine knee extension stretch x 5 minutes    Time Entry(in minutes):   TE: 20 minutes     Assessment & Plan   Assessment  Jojo presents with a condition of High complexity.   Presentation of Symptoms: Stable  Will Comorbidities Impact Care: No       Functional Limitations: Activity tolerance, Ambulating on uneven surfaces, Bed mobility, Disrupted sleep pattern, Driving, Functional mobility, Gait limitations, Increased risk of fall, Maintaining balance, Decreased ambulation distance/endurance, Pain with ADLs/IADLs, Painful locomotion/ambulation, Performing household chores, Range of motion, Sitting tolerance, Squatting, Standing tolerance, Transfers  Impairments: Abnormal gait, Abnormal or restricted range of motion, Impaired balance, Impaired physical strength, Pain with functional activity  Personal Factors Affecting Prognosis: Pain  Patient Goal for Therapy (PT): Decreased pain, return home, independent walking, independent with ADLs  Prognosis: Good    Plan  From a physical therapy perspective, the patient would benefit from: Skilled Rehab Services    Planned therapy interventions include: Therapeutic exercise, Therapeutic activities, Neuromuscular re-education, Manual therapy, ADLs/IADLs, and Gait training.    Planned modalities to include: Electrical stimulation - attended, Cryotherapy (cold pack), Electrical " stimulation - passive/unattended, and Thermotherapy (hot pack).        Visit Frequency: 2-3 times Per Week for 8-10 Weeks.  This plan was discussed with Patient and Family.   Discussion participants: Agreed Upon Plan of Care     The patient's spiritual, cultural, and educational needs were considered, and the patient is agreeable to the plan of care and goals.         Goals:   Active       Long Term Goals       Patient will demonstrate improved function as indicated by a functional score of 61 on the FOTO.        Start:  06/20/25    Expected End:  08/15/25            Pt will demonstrate improved pain by reports of less than or equal to 3/10 worst pain on the verbal rating scale in order to progress toward maximal functional ability and improve QOL.         Start:  06/20/25    Expected End:  08/15/25            Patient will improve bilateral lower extremity strength to greater than or equal to 4+/5 MMT for return to home and ease with stair climbing.        Start:  06/20/25    Expected End:  08/15/25            Patient will be able to climb a flight of stairs independently and safely for return to home and prior level of function.        Start:  06/20/25    Expected End:  08/15/25            Patient will improve right knee range of motion to within normal limits and less than 5/10 pain for ease with stair climbing and household chores.        Start:  06/20/25    Expected End:  08/15/25            Patient will ambulate independently with normalized gait pattern for return to prior level of function.        Start:  06/20/25    Expected End:  08/15/25               Short Term Goals       Patient will demonstrate independence with HEP in order to progress toward functional independence        Start:  06/20/25    Expected End:  08/15/25            Pt will demonstrate improved pain by reports of less than or equal to 5/10 worst pain on the verbal rating scale in order to progress toward maximal functional ability and  improve QOL.         Start:  06/20/25    Expected End:  08/15/25            Patient will improve right lower extremity strength by 1/2 MMT for improved strength needed for stair climbing.        Start:  06/20/25    Expected End:  08/15/25            Patient will ambulate independently for greater than 500 feet for return to prior level of function.        Start:  06/20/25    Expected End:  08/15/25                Montserrat Mitchell PT

## 2025-06-23 ENCOUNTER — CLINICAL SUPPORT (OUTPATIENT)
Dept: REHABILITATION | Facility: HOSPITAL | Age: 75
End: 2025-06-23
Payer: MEDICARE

## 2025-06-23 DIAGNOSIS — M25.661 IMPAIRED RANGE OF MOTION OF RIGHT KNEE: ICD-10-CM

## 2025-06-23 DIAGNOSIS — R29.898 WEAKNESS OF RIGHT LOWER EXTREMITY: ICD-10-CM

## 2025-06-23 DIAGNOSIS — G89.29 CHRONIC PAIN OF RIGHT KNEE: Primary | ICD-10-CM

## 2025-06-23 DIAGNOSIS — M25.561 CHRONIC PAIN OF RIGHT KNEE: Primary | ICD-10-CM

## 2025-06-23 DIAGNOSIS — Z74.09 IMPAIRED FUNCTIONAL MOBILITY, BALANCE, GAIT, AND ENDURANCE: ICD-10-CM

## 2025-06-23 PROCEDURE — 97110 THERAPEUTIC EXERCISES: CPT | Mod: PN

## 2025-06-23 PROCEDURE — 97140 MANUAL THERAPY 1/> REGIONS: CPT | Mod: PN

## 2025-06-23 NOTE — PROGRESS NOTES
"Outpatient Rehab  Physical Therapy Visit    Patient Name: Jojo Burrows  MRN: [id number]  YOB: 1950  Encounter Date: 6/23/2025    Therapy Diagnosis:   Encounter Diagnoses   Name Primary?    Chronic pain of right knee Yes    Impaired range of motion of right knee     Weakness of right lower extremity     Impaired functional mobility, balance, gait, and endurance      Physician: Chary Dobbins NP    Physician Orders: Eval and Treat  Medical Diagnosis: S/P total knee replacement, right  Surgical Diagnosis: Not applicable for this Episode   Surgical Date: Not applicable for this Episode  Days Since Last Surgery: Not applicable for this Episode    Visit # / Visits Authorized:  1 / 10  Insurance Authorization Period: 6/23/2025 to 8/23/2025  Date of Evaluation: 6/20/2025  Plan of Care Certification: 6/20/2025 to 8/15/2025      PT/PTA: PT   Number of PTA visits since last PT visit:0    Time In: 1000   Time Out: 1100  Total Time (in minutes): 60   Total Billable Time (in minutes):  30 minutes 1:1    Precautions: S/P (R) TKA on 6/17/2025      Subjective   She is having the most difficulty with bending her knee..         Objective    Last assessed on Eval on 6/20/2025      Treatment:  Therapeutic Exercise  TE 1: Nustep x 5minutes  TE 2: Quad Set (towel under ankle) 5" hold, 10x  TE 3: Quad Set (towel under knee)5" holx 10x  TE 4: Heel slides 5" hold 20x  TE 5: seated hip adduction 20x  TE 6: Seated hip abduction, RTB 20x  TE 7: Seated Calf stretch 3x30"  TE 8: Seated knee flexion with skateboard 5" hold 20x  Manual Therapy  MT 1: Patellar mobs  MT 2: PROM to (R) knee flexion with gentle overpressure    Time Entry(in minutes):  TE: 45 minutes  MT: 15 minutes       Assessment & Plan   Assessment: Patient presents to her first follow-up after initial evaluation where POC was initiated. She continues to ambulate with step to gait pattern using rolling walker. She is limited in therapy due to pain. She tolerated " therapy session good today with minimal increases in pain. Added gentle overpressure during PROM with good tolerance and no hip hike. Will continue to benefit from further mobility and strengthening for return to prior level of function and independent ambulation.      The patient will continue to benefit from skilled outpatient physical therapy in order to address the deficits listed in the problem list on the initial evaluation, provide patient and family education, and maximize the patients level of independence in the home and community environments.     The patient's spiritual, cultural, and educational needs were considered, and the patient is agreeable to the plan of care and goals.       Plan: Continue with POC and progress as tolerated.    Goals:   Active       Long Term Goals       Patient will demonstrate improved function as indicated by a functional score of 61 on the FOTO.        Start:  06/20/25    Expected End:  08/15/25            Pt will demonstrate improved pain by reports of less than or equal to 3/10 worst pain on the verbal rating scale in order to progress toward maximal functional ability and improve QOL.         Start:  06/20/25    Expected End:  08/15/25            Patient will improve bilateral lower extremity strength to greater than or equal to 4+/5 MMT for return to home and ease with stair climbing.        Start:  06/20/25    Expected End:  08/15/25            Patient will be able to climb a flight of stairs independently and safely for return to home and prior level of function.        Start:  06/20/25    Expected End:  08/15/25            Patient will improve right knee range of motion to within normal limits and less than 5/10 pain for ease with stair climbing and household chores.        Start:  06/20/25    Expected End:  08/15/25            Patient will ambulate independently with normalized gait pattern for return to prior level of function.        Start:  06/20/25    Expected  End:  08/15/25               Short Term Goals       Patient will demonstrate independence with HEP in order to progress toward functional independence        Start:  06/20/25    Expected End:  08/15/25            Pt will demonstrate improved pain by reports of less than or equal to 5/10 worst pain on the verbal rating scale in order to progress toward maximal functional ability and improve QOL.         Start:  06/20/25    Expected End:  08/15/25            Patient will improve right lower extremity strength by 1/2 MMT for improved strength needed for stair climbing.        Start:  06/20/25    Expected End:  08/15/25            Patient will ambulate independently for greater than 500 feet for return to prior level of function.        Start:  06/20/25    Expected End:  08/15/25                Montserrat Mitchell, PT

## 2025-06-26 ENCOUNTER — CLINICAL SUPPORT (OUTPATIENT)
Dept: REHABILITATION | Facility: HOSPITAL | Age: 75
End: 2025-06-26
Attending: NURSE PRACTITIONER
Payer: MEDICARE

## 2025-06-26 DIAGNOSIS — M25.561 CHRONIC PAIN OF RIGHT KNEE: Primary | ICD-10-CM

## 2025-06-26 DIAGNOSIS — M25.661 IMPAIRED RANGE OF MOTION OF RIGHT KNEE: ICD-10-CM

## 2025-06-26 DIAGNOSIS — G89.29 CHRONIC PAIN OF RIGHT KNEE: Primary | ICD-10-CM

## 2025-06-26 DIAGNOSIS — Z74.09 IMPAIRED FUNCTIONAL MOBILITY, BALANCE, GAIT, AND ENDURANCE: ICD-10-CM

## 2025-06-26 DIAGNOSIS — R29.898 WEAKNESS OF RIGHT LOWER EXTREMITY: ICD-10-CM

## 2025-06-26 PROCEDURE — 97140 MANUAL THERAPY 1/> REGIONS: CPT | Mod: PN

## 2025-06-26 PROCEDURE — 97110 THERAPEUTIC EXERCISES: CPT | Mod: PN

## 2025-06-26 NOTE — PROGRESS NOTES
"Outpatient Rehab  Physical Therapy Visit    Patient Name: Jojo Burrows  MRN: [id number]  YOB: 1950  Encounter Date: 6/26/2025    Therapy Diagnosis:   Encounter Diagnoses   Name Primary?    Chronic pain of right knee Yes    Impaired range of motion of right knee     Weakness of right lower extremity     Impaired functional mobility, balance, gait, and endurance      Physician: Chary Dobbins NP    Physician Orders: Eval and Treat  Medical Diagnosis: S/P total knee replacement, right  Surgical Diagnosis: Not applicable for this Episode   Surgical Date: Not applicable for this Episode  Days Since Last Surgery: Not applicable for this Episode    Visit # / Visits Authorized:  2 / 10  Insurance Authorization Period: 6/23/2025 to 8/23/2025  Date of Evaluation: 6/20/2025  Plan of Care Certification: 6/20/2025 to 8/15/2025       Time In: 1108   Time Out: 1208  Total Time (in minutes): 60   Total Billable Time (in minutes): 60    Precautions: S/P (R) TKA on 6/17/2025      Subjective   Patient reports she is having stiffness and inflamation in her right knee which is limiting her from bending her knee and performing her HEP. She is currently performing her HEP once a day and has been haveing difficut.         Objective    Last assessed on Eval on 6/20/2025      Treatment:  Therapeutic Exercise  TE 1: Nustep x 5minutes  TE 2: Quad Set (towel under ankle) 5" hold, 10x  TE 3: Quad Set (towel under knee)5" holx 10x  TE 4: Heel slides 5" hold 20x  TE 5: seated hip adduction 20x  TE 6: Seated hip abduction, RTB 20x  TE 7: Seated Calf stretch 3x30"  TE 8: Seated knee flexion with skateboard 5" hold 20x  Manual Therapy  MT 1: Patellar mobs  MT 2: PROM to (R) knee flexion with gentle overpressure      Time Entry(in minutes):  TE: 45 minutes  MT: 15 minutes       Assessment & Plan   Assessment: Patient's ambulation with walking is improved this visit demonstrating a more step through pattern with the occasional step to " gait pattern still present. She was able to tolerate a small increase in PROM with right knee flexion minimal increase in pain. She was educated to complete her HEP 2 times a day with an emphasis on the knee flexion ROM exercises.   Evaluation/Treatment Tolerance: Patient limited by pain  The patient will continue to benefit from skilled outpatient physical therapy in order to address the deficits listed in the problem list on the initial evaluation, provide patient and family education, and maximize the patients level of independence in the home and community environments.     The patient's spiritual, cultural, and educational needs were considered, and the patient is agreeable to the plan of care and goals.       Plan: Continue with POC and progress as tolerated.    Goals:   Active       Long Term Goals       Patient will demonstrate improved function as indicated by a functional score of 61 on the FOTO.        Start:  06/20/25    Expected End:  08/15/25            Pt will demonstrate improved pain by reports of less than or equal to 3/10 worst pain on the verbal rating scale in order to progress toward maximal functional ability and improve QOL.         Start:  06/20/25    Expected End:  08/15/25            Patient will improve bilateral lower extremity strength to greater than or equal to 4+/5 MMT for return to home and ease with stair climbing.        Start:  06/20/25    Expected End:  08/15/25            Patient will be able to climb a flight of stairs independently and safely for return to home and prior level of function.        Start:  06/20/25    Expected End:  08/15/25            Patient will improve right knee range of motion to within normal limits and less than 5/10 pain for ease with stair climbing and household chores.        Start:  06/20/25    Expected End:  08/15/25            Patient will ambulate independently with normalized gait pattern for return to prior level of function.        Start:   06/20/25    Expected End:  08/15/25               Short Term Goals       Patient will demonstrate independence with HEP in order to progress toward functional independence        Start:  06/20/25    Expected End:  08/15/25            Pt will demonstrate improved pain by reports of less than or equal to 5/10 worst pain on the verbal rating scale in order to progress toward maximal functional ability and improve QOL.         Start:  06/20/25    Expected End:  08/15/25            Patient will improve right lower extremity strength by 1/2 MMT for improved strength needed for stair climbing.        Start:  06/20/25    Expected End:  08/15/25            Patient will ambulate independently for greater than 500 feet for return to prior level of function.        Start:  06/20/25    Expected End:  08/15/25                Allan Rodriguez, SPT

## 2025-06-27 DIAGNOSIS — Z96.651 S/P TOTAL KNEE REPLACEMENT, RIGHT: ICD-10-CM

## 2025-06-27 RX ORDER — OXYCODONE HYDROCHLORIDE 5 MG/1
TABLET ORAL
Qty: 50 TABLET | Refills: 0 | Status: SHIPPED | OUTPATIENT
Start: 2025-06-27

## 2025-06-30 ENCOUNTER — OFFICE VISIT (OUTPATIENT)
Dept: ORTHOPEDICS | Facility: CLINIC | Age: 75
End: 2025-06-30
Payer: MEDICARE

## 2025-06-30 VITALS — WEIGHT: 154.13 LBS | HEIGHT: 61 IN | BODY MASS INDEX: 29.1 KG/M2

## 2025-06-30 DIAGNOSIS — Z96.651 S/P TOTAL KNEE REPLACEMENT, RIGHT: ICD-10-CM

## 2025-06-30 PROCEDURE — 1160F RVW MEDS BY RX/DR IN RCRD: CPT | Mod: CPTII,HCNC,S$GLB,

## 2025-06-30 PROCEDURE — 1101F PT FALLS ASSESS-DOCD LE1/YR: CPT | Mod: CPTII,HCNC,S$GLB,

## 2025-06-30 PROCEDURE — 99024 POSTOP FOLLOW-UP VISIT: CPT | Mod: HCNC,S$GLB,,

## 2025-06-30 PROCEDURE — 99999 PR PBB SHADOW E&M-EST. PATIENT-LVL IV: CPT | Mod: PBBFAC,,,

## 2025-06-30 PROCEDURE — 1159F MED LIST DOCD IN RCRD: CPT | Mod: CPTII,HCNC,S$GLB,

## 2025-06-30 PROCEDURE — 3288F FALL RISK ASSESSMENT DOCD: CPT | Mod: CPTII,HCNC,S$GLB,

## 2025-06-30 PROCEDURE — 1125F AMNT PAIN NOTED PAIN PRSNT: CPT | Mod: CPTII,HCNC,S$GLB,

## 2025-06-30 RX ORDER — OXYCODONE HYDROCHLORIDE 5 MG/1
TABLET ORAL
Qty: 50 TABLET | Refills: 0 | Status: SHIPPED | OUTPATIENT
Start: 2025-06-30 | End: 2025-07-01 | Stop reason: SDUPTHER

## 2025-06-30 NOTE — PROGRESS NOTES
"Jojo Burrows presents for initial post-operative visit following a right total knee arthroplasty performed by Dr. Chow on 6/17/2025.     Exam:   Height 5' 1" (1.549 m), weight 69.9 kg (154 lb 1.6 oz).   Ambulating well with assistive device.  Incision is clean and dry without drainage or erythema.   ROM:  0-65    Initial post-operative radiographs reviewed today revealing a well fixed and aligned prosthesis.    A/P:  2 weeks s/p right total knee arthroplasty    - The patient was advised to keep the incision clean and dry for the next 24 hours after which she may wash the area with antibacterial soap in the shower. Will not submerge until the incision is completely healed.   - Outpatient PT ongoing:  [facility]  - Patient counseled on importance of home exercises and adherence to pain medication.  - Continue aspirin for 1 month post op  - Pain medication:  Refills sent to pharmacy  - Reviewed antibiotic prophylaxis   - Follow up in 2 weeks with myself for ROM check. Pt will call clinic with problems/concerns.     Luis Alfredo Mckee Jr., PA-C        "

## 2025-07-01 ENCOUNTER — CLINICAL SUPPORT (OUTPATIENT)
Dept: REHABILITATION | Facility: HOSPITAL | Age: 75
End: 2025-07-01
Payer: MEDICARE

## 2025-07-01 DIAGNOSIS — M25.661 IMPAIRED RANGE OF MOTION OF RIGHT KNEE: ICD-10-CM

## 2025-07-01 DIAGNOSIS — M25.561 CHRONIC PAIN OF RIGHT KNEE: Primary | ICD-10-CM

## 2025-07-01 DIAGNOSIS — Z96.651 S/P TOTAL KNEE REPLACEMENT, RIGHT: Primary | ICD-10-CM

## 2025-07-01 DIAGNOSIS — G89.29 CHRONIC PAIN OF RIGHT KNEE: Primary | ICD-10-CM

## 2025-07-01 DIAGNOSIS — R29.898 WEAKNESS OF RIGHT LOWER EXTREMITY: ICD-10-CM

## 2025-07-01 DIAGNOSIS — Z74.09 IMPAIRED FUNCTIONAL MOBILITY, BALANCE, GAIT, AND ENDURANCE: ICD-10-CM

## 2025-07-01 PROCEDURE — 97110 THERAPEUTIC EXERCISES: CPT | Mod: HCNC,PN

## 2025-07-01 PROCEDURE — 97140 MANUAL THERAPY 1/> REGIONS: CPT | Mod: HCNC,PN

## 2025-07-01 RX ORDER — OXYCODONE HYDROCHLORIDE 5 MG/1
TABLET ORAL
Qty: 50 TABLET | Refills: 0 | Status: SHIPPED | OUTPATIENT
Start: 2025-07-01

## 2025-07-01 NOTE — PROGRESS NOTES
"Outpatient Rehab  Physical Therapy Visit    Patient Name: Jojo Burrows  MRN: [id number]  YOB: 1950  Encounter Date: 7/1/2025    Therapy Diagnosis:   Encounter Diagnoses   Name Primary?    Chronic pain of right knee Yes    Impaired range of motion of right knee     Weakness of right lower extremity     Impaired functional mobility, balance, gait, and endurance      Physician: Chary Dobbins NP    Physician Orders: Eval and Treat  Medical Diagnosis: S/P total knee replacement, right  Surgical Diagnosis: Not applicable for this Episode   Surgical Date: Not applicable for this Episode  Days Since Last Surgery: Not applicable for this Episode    Visit # / Visits Authorized:  3 / 10  Insurance Authorization Period: 6/23/2025 to 8/23/2025  Date of Evaluation: 6/20/2025  Plan of Care Certification: 6/20/2025 to 8/15/2025     Time In: 1020   Time Out: 1115  Total Time (in minutes): 55   Total Billable Time (in minutes):  55 minutes    Precautions: S/P (R) TKA on 6/17/2025      Subjective   She is feeling okay today. Trying to do exericses at home..         Objective    Last assessed on Eval on 6/20/2025      Treatment:  Therapeutic Exercise  TE 1: Nustep x 5minutes  TE 2: Quad Set (towel under ankle) 5" hold, 10x  TE 3: DKTC with SB 20x  TE 4: Heel slides 5" hold 20x  TE 5: SAQ 20x  TE 6: Seated hip abdution, RTB  TE 7: Seated hip adduction 20x  TE 8: Seated knee flexion with skateboard 5" hold 20x  TE 9: calf stretch (standing) 3x30"  Manual Therapy  MT 1: Patellar mobs  MT 2: PROM to (R) knee flexion with gentle overpressure      Time Entry(in minutes):  TE: 45 minutes  MT: 15 minutes  Manual Therapy Time Entry: 10  Therapeutic Exercise Time Entry: 45    Assessment & Plan   Assessment: She continues with slight improved gait, but still lacking knee flexion during swing phase and has hip hike to compensate. Patient very hesitant to bend knee during heel slides, DKTC and skateboard knee flexion rocks. She was " educated to complete her HEP 2 times a day with an emphasis on the knee flexion ROM exercises.      The patient will continue to benefit from skilled outpatient physical therapy in order to address the deficits listed in the problem list on the initial evaluation, provide patient and family education, and maximize the patients level of independence in the home and community environments.     The patient's spiritual, cultural, and educational needs were considered, and the patient is agreeable to the plan of care and goals.       Plan: Continue with POC and progress as tolerated.    Goals:   Active       Long Term Goals       Patient will demonstrate improved function as indicated by a functional score of 61 on the FOTO.        Start:  06/20/25    Expected End:  08/15/25            Pt will demonstrate improved pain by reports of less than or equal to 3/10 worst pain on the verbal rating scale in order to progress toward maximal functional ability and improve QOL.         Start:  06/20/25    Expected End:  08/15/25            Patient will improve bilateral lower extremity strength to greater than or equal to 4+/5 MMT for return to home and ease with stair climbing.        Start:  06/20/25    Expected End:  08/15/25            Patient will be able to climb a flight of stairs independently and safely for return to home and prior level of function.        Start:  06/20/25    Expected End:  08/15/25            Patient will improve right knee range of motion to within normal limits and less than 5/10 pain for ease with stair climbing and household chores.        Start:  06/20/25    Expected End:  08/15/25            Patient will ambulate independently with normalized gait pattern for return to prior level of function.        Start:  06/20/25    Expected End:  08/15/25               Short Term Goals       Patient will demonstrate independence with HEP in order to progress toward functional independence        Start:   06/20/25    Expected End:  08/15/25            Pt will demonstrate improved pain by reports of less than or equal to 5/10 worst pain on the verbal rating scale in order to progress toward maximal functional ability and improve QOL.         Start:  06/20/25    Expected End:  08/15/25            Patient will improve right lower extremity strength by 1/2 MMT for improved strength needed for stair climbing.        Start:  06/20/25    Expected End:  08/15/25            Patient will ambulate independently for greater than 500 feet for return to prior level of function.        Start:  06/20/25    Expected End:  08/15/25                Montserrat Mitchell PT

## 2025-07-02 ENCOUNTER — CLINICAL SUPPORT (OUTPATIENT)
Dept: REHABILITATION | Facility: HOSPITAL | Age: 75
End: 2025-07-02
Payer: MEDICARE

## 2025-07-02 DIAGNOSIS — G89.29 CHRONIC PAIN OF RIGHT KNEE: Primary | ICD-10-CM

## 2025-07-02 DIAGNOSIS — M25.561 CHRONIC PAIN OF RIGHT KNEE: Primary | ICD-10-CM

## 2025-07-02 DIAGNOSIS — R29.898 WEAKNESS OF RIGHT LOWER EXTREMITY: ICD-10-CM

## 2025-07-02 DIAGNOSIS — M25.661 IMPAIRED RANGE OF MOTION OF RIGHT KNEE: ICD-10-CM

## 2025-07-02 DIAGNOSIS — Z74.09 IMPAIRED FUNCTIONAL MOBILITY, BALANCE, GAIT, AND ENDURANCE: ICD-10-CM

## 2025-07-02 PROCEDURE — 97140 MANUAL THERAPY 1/> REGIONS: CPT | Mod: HCNC,PN

## 2025-07-02 PROCEDURE — 97110 THERAPEUTIC EXERCISES: CPT | Mod: HCNC,PN

## 2025-07-02 NOTE — PROGRESS NOTES
"Outpatient Rehab  Physical Therapy Visit    Patient Name: Jojo Burrows  MRN: [id number]  YOB: 1950  Encounter Date: 7/2/2025    Therapy Diagnosis:   Encounter Diagnoses   Name Primary?    Chronic pain of right knee Yes    Impaired range of motion of right knee     Weakness of right lower extremity     Impaired functional mobility, balance, gait, and endurance      Physician: Chary Dobbins NP    Physician Orders: Eval and Treat  Medical Diagnosis: S/P total knee replacement, right  Surgical Diagnosis: Not applicable for this Episode   Surgical Date: Not applicable for this Episode  Days Since Last Surgery: Not applicable for this Episode    Visit # / Visits Authorized:  4 / 10  Insurance Authorization Period: 6/23/2025 to 8/23/2025  Date of Evaluation: 6/20/2025  Plan of Care Certification: 6/20/2025 to 8/15/2025     Time In: 1300   Time Out: 1400  Total Time (in minutes): 60   Total Billable Time (in minutes):  60 minutes    Precautions: S/P (R) TKA on 6/17/2025      Subjective   No new complaints.         Objective    Last assessed on Eval on 6/20/2025      Treatment:  Therapeutic Exercise  TE 1: Nustep x 5minutes  TE 2: Quad Set (towel under ankle) 5" hold, 10x  TE 3: DKTC with SB 20x  TE 4: Heel slides 5" hold 20x  TE 5: SAQ 30x; 1#  TE 6: Seated hip abdution, RTB  TE 7: Supine hip adduction 20x  TE 8: Seated knee flexion with skateboard 5" hold 20x  TE 9: calf stretch (standing) 3x30"  TE 10: Bridges 1x10  Manual Therapy  MT 1: Patellar mobs  MT 2: PROM to (R) knee flexion with gentle overpressure        Time Entry(in minutes):  TE: 45 minutes  MT: 15 minutes       Assessment & Plan   Assessment: She continues with slight improved gait, but still lacking knee flexion during swing phase and has hip hike to compensate. She shows good improved knee flexion range of motion with heel slides, DKTC and nustep. She was educated to complete her HEP 2 times a day with an emphasis on the knee flexion ROM " exercises.      The patient will continue to benefit from skilled outpatient physical therapy in order to address the deficits listed in the problem list on the initial evaluation, provide patient and family education, and maximize the patients level of independence in the home and community environments.     The patient's spiritual, cultural, and educational needs were considered, and the patient is agreeable to the plan of care and goals.       Plan: Continue with POC and progress as tolerated.    Goals:   Active       Long Term Goals       Patient will demonstrate improved function as indicated by a functional score of 61 on the FOTO.        Start:  06/20/25    Expected End:  08/15/25            Pt will demonstrate improved pain by reports of less than or equal to 3/10 worst pain on the verbal rating scale in order to progress toward maximal functional ability and improve QOL.         Start:  06/20/25    Expected End:  08/15/25            Patient will improve bilateral lower extremity strength to greater than or equal to 4+/5 MMT for return to home and ease with stair climbing.        Start:  06/20/25    Expected End:  08/15/25            Patient will be able to climb a flight of stairs independently and safely for return to home and prior level of function.        Start:  06/20/25    Expected End:  08/15/25            Patient will improve right knee range of motion to within normal limits and less than 5/10 pain for ease with stair climbing and household chores.        Start:  06/20/25    Expected End:  08/15/25            Patient will ambulate independently with normalized gait pattern for return to prior level of function.        Start:  06/20/25    Expected End:  08/15/25               Short Term Goals       Patient will demonstrate independence with HEP in order to progress toward functional independence        Start:  06/20/25    Expected End:  08/15/25            Pt will demonstrate improved pain by  reports of less than or equal to 5/10 worst pain on the verbal rating scale in order to progress toward maximal functional ability and improve QOL.         Start:  06/20/25    Expected End:  08/15/25            Patient will improve right lower extremity strength by 1/2 MMT for improved strength needed for stair climbing.        Start:  06/20/25    Expected End:  08/15/25            Patient will ambulate independently for greater than 500 feet for return to prior level of function.        Start:  06/20/25    Expected End:  08/15/25                Montserrat Mitchell PT

## 2025-07-07 ENCOUNTER — CLINICAL SUPPORT (OUTPATIENT)
Dept: REHABILITATION | Facility: HOSPITAL | Age: 75
End: 2025-07-07
Payer: MEDICARE

## 2025-07-07 DIAGNOSIS — Z74.09 IMPAIRED FUNCTIONAL MOBILITY, BALANCE, GAIT, AND ENDURANCE: ICD-10-CM

## 2025-07-07 DIAGNOSIS — M25.661 IMPAIRED RANGE OF MOTION OF RIGHT KNEE: ICD-10-CM

## 2025-07-07 DIAGNOSIS — G89.29 CHRONIC PAIN OF RIGHT KNEE: Primary | ICD-10-CM

## 2025-07-07 DIAGNOSIS — M25.561 CHRONIC PAIN OF RIGHT KNEE: Primary | ICD-10-CM

## 2025-07-07 DIAGNOSIS — R29.898 WEAKNESS OF RIGHT LOWER EXTREMITY: ICD-10-CM

## 2025-07-07 PROCEDURE — 97110 THERAPEUTIC EXERCISES: CPT | Mod: HCNC,PN

## 2025-07-07 PROCEDURE — 97140 MANUAL THERAPY 1/> REGIONS: CPT | Mod: HCNC,PN

## 2025-07-07 NOTE — PROGRESS NOTES
"Outpatient Rehab  Physical Therapy Visit    Patient Name: Jojo Burrows  MRN: [id number]  YOB: 1950  Encounter Date: 7/7/2025    Therapy Diagnosis:   Encounter Diagnoses   Name Primary?    Chronic pain of right knee Yes    Impaired range of motion of right knee     Weakness of right lower extremity     Impaired functional mobility, balance, gait, and endurance      Physician: Chary Dobbins NP    Physician Orders: Eval and Treat  Medical Diagnosis: S/P total knee replacement, right  Surgical Diagnosis: Not applicable for this Episode   Surgical Date: Not applicable for this Episode  Days Since Last Surgery: Not applicable for this Episode    Visit # / Visits Authorized:  5 / 10  Insurance Authorization Period: 6/23/2025 to 8/23/2025  Date of Evaluation: 6/20/2025  Plan of Care Certification: 6/20/2025 to 8/15/2025     Time In: 1400   Time Out: 1500  Total Time (in minutes): 60   Total Billable Time (in minutes):  60 minutes    Precautions: S/P (R) TKA on 6/17/2025      Subjective   Went to a wedding this weekend and was having some increased pain and took some medicine. She was able to stay seated with her foot propped up at the wedding..         Objective    Last assessed on Eval on 6/20/2025      Treatment:  Therapeutic Exercise  TE 1: Nustep x 5minutes  TE 2: Quad Set (towel under ankle) 5" hold, 10x  TE 4: Heel slides 5" hold 20x  TE 5: SAQ 30x; 2#  TE 6: Seated hip abdution, RTB  TE 7: Supine hip adduction 20x  TE 8: cone walking x 3 laps  TE 9: calf stretch (standing) 3x30"  TE 10: Bridges 1x10  Manual Therapy  MT 1: Patellar mobs  MT 2: PROM to (R) knee flexion with gentle overpressure          Time Entry(in minutes):  TE: 45 minutes  MT: 15 minutes  Manual Therapy Time Entry: 10  Therapeutic Exercise Time Entry: 45    Assessment & Plan   Assessment: She continues with improved gait, but still lacking knee flexion during swing phase and has hip hike to compensate. Improved active and passive " knee flexion ROM, will measure at next session. Improved activity tolerance noted today and added in cone walking with emphasis on knee to chest. Will inquire response at next session.      The patient will continue to benefit from skilled outpatient physical therapy in order to address the deficits listed in the problem list on the initial evaluation, provide patient and family education, and maximize the patients level of independence in the home and community environments.     The patient's spiritual, cultural, and educational needs were considered, and the patient is agreeable to the plan of care and goals.       Plan: Continue with POC and progress as tolerated.    Goals:   Active       Long Term Goals       Patient will demonstrate improved function as indicated by a functional score of 61 on the FOTO.        Start:  06/20/25    Expected End:  08/15/25            Pt will demonstrate improved pain by reports of less than or equal to 3/10 worst pain on the verbal rating scale in order to progress toward maximal functional ability and improve QOL.         Start:  06/20/25    Expected End:  08/15/25            Patient will improve bilateral lower extremity strength to greater than or equal to 4+/5 MMT for return to home and ease with stair climbing.        Start:  06/20/25    Expected End:  08/15/25            Patient will be able to climb a flight of stairs independently and safely for return to home and prior level of function.        Start:  06/20/25    Expected End:  08/15/25            Patient will improve right knee range of motion to within normal limits and less than 5/10 pain for ease with stair climbing and household chores.        Start:  06/20/25    Expected End:  08/15/25            Patient will ambulate independently with normalized gait pattern for return to prior level of function.        Start:  06/20/25    Expected End:  08/15/25               Short Term Goals       Patient will demonstrate  independence with HEP in order to progress toward functional independence        Start:  06/20/25    Expected End:  08/15/25            Pt will demonstrate improved pain by reports of less than or equal to 5/10 worst pain on the verbal rating scale in order to progress toward maximal functional ability and improve QOL.         Start:  06/20/25    Expected End:  08/15/25            Patient will improve right lower extremity strength by 1/2 MMT for improved strength needed for stair climbing.        Start:  06/20/25    Expected End:  08/15/25            Patient will ambulate independently for greater than 500 feet for return to prior level of function.        Start:  06/20/25    Expected End:  08/15/25                Montserrat Mitchell, PT

## 2025-07-09 ENCOUNTER — CLINICAL SUPPORT (OUTPATIENT)
Dept: REHABILITATION | Facility: HOSPITAL | Age: 75
End: 2025-07-09
Payer: MEDICARE

## 2025-07-09 DIAGNOSIS — M25.661 IMPAIRED RANGE OF MOTION OF RIGHT KNEE: ICD-10-CM

## 2025-07-09 DIAGNOSIS — R29.898 WEAKNESS OF RIGHT LOWER EXTREMITY: ICD-10-CM

## 2025-07-09 DIAGNOSIS — G89.29 CHRONIC PAIN OF RIGHT KNEE: Primary | ICD-10-CM

## 2025-07-09 DIAGNOSIS — M25.561 CHRONIC PAIN OF RIGHT KNEE: Primary | ICD-10-CM

## 2025-07-09 DIAGNOSIS — Z74.09 IMPAIRED FUNCTIONAL MOBILITY, BALANCE, GAIT, AND ENDURANCE: ICD-10-CM

## 2025-07-09 PROCEDURE — 97140 MANUAL THERAPY 1/> REGIONS: CPT | Mod: HCNC,PN

## 2025-07-09 PROCEDURE — 97110 THERAPEUTIC EXERCISES: CPT | Mod: HCNC,PN

## 2025-07-09 NOTE — PROGRESS NOTES
"Outpatient Rehab  Physical Therapy Visit    Patient Name: Jojo Burrows  MRN: [id number]  YOB: 1950  Encounter Date: 7/9/2025    Therapy Diagnosis:   Encounter Diagnoses   Name Primary?    Chronic pain of right knee Yes    Impaired range of motion of right knee     Weakness of right lower extremity     Impaired functional mobility, balance, gait, and endurance      Physician: Chary Dobbins NP    Physician Orders: Eval and Treat  Medical Diagnosis: S/P total knee replacement, right  Surgical Diagnosis: Not applicable for this Episode   Surgical Date: Not applicable for this Episode  Days Since Last Surgery: Not applicable for this Episode    Visit # / Visits Authorized:  6 / 10  Insurance Authorization Period: 6/23/2025 to 8/23/2025  Date of Evaluation: 6/20/2025  Plan of Care Certification: 6/20/2025 to 8/15/2025     Time In: 1400   Time Out: 1500  Total Time (in minutes): 60   Total Billable Time (in minutes):  60 minutes    Precautions: S/P (R) TKA on 6/17/2025      Subjective   Doing okay today..       Objective    Last assessed on Eval on 6/20/2025      Treatment:  Therapeutic Exercise  TE 1: Nustep x 5minutes  TE 2: Quad Set (towel under ankle) 5" hold, 10x  TE 4: Heel slides 5" hold 20x  TE 5: SAQ 30x; 2#  TE 6: Seated hip abdution, RTB  TE 7: Supine hip adduction 20x  TE 8: cone walking x 3 laps  TE 9: calf stretch (standing) 3x30"  TE 10: Bridges 2x10  Manual Therapy  MT 1: Patellar mobs  MT 2: PROM to (R) knee flexion with gentle overpressure  Other Activities  Activity 1: SLR 1x10  Activity 2: Sit to stand (elevated mat- therapist assist) x10    Time Entry(in minutes):  TE: 45 minutes  MT: 15 minutes     Assessment & Plan   Assessment: She continues with improved gait, but still lacking knee flexion during swing phase and has hip hike to compensate. Improved active and passive knee flexion ROM, with 75 degrees of passive range. Improved activity tolerance noted today and added in cone " walking with emphasis on knee to chest. Will inquire response at next session.   Evaluation/Treatment Tolerance: Patient limited by pain  The patient will continue to benefit from skilled outpatient physical therapy in order to address the deficits listed in the problem list on the initial evaluation, provide patient and family education, and maximize the patients level of independence in the home and community environments.     The patient's spiritual, cultural, and educational needs were considered, and the patient is agreeable to the plan of care and goals.       Plan: Continue with POC and progress as tolerated.    Goals:   Active       Long Term Goals       Patient will demonstrate improved function as indicated by a functional score of 61 on the FOTO.        Start:  06/20/25    Expected End:  08/15/25            Pt will demonstrate improved pain by reports of less than or equal to 3/10 worst pain on the verbal rating scale in order to progress toward maximal functional ability and improve QOL.         Start:  06/20/25    Expected End:  08/15/25            Patient will improve bilateral lower extremity strength to greater than or equal to 4+/5 MMT for return to home and ease with stair climbing.        Start:  06/20/25    Expected End:  08/15/25            Patient will be able to climb a flight of stairs independently and safely for return to home and prior level of function.        Start:  06/20/25    Expected End:  08/15/25            Patient will improve right knee range of motion to within normal limits and less than 5/10 pain for ease with stair climbing and household chores.        Start:  06/20/25    Expected End:  08/15/25            Patient will ambulate independently with normalized gait pattern for return to prior level of function.        Start:  06/20/25    Expected End:  08/15/25               Short Term Goals       Patient will demonstrate independence with HEP in order to progress toward  functional independence        Start:  06/20/25    Expected End:  08/15/25            Pt will demonstrate improved pain by reports of less than or equal to 5/10 worst pain on the verbal rating scale in order to progress toward maximal functional ability and improve QOL.         Start:  06/20/25    Expected End:  08/15/25            Patient will improve right lower extremity strength by 1/2 MMT for improved strength needed for stair climbing.        Start:  06/20/25    Expected End:  08/15/25            Patient will ambulate independently for greater than 500 feet for return to prior level of function.        Start:  06/20/25    Expected End:  08/15/25                Montserrat Mitchell PT

## 2025-07-14 ENCOUNTER — HOSPITAL ENCOUNTER (OUTPATIENT)
Dept: RADIOLOGY | Facility: HOSPITAL | Age: 75
Discharge: HOME | End: 2025-07-14
Payer: MEDICARE

## 2025-07-14 ENCOUNTER — OFFICE VISIT (OUTPATIENT)
Dept: ORTHOPEDICS | Facility: CLINIC | Age: 75
End: 2025-07-14
Payer: MEDICARE

## 2025-07-14 DIAGNOSIS — Z96.651 S/P TOTAL KNEE REPLACEMENT, RIGHT: ICD-10-CM

## 2025-07-14 DIAGNOSIS — Z96.651 S/P TOTAL KNEE REPLACEMENT, RIGHT: Primary | ICD-10-CM

## 2025-07-14 PROCEDURE — 99024 POSTOP FOLLOW-UP VISIT: CPT | Mod: HCNC,S$GLB,,

## 2025-07-14 PROCEDURE — 99999 PR PBB SHADOW E&M-EST. PATIENT-LVL IV: CPT | Mod: PBBFAC,HCNC,,

## 2025-07-14 PROCEDURE — 1159F MED LIST DOCD IN RCRD: CPT | Mod: CPTII,HCNC,S$GLB,

## 2025-07-14 PROCEDURE — 73562 X-RAY EXAM OF KNEE 3: CPT | Mod: 26,HCNC,RT, | Performed by: RADIOLOGY

## 2025-07-14 PROCEDURE — 73562 X-RAY EXAM OF KNEE 3: CPT | Mod: TC,HCNC,RT

## 2025-07-14 PROCEDURE — 1125F AMNT PAIN NOTED PAIN PRSNT: CPT | Mod: CPTII,HCNC,S$GLB,

## 2025-07-14 PROCEDURE — 1160F RVW MEDS BY RX/DR IN RCRD: CPT | Mod: CPTII,HCNC,S$GLB,

## 2025-07-15 NOTE — PROGRESS NOTES
Jojo Burrows presents for post-operative visit following a right total knee arthroplasty performed by Dr. Chow on 6/17/2025.  Patient presents today for a 4 week ROM check due to decreased ROM at her 2 week postop visit.    Exam:   There were no vitals taken for this visit.   Ambulating well with assistive device.  Incision is clean and dry without drainage or erythema.   Today ROM:  0-90  Preop ROM:  0-120    A/P:  2 weeks s/p right total knee arthroplasty    - The patient was advised to keep the incision clean and dry for the next 24 hours after which she may wash the area with antibacterial soap in the shower. Will not submerge until the incision is completely healed.   - Outpatient PT ongoing:  [facility].  Patient recently moved back to [place] and we would like to change her PT location.  Outpatient PT referral sent to [place] [facility]  - Continue aspirin for 1 month post op  - Pain medication:  No refill needed  - Reviewed antibiotic prophylaxis   - Follow up in 2 weeks with myself. Pt will call clinic with problems/concerns.     Luis Alfredo Mckee Jr., PA-C

## 2025-07-17 DIAGNOSIS — Z96.651 S/P TOTAL KNEE REPLACEMENT, RIGHT: ICD-10-CM

## 2025-07-17 RX ORDER — OXYCODONE HYDROCHLORIDE 5 MG/1
TABLET ORAL
Qty: 30 TABLET | Refills: 0 | Status: SHIPPED | OUTPATIENT
Start: 2025-07-17

## 2025-07-17 RX ORDER — CELECOXIB 200 MG/1
200 CAPSULE ORAL DAILY
Qty: 30 CAPSULE | Refills: 0 | Status: SHIPPED | OUTPATIENT
Start: 2025-07-17

## 2025-07-19 NOTE — TELEPHONE ENCOUNTER
No care due was identified.  [facility] Embedded Care Due Messages. Reference number: [id number].   7/19/2025 6:32:49 PM CDT

## 2025-07-21 RX ORDER — ROSUVASTATIN CALCIUM 20 MG/1
20 TABLET, COATED ORAL DAILY
Qty: 90 TABLET | Refills: 3 | OUTPATIENT
Start: 2025-07-21 | End: 2026-07-21

## 2025-07-28 ENCOUNTER — OFFICE VISIT (OUTPATIENT)
Dept: ORTHOPEDICS | Facility: CLINIC | Age: 75
End: 2025-07-28
Payer: MEDICARE

## 2025-07-28 VITALS — BODY MASS INDEX: 29.12 KG/M2 | HEIGHT: 61 IN

## 2025-07-28 DIAGNOSIS — Z96.651 S/P TOTAL KNEE REPLACEMENT, RIGHT: Primary | ICD-10-CM

## 2025-07-28 PROCEDURE — 99999 PR PBB SHADOW E&M-EST. PATIENT-LVL III: CPT | Mod: PBBFAC,HCNC,,

## 2025-07-28 PROCEDURE — 99024 POSTOP FOLLOW-UP VISIT: CPT | Mod: HCNC,S$GLB,,

## 2025-07-28 PROCEDURE — 1159F MED LIST DOCD IN RCRD: CPT | Mod: CPTII,HCNC,S$GLB,

## 2025-07-28 PROCEDURE — 1160F RVW MEDS BY RX/DR IN RCRD: CPT | Mod: CPTII,HCNC,S$GLB,

## 2025-07-28 NOTE — PROGRESS NOTES
HPI:  Jojo Burrows presents for 6-week post-operative visit following a right total knee arthroplasty performed by Dr. Chow on 6/17/2025.  Patient was seen in clinic on 07/14/2025 for an ROM check due to decreased ROM at the 2 week postop visit.  Today, patient endorses increased ROM and an overall satisfactory recovery.     Medications: tylenol BID and oxycodone nightly    Assistive Devices: walker   PT: ongoing at [facility]   Limitations: none    Exam:   There were no vitals taken for this visit.   Gait: normal with assistive device  Incision: healed, clean, and dry without drainage or erythema   Stability:  Knee stable anterior-posterior varus and valgus stresses, no extensor lag    Current ROM: 2-110  Pre-op ROM:  0-120    Imaging:  Radiographs taken today and reviewed revealing a well fixed and aligned prosthesis.    A/P:  6 weeks s/p right total knee arthroplasty    - Patient's recovery is progressing well with ROM of 0-110 and a well healing incision.   - Outpatient PT: ongoing at [facility]   - Pain medication: no refill needed   - Follow up in 6 weeks with Dr. Chow. Pt will call clinic with any problems/concerns.      Luis Alfredo Mckee Jr., PA-C

## 2025-07-31 RX ORDER — ROSUVASTATIN CALCIUM 20 MG/1
20 TABLET, COATED ORAL DAILY
Qty: 90 TABLET | Refills: 1 | Status: SHIPPED | OUTPATIENT
Start: 2025-07-31 | End: 2026-01-27

## 2025-07-31 NOTE — TELEPHONE ENCOUNTER
No care due was identified.  [facility] Embedded Care Due Messages. Reference number: [id number].   7/31/2025 2:55:13 PM CDT

## 2025-08-01 NOTE — TELEPHONE ENCOUNTER
Refill Decision Note   Jojo Markos  is requesting a refill authorization.  Brief Assessment and Rationale for Refill:  Approve     Medication Therapy Plan:         Comments:     Note composed:8:19 PM 07/31/2025

## 2025-08-04 PROBLEM — M25.561 CHRONIC PAIN OF RIGHT KNEE: Status: RESOLVED | Noted: 2025-06-20 | Resolved: 2025-08-04

## 2025-08-04 PROBLEM — G89.29 CHRONIC PAIN OF RIGHT KNEE: Status: RESOLVED | Noted: 2025-06-20 | Resolved: 2025-08-04

## 2025-08-13 ENCOUNTER — OFFICE VISIT (OUTPATIENT)
Dept: CARDIOLOGY | Facility: CLINIC | Age: 75
End: 2025-08-13
Payer: MEDICARE

## 2025-08-13 VITALS
DIASTOLIC BLOOD PRESSURE: 71 MMHG | HEIGHT: 61 IN | BODY MASS INDEX: 29.47 KG/M2 | SYSTOLIC BLOOD PRESSURE: 101 MMHG | HEART RATE: 85 BPM | WEIGHT: 156.06 LBS

## 2025-08-13 DIAGNOSIS — K76.9 LIVER LESION: ICD-10-CM

## 2025-08-13 DIAGNOSIS — I77.1 TORTUOUS AORTA: ICD-10-CM

## 2025-08-13 DIAGNOSIS — I71.43 INFRARENAL ABDOMINAL AORTIC ANEURYSM (AAA) WITHOUT RUPTURE: Primary | ICD-10-CM

## 2025-08-13 DIAGNOSIS — I72.3 ANEURYSM OF COMMON ILIAC ARTERY: ICD-10-CM

## 2025-08-13 DIAGNOSIS — I72.2 ANEURYSM OF RENAL ARTERY: ICD-10-CM

## 2025-08-13 DIAGNOSIS — E78.49 OTHER HYPERLIPIDEMIA: ICD-10-CM

## 2025-08-13 DIAGNOSIS — I10 ESSENTIAL HYPERTENSION: ICD-10-CM

## 2025-08-13 PROBLEM — I71.40 ABDOMINAL AORTIC ANEURYSM (AAA) WITHOUT RUPTURE: Status: ACTIVE | Noted: 2025-08-13

## 2025-08-13 PROCEDURE — 99999 PR PBB SHADOW E&M-EST. PATIENT-LVL V: CPT | Mod: PBBFAC,HCNC,, | Performed by: INTERNAL MEDICINE

## 2025-08-13 RX ORDER — DIPHENHYDRAMINE HCL 50 MG
CAPSULE ORAL
Qty: 1 CAPSULE | Refills: 0 | Status: SHIPPED | OUTPATIENT
Start: 2025-08-13

## 2025-08-13 RX ORDER — PREDNISONE 50 MG/1
TABLET ORAL
Qty: 3 TABLET | Refills: 0 | Status: SHIPPED | OUTPATIENT
Start: 2025-08-13

## (undated) DEVICE — SUT VICRYL PLUS 0 CT1 18IN

## (undated) DEVICE — ADHESIVE DERMABOND ADVANCED

## (undated) DEVICE — BLADE SAGITTAL 18 X 1.27 X 90M

## (undated) DEVICE — ELECTRODE REM PLYHSV RETURN 9

## (undated) DEVICE — KIT IRR SUCTION HND PIECE

## (undated) DEVICE — SET CEMENT (SCULP)

## (undated) DEVICE — WRAP PROTECTIVE LEG POS STRL

## (undated) DEVICE — BLADE SURG CARBON STEEL SZ11

## (undated) DEVICE — SUT ETHILON 3-0 PS2 18 BLK

## (undated) DEVICE — GLOVE BIOGEL SKINSENSE PI 8.0

## (undated) DEVICE — DRAPE THREE-QTR REINF 53X77IN

## (undated) DEVICE — PAD CAST SPECIALIST STRL 6

## (undated) DEVICE — PENCIL SMK EVAC CONNECTOR 10FT

## (undated) DEVICE — DRAPE STERI INSTRUMENT 1018

## (undated) DEVICE — TOWEL OR DISP STRL BLUE 4/PK

## (undated) DEVICE — MIXER BONE CEMENT

## (undated) DEVICE — KIT VIZADISC KNEE TRACKING

## (undated) DEVICE — DRESSING AQUACEL FOAM 4X4IN

## (undated) DEVICE — MANIFOLD 4 PORT

## (undated) DEVICE — KIT TOTAL KNEE TKOFG OMC

## (undated) DEVICE — SUT STRATAFIX 3-0 30CM

## (undated) DEVICE — TOURNIQUET SB QC DP 34X4IN

## (undated) DEVICE — GOWN ECLIPSE REINF LV4 TWL 2XL

## (undated) DEVICE — BANDAGE MATRIX HK LOOP 6IN 5YD

## (undated) DEVICE — GOWN ECLIPSE POLY REINF 3XL

## (undated) DEVICE — SUT VICRYL+ 1 CT1 18IN

## (undated) DEVICE — UNDERGLOVES BIOGEL PI SIZE 8.5

## (undated) DEVICE — PAD DERMAPROX SM 8X11X.5IN

## (undated) DEVICE — WRAP KNEE ACCU THERM GEL PACK

## (undated) DEVICE — KIT CHECKPOINT MAKO

## (undated) DEVICE — DRAPE STERI U-SHAPED 47X51IN

## (undated) DEVICE — SOL BETADINE 5%

## (undated) DEVICE — SOL NACL IRR 1000ML BTL

## (undated) DEVICE — SUT VICRYL PLUS 2-0 CT1 18

## (undated) DEVICE — HOOD T7 W/ PEEL AWAY LENS

## (undated) DEVICE — TAPE SURG DURAPORE 2 X10YD

## (undated) DEVICE — DRESSING XEROFORM 1X8IN

## (undated) DEVICE — BONE PINS (4MM X 140MM)
Type: IMPLANTABLE DEVICE | Site: KNEE | Status: NON-FUNCTIONAL
Removed: 2025-06-17

## (undated) DEVICE — BLADE MAKO NARROW

## (undated) DEVICE — KIT DRAPE RIO ONE PIECE W/POCK

## (undated) DEVICE — BANDAGE MATRIX HK LOOP 4IN 5YD

## (undated) DEVICE — BONE PINS (4MM X 110MM)
Type: IMPLANTABLE DEVICE | Site: KNEE | Status: NON-FUNCTIONAL
Removed: 2025-06-17

## (undated) DEVICE — DRESSING AQUACEL AG ADV 3.5X12